# Patient Record
Sex: FEMALE | Race: BLACK OR AFRICAN AMERICAN | Employment: UNEMPLOYED | URBAN - METROPOLITAN AREA
[De-identification: names, ages, dates, MRNs, and addresses within clinical notes are randomized per-mention and may not be internally consistent; named-entity substitution may affect disease eponyms.]

---

## 2019-12-11 ENCOUNTER — OFFICE VISIT (OUTPATIENT)
Dept: FAMILY MEDICINE CLINIC | Age: 49
End: 2019-12-11

## 2019-12-11 VITALS
WEIGHT: 97 LBS | HEART RATE: 84 BPM | OXYGEN SATURATION: 98 % | RESPIRATION RATE: 12 BRPM | HEIGHT: 58 IN | BODY MASS INDEX: 20.36 KG/M2 | TEMPERATURE: 98.5 F

## 2019-12-11 DIAGNOSIS — K21.9 GASTROESOPHAGEAL REFLUX DISEASE WITHOUT ESOPHAGITIS: ICD-10-CM

## 2019-12-11 DIAGNOSIS — R56.9 SEIZURE (HCC): Primary | ICD-10-CM

## 2019-12-11 DIAGNOSIS — Q90.9 DOWN'S SYNDROME: ICD-10-CM

## 2019-12-11 PROBLEM — K59.00 CONSTIPATION: Status: ACTIVE | Noted: 2019-12-11

## 2019-12-11 PROBLEM — E61.1 IRON DEFICIENCY: Status: ACTIVE | Noted: 2019-12-11

## 2019-12-11 RX ORDER — LACOSAMIDE 50 MG/1
50 TABLET ORAL
COMMUNITY
Start: 2019-12-11 | End: 2020-03-17

## 2019-12-11 RX ORDER — OMEPRAZOLE 40 MG/1
40 CAPSULE, DELAYED RELEASE ORAL DAILY
COMMUNITY
End: 2020-03-17

## 2019-12-11 RX ORDER — ASPIRIN 81 MG
TABLET, DELAYED RELEASE (ENTERIC COATED) ORAL
COMMUNITY
End: 2020-03-17

## 2019-12-11 RX ORDER — LANOLIN ALCOHOL/MO/W.PET/CERES
CREAM (GRAM) TOPICAL
COMMUNITY
End: 2020-03-17

## 2019-12-11 RX ORDER — PREDNISONE 10 MG/1
TABLET ORAL
COMMUNITY
End: 2019-12-11 | Stop reason: ALTCHOICE

## 2019-12-11 RX ORDER — LACOSAMIDE 50 MG/1
50 TABLET ORAL 2 TIMES DAILY
COMMUNITY
End: 2019-12-11 | Stop reason: ALTCHOICE

## 2019-12-11 RX ORDER — LACOSAMIDE 100 MG/1
100 TABLET ORAL
COMMUNITY
Start: 2019-12-11 | End: 2020-03-17

## 2019-12-11 RX ORDER — LACOSAMIDE 100 MG/1
100 TABLET ORAL 2 TIMES DAILY
COMMUNITY
End: 2019-12-11 | Stop reason: ALTCHOICE

## 2019-12-11 RX ORDER — POLYETHYLENE GLYCOL 3350
POWDER (GRAM) MISCELLANEOUS
COMMUNITY
End: 2020-03-17

## 2019-12-11 RX ORDER — DIAZEPAM 10 MG/1
10 TABLET ORAL AS NEEDED
COMMUNITY
End: 2020-03-27

## 2019-12-11 RX ORDER — GUAIFENESIN 100 MG/5ML
81 LIQUID (ML) ORAL DAILY
Status: ON HOLD | COMMUNITY
End: 2021-01-01

## 2019-12-11 NOTE — PROGRESS NOTES
HISTORY OF PRESENT ILLNESS  Trae Crowley is a 51 y.o. female. HPI  Pt in office today to establish care - presents with group home and ?mom  -pt's care giver states that she has been having abnormal behaviors  -pt's care giver states she has had seizures - followed by neuro, on Vimpat, see med list  -pt's care givers states in recent mo pt has had a decline - not sure if med, seizure or CVA related, also followed by neuro  -they state that pt has been showing signs of possible dementia, pt was walking and now she is not  -pt has swelling in her hands   - pt went to the er for this and states that they say it was an allergic reaction to med - maybe Depakote, on 12d pred taper  Blisters have stabilized but not popped yet, on eze hands  The FamHx, SocHx, MedHx, Medication, and Allergy lists have been reviewed and updated in the chart. ROS  A comprehensive review of system was obtained and negative except findings in the HPI    Visit Vitals  Pulse 84   Temp 98.5 °F (36.9 °C) (Oral)   Resp 12   Ht 4' 10\" (1.473 m)   Wt 97 lb (44 kg)   LMP 09/05/2019   SpO2 98%   BMI 20.27 kg/m²     Physical Exam  Vitals signs and nursing note reviewed. Constitutional:       Appearance: She is well-developed. Comments:      Neck:      Vascular: No JVD. Cardiovascular:      Rate and Rhythm: Normal rate and regular rhythm. Heart sounds: No murmur. No friction rub. No gallop. Pulmonary:      Effort: Pulmonary effort is normal. No respiratory distress. Breath sounds: Normal breath sounds. No wheezing. Skin:     General: Skin is warm. Neurological:      Mental Status: She is alert and oriented to person, place, and time. ASSESSMENT and PLAN  Encounter Diagnoses   Name Primary?     Seizure ([address] Yes     Orders Placed This Encounter    aspirin 81 mg chewable tablet    docusate sodium (DOK) 100 mg tab    ferrous sulfate 325 mg (65 mg iron) tablet    omeprazole (PRILOSEC) 40 mg capsule    diazePAM (VALIUM) 10 mg tablet    Polyethylene Glycol 3350 powd    lacosamide (VIMPAT) 100 mg tab tablet    lacosamide (VIMPAT) 50 mg tab tablet     Med list updated  Will do refills in the future as long as they are not neuro related  Will set up cpx with labs next mo    I have discussed the diagnosis with the patient and the intended plan as seen in the above orders. The patient has received an after-visit summary and questions were answered concerning future plans. Patient conveyed understanding of the plan at the time of the visit.     Whit Zhang, MSN, ANP  12/11/2019

## 2019-12-11 NOTE — PROGRESS NOTES
Chief Complaint   Patient presents with   [address]     Pt in office today to establish care  -pt's care giver states that she has been having abnormal behaviors  -pt's care giver states she has had seizures  -pt's care givers states in recent mo pt has had a decline  -they state that pt has been showing signs of possible dementia, pt was walking and now she is not  -pt has swelling in her hands   - pt went to the er for this and states that they say it was an allergic reaction to med    1. Have you been to the ER, urgent care clinic since your last visit? Hospitalized since your last visit? Yes When: [facility]    2. Have you seen or consulted any other health care providers outside of the [address] [place] [address] since your last visit? Include any pap smears or colon screening.  No     Pt has no other concerns

## 2020-01-01 ENCOUNTER — DOCUMENTATION ONLY (OUTPATIENT)
Dept: FAMILY MEDICINE CLINIC | Age: 50
End: 2020-01-01

## 2020-01-01 ENCOUNTER — OFFICE VISIT (OUTPATIENT)
Dept: FAMILY MEDICINE CLINIC | Age: 50
End: 2020-01-01

## 2020-01-01 ENCOUNTER — HOSPITAL ENCOUNTER (OUTPATIENT)
Dept: GENERAL RADIOLOGY | Age: 50
Discharge: HOME | End: 2020-10-08
Attending: FAMILY MEDICINE
Payer: MEDICAID

## 2020-01-01 ENCOUNTER — TELEPHONE (OUTPATIENT)
Dept: FAMILY MEDICINE CLINIC | Age: 50
End: 2020-01-01

## 2020-01-01 ENCOUNTER — OFFICE VISIT (OUTPATIENT)
Dept: FAMILY MEDICINE CLINIC | Age: 50
End: 2020-01-01
Payer: MEDICAID

## 2020-01-01 ENCOUNTER — APPOINTMENT (OUTPATIENT)
Dept: GENERAL RADIOLOGY | Age: 50
End: 2020-01-01
Attending: STUDENT IN AN ORGANIZED HEALTH CARE EDUCATION/TRAINING PROGRAM
Payer: MEDICAID

## 2020-01-01 ENCOUNTER — ANESTHESIA EVENT (OUTPATIENT)
Dept: ENDOSCOPY | Age: 50
End: 2020-01-01
Payer: MEDICAID

## 2020-01-01 ENCOUNTER — ANESTHESIA (OUTPATIENT)
Dept: ENDOSCOPY | Age: 50
End: 2020-01-01
Payer: MEDICAID

## 2020-01-01 ENCOUNTER — VIRTUAL VISIT (OUTPATIENT)
Dept: FAMILY MEDICINE CLINIC | Age: 50
End: 2020-01-01

## 2020-01-01 ENCOUNTER — APPOINTMENT (OUTPATIENT)
Dept: CT IMAGING | Age: 50
End: 2020-01-01
Attending: STUDENT IN AN ORGANIZED HEALTH CARE EDUCATION/TRAINING PROGRAM
Payer: MEDICAID

## 2020-01-01 ENCOUNTER — HOSPITAL ENCOUNTER (OUTPATIENT)
Age: 50
Setting detail: OBSERVATION
Discharge: HOME | End: 2020-07-02
Attending: STUDENT IN AN ORGANIZED HEALTH CARE EDUCATION/TRAINING PROGRAM | Admitting: FAMILY MEDICINE
Payer: MEDICAID

## 2020-01-01 ENCOUNTER — HOSPITAL ENCOUNTER (OUTPATIENT)
Dept: ULTRASOUND IMAGING | Age: 50
Discharge: HOME | End: 2020-09-25
Attending: INTERNAL MEDICINE
Payer: MEDICAID

## 2020-01-01 ENCOUNTER — HOSPITAL ENCOUNTER (OUTPATIENT)
Dept: NUTRITION | Age: 50
Discharge: HOME | End: 2020-08-04

## 2020-01-01 ENCOUNTER — HOSPITAL ENCOUNTER (OUTPATIENT)
Age: 50
Setting detail: OUTPATIENT SURGERY
Discharge: HOME | End: 2020-07-08
Attending: INTERNAL MEDICINE | Admitting: INTERNAL MEDICINE
Payer: MEDICAID

## 2020-01-01 VITALS
TEMPERATURE: 98.6 F | HEIGHT: 58 IN | SYSTOLIC BLOOD PRESSURE: 129 MMHG | OXYGEN SATURATION: 99 % | WEIGHT: 110 LBS | DIASTOLIC BLOOD PRESSURE: 60 MMHG | HEART RATE: 98 BPM | BODY MASS INDEX: 23.09 KG/M2 | RESPIRATION RATE: 18 BRPM

## 2020-01-01 VITALS
HEIGHT: 57 IN | DIASTOLIC BLOOD PRESSURE: 64 MMHG | TEMPERATURE: 98.4 F | BODY MASS INDEX: 23.8 KG/M2 | OXYGEN SATURATION: 98 % | RESPIRATION RATE: 20 BRPM | SYSTOLIC BLOOD PRESSURE: 94 MMHG | HEART RATE: 98 BPM

## 2020-01-01 VITALS
OXYGEN SATURATION: 100 % | HEART RATE: 103 BPM | SYSTOLIC BLOOD PRESSURE: 116 MMHG | HEIGHT: 58 IN | RESPIRATION RATE: 17 BRPM | BODY MASS INDEX: 23.09 KG/M2 | TEMPERATURE: 98.2 F | WEIGHT: 110 LBS | DIASTOLIC BLOOD PRESSURE: 79 MMHG

## 2020-01-01 VITALS
DIASTOLIC BLOOD PRESSURE: 64 MMHG | RESPIRATION RATE: 16 BRPM | SYSTOLIC BLOOD PRESSURE: 106 MMHG | OXYGEN SATURATION: 94 % | HEIGHT: 58 IN | HEART RATE: 87 BPM | TEMPERATURE: 98.2 F | BODY MASS INDEX: 22.99 KG/M2

## 2020-01-01 VITALS
HEART RATE: 92 BPM | DIASTOLIC BLOOD PRESSURE: 81 MMHG | RESPIRATION RATE: 16 BRPM | SYSTOLIC BLOOD PRESSURE: 130 MMHG | OXYGEN SATURATION: 94 % | TEMPERATURE: 98.3 F

## 2020-01-01 VITALS — HEIGHT: 58 IN | BODY MASS INDEX: 23 KG/M2

## 2020-01-01 DIAGNOSIS — G47.00 INSOMNIA, UNSPECIFIED TYPE: ICD-10-CM

## 2020-01-01 DIAGNOSIS — F72 SEVERE INTELLECTUAL DISABILITY: ICD-10-CM

## 2020-01-01 DIAGNOSIS — A04.72 C. DIFFICILE COLITIS: Primary | ICD-10-CM

## 2020-01-01 DIAGNOSIS — K59.04 CHRONIC IDIOPATHIC CONSTIPATION: Primary | ICD-10-CM

## 2020-01-01 DIAGNOSIS — L98.9 SKIN LESION: ICD-10-CM

## 2020-01-01 DIAGNOSIS — R32 URINARY INCONTINENCE, UNSPECIFIED TYPE: Primary | ICD-10-CM

## 2020-01-01 DIAGNOSIS — B37.0 ORAL THRUSH: ICD-10-CM

## 2020-01-01 DIAGNOSIS — K59.04 CHRONIC IDIOPATHIC CONSTIPATION: ICD-10-CM

## 2020-01-01 DIAGNOSIS — Z99.3 WHEELCHAIR BOUND: ICD-10-CM

## 2020-01-01 DIAGNOSIS — Z13.6 ENCOUNTER FOR LIPID SCREENING FOR CARDIOVASCULAR DISEASE: ICD-10-CM

## 2020-01-01 DIAGNOSIS — R19.7 DIARRHEA OF PRESUMED INFECTIOUS ORIGIN: Primary | ICD-10-CM

## 2020-01-01 DIAGNOSIS — R26.2 INABILITY TO WALK: ICD-10-CM

## 2020-01-01 DIAGNOSIS — R79.89 ELEVATED LFTS: Primary | ICD-10-CM

## 2020-01-01 DIAGNOSIS — Z51.81 MEDICATION MONITORING ENCOUNTER: ICD-10-CM

## 2020-01-01 DIAGNOSIS — R74.8 ELEVATED LIVER ENZYMES: ICD-10-CM

## 2020-01-01 DIAGNOSIS — K62.5 RECTAL BLEEDING: ICD-10-CM

## 2020-01-01 DIAGNOSIS — R79.89 ELEVATED LFTS: ICD-10-CM

## 2020-01-01 DIAGNOSIS — Z13.220 ENCOUNTER FOR LIPID SCREENING FOR CARDIOVASCULAR DISEASE: ICD-10-CM

## 2020-01-01 DIAGNOSIS — A04.72 C. DIFFICILE DIARRHEA: Primary | ICD-10-CM

## 2020-01-01 DIAGNOSIS — Z78.9 ON TUBE FEEDING DIET: ICD-10-CM

## 2020-01-01 DIAGNOSIS — Z23 NEEDS FLU SHOT: ICD-10-CM

## 2020-01-01 DIAGNOSIS — Z99.3 WHEELCHAIR BOUND: Primary | ICD-10-CM

## 2020-01-01 LAB
ABO + RH BLD: NORMAL
ALBUMIN SERPL-MCNC: 2.2 G/DL (ref 3.5–5)
ALBUMIN SERPL-MCNC: 2.2 G/DL (ref 3.5–5)
ALBUMIN SERPL-MCNC: 2.3 G/DL (ref 3.5–5)
ALBUMIN SERPL-MCNC: 4 G/DL (ref 3.8–4.8)
ALBUMIN SERPL-MCNC: 4 G/DL (ref 3.8–4.8)
ALBUMIN SERPL-MCNC: 4.2 G/DL (ref 3.8–4.8)
ALBUMIN/GLOB SERPL: 0.5 {RATIO} (ref 1.1–2.2)
ALBUMIN/GLOB SERPL: 0.6 {RATIO} (ref 1.1–2.2)
ALBUMIN/GLOB SERPL: 0.6 {RATIO} (ref 1.1–2.2)
ALP BONE CFR SERPL: 14 % (ref 14–68)
ALP INTEST CFR SERPL: 0 % (ref 0–18)
ALP LIVER CFR SERPL: 86 % (ref 18–85)
ALP SERPL-CCNC: 193 U/L (ref 45–117)
ALP SERPL-CCNC: 203 U/L (ref 45–117)
ALP SERPL-CCNC: 214 U/L (ref 45–117)
ALP SERPL-CCNC: 253 IU/L (ref 39–117)
ALP SERPL-CCNC: 255 IU/L (ref 39–117)
ALP SERPL-CCNC: 274 IU/L (ref 39–117)
ALT SERPL-CCNC: 104 U/L (ref 12–78)
ALT SERPL-CCNC: 111 U/L (ref 12–78)
ALT SERPL-CCNC: 112 U/L (ref 12–78)
ALT SERPL-CCNC: 114 IU/L (ref 0–32)
ALT SERPL-CCNC: 116 IU/L (ref 0–32)
ALT SERPL-CCNC: 71 IU/L (ref 0–32)
ANION GAP SERPL CALC-SCNC: 3 MMOL/L (ref 5–15)
ANION GAP SERPL CALC-SCNC: 5 MMOL/L (ref 5–15)
ANION GAP SERPL CALC-SCNC: 7 MMOL/L (ref 5–15)
AST SERPL-CCNC: 38 IU/L (ref 0–40)
AST SERPL-CCNC: 60 IU/L (ref 0–40)
AST SERPL-CCNC: 64 U/L (ref 15–37)
AST SERPL-CCNC: 66 U/L (ref 15–37)
AST SERPL-CCNC: 67 IU/L (ref 0–40)
AST SERPL-CCNC: 77 U/L (ref 15–37)
BACTERIA SPEC CULT: NORMAL
BASOPHILS # BLD: 0 K/UL (ref 0–0.1)
BASOPHILS NFR BLD: 0 % (ref 0–1)
BILIRUB DIRECT SERPL-MCNC: 0.06 MG/DL (ref 0–0.4)
BILIRUB DIRECT SERPL-MCNC: 0.08 MG/DL (ref 0–0.4)
BILIRUB DIRECT SERPL-MCNC: 0.08 MG/DL (ref 0–0.4)
BILIRUB SERPL-MCNC: 0.1 MG/DL (ref 0.2–1)
BILIRUB SERPL-MCNC: 0.1 MG/DL (ref 0.2–1)
BILIRUB SERPL-MCNC: 0.2 MG/DL (ref 0.2–1)
BILIRUB SERPL-MCNC: <0.2 MG/DL (ref 0–1.2)
BLOOD GROUP ANTIBODIES SERPL: NORMAL
BUN SERPL-MCNC: 12 MG/DL (ref 6–20)
BUN SERPL-MCNC: 12 MG/DL (ref 6–20)
BUN SERPL-MCNC: 16 MG/DL (ref 6–20)
BUN/CREAT SERPL: 23 (ref 12–20)
BUN/CREAT SERPL: 26 (ref 12–20)
BUN/CREAT SERPL: 26 (ref 12–20)
C DIFF TOX A+B STL QL IA: POSITIVE
CALCIUM SERPL-MCNC: 8 MG/DL (ref 8.5–10.1)
CALCIUM SERPL-MCNC: 8.1 MG/DL (ref 8.5–10.1)
CALCIUM SERPL-MCNC: 8.4 MG/DL (ref 8.5–10.1)
CAMPYLOBACTER SPECIES, DNA: NEGATIVE
CHLORIDE SERPL-SCNC: 105 MMOL/L (ref 97–108)
CHLORIDE SERPL-SCNC: 108 MMOL/L (ref 97–108)
CHLORIDE SERPL-SCNC: 109 MMOL/L (ref 97–108)
CHOLEST SERPL-MCNC: 277 MG/DL (ref 100–199)
CO2 SERPL-SCNC: 26 MMOL/L (ref 21–32)
CO2 SERPL-SCNC: 26 MMOL/L (ref 21–32)
CO2 SERPL-SCNC: 27 MMOL/L (ref 21–32)
COMMENT, HOLDF: NORMAL
CREAT SERPL-MCNC: 0.46 MG/DL (ref 0.55–1.02)
CREAT SERPL-MCNC: 0.53 MG/DL (ref 0.55–1.02)
CREAT SERPL-MCNC: 0.61 MG/DL (ref 0.55–1.02)
CRP SERPL-MCNC: 2.98 MG/DL (ref 0–0.6)
D DIMER PPP FEU-MCNC: 3.84 MG/L FEU (ref 0–0.65)
DIFFERENTIAL METHOD BLD: ABNORMAL
ENTEROTOXIGEN E COLI, DNA: NEGATIVE
EOSINOPHIL # BLD: 0 K/UL (ref 0–0.4)
EOSINOPHIL NFR BLD: 0 % (ref 0–7)
EOSINOPHIL NFR BLD: 0 % (ref 0–7)
EOSINOPHIL NFR BLD: 1 % (ref 0–7)
ERYTHROCYTE [DISTWIDTH] IN BLOOD BY AUTOMATED COUNT: 14.3 % (ref 11.5–14.5)
ERYTHROCYTE [DISTWIDTH] IN BLOOD BY AUTOMATED COUNT: 14.3 % (ref 11.5–14.5)
ERYTHROCYTE [DISTWIDTH] IN BLOOD BY AUTOMATED COUNT: 14.4 % (ref 11.5–14.5)
FERRITIN SERPL-MCNC: 162 NG/ML (ref 26–388)
GLOBULIN SER CALC-MCNC: 3.8 G/DL (ref 2–4)
GLOBULIN SER CALC-MCNC: 3.9 G/DL (ref 2–4)
GLOBULIN SER CALC-MCNC: 4.2 G/DL (ref 2–4)
GLUCOSE SERPL-MCNC: 102 MG/DL (ref 65–100)
GLUCOSE SERPL-MCNC: 105 MG/DL (ref 65–100)
GLUCOSE SERPL-MCNC: 113 MG/DL (ref 65–100)
HCT VFR BLD AUTO: 38.9 % (ref 35–47)
HCT VFR BLD AUTO: 39.6 % (ref 35–47)
HCT VFR BLD AUTO: 39.8 % (ref 35–47)
HDLC SERPL-MCNC: 67 MG/DL
HEMOCCULT STL QL: POSITIVE
HGB BLD-MCNC: 12.9 G/DL (ref 11.5–16)
HGB BLD-MCNC: 13.3 G/DL (ref 11.5–16)
HGB BLD-MCNC: 13.3 G/DL (ref 11.5–16)
IL6 SERPL-MCNC: 6.5 PG/ML (ref 0–15.5)
IMM GRANULOCYTES # BLD AUTO: 0.1 K/UL (ref 0–0.04)
IMM GRANULOCYTES NFR BLD AUTO: 1 % (ref 0–0.5)
INTERPRETATION, 910389: NORMAL
LACTATE SERPL-SCNC: 1.6 MMOL/L (ref 0.4–2)
LDH SERPL L TO P-CCNC: 165 U/L (ref 81–246)
LDLC SERPL CALC-MCNC: 180 MG/DL (ref 0–99)
LEVETIRACETAM SERPL-MCNC: 79.4 UG/ML (ref 10–40)
LEVETIRACETAM SERPL-MCNC: 9.5 UG/ML (ref 10–40)
LIPASE SERPL-CCNC: 50 U/L (ref 73–393)
LYMPHOCYTES # BLD: 2.1 K/UL (ref 0.8–3.5)
LYMPHOCYTES # BLD: 2.4 K/UL (ref 0.8–3.5)
LYMPHOCYTES # BLD: 2.6 K/UL (ref 0.8–3.5)
LYMPHOCYTES NFR BLD: 23 % (ref 12–49)
LYMPHOCYTES NFR BLD: 28 % (ref 12–49)
LYMPHOCYTES NFR BLD: 32 % (ref 12–49)
MAGNESIUM SERPL-MCNC: 2.1 MG/DL (ref 1.6–2.4)
MCH RBC QN AUTO: 32.1 PG (ref 26–34)
MCH RBC QN AUTO: 32.4 PG (ref 26–34)
MCH RBC QN AUTO: 32.5 PG (ref 26–34)
MCHC RBC AUTO-ENTMCNC: 33.2 G/DL (ref 30–36.5)
MCHC RBC AUTO-ENTMCNC: 33.4 G/DL (ref 30–36.5)
MCHC RBC AUTO-ENTMCNC: 33.6 G/DL (ref 30–36.5)
MCV RBC AUTO: 96.4 FL (ref 80–99)
MCV RBC AUTO: 96.8 FL (ref 80–99)
MCV RBC AUTO: 97.3 FL (ref 80–99)
MONOCYTES # BLD: 0.4 K/UL (ref 0–1)
MONOCYTES # BLD: 0.5 K/UL (ref 0–1)
MONOCYTES # BLD: 0.5 K/UL (ref 0–1)
MONOCYTES NFR BLD: 5 % (ref 5–13)
MONOCYTES NFR BLD: 6 % (ref 5–13)
MONOCYTES NFR BLD: 6 % (ref 5–13)
NEUTS SEG # BLD: 4.5 K/UL (ref 1.8–8)
NEUTS SEG # BLD: 6 K/UL (ref 1.8–8)
NEUTS SEG # BLD: 6.6 K/UL (ref 1.8–8)
NEUTS SEG NFR BLD: 60 % (ref 32–75)
NEUTS SEG NFR BLD: 65 % (ref 32–75)
NEUTS SEG NFR BLD: 71 % (ref 32–75)
NRBC # BLD: 0 K/UL (ref 0–0.01)
NRBC BLD-RTO: 0 PER 100 WBC
O+P SPEC MICRO: NORMAL
O+P STL CONC: NORMAL
P SHIGELLOIDES DNA STL QL NAA+PROBE: NEGATIVE
PHENYTOIN SERPL-MCNC: 10.2 UG/ML (ref 10–20)
PHENYTOIN SERPL-MCNC: 33.3 UG/ML (ref 10–20)
PLATELET # BLD AUTO: 287 K/UL (ref 150–400)
PLATELET # BLD AUTO: 308 K/UL (ref 150–400)
PLATELET # BLD AUTO: 320 K/UL (ref 150–400)
PMV BLD AUTO: 10 FL (ref 8.9–12.9)
PMV BLD AUTO: 10 FL (ref 8.9–12.9)
PMV BLD AUTO: 9.9 FL (ref 8.9–12.9)
POTASSIUM SERPL-SCNC: 3.8 MMOL/L (ref 3.5–5.1)
POTASSIUM SERPL-SCNC: 4 MMOL/L (ref 3.5–5.1)
POTASSIUM SERPL-SCNC: 4 MMOL/L (ref 3.5–5.1)
PROT SERPL-MCNC: 6 G/DL (ref 6.4–8.2)
PROT SERPL-MCNC: 6.1 G/DL (ref 6.4–8.2)
PROT SERPL-MCNC: 6.5 G/DL (ref 6.4–8.2)
PROT SERPL-MCNC: 7.5 G/DL (ref 6–8.5)
PROT SERPL-MCNC: 7.5 G/DL (ref 6–8.5)
PROT SERPL-MCNC: 7.7 G/DL (ref 6–8.5)
RBC # BLD AUTO: 4.02 M/UL (ref 3.8–5.2)
RBC # BLD AUTO: 4.09 M/UL (ref 3.8–5.2)
RBC # BLD AUTO: 4.11 M/UL (ref 3.8–5.2)
SALMONELLA SPECIES, DNA: NEGATIVE
SAMPLES BEING HELD,HOLD: NORMAL
SARS-COV-2, COV2: NOT DETECTED
SERVICE CMNT-IMP: NORMAL
SHIGA TOXIN PRODUCING, DNA: NEGATIVE
SHIGELLA SP+EIEC IPAH STL QL NAA+PROBE: NEGATIVE
SODIUM SERPL-SCNC: 138 MMOL/L (ref 136–145)
SODIUM SERPL-SCNC: 139 MMOL/L (ref 136–145)
SODIUM SERPL-SCNC: 139 MMOL/L (ref 136–145)
SOURCE, COVRS: NORMAL
SPECIMEN EXP DATE BLD: NORMAL
SPECIMEN SOURCE, FCOV2M: NORMAL
SPECIMEN SOURCE: NORMAL
SPECIMEN STATUS REPORT, ROLRST: NORMAL
TRIGL SERPL-MCNC: 165 MG/DL (ref 0–149)
VIBRIO SPECIES, DNA: NEGATIVE
VLDLC SERPL CALC-MCNC: 30 MG/DL (ref 5–40)
WBC # BLD AUTO: 7.5 K/UL (ref 3.6–11)
WBC # BLD AUTO: 9.3 K/UL (ref 3.6–11)
WBC # BLD AUTO: 9.3 K/UL (ref 3.6–11)
WBC #/AREA STL HPF: NORMAL /HPF (ref 0–4)
Y. ENTEROCOLITICA, DNA: NEGATIVE

## 2020-01-01 PROCEDURE — 74011250636 HC RX REV CODE- 250/636: Performed by: NURSE ANESTHETIST, CERTIFIED REGISTERED

## 2020-01-01 PROCEDURE — 80053 COMPREHEN METABOLIC PANEL: CPT

## 2020-01-01 PROCEDURE — 99218 HC RM OBSERVATION: CPT

## 2020-01-01 PROCEDURE — 36415 COLL VENOUS BLD VENIPUNCTURE: CPT

## 2020-01-01 PROCEDURE — 74011250637 HC RX REV CODE- 250/637: Performed by: FAMILY MEDICINE

## 2020-01-01 PROCEDURE — 94761 N-INVAS EAR/PLS OXIMETRY MLT: CPT

## 2020-01-01 PROCEDURE — 74011250636 HC RX REV CODE- 250/636: Performed by: FAMILY MEDICINE

## 2020-01-01 PROCEDURE — 76705 ECHO EXAM OF ABDOMEN: CPT

## 2020-01-01 PROCEDURE — 85379 FIBRIN DEGRADATION QUANT: CPT

## 2020-01-01 PROCEDURE — 31720 CLEARANCE OF AIRWAYS: CPT

## 2020-01-01 PROCEDURE — 74011250636 HC RX REV CODE- 250/636: Performed by: STUDENT IN AN ORGANIZED HEALTH CARE EDUCATION/TRAINING PROGRAM

## 2020-01-01 PROCEDURE — 65660000000 HC RM CCU STEPDOWN

## 2020-01-01 PROCEDURE — 87040 BLOOD CULTURE FOR BACTERIA: CPT

## 2020-01-01 PROCEDURE — 77030006998

## 2020-01-01 PROCEDURE — 74011000250 HC RX REV CODE- 250: Performed by: NURSE ANESTHETIST, CERTIFIED REGISTERED

## 2020-01-01 PROCEDURE — 99214 OFFICE O/P EST MOD 30 MIN: CPT | Performed by: FAMILY MEDICINE

## 2020-01-01 PROCEDURE — 85025 COMPLETE CBC W/AUTO DIFF WBC: CPT

## 2020-01-01 PROCEDURE — 74011000258 HC RX REV CODE- 258: Performed by: NURSE ANESTHETIST, CERTIFIED REGISTERED

## 2020-01-01 PROCEDURE — 96372 THER/PROPH/DIAG INJ SC/IM: CPT

## 2020-01-01 PROCEDURE — 74011250637 HC RX REV CODE- 250/637: Performed by: STUDENT IN AN ORGANIZED HEALTH CARE EDUCATION/TRAINING PROGRAM

## 2020-01-01 PROCEDURE — 74011636320 HC RX REV CODE- 636/320: Performed by: RADIOLOGY

## 2020-01-01 PROCEDURE — 99285 EMERGENCY DEPT VISIT HI MDM: CPT

## 2020-01-01 PROCEDURE — 83605 ASSAY OF LACTIC ACID: CPT

## 2020-01-01 PROCEDURE — 94760 N-INVAS EAR/PLS OXIMETRY 1: CPT

## 2020-01-01 PROCEDURE — 65270000029 HC RM PRIVATE

## 2020-01-01 PROCEDURE — 77030018786 HC NDL GD F/USND BARD -B

## 2020-01-01 PROCEDURE — 89055 LEUKOCYTE ASSESSMENT FECAL: CPT

## 2020-01-01 PROCEDURE — 90471 IMMUNIZATION ADMIN: CPT

## 2020-01-01 PROCEDURE — C1751 CATH, INF, PER/CENT/MIDLINE: HCPCS

## 2020-01-01 PROCEDURE — 86140 C-REACTIVE PROTEIN: CPT

## 2020-01-01 PROCEDURE — 83735 ASSAY OF MAGNESIUM: CPT

## 2020-01-01 PROCEDURE — 83690 ASSAY OF LIPASE: CPT

## 2020-01-01 PROCEDURE — 74177 CT ABD & PELVIS W/CONTRAST: CPT

## 2020-01-01 PROCEDURE — 83615 LACTATE (LD) (LDH) ENZYME: CPT

## 2020-01-01 PROCEDURE — 76937 US GUIDE VASCULAR ACCESS: CPT

## 2020-01-01 PROCEDURE — 87506 IADNA-DNA/RNA PROBE TQ 6-11: CPT

## 2020-01-01 PROCEDURE — 74019 RADEX ABDOMEN 2 VIEWS: CPT

## 2020-01-01 PROCEDURE — 82728 ASSAY OF FERRITIN: CPT

## 2020-01-01 PROCEDURE — 87635 SARS-COV-2 COVID-19 AMP PRB: CPT

## 2020-01-01 PROCEDURE — 86900 BLOOD TYPING SEROLOGIC ABO: CPT

## 2020-01-01 PROCEDURE — 82272 OCCULT BLD FECES 1-3 TESTS: CPT

## 2020-01-01 PROCEDURE — 83520 IMMUNOASSAY QUANT NOS NONAB: CPT

## 2020-01-01 PROCEDURE — 90686 IIV4 VACC NO PRSV 0.5 ML IM: CPT

## 2020-01-01 PROCEDURE — 74011000250 HC RX REV CODE- 250: Performed by: INTERNAL MEDICINE

## 2020-01-01 PROCEDURE — 87177 OVA AND PARASITES SMEARS: CPT

## 2020-01-01 PROCEDURE — 74178 CT ABD&PLV WO CNTR FLWD CNTR: CPT

## 2020-01-01 PROCEDURE — 76040000019: Performed by: INTERNAL MEDICINE

## 2020-01-01 PROCEDURE — 76060000031 HC ANESTHESIA FIRST 0.5 HR: Performed by: INTERNAL MEDICINE

## 2020-01-01 PROCEDURE — 99213 OFFICE O/P EST LOW 20 MIN: CPT | Performed by: FAMILY MEDICINE

## 2020-01-01 RX ORDER — EPINEPHRINE 0.1 MG/ML
1 INJECTION INTRACARDIAC; INTRAVENOUS
Status: CANCELLED | OUTPATIENT
Start: 2020-01-01 | End: 2020-01-01

## 2020-01-01 RX ORDER — PHENYTOIN 125 MG/5ML
125 SUSPENSION ORAL EVERY 12 HOURS
Status: DISCONTINUED | OUTPATIENT
Start: 2020-01-01 | End: 2020-01-01 | Stop reason: HOSPADM

## 2020-01-01 RX ORDER — SODIUM CHLORIDE 9 MG/ML
50 INJECTION, SOLUTION INTRAVENOUS CONTINUOUS
Status: DISPENSED | OUTPATIENT
Start: 2020-01-01 | End: 2020-01-01

## 2020-01-01 RX ORDER — FENTANYL CITRATE 50 UG/ML
100 INJECTION, SOLUTION INTRAMUSCULAR; INTRAVENOUS
Status: CANCELLED | OUTPATIENT
Start: 2020-01-01 | End: 2020-01-01

## 2020-01-01 RX ORDER — DEXTROMETHORPHAN/PSEUDOEPHED 2.5-7.5/.8
1.2 DROPS ORAL
Status: CANCELLED | OUTPATIENT
Start: 2020-01-01

## 2020-01-01 RX ORDER — SODIUM CHLORIDE 9 MG/ML
1000 INJECTION, SOLUTION INTRAVENOUS ONCE
Status: COMPLETED | OUTPATIENT
Start: 2020-01-01 | End: 2020-01-01

## 2020-01-01 RX ORDER — VANCOMYCIN HYDROCHLORIDE 250 MG/5ML
250 POWDER, FOR SOLUTION ORAL EVERY 6 HOURS
Qty: 160 ML | Refills: 0 | Status: SHIPPED | OUTPATIENT
Start: 2020-01-01 | End: 2020-01-01

## 2020-01-01 RX ORDER — ATORVASTATIN CALCIUM 10 MG/1
10 TABLET, FILM COATED ORAL DAILY
Qty: 30 TAB | Refills: 3 | Status: SHIPPED | OUTPATIENT
Start: 2020-01-01 | End: 2021-01-01 | Stop reason: SDUPTHER

## 2020-01-01 RX ORDER — VANCOMYCIN HYDROCHLORIDE 250 MG/5ML
250 POWDER, FOR SOLUTION ORAL EVERY 6 HOURS
Status: DISCONTINUED | OUTPATIENT
Start: 2020-01-01 | End: 2020-01-01 | Stop reason: HOSPADM

## 2020-01-01 RX ORDER — SODIUM CHLORIDE 9 MG/ML
INJECTION, SOLUTION INTRAVENOUS
Status: DISCONTINUED | OUTPATIENT
Start: 2020-01-01 | End: 2020-01-01 | Stop reason: HOSPADM

## 2020-01-01 RX ORDER — POLYETHYLENE GLYCOL 3350 17 G/17G
17 POWDER, FOR SOLUTION ORAL
COMMUNITY
End: 2020-01-01 | Stop reason: CLARIF

## 2020-01-01 RX ORDER — SODIUM CHLORIDE 0.9 % (FLUSH) 0.9 %
5-40 SYRINGE (ML) INJECTION EVERY 8 HOURS
Status: DISCONTINUED | OUTPATIENT
Start: 2020-01-01 | End: 2020-01-01 | Stop reason: HOSPADM

## 2020-01-01 RX ORDER — NALOXONE HYDROCHLORIDE 0.4 MG/ML
0.4 INJECTION, SOLUTION INTRAMUSCULAR; INTRAVENOUS; SUBCUTANEOUS
Status: ACTIVE | OUTPATIENT
Start: 2020-01-01 | End: 2020-01-01

## 2020-01-01 RX ORDER — BACITRACIN ZINC 500 UNIT/G
OINTMENT (GRAM) TOPICAL 2 TIMES DAILY
Qty: 28 G | Refills: 2 | Status: SHIPPED | OUTPATIENT
Start: 2020-01-01 | End: 2020-01-01 | Stop reason: CLARIF

## 2020-01-01 RX ORDER — ACETAMINOPHEN 500 MG
500 TABLET ORAL
COMMUNITY
End: 2020-01-01 | Stop reason: CLARIF

## 2020-01-01 RX ORDER — IPRATROPIUM BROMIDE AND ALBUTEROL SULFATE 2.5; .5 MG/3ML; MG/3ML
3 SOLUTION RESPIRATORY (INHALATION)
Status: DISCONTINUED | OUTPATIENT
Start: 2020-01-01 | End: 2020-01-01 | Stop reason: HOSPADM

## 2020-01-01 RX ORDER — PROPOFOL 10 MG/ML
INJECTION, EMULSION INTRAVENOUS
Status: DISCONTINUED | OUTPATIENT
Start: 2020-01-01 | End: 2020-01-01 | Stop reason: HOSPADM

## 2020-01-01 RX ORDER — ATROPINE SULFATE 0.1 MG/ML
0.5 INJECTION INTRAVENOUS
Status: CANCELLED | OUTPATIENT
Start: 2020-01-01 | End: 2020-01-01

## 2020-01-01 RX ORDER — POLYETHYLENE GLYCOL 3350 17 G/17G
17 POWDER, FOR SOLUTION ORAL EVERY OTHER DAY
Qty: 507 G | Refills: 11 | Status: SHIPPED | OUTPATIENT
Start: 2020-01-01 | End: 2020-01-01 | Stop reason: SDUPTHER

## 2020-01-01 RX ORDER — VANCOMYCIN HYDROCHLORIDE 250 MG/5ML
125 POWDER, FOR SOLUTION ORAL EVERY 6 HOURS
Status: DISCONTINUED | OUTPATIENT
Start: 2020-01-01 | End: 2020-01-01

## 2020-01-01 RX ORDER — PROPOFOL 10 MG/ML
INJECTION, EMULSION INTRAVENOUS AS NEEDED
Status: DISCONTINUED | OUTPATIENT
Start: 2020-01-01 | End: 2020-01-01 | Stop reason: HOSPADM

## 2020-01-01 RX ORDER — NYSTATIN 100000 [USP'U]/ML
1 SUSPENSION ORAL 4 TIMES DAILY
Qty: 473 ML | Refills: 0 | Status: SHIPPED | OUTPATIENT
Start: 2020-01-01 | End: 2020-01-01

## 2020-01-01 RX ORDER — SODIUM CHLORIDE 9 MG/ML
50 INJECTION, SOLUTION INTRAVENOUS CONTINUOUS
Status: CANCELLED | OUTPATIENT
Start: 2020-01-01 | End: 2020-01-01

## 2020-01-01 RX ORDER — EPINEPHRINE 0.1 MG/ML
1 INJECTION INTRACARDIAC; INTRAVENOUS
Status: DISCONTINUED | OUTPATIENT
Start: 2020-01-01 | End: 2020-01-01 | Stop reason: HOSPADM

## 2020-01-01 RX ORDER — FLUMAZENIL 0.1 MG/ML
0.2 INJECTION INTRAVENOUS
Status: CANCELLED | OUTPATIENT
Start: 2020-01-01 | End: 2020-01-01

## 2020-01-01 RX ORDER — ATROPINE SULFATE 10 MG/ML
1 SOLUTION/ DROPS OPHTHALMIC 3 TIMES DAILY
Status: ON HOLD | COMMUNITY
End: 2021-01-01

## 2020-01-01 RX ORDER — MIDAZOLAM HYDROCHLORIDE 1 MG/ML
.25-5 INJECTION, SOLUTION INTRAMUSCULAR; INTRAVENOUS
Status: CANCELLED | OUTPATIENT
Start: 2020-01-01

## 2020-01-01 RX ORDER — FENTANYL CITRATE 50 UG/ML
100 INJECTION, SOLUTION INTRAMUSCULAR; INTRAVENOUS
Status: ACTIVE | OUTPATIENT
Start: 2020-01-01 | End: 2020-01-01

## 2020-01-01 RX ORDER — LIDOCAINE HYDROCHLORIDE 20 MG/ML
INJECTION, SOLUTION EPIDURAL; INFILTRATION; INTRACAUDAL; PERINEURAL AS NEEDED
Status: DISCONTINUED | OUTPATIENT
Start: 2020-01-01 | End: 2020-01-01 | Stop reason: HOSPADM

## 2020-01-01 RX ORDER — FLUMAZENIL 0.1 MG/ML
0.2 INJECTION INTRAVENOUS
Status: ACTIVE | OUTPATIENT
Start: 2020-01-01 | End: 2020-01-01

## 2020-01-01 RX ORDER — ZINC SULFATE 50(220)MG
1 CAPSULE ORAL DAILY
Status: DISCONTINUED | OUTPATIENT
Start: 2020-01-01 | End: 2020-01-01

## 2020-01-01 RX ORDER — SODIUM CHLORIDE 0.9 % (FLUSH) 0.9 %
10-40 SYRINGE (ML) INJECTION EVERY 8 HOURS
Status: DISCONTINUED | OUTPATIENT
Start: 2020-01-01 | End: 2020-01-01 | Stop reason: HOSPADM

## 2020-01-01 RX ORDER — ATORVASTATIN CALCIUM 20 MG/1
20 TABLET, FILM COATED ORAL DAILY
Status: DISCONTINUED | OUTPATIENT
Start: 2020-01-01 | End: 2020-01-01

## 2020-01-01 RX ORDER — MIDAZOLAM HYDROCHLORIDE 1 MG/ML
.25-5 INJECTION, SOLUTION INTRAMUSCULAR; INTRAVENOUS
Status: DISCONTINUED | OUTPATIENT
Start: 2020-01-01 | End: 2020-01-01 | Stop reason: HOSPADM

## 2020-01-01 RX ORDER — LORAZEPAM 2 MG/ML
4 INJECTION INTRAMUSCULAR
Status: DISCONTINUED | OUTPATIENT
Start: 2020-01-01 | End: 2020-01-01 | Stop reason: HOSPADM

## 2020-01-01 RX ORDER — DIAZEPAM 10 MG/2ML
10 GEL RECTAL AS NEEDED
COMMUNITY

## 2020-01-01 RX ORDER — SODIUM CHLORIDE 0.9 % (FLUSH) 0.9 %
5-40 SYRINGE (ML) INJECTION AS NEEDED
Status: DISCONTINUED | OUTPATIENT
Start: 2020-01-01 | End: 2020-01-01 | Stop reason: HOSPADM

## 2020-01-01 RX ORDER — LANOLIN ALCOHOL/MO/W.PET/CERES
3 CREAM (GRAM) TOPICAL
Qty: 30 TAB | Refills: 11 | Status: SHIPPED | OUTPATIENT
Start: 2020-01-01

## 2020-01-01 RX ORDER — POLYETHYLENE GLYCOL 3350 17 G/17G
17 POWDER, FOR SOLUTION ORAL DAILY
Qty: 507 G | Refills: 11 | Status: SHIPPED | OUTPATIENT
Start: 2020-01-01 | End: 2020-01-01

## 2020-01-01 RX ORDER — POLYETHYLENE GLYCOL 3350 17 G/17G
17 POWDER, FOR SOLUTION ORAL EVERY OTHER DAY
Qty: 507 G | Refills: 11 | Status: SHIPPED | OUTPATIENT
Start: 2020-01-01 | End: 2021-01-01 | Stop reason: SDUPTHER

## 2020-01-01 RX ORDER — SODIUM CHLORIDE 9 MG/ML
90 INJECTION, SOLUTION INTRAVENOUS CONTINUOUS
Status: DISCONTINUED | OUTPATIENT
Start: 2020-01-01 | End: 2020-01-01

## 2020-01-01 RX ORDER — NALOXONE HYDROCHLORIDE 0.4 MG/ML
0.4 INJECTION, SOLUTION INTRAMUSCULAR; INTRAVENOUS; SUBCUTANEOUS
Status: CANCELLED | OUTPATIENT
Start: 2020-01-01 | End: 2020-01-01

## 2020-01-01 RX ORDER — DEXTROMETHORPHAN/PSEUDOEPHED 2.5-7.5/.8
1.2 DROPS ORAL
Status: DISCONTINUED | OUTPATIENT
Start: 2020-01-01 | End: 2020-01-01 | Stop reason: HOSPADM

## 2020-01-01 RX ORDER — VANCOMYCIN HYDROCHLORIDE 250 MG/5ML
250 POWDER, FOR SOLUTION ORAL EVERY 6 HOURS
Qty: 100 ML | Refills: 0 | Status: SHIPPED | OUTPATIENT
Start: 2020-01-01 | End: 2020-01-01

## 2020-01-01 RX ORDER — DIAZEPAM 10 MG/2ML
10 GEL RECTAL AS NEEDED
Status: DISCONTINUED | OUTPATIENT
Start: 2020-01-01 | End: 2020-01-01

## 2020-01-01 RX ORDER — ATROPINE SULFATE 0.1 MG/ML
0.5 INJECTION INTRAVENOUS
Status: DISCONTINUED | OUTPATIENT
Start: 2020-01-01 | End: 2020-01-01 | Stop reason: HOSPADM

## 2020-01-01 RX ORDER — ENOXAPARIN SODIUM 100 MG/ML
40 INJECTION SUBCUTANEOUS EVERY 24 HOURS
Status: DISCONTINUED | OUTPATIENT
Start: 2020-01-01 | End: 2020-01-01 | Stop reason: HOSPADM

## 2020-01-01 RX ORDER — ASCORBIC ACID 500 MG
500 TABLET ORAL 2 TIMES DAILY
Status: DISCONTINUED | OUTPATIENT
Start: 2020-01-01 | End: 2020-01-01

## 2020-01-01 RX ADMIN — Medication 10 ML: at 15:15

## 2020-01-01 RX ADMIN — ENOXAPARIN SODIUM 40 MG: 40 INJECTION SUBCUTANEOUS at 21:13

## 2020-01-01 RX ADMIN — LEVETIRACETAM 500 MG: 100 SOLUTION ORAL at 01:12

## 2020-01-01 RX ADMIN — ENOXAPARIN SODIUM 40 MG: 40 INJECTION SUBCUTANEOUS at 01:11

## 2020-01-01 RX ADMIN — LIDOCAINE HYDROCHLORIDE 70 MG: 20 INJECTION, SOLUTION INTRAVENOUS at 16:25

## 2020-01-01 RX ADMIN — VANCOMYCIN HYDROCHLORIDE 125 MG: KIT at 05:33

## 2020-01-01 RX ADMIN — IOPAMIDOL 100 ML: 755 INJECTION, SOLUTION INTRAVENOUS at 16:44

## 2020-01-01 RX ADMIN — LEVETIRACETAM 500 MG: 100 SOLUTION ORAL at 09:09

## 2020-01-01 RX ADMIN — SODIUM CHLORIDE 90 ML/HR: 900 INJECTION, SOLUTION INTRAVENOUS at 05:34

## 2020-01-01 RX ADMIN — SODIUM CHLORIDE: 9 INJECTION, SOLUTION INTRAVENOUS at 16:11

## 2020-01-01 RX ADMIN — VANCOMYCIN HYDROCHLORIDE 125 MG: KIT at 19:12

## 2020-01-01 RX ADMIN — Medication 10 ML: at 05:42

## 2020-01-01 RX ADMIN — Medication 10 ML: at 05:41

## 2020-01-01 RX ADMIN — Medication 10 ML: at 21:13

## 2020-01-01 RX ADMIN — ATORVASTATIN CALCIUM 20 MG: 20 TABLET, FILM COATED ORAL at 09:09

## 2020-01-01 RX ADMIN — Medication 10 ML: at 15:17

## 2020-01-01 RX ADMIN — LEVETIRACETAM 500 MG: 100 SOLUTION ORAL at 09:21

## 2020-01-01 RX ADMIN — LEVETIRACETAM 500 MG: 100 SOLUTION ORAL at 21:13

## 2020-01-01 RX ADMIN — VANCOMYCIN HYDROCHLORIDE 250 MG: KIT at 12:54

## 2020-01-01 RX ADMIN — PHENYLEPHRINE HYDROCHLORIDE 100 MCG: 10 INJECTION INTRAVENOUS at 16:31

## 2020-01-01 RX ADMIN — PROPOFOL 100 MCG/KG/MIN: 10 INJECTION, EMULSION INTRAVENOUS at 16:25

## 2020-01-01 RX ADMIN — OXYCODONE HYDROCHLORIDE AND ACETAMINOPHEN 500 MG: 500 TABLET ORAL at 18:49

## 2020-01-01 RX ADMIN — PHENYTOIN 125 MG: 125 SUSPENSION ORAL at 01:12

## 2020-01-01 RX ADMIN — Medication 10 ML: at 01:13

## 2020-01-01 RX ADMIN — PHENYLEPHRINE HYDROCHLORIDE 100 MCG: 10 INJECTION INTRAVENOUS at 16:34

## 2020-01-01 RX ADMIN — VANCOMYCIN HYDROCHLORIDE 250 MG: KIT at 05:41

## 2020-01-01 RX ADMIN — PHENYLEPHRINE HYDROCHLORIDE 100 MCG: 10 INJECTION INTRAVENOUS at 16:37

## 2020-01-01 RX ADMIN — SODIUM CHLORIDE 1000 ML: 900 INJECTION, SOLUTION INTRAVENOUS at 19:10

## 2020-01-01 RX ADMIN — PHENYTOIN 125 MG: 125 SUSPENSION ORAL at 09:21

## 2020-01-01 RX ADMIN — PROPOFOL 60 MG: 10 INJECTION, EMULSION INTRAVENOUS at 16:25

## 2020-01-01 RX ADMIN — VANCOMYCIN HYDROCHLORIDE 125 MG: KIT at 15:09

## 2020-01-01 RX ADMIN — VANCOMYCIN HYDROCHLORIDE 250 MG: KIT at 23:55

## 2020-01-01 RX ADMIN — VANCOMYCIN HYDROCHLORIDE 125 MG: KIT at 01:12

## 2020-01-01 RX ADMIN — Medication 250 ML: at 16:33

## 2020-01-01 RX ADMIN — ZINC SULFATE 220 MG (50 MG) CAPSULE 1 CAPSULE: CAPSULE at 09:09

## 2020-01-01 RX ADMIN — OXYCODONE HYDROCHLORIDE AND ACETAMINOPHEN 500 MG: 500 TABLET ORAL at 09:09

## 2020-01-01 RX ADMIN — PHENYTOIN 125 MG: 125 SUSPENSION ORAL at 09:09

## 2020-01-01 RX ADMIN — Medication 10 ML: at 19:05

## 2020-01-01 RX ADMIN — Medication 10 ML: at 01:12

## 2020-01-01 RX ADMIN — PHENYTOIN 125 MG: 125 SUSPENSION ORAL at 21:13

## 2020-01-09 ENCOUNTER — OFFICE VISIT (OUTPATIENT)
Dept: FAMILY MEDICINE CLINIC | Age: 50
End: 2020-01-09

## 2020-01-09 VITALS
HEART RATE: 80 BPM | TEMPERATURE: 97.3 F | SYSTOLIC BLOOD PRESSURE: 114 MMHG | OXYGEN SATURATION: 99 % | DIASTOLIC BLOOD PRESSURE: 72 MMHG | RESPIRATION RATE: 20 BRPM | WEIGHT: 98.38 LBS | HEIGHT: 58 IN | BODY MASS INDEX: 20.65 KG/M2

## 2020-01-09 DIAGNOSIS — T14.8XXA SKIN ABRASION: Primary | ICD-10-CM

## 2020-01-09 RX ORDER — CLOBAZAM 10 MG/1
TABLET ORAL DAILY
COMMUNITY
End: 2020-03-17

## 2020-01-09 RX ORDER — MUPIROCIN 20 MG/G
OINTMENT TOPICAL DAILY
Qty: 30 G | Refills: 0 | Status: SHIPPED | OUTPATIENT
Start: 2020-01-09 | End: 2020-03-17

## 2020-01-09 NOTE — PROGRESS NOTES
1. Have you been to the ER, urgent care clinic since your last visit? Hospitalized since your last visit? No    2. Have you seen or consulted any other health care providers outside of the [address] [place] [address] since your last visit? Include any pap smears or colon screening.  No       Chief Complaint   Patient presents with    Skin Problem     blisters on left arm elbow area

## 2020-01-09 NOTE — PROGRESS NOTES
Chief Complaint   Patient presents with    Skin Problem     blisters on left arm elbow area     Alka Chan is a 51 y.o. female who presents for evaluation. Lives in [facility]. Caregiver states pt had negative rxn to depakote that caused blistering rash back in November. Took predisone in December and blistering improved somewhat. Was taking depakote for seizures. Neuro changed rx to [facility] along with vimpat, still in the process of up titration onfi dosage. Here today for eval of left elbow abrasion. Was previously blister but still not fully healed. Caregiver states they want to make sure there is no infection. Reviewed PmHx, RxHx, FmHx, SocHx, AllgHx and updated and dated in the chart. Patient Active Problem List    Diagnosis    Seizure ([address]    Gastroesophageal reflux disease without esophagitis    Down's syndrome    Iron deficiency    Constipation    MR (mental retardation), severe     Mongolism           Review of Systems - negative except as listed above in the HPI    Objective:     Vitals:    01/09/20 1048   BP: 114/72   Pulse: 80   Resp: 20   Temp: 97.3 °F (36.3 °C)   TempSrc: Oral   SpO2: 99%   Weight: 98 lb 6 oz (44.6 kg)   Height: 4' 10\" (1.473 m)     Physical Examination: General appearance - alert, well appearing, and in no distress  Mental status - alert, non-verbal  Chest - clear to auscultation, no wheezes, rales or rhonchi, symmetric air entry  Heart - normal rate, regular rhythm, normal S1, S2, no murmurs, rubs, clicks or gallops  Abdomen - soft, nontender, nondistended, no masses or organomegaly  Skin - normal coloration and turgor, healing abrasion/open blister to left elbow, no surrounding erythema, no purulence, no odor, non-tender to palpation    Assessment/ Plan:   Diagnoses and all orders for this visit:    1. Skin abrasion  -     mupirocin (BACTROBAN) 2 % ointment;  Apply  to affected area daily.  - Discussed left elbow appears to be healing well, does not look infected today  - Continue to wash daily during bath and can apply Bactroban afterwards to prevent infection  - Leave open to air        Follow-up and Dispositions    · Return if symptoms worsen or fail to improve. I have discussed the diagnosis with the patient and the intended plan as seen in the above orders. The patient understands and agrees with the plan. The patient has received an after-visit summary and questions were answered concerning future plans. Medication Side Effects and Warnings were discussed with patient  Patient Labs were reviewed and or requested:  Patient Past Records were reviewed and or requested    Caitlyn Garzon NP  [facility]    There are no Patient Instructions on file for this visit.

## 2020-01-14 ENCOUNTER — HOSPITAL ENCOUNTER (OUTPATIENT)
Dept: LAB | Age: 50
Discharge: HOME | End: 2020-01-14

## 2020-01-14 ENCOUNTER — OFFICE VISIT (OUTPATIENT)
Dept: FAMILY MEDICINE CLINIC | Age: 50
End: 2020-01-14

## 2020-01-14 VITALS
SYSTOLIC BLOOD PRESSURE: 113 MMHG | WEIGHT: 98 LBS | RESPIRATION RATE: 18 BRPM | BODY MASS INDEX: 20.57 KG/M2 | OXYGEN SATURATION: 98 % | HEIGHT: 58 IN | DIASTOLIC BLOOD PRESSURE: 79 MMHG | TEMPERATURE: 97.7 F | HEART RATE: 69 BPM

## 2020-01-14 DIAGNOSIS — R56.9 SEIZURE (HCC): ICD-10-CM

## 2020-01-14 DIAGNOSIS — Z00.00 WELL ADULT EXAM: ICD-10-CM

## 2020-01-14 DIAGNOSIS — Z00.00 WELL ADULT EXAM: Primary | ICD-10-CM

## 2020-01-14 DIAGNOSIS — Z11.1 SCREENING-PULMONARY TB: ICD-10-CM

## 2020-01-14 LAB
ALBUMIN SERPL-MCNC: 3.2 G/DL (ref 3.5–5)
ALBUMIN/GLOB SERPL: 0.9 {RATIO} (ref 1.1–2.2)
ALP SERPL-CCNC: 112 U/L (ref 45–117)
ALT SERPL-CCNC: 37 U/L (ref 12–78)
ANION GAP SERPL CALC-SCNC: 5 MMOL/L (ref 5–15)
AST SERPL-CCNC: 34 U/L (ref 15–37)
BASOPHILS # BLD: 0 K/UL (ref 0–0.1)
BASOPHILS NFR BLD: 0 % (ref 0–1)
BILIRUB SERPL-MCNC: 0.2 MG/DL (ref 0.2–1)
BUN SERPL-MCNC: 14 MG/DL (ref 6–20)
BUN/CREAT SERPL: 14 (ref 12–20)
CALCIUM SERPL-MCNC: 9.1 MG/DL (ref 8.5–10.1)
CHLORIDE SERPL-SCNC: 106 MMOL/L (ref 97–108)
CHOLEST SERPL-MCNC: 227 MG/DL
CO2 SERPL-SCNC: 30 MMOL/L (ref 21–32)
CREAT SERPL-MCNC: 1 MG/DL (ref 0.55–1.02)
DIFFERENTIAL METHOD BLD: ABNORMAL
EOSINOPHIL # BLD: 0 K/UL (ref 0–0.4)
EOSINOPHIL NFR BLD: 0 % (ref 0–7)
ERYTHROCYTE [DISTWIDTH] IN BLOOD BY AUTOMATED COUNT: 15 % (ref 11.5–14.5)
GLOBULIN SER CALC-MCNC: 3.5 G/DL (ref 2–4)
GLUCOSE SERPL-MCNC: 104 MG/DL (ref 65–100)
HCT VFR BLD AUTO: 42.2 % (ref 35–47)
HDLC SERPL-MCNC: 63 MG/DL
HDLC SERPL: 3.6 {RATIO} (ref 0–5)
HGB BLD-MCNC: 13.5 G/DL (ref 11.5–16)
IMM GRANULOCYTES # BLD AUTO: 0.1 K/UL (ref 0–0.04)
IMM GRANULOCYTES NFR BLD AUTO: 1 % (ref 0–0.5)
LDLC SERPL CALC-MCNC: 143 MG/DL (ref 0–100)
LIPID PROFILE,FLP: ABNORMAL
LYMPHOCYTES # BLD: 1.6 K/UL (ref 0.8–3.5)
LYMPHOCYTES NFR BLD: 20 % (ref 12–49)
MCH RBC QN AUTO: 32.6 PG (ref 26–34)
MCHC RBC AUTO-ENTMCNC: 32 G/DL (ref 30–36.5)
MCV RBC AUTO: 101.9 FL (ref 80–99)
MONOCYTES # BLD: 0.4 K/UL (ref 0–1)
MONOCYTES NFR BLD: 5 % (ref 5–13)
NEUTS SEG # BLD: 5.8 K/UL (ref 1.8–8)
NEUTS SEG NFR BLD: 74 % (ref 32–75)
NRBC # BLD: 0 K/UL (ref 0–0.01)
NRBC BLD-RTO: 0 PER 100 WBC
PLATELET # BLD AUTO: 240 K/UL (ref 150–400)
PMV BLD AUTO: 10.9 FL (ref 8.9–12.9)
POTASSIUM SERPL-SCNC: 4.1 MMOL/L (ref 3.5–5.1)
PROT SERPL-MCNC: 6.7 G/DL (ref 6.4–8.2)
RBC # BLD AUTO: 4.14 M/UL (ref 3.8–5.2)
SODIUM SERPL-SCNC: 141 MMOL/L (ref 136–145)
TRIGL SERPL-MCNC: 105 MG/DL (ref ?–150)
TSH SERPL DL<=0.05 MIU/L-ACNC: 3.85 UIU/ML (ref 0.36–3.74)
VLDLC SERPL CALC-MCNC: 21 MG/DL
WBC # BLD AUTO: 8 K/UL (ref 3.6–11)

## 2020-01-14 NOTE — PROGRESS NOTES
Chief Complaint   Patient presents with    Labs    Fatigue     Pt in office today for labs and fatigue  -pt caregiver has concerns about patient sleeping a lot   -pt started a new med January according to her caregiver  -pt's caregiver states that this has been going on for the last 3 days    1. Have you been to the ER, urgent care clinic since your last visit? Hospitalized since your last visit? No    2. Have you seen or consulted any other health care providers outside of the [address] [place] [address] since your last visit? Include any pap smears or colon screening.  No     Pt has no other concerns

## 2020-01-14 NOTE — PROGRESS NOTES
Subjective:   51 y.o. female for Well Woman Check. Her gyne and breast care is done elsewhere by her Ob-Gyne physician. Patient Active Problem List    Diagnosis Date Noted    Seizure [facility]) 12/11/2019    Gastroesophageal reflux disease without esophagitis 12/11/2019    Down's syndrome 12/11/2019    Iron deficiency 12/11/2019    Constipation 12/11/2019    MR (mental retardation), severe 02/02/2012     Current Outpatient Medications   Medication Sig Dispense Refill    cloBAZam (ONFI) 10 mg tab tablet Take  by mouth daily.  mupirocin (BACTROBAN) 2 % ointment Apply  to affected area daily. 30 g 0    aspirin 81 mg chewable tablet Take 81 mg by mouth daily.  docusate sodium (DOK) 100 mg tab Take  by mouth.  ferrous sulfate 325 mg (65 mg iron) tablet Take  by mouth Daily (before breakfast).  omeprazole (PRILOSEC) 40 mg capsule Take 40 mg by mouth daily.  diazePAM (VALIUM) 10 mg tablet Take 10 mg by mouth every six (6) hours as needed for Anxiety.  Polyethylene Glycol 3350 powd by Does Not Apply route.  lacosamide (VIMPAT) 100 mg tab tablet Take 1 Tab by mouth nightly. Max Daily Amount: 100 mg.      lacosamide (VIMPAT) 50 mg tab tablet Take 1 Tab by mouth every morning. Max Daily Amount: 50 mg. History reviewed. No pertinent family history. Social History     Tobacco Use    Smoking status: Never Smoker    Smokeless tobacco: Never Used   Substance Use Topics    Alcohol use: Never     Frequency: Never             ROS: Feeling generally well. No TIA's or unusual headaches, no dysphagia. No prolonged cough. No dyspnea or chest pain on exertion. No abdominal pain, change in bowel habits, black or bloody stools. No urinary tract symptoms. No new or unusual musculoskeletal symptoms. Specific concerns today: she is severely MR, here for group home cpx and tb screening for the year. Objective: The patient appears well, alert, oriented x 3, in no distress.   Visit Vitals  /79 (BP 1 Location: Left arm, BP Patient Position: Sitting)   Pulse 69   Temp 97.7 °F (36.5 °C) (Oral)   Resp 18   Ht 4' 10\" (1.473 m)   Wt 98 lb (44.5 kg)   SpO2 98%   BMI 20.48 kg/m²     ENT normal.  Neck supple. No adenopathy or thyromegaly. MATTHEW. Lungs are clear, good air entry, no wheezes, rhonchi or rales. S1 and S2 normal, no murmurs, regular rate and rhythm. Extremities show no edema, normal peripheral pulses. Breast and Pelvic exams are deferred. Assessment/Plan:   Well Woman  follow low fat diet, follow low salt diet, routine labs ordered, ppd placed, recheck 2 days  Encounter Diagnoses   Name Primary?  Well adult exam Yes    Seizure ([address]     Screening-pulmonary TB      Orders Placed This Encounter    LIPID PANEL    METABOLIC PANEL, COMPREHENSIVE    CBC WITH AUTOMATED DIFF    TSH 3RD GENERATION    AMB POC TUBERCULOSIS, INTRADERMAL (SKIN TEST)     I have discussed the diagnosis with the patient and the intended plan as seen in the above orders. The patient has received an after-visit summary and questions were answered concerning future plans. Patient conveyed understanding of the plan at the time of the visit.     David Arora, MSN, ANP  1/14/2020

## 2020-01-16 LAB
MM INDURATION POC: 0 MM (ref 0–5)
PPD POC: NEGATIVE NEGATIVE

## 2020-01-18 NOTE — PROGRESS NOTES
Please let the group home know that her labs overall are stable but her cholesterol is quite high. Find out what kind of diet they have her on.  Elias Dupont

## 2020-01-22 NOTE — PROGRESS NOTES
Spoke with pt caregiver she states that ye is in [facility]. In ICU but that they have her on a regular diet.  I verbalized understanding

## 2020-03-17 ENCOUNTER — OFFICE VISIT (OUTPATIENT)
Dept: FAMILY MEDICINE CLINIC | Age: 50
End: 2020-03-17

## 2020-03-17 ENCOUNTER — HOSPITAL ENCOUNTER (OUTPATIENT)
Dept: LAB | Age: 50
Discharge: HOME | End: 2020-03-17

## 2020-03-17 VITALS
RESPIRATION RATE: 18 BRPM | TEMPERATURE: 98.3 F | DIASTOLIC BLOOD PRESSURE: 75 MMHG | WEIGHT: 98 LBS | HEIGHT: 58 IN | OXYGEN SATURATION: 90 % | BODY MASS INDEX: 20.57 KG/M2 | SYSTOLIC BLOOD PRESSURE: 112 MMHG | HEART RATE: 80 BPM

## 2020-03-17 DIAGNOSIS — B37.0 ORAL THRUSH: ICD-10-CM

## 2020-03-17 DIAGNOSIS — Z51.81 MEDICATION MONITORING ENCOUNTER: ICD-10-CM

## 2020-03-17 DIAGNOSIS — R56.9 SEIZURE (HCC): ICD-10-CM

## 2020-03-17 DIAGNOSIS — R56.9 SEIZURE (HCC): Primary | ICD-10-CM

## 2020-03-17 DIAGNOSIS — K59.00 CONSTIPATION, UNSPECIFIED CONSTIPATION TYPE: ICD-10-CM

## 2020-03-17 LAB — PHENYTOIN SERPL-MCNC: 33.8 UG/ML (ref 10–20)

## 2020-03-17 RX ORDER — PHENYTOIN 125 MG/5ML
SUSPENSION ORAL
COMMUNITY
End: 2020-03-17

## 2020-03-17 RX ORDER — NYSTATIN 100000 [USP'U]/ML
1 SUSPENSION ORAL 4 TIMES DAILY
Qty: 300 ML | Refills: 0 | Status: SHIPPED | OUTPATIENT
Start: 2020-03-17 | End: 2020-04-01

## 2020-03-17 RX ORDER — IPRATROPIUM BROMIDE AND ALBUTEROL SULFATE 2.5; .5 MG/3ML; MG/3ML
3 SOLUTION RESPIRATORY (INHALATION)
COMMUNITY

## 2020-03-17 RX ORDER — LEVETIRACETAM 500 MG/1
500 TABLET ORAL 2 TIMES DAILY
COMMUNITY
End: 2020-03-27

## 2020-03-17 RX ORDER — PHENYTOIN 125 MG/5ML
125 SUSPENSION ORAL EVERY 8 HOURS
COMMUNITY
Start: 2020-03-17 | End: 2021-01-01

## 2020-03-17 RX ORDER — NYSTATIN 100000 [USP'U]/ML
SUSPENSION ORAL 4 TIMES DAILY
COMMUNITY
End: 2020-03-17

## 2020-03-17 RX ORDER — SCOLOPAMINE TRANSDERMAL SYSTEM 1 MG/1
1 PATCH, EXTENDED RELEASE TRANSDERMAL
COMMUNITY
End: 2020-03-27

## 2020-03-17 RX ORDER — POLYETHYLENE GLYCOL 3350 17 G/17G
17 POWDER, FOR SOLUTION ORAL DAILY
Qty: 30 EACH | Refills: 5 | Status: SHIPPED | OUTPATIENT
Start: 2020-03-17 | End: 2020-03-27

## 2020-03-17 NOTE — LETTER
3/17/2020 8:49 AM 
 
Ms. Joyce Griffith [address] [facility] [phone] [id number] To Whom It May Care, If no BM in 24 hours use miralax daily if no BM in 48 hrs increase miralax to bid, if no BM in 72 hours call Dr hull. Sincerely, Kartik Hart, DO

## 2020-03-17 NOTE — PROGRESS NOTES
Brian Stinson is a 51 y.o. female   Chief Complaint   Patient presents with   [facility] Follow Up    pt here for follow up from recent extended hospitalization for extended seizures. Had mult eeg's which were showing seizure activity. Was on vimpat and onfi and this was stopped. Pt has f/u with Dr Angelo Mullen with [address] [facility], has appt for 3/24. Had a dilantin and keppra level prior to d/c. Pt is also on a fluid restriction of 2L per day and this has been improved with the fluid restriction. Getting HH PT/OT. Pt also with increased oral secretions and is on scopolamine patch but insurance will not cover. Staff states not having any N/V. Pt also has an order for miralax daily but is having 4 loose stools daily. Needs a bowel protocol too. Pt also with oral thrushand will give nystatin. Scopolamine may be contributing will d/c and house will monitor if needed to resume a med for secretions will likely need to use glycopyrrolate given insurance issues. she is a 51y.o. year old female who presents for evalution. Reviewed PmHx, RxHx, FmHx, SocHx, AllgHx and updated and dated in the chart. Review of Systems - negative except as listed above in the HPI    Objective:     Vitals:    03/17/20 0812   BP: 112/75   Pulse: 80   Resp: 18   Temp: 98.3 °F (36.8 °C)   TempSrc: Oral   SpO2: 90%   Weight: 98 lb (44.5 kg)   Height: 4' 10\" (1.473 m)       Current Outpatient Medications   Medication Sig    albuterol-ipratropium (DUO-NEB) 2.5 mg-0.5 mg/3 ml nebu 3 mL by Nebulization route.  levETIRAcetam (Keppra) 500 mg tablet Take  by mouth two (2) times a day.  scopolamine (TRANSDERM-SCOP) 1 mg over 3 days pt3d 1 Patch by TransDERmal route every seventy-two (72) hours.  phenytoin (DILANTIN) 100 mg/4 mL susp oral suspension Take 8 mL by mouth two (2) times a day.  polyethylene glycol (MIRALAX) 17 gram packet 1 Packet by Per G Tube route daily.  As needed for constipation    nystatin (MYCOSTATIN) 100,000 unit/mL suspension Take 5 mL by mouth four (4) times daily for 14 days. swish and spit    aspirin 81 mg chewable tablet Take 81 mg by mouth daily.  diazePAM (VALIUM) 10 mg tablet Take 10 mg by mouth every six (6) hours as needed for Anxiety. No current facility-administered medications for this visit. Physical Examination: General appearance - chronically ill appearing  Mental status - non verbal sedated  Chest - upper airway sounds but lungs sound clear otherwise  Heart - normal rate, regular rhythm, normal S1, S2, no murmurs, rubs, clicks or gallops  Extremities - mild hand edema b/l, no LE edema    Mouth- thrush noted of tongue  Assessment/ Plan:   Diagnoses and all orders for this visit:    1. Seizure ([address]  -     LEVETIRACETAM (KEPPRA); Future  -     PHENYTOIN; Future    2. Medication monitoring encounter  -     LEVETIRACETAM (KEPPRA); Future  -     PHENYTOIN; Future    3. Constipation, unspecified constipation type  -     polyethylene glycol (MIRALAX) 17 gram packet; 1 Packet by Per G Tube route daily. As needed for constipation    4. Oral thrush  -     nystatin (MYCOSTATIN) 100,000 unit/mL suspension; Take 5 mL by mouth four (4) times daily for 14 days. swish and spit     Send levels to Dr Hector Moss  [phone] phone  Follow-up and Dispositions    · Return if symptoms worsen or fail to improve. I have discussed the diagnosis with the patient and the intended plan as seen in the above orders. The patient has received an after-visit summary and questions were answered concerning future plans. Pt conveyed understanding of plan. Medication Side Effects and Warnings were discussed with patient      Sandra Milagro Sawyer DO   Over 50% of the 45 minutes face to face with Frederick Perdomo consisted of counseling and/or discussing treatment plans in reference to her [facility] med issues.

## 2020-03-17 NOTE — PROGRESS NOTES
Chief Complaint   Patient presents with   [facility] Follow Up     Patient presents in office today for JOS[facility] f/u from [facility].  Admitted on 1/20/20 for altered mental status. Discharged on 3/9/20. No concerns. 1. Have you been to the ER, urgent care clinic since your last visit? Hospitalized since your last visit? Yes When: 1/20/20 Where: [facility] Reason for visit: altered mental status    2. Have you seen or consulted any other health care providers outside of the [address] [place] [address] since your last visit? Include any pap smears or colon screening.  No    Learning Assessment 3/17/2020   PRIMARY LEARNER Patient   PRIMARY LANGUAGE ENGLISH   LEARNER PREFERENCE PRIMARY PICTURES   ANSWERED BY caretaker   RELATIONSHIP OTHER

## 2020-03-17 NOTE — PATIENT INSTRUCTIONS
A Healthy Lifestyle: Care Instructions  Your Care Instructions    A healthy lifestyle can help you feel good, stay at a healthy weight, and have plenty of energy for both work and play. A healthy lifestyle is something you can share with your whole family. A healthy lifestyle also can lower your risk for serious health problems, such as high blood pressure, heart disease, and diabetes. You can follow a few steps listed below to improve your health and the health of your family. Follow-up care is a key part of your treatment and safety. Be sure to make and go to all appointments, and call your doctor if you are having problems. It's also a good idea to know your test results and keep a list of the medicines you take. How can you care for yourself at home? · Do not eat too much sugar, fat, or fast foods. You can still have dessert and treats now and then. The goal is moderation. · Start small to improve your eating habits. Pay attention to portion sizes, drink less juice and soda pop, and eat more fruits and vegetables. ? Eat a healthy amount of food. A 3-ounce serving of meat, for example, is about the size of a deck of cards. Fill the rest of your plate with vegetables and whole grains. ? Limit the amount of soda and sports drinks you have every day. Drink more water when you are thirsty. ? Eat at least 5 servings of fruits and vegetables every day. It may seem like a lot, but it is not hard to reach this goal. A serving or helping is 1 piece of fruit, 1 cup of vegetables, or 2 cups of leafy, raw vegetables. Have an apple or some carrot sticks as an afternoon snack instead of a candy bar. Try to have fruits and/or vegetables at every meal.  · Make exercise part of your daily routine. You may want to start with simple activities, such as walking, bicycling, or slow swimming. Try to be active 30 to 60 minutes every day. You do not need to do all 30 to 60 minutes all at once.  For example, you can exercise 3 times a day for 10 or 20 minutes. Moderate exercise is safe for most people, but it is always a good idea to talk to your doctor before starting an exercise program.  · Keep moving. Tala Anish the lawn, work in the garden, or Other Machine. Take the stairs instead of the elevator at work. · If you smoke, quit. People who smoke have an increased risk for heart attack, stroke, cancer, and other lung illnesses. Quitting is hard, but there are ways to boost your chance of quitting tobacco for good. ? Use nicotine gum, patches, or lozenges. ? Ask your doctor about stop-smoking programs and medicines. ? Keep trying. In addition to reducing your risk of diseases in the future, you will notice some benefits soon after you stop using tobacco. If you have shortness of breath or asthma symptoms, they will likely get better within a few weeks after you quit. · Limit how much alcohol you drink. Moderate amounts of alcohol (up to 2 drinks a day for men, 1 drink a day for women) are okay. But drinking too much can lead to liver problems, high blood pressure, and other health problems. Family health  If you have a family, there are many things you can do together to improve your health. · Eat meals together as a family as often as possible. · Eat healthy foods. This includes fruits, vegetables, lean meats and dairy, and whole grains. · Include your family in your fitness plan. Most people think of activities such as jogging or tennis as the way to fitness, but there are many ways you and your family can be more active. Anything that makes you breathe hard and gets your heart pumping is exercise. Here are some tips:  ? Walk to do errands or to take your child to school or the bus.  ? Go for a family bike ride after dinner instead of watching TV. Where can you learn more? Go to http://satnam-yesika.info/  Enter Z271 in the search box to learn more about \"A Healthy Lifestyle: Care Instructions. \"  Current as of: May 28, 2019Content Version: 12.4  © 5612-3247 [employer]wise, Incorporated. Care instructions adapted under license by [employer] (which disclaims liability or warranty for this information). If you have questions about a medical condition or this instruction, always ask your healthcare professional. [address] any warranty or liability for your use of this information.

## 2020-03-18 LAB — LEVETIRACETAM SERPL-MCNC: 30.6 UG/ML (ref 10–40)

## 2020-03-18 NOTE — PROGRESS NOTES
Dilantin level is elevated please fax to neuro their info in note. I do not know if they are keeping her at an elevated level due to difficulty controlling seizures.

## 2020-03-23 ENCOUNTER — TELEPHONE (OUTPATIENT)
Dept: FAMILY MEDICINE CLINIC | Age: 50
End: 2020-03-23

## 2020-03-23 ENCOUNTER — DOCUMENTATION ONLY (OUTPATIENT)
Dept: FAMILY MEDICINE CLINIC | Age: 50
End: 2020-03-23

## 2020-03-23 RX ORDER — PSEUDOEPHED/ACETAMINOPHEN/CPM 30-500-2MG
2 TABLET ORAL
Qty: 118 ML | Refills: 0 | Status: SHIPPED | OUTPATIENT
Start: 2020-03-23 | End: 2020-03-27 | Stop reason: SDUPTHER

## 2020-03-23 NOTE — TELEPHONE ENCOUNTER
Spoke with Pt`s Nurse idx3 The [facility] Nurse states it wasn`t melatonin being given but instead Miralax. The miralax has been discontinued for 10days now. The nurse thinks it may be the Pt`s new formula. The nurse states she would need a physicians order to hold formula or an order for a med to calm the patient stomach. Pt is on a fluid restriction and the nurse is worried about possible dehydration.

## 2020-03-23 NOTE — TELEPHONE ENCOUNTER
[address]  from [facility] DME needs a change of order regarding tube feedings. . She would like to discuss with nurse & wants nurse to call her back @[phone]

## 2020-03-23 NOTE — TELEPHONE ENCOUNTER
Find out if stomach virus is going through the house, does she take anything orally? Type of tube feed changed?  Maricruz Richards

## 2020-03-23 NOTE — TELEPHONE ENCOUNTER
Spoke with Pt`s nurse idx3 Pt takes Dilantin 6ml bid, Aspirin 81mg once a day, and Keppar 5ml bid. The nurse states the Pt is on a osma-like feeding 1.2, 237ml-8oz 5x day, there has not been a change in the type of tube feeding. Nurse states that no other Pt within the group home is experiencing loose stools. Pt was seen by Dr. Rebeca Butler last Monday and melatonin was ordered to be given as needed.

## 2020-03-23 NOTE — TELEPHONE ENCOUNTER
----- Message from [place] sent at 3/23/2020  8:10 AM EDT -----  Regarding: AMINA Dacosta/ Telephone  Contact: [phone]  Bren Coronado, Nurse with [address] states that patient has been having mutltiple loose stools for 5 days. There was no answer when calling the back line.

## 2020-03-23 NOTE — TELEPHONE ENCOUNTER
Imodium AD liquid sent to [facility] pharmacy for prn order diarrhea.  Russel Armenta

## 2020-03-24 ENCOUNTER — TELEPHONE (OUTPATIENT)
Dept: FAMILY MEDICINE CLINIC | Age: 50
End: 2020-03-24

## 2020-03-24 ENCOUNTER — DOCUMENTATION ONLY (OUTPATIENT)
Dept: FAMILY MEDICINE CLINIC | Age: 50
End: 2020-03-24

## 2020-03-24 NOTE — TELEPHONE ENCOUNTER
Leandra from [facility]. states that patient was put on imodium 3 times a day for diarrhea. [facility] is requesting to obtain a stool specimen order be faxed to them @[phone].  Any questions Constantino Price can be reached on her cell @[phone] Uriel Domínguez phone #[phone])

## 2020-03-24 NOTE — PROGRESS NOTES
IVY faxed to [address] [place] [address]. Fax number [phone] confirmation number [id number].

## 2020-03-24 NOTE — TELEPHONE ENCOUNTER
Called and spoke with Whitley Campos. She states that caretaker has reported that 30 minutes after feedings patient has a large loose BM. She was requesting to have patient switch to a fiber containing formula or send in something for the diarrhea. I advised that yesterday imodium was sent in for patient. Whitley Campos verbalized understanding and states she would like to hold off on the request for the formula switch at this time and will give the imodium about a week and see how that patient does.

## 2020-03-25 DIAGNOSIS — K52.9 CHRONIC DIARRHEA: Primary | ICD-10-CM

## 2020-03-25 NOTE — TELEPHONE ENCOUNTER
I need kasi information than this. Was she recently on antibiotics? Have they stopped giving her miralax? Have they discussed dilantin level with neuro as this can cause diarrhea as well.

## 2020-03-25 NOTE — TELEPHONE ENCOUNTER
Called and spoke with Leandra. She states that patient has had a strong foul smelling diarrhea. Patient has not recently been on abx. Miralax was stopped about a week ago and they did discuss her dilantin level with neuro. She is scheduled to have this rechecked on 3/31/20.

## 2020-03-25 NOTE — TELEPHONE ENCOUNTER
Order faxed to [facility]. Fax number [phone] confirmation number [id number]. Called and informed Margie Hewitt that this has been faxed. Leandra verbalized understanding.

## 2020-03-27 ENCOUNTER — TELEPHONE (OUTPATIENT)
Dept: FAMILY MEDICINE CLINIC | Age: 50
End: 2020-03-27

## 2020-03-27 ENCOUNTER — HOSPITAL ENCOUNTER (OUTPATIENT)
Age: 50
Setting detail: OBSERVATION
Discharge: HOME | End: 2020-04-01
Attending: EMERGENCY MEDICINE | Admitting: FAMILY MEDICINE
Payer: COMMERCIAL

## 2020-03-27 ENCOUNTER — APPOINTMENT (OUTPATIENT)
Dept: GENERAL RADIOLOGY | Age: 50
End: 2020-03-27
Attending: EMERGENCY MEDICINE
Payer: COMMERCIAL

## 2020-03-27 DIAGNOSIS — E86.0 DEHYDRATION: ICD-10-CM

## 2020-03-27 DIAGNOSIS — R19.7 DIARRHEA OF PRESUMED INFECTIOUS ORIGIN: Primary | ICD-10-CM

## 2020-03-27 LAB
ALBUMIN SERPL-MCNC: 2.4 G/DL (ref 3.5–5)
ALBUMIN/GLOB SERPL: 0.5 {RATIO} (ref 1.1–2.2)
ALP SERPL-CCNC: 182 U/L (ref 45–117)
ALT SERPL-CCNC: 99 U/L (ref 12–78)
ANION GAP SERPL CALC-SCNC: 6 MMOL/L (ref 5–15)
APPEARANCE UR: CLEAR
AST SERPL-CCNC: 51 U/L (ref 15–37)
BACTERIA URNS QL MICRO: NEGATIVE /HPF
BASOPHILS # BLD: 0 K/UL (ref 0–0.1)
BASOPHILS NFR BLD: 0 % (ref 0–1)
BILIRUB SERPL-MCNC: <0.1 MG/DL (ref 0.2–1)
BILIRUB UR QL: NEGATIVE
BUN SERPL-MCNC: 10 MG/DL (ref 6–20)
BUN/CREAT SERPL: 20 (ref 12–20)
CALCIUM SERPL-MCNC: 8.8 MG/DL (ref 8.5–10.1)
CHLORIDE SERPL-SCNC: 103 MMOL/L (ref 97–108)
CO2 SERPL-SCNC: 29 MMOL/L (ref 21–32)
COLOR UR: NORMAL
COMMENT, HOLDF: NORMAL
CREAT SERPL-MCNC: 0.51 MG/DL (ref 0.55–1.02)
DIFFERENTIAL METHOD BLD: ABNORMAL
EOSINOPHIL # BLD: 0 K/UL (ref 0–0.4)
EOSINOPHIL NFR BLD: 0 % (ref 0–7)
EPITH CASTS URNS QL MICRO: NORMAL /LPF
ERYTHROCYTE [DISTWIDTH] IN BLOOD BY AUTOMATED COUNT: 15.6 % (ref 11.5–14.5)
GLOBULIN SER CALC-MCNC: 4.5 G/DL (ref 2–4)
GLUCOSE SERPL-MCNC: 99 MG/DL (ref 65–100)
GLUCOSE UR STRIP.AUTO-MCNC: NEGATIVE MG/DL
HCT VFR BLD AUTO: 44.2 % (ref 35–47)
HEMOCCULT STL QL: POSITIVE
HGB BLD-MCNC: 14.5 G/DL (ref 11.5–16)
HGB UR QL STRIP: NEGATIVE
HYALINE CASTS URNS QL MICRO: NORMAL /LPF (ref 0–5)
IMM GRANULOCYTES # BLD AUTO: 0 K/UL (ref 0–0.04)
IMM GRANULOCYTES NFR BLD AUTO: 0 % (ref 0–0.5)
KETONES UR QL STRIP.AUTO: NEGATIVE MG/DL
LEUKOCYTE ESTERASE UR QL STRIP.AUTO: NEGATIVE
LIPASE SERPL-CCNC: 52 U/L (ref 73–393)
LYMPHOCYTES # BLD: 1.6 K/UL (ref 0.8–3.5)
LYMPHOCYTES NFR BLD: 18 % (ref 12–49)
MCH RBC QN AUTO: 32.5 PG (ref 26–34)
MCHC RBC AUTO-ENTMCNC: 32.8 G/DL (ref 30–36.5)
MCV RBC AUTO: 99.1 FL (ref 80–99)
MONOCYTES # BLD: 0.7 K/UL (ref 0–1)
MONOCYTES NFR BLD: 8 % (ref 5–13)
MYELOCYTES NFR BLD MANUAL: 1 %
NEUTS BAND NFR BLD MANUAL: 8 %
NEUTS SEG # BLD: 6.5 K/UL (ref 1.8–8)
NEUTS SEG NFR BLD: 65 % (ref 32–75)
NITRITE UR QL STRIP.AUTO: NEGATIVE
NRBC # BLD: 0 K/UL (ref 0–0.01)
NRBC BLD-RTO: 0 PER 100 WBC
PH UR STRIP: 6 [PH] (ref 5–8)
PLATELET # BLD AUTO: 302 K/UL (ref 150–400)
PMV BLD AUTO: 10.6 FL (ref 8.9–12.9)
POTASSIUM SERPL-SCNC: 4 MMOL/L (ref 3.5–5.1)
PROT SERPL-MCNC: 6.9 G/DL (ref 6.4–8.2)
PROT UR STRIP-MCNC: NEGATIVE MG/DL
RBC # BLD AUTO: 4.46 M/UL (ref 3.8–5.2)
RBC #/AREA URNS HPF: NORMAL /HPF (ref 0–5)
RBC MORPH BLD: ABNORMAL
SAMPLES BEING HELD,HOLD: NORMAL
SODIUM SERPL-SCNC: 138 MMOL/L (ref 136–145)
SP GR UR REFRACTOMETRY: 1.01 (ref 1–1.03)
UR CULT HOLD, URHOLD: NORMAL
UROBILINOGEN UR QL STRIP.AUTO: 0.2 EU/DL (ref 0.2–1)
WBC # BLD AUTO: 8.9 K/UL (ref 3.6–11)
WBC MORPH BLD: ABNORMAL
WBC URNS QL MICRO: NORMAL /HPF (ref 0–4)

## 2020-03-27 PROCEDURE — 77030019905 HC CATH URETH INTMIT MDII -A

## 2020-03-27 PROCEDURE — 80053 COMPREHEN METABOLIC PANEL: CPT

## 2020-03-27 PROCEDURE — 80185 ASSAY OF PHENYTOIN TOTAL: CPT

## 2020-03-27 PROCEDURE — 87506 IADNA-DNA/RNA PROBE TQ 6-11: CPT

## 2020-03-27 PROCEDURE — 87177 OVA AND PARASITES SMEARS: CPT

## 2020-03-27 PROCEDURE — 81001 URINALYSIS AUTO W/SCOPE: CPT

## 2020-03-27 PROCEDURE — 77030037759

## 2020-03-27 PROCEDURE — 0107U C DIFF TOX AG DETCJ IA STOOL: CPT

## 2020-03-27 PROCEDURE — 82272 OCCULT BLD FECES 1-3 TESTS: CPT

## 2020-03-27 PROCEDURE — 84100 ASSAY OF PHOSPHORUS: CPT

## 2020-03-27 PROCEDURE — 74011250636 HC RX REV CODE- 250/636: Performed by: EMERGENCY MEDICINE

## 2020-03-27 PROCEDURE — 96361 HYDRATE IV INFUSION ADD-ON: CPT

## 2020-03-27 PROCEDURE — 85025 COMPLETE CBC W/AUTO DIFF WBC: CPT

## 2020-03-27 PROCEDURE — 83690 ASSAY OF LIPASE: CPT

## 2020-03-27 PROCEDURE — 80177 DRUG SCRN QUAN LEVETIRACETAM: CPT

## 2020-03-27 PROCEDURE — 83735 ASSAY OF MAGNESIUM: CPT

## 2020-03-27 PROCEDURE — 36415 COLL VENOUS BLD VENIPUNCTURE: CPT

## 2020-03-27 PROCEDURE — 89055 LEUKOCYTE ASSESSMENT FECAL: CPT

## 2020-03-27 PROCEDURE — 96360 HYDRATION IV INFUSION INIT: CPT

## 2020-03-27 PROCEDURE — 71045 X-RAY EXAM CHEST 1 VIEW: CPT

## 2020-03-27 PROCEDURE — 99285 EMERGENCY DEPT VISIT HI MDM: CPT

## 2020-03-27 RX ORDER — DIAZEPAM 10 MG/1
10 TABLET ORAL AS NEEDED
COMMUNITY
End: 2020-01-01 | Stop reason: CLARIF

## 2020-03-27 RX ORDER — LEVETIRACETAM 100 MG/ML
750 SOLUTION ORAL EVERY 12 HOURS
COMMUNITY
End: 2021-01-01

## 2020-03-27 RX ORDER — PSEUDOEPHED/ACETAMINOPHEN/CPM 30-500-2MG
2 TABLET ORAL
Qty: 118 ML | Refills: 0 | Status: SHIPPED | OUTPATIENT
Start: 2020-03-27 | End: 2020-01-01

## 2020-03-27 RX ORDER — POLYETHYLENE GLYCOL 3350 17 G/17G
17 POWDER, FOR SOLUTION ORAL
COMMUNITY
End: 2020-04-01

## 2020-03-27 RX ADMIN — SODIUM CHLORIDE 1000 ML: 900 INJECTION, SOLUTION INTRAVENOUS at 22:11

## 2020-03-27 NOTE — TELEPHONE ENCOUNTER
Called and spoke with Lula Chang. She states that patient is still having diarrhea and has concerns of dehydration. Patient is on a fluid restriction of 2000 ML. She would like to know if she can either have the fluid restriction increased or get an RX for pedialyte to be sent to express care.

## 2020-03-27 NOTE — TELEPHONE ENCOUNTER
Royal Caro calling back about getting refill of immodium since they have given pt all of this that was sent to pharmacy on 3/24. I explained we received refill request this morning and it was approved. Pt is still having diarrhea and wants to speak with a nurse about this. She can be reached at [phone].

## 2020-03-27 NOTE — ED NOTES
Caregiver from group home called ahead to make sure it was ok for her to come here. Per policy, pt is non-verbal, and caretaker will be allowed at bedside. Screeners in front notified.

## 2020-03-27 NOTE — ED NOTES
Attempted IV via US x2 unsuccessfully. Pt incontinent of stool. Samples collected and sent to lab, pt cleaned and briefs replaced. Every time pt coughs she is incontinent of stool. Caretaker (Alise Tena) at bedside and reports that pt was in [facility] from Jan 9-March 9, and during the course of her stay, pt was coded, intubated, and switched to tracheostomy. All related to seizure activities. Many meds and abx given along with Miralax during hospitalization. Prior to hospitalization pt was verbal and ambulatory. Sees Dr. Rd Moreno for neurology. Keppra (caused behavioral issues)  
venpack (lethargy) depakote (hand swelling) 
onfi (lethargic and went downhill) Keppra (no seizures but causing tremors) Last Keppra level checked by [facility] on 3/16, decreased dosages due to elevated levels

## 2020-03-27 NOTE — TELEPHONE ENCOUNTER
Called and spoke with Tevin. Advised that patient should be evaluated by  for dispatch health. Gave her the phone number for dispatch health. Tevin verbalized understanding.

## 2020-03-27 NOTE — ED TRIAGE NOTES
Pt arrives via EMS from group home with cc of diarrhea x 6 days. Seen by dispatch health, who was unable to get IV line for rehydration. Pt has trach, is non-verbal, and does not move independently at baseline.

## 2020-03-28 PROBLEM — R19.7 DIARRHEA IN ADULT PATIENT: Status: ACTIVE | Noted: 2020-03-28

## 2020-03-28 PROBLEM — A04.72 C. DIFFICILE DIARRHEA: Status: ACTIVE | Noted: 2020-03-28

## 2020-03-28 LAB
ALBUMIN SERPL-MCNC: 2 G/DL (ref 3.5–5)
ALBUMIN/GLOB SERPL: 0.7 {RATIO} (ref 1.1–2.2)
ALP SERPL-CCNC: 147 U/L (ref 45–117)
ALT SERPL-CCNC: 76 U/L (ref 12–78)
ANION GAP SERPL CALC-SCNC: 4 MMOL/L (ref 5–15)
AST SERPL-CCNC: 39 U/L (ref 15–37)
B PERT DNA SPEC QL NAA+PROBE: NOT DETECTED
BASOPHILS # BLD: 0 K/UL (ref 0–0.1)
BASOPHILS NFR BLD: 0 % (ref 0–1)
BILIRUB SERPL-MCNC: <0.1 MG/DL (ref 0.2–1)
BORDETELLA PARAPERTUSSIS PCR, BORPAR: NOT DETECTED
BUN SERPL-MCNC: 5 MG/DL (ref 6–20)
BUN/CREAT SERPL: 14 (ref 12–20)
C DIFF GDH STL QL: POSITIVE
C DIFF TOX A+B STL QL IA: POSITIVE
C PNEUM DNA SPEC QL NAA+PROBE: NOT DETECTED
CALCIUM SERPL-MCNC: 8.1 MG/DL (ref 8.5–10.1)
CAMPYLOBACTER SPECIES, DNA: NEGATIVE
CHLORIDE SERPL-SCNC: 111 MMOL/L (ref 97–108)
CO2 SERPL-SCNC: 26 MMOL/L (ref 21–32)
CREAT SERPL-MCNC: 0.36 MG/DL (ref 0.55–1.02)
DIFFERENTIAL METHOD BLD: ABNORMAL
ENTEROTOXIGEN E COLI, DNA: NEGATIVE
EOSINOPHIL # BLD: 0 K/UL (ref 0–0.4)
EOSINOPHIL NFR BLD: 0 % (ref 0–7)
ERYTHROCYTE [DISTWIDTH] IN BLOOD BY AUTOMATED COUNT: 15.8 % (ref 11.5–14.5)
FLUAV H1 2009 PAND RNA SPEC QL NAA+PROBE: NOT DETECTED
FLUAV H1 RNA SPEC QL NAA+PROBE: NOT DETECTED
FLUAV H3 RNA SPEC QL NAA+PROBE: NOT DETECTED
FLUAV SUBTYP SPEC NAA+PROBE: NOT DETECTED
FLUBV RNA SPEC QL NAA+PROBE: NOT DETECTED
GLOBULIN SER CALC-MCNC: 3 G/DL (ref 2–4)
GLUCOSE SERPL-MCNC: 86 MG/DL (ref 65–100)
HADV DNA SPEC QL NAA+PROBE: NOT DETECTED
HCOV 229E RNA SPEC QL NAA+PROBE: NOT DETECTED
HCOV HKU1 RNA SPEC QL NAA+PROBE: NOT DETECTED
HCOV NL63 RNA SPEC QL NAA+PROBE: NOT DETECTED
HCOV OC43 RNA SPEC QL NAA+PROBE: NOT DETECTED
HCT VFR BLD AUTO: 37.1 % (ref 35–47)
HGB BLD-MCNC: 12.1 G/DL (ref 11.5–16)
HMPV RNA SPEC QL NAA+PROBE: NOT DETECTED
HPIV1 RNA SPEC QL NAA+PROBE: NOT DETECTED
HPIV2 RNA SPEC QL NAA+PROBE: NOT DETECTED
HPIV3 RNA SPEC QL NAA+PROBE: NOT DETECTED
HPIV4 RNA SPEC QL NAA+PROBE: NOT DETECTED
IMM GRANULOCYTES # BLD AUTO: 0.1 K/UL (ref 0–0.04)
IMM GRANULOCYTES NFR BLD AUTO: 2 % (ref 0–0.5)
INTERPRETATION: ABNORMAL
LYMPHOCYTES # BLD: 1.5 K/UL (ref 0.8–3.5)
LYMPHOCYTES NFR BLD: 24 % (ref 12–49)
M PNEUMO DNA SPEC QL NAA+PROBE: NOT DETECTED
MAGNESIUM SERPL-MCNC: 2.2 MG/DL (ref 1.6–2.4)
MCH RBC QN AUTO: 32.4 PG (ref 26–34)
MCHC RBC AUTO-ENTMCNC: 32.6 G/DL (ref 30–36.5)
MCV RBC AUTO: 99.2 FL (ref 80–99)
MONOCYTES # BLD: 0.7 K/UL (ref 0–1)
MONOCYTES NFR BLD: 11 % (ref 5–13)
NEUTS SEG # BLD: 3.8 K/UL (ref 1.8–8)
NEUTS SEG NFR BLD: 63 % (ref 32–75)
NRBC # BLD: 0 K/UL (ref 0–0.01)
NRBC BLD-RTO: 0 PER 100 WBC
P SHIGELLOIDES DNA STL QL NAA+PROBE: NEGATIVE
PHENYTOIN SERPL-MCNC: 17.4 UG/ML (ref 10–20)
PHOSPHATE SERPL-MCNC: 4.9 MG/DL (ref 2.6–4.7)
PLATELET # BLD AUTO: 253 K/UL (ref 150–400)
PMV BLD AUTO: 10 FL (ref 8.9–12.9)
POTASSIUM SERPL-SCNC: 4 MMOL/L (ref 3.5–5.1)
PROCALCITONIN SERPL-MCNC: 0.12 NG/ML
PROT SERPL-MCNC: 5 G/DL (ref 6.4–8.2)
RBC # BLD AUTO: 3.74 M/UL (ref 3.8–5.2)
RSV RNA SPEC QL NAA+PROBE: NOT DETECTED
RV+EV RNA SPEC QL NAA+PROBE: NOT DETECTED
SALMONELLA SPECIES, DNA: NEGATIVE
SHIGA TOXIN PRODUCING, DNA: NEGATIVE
SHIGELLA SP+EIEC IPAH STL QL NAA+PROBE: NEGATIVE
SODIUM SERPL-SCNC: 141 MMOL/L (ref 136–145)
VIBRIO SPECIES, DNA: NEGATIVE
WBC # BLD AUTO: 6.1 K/UL (ref 3.6–11)
WBC #/AREA STL HPF: NORMAL /HPF (ref 0–4)
Y. ENTEROCOLITICA, DNA: NEGATIVE

## 2020-03-28 PROCEDURE — 77030038269 HC DRN EXT URIN PURWCK BARD -A

## 2020-03-28 PROCEDURE — 87040 BLOOD CULTURE FOR BACTERIA: CPT

## 2020-03-28 PROCEDURE — 77030018798 HC PMP KT ENTRL FED COVD -A

## 2020-03-28 PROCEDURE — 99218 HC RM OBSERVATION: CPT

## 2020-03-28 PROCEDURE — 36415 COLL VENOUS BLD VENIPUNCTURE: CPT

## 2020-03-28 PROCEDURE — 87449 NOS EACH ORGANISM AG IA: CPT

## 2020-03-28 PROCEDURE — 77030041247 HC PROTECTOR HEEL HEELMEDIX MDII -B

## 2020-03-28 PROCEDURE — 96375 TX/PRO/DX INJ NEW DRUG ADDON: CPT

## 2020-03-28 PROCEDURE — 87899 AGENT NOS ASSAY W/OPTIC: CPT

## 2020-03-28 PROCEDURE — 65270000029 HC RM PRIVATE

## 2020-03-28 PROCEDURE — 97162 PT EVAL MOD COMPLEX 30 MIN: CPT

## 2020-03-28 PROCEDURE — 96376 TX/PRO/DX INJ SAME DRUG ADON: CPT

## 2020-03-28 PROCEDURE — 74011250637 HC RX REV CODE- 250/637: Performed by: STUDENT IN AN ORGANIZED HEALTH CARE EDUCATION/TRAINING PROGRAM

## 2020-03-28 PROCEDURE — 65660000000 HC RM CCU STEPDOWN

## 2020-03-28 PROCEDURE — 0100U RESPIRATORY PANEL,PCR,NASOPHARYNGEAL: CPT

## 2020-03-28 PROCEDURE — 97161 PT EVAL LOW COMPLEX 20 MIN: CPT

## 2020-03-28 PROCEDURE — 85025 COMPLETE CBC W/AUTO DIFF WBC: CPT

## 2020-03-28 PROCEDURE — 84145 PROCALCITONIN (PCT): CPT

## 2020-03-28 PROCEDURE — 97166 OT EVAL MOD COMPLEX 45 MIN: CPT

## 2020-03-28 PROCEDURE — 77030018846 HC SOL IRR STRL H20 ICUM -A

## 2020-03-28 PROCEDURE — 74011250636 HC RX REV CODE- 250/636: Performed by: STUDENT IN AN ORGANIZED HEALTH CARE EDUCATION/TRAINING PROGRAM

## 2020-03-28 PROCEDURE — 74011250637 HC RX REV CODE- 250/637: Performed by: FAMILY MEDICINE

## 2020-03-28 PROCEDURE — C9113 INJ PANTOPRAZOLE SODIUM, VIA: HCPCS | Performed by: STUDENT IN AN ORGANIZED HEALTH CARE EDUCATION/TRAINING PROGRAM

## 2020-03-28 PROCEDURE — 80053 COMPREHEN METABOLIC PANEL: CPT

## 2020-03-28 RX ORDER — PHENYTOIN 125 MG/5ML
150 SUSPENSION ORAL 2 TIMES DAILY
Status: DISCONTINUED | OUTPATIENT
Start: 2020-03-28 | End: 2020-04-01 | Stop reason: HOSPADM

## 2020-03-28 RX ORDER — CLOTRIMAZOLE 10 MG/1
10 LOZENGE ORAL; TOPICAL
Status: DISCONTINUED | OUTPATIENT
Start: 2020-03-28 | End: 2020-04-01 | Stop reason: HOSPADM

## 2020-03-28 RX ORDER — NYSTATIN 100000 [USP'U]/ML
500000 SUSPENSION ORAL 4 TIMES DAILY
Status: DISCONTINUED | OUTPATIENT
Start: 2020-03-28 | End: 2020-03-28

## 2020-03-28 RX ORDER — DIAZEPAM 5 MG/1
10 TABLET ORAL AS NEEDED
Status: DISCONTINUED | OUTPATIENT
Start: 2020-03-28 | End: 2020-04-01 | Stop reason: HOSPADM

## 2020-03-28 RX ORDER — SODIUM CHLORIDE 0.9 % (FLUSH) 0.9 %
5-40 SYRINGE (ML) INJECTION AS NEEDED
Status: DISCONTINUED | OUTPATIENT
Start: 2020-03-28 | End: 2020-04-01 | Stop reason: HOSPADM

## 2020-03-28 RX ORDER — LEVETIRACETAM 100 MG/ML
500 SOLUTION ORAL 2 TIMES DAILY
Status: DISCONTINUED | OUTPATIENT
Start: 2020-03-28 | End: 2020-04-01 | Stop reason: HOSPADM

## 2020-03-28 RX ORDER — PSEUDOEPHED/ACETAMINOPHEN/CPM 30-500-2MG
2 TABLET ORAL
Status: DISCONTINUED | OUTPATIENT
Start: 2020-03-28 | End: 2020-03-28

## 2020-03-28 RX ORDER — SODIUM CHLORIDE 9 MG/ML
60 INJECTION, SOLUTION INTRAVENOUS CONTINUOUS
Status: DISCONTINUED | OUTPATIENT
Start: 2020-03-28 | End: 2020-03-31

## 2020-03-28 RX ORDER — VANCOMYCIN HYDROCHLORIDE 250 MG/5ML
125 POWDER, FOR SOLUTION ORAL EVERY 6 HOURS
Status: DISCONTINUED | OUTPATIENT
Start: 2020-03-28 | End: 2020-04-01 | Stop reason: HOSPADM

## 2020-03-28 RX ORDER — SODIUM CHLORIDE 0.9 % (FLUSH) 0.9 %
5-40 SYRINGE (ML) INJECTION EVERY 8 HOURS
Status: DISCONTINUED | OUTPATIENT
Start: 2020-03-28 | End: 2020-04-01 | Stop reason: HOSPADM

## 2020-03-28 RX ADMIN — NYSTATIN 500000 UNITS: 100000 SUSPENSION ORAL at 08:50

## 2020-03-28 RX ADMIN — CLOTRIMAZOLE 10 MG: 10 LOZENGE ORAL; TOPICAL at 14:00

## 2020-03-28 RX ADMIN — Medication 10 ML: at 03:06

## 2020-03-28 RX ADMIN — Medication 10 ML: at 21:44

## 2020-03-28 RX ADMIN — CLOTRIMAZOLE 10 MG: 10 LOZENGE ORAL; TOPICAL at 18:52

## 2020-03-28 RX ADMIN — PANTOPRAZOLE SODIUM 40 MG: 40 INJECTION, POWDER, FOR SOLUTION INTRAVENOUS at 13:01

## 2020-03-28 RX ADMIN — CLOTRIMAZOLE 10 MG: 10 LOZENGE ORAL; TOPICAL at 21:44

## 2020-03-28 RX ADMIN — VANCOMYCIN HYDROCHLORIDE 125 MG: KIT at 21:43

## 2020-03-28 RX ADMIN — PANTOPRAZOLE SODIUM 40 MG: 40 INJECTION, POWDER, FOR SOLUTION INTRAVENOUS at 03:24

## 2020-03-28 RX ADMIN — LEVETIRACETAM 500 MG: 100 SOLUTION ORAL at 10:31

## 2020-03-28 RX ADMIN — VANCOMYCIN HYDROCHLORIDE 125 MG: KIT at 15:24

## 2020-03-28 RX ADMIN — VANCOMYCIN HYDROCHLORIDE 125 MG: KIT at 03:24

## 2020-03-28 RX ADMIN — LEVETIRACETAM 500 MG: 100 SOLUTION ORAL at 23:24

## 2020-03-28 RX ADMIN — PHENYTOIN 150 MG: 125 SUSPENSION ORAL at 18:51

## 2020-03-28 RX ADMIN — VANCOMYCIN HYDROCHLORIDE 125 MG: KIT at 10:31

## 2020-03-28 RX ADMIN — PHENYTOIN 150 MG: 125 SUSPENSION ORAL at 08:50

## 2020-03-28 RX ADMIN — Medication 10 ML: at 13:01

## 2020-03-28 RX ADMIN — SODIUM CHLORIDE 60 ML/HR: 900 INJECTION, SOLUTION INTRAVENOUS at 03:06

## 2020-03-28 RX ADMIN — Medication 10 ML: at 06:00

## 2020-03-28 RX ADMIN — PANTOPRAZOLE SODIUM 40 MG: 40 INJECTION, POWDER, FOR SOLUTION INTRAVENOUS at 23:10

## 2020-03-28 RX ADMIN — SODIUM CHLORIDE 60 ML/HR: 900 INJECTION, SOLUTION INTRAVENOUS at 19:02

## 2020-03-28 NOTE — PROGRESS NOTES
OCCUPATIONAL THERAPY EVALUATION/DISCHARGE Patient: Kush Daniel (79 y.o. female) Date: 3/28/2020 Primary Diagnosis: Diarrhea in adult patient [R19.7] Precautions: Fall; Pt is non-verbal    
 
ASSESSMENT Based on the objective data described below, the patient presents with near baseline total A for all ADLs following admission for diarrhea. Pt has baseline Down's Syndrome and is non-verbal, this session does not follow any commands and is unable to provide any history. She is further limited by cognition and recent complex hospital admission from January 2020 to March 2020 where she coded and now is with trach and PEG. Called  to obtain PLOF. Prior to that hospitalization, pt had been ambulatory and able to self-feed and required max A for ADLs. Since hospitalization (d/c'd back to her group home on 3/9/2020), pt has required total care for all ADLs. She had been having [place] therapy (one discipline d/c'd pt due to poor command following) and is placed in chair every day by staff. This session, pt is repositioned in bed for skin integrity and performs rolling in bed with total A for toileting. At this time, pt is likely near baseline. Group Home is able to provide this current level of assistance. Will sign off at this time. Please re-consult should patient's status change. Current Level of Function (ADLs/self-care): total care Functional Outcome Measure: The patient scored 0/100 on the Barthel outcome measure which is indicative of total impairment in ADLs and mobility. Other factors to consider for discharge: cognition; total care PLAN : 
Recommend with staff: continue to turn for skin integrity; PROM to UEs to prevent contractures Recommendation for discharge: (in order for the patient to meet his/her long term goals) No skilled occupational therapy/ follow up rehabilitation needs identified at this time. This discharge recommendation: Has not yet been discussed the attending provider and/or case management IF patient discharges home will need the following DME: patient owns DME required for discharge SUBJECTIVE:  
Patient is non-verbal 
 
OBJECTIVE DATA SUMMARY:  
HISTORY:  
Past Medical History:  
Diagnosis Date  Down syndrome  Seizures ([facility] [address] No past surgical history on file. Prior Level of Function/Environment/Context: see above. Expanded or extensive additional review of patient history:  
Home Situation Home Environment: Group home Living Alone: No 
Support Systems: Home care staff Current DME Used/Available at Home: (tracheostomy) EXAMINATION OF PERFORMANCE DEFICITS: 
Cognitive/Behavioral Status: 
Neurologic State: Alert;Eyes open spontaneously Orientation Level: Unable to verbalize Cognition: Unable to assess (comment); No command following Perseveration: No perseveration noted Vision/Perceptual:   
Tracking: Unable to test secondary due to decreased visual attention Range of Motion: 
AROM: Grossly decreased, non-functional 
PROM: Generally decreased, functional 
  
Strength: 
Strength: Grossly decreased, non-functional 
 
Coordination: 
Coordination: Grossly decreased, non-functional 
Fine Motor Skills-Upper: Left Impaired;Right Impaired Gross Motor Skills-Upper: Left Impaired;Right Impaired Sensation: 
Sensation: (unable to assess 2/2 cognition) Balance: 
Sitting: (not assessed) Functional Mobility and Transfers for ADLs: 
Bed Mobility: 
Rolling: Total assistance Transfers: 
 Not performed this session ADL Assessment: 
Feeding: (Pt has PEG tube for feeding) Oral Facial Hygiene/Grooming: Total assistance Bathing: Total assistance Upper Body Dressing: Total assistance Lower Body Dressing: Total assistance Toileting: Total assistance ADL Intervention and task modifications: Toileting Toileting Assistance: Total assistance(dependent)(performed at bed level) Bowel Hygiene: Total assistance (dependent) Cues: (physical assist for hygiene) Functional Measure: 
Barthel Index: 
 
Bathin Bladder: 0 Bowels: 0 Groomin Dressin Feedin Mobility: 0 Stairs: 0 Toilet Use: 0 Transfer (Bed to Chair and Back): 0 Total: 0/100 The Barthel ADL Index: Guidelines 1. The index should be used as a record of what a patient does, not as a record of what a patient could do. 2. The main aim is to establish degree of independence from any help, physical or verbal, however minor and for whatever reason. 3. The need for supervision renders the patient not independent. 4. A patient's performance should be established using the best available evidence. Asking the patient, friends/relatives and nurses are the usual sources, but direct observation and common sense are also important. However direct testing is not needed. 5. Usually the patient's performance over the preceding 24-48 hours is important, but occasionally longer periods will be relevant. 6. Middle categories imply that the patient supplies over 50 per cent of the effort. 7. Use of aids to be independent is allowed. Danilo Birmingham., Barthel, D.W. (3583). Functional evaluation: the Barthel Index. [facility] (14)2. Marilin Jacinto jose g ZORA YusufF, Lisa Naidu., Francesco Muñiz., Merlin Hippo, [address] [place] [address] (). Measuring the change indisability after inpatient rehabilitation; comparison of the responsiveness of the Barthel Index and Functional [place] Measure. Journal of Neurology, Neurosurgery, and Psychiatry, 66(4), 436-323. Pb Irene, N.J.A, VONDA Mosquera, & Demetrice Davenport, M.A. (2004.) Assessment of post-stroke quality of life in cost-effectiveness studies: The usefulness of the Barthel Index and the EuroQoL-5D. [facility], 13, 073-07 Occupational Therapy Evaluation Charge Determination History Examination Decision-Making MEDIUM Complexity : Expanded review of history including physical, cognitive and psychosocial  history  HIGH Complexity : 5 or more performance deficits relating to physical, cognitive , or psychosocial skils that result in activity limitations and / or participation restrictions HIGH Complexity : Patient presents with comorbidities that affect occupational performance. Signifigant modification of tasks or assistance (eg, physical or verbal) with assessment (s) is necessary to enable patient to complete evaluation Based on the above components, the patient evaluation is determined to be of the following complexity level: MEDIUM Pain Rating: Pt without grimace during activity; non-verbal 
 
Activity Tolerance: VSS during session Please refer to the flowsheet for vital signs taken during this treatment. After treatment patient left in no apparent distress:   
Supine in bed, Heels elevated for pressure relief, Patient positioned in Left sidelying for pressure relief, Call bell within reach, Side rails x 3 and RN notified COMMUNICATION/EDUCATION:  
The patients plan of care was discussed with: Physical therapist and Registered nurse. Thank you for this referral. 
Eloisa Bamberger, OT Time Calculation: 25 mins

## 2020-03-28 NOTE — H&P
Stacey Dyer [address]  Office [phone] Fax [phone] Admission H&P Name: Erich Galvin MRN: [id number]  Sex: Female YOB: 1970  Age: 53 y.o. PCP: Shantel Tinsley NP Source of Information: Patients Group home caretaker Ana Pulido, and  medical records Chief complaint: Diarrhea History of Present Illness Erich Galvin is a 53 y.o. female with known Downs Syndrome (non verbal at baseline), seizure disorder, GERD, h/o iron deficiency anemia, and recent Trach placement, presents to the ER from her [address] for 10 days of 5-6 loose mucous stools. She was started on Imodium by her PCP and caregiver states that this helped decrease the amount of stool produced but did not improve the frequency. Care taker states that for the past week Pt has appeared more tired than usual.  Denies any fever, chills, decreased ability to tolerate tube feeds, decreased UOP, SOB, cough, hemoptysis, hematemesis, melena, n/v or edema. She denies any sick contacts at the group home and states it does not appear that the Pt is in any pain. Of note: Pt had a recent 2 month admission at [facility] from January 9-March 9 for AHRF 2/2 aspiration PNA and neuro pulmonary derangement d/t Status epilepticus. During this stay she was coded and intubated and finally transitioned to a tracheostomy and swithced back to [facility] through her the PEG tube. Pt was treated with several IV antibiotics during this admission. Prior to hospitalization pt could communicate with one word phrases and was ambulatory. In the Er:  
vital signs were remarkable for tachycardia to 101. Otherwise BP stable and Pt is afebrile at 98.9 Labs were remarkable for Positive FOBT  
CXR Showed Multifocal scattered nodular opacities, Consider multifocal infectious process. Jass Pineda Pt treated with 2L NS bolus Patient Vitals for the past 12 hrs: 
 Pulse Resp BP SpO2  
03/27/20 2145   94/65 98 % 03/27/20 2100   91/68 99 % 03/27/20 2045   97/85 (!) 86 %  
03/27/20 2030   106/77 97 % 03/27/20 2015   102/79 98 % 03/27/20 2000    98 % 03/27/20 1945   100/71 97 % 03/27/20 1930    95 % 03/27/20 1915   103/65   
03/27/20 1815   109/79 100 % 03/27/20 1801   (!) 122/94 94 % 03/27/20 1756 (!) 101 18 (!) 122/94 97 % Past Medical History:  
Diagnosis Date  Down syndrome  Seizures ([facility] [address] Home Medications Prior to Admission medications Medication Sig Start Date End Date Taking? Authorizing Provider  
loperamide (Imodium A-D) 1 mg/7.5 mL solution 15 mL by Per G Tube route three (3) times daily as needed for Diarrhea. 3/27/20  Yes Sidney Sawyer, DO  
levETIRAcetam (KEPPRA) 100 mg/mL solution 500 mg by Per G Tube route two (2) times a day. Yes Provider, Historical  
polyethylene glycol (Miralax) 17 gram/dose powder Take 17 g by mouth daily as needed for Constipation. Yes Provider, Historical  
diazePAM (VALIUM) 10 mg tablet Take 10 mg by mouth as needed (seizure > 5 min). Uses gel formulation   Yes Provider, Historical  
albuterol-ipratropium (DUO-NEB) 2.5 mg-0.5 mg/3 ml nebu 3 mL by Nebulization route every four (4) hours as needed. Yes Provider, Historical  
phenytoin (DILANTIN) 100 mg/4 mL susp oral suspension 150 mg by PEG Tube route two (2) times a day. 3/17/20  Yes Sidney Sawyer DO  
nystatin (MYCOSTATIN) 100,000 unit/mL suspension Take 5 mL by mouth four (4) times daily for 14 days. swish and spit 3/17/20 3/31/20 Yes Sidney Sawyer DO  
aspirin 81 mg chewable tablet 81 mg by Per G Tube route daily. Yes Provider, Historical  
loperamide (Imodium A-D) 1 mg/7.5 mL solution 15 mL by Per G Tube route three (3) times daily as needed for Diarrhea. 3/23/20 3/27/20  Pat Mcgowan NP  
levETIRAcetam (Keppra) 500 mg tablet Take 500 mg by mouth two (2) times a day.   3/27/20  Provider, Historical  
scopolamine (TRANSDERM-SCOP) 1 mg over 3 days pt3d 1 Patch by TransDERmal route every seventy-two (72) hours. 3/27/20  Provider, Historical  
polyethylene glycol (MIRALAX) 17 gram packet 1 Packet by Per G Tube route daily. As needed for constipation Patient taking differently: 17 g by Per G Tube route daily as needed. As needed for constipation 3/17/20 3/27/20  Lobb, Karinaetta Day H, DO  
diazePAM (VALIUM) 10 mg tablet Take 10 mg by mouth as needed (seizure > 5 min). 3/27/20  Provider, Historical  
 
 
Allergies Allergies Allergen Reactions  Depakote [Divalproex] Swelling Past Medical History:  
Diagnosis Date  Down syndrome  Seizures ([facility] [address] No past surgical history on file. No family history on file. Social History Social History Socioeconomic History  Marital status: SINGLE Spouse name: Not on file  Number of children: Not on file  Years of education: Not on file  Highest education level: Not on file Occupational History  Not on file Social Needs  Financial resource strain: Not on file  Food insecurity Worry: Not on file Inability: Not on file  Transportation needs Medical: Not on file Non-medical: Not on file Tobacco Use  Smoking status: Never Smoker  Smokeless tobacco: Never Used Substance and Sexual Activity  Alcohol use: Never Frequency: Never  Drug use: Never  Sexual activity: Never Lifestyle  Physical activity Days per week: Not on file Minutes per session: Not on file  Stress: Not on file Relationships  Social connections Talks on phone: Not on file Gets together: Not on file Attends Orthodoxy service: Not on file Active member of club or organization: Not on file Attends meetings of clubs or organizations: Not on file Relationship status: Not on file  Intimate partner violence Fear of current or ex partner: Not on file Emotionally abused: Not on file   Physically abused: Not on file Forced sexual activity: Not on file Other Topics Concern  Not on file Social History Narrative ** Merged History Encounter ** Alcohol history: Not at all Smoking history: Non-smoker Illicit drug history: Not at all Living arrangement: patient lives in an adult home. Ambulates: non ambulatory-uses wheelchair Review of Systems:  
Review of Systems Unable to perform ROS: Patient nonverbal  
  
 
Physical Exam 
 
  O2 Device: Room air, Tracheostomy General: No acute distress. Alert. Head: Normocephalic. Atraumatic. Eyes:              Conjunctiva pink. Sclera white. PERRL. Nose:             Septum midline. Mucosa pink. No drainage. Throat: Mucosa pink. Moist mucous membranes. No tonsillar exudates or erythema. Palate movement equal bilaterally. Neck: Supple. Normal ROM. No stiffness. Respiratory: Tracheostomy is in place. Mild secretions, no evidence of infection. Referred upper airway sounds. No rhonchi, rales or wheezing. No respiratory distress. Cardiovascular: RRR. Normal S1,S2. No m/r/g. Pulses 2+ throughout. GI: + bowel sounds. Nontender. No rebound tenderness or guarding. Slightly distended but soft and non tender. PEG Tube in place. C/d/i Extremities: no LE edema. Distal pulses intact. Musculoskeletal: Chronic contractures and non weight bearing at baseline. Skin: Warm, dry. No rashes. Neuro: Nonverbal at baseline. Alert and awake. Laboratory Data Recent Results (from the past 8 hour(s)) OCCULT BLOOD, STOOL Collection Time: 03/27/20  7:09 PM  
Result Value Ref Range Occult blood, stool POSITIVE (A) NEG    
CBC WITH AUTOMATED DIFF Collection Time: 03/27/20  7:53 PM  
Result Value Ref Range WBC 8.9 3.6 - 11.0 K/uL  
 RBC 4.46 3.80 - 5.20 M/uL  
 HGB 14.5 11.5 - 16.0 g/dL HCT 44.2 35.0 - 47.0 % MCV 99.1 (H) 80.0 - 99.0 FL  
 MCH 32.5 26.0 - 34.0 PG  
 MCHC 32.8 30.0 - 36.5 g/dL  
 RDW 15.6 (H) 11.5 - 14.5 % PLATELET 851 099 - 183 K/uL MPV 10.6 8.9 - 12.9 FL  
 NRBC 0.0 0  WBC ABSOLUTE NRBC 0.00 0.00 - 0.01 K/uL NEUTROPHILS 65 32 - 75 % BAND NEUTROPHILS 8 % LYMPHOCYTES 18 12 - 49 % MONOCYTES 8 5 - 13 % EOSINOPHILS 0 0 - 7 % BASOPHILS 0 0 - 1 % MYELOCYTES 1 % IMMATURE GRANULOCYTES 0 0.0 - 0.5 % ABS. NEUTROPHILS 6.5 1.8 - 8.0 K/UL  
 ABS. LYMPHOCYTES 1.6 0.8 - 3.5 K/UL  
 ABS. MONOCYTES 0.7 0.0 - 1.0 K/UL  
 ABS. EOSINOPHILS 0.0 0.0 - 0.4 K/UL  
 ABS. BASOPHILS 0.0 0.0 - 0.1 K/UL  
 ABS. IMM. GRANS. 0.0 0.00 - 0.04 K/UL  
 DF MANUAL    
 RBC COMMENTS NORMOCYTIC, NORMOCHROMIC    
 WBC COMMENTS TOXIC GRANULATION    
METABOLIC PANEL, COMPREHENSIVE Collection Time: 03/27/20  7:53 PM  
Result Value Ref Range Sodium 138 136 - 145 mmol/L Potassium 4.0 3.5 - 5.1 mmol/L Chloride 103 97 - 108 mmol/L  
 CO2 29 21 - 32 mmol/L Anion gap 6 5 - 15 mmol/L Glucose 99 65 - 100 mg/dL BUN 10 6 - 20 MG/DL Creatinine 0.51 (L) 0.55 - 1.02 MG/DL  
 BUN/Creatinine ratio 20 12 - 20 GFR est AA >60 >60 ml/min/1.73m2 GFR est non-AA >60 >60 ml/min/1.73m2 Calcium 8.8 8.5 - 10.1 MG/DL Bilirubin, total <0.1 (L) 0.2 - 1.0 MG/DL  
 ALT (SGPT) 99 (H) 12 - 78 U/L  
 AST (SGOT) 51 (H) 15 - 37 U/L Alk. phosphatase 182 (H) 45 - 117 U/L Protein, total 6.9 6.4 - 8.2 g/dL Albumin 2.4 (L) 3.5 - 5.0 g/dL Globulin 4.5 (H) 2.0 - 4.0 g/dL A-G Ratio 0.5 (L) 1.1 - 2.2 LIPASE Collection Time: 03/27/20  7:53 PM  
Result Value Ref Range Lipase 52 (L) 73 - 393 U/L  
SAMPLES BEING HELD Collection Time: 03/27/20  7:53 PM  
Result Value Ref Range SAMPLES BEING HELD 1SST,1RED,1BLU   
 COMMENT Add-on orders for these samples will be processed based on acceptable specimen integrity and analyte stability, which may vary by analyte. URINALYSIS W/MICROSCOPIC Collection Time: 03/27/20  8:42 PM  
Result Value Ref Range Color YELLOW/STRAW  Appearance CLEAR CLEAR Specific gravity 1.007 1.003 - 1.030    
 pH (UA) 6.0 5.0 - 8.0 Protein NEGATIVE  NEG mg/dL Glucose NEGATIVE  NEG mg/dL Ketone NEGATIVE  NEG mg/dL Bilirubin NEGATIVE  NEG Blood NEGATIVE  NEG Urobilinogen 0.2 0.2 - 1.0 EU/dL Nitrites NEGATIVE  NEG Leukocyte Esterase NEGATIVE  NEG    
 WBC 0-4 0 - 4 /hpf  
 RBC 0-5 0 - 5 /hpf Epithelial cells FEW FEW /lpf Bacteria NEGATIVE  NEG /hpf Hyaline cast 0-2 0 - 5 /lpf URINE CULTURE HOLD SAMPLE Collection Time: 03/27/20  8:42 PM  
Result Value Ref Range Urine culture hold Urine on hold in Microbiology dept for 2 days. If unpreserved urine is submitted, it cannot be used for addtional testing after 24 hours, recollection will be required. Imaging CXR Results  (Last 48 hours) 03/27/20 2242  XR CHEST PORT Final result Impression:  IMPRESSION:  
Multifocal scattered nodular opacities. Consider multifocal infectious process. Shelby Money Narrative:  PORTABLE CHEST RADIOGRAPH/S: 3/27/2020 10:42 PM  
   
Clinical history: cough INDICATION:   cough COMPARISON: None FINDINGS:  
AP portable upright view of the chest demonstrates a stable  cardiopericardial  
silhouette. The lungs are adequately expanded. Tracheostomy in place. Scattered  
nodular and parenchymal opacities. . The osseous structures are unremarkable. Patient is on a cardiac monitor. Assessment and Plan Luis E Dasilva is a 53 y.o. female with known Downs syndrome (non verbal at baseline), seizure disorder,  GERD, h/o Iron deficiency, who is admitted for diarrhea. ACUTE PROBLEMS Diarrhea: 10 days of 5-6 loose stools per day. Afebrile. WBC wnl. Risk factors include: recent IV abx, living in group home. DDX includes, C. Diff or other infectious process, IBS, IBD, malignancy.    
- admit to tele  
-vitals per unit routine  
-strict I &O & hold PEG tube feeds, continue with MIVF at 60 mL/hr NS  
-consult nutrition when resume PEG tube feeds  
-f/u Cdiff, Ova/Parasite, WBC stool, Enteric Panel  
-PO Vanc  125mg q6h x10 days through PEG tube   
-continue Imodium TID prn  
-daily CBC/CMP  
-enteric percautions  
-consult GI- spoke with Dr. Leah Rondon and abril with Vanc and will see Pt in the AM. FOBT Positive on admission: h/o GIB in 2018. No active bleeding currently. Iron supplement and Omeprazole was discontinued after last hospital admission 3/9/2020. POA HgB 14.5 (1/2020 HgB 13.5) -hold ASA at this time.  
-hold anticoagulation and SCD's for now. -start Protonix 40mg IV BID 
-daily CBC to monitor HgB  
-GI following Focal Nodular opacity: Seen on POA CXR. Pt is asymptomatic, vitals stable and remains afebrile, WBC wnl. Treated for AHRF 2/2 aspiration pna during admission at [facility] 1/9 through 3/9/2020. This finding is likely residual from recent infection. No prior CXR to compare too.  
-f/u RVP, PNA labs, Procalcitonin  
-f/u blood cultures and sputum culture   
-hold on Abx at this time until further testing results - observe for respiratory failure or sepsis syndrome. -RT for trach care and suctioning.  
-Will reach out to radiology in AM to compare imaging from [address] [place] [address] Seizure: stable at this time. Dilantin level elevated on 3/17 and Keppra level wnl.   
-seizure precautions  
-continue home Dilantin and Keppra BID  
-continue Valium 10mg prn for seizure >5 minutes  
-get Keppra and Phenytoin level now, if abnormal will consult Neuro GERD: stable  
-Protonix 40mg IV BID 
 
H/o Iron Deficiency: stable. POA HgB 14.5  
-daily CBC Oral Thrush: Stable  
-continue Nystatin swish and spit 4 times per day FEN/GI -Hold tube feeds. cont IV Fluids NS at 60mL/hr. Nutrition consult once resume Tube Feeds Activity - Out of bed with assistance DVT prophylaxis - SCDs at this time due to +FOBT 
GI prophylaxis - Protonix 40 IV BID Fall prophylaxis - Not indicated at this time. Disposition - Admit to Telemetry. Plan to d/c to Group home. Consulting PT/OT/CM, Nutrition, RT, GI  
Code Status - Full, discussed with caregivers (Group Home Next of Kin Name and Contact Group home care giver Ana Law [phone]. Sister Nicci Barnes Patient Erich  will be discussed Dr. Alena Rivera (attending) . 10:45 PM, 03/27/20 Negrito Pearson DO Family Medicine Resident For Billing Chief Complaint Patient presents with  Diarrhea Hospital Problems  Date Reviewed: 3/17/2020 None [address] Medicine Residency Attending Addendum: I discussed the findings, assessment and plan with the resident and agree with the resident's findings and plan as documented in the resident's note. Gary Amador MD, FAAFP, CAQSM, RMSK

## 2020-03-28 NOTE — PROGRESS NOTES
Stacey Canaselsi Eng [address]  Office [phone] Fax [phone] [address] Medicine Residency Attending Addendum: I saw and evaluated the patient on the day of the encounter with Dr. Hal Marino, performing the walker elements of the service. I discussed the findings, assessment and plan with the resident and agree with the resident's findings and plan as documented in the resident's note. C. difficile return reporting positive. Continue p.o. Vanco.  Appreciate GI assistance and recommendations. Patient sinus rhythm in the 80s overnight on telemetry. Any within good range. Monitor closely for hypoxia, tachypnea, or tachycardia. No fevers. We will hold off on antibiotics. Radhika Guerra MD, FAAFP, CAQSM, RMSK Assessment and Plan Cate Sainz is a 53 y.o. female with known Downs syndrome (non verbal at baseline), seizure disorder,  GERD, h/o Iron deficiency, who is admitted for diarrhea. 24 Hour Events: No acute events overnight ACUTE PROBLEMS Diarrhea: 10 days of 5-6 loose stools per day. Afebrile. WBC wnl. Risk factors include: recent IV abx, living in group home. DDX includes, C. Diff or other infectious process, IBS, IBD, malignancy. -strict I &O & hold PEG tube feeds,  MIVF at 60 mL/hr NS  
-c/s nutrition when resume PEG tube feeds  
-f/u Cdiff, Ova/Parasite, WBC stool, Enteric Panel  
-PO Vanc 125mg q6h x10 days through PEG tube   
-continue Imodium TID prn as this was showing improvement at home  
-daily CBC/CMP  
-enteric percautions -GI following-will see Pt today *Pt is without URI symptoms and Afebrile since discharge on 3/9. With the diarrhea development 10 days ago would consider testing COVID-19 pending RVP panel.  
  
FOBT Positive on admission: h/o GIB in 2018. No evidence of active bleeding. Iron supplement and Omeprazole was discontinued after last hospital admission 3/9/2020. POA HgB 14.5 (1/2020 HgB 13.5) -hold ASA at this time.  
-hold anticoagulation and SCD's for now. -start Protonix 40mg IV BID (can consider d/c once GIB ruled out) -daily CBC to monitor HgB  
-GI following  
  
Focal Nodular opacity: Seen on POA CXR. Pt is asymptomatic, vitals stable and remains afebrile, WBC wnl. Treated for AHRF 2/2 aspiration pna during admission at [facility] 1/9 through 3/9/2020. This finding is likely residual from recent infection. No prior CXR to compare too. Procal low. (0.12) 
-f/u RVP, PNA labs 
-f/u blood cultures and sputum culture   
-hold on Abx at this time until further testing results -observe for respiratory failure or sepsis syndrome. -RT for trach care and suctioning.  
-Will reach out to radiology today to compare imaging from [address] [place] [address]  Seizure: stable at this time. Dilantin level elevated on 3/17 and Keppra level wnl.   
-seizure precautions  
-continue home Dilantin and Keppra BID  
-continue Valium 10mg prn for seizure >5 minutes  
-get Keppra and Phenytoin level now, if abnormal will consult Neuro  
  
GERD: stable  
-Protonix 40mg IV BID 
  
H/o Iron Deficiency: stable. POA HgB 14.5  
-daily CBC  
  
Oral Thrush: Stable  
-continue Nystatin swish and spit 4 times per day  
  
FEN/GI -Hold tube feeds. cont IV Fluids NS at 60mL/hr. Nutrition consult once resume Tube Feeds Activity - Out of bed with assistance (Pt non ambulatory) DVT prophylaxis - SCDs at this time due to +FOBT 
GI prophylaxis - Protonix 40 IV BID Fall prophylaxis - Not indicated at this time. Disposition - Admit to Telemetry. Plan to d/c to Group home. Consulting PT/OT/CM, Nutrition, RT, GI  
Code Status - Full, discussed with caregivers (Group Home Next of Kin Name and Contact Group home care giver Belkis Monica [phone].   Sister Cristiane Gordon  
  
Patient Blaine Kebede will be discussed Dr. Sofiya Dunn (attending) .  
  
I appreciate the opportunity to participate in the care of this patient, 
 Brien Bermeo DO Family Medicine Resident Subjective / Objective Subjective Pt non verbal.  No issues reported from nurse. Temp (24hrs), Av.6 °F (37 °C), Min:98.1 °F (36.7 °C), Max:98.9 °F (37.2 °C) Objective Respiratory:   O2 Device: Tracheostomy O2 Device: Room air, Tracheostomy General: No acute distress. Head: Normocephalic. Atraumatic. Eyes:              Conjunctiva pink. Sclera white. PERRL. Nose:             Septum midline. Mucosa pink. No drainage. Throat: Mucosa pink. Moist mucous membranes. No tonsillar exudates or erythema. Palate movement equal bilaterally. Neck: Supple. Normal ROM. No stiffness. Respiratory: Tracheostomy is in place. Mild secretions, no evidence of infection. Referred upper airway sounds. No rhonchi, rales or wheezing. No respiratory distress. Cardiovascular: RRR. Normal S1,S2. No m/r/g. Pulses 2+ throughout. GI: + bowel sounds. Nontender. No rebound tenderness or guarding. Slightly distended but soft and non tender. PEG Tube in place. C/d/i Extremities: no LE edema. Distal pulses intact. Musculoskeletal: Chronic contractures and non weight bearing at baseline. Skin: Warm, dry. No rashes. Neuro: Nonverbal at baseline. Sleeping I/O: 
Date 20 - 20 8325 20 - 20 5330 Shift [id number] 24 Hour Total 1900-0659 24 Hour Total  
INTAKE  
I.V.  1000 1000 Volume (sodium chloride 0.9 % bolus infusion 1,000 mL)  1000 1000 Shift Total(mL/kg)  1000(20.8) 1000(20.8) OUTPUT Shift Total(mL/kg) NET  1000 1000 Weight (kg)  48.2 48.2 48.2 48.2 48.2 Inpatient Medications Current Facility-Administered Medications Medication Dose Route Frequency  sodium chloride (NS) flush 5-40 mL  5-40 mL IntraVENous Q8H  
 sodium chloride (NS) flush 5-40 mL  5-40 mL IntraVENous PRN  
 0.9% sodium chloride infusion  60 mL/hr IntraVENous CONTINUOUS  
  vancomycin (FIRVANQ) 50 mg/mL oral solution 125 mg  125 mg Per G Tube Q6H  
 diazePAM (VALIUM) tablet 10 mg  10 mg Oral PRN  
 levETIRAcetam (KEPPRA) 100 mg/mL solution 500 mg  500 mg Per G Tube BID  loperamide (IMODIUM) 1 mg/7.5 mL oral solution 2 mg  2 mg Per G Tube TID PRN  
 nystatin (MYCOSTATIN) 100,000 unit/mL oral suspension 500,000 Units  500,000 Units Oral QID  phenytoin (DILANTIN) 100 mg/4 mL oral suspension 150 mg  150 mg PEG Tube BID  pantoprazole (PROTONIX) 40 mg in 0.9% sodium chloride 10 mL injection  40 mg IntraVENous Q12H Allergies Allergies Allergen Reactions  Depakote [Divalproex] Swelling CBC: 
Recent Labs  
  03/27/20 1953 WBC 8.9 HGB 14.5 HCT 44.2  Metabolic Panel: 
Recent Labs  
  03/27/20 1953   
K 4.0  
 CO2 29 BUN 10  
CREA 0.51* GLU 99  
CA 8.8 ALB 2.4* SGOT 51* ALT 99* For Billing Chief Complaint Patient presents with  Diarrhea Hospital Problems  Date Reviewed: 3/17/2020 Codes Class Noted POA Diarrhea in adult patient ICD-10-CM: R19.7 ICD-9-CM: 787.91  3/28/2020 Unknown

## 2020-03-28 NOTE — PROGRESS NOTES
[facility]: MED RECONCILIATION Comments/Recommendations:  
- confirmed pt no longer takes lacosamide, clobazam, valproic acid, omeprazole Medications removed: 
Scopolamine 1mg patch every 72 hrs Medications adjusted: 
Keppra- previously tablet formulation Phenytoin- previously 200mg BID Valium- previously every 6 hrs prn ASA- changed to PEG tube Information obtained from:  Vidal Fraser), [id number] Allergies: Depakote [divalproex] Prior to Admission Medications:  
 
Prior to Admission Medications Prescriptions Last Dose Informant Patient Reported? Taking? albuterol-ipratropium (DUO-NEB) 2.5 mg-0.5 mg/3 ml nebu   Yes Yes Sig: 3 mL by Nebulization route every four (4) hours as needed. aspirin 81 mg chewable tablet 3/27/2020  Yes Yes Si mg by Per G Tube route daily. diazePAM (VALIUM) 10 mg tablet   Yes Yes Sig: Take 10 mg by mouth as needed (seizure > 5 min). levETIRAcetam (KEPPRA) 100 mg/mL solution 3/27/2020  Yes Yes Si mg by Per G Tube route two (2) times a day. loperamide (Imodium A-D) 1 mg/7.5 mL solution 3/27/2020  No Yes Sig: 15 mL by Per G Tube route three (3) times daily as needed for Diarrhea. nystatin (MYCOSTATIN) 100,000 unit/mL suspension 3/27/2020  No Yes Sig: Take 5 mL by mouth four (4) times daily for 14 days. swish and spit  
phenytoin (DILANTIN) 100 mg/4 mL susp oral suspension 3/27/2020  Yes Yes Si mg by PEG Tube route two (2) times a day. polyethylene glycol (Miralax) 17 gram/dose powder   Yes Yes Sig: Take 17 g by mouth daily as needed for Constipation. Facility-Administered Medications: None Parish Monteiro

## 2020-03-28 NOTE — PROGRESS NOTES
TRANSFER - IN REPORT: 
 
Verbal report received from Raegan Holden RN(name) on Sandra Nguyen  being received from ED(unit) for routine progression of care Report consisted of patients Situation, Background, Assessment and  
Recommendations(SBAR). Information from the following report(s) SBAR, Kardex, ED Summary, Intake/Output, Recent Results, Med Rec Status and Cardiac Rhythm ST/NSR was reviewed with the receiving nurse. Opportunity for questions and clarification was provided. Assessment completed upon patients arrival to unit and care assumed. 206: Pt arrived vie stretcher accompanied by RN. Pt alert, non-verbal. Tracheostomy collar deflated; trach humidification attached to trach collar. /73 (BP 1 Location: Left arm, BP Patient Position: At rest;Supine; Head of bed elevated (Comment degrees))   Pulse 93   Temp 98.1 °F (36.7 °C)   Resp 20   Wt 48.2 kg (106 lb 3 oz)   LMP  (LMP Unknown)   SpO2 92%   BMI 22.19 kg/m² 0230: RT at pt bedside; Replaced Inline suction canulaMaribel at pt bedside. Additional Inner cannulas at pt bedside table. Humidified air at site of canula; collar is deflated. 0515: RT at pt bedside to suction; Pt Spo2 b/t 96-98%; breath sounds wet. Pt HR b/t 76-80. End of Shift Report:  
 
Bedside and Verbal shift change report given to Joselyn Valdez RN (oncoming nurse) by Thuy Pringle (offgoing nurse). Report included the following information SBAR, Kardex, ED Summary, Intake/Output, Recent Results, Med Rec Status and Cardiac Rhythm NSR.

## 2020-03-28 NOTE — ED NOTES
TRANSFER - OUT REPORT: 
 
Verbal report given to Ander Pang RN(name) on Lexii Found  being transferred to 3rd floor (unit) for routine progression of care Report consisted of patients Situation, Background, Assessment and  
Recommendations(SBAR). Information from the following report(s) SBAR, Intake/Output, MAR and Recent Results was reviewed with the receiving nurse. Lines:  
Peripheral IV 03/27/20 Right Antecubital (Active) Site Assessment Clean, dry, & intact 3/27/2020  7:56 PM  
Phlebitis Assessment 0 3/27/2020  7:56 PM  
Infiltration Assessment 0 3/27/2020  7:56 PM  
Dressing Status Clean, dry, & intact 3/27/2020  7:56 PM  
  
 
Opportunity for questions and clarification was provided. Patient transported with: 
 Monitor Registered Nurse

## 2020-03-28 NOTE — PROGRESS NOTES
3/28/2020 
2:06 PM 
EMR reviewed, pt has ID, Down's Syndrome, non-verbal at baseline,  per H & P pt had recent 2 mo stay at [facility]  January - March, during her admission she coded, was intubated, now has trach on RA  and PEG, pt is C. Diff + 
CM attempted  to contact  John Davila [phone] for d/c planning, return to group home when stable, lvm. Will follow Jackie Mosher

## 2020-03-28 NOTE — PROGRESS NOTES
Stacey Canaselsi Eng [address] Office [phone] Fax [phone] [address] Medicine Residency Attending Addendum: I saw and evaluated the patient on the day of the encounter with Dr. Trinh Coleman, performing the key elements of the service. I discussed the findings, assessment and plan with the resident and agree with the resident's findings and plan as documented in the resident's note. Vital signs stable. Still having loose stools. Blood cultures flagged and may be contaminant, will repeat blood cultures this morning. Continue Vanco p.o. Labs all within good range. We will start IV antibiotics for now. Noe Enriquez MD, FAAFP, CAQSM, RMSK Assessment and Plan Last Abrams is a 53 y.o. female with known Downs syndrome (non verbal at baseline), seizure disorder,  GERD, h/o Iron deficiency, who is admitted for diarrhea. 24 Hour Events: No acute events overnight ACUTE PROBLEMS Diarrhea 2/2 C DIFF: GI seen recommends continue PO (via PEG) vanco - if no response within five days fidaxomicin. Nutrition consulted for tube feeds. Enteric Panel Neg. WBC nl.  
- C diff positive  
-strict I &O & PEG tube feeds,   
-f/u  Ova/Parasite 
-PO Vanc 125mg q6h x10 days through PEG tube, ends on 4/7 
-daily CBC/CMP  
-enteric percautions  
 
 FOBT Positive on admission: h/o GIB in 2018. No evidence of active bleeding. Iron supplement and Omeprazole was discontinued after last hospital admission 3/9/2020. POA HgB 14.5 (1/2020 HgB 13.5). No new recs from GI, likely 2/2 to strain with diarrhea.  
-hold ASA -hold anticoagulation 
-SCDs 
-daily CBC to monitor HgB Focal Nodular opacity: Seen on POA CXR. Pt is asymptomatic, vitals stable and remains afebrile, WBC wnl. Treated for AHRF 2/2 aspiration pna during admission at [facility] 1/9 through 3/9/2020. This finding is likely residual from recent infection. No prior CXR to compare too. Procal low. (0. 12). RVP neg. - f/u  PNA labs - f/u Bcx 1 out of 2 flagged sens/spec, rBcx today - F/u Sputum culture - RT for trach care and suctioning.  
-Will reach out to radiology today to compare imaging from [address] Seizure: stable at this time. Dilantin level elevated on 3/17 and Keppra level wnl. Phenytoin level wnl.  
-seizure precautions  
-continue home Dilantin and Keppra BID  
-continue Valium 10mg prn for seizure >5 minutes  
-f/u Keppra level now, if abnormal will consult Neuro 
  
GERD: stable  
-Protonix 40mg IV BID 
  
H/o Iron Deficiency: stable. POA HgB 14.5  
-daily CBC  
  
Oral Thrush: Stable  
-continue Nystatin swish and spit 4 times per day  
  
FEN/GI -tube feeds. cont IV Fluids NS at 60mL/hr. Activity - Pt non ambulatory DVT prophylaxis - SCDs GI prophylaxis - Protonix 40 IV BID Fall prophylaxis - Not indicated at this time. Disposition - Admit to Telemetry. Plan to d/c to Group home. Consulting PT/OT/CM, Nutrition, RT, GI  
Code Status - Full, discussed with caregivers (Group Home Next of Kin Name and Contact Group home care giver Arcenio Clements [phone]. Sister Claus Johnston  
  
Patient Bladimir Guardado will be discussed Dr. Eula Simmonds (attending) .  
  
I appreciate the opportunity to participate in the care of this patient, Tamara Lopez MD 
Family Medicine Resident Subjective / Objective Subjective Pt non verbal.  No issues reported from nurse. 5 BM's o/n, last BM at 4AM. O2 Device: Room air, Tracheostomy General: No acute distress. Respiratory: Tracheostomy is in place. Mild secretions, no evidence of infection. Referred upper airway sounds. No rhonchi, rales or wheezing. No respiratory distress. Cardiovascular: RRR. GI: + bowel sounds. Nontender. PEG Tube in place. Extremities: no LE edema. Distal pulses intact. Musculoskeletal: Chronic contractures and non weight bearing at baseline. Skin: Warm, dry. No rashes. Neuro: Nonverbal at baseline. Sleeping I/O: 
Date 03/28/20 0700 - 03/29/20 0857 03/29/20 0700 - 03/30/20 9908 Shift 4015-9583 6895-8846 24 Hour Total 3292-8746 4255-1983 24 Hour Total  
INTAKE  
I.V.(mL/kg/hr) 485(0.8)  485 Volume (0.9% sodium chloride infusion) 485  485 NG/ 210 970 Water Flush Volume (mL) (PEG/Gastrostomy Tube) 100 50 150 Medication Volume (PEG/Gastrostomy Tube) 60 160 220 Intake (ml) (PEG/Gastrostomy Tube) 600  600 Shift Total(mL/kg) 1245(25.8) 210(3.8) 1455(26.6) OUTPUT Urine(mL/kg/hr) 200(0.3)  200 Urine Voided 200  200 Urine Occurrence(s) 2 x  2 x Stool Stool Occurrence(s) 3 x 3 x 6 x Shift Total(mL/kg) 200(4.2)  200(3.7) NET 9582 141 3137 Weight (kg) 48.2 54.7 54.7 54.7 54.7 54.7 Inpatient Medications Current Facility-Administered Medications Medication Dose Route Frequency  sodium chloride (NS) flush 5-40 mL  5-40 mL IntraVENous Q8H  
 sodium chloride (NS) flush 5-40 mL  5-40 mL IntraVENous PRN  
 0.9% sodium chloride infusion  60 mL/hr IntraVENous CONTINUOUS  
 vancomycin (FIRVANQ) 50 mg/mL oral solution 125 mg  125 mg Per G Tube Q6H  
 diazePAM (VALIUM) tablet 10 mg  10 mg Oral PRN  
 levETIRAcetam (KEPPRA) 100 mg/mL solution 500 mg  500 mg Per G Tube BID  phenytoin (DILANTIN) 100 mg/4 mL oral suspension 150 mg  150 mg PEG Tube BID  pantoprazole (PROTONIX) 40 mg in 0.9% sodium chloride 10 mL injection  40 mg IntraVENous Q12H  clotrimazole (MYCELEX) ayesha 10 mg  10 mg Oral 5XD Allergies Allergies Allergen Reactions  Depakote [Divalproex] Swelling CBC: 
Recent Labs  
  03/29/20 
0527 03/28/20 
0858 03/27/20 1953 WBC 5.6 6.1 8.9 HGB 12.5 12.1 14.5 HCT 38.4 37.1 44.2  253 302 Metabolic Panel: 
Recent Labs  
  03/29/20 
0527 03/28/20 
0858 03/27/20 1953  141 138  
K 4.0 4.0 4.0  
* 111* 103 CO2 24 26 29 BUN 7 5* 10  
CREA 0.38* 0.36* 0.51* GLU 99 86 99 CA 7.8* 8.1* 8.8 MG  --   --  2.2 PHOS  --   --  4.9* ALB 1.8* 2.0* 2.4* SGOT 46* 39* 51* ALT 76 76 99* For Billing Chief Complaint Patient presents with  Diarrhea Hospital Problems  Date Reviewed: 3/17/2020 Codes Class Noted POA Diarrhea in adult patient ICD-10-CM: R19.7 ICD-9-CM: 787.91  3/28/2020 Unknown * (Principal) C. difficile diarrhea ICD-10-CM: A04.72 
ICD-9-CM: 008.45  3/28/2020 Unknown

## 2020-03-28 NOTE — ED PROVIDER NOTES
Pt is a 52 yo female with hx of down syndrome, seizures, non verbal at baseline, trach who presents with diarrhea and dehydration. 2 month stay at OSH with discharge earlier this month. Caregiver reports 6 day hx of diarrhea and were unable to start line to give IVF prompting ED visit. No concern for change in mental status, no F/C, no sick contacts. Pt with multiple episodes of watery brown foul smelling diarrhea during ED stay. Past Medical History:  
Diagnosis Date  Down syndrome  Seizures ([facility] [address] No past surgical history on file. No family history on file. Social History Socioeconomic History  Marital status: SINGLE Spouse name: Not on file  Number of children: Not on file  Years of education: Not on file  Highest education level: Not on file Occupational History  Not on file Social Needs  Financial resource strain: Not on file  Food insecurity Worry: Not on file Inability: Not on file  Transportation needs Medical: Not on file Non-medical: Not on file Tobacco Use  Smoking status: Never Smoker  Smokeless tobacco: Never Used Substance and Sexual Activity  Alcohol use: Never Frequency: Never  Drug use: Never  Sexual activity: Never Lifestyle  Physical activity Days per week: Not on file Minutes per session: Not on file  Stress: Not on file Relationships  Social connections Talks on phone: Not on file Gets together: Not on file Attends Baptism service: Not on file Active member of club or organization: Not on file Attends meetings of clubs or organizations: Not on file Relationship status: Not on file  Intimate partner violence Fear of current or ex partner: Not on file Emotionally abused: Not on file Physically abused: Not on file Forced sexual activity: Not on file Other Topics Concern  Not on file Social History Narrative ** Merged History Encounter ** ALLERGIES: Depakote [divalproex] Review of Systems Unable to perform ROS: Patient nonverbal  
 
 
Vitals:  
 03/27/20 2030 03/27/20 2045 03/27/20 2100 03/27/20 2145 BP: 106/77 97/85 91/68 94/65 Pulse:      
Resp:      
SpO2: 97% (!) 86% 99% 98% Physical Exam 
Constitutional:   
   General: She is not in acute distress. Appearance: She is not ill-appearing, toxic-appearing or diaphoretic. HENT:  
   Mouth/Throat:  
   Mouth: Mucous membranes are dry. Eyes:  
   General: No scleral icterus. Conjunctiva/sclera: Conjunctivae normal.  
Neck: Musculoskeletal: Neck supple. Comments: Napoleon Ness in place Cardiovascular:  
   Rate and Rhythm: Normal rate. Pulses: Normal pulses. Pulmonary:  
   Effort: Pulmonary effort is normal.  
   Breath sounds: Normal breath sounds. Abdominal:  
   General: There is no distension. Palpations: There is no mass. Hernia: No hernia is present. Skin: 
   General: Skin is dry. Neurological:  
   Mental Status: Mental status is at baseline. MDM Number of Diagnoses or Management Options Dehydration:  
Diarrhea of presumed infectious origin:  
Diagnosis management comments: IVF for diarrhea, stool cultures sent, pending results. Will be admitted for further management. Procedures Hospitalist Perfect Serve for Admission 10:11 PM 
 
ED Room Number: RS76/62 Patient Name and age:  Monik Da Silva 53 y.o.  female Working Diagnosis: 1. Diarrhea of presumed infectious origin 2. Dehydration COVID-19 Suspicion:  no 
Readmission: no 
Isolation Requirements:  yes Recommended Level of Care:  telemetry Code Status:  Full Code Department:[facility] ED - [phone] Other:  Pt with recent dc from OSH after prolonged stay. Non verbal, trach in place. 6 days of diarrhea, dehydration.  Unable to IV hydrate at SNF and sent to ED. Copious amounts of diarrhea, C. Diff pending. IVF administered. +FOBT. Patient is being admitted to the hospital.  The results of their tests and reasons for their admission have been discussed with them and/or available family. They convey agreement and understanding for the need to be admitted and for their admission diagnosis.

## 2020-03-28 NOTE — PROGRESS NOTES
3/28/2020 
2:45 PM 
Reason for Admission:   Diarrhea, C. Difficile 
 pt is 54 yo AAF who presents to [facility] ED w/ diarrhea, at baseline pt has ID Down's syndrome, is non-verbal, had complicated admission at [facility] 1/9-3/9/2020  Where she coded, pt now has tracheostomy on RA and PEG tube PMHx:     Down's Syndrome, Seizures RUR Score:          10% Plan for utilizing home health:      Pt open to [facility] for SN,PT,OT will require resumption order at d/c 
 
PCP: First and Last name:  Manuel Bean DO Name of Practice: [facility] Are you a current patient: Yes/No:  Yes Approximate date of last visit: 3/17/2020 Current Advanced Directive/Advance Care Plan: ACP not on File Transition of Care Plan: 1. Hospital admission for medical management, GI consult, RT, PT/OT evals, nutrition 2. CM to follow 3. D/C when stable to group home 4. Resume HH through [facility] 5. Outpatient f/u PCP 6. [facility] health resources 7. Group home will transport pt at d/c, have pt's WC Care Management Interventions PCP Verified by CM: Yes(Sidney Sawyer DO) Last Visit to PCP: 03/17/20 Palliative Care Criteria Met (RRAT>21 & CHF Dx)?: No 
Mode of Transport at Discharge: Self(Group Home Staff) Transition of Care Consult (CM Consult): Discharge Planning Physical Therapy Consult: Yes Occupational Therapy Consult: Yes Speech Therapy Consult: No 
Current Support Network: Adult Group Home(pt resides in adult group home where she receives assist w/ all ADLs, pt uses WC for mobility, group home provides transport) Confirm Follow Up Transport: Other (see comment)(Group Home ) Discharge Location Discharge Placement: Group home CM s/w  David Leach to begin /c planning, charted demographics verified, pt lives in group home, there is entry ramp.  At baseline pt uses WC for mobility, group home staff assists pt w/ all ADLs and transport. DME: hospital bed,suction machine, nebulizer,WC, humidifier for trach cuff Rx; [facility] or [facility]  
 
 
Vonzella Rota

## 2020-03-28 NOTE — PROGRESS NOTES
Problem: Mobility Impaired (Adult and Pediatric) Goal: *Acute Goals and Plan of Care (Insert Text) Description: FUNCTIONAL STATUS PRIOR TO ADMISSION: Patient required maximum to total assistance for basic and instrumental ADLs. She had been working with [address] [facility] [address]  in the group home setting on sitting balance EOB, per manager. HOME SUPPORT PRIOR TO ADMISSION: The patient lived in a group home, has [place] [address] sydrome but recent major medical event in January where she was intubated and required tracheostomy. Prior to January, patient was able to ambulate with only supervision and perform some self-care. Physical Therapy Goals Initiated 3/28/2020 1. Patient will move from supine to sit and sit to supine , scoot up and down and roll side to side in bed with moderate assistance  within 7 day(s). 2.  Patient will transfer from bed to chair and chair to bed with moderate assistance  using the least restrictive device within 7 day(s). 3.  Patient will sit on the edge of bed for 5 minutes duration with only contact guard assistance within 7 days. Outcome: Progressing Towards Goal 
 PHYSICAL THERAPY EVALUATION Patient: Anne Roman (89 y.o. female) Date: 3/28/2020 Primary Diagnosis: Diarrhea in adult patient [R19.7] Precautions:     
 
 
ASSESSMENT Based on the objective data described below, the patient presents with significant mobility and cognitive limitations, most of which appear to be close to her baseline level of function. Patient has Down's syndrome and has lived in a group home for many years. Prior to January 2020, she was indep with mobility and gait and able to participate with ADLs. She was hospitalized in January, intubated and ended up with a trach. Per group home leader, she returned and was working with OT and PT but discharged by one due to lack of command following.   They had been working on sitting up on the EOB and the staff transfers her to a recliner every day. Today, patient was non-verbal and unable to respond to any commands. No active movement was noted per command but patient did exhibit some muscle contraction with stretching/yawning at one time. Total A for rolling. Will put her on caseload on a trial basis to assess ability to come to sitting EOB and command following as group home notes that she is able to follow some commands at baseline, even since the last hospitalization. Current Level of Function Impacting Discharge (mobility/balance): Total A x 1-2 for turning Functional Outcome Measure: The patient scored Total: 0/100 on the Barthel Index which is indicative of 100% impaired ability to care for basic self needs/dependency on others. Other factors to consider for discharge: lives in group home, minimal activity and mobility prior to this admission Patient will benefit from skilled therapy intervention to address the above noted impairments. PLAN : 
Recommendations and Planned Interventions: bed mobility training, transfer training, therapeutic exercises, neuromuscular re-education, patient and family training/education, and therapeutic activities Frequency/Duration: Patient will be followed by physical therapy:  2 times a week to address goals. Recommendation for discharge: (in order for the patient to meet his/her long term goals) Physical therapy at least 2 days/week in the home depending on response here and command following This discharge recommendation: A follow-up discussion with the attending provider and/or case management is planned IF patient discharges home will need the following DME: to be determined (TBD) SUBJECTIVE:  
Patient stated patient is non verbal. OBJECTIVE DATA SUMMARY:  
HISTORY:   
Past Medical History:  
Diagnosis Date Down syndrome Seizures ([facility] [address] No past surgical history on file. Personal factors and/or comorbidities impacting plan of care:  
 
Home Situation Home Environment: Group home Living Alone: No 
Support Systems: Home care staff Current DME Used/Available at Home: (tracheostomy) EXAMINATION/PRESENTATION/DECISION MAKING:  
Critical Behavior: 
Neurologic State: Alert Orientation Level: Unable to verbalize Cognition: Unable to assess (comment), No command following Hearing: 
  
 
Range Of Motion: 
AROM: Grossly decreased, non-functional 
  
  
  
PROM: Generally decreased, functional 
  
  
  
Strength:   
Strength: Grossly decreased, non-functional 
  
  
  
  
  
  
Tone & Sensation:  
  
  
  
  
  
Sensation: (unable to assess 2/2 cognition) Coordination: 
Coordination: Grossly decreased, non-functional 
Vision:  
  
Functional Mobility: 
Bed Mobility: 
Rolling: Total assistance Functional Measure: 
Barthel Index: 
 
Bathin Bladder: 0 Bowels: 0 Groomin Dressin Feedin Mobility: 0 Stairs: 0 Toilet Use: 0 Transfer (Bed to Chair and Back): 0 Total: 0/100 The Barthel ADL Index: Guidelines 1. The index should be used as a record of what a patient does, not as a record of what a patient could do. 2. The main aim is to establish degree of independence from any help, physical or verbal, however minor and for whatever reason. 3. The need for supervision renders the patient not independent. 4. A patient's performance should be established using the best available evidence. Asking the patient, friends/relatives and nurses are the usual sources, but direct observation and common sense are also important. However direct testing is not needed. 5. Usually the patient's performance over the preceding 24-48 hours is important, but occasionally longer periods will be relevant. 6. Middle categories imply that the patient supplies over 50 per cent of the effort. 7. Use of aids to be independent is allowed. Luis oH., Barthel, D.W. (6703). Functional evaluation: the Barthel Index. [facility] (14)2. JOSÉ LUIS Shah, Nate Mosher, Kwan Owens., [place], [address] (1999). Measuring the change indisability after inpatient rehabilitation; comparison of the responsiveness of the Barthel Index and Functional [place] Measure. Journal of Neurology, Neurosurgery, and Psychiatry, 66(4), 422-872. RUSSELL Mcmullen, VONDA Mosquera, & Sam Cotton M.A. (2004.) Assessment of post-stroke quality of life in cost-effectiveness studies: The usefulness of the Barthel Index and the EuroQoL-5D. [facility], 13, 751-32 Physical Therapy Evaluation Charge Determination History Examination Presentation Decision-Making HIGH Complexity :3+ comorbidities / personal factors will impact the outcome/ POC  MEDIUM Complexity : 3 Standardized tests and measures addressing body structure, function, activity limitation and / or participation in recreation  MEDIUM Complexity : Evolving with changing characteristics  Other outcome measures Barthel 0/100  HIGH Based on the above components, the patient evaluation is determined to be of the following complexity level: MEDIUM Pain Rating: 
None reported or noted Activity Tolerance:  
Poor Please refer to the flowsheet for vital signs taken during this treatment. After treatment patient left in no apparent distress:  
Supine in bed, Heels elevated for pressure relief, and Call bell within reach COMMUNICATION/EDUCATION:  
The patients plan of care was discussed with: Occupational therapist and Registered nurse. Fall prevention education was provided and the patient/caregiver indicated understanding. and Patient is unable to participate in goal setting and plan of care. Thank you for this referral. 
Zachary Pantoja, PT Time Calculation: 25 mins

## 2020-03-28 NOTE — PROGRESS NOTES
[facility]  
Senior Resident Admission Note CC: diarrhea HPI: 
Jaime Herrera is a 53 y.o. female known Downs Syndrome (non verbal at baseline), seizure disorder who presents to the ER from her [address] for 10 days of 5-6 loose watery foul smelly mucousy stools. H/o was obtained from chart review and caregiver. Pt had a recent 2 month admission at [facility] from January 9-March 9 for acute hypoxic respiratory failure secondary to aspiration pneumonia and acute metabolic encephalopathy 2/2 status epilepticus. Medical records were obtained from [address] [place] [address]. Since discharge home, patient f/u with PCP on 3/17: Keppra and dilantin level were ordered and a bowel regimen adjusted. Patient called PCP later for diarrhea. Miralax has been discontinued but stool has persisted. Immodium did not help. They have been seen by [facility] and sent to the ED for IV fluids. Caregiver states patient did not have any cough or increase SOB or fever or oxygen requirement since discharge. She has been tolerating and PEG tube feeding. See full HPI. Chart was reviewed. Visit Vitals /70 (BP 1 Location: Left arm, BP Patient Position: At rest) Pulse 98 Temp 98.8 °F (37.1 °C) Resp 18 LMP  (LMP Unknown) SpO2 97%  
on RA on tracheostomy General  no distress, alert non verbal  
HEENT  Tracheostomy with minimal secretion Respiratory  Clear Breath Sounds Bilaterally, No Increased Effort and Good Air Movement Bilaterally. Referred upper airway sounds Cardiovascular   RRR, S1S2, No murmur and Radial/Pedal Pulses 2+ Abdomen  soft, non tender and slightly distended. PEG tube in place. Skin  No Rash and Cap Refill less than 3 sec Musculoskeletal : LE contractures A/P: Admit to Telemetry 
 
-Diarrhea: likely 2/2 to C-diff colitis in the setting of recent antibiotics use for aspiration pneumonia - Stool studies: C.  Diff, O&P, WBC stool, Culture stool, culture blood  
- spoke with Dr. Bossman Anne, on call GI. Will start enteral Vanc per G tube. Hold Miralax, IVF, enteric precautions. Dr. Bossman Anne will see in morning. 
 
- mild elevation of LFTs <2 normal. Repeat CMP 
 
- FOBT +, likely 2/2 to CDiff colitis. Hb 14. 5. repeat CBC in am. And trend prn. Hold ASA. NPO, IV PPI BID. SCD, GI to see - Abnormal imaging: POA with multifocal scattered nodular opacities on CXR. Patient was recently treated for AHRF 2/2 PNA and is improving clinically. Caregiver states patient did not have any cough or increase SOB or fever. She has had decreased oxygen requirement since discharge, now on  RA. No fever or WBCs. Abnormal imaging likely residual from recent infection. 
- I attempted discussing radiology finding with oncall radiologist for possible comparison with previous imaging without success. - Monitor respiratory status. consider treating for HAP and repeat imaging if patient worsening 
- Hold off on ABx treatment in the setting of Cdiff treatment. PNA labs ( possibly legionaire disease?), procal, RVP,  Blood and sputum cultures - RT consulted for trach care. Seizure: Resume home meds. Drug levels. Seizure precautions. Cs Neuro consults. Patient seen, examined, and discussed with Dr. Devan Morris (PGY-1). For the remaining assessment and plan of other medical problems please refer to Dr. Elisa Sun H&P for more details. Pt discussed with on-call attending physician Angela Srivastava MD 
Family Medicine Resident

## 2020-03-28 NOTE — CONSULTS
Gastroenterology Consult Referring Physician: Kennedi Ellis 
 
Consult Date: 3/28/2020 Subjective: Chief Complaint: diarrhea History of Present Illness: Jaime Herrera is a 53 y.o. female who is seen in consultation for diarrhea. Patient is non verbal.  History obtained chart materials. Personal history of hospital stay with antibiotics; transferred here for long term care with diarrhea. Stool samples positive c diff. Past Medical History:  
Diagnosis Date  Down syndrome  Seizures ([address] No past surgical history on file. No family history on file. Social History Tobacco Use  Smoking status: Never Smoker  Smokeless tobacco: Never Used Substance Use Topics  Alcohol use: Never Frequency: Never Allergies Allergen Reactions  Depakote [Divalproex] Swelling Current Facility-Administered Medications Medication Dose Route Frequency  sodium chloride (NS) flush 5-40 mL  5-40 mL IntraVENous Q8H  
 sodium chloride (NS) flush 5-40 mL  5-40 mL IntraVENous PRN  
 0.9% sodium chloride infusion  60 mL/hr IntraVENous CONTINUOUS  
 vancomycin (FIRVANQ) 50 mg/mL oral solution 125 mg  125 mg Per G Tube Q6H  
 diazePAM (VALIUM) tablet 10 mg  10 mg Oral PRN  
 levETIRAcetam (KEPPRA) 100 mg/mL solution 500 mg  500 mg Per G Tube BID  loperamide (IMODIUM) 1 mg/7.5 mL oral solution 2 mg  2 mg Per G Tube TID PRN  
 nystatin (MYCOSTATIN) 100,000 unit/mL oral suspension 500,000 Units  500,000 Units Oral QID  phenytoin (DILANTIN) 100 mg/4 mL oral suspension 150 mg  150 mg PEG Tube BID  pantoprazole (PROTONIX) 40 mg in 0.9% sodium chloride 10 mL injection  40 mg IntraVENous Q12H Review of Systems: 
Not obtainable. Objective:  
 
Physical Exam: 
Visit Vitals /71 (BP 1 Location: Left arm, BP Patient Position: At rest) Pulse 90 Temp 97.4 °F (36.3 °C) Resp 16 Wt 48.2 kg (106 lb 3 oz) LMP  (LMP Unknown) SpO2 98% BMI 22.19 kg/m² Skin:  Extremities and face reveal no rashes. No lockwood erythema. HEENT: Sclerae anicteric. Extra-occular muscles are intact. No oral ulcers. No abnormal pigmentation of the lips. Cardiovascular: Regular rate and rhythm. No murmurs, gallops, or rubs. Respiratory:  Comfortable breathing with no accessory muscle use. Clear breath sounds with no wheezes, rales, or rhonchi. GI:  Abdomen nondistended, soft, and nontender. Normal active bowel sounds. No enlargement of the liver or spleen. No masses palpable. Musculoskeletal:  No pitting edema of the lower legs. Psychiatric:  No verbal responses. Lymphatic:  No cervical or supraclavicular adenopathy. Lab/Data Review: 
CMP:  
Lab Results Component Value Date/Time  03/28/2020 08:58 AM  
 K 4.0 03/28/2020 08:58 AM  
  (H) 03/28/2020 08:58 AM  
 CO2 26 03/28/2020 08:58 AM  
 AGAP 4 (L) 03/28/2020 08:58 AM  
 GLU 86 03/28/2020 08:58 AM  
 BUN 5 (L) 03/28/2020 08:58 AM  
 CREA 0.36 (L) 03/28/2020 08:58 AM  
 GFRAA >60 03/28/2020 08:58 AM  
 GFRNA >60 03/28/2020 08:58 AM  
 CA 8.1 (L) 03/28/2020 08:58 AM  
 MG 2.2 03/27/2020 07:53 PM  
 PHOS 4.9 (H) 03/27/2020 07:53 PM  
 ALB 2.0 (L) 03/28/2020 08:58 AM  
 TP 5.0 (L) 03/28/2020 08:58 AM  
 GLOB 3.0 03/28/2020 08:58 AM  
 AGRAT 0.7 (L) 03/28/2020 08:58 AM  
 SGOT 39 (H) 03/28/2020 08:58 AM  
 ALT 76 03/28/2020 08:58 AM  
 
CBC:  
Lab Results Component Value Date/Time WBC 6.1 03/28/2020 08:58 AM  
 HGB 12.1 03/28/2020 08:58 AM  
 HCT 37.1 03/28/2020 08:58 AM  
  03/28/2020 08:58 AM  
 
 
 
Assessment/Plan: Active Problems: 
  Diarrhea in adult patient (3/28/2020) 
 
 c diff enterocolitis without any high risk factors that would indicate short term poor prognosis Recommend:  
 
Vancomycin; if no response within five days fidaxomicin Resume tube feedings

## 2020-03-29 ENCOUNTER — APPOINTMENT (OUTPATIENT)
Dept: NON INVASIVE DIAGNOSTICS | Age: 50
End: 2020-03-29
Attending: STUDENT IN AN ORGANIZED HEALTH CARE EDUCATION/TRAINING PROGRAM
Payer: COMMERCIAL

## 2020-03-29 PROBLEM — R19.5 POSITIVE FECAL OCCULT BLOOD TEST: Status: ACTIVE | Noted: 2020-03-29

## 2020-03-29 PROBLEM — B37.0 ORAL THRUSH: Status: ACTIVE | Noted: 2020-03-29

## 2020-03-29 LAB
ALBUMIN SERPL-MCNC: 1.8 G/DL (ref 3.5–5)
ALBUMIN/GLOB SERPL: 0.5 {RATIO} (ref 1.1–2.2)
ALP SERPL-CCNC: 143 U/L (ref 45–117)
ALT SERPL-CCNC: 76 U/L (ref 12–78)
ANION GAP SERPL CALC-SCNC: 4 MMOL/L (ref 5–15)
AST SERPL-CCNC: 46 U/L (ref 15–37)
BASOPHILS # BLD: 0 K/UL (ref 0–0.1)
BASOPHILS NFR BLD: 0 % (ref 0–1)
BILIRUB SERPL-MCNC: <0.1 MG/DL (ref 0.2–1)
BUN SERPL-MCNC: 7 MG/DL (ref 6–20)
BUN/CREAT SERPL: 18 (ref 12–20)
CALCIUM SERPL-MCNC: 7.8 MG/DL (ref 8.5–10.1)
CHLORIDE SERPL-SCNC: 113 MMOL/L (ref 97–108)
CO2 SERPL-SCNC: 24 MMOL/L (ref 21–32)
CREAT SERPL-MCNC: 0.38 MG/DL (ref 0.55–1.02)
DIFFERENTIAL METHOD BLD: ABNORMAL
EOSINOPHIL # BLD: 0 K/UL (ref 0–0.4)
EOSINOPHIL NFR BLD: 0 % (ref 0–7)
ERYTHROCYTE [DISTWIDTH] IN BLOOD BY AUTOMATED COUNT: 15.3 % (ref 11.5–14.5)
FLUID CULTURE, SPNG2: NORMAL
GLOBULIN SER CALC-MCNC: 3.3 G/DL (ref 2–4)
GLUCOSE SERPL-MCNC: 99 MG/DL (ref 65–100)
HCT VFR BLD AUTO: 38.4 % (ref 35–47)
HGB BLD-MCNC: 12.5 G/DL (ref 11.5–16)
IMM GRANULOCYTES # BLD AUTO: 0 K/UL (ref 0–0.04)
IMM GRANULOCYTES NFR BLD AUTO: 0 % (ref 0–0.5)
L PNEUMO1 AG UR QL IA: NEGATIVE
LYMPHOCYTES # BLD: 1.7 K/UL (ref 0.8–3.5)
LYMPHOCYTES NFR BLD: 31 % (ref 12–49)
MCH RBC QN AUTO: 32.1 PG (ref 26–34)
MCHC RBC AUTO-ENTMCNC: 32.6 G/DL (ref 30–36.5)
MCV RBC AUTO: 98.7 FL (ref 80–99)
METAMYELOCYTES NFR BLD MANUAL: 2 %
MONOCYTES # BLD: 0.4 K/UL (ref 0–1)
MONOCYTES NFR BLD: 7 % (ref 5–13)
MYELOCYTES NFR BLD MANUAL: 4 %
NEUTS BAND NFR BLD MANUAL: 2 %
NEUTS SEG # BLD: 3.1 K/UL (ref 1.8–8)
NEUTS SEG NFR BLD: 54 % (ref 32–75)
NRBC # BLD: 0 K/UL (ref 0–0.01)
NRBC BLD-RTO: 0 PER 100 WBC
ORGANISM ID, SPNG3: NORMAL
PLATELET # BLD AUTO: 171 K/UL (ref 150–400)
PLEASE NOTE, SPNG4: NORMAL
PMV BLD AUTO: 11.1 FL (ref 8.9–12.9)
POTASSIUM SERPL-SCNC: 4 MMOL/L (ref 3.5–5.1)
PROT SERPL-MCNC: 5.1 G/DL (ref 6.4–8.2)
RBC # BLD AUTO: 3.89 M/UL (ref 3.8–5.2)
RBC MORPH BLD: ABNORMAL
S PNEUM AG SPEC QL LA: NEGATIVE
SODIUM SERPL-SCNC: 141 MMOL/L (ref 136–145)
SPECIMEN SOURCE: NORMAL
SPECIMEN SOURCE: NORMAL
SPECIMEN, SPNG1: NORMAL
WBC # BLD AUTO: 5.6 K/UL (ref 3.6–11)

## 2020-03-29 PROCEDURE — 94760 N-INVAS EAR/PLS OXIMETRY 1: CPT

## 2020-03-29 PROCEDURE — 74011250636 HC RX REV CODE- 250/636: Performed by: STUDENT IN AN ORGANIZED HEALTH CARE EDUCATION/TRAINING PROGRAM

## 2020-03-29 PROCEDURE — 65660000000 HC RM CCU STEPDOWN

## 2020-03-29 PROCEDURE — 74011250637 HC RX REV CODE- 250/637: Performed by: FAMILY MEDICINE

## 2020-03-29 PROCEDURE — 36415 COLL VENOUS BLD VENIPUNCTURE: CPT

## 2020-03-29 PROCEDURE — 87040 BLOOD CULTURE FOR BACTERIA: CPT

## 2020-03-29 PROCEDURE — 99218 HC RM OBSERVATION: CPT

## 2020-03-29 PROCEDURE — 80053 COMPREHEN METABOLIC PANEL: CPT

## 2020-03-29 PROCEDURE — 96376 TX/PRO/DX INJ SAME DRUG ADON: CPT

## 2020-03-29 PROCEDURE — 85025 COMPLETE CBC W/AUTO DIFF WBC: CPT

## 2020-03-29 PROCEDURE — 93306 TTE W/DOPPLER COMPLETE: CPT

## 2020-03-29 PROCEDURE — C9113 INJ PANTOPRAZOLE SODIUM, VIA: HCPCS | Performed by: STUDENT IN AN ORGANIZED HEALTH CARE EDUCATION/TRAINING PROGRAM

## 2020-03-29 PROCEDURE — 94761 N-INVAS EAR/PLS OXIMETRY MLT: CPT

## 2020-03-29 PROCEDURE — 74011250637 HC RX REV CODE- 250/637: Performed by: STUDENT IN AN ORGANIZED HEALTH CARE EDUCATION/TRAINING PROGRAM

## 2020-03-29 PROCEDURE — 77030038269 HC DRN EXT URIN PURWCK BARD -A

## 2020-03-29 PROCEDURE — 77030018798 HC PMP KT ENTRL FED COVD -A

## 2020-03-29 RX ADMIN — VANCOMYCIN HYDROCHLORIDE 125 MG: KIT at 15:34

## 2020-03-29 RX ADMIN — PHENYTOIN 150 MG: 125 SUSPENSION ORAL at 09:18

## 2020-03-29 RX ADMIN — SODIUM CHLORIDE 60 ML/HR: 900 INJECTION, SOLUTION INTRAVENOUS at 14:02

## 2020-03-29 RX ADMIN — Medication 10 ML: at 12:07

## 2020-03-29 RX ADMIN — PHENYTOIN 150 MG: 125 SUSPENSION ORAL at 18:23

## 2020-03-29 RX ADMIN — CLOTRIMAZOLE 10 MG: 10 LOZENGE ORAL; TOPICAL at 21:41

## 2020-03-29 RX ADMIN — VANCOMYCIN HYDROCHLORIDE 125 MG: KIT at 09:27

## 2020-03-29 RX ADMIN — CLOTRIMAZOLE 10 MG: 10 LOZENGE ORAL; TOPICAL at 14:02

## 2020-03-29 RX ADMIN — VANCOMYCIN HYDROCHLORIDE 125 MG: KIT at 21:41

## 2020-03-29 RX ADMIN — LEVETIRACETAM 500 MG: 100 SOLUTION ORAL at 09:19

## 2020-03-29 RX ADMIN — CLOTRIMAZOLE 10 MG: 10 LOZENGE ORAL; TOPICAL at 12:07

## 2020-03-29 RX ADMIN — VANCOMYCIN HYDROCHLORIDE 125 MG: KIT at 03:42

## 2020-03-29 RX ADMIN — PANTOPRAZOLE SODIUM 40 MG: 40 INJECTION, POWDER, FOR SOLUTION INTRAVENOUS at 12:07

## 2020-03-29 RX ADMIN — PANTOPRAZOLE SODIUM 40 MG: 40 INJECTION, POWDER, FOR SOLUTION INTRAVENOUS at 23:13

## 2020-03-29 RX ADMIN — LEVETIRACETAM 500 MG: 100 SOLUTION ORAL at 21:41

## 2020-03-29 RX ADMIN — Medication 10 ML: at 06:38

## 2020-03-29 RX ADMIN — CLOTRIMAZOLE 10 MG: 10 LOZENGE ORAL; TOPICAL at 18:23

## 2020-03-29 RX ADMIN — VANCOMYCIN HYDROCHLORIDE 1250 MG: 1.25 INJECTION, POWDER, LYOPHILIZED, FOR SOLUTION INTRAVENOUS at 14:02

## 2020-03-29 RX ADMIN — Medication 10 ML: at 21:41

## 2020-03-29 RX ADMIN — CLOTRIMAZOLE 10 MG: 10 LOZENGE ORAL; TOPICAL at 06:38

## 2020-03-29 NOTE — PROGRESS NOTES
[address] Office [phone] Fax [phone] [address] Medicine Residency Attending Addendum: I saw and evaluated the patient on the day of the encounter with Dr. Elza Fraser, performing the walker elements of the service. I discussed the findings, assessment and plan with the resident and agree with the resident's findings and plan as documented in the resident's note. Vital signs stable. Labs within good range. Pending speciation of blood cultures, continue IV abx. Continue p.o. Vanco for C. difficile. Altagracia Fitzpatirck MD, FAAFP, CAQSM, RMSK Assessment and Plan Gabbie Chavez is a 53 y.o. female with known Downs syndrome (non verbal at baseline), seizure disorder,  GERD, h/o Iron deficiency, who is admitted for diarrhea. 24 Hour Events: No acute events overnight. Hard to stick for morning labs. Diarrhea 2/2 C DIFF: GI seen recommends continue PO (via PEG) vanco - if no response within five days fidaxomicin. Nutrition consulted for tube feeds. Enteric Panel Neg. WBC nl.  
-strict I &O & PEG tube feeds,   
-f/u  Ova/Parasite 
-PO Vanc 125mg q6h x10 days through PEG tube, ends on 4/7 
-daily CBC/CMP  
-enteric percautions FOBT Positive on admission: h/o GIB in 2018. No evidence of active bleeding. Iron supplement and Omeprazole was discontinued after last hospital admission 3/9/2020. POA HgB 14.5 (1/2020 HgB 13.5). No new recs from GI, likely 2/2 to strain with diarrhea.  
-hold ASA -hold anticoagulation 
-SCDs and PPI BID 
- Follow up on GI recs  
-daily CBC to monitor HgB Focal Nodular opacity: Seen on POA CXR. Pt is asymptomatic, vitals stable and remains afebrile, WBC wnl. Treated for AHRF 2/2 aspiration pna during admission at [facility] 1/9 through 3/9/2020. This finding is likely residual from recent infection. No prior CXR to compare too. Procal low. (0.12). RVP neg. PNA labs neg. - Initial Bcx 1 out of 2 flagged: micrococcus which is a common contaminant 
- rBcx (3/29) NG18h 
- RT for trach care and suctioning - F/u ECHO results (done for eval of valve involvement) 
  Seizure: stable at this time. Dilantin level elevated on 3/17 and Keppra level wnl. Phenytoin level wnl.  
-seizure precautions  
-continue home Dilantin and Keppra BID  
-continue Valium 10mg prn for seizure >5 minutes  
-f/u Keppra level now, if abnormal will consult Neuro 
  
GERD: stable  
-Protonix 40mg IV BID  
  
H/o Iron Deficiency: stable. POA HgB 14.5  
-daily CBC  
  
Oral Thrush: Stable  
-continue Nystatin swish and spit 4 times per day  
  
FEN/GI -tube feeds. cont IV Fluids NS at 60mL/hr. Activity - Pt non ambulatory DVT prophylaxis - SCDs GI prophylaxis - Protonix 40 IV BID Fall prophylaxis - Not indicated at this time. Disposition - Plan to d/c to Group home. Consulting PT/OT/CM, Nutrition, RT, GI  
Code Status - Full, discussed with caregivers (Group Home Next of Kin Name and Contact Group home care giver Shona Morin [phone]. Sister Dinora Mauro 
I appreciate the opportunity to participate in the care of this patient, Ashley Pressley MD 
Family Medicine Resident Subjective / Objective Subjective Pt non verbal.  No complaints per nursing. O2 Device: Room air, Tracheostomy General: No acute distress. Respiratory: Tracheostomy is in place. Mild secretions, no evidence of infection. Referred upper airway sounds. No rhonchi, rales or wheezing. No respiratory distress. Cardiovascular: RRR. GI: + bowel sounds. Nontender. PEG Tube in place. Extremities: no LE edema. Distal pulses intact. Musculoskeletal: Chronic contractures and non weight bearing at baseline. Skin: Warm, dry. No rashes. Neuro: Nonverbal at baseline. Sleeping I/O: 
Date 03/29/20 0700 - 03/30/20 3437 03/30/20 0700 - 03/31/20 2498 Shift 2363-4086 9046-2842 24 Hour Total 5734-7192 4395-0615 24 Hour Total  
INTAKE  
P.O.  0 0     
  P. O.  0 0 NG/ 1030 1440 Water Flush Volume (mL) (PEG/Gastrostomy Tube) 50 200 250 Medication Volume (PEG/Gastrostomy Tube) 60  60 Intake (ml) (PEG/Gastrostomy Tube)  Shift Total(mL/kg) 410(7.5) 6813(40.2) 1440(26.5) OUTPUT Urine(mL/kg/hr)  200(0.3) 200(0.2) Urine Occurrence(s)  1 x 1 x Urine Output (mL) (External Female Catheter 03/30/20)  200 200 Stool Stool Occurrence(s)  2 x 2 x Shift Total(mL/kg)  200(3.7) 200(3.7)  335 4893 Weight (kg) 54.4 54.4 54.4 54.4 54.4 54.4 Inpatient Medications Current Facility-Administered Medications Medication Dose Route Frequency  vancomycin (VANCOCIN) 750 mg in 0.9% sodium chloride (MBP/ADV) 250 mL  750 mg IntraVENous Q12H  
 sodium chloride (NS) flush 5-40 mL  5-40 mL IntraVENous Q8H  
 sodium chloride (NS) flush 5-40 mL  5-40 mL IntraVENous PRN  
 0.9% sodium chloride infusion  60 mL/hr IntraVENous CONTINUOUS  
 vancomycin (FIRVANQ) 50 mg/mL oral solution 125 mg  125 mg Per G Tube Q6H  
 diazePAM (VALIUM) tablet 10 mg  10 mg Oral PRN  
 levETIRAcetam (KEPPRA) 100 mg/mL solution 500 mg  500 mg Per G Tube BID  phenytoin (DILANTIN) 100 mg/4 mL oral suspension 150 mg  150 mg PEG Tube BID  pantoprazole (PROTONIX) 40 mg in 0.9% sodium chloride 10 mL injection  40 mg IntraVENous Q12H  clotrimazole (MYCELEX) ayesha 10 mg  10 mg Oral 5XD Allergies Allergies Allergen Reactions  Depakote [Divalproex] Swelling CBC: 
Recent Labs  
  03/29/20 
0527 03/28/20 
0858 03/27/20 
1953 WBC 5.6 6.1 8.9 HGB 12.5 12.1 14.5 HCT 38.4 37.1 44.2  253 302 Metabolic Panel: 
Recent Labs  
  03/29/20 
0527 03/28/20 
0858 03/27/20 1953  141 138  
K 4.0 4.0 4.0  
* 111* 103 CO2 24 26 29 BUN 7 5* 10  
 CREA 0.38* 0.36* 0.51* GLU 99 86 99 CA 7.8* 8.1* 8.8 MG  --   --  2.2 PHOS  --   --  4.9* ALB 1.8* 2.0* 2.4* SGOT 46* 39* 51* ALT 76 76 99* For Billing Chief Complaint Patient presents with  Diarrhea Hospital Problems  Date Reviewed: 3/29/2020 Codes Class Noted POA Positive fecal occult blood test ICD-10-CM: R19.5 ICD-9-CM: 792.1  3/29/2020 Unknown Oral thrush ICD-10-CM: B37.0 ICD-9-CM: 112.0  3/29/2020 Unknown Diarrhea in adult patient ICD-10-CM: R19.7 ICD-9-CM: 787.91  3/28/2020 Unknown * (Principal) C. difficile diarrhea ICD-10-CM: A04.72 
ICD-9-CM: 008.45  3/28/2020 Unknown Seizure ([facility] [address] ICD-10-CM: R56.9 ICD-9-CM: 780.39  12/11/2019 Yes Gastroesophageal reflux disease without esophagitis ICD-10-CM: K21.9 ICD-9-CM: 530.81  12/11/2019 Yes Down's syndrome ICD-10-CM: Q90.9 ICD-9-CM: 758.0  12/11/2019 Yes Iron deficiency ICD-10-CM: E61.1 ICD-9-CM: 280.9  12/11/2019 Yes Constipation ICD-10-CM: K59.00 ICD-9-CM: 564.00  12/11/2019 Yes Severe intellectual disability ICD-10-CM: F72 
ICD-9-CM: 318.1  2/2/2012 Yes Overview Signed 2/2/2012  1:40 PM by Oskar Knowles MD  
  [facility]

## 2020-03-29 NOTE — PROGRESS NOTES
Bedside and Verbal shift change report given to Florence Joseph (oncoming nurse) by Vidhya Martinez (offgoing nurse). Report included the following information SBAR, Kardex, Procedure Summary, Intake/Output, MAR, Recent Results and Med Rec Status.

## 2020-03-29 NOTE — PROGRESS NOTES
Bedside and Verbal shift change report given to Stacey Mckeon (oncoming nurse) by Jose Goodwin (offgoing nurse). Report included the following information SBAR, Kardex, ED Summary, Intake/Output, MAR, Accordion and Recent Results.

## 2020-03-29 NOTE — PROGRESS NOTES
[address] Dosing Services: Antimicrobial Stewardship Progress Note Consult for antibiotic dosing of vancomycin by Dr. Christine Rodarte Pharmacist reviewed antibiotic appropriateness for 53year old , female  for indication of bloodstream infection Day of Therapy 1 Plan: 
Vancomycin therapy: 
Start Vancomycin therapy, with loading dose of 1250 mg IV x 1 now Follow with maintenance dose of 750 mg IV Q12H Dose calculated to approximate a therapeutic trough of 15-20 mcg/mL. Last trough level / Plan for level: prior to 4th dose (not yet ordered) Pharmacy to follow daily and will make changes to dose and/or frequency based on clinical status. Date Dose & Interval Measured (mcg/mL) Extrapolated (mcg/mL) ? ? ? ?  
? ? ? ?  
? ? ? ? Other Antimicrobial 
(not dosed by pharmacist) Vancomycin 125 mg PO Q6H (for C.diff) Cultures 3/29 Blood x 2 - (pending) 3/28 Blood x 2 - GPC clusters 1/2 bottles (pending) 3/28 Resp panel - not detected (FINAL) 3/27 Urine - (pending) 3/27 CDiff - positive (FINAL) Serum Creatinine Lab Results Component Value Date/Time Creatinine 0.38 (L) 03/29/2020 05:27 AM  
   
Creatinine Clearance Estimated Creatinine Clearance: 72.3 mL/min (A) (by C-G formula based on SCr of 0.38 mg/dL (L)). Procalcitonin Lab Results Component Value Date/Time Procalcitonin 0.12 03/28/2020 01:04 AM  
 
  
Temp 98.3 °F (36.8 °C) WBC Lab Results Component Value Date/Time WBC 5.6 03/29/2020 05:27 AM  
   
H/H Lab Results Component Value Date/Time HGB 12.5 03/29/2020 05:27 AM  
  
Platelets Lab Results Component Value Date/Time PLATELET 476 73/[birth year]/1916 05:27 AM  
 
  
 
 
Pharmacist: [facility] Elsi Olea, PHARMD Contact information: [phone]

## 2020-03-30 ENCOUNTER — DOCUMENTATION ONLY (OUTPATIENT)
Dept: FAMILY MEDICINE CLINIC | Age: 50
End: 2020-03-30

## 2020-03-30 LAB
ALBUMIN SERPL-MCNC: 2 G/DL (ref 3.5–5)
ALBUMIN/GLOB SERPL: 0.6 {RATIO} (ref 1.1–2.2)
ALP SERPL-CCNC: 151 U/L (ref 45–117)
ALT SERPL-CCNC: 81 U/L (ref 12–78)
ANION GAP SERPL CALC-SCNC: 4 MMOL/L (ref 5–15)
AST SERPL-CCNC: 50 U/L (ref 15–37)
AV VELOCITY RATIO: 0.76
BASOPHILS # BLD: 0 K/UL (ref 0–0.1)
BASOPHILS NFR BLD: 0 % (ref 0–1)
BILIRUB SERPL-MCNC: 0.1 MG/DL (ref 0.2–1)
BUN SERPL-MCNC: 6 MG/DL (ref 6–20)
BUN/CREAT SERPL: 13 (ref 12–20)
CALCIUM SERPL-MCNC: 8.4 MG/DL (ref 8.5–10.1)
CHLORIDE SERPL-SCNC: 113 MMOL/L (ref 97–108)
CO2 SERPL-SCNC: 26 MMOL/L (ref 21–32)
CREAT SERPL-MCNC: 0.45 MG/DL (ref 0.55–1.02)
DIFFERENTIAL METHOD BLD: ABNORMAL
ECHO AO ROOT DIAM: 2.88 CM
ECHO AV AREA PEAK VELOCITY: 1.9 CM2
ECHO AV PEAK GRADIENT: 4.2 MMHG
ECHO AV PEAK VELOCITY: 102.86 CM/S
ECHO EST RA PRESSURE: 8 MMHG
ECHO LA MAJOR AXIS: 2.04 CM
ECHO LA TO AORTIC ROOT RATIO: 0.71
ECHO LA VOL 2C: 18.26 ML (ref 22–52)
ECHO LA VOL 4C: 12.33 ML (ref 22–52)
ECHO LA VOL BP: 17.44 ML (ref 22–52)
ECHO LA VOL/BSA BIPLANE: 11.9 ML/M2 (ref 16–28)
ECHO LA VOLUME INDEX A2C: 12.46 ML/M2 (ref 16–28)
ECHO LA VOLUME INDEX A4C: 8.41 ML/M2 (ref 16–28)
ECHO LV INTERNAL DIMENSION DIASTOLIC: 3.71 CM (ref 3.9–5.3)
ECHO LV INTERNAL DIMENSION SYSTOLIC: 2.64 CM
ECHO LV IVSD: 0.77 CM (ref 0.6–0.9)
ECHO LV MASS 2D: 73.8 G (ref 67–162)
ECHO LV MASS INDEX 2D: 50.3 G/M2 (ref 43–95)
ECHO LV POSTERIOR WALL DIASTOLIC: 0.65 CM (ref 0.6–0.9)
ECHO LVOT DIAM: 1.79 CM
ECHO LVOT PEAK GRADIENT: 2.4 MMHG
ECHO LVOT PEAK VELOCITY: 78.15 CM/S
ECHO MV A VELOCITY: 93.92 CM/S
ECHO MV E DECELERATION TIME (DT): 204.4 MS
ECHO MV E VELOCITY: 108.79 CM/S
ECHO MV E/A RATIO: 1.16
ECHO PULMONARY ARTERY SYSTOLIC PRESSURE (PASP): 34 MMHG
ECHO PV MAX VELOCITY: 69.09 CM/S
ECHO PV PEAK GRADIENT: 1.9 MMHG
ECHO RIGHT VENTRICULAR SYSTOLIC PRESSURE (RVSP): 34 MMHG
ECHO RV INTERNAL DIMENSION: 3.12 CM
ECHO TV REGURGITANT MAX VELOCITY: 254.88 CM/S
ECHO TV REGURGITANT PEAK GRADIENT: 26 MMHG
EOSINOPHIL # BLD: 0.1 K/UL (ref 0–0.4)
EOSINOPHIL NFR BLD: 1 % (ref 0–7)
ERYTHROCYTE [DISTWIDTH] IN BLOOD BY AUTOMATED COUNT: 15.6 % (ref 11.5–14.5)
GLOBULIN SER CALC-MCNC: 3.1 G/DL (ref 2–4)
GLUCOSE SERPL-MCNC: 95 MG/DL (ref 65–100)
HCT VFR BLD AUTO: 38.2 % (ref 35–47)
HGB BLD-MCNC: 12.5 G/DL (ref 11.5–16)
IMM GRANULOCYTES # BLD AUTO: 0 K/UL (ref 0–0.04)
IMM GRANULOCYTES NFR BLD AUTO: 0 % (ref 0–0.5)
LVFS 2D: 28.85 %
LYMPHOCYTES # BLD: 1.4 K/UL (ref 0.8–3.5)
LYMPHOCYTES NFR BLD: 18 % (ref 12–49)
MCH RBC QN AUTO: 32.1 PG (ref 26–34)
MCHC RBC AUTO-ENTMCNC: 32.7 G/DL (ref 30–36.5)
MCV RBC AUTO: 97.9 FL (ref 80–99)
MONOCYTES # BLD: 0.5 K/UL (ref 0–1)
MONOCYTES NFR BLD: 6 % (ref 5–13)
MV DEC SLOPE: 5.32
MYELOCYTES NFR BLD MANUAL: 3 %
NEUTS BAND NFR BLD MANUAL: 7 %
NEUTS SEG # BLD: 5.6 K/UL (ref 1.8–8)
NEUTS SEG NFR BLD: 65 % (ref 32–75)
NRBC # BLD: 0 K/UL (ref 0–0.01)
NRBC BLD-RTO: 0 PER 100 WBC
PLATELET # BLD AUTO: 263 K/UL (ref 150–400)
PMV BLD AUTO: 10.3 FL (ref 8.9–12.9)
POTASSIUM SERPL-SCNC: 3.7 MMOL/L (ref 3.5–5.1)
PROT SERPL-MCNC: 5.1 G/DL (ref 6.4–8.2)
PULMONARY ARTERY END DIASTOLIC PRESSURE: 11.5 MMHG
PULMONARY ARTERY MEAN PRESURE: 19 MMHG
PV END DIASTOLIC VELOCITY: 0.9 MMHG
RBC # BLD AUTO: 3.9 M/UL (ref 3.8–5.2)
RBC MORPH BLD: ABNORMAL
SODIUM SERPL-SCNC: 143 MMOL/L (ref 136–145)
WBC # BLD AUTO: 7.8 K/UL (ref 3.6–11)

## 2020-03-30 PROCEDURE — 85025 COMPLETE CBC W/AUTO DIFF WBC: CPT

## 2020-03-30 PROCEDURE — 96365 THER/PROPH/DIAG IV INF INIT: CPT

## 2020-03-30 PROCEDURE — 74011250636 HC RX REV CODE- 250/636: Performed by: STUDENT IN AN ORGANIZED HEALTH CARE EDUCATION/TRAINING PROGRAM

## 2020-03-30 PROCEDURE — 74011250637 HC RX REV CODE- 250/637: Performed by: STUDENT IN AN ORGANIZED HEALTH CARE EDUCATION/TRAINING PROGRAM

## 2020-03-30 PROCEDURE — 36415 COLL VENOUS BLD VENIPUNCTURE: CPT

## 2020-03-30 PROCEDURE — 94761 N-INVAS EAR/PLS OXIMETRY MLT: CPT

## 2020-03-30 PROCEDURE — 77030006998

## 2020-03-30 PROCEDURE — 65660000000 HC RM CCU STEPDOWN

## 2020-03-30 PROCEDURE — 74011250637 HC RX REV CODE- 250/637: Performed by: FAMILY MEDICINE

## 2020-03-30 PROCEDURE — 99218 HC RM OBSERVATION: CPT

## 2020-03-30 PROCEDURE — 80053 COMPREHEN METABOLIC PANEL: CPT

## 2020-03-30 PROCEDURE — 97530 THERAPEUTIC ACTIVITIES: CPT

## 2020-03-30 PROCEDURE — 94760 N-INVAS EAR/PLS OXIMETRY 1: CPT

## 2020-03-30 PROCEDURE — 77030038269 HC DRN EXT URIN PURWCK BARD -A

## 2020-03-30 PROCEDURE — 77030018846 HC SOL IRR STRL H20 ICUM -A

## 2020-03-30 PROCEDURE — C9113 INJ PANTOPRAZOLE SODIUM, VIA: HCPCS | Performed by: STUDENT IN AN ORGANIZED HEALTH CARE EDUCATION/TRAINING PROGRAM

## 2020-03-30 PROCEDURE — 96376 TX/PRO/DX INJ SAME DRUG ADON: CPT

## 2020-03-30 RX ORDER — VANCOMYCIN HYDROCHLORIDE 250 MG/5ML
125 POWDER, FOR SOLUTION ORAL EVERY 6 HOURS
Qty: 70 ML | Refills: 0 | Status: SHIPPED | OUTPATIENT
Start: 2020-03-30 | End: 2020-03-31

## 2020-03-30 RX ORDER — CLOTRIMAZOLE 10 MG/1
10 LOZENGE ORAL; TOPICAL
Qty: 50 TAB | Refills: 0 | Status: SHIPPED | OUTPATIENT
Start: 2020-03-30 | End: 2020-03-31

## 2020-03-30 RX ADMIN — LEVETIRACETAM 500 MG: 100 SOLUTION ORAL at 09:00

## 2020-03-30 RX ADMIN — CLOTRIMAZOLE 10 MG: 10 LOZENGE ORAL; TOPICAL at 05:08

## 2020-03-30 RX ADMIN — VANCOMYCIN HYDROCHLORIDE 125 MG: KIT at 08:59

## 2020-03-30 RX ADMIN — LEVETIRACETAM 500 MG: 100 SOLUTION ORAL at 21:31

## 2020-03-30 RX ADMIN — CLOTRIMAZOLE 10 MG: 10 LOZENGE ORAL; TOPICAL at 21:31

## 2020-03-30 RX ADMIN — VANCOMYCIN HYDROCHLORIDE 125 MG: KIT at 21:31

## 2020-03-30 RX ADMIN — CLOTRIMAZOLE 10 MG: 10 LOZENGE ORAL; TOPICAL at 11:28

## 2020-03-30 RX ADMIN — VANCOMYCIN HYDROCHLORIDE 750 MG: 750 INJECTION, POWDER, LYOPHILIZED, FOR SOLUTION INTRAVENOUS at 02:06

## 2020-03-30 RX ADMIN — VANCOMYCIN HYDROCHLORIDE 125 MG: KIT at 02:07

## 2020-03-30 RX ADMIN — PHENYTOIN 150 MG: 125 SUSPENSION ORAL at 18:02

## 2020-03-30 RX ADMIN — SODIUM CHLORIDE 60 ML/HR: 900 INJECTION, SOLUTION INTRAVENOUS at 05:08

## 2020-03-30 RX ADMIN — CLOTRIMAZOLE 10 MG: 10 LOZENGE ORAL; TOPICAL at 14:44

## 2020-03-30 RX ADMIN — VANCOMYCIN HYDROCHLORIDE 125 MG: KIT at 14:43

## 2020-03-30 RX ADMIN — PANTOPRAZOLE SODIUM 40 MG: 40 INJECTION, POWDER, FOR SOLUTION INTRAVENOUS at 11:28

## 2020-03-30 RX ADMIN — Medication 10 ML: at 13:03

## 2020-03-30 RX ADMIN — PHENYTOIN 150 MG: 125 SUSPENSION ORAL at 08:59

## 2020-03-30 RX ADMIN — Medication 10 ML: at 21:31

## 2020-03-30 RX ADMIN — CLOTRIMAZOLE 10 MG: 10 LOZENGE ORAL; TOPICAL at 18:02

## 2020-03-30 RX ADMIN — Medication 10 ML: at 05:08

## 2020-03-30 NOTE — WOUND CARE
Wound care consult: 
Initial visit for skin assessment, on low air loss surface- limited mobility, assessed with staff nurse. Assessment All skin folds and bony prominences assessed, turned with staff assistance. Hands edematous, skin is dry and fragile. Sacral area- albert rectal area red with mild excoriation from frequent stools and incontinence. Heels intact Right heel- small resolving area of discoloration on base of heel with dry peeling skin - area is blanching- non tender, off loading boots in place. Treatment Repositioned in bed Heels floated- off loading boots in place Recommendations/Plan Low air loss surface- on P500 PEAK NEEDs surface Turn, reposition every 2 hours as tolerated, float heels, place in  boots. Incontinent care with comfort shields. Apply Zinc to all open areas, moisture barrier as needed. Will follow, reconsult as needed.  
 
 
[facility]

## 2020-03-30 NOTE — PROGRESS NOTES
Problem: Mobility Impaired (Adult and Pediatric) Goal: *Acute Goals and Plan of Care (Insert Text) Description: FUNCTIONAL STATUS PRIOR TO ADMISSION: Patient required maximum to total assistance for basic and instrumental ADLs. She had been working with [address] [address]  in the group home setting on sitting balance EOB, per manager. HOME SUPPORT PRIOR TO ADMISSION: The patient lived in a group home, has [facility] 12 sydrome but recent major medical event in January where she was intubated and required tracheostomy. Prior to January, patient was able to ambulate with only supervision and perform some self-care. Physical Therapy Goals Initiated 3/28/2020 1. Patient will move from supine to sit and sit to supine , scoot up and down and roll side to side in bed with moderate assistance  within 7 day(s). 2.  Patient will transfer from bed to chair and chair to bed with moderate assistance  using the least restrictive device within 7 day(s). 3.  Patient will sit on the edge of bed for 5 minutes duration with only contact guard assistance within 7 days. Outcome: Progressing Towards Goal 
 PHYSICAL THERAPY TREATMENT Patient: Phan Herbert (3 y.o. female) Date: 3/30/2020 Diagnosis: Diarrhea in adult patient [R19.7] C. difficile diarrhea Precautions:   
Chart, physical therapy assessment, plan of care and goals were reviewed. ASSESSMENT Patient continues with skilled PT services and is not progressing towards goals. Pt requires Total A for rolling L and R and scooting up in bed. Incontinent of urine and bowels. Total A for self care and repositioning in the bed. Patient is not following commands today, but does open eyes and able to track briefly before falling back to sleep. Will increased frequency to 5x/week on a trial basis to assess for any mobility gains. Will attempt Total A to EOB tomorrow to assess sitting balance/tolerance. If continues without progress will sign off acutely. Current Level of Function Impacting Discharge (mobility/balance): Total A Other factors to consider for discharge: ability to follow commands and make progress with therapy is questionable PLAN : 
Patient continues to benefit from skilled intervention to address the above impairments. Continue treatment per established plan of care. to address goals. Recommendation for discharge: (in order for the patient to meet his/her long term goals) Physical therapy at least 2 days/week in the home This discharge recommendation: 
Has been made in collaboration with the attending provider and/or case management IF patient discharges home will need the following DME: patient owns DME required for discharge SUBJECTIVE:  
Patient stated non-verbal. OBJECTIVE DATA SUMMARY:  
Critical Behavior: 
Neurologic State: Alert Orientation Level: Unable to verbalize Cognition: Unable to assess (comment) Functional Mobility Training: 
Bed Mobility: 
Rolling: Total assistance Activity Tolerance:  
Fair and desaturates with exertion and requires oxygen Please refer to the flowsheet for vital signs taken during this treatment. After treatment patient left in no apparent distress:  
Supine in bed, Heels elevated for pressure relief, Patient positioned in left sidelying for pressure relief, Call bell within reach, Bed / chair alarm activated, and Side rails x 3 
 
COMMUNICATION/COLLABORATION:  
The patients plan of care was discussed with: Registered nurse. Faith Mckeon, PT Time Calculation: 16 mins

## 2020-03-30 NOTE — PROGRESS NOTES
MD asked this RN to contact YULI and see if the patient was clear for DC. RN paged YULI JAY. Dr. Deneen Castle said she is clear as long as we have confirmation that the facility will take her back with C. Diff.  RN reported to Rhiannon Shelby MD.

## 2020-03-30 NOTE — PROGRESS NOTES
0330- Unable to obtain pt's AM labwork at this time. 2 RN's tried 2 times each unsuccessfully. On call family practice resident notified. Will continue to monitor and obtain labs as able.

## 2020-03-30 NOTE — PROGRESS NOTES
Bedside shift change report given to Yovani Fernandez RN (oncoming nurse) by Selena Willis RN (offgoing nurse). Report included the following information SBAR, Kardex, Procedure Summary, Intake/Output, MAR and Recent Results.

## 2020-03-30 NOTE — PROGRESS NOTES
Bedside shift change report given to Nghia Cha RN (oncoming nurse) by Araceli Casarez RN (offgoing nurse). Report included the following information SBAR and Kardex.

## 2020-03-30 NOTE — PROGRESS NOTES
3/30/2020 4:03 PM Received call from pt's , Froylan Lombard and [facility] resident. [facility] resident reported pt will discharge back to group home today. Froylan Lombard confirmed they can accept pt back but cannot accept pt back today. Froylan Lombard requested discharge on 3/31. CM called and spoke with [facility] confirmed discharge for today. CM called back to pt's group home director, Froylan Lombard who reported they cannot accept pt back today but can accept on 3/31 after 12PM. CM called back to [facility], spoke with Josef Payan and confirmed discharge transport for pt at [address] [place] [address] on 3/31.  
 
3/30/2020  2:00 PM CM called back to Froylan Lombard with pt's group home to confirm they received updates and can accept pt. Froylan Lombard reported if pt remains on enteric contact precautions they cannot accept pt back because they do not have the neccessary PPE. Froylan Lombard reported they have no way of accessing PPE at home for pt. CM called to [facility] resident regarding how long pt will need to be on contact precautions. [facility] resident to contact GI and follow up with CM after. If pt's group home cannot accept pt back, SNF placement will have to be pursued, pt will need a level 2 screening. 3/30/2020 1:39 PM CM called [facility] [employer], scheduled ambulance transport for pt on 3/31 to return to group home. Transport requested for 1PM on 3/31 and trip id # is [id number].  
 
3/30/2020 11:38 AM Received return phone call from pt's mother who was understanding planning for pt to discharge on 3/31. Pt's mother had no questions or concerns with pt's discharge. 3/30/2020 11:16 AM CM called and spoke with Sandra at [facility], notified of referral. Mitzi Subramanian confirmed they can accept pt back under their services at discharge. 3/30/2020  9:44 AM Case management consult for transport at discharge received. Confirmed with [facility] attending, pt will not discharge today, planning for discharge on 3/31.  CM called and spoke with group [place] manager, Adithya CRUZ([phone]), relayed plan for discharge on 3/31. Ms. Shin Rain requested CM contact her manager, ITALIASTEVE([phone]). CM called and spoke with [facility]. [facility] requested updates be faxed to her at [phone]. [facility] reported on day of discharge, they will need discharge and updated labs faxed to them to review prior to pt's discharge. [facility] was provided phone number to 5th floor to speak with pt's RN regarding any medical questions. [facility] reported pt does not have a POA nor a guardian and her mother is her emergency contact. Pt's [facility]([phone]). [facility] reported medical decisions are left up to pt's mother and group home. CM called and lvm with pt's mother, Ms. Radha Batista at provided number. CM faxed pt's updated labs and MD note to [facility] at [phone]. Home health order requested and received, referral sent to [facility] for resumption of care. CM will follow. GRICELDA Saldivar Transitions of Care Plan: 
Discharge back to group home on 3/31 at 1PM. Updates and discharge will need to be faxed to pt's group home at [phone]. Resumption of care through [facility] for home health. Ambulance transport arranged through [facility].

## 2020-03-30 NOTE — PROGRESS NOTES
[facility] [address] 
[phone] GI PROGRESS NOTE 
 
 
 
NAME: Lexii Found :  1970 MRN:  [id number] Subjective:  
Nursing reports her bowel movements have become more formed and less frequent. Objective:  
NAD 
 
 
VITALS:  
Last 24hrs VS reviewed since prior progress note. Most recent are: 
Visit Vitals /65 (BP 1 Location: Left arm, BP Patient Position: At rest) Pulse 85 Temp 98.1 °F (36.7 °C) Resp 18 Ht 4' 10\" (1.473 m) Wt 54.4 kg (120 lb) SpO2 96% BMI 25.08 kg/m² Intake/Output Summary (Last 24 hours) at 3/30/2020 1430 Last data filed at 3/30/2020 0800 Gross per 24 hour Intake 1130 ml Output 200 ml Net 930 ml PHYSICAL EXAM: 
General: Alert, in no acute distress HEENT: Anicteric sclerae. Lungs:            CTA Bilaterally. Heart:  Regular  rhythm, Abdomen: Soft, Non distended, Non tender.  (+)Bowel sounds, no HSM Extremities: No c/c/e Neurologic:  CN 2-12 gi, Alert. Non verbal.  No acute neurological distress Psych:   Not anxious nor agitated. Lab Data Reviewed:  
Recent Labs  
  20 
3641 20 
2574 WBC 7.8 5.6 HGB 12.5 12.5 HCT 38.2 38.4  171 Recent Labs  
  20 
0944 20  141   < > 138  
K 3.7 4.0   < > 4.0  
* 113*   < > 103 CO2 26 24   < > 29 BUN 6 7   < > 10 CREA 0.45* 0.38*   < > 0.51* GLU 95 99   < > 99 PHOS  --   --   --  4.9*  
CA 8.4* 7.8*   < > 8.8  
 < > = values in this interval not displayed. Recent Labs  
  20 
0944 20 SGOT 50* 46*   < > 51* * 143*   < > 182* TP 5.1* 5.1*   < > 6.9 ALB 2.0* 1.8*   < > 2.4*  
GLOB 3.1 3.3   < > 4.5* LPSE  --   --   --  52*  
 < > = values in this interval not displayed. ________________________________________________________________________ Patient Active Problem List  
Diagnosis Code  Seizure ([facility] [address] R56.9  Gastroesophageal reflux disease without esophagitis K21.9  Down's syndrome Q90.9  Iron deficiency E61.1  Constipation K59.00  Severe intellectual disability F72  
 Diarrhea in adult patient R19.7  
 C. difficile diarrhea A04.72  
 Positive fecal occult blood test R19.5  Oral thrush B37.0 Assessment and Plan: 
C Diff Diarrhea:  Improving  
 
- Continue Vancomycin 125 PO QID x 10 days - Continue tube feedings Will see again on request.  Please call with questions or concerns.    
 
  
Signed By: Ab Ty NP   
 3/30/2020  2:30 PM

## 2020-03-31 ENCOUNTER — APPOINTMENT (OUTPATIENT)
Dept: GENERAL RADIOLOGY | Age: 50
End: 2020-03-31
Attending: STUDENT IN AN ORGANIZED HEALTH CARE EDUCATION/TRAINING PROGRAM
Payer: COMMERCIAL

## 2020-03-31 ENCOUNTER — DOCUMENTATION ONLY (OUTPATIENT)
Dept: FAMILY MEDICINE CLINIC | Age: 50
End: 2020-03-31

## 2020-03-31 LAB
ALBUMIN SERPL-MCNC: 2 G/DL (ref 3.5–5)
ALBUMIN/GLOB SERPL: 0.6 {RATIO} (ref 1.1–2.2)
ALP SERPL-CCNC: 150 U/L (ref 45–117)
ALT SERPL-CCNC: 83 U/L (ref 12–78)
ANION GAP SERPL CALC-SCNC: 5 MMOL/L (ref 5–15)
AST SERPL-CCNC: 55 U/L (ref 15–37)
BASOPHILS # BLD: 0 K/UL (ref 0–0.1)
BASOPHILS NFR BLD: 0 % (ref 0–1)
BILIRUB SERPL-MCNC: <0.1 MG/DL (ref 0.2–1)
BUN SERPL-MCNC: 7 MG/DL (ref 6–20)
BUN/CREAT SERPL: 16 (ref 12–20)
CALCIUM SERPL-MCNC: 8.2 MG/DL (ref 8.5–10.1)
CHLORIDE SERPL-SCNC: 110 MMOL/L (ref 97–108)
CO2 SERPL-SCNC: 25 MMOL/L (ref 21–32)
CREAT SERPL-MCNC: 0.45 MG/DL (ref 0.55–1.02)
DIFFERENTIAL METHOD BLD: ABNORMAL
EOSINOPHIL # BLD: 0.1 K/UL (ref 0–0.4)
EOSINOPHIL NFR BLD: 1 % (ref 0–7)
ERYTHROCYTE [DISTWIDTH] IN BLOOD BY AUTOMATED COUNT: 14.8 % (ref 11.5–14.5)
GLOBULIN SER CALC-MCNC: 3.5 G/DL (ref 2–4)
GLUCOSE SERPL-MCNC: 94 MG/DL (ref 65–100)
HCT VFR BLD AUTO: 35 % (ref 35–47)
HGB BLD-MCNC: 11.6 G/DL (ref 11.5–16)
IMM GRANULOCYTES # BLD AUTO: 0 K/UL (ref 0–0.04)
IMM GRANULOCYTES NFR BLD AUTO: 0 % (ref 0–0.5)
LEVETIRACETAM SERPL-MCNC: 7.6 UG/ML (ref 10–40)
LYMPHOCYTES # BLD: 1.7 K/UL (ref 0.8–3.5)
LYMPHOCYTES NFR BLD: 20 % (ref 12–49)
MAGNESIUM SERPL-MCNC: 1.9 MG/DL (ref 1.6–2.4)
MCH RBC QN AUTO: 32.2 PG (ref 26–34)
MCHC RBC AUTO-ENTMCNC: 33.1 G/DL (ref 30–36.5)
MCV RBC AUTO: 97.2 FL (ref 80–99)
METAMYELOCYTES NFR BLD MANUAL: 1 %
MONOCYTES # BLD: 0.5 K/UL (ref 0–1)
MONOCYTES NFR BLD: 6 % (ref 5–13)
MYELOCYTES NFR BLD MANUAL: 1 %
NEUTS BAND NFR BLD MANUAL: 4 %
NEUTS SEG # BLD: 5.9 K/UL (ref 1.8–8)
NEUTS SEG NFR BLD: 67 % (ref 32–75)
NRBC # BLD: 0 K/UL (ref 0–0.01)
NRBC BLD-RTO: 0 PER 100 WBC
O+P SPEC MICRO: NORMAL
O+P STL CONC: NORMAL
PHOSPHATE SERPL-MCNC: 4.1 MG/DL (ref 2.6–4.7)
PLATELET # BLD AUTO: 260 K/UL (ref 150–400)
PMV BLD AUTO: 10.1 FL (ref 8.9–12.9)
POTASSIUM SERPL-SCNC: 3.4 MMOL/L (ref 3.5–5.1)
PROT SERPL-MCNC: 5.5 G/DL (ref 6.4–8.2)
RBC # BLD AUTO: 3.6 M/UL (ref 3.8–5.2)
RBC MORPH BLD: ABNORMAL
SODIUM SERPL-SCNC: 140 MMOL/L (ref 136–145)
SPECIMEN SOURCE: NORMAL
WBC # BLD AUTO: 8.3 K/UL (ref 3.6–11)

## 2020-03-31 PROCEDURE — 85025 COMPLETE CBC W/AUTO DIFF WBC: CPT

## 2020-03-31 PROCEDURE — 74011250637 HC RX REV CODE- 250/637: Performed by: FAMILY MEDICINE

## 2020-03-31 PROCEDURE — 83735 ASSAY OF MAGNESIUM: CPT

## 2020-03-31 PROCEDURE — 84100 ASSAY OF PHOSPHORUS: CPT

## 2020-03-31 PROCEDURE — 71045 X-RAY EXAM CHEST 1 VIEW: CPT

## 2020-03-31 PROCEDURE — 65660000000 HC RM CCU STEPDOWN

## 2020-03-31 PROCEDURE — 36415 COLL VENOUS BLD VENIPUNCTURE: CPT

## 2020-03-31 PROCEDURE — 74011250636 HC RX REV CODE- 250/636: Performed by: STUDENT IN AN ORGANIZED HEALTH CARE EDUCATION/TRAINING PROGRAM

## 2020-03-31 PROCEDURE — 80053 COMPREHEN METABOLIC PANEL: CPT

## 2020-03-31 PROCEDURE — 94760 N-INVAS EAR/PLS OXIMETRY 1: CPT

## 2020-03-31 PROCEDURE — 99218 HC RM OBSERVATION: CPT

## 2020-03-31 PROCEDURE — 96375 TX/PRO/DX INJ NEW DRUG ADDON: CPT

## 2020-03-31 PROCEDURE — 74011250637 HC RX REV CODE- 250/637: Performed by: STUDENT IN AN ORGANIZED HEALTH CARE EDUCATION/TRAINING PROGRAM

## 2020-03-31 PROCEDURE — 77030006998

## 2020-03-31 PROCEDURE — 77030038269 HC DRN EXT URIN PURWCK BARD -A

## 2020-03-31 RX ORDER — FUROSEMIDE 10 MG/ML
10 INJECTION INTRAMUSCULAR; INTRAVENOUS ONCE
Status: COMPLETED | OUTPATIENT
Start: 2020-03-31 | End: 2020-03-31

## 2020-03-31 RX ORDER — VANCOMYCIN HYDROCHLORIDE 250 MG/5ML
125 POWDER, FOR SOLUTION ORAL EVERY 6 HOURS
Qty: 65 ML | Refills: 0 | Status: SHIPPED
Start: 2020-03-31 | End: 2020-04-01

## 2020-03-31 RX ORDER — CLOTRIMAZOLE 10 MG/1
10 LOZENGE ORAL; TOPICAL
Qty: 150 TAB | Refills: 0 | Status: SHIPPED | OUTPATIENT
Start: 2020-03-31 | End: 2020-04-30

## 2020-03-31 RX ORDER — VANCOMYCIN HYDROCHLORIDE 250 MG/5ML
125 POWDER, FOR SOLUTION ORAL EVERY 6 HOURS
Qty: 65 ML | Refills: 0 | Status: SHIPPED | OUTPATIENT
Start: 2020-03-31 | End: 2020-03-31

## 2020-03-31 RX ADMIN — LEVETIRACETAM 500 MG: 100 SOLUTION ORAL at 08:35

## 2020-03-31 RX ADMIN — POTASSIUM BICARBONATE 40 MEQ: 782 TABLET, EFFERVESCENT ORAL at 06:51

## 2020-03-31 RX ADMIN — PHENYTOIN 150 MG: 125 SUSPENSION ORAL at 08:34

## 2020-03-31 RX ADMIN — CLOTRIMAZOLE 10 MG: 10 LOZENGE ORAL; TOPICAL at 12:01

## 2020-03-31 RX ADMIN — VANCOMYCIN HYDROCHLORIDE 125 MG: KIT at 22:03

## 2020-03-31 RX ADMIN — Medication 10 ML: at 16:29

## 2020-03-31 RX ADMIN — PHENYTOIN 150 MG: 125 SUSPENSION ORAL at 18:31

## 2020-03-31 RX ADMIN — Medication 10 ML: at 22:04

## 2020-03-31 RX ADMIN — FUROSEMIDE 10 MG: 10 INJECTION, SOLUTION INTRAMUSCULAR; INTRAVENOUS at 12:00

## 2020-03-31 RX ADMIN — POTASSIUM BICARBONATE 40 MEQ: 782 TABLET, EFFERVESCENT ORAL at 10:34

## 2020-03-31 RX ADMIN — SODIUM CHLORIDE 60 ML/HR: 900 INJECTION, SOLUTION INTRAVENOUS at 00:16

## 2020-03-31 RX ADMIN — VANCOMYCIN HYDROCHLORIDE 125 MG: KIT at 16:29

## 2020-03-31 RX ADMIN — CLOTRIMAZOLE 10 MG: 10 LOZENGE ORAL; TOPICAL at 05:22

## 2020-03-31 RX ADMIN — LEVETIRACETAM 500 MG: 100 SOLUTION ORAL at 22:03

## 2020-03-31 RX ADMIN — CLOTRIMAZOLE 10 MG: 10 LOZENGE ORAL; TOPICAL at 22:03

## 2020-03-31 RX ADMIN — Medication 10 ML: at 05:22

## 2020-03-31 RX ADMIN — CLOTRIMAZOLE 10 MG: 10 LOZENGE ORAL; TOPICAL at 18:31

## 2020-03-31 RX ADMIN — VANCOMYCIN HYDROCHLORIDE 125 MG: KIT at 08:35

## 2020-03-31 RX ADMIN — CLOTRIMAZOLE 10 MG: 10 LOZENGE ORAL; TOPICAL at 09:15

## 2020-03-31 RX ADMIN — VANCOMYCIN HYDROCHLORIDE 125 MG: KIT at 02:05

## 2020-03-31 NOTE — PROGRESS NOTES
[address] [facility] [address] Office [phone] Fax [phone] [address]  Medicine Residency Attending Addendum: I saw and evaluated the patient on the day of the encounter with Dr. Shannon Suarez, performing the walker elements of the service. I discussed the findings, assessment and plan with the resident and agree with the resident's findings and plan as documented in the resident's note. Patient less alert today. We will get a chest x-ray. Will consult ID for micrococcus on blood cultures. May be a contaminant. Appreciate their assistance and recommendations. Bowel movements are improving on p.o. Vanco.  Will need to touch base with the group home to see if they are able to give patient medication through PEG-tube. Juaquin Vela MD, FAAFP, CAQSM, RMSK Assessment and Plan Smiley Ch is a 53 y.o. female with known Downs syndrome (non verbal at baseline), seizure disorder,  GERD, h/o Iron deficiency, who is admitted for diarrhea. 24 Hour Events: No acute events overnight. Diarrhea 2/2 C DIFF: GI seen recommends continue PO (via PEG) vanco. Nutrition consulted for tube feeds. Enteric Panel Neg. WBC nl.  
-strict I &O & PEG tube feeds,   
-f/u  Ova/Parasite 
-PO Vanc 125mg q6h x10 days through PEG tube, ends on 4/7 
-daily CBC/CMP  
-enteric percautions FOBT Positive on admission: h/o GIB in 2018. No evidence of active bleeding. Iron supplement and Omeprazole was discontinued after last hospital admission 3/9/2020. POA HgB 14.5 (1/2020 HgB 13.5). No new recs from GI, likely 2/2 to strain with diarrhea.  
-can restart home meds and will need OP colonoscopy  
-daily CBC to monitor HgB Focal Nodular opacity: Seen on POA CXR. Pt is asymptomatic, vitals stable and remains afebrile, WBC wnl. Treated for AHRF 2/2 aspiration pna during admission at [facility] 1/9 through 3/9/2020.   This finding is likely residual from recent infection. No prior CXR to compare too. Procal low. (0.12). RVP neg. PNA labs neg. ECHO: EF: 60-65% - Initial Bcx: micrococcus which is a common contaminant 
- rBcx (3/29) NG1d 
- RT for trach care and suctioning Pleural effusion on CXR 3/31: likely 2/2 IVF 
- Off IVF 
- Lasix 10 IV once - CXR tomorrow  
  
Seizure: stable at this time. Dilantin level elevated on 3/17 and Keppra level wnl. Phenytoin level wnl. Keppra level 7.6 (low). -seizure precautions  
-continue home Dilantin and Keppra BID  
-continue Valium 10mg prn for seizure >5 minutes  
-f/u with neuro outpatient  
  
GERD: stable  
  
H/o Iron Deficiency: stable. POA HgB 14.5  
-daily CBC  
  
Oral Thrush: Stable  
-continue Nystatin swish and spit 4 times per day  
  
FEN/GI -tube feeds. cont IV Fluids NS at 60mL/hr. Activity - Pt non ambulatory DVT prophylaxis - SCDs GI prophylaxis - not indicated at this time Fall prophylaxis - Not indicated at this time. Disposition - Plan to d/c to Group home. Consulted PT/OT/CM, Nutrition, RT, GI  
Code Status - Full, discussed with caregivers (Group Home Next of Kin Name and Contact Group home care giver Belkis Anderson [phone]. Sister Hina Sexton 
I appreciate the opportunity to participate in the care of this patient, Juan Alberto Baltazar MD 
Family Medicine Resident Subjective / Objective Subjective Pt non verbal.  No complaints per nursing. O2 Device: Room air, Tracheostomy General: No acute distress. Respiratory: Tracheostomy is in place. Mild secretions, no evidence of infection. Referred upper airway sounds. No rhonchi, rales or wheezing. No respiratory distress. Cardiovascular: RRR. GI: + bowel sounds. Nontender. PEG Tube in place. Extremities: no LE edema. Distal pulses intact. Musculoskeletal: Chronic contractures and non weight bearing at baseline. Skin: Warm, dry. No rashes. Neuro: Nonverbal at baseline. Sleeping I/O: 
Date 03/30/20 0700 - 03/31/20 6983 03/31/20 0700 - 04/01/20 1167 Shift [id number] 1900-0659 24 Hour Total 0700-1859 1900-0659 24 Hour Total  
INTAKE  
P.O.  0 0 0  0  
  P. O.  0 0 0  0 NG/ 192 2743 300  300 Water Flush Volume (mL) (PEG/Gastrostomy Tube) 90 150 240 150  150 Medication Volume (PEG/Gastrostomy Tube) 100  100 150  150 Intake (ml) (PEG/Gastrostomy Tube)  Shift Total(mL/kg) 620(11.4) 980(18) 1600(29.4) 300(6)  300(6) OUTPUT Urine(mL/kg/hr)  1000(1.5) 1000(0.8) 200  200 Urine Occurrence(s) 1 x 1 x 2 x Urine Output (mL) (External Female Catheter 03/30/20)  1000 1000 200  200 Stool Stool Occurrence(s) 2 x 2 x 4 x 1 x  1 x Shift Total(mL/kg)  1000(18.4) 1000(18.4) 200(4)  200(4)  -20 600 100  100 Weight (kg) 54.4 54.4 54.4 50.1 50.1 50.1 Inpatient Medications Current Facility-Administered Medications Medication Dose Route Frequency  white petrolatum-mineral oiL (EUCERIN) cream   Topical PRN  
 sodium chloride (NS) flush 5-40 mL  5-40 mL IntraVENous Q8H  
 sodium chloride (NS) flush 5-40 mL  5-40 mL IntraVENous PRN  
 vancomycin (FIRVANQ) 50 mg/mL oral solution 125 mg  125 mg Per G Tube Q6H  
 diazePAM (VALIUM) tablet 10 mg  10 mg Oral PRN  
 levETIRAcetam (KEPPRA) 100 mg/mL solution 500 mg  500 mg Per G Tube BID  phenytoin (DILANTIN) 100 mg/4 mL oral suspension 150 mg  150 mg PEG Tube BID  clotrimazole (MYCELEX) ayesha 10 mg  10 mg Oral 5XD Allergies Allergies Allergen Reactions  Depakote [Divalproex] Swelling CBC: 
Recent Labs  
  03/31/20 
0445 03/30/20 
0944 03/29/20 
0429 WBC 8.3 7.8 5.6 HGB 11.6 12.5 12.5 HCT 35.0 38.2 38.4  263 171 Metabolic Panel: 
Recent Labs  
  03/31/20 
0445 03/30/20 
0944 03/29/20 
9829  143 141  
K 3.4* 3.7 4.0  
* 113* 113* CO2 25 26 24 BUN 7 6 7 CREA 0.45* 0.45* 0.38* GLU 94 95 99  
CA 8.2* 8.4* 7.8*  
MG 1.9  --   --   
PHOS 4.1  --   --   
ALB 2.0* 2.0* 1.8* SGOT 55* 50* 46* ALT 83* 81* 76 For Billing Chief Complaint Patient presents with  Diarrhea Hospital Problems  Date Reviewed: 3/29/2020 Codes Class Noted POA Positive fecal occult blood test ICD-10-CM: R19.5 ICD-9-CM: 792.1  3/29/2020 Unknown Oral thrush ICD-10-CM: B37.0 ICD-9-CM: 112.0  3/29/2020 Unknown Diarrhea in adult patient ICD-10-CM: R19.7 ICD-9-CM: 787.91  3/28/2020 Unknown * (Principal) C. difficile diarrhea ICD-10-CM: A04.72 
ICD-9-CM: 008.45  3/28/2020 Unknown Seizure ([facility] [address] ICD-10-CM: R56.9 ICD-9-CM: 780.39  12/11/2019 Yes Gastroesophageal reflux disease without esophagitis ICD-10-CM: K21.9 ICD-9-CM: 530.81  12/11/2019 Yes Down's syndrome ICD-10-CM: Q90.9 ICD-9-CM: 758.0  12/11/2019 Yes Iron deficiency ICD-10-CM: E61.1 ICD-9-CM: 280.9  12/11/2019 Yes Constipation ICD-10-CM: K59.00 ICD-9-CM: 564.00  12/11/2019 Yes Severe intellectual disability ICD-10-CM: F72 
ICD-9-CM: 318.1  2/2/2012 Yes Overview Signed 2/2/2012  1:40 PM by Tunde Olivera MD  
  [place]

## 2020-03-31 NOTE — DISCHARGE SUMMARY
Stacey Eng [address] Office [phone] Fax [phone] Discharge / Transfer / Off-Service Note Name: Mikie Casarez MRN: [id number]  Sex: Female YOB: 1970  Age: 53 y.o. PCP: Trish Ocampo NP Date of admission: 3/27/2020 Date of discharge/transfer: 4/1/2020 Attending physician at admission: Dr. Tia Leal Attending physician at discharge/transfer: Dr. Tia Leal Resident physician at discharge/transfer: Mimi Carpio DO, PGY1 Consultants during hospitalization GI Admission diagnoses Diarrhea in adult patient [R19.7] Recommended follow-up after discharge PCP  
GI Things to follow up on with PCP: 
Hospital stay and Transition of care Outpatient colonoscopy Medication Changes: MEDICATION CHANGES: 
STOP MIRALAX AND NYSTATIN  
START CLOTRIMAZOLE 10 MG ERIC 5 TIMES DAILY  
START VANCOMYCIN 2.5 ML VIA PEG-TUBE EVERY 6 HOURS (7CM-3DC-6EY-3AM) FOR 5.5 DAYS (TOTAL 22 DOSES. FIRST DOSE 4/1 AT 3 PM) CONTINUE ALL OTHER MEDICATIONS AS PRESCRIBED History of Present Illness Per admitting provider, Terence Lackey is a 53 y.o. 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 She was started on Imodium by her PCP and caregiver states that this helped decrease the amount of stool produced but did not improve the frequency. Care taker states that for the past week Pt has appeared more tired than usual.  Denies any fever, chills, decreased ability to tolerate tube feeds, decreased UOP, SOB, cough, hemoptysis, hematemesis, melena, n/v or edema.   She denies any sick contacts at the group home and states it does not appear that the Pt is in any pain.  
  
Of note: Pt had a recent 2 month admission at [facility] from January 9-March 9 for AHRF 2/2 aspiration PNA and neuro pulmonary derangement d/t Status epilepticus. During this stay she was coded and intubated and finally transitioned to a tracheostomy and swithced back to [facility] through her the PEG tube. Pt was treated with several IV antibiotics during this admission. Prior to hospitalization pt could communicate with one word phrases and was ambulatory.  
  
In the Er:  
vital signs were remarkable for tachycardia to 101. Otherwise BP stable and Pt is afebrile at 98.9 Labs were remarkable for Positive FOBT  
CXR Showed Multifocal scattered nodular opacities, Consider multifocal infectious process. . 
  
Pt treated with 2L NS bolus \" HOSPITAL COURSE Diarrhea 2/2 C DIFF: GI seen recommended PO Vancomycin. Enteric Panel Neg. WBC stool wnl. O&P negative. -PO Vanc 125mg q6h x10 days through PEG tube, ends on 4/6 
-strict hand hygiene discussed with group home staff. Recommendations included in AVS 
 
Pleural effusion: Resolved. Found on CXR on 3/31. Given lasix 10mg x 1 and discontinued maintenance fluids. CXR on day of discharge improved significantly. 
  
FOBT Positive on admission: h/o GIB in 2018. No evidence of active bleeding. Iron supplement and Omeprazole was discontinued after last hospital admission 3/9/2020. POA HgB 14.5 (1/2020 HgB 13.5). No new recs from GI, likely 2/2 to strain with diarrhea.  
-continue ASA 
-follow up with GI for outpatient colonoscopy 
  
Focal Nodular opacity: Seen on POA CXR. Pt is asymptomatic, vitals stable and remains afebrile, WBC wnl. Treated for AHRF 2/2 aspiration pna during admission at [facility] 1/9 through 3/9/2020.  This finding is likely residual from recent infection.  No prior CXR to compare too. Procal low. (0.12). RVP neg. PNA labs neg. CXR on day of discharge improved opacity. 
  
Seizure: stable at this time.  Dilantin level elevated on 3/17 and Keppra level wnl.  Phenytoin level wnl. Keppra level 7.6 -continue home Dilantin and Keppra BID  
-continue Valium 10mg prn for seizure >5 minutes  
  
GERD: stable. No home meds  
  
H/o Iron Deficiency: stable.  POA HgB 14.5  
-daily CBC  
  
Oral Thrush: Stable  
-discontinue Nystatin swish and spit 4 times per day 
-start clotrimazole 5 times daily Physical exam at discharge: 
General: No acute distress. Respiratory: Tracheostomy is in place. Mild secretions, no evidence of infection.  Referred upper airway sounds. No rhonchi, rales or wheezing.  No respiratory distress. Cardiovascular: RRR. GI: + bowel sounds. Nontender.  PEG Tube in place. Extremities: no LE edema. Distal pulses intact. Musculoskeletal: Chronic contractures and non weight bearing at baseline.   
Skin: Warm, dry. No rashes. Neuro: Nonverbal at baseline. Sleeping    
  
 
Condition at discharge: Stable. Labs Recent Results (from the past 24 hour(s)) CBC WITH AUTOMATED DIFF Collection Time: 04/01/20  2:06 AM  
Result Value Ref Range WBC 7.2 3.6 - 11.0 K/uL  
 RBC 3.71 (L) 3.80 - 5.20 M/uL  
 HGB 12.0 11.5 - 16.0 g/dL HCT 36.1 35.0 - 47.0 % MCV 97.3 80.0 - 99.0 FL  
 MCH 32.3 26.0 - 34.0 PG  
 MCHC 33.2 30.0 - 36.5 g/dL  
 RDW 15.4 (H) 11.5 - 14.5 % PLATELET 002 989 - 893 K/uL MPV 10.7 8.9 - 12.9 FL  
 NRBC 0.3 (H) 0  WBC ABSOLUTE NRBC 0.02 (H) 0.00 - 0.01 K/uL NEUTROPHILS 52 32 - 75 % BAND NEUTROPHILS 2 % LYMPHOCYTES 37 12 - 49 % MONOCYTES 7 5 - 13 % EOSINOPHILS 0 0 - 7 % BASOPHILS 0 0 - 1 % METAMYELOCYTES 1 % MYELOCYTES 1 % IMMATURE GRANULOCYTES 0 0.0 - 0.5 % ABS. NEUTROPHILS 3.9 1.8 - 8.0 K/UL  
 ABS. LYMPHOCYTES 2.7 0.8 - 3.5 K/UL  
 ABS. MONOCYTES 0.5 0.0 - 1.0 K/UL  
 ABS. EOSINOPHILS 0.0 0.0 - 0.4 K/UL  
 ABS. BASOPHILS 0.0 0.0 - 0.1 K/UL  
 ABS. IMM. GRANS. 0.0 0.00 - 0.04 K/UL  
 DF MANUAL    
 RBC COMMENTS ANISOCYTOSIS 1+ 
    
 RBC COMMENTS TEARDROP CELLS 
PRESENT 
    
METABOLIC PANEL, COMPREHENSIVE  
 Collection Time: 04/01/20  2:06 AM  
Result Value Ref Range Sodium 140 136 - 145 mmol/L Potassium 3.9 3.5 - 5.1 mmol/L Chloride 107 97 - 108 mmol/L  
 CO2 25 21 - 32 mmol/L Anion gap 8 5 - 15 mmol/L Glucose 137 (H) 65 - 100 mg/dL BUN 8 6 - 20 MG/DL Creatinine 0.45 (L) 0.55 - 1.02 MG/DL  
 BUN/Creatinine ratio 18 12 - 20 GFR est AA >60 >60 ml/min/1.73m2 GFR est non-AA >60 >60 ml/min/1.73m2 Calcium 8.3 (L) 8.5 - 10.1 MG/DL Bilirubin, total <0.1 (L) 0.2 - 1.0 MG/DL  
 ALT (SGPT) 81 (H) 12 - 78 U/L  
 AST (SGOT) 49 (H) 15 - 37 U/L Alk. phosphatase 156 (H) 45 - 117 U/L Protein, total 5.6 (L) 6.4 - 8.2 g/dL Albumin 1.9 (L) 3.5 - 5.0 g/dL Globulin 3.7 2.0 - 4.0 g/dL A-G Ratio 0.5 (L) 1.1 - 2.2 PROCALCITONIN Collection Time: 04/01/20  2:06 AM  
Result Value Ref Range Procalcitonin <0.05 ng/mL Cultures · 3/29 BCx NG3d; 3/28 BCx showed micrococcus which is a contaminant · PNA labs neg Imaging Results from [facility] Encounter encounter on 03/27/20 XR CHEST PORT Narrative PORTABLE CHEST RADIOGRAPH/S: 3/27/2020 10:42 PM 
 
Clinical history: cough INDICATION:   cough COMPARISON: None FINDINGS: 
AP portable upright view of the chest demonstrates a stable  cardiopericardial 
silhouette. The lungs are adequately expanded. Tracheostomy in place. Scattered 
nodular and parenchymal opacities. . The osseous structures are unremarkable. Patient is on a cardiac monitor. Impression IMPRESSION: 
Multifocal scattered nodular opacities. Consider multifocal infectious process. Shaaron Money No results found for this or any previous visit. No results found for this or any previous visit. No procedure found. Chronic diagnoses Problem List as of 3/31/2020 Date Reviewed: 3/29/2020 Codes Class Noted - Resolved Positive fecal occult blood test ICD-10-CM: R19.5 ICD-9-CM: 792.1  3/29/2020 - Present Oral thrush ICD-10-CM: B37.0 ICD-9-CM: 112.0  3/29/2020 - Present Diarrhea in adult patient ICD-10-CM: R19.7 ICD-9-CM: 787.91  3/28/2020 - Present * (Principal) C. difficile diarrhea ICD-10-CM: A04.72 
ICD-9-CM: 008.45  3/28/2020 - Present Seizure ([address] ICD-10-CM: R56.9 ICD-9-CM: 780.39  12/11/2019 - Present Gastroesophageal reflux disease without esophagitis ICD-10-CM: K21.9 ICD-9-CM: 530.81  12/11/2019 - Present Down's syndrome ICD-10-CM: Q90.9 ICD-9-CM: 758.0  12/11/2019 - Present Iron deficiency ICD-10-CM: E61.1 ICD-9-CM: 280.9  12/11/2019 - Present Constipation ICD-10-CM: K59.00 ICD-9-CM: 564.00  12/11/2019 - Present Severe intellectual disability ICD-10-CM: F72 
ICD-9-CM: 318.1  2/2/2012 - Present Overview Signed 2/2/2012  1:40 PM by Willian Sanchez MD  
  [facility] Discharge/Transfer Medications Current Discharge Medication List  
  
START taking these medications Details  
vancomycin (FIRVANQ) 50 mg/mL oral solution 2.5 mL by Per G Tube route every six (6) hours for 22 doses. Qty: 55 mL, Refills: 0  
  
clotrimazole (MYCELEX) 10 mg ayesha Take 1 Tab by mouth five (5) times daily for 30 days. Qty: 150 Tab, Refills: 0 CONTINUE these medications which have NOT CHANGED Details  
loperamide (Imodium A-D) 1 mg/7.5 mL solution 15 mL by Per G Tube route three (3) times daily as needed for Diarrhea. Qty: 118 mL, Refills: 0  
  
levETIRAcetam (KEPPRA) 100 mg/mL solution 500 mg by Per G Tube route two (2) times a day. diazePAM (VALIUM) 10 mg tablet Take 10 mg by mouth as needed (seizure > 5 min). Uses gel formulation  
  
albuterol-ipratropium (DUO-NEB) 2.5 mg-0.5 mg/3 ml nebu 3 mL by Nebulization route every four (4) hours as needed. phenytoin (DILANTIN) 100 mg/4 mL susp oral suspension 150 mg by PEG Tube route two (2) times a day. Associated Diagnoses: Seizure ([facility] [address] aspirin 81 mg chewable tablet 81 mg by Per G Tube route daily. STOP taking these medications  
  
 polyethylene glycol (Miralax) 17 gram/dose powder Comments:  
Reason for Stopping:   
   
 nystatin (MYCOSTATIN) 100,000 unit/mL suspension Comments:  
Reason for Stopping:   
   
  
 
 
  
Diet:  PEG-tube feeds. Activity:  As tolerated Disposition:  To group home, stable. Discharge instructions to patient/family Please seek medical attention for any new or worsening symptoms particularly fever, chest pain, shortness of breath, abdominal pain, nausea, vomiting Follow up plans/appointments Follow-up Information Follow up With Specialties Details Why Contact Dominic Bland NP Family Practice Call for transition of care virtual/telephone visit  [address] [facility] [id number] [place] [address] 
[phone] [facility], If you haven't recieved a phone call within 24, hours. Please contact the agency directly. Dixon Gomez [address] [facility] [id number] [phone] Raudel Chamberlain MD Gastroenterology Schedule an appointment as soon as possible for a visit for outpatient colonoscopy  [address] [place] [address] 
[phone] Ivan Ferrera DO Family Medicine Resident For Billing Chief Complaint Patient presents with  Diarrhea Hospital Problems  Date Reviewed: 3/29/2020 Codes Class Noted POA Positive fecal occult blood test ICD-10-CM: R19.5 ICD-9-CM: 792.1  3/29/2020 Unknown Oral thrush ICD-10-CM: B37.0 ICD-9-CM: 112.0  3/29/2020 Unknown Diarrhea in adult patient ICD-10-CM: R19.7 ICD-9-CM: 787.91  3/28/2020 Unknown * (Principal) C. difficile diarrhea ICD-10-CM: A04.72 
ICD-9-CM: 008.45  3/28/2020 Unknown Seizure ([address] ICD-10-CM: R56.9 ICD-9-CM: 780.39  12/11/2019 Yes Gastroesophageal reflux disease without esophagitis ICD-10-CM: K21.9 ICD-9-CM: 530.81  12/11/2019 Yes Down's syndrome ICD-10-CM: Q90.9 ICD-9-CM: 758.0  12/11/2019 Yes Iron deficiency ICD-10-CM: E61.1 ICD-9-CM: 280.9  12/11/2019 Yes Constipation ICD-10-CM: K59.00 ICD-9-CM: 564.00  12/11/2019 Yes Severe intellectual disability ICD-10-CM: F72 
ICD-9-CM: 318.1  2/2/2012 Yes Overview Signed 2/2/2012  1:40 PM by Cholo Grullon MD  
  [facility] [address] [facility] Medicine Residency Attending Addendum: I saw and evaluated the patient on the day of the encounter with Dr. George Antunez, performing the walker elements of the service. I discussed the findings, assessment and plan with the resident and agree with the resident's findings and plan as documented in the resident's note. Christian Kelly MD, FAAFP, CAQSM, RMSK

## 2020-03-31 NOTE — PROGRESS NOTES
Spiritual Care Assessment/Progress Note [address] 
 
 
NAME: David Zavala      MRN: [id number] AGE: 53 y.o. SEX: female Denominational Affiliation:   
Language: [place] 3/31/2020     Total Time (in minutes): 12 Spiritual Assessment begun in [facility]  MED SURG 2 through conversation with: 
  
    [x]Patient        [] Family    [] Friend(s) Reason for Consult: Initial/Spiritual assessment, patient floor Spiritual beliefs: (Please include comment if needed) 
   [] Identifies with a tj tradition:     
   [] Supported by a tj community:        
   [] Claims no spiritual orientation:       
   [] Seeking spiritual identity:            
   [] Adheres to an individual form of spirituality:       
   [x] Not able to assess:                   
 
    
Identified resources for coping:  
   [] Prayer                           
   [] Music                  [] Guided Imagery 
   [] Family/friends                 [] Pet visits [] Devotional reading                         [x] Unknown 
   [] Other:                                         
 
 
Interventions offered during this visit: (See comments for more details) Patient Interventions: Other (comment)(Unable to assess) Family/Friend(s): Affirmation of emotions/emotional suffering Plan of Care: 
 
 [] Support spiritual and/or cultural needs  
 [] Support AMD and/or advance care planning process    
 [] Support grieving process 
 [] Coordinate Rites and/or Rituals  
 [] Coordination with community clergy [] No spiritual needs identified at this time 
 [] Detailed Plan of Care below (See Comments)  [] Make referral to Music Therapy 
[] Make referral to Pet Therapy    
[] Make referral to Addiction services 
[] Make referral to [facility] 
[] Make referral to Spiritual Care Partner 
[] No future visits requested       
[x] Follow up visits as needed Comments:  visited Mrs. Joey Carmona, for an initial spiritual assessment on the Med. Surgical unit. Per her RN, Mrs. Mikel Jameson, is non-verbal and non-responsive. Mrs. Mikel Jameson is awake, lying bed, and appeared to be comfortable.  introduced herself and provided supportive presence and comforting words at bedside. Chaplains will follow up as able and/or needed Leda Barker.  Paging Service: [phone])

## 2020-03-31 NOTE — PROGRESS NOTES
Shift Change: 
 
Bedside and Verbal shift change report given to Natasha Mullen RN (oncoming nurse) by Jessie Lamar RN (offgoing nurse). Report included the following information SBAR, Kardex, MAR and Recent Results. 0900: Unable to complete admission documentation due to patient mental status. 1230: Tube feeding completed. Following 0600, 1200, 1800, 0000 schedule. 1700: Patient has had multiple watery stools and high urine output. 1820: Tube feeding completed. Shift Change:  
 
Bedside and Verbal shift change report given to KLAUDIA Pires (oncoming nurse) by Natasha Mullen RN (offgoing nurse). Report included the following information SBAR, Kardex, MAR and Recent Results.

## 2020-03-31 NOTE — PROGRESS NOTES
3/31/2020 3:38 PM Spke with Davidnadia at pt's group home, she requested pt's most recent labs for keppra and phenytoin be sent to pt's neurologist Dr. Nan Farmer. CM called to Dr. Kourtney Alvarez office, spoke with Zachary GARCIA who requested CM fax lab results to [phone]. CM faxed labs to provided fax number. 3/31/2020 12:17 PM Per [facility] resident, pt not to discharge today. CM called [facility], spoke with Alma Rosa Boss and reschedule transport for 1PM on 4/1. CM called pt's  and group home director and both are aware planning for discharge on 4/1. Pt's , Miguelangel Acosta reported pt was scheduled to have blood work done today to check her Julane Balding and dilantin levels and requested these be done here to  
CM will follow. 3/31/2020 9:56 AM Received return phone call from Jackson Wylie who confirmed pt's discharge today. 3/31/2020 8:31 AM Pt to discharge today. Pt's discharge summary and avs were faxed to pt's group home director, Jackson Wylie at [phone] and also sent to [facility] via [employer]. Notified [facility] via [employer] pt will discharge today. Called and left message for Jackson Wylie, pt's group home director. CM also called and spoke with pt's , Shawn Lewis and relayed plan for discharge. Miguelangel Acosta confirmed they will expect pt at home between 1:30-2:00PM. CM will follow. GRICELDA Macdonald Transitions of Care Plan: 
Return to group home on 4/1 at 1PM via ambulance. [facility] to resume services(PT/OT/RN).

## 2020-03-31 NOTE — PROGRESS NOTES
Bedside shift change report given to Lydia Adames RN (oncoming nurse) by Kim Jean RN (offgoing nurse). Report included the following information SBAR and Kardex.

## 2020-04-01 ENCOUNTER — TELEPHONE (OUTPATIENT)
Dept: FAMILY MEDICINE CLINIC | Age: 50
End: 2020-04-01

## 2020-04-01 ENCOUNTER — APPOINTMENT (OUTPATIENT)
Dept: GENERAL RADIOLOGY | Age: 50
End: 2020-04-01
Attending: STUDENT IN AN ORGANIZED HEALTH CARE EDUCATION/TRAINING PROGRAM
Payer: COMMERCIAL

## 2020-04-01 VITALS
HEART RATE: 99 BPM | OXYGEN SATURATION: 96 % | BODY MASS INDEX: 23.18 KG/M2 | WEIGHT: 110.45 LBS | TEMPERATURE: 97.7 F | HEIGHT: 58 IN | DIASTOLIC BLOOD PRESSURE: 100 MMHG | SYSTOLIC BLOOD PRESSURE: 153 MMHG | RESPIRATION RATE: 18 BRPM

## 2020-04-01 LAB
ALBUMIN SERPL-MCNC: 1.9 G/DL (ref 3.5–5)
ALBUMIN/GLOB SERPL: 0.5 {RATIO} (ref 1.1–2.2)
ALP SERPL-CCNC: 156 U/L (ref 45–117)
ALT SERPL-CCNC: 81 U/L (ref 12–78)
ANION GAP SERPL CALC-SCNC: 8 MMOL/L (ref 5–15)
AST SERPL-CCNC: 49 U/L (ref 15–37)
BASOPHILS # BLD: 0 K/UL (ref 0–0.1)
BASOPHILS NFR BLD: 0 % (ref 0–1)
BILIRUB SERPL-MCNC: <0.1 MG/DL (ref 0.2–1)
BUN SERPL-MCNC: 8 MG/DL (ref 6–20)
BUN/CREAT SERPL: 18 (ref 12–20)
CALCIUM SERPL-MCNC: 8.3 MG/DL (ref 8.5–10.1)
CHLORIDE SERPL-SCNC: 107 MMOL/L (ref 97–108)
CO2 SERPL-SCNC: 25 MMOL/L (ref 21–32)
CREAT SERPL-MCNC: 0.45 MG/DL (ref 0.55–1.02)
DIFFERENTIAL METHOD BLD: ABNORMAL
EOSINOPHIL # BLD: 0 K/UL (ref 0–0.4)
EOSINOPHIL NFR BLD: 0 % (ref 0–7)
ERYTHROCYTE [DISTWIDTH] IN BLOOD BY AUTOMATED COUNT: 15.4 % (ref 11.5–14.5)
GLOBULIN SER CALC-MCNC: 3.7 G/DL (ref 2–4)
GLUCOSE SERPL-MCNC: 137 MG/DL (ref 65–100)
HCT VFR BLD AUTO: 36.1 % (ref 35–47)
HGB BLD-MCNC: 12 G/DL (ref 11.5–16)
IMM GRANULOCYTES # BLD AUTO: 0 K/UL (ref 0–0.04)
IMM GRANULOCYTES NFR BLD AUTO: 0 % (ref 0–0.5)
LYMPHOCYTES # BLD: 2.7 K/UL (ref 0.8–3.5)
LYMPHOCYTES NFR BLD: 37 % (ref 12–49)
MCH RBC QN AUTO: 32.3 PG (ref 26–34)
MCHC RBC AUTO-ENTMCNC: 33.2 G/DL (ref 30–36.5)
MCV RBC AUTO: 97.3 FL (ref 80–99)
METAMYELOCYTES NFR BLD MANUAL: 1 %
MONOCYTES # BLD: 0.5 K/UL (ref 0–1)
MONOCYTES NFR BLD: 7 % (ref 5–13)
MYELOCYTES NFR BLD MANUAL: 1 %
NEUTS BAND NFR BLD MANUAL: 2 %
NEUTS SEG # BLD: 3.9 K/UL (ref 1.8–8)
NEUTS SEG NFR BLD: 52 % (ref 32–75)
NRBC # BLD: 0.02 K/UL (ref 0–0.01)
NRBC BLD-RTO: 0.3 PER 100 WBC
PLATELET # BLD AUTO: 215 K/UL (ref 150–400)
PMV BLD AUTO: 10.7 FL (ref 8.9–12.9)
POTASSIUM SERPL-SCNC: 3.9 MMOL/L (ref 3.5–5.1)
PROCALCITONIN SERPL-MCNC: <0.05 NG/ML
PROT SERPL-MCNC: 5.6 G/DL (ref 6.4–8.2)
RBC # BLD AUTO: 3.71 M/UL (ref 3.8–5.2)
RBC MORPH BLD: ABNORMAL
RBC MORPH BLD: ABNORMAL
SODIUM SERPL-SCNC: 140 MMOL/L (ref 136–145)
WBC # BLD AUTO: 7.2 K/UL (ref 3.6–11)

## 2020-04-01 PROCEDURE — 71045 X-RAY EXAM CHEST 1 VIEW: CPT

## 2020-04-01 PROCEDURE — 85025 COMPLETE CBC W/AUTO DIFF WBC: CPT

## 2020-04-01 PROCEDURE — 77030038269 HC DRN EXT URIN PURWCK BARD -A

## 2020-04-01 PROCEDURE — 84145 PROCALCITONIN (PCT): CPT

## 2020-04-01 PROCEDURE — 99218 HC RM OBSERVATION: CPT

## 2020-04-01 PROCEDURE — 80053 COMPREHEN METABOLIC PANEL: CPT

## 2020-04-01 PROCEDURE — 74011250637 HC RX REV CODE- 250/637: Performed by: STUDENT IN AN ORGANIZED HEALTH CARE EDUCATION/TRAINING PROGRAM

## 2020-04-01 PROCEDURE — 94760 N-INVAS EAR/PLS OXIMETRY 1: CPT

## 2020-04-01 PROCEDURE — 94761 N-INVAS EAR/PLS OXIMETRY MLT: CPT

## 2020-04-01 PROCEDURE — 74011250637 HC RX REV CODE- 250/637: Performed by: FAMILY MEDICINE

## 2020-04-01 PROCEDURE — 36415 COLL VENOUS BLD VENIPUNCTURE: CPT

## 2020-04-01 RX ORDER — VANCOMYCIN HYDROCHLORIDE 250 MG/5ML
125 POWDER, FOR SOLUTION ORAL EVERY 6 HOURS
Qty: 55 ML | Refills: 0 | Status: SHIPPED | OUTPATIENT
Start: 2020-04-01 | End: 2020-04-07

## 2020-04-01 RX ADMIN — VANCOMYCIN HYDROCHLORIDE 125 MG: KIT at 04:00

## 2020-04-01 RX ADMIN — VANCOMYCIN HYDROCHLORIDE 125 MG: KIT at 15:00

## 2020-04-01 RX ADMIN — CLOTRIMAZOLE 10 MG: 10 LOZENGE ORAL; TOPICAL at 11:28

## 2020-04-01 RX ADMIN — PHENYTOIN 150 MG: 125 SUSPENSION ORAL at 10:15

## 2020-04-01 RX ADMIN — CLOTRIMAZOLE 10 MG: 10 LOZENGE ORAL; TOPICAL at 06:48

## 2020-04-01 RX ADMIN — VANCOMYCIN HYDROCHLORIDE 125 MG: KIT at 10:15

## 2020-04-01 RX ADMIN — LEVETIRACETAM 500 MG: 100 SOLUTION ORAL at 10:15

## 2020-04-01 NOTE — PROGRESS NOTES
Transition of Care Plan:     
RUR Score 10% 1. Patient is being discharged today back to her group home. 2. Adithya Pelaez and Cristhian Bella group home staff have both been notified of discharge. AVS faxed to [phone]. 3. Notified [facility] health of d/c, sent AVS in [employer]. 4. [facility] will transport at 1:00.  
5. No other issues or concerns. Pt is ready to be discharged from cm standpoint. Care Management Interventions PCP Verified by CM: Yes(Sidney Sawyer DO) Last Visit to PCP: 03/17/20 Palliative Care Criteria Met (RRAT>21 & CHF Dx)?: No 
Mode of Transport at Discharge: Self(Group Home Staff) Transition of Care Consult (CM Consult): Discharge Planning Physical Therapy Consult: Yes Occupational Therapy Consult: Yes Speech Therapy Consult: No 
Current Support Network: Adult Group Home(pt resides in adult group home where she receives assist w/ all ADLs, pt uses WC for mobility, group home provides transport) Confirm Follow Up Transport: Other (see comment)(Group Home ) Discharge Location Discharge Placement: Group home ANNE Brown

## 2020-04-01 NOTE — PROGRESS NOTES
1910 
 
Bedside and Verbal shift change report given to [address] (oncoming nurse) [facility] Report included the following information SBAR, Kardex, MAR and Cardiac Rhythm NSR, Sinus Tach  
 
1928 Bedside and Verbal shift change report given to [address] (oncoming nurse) by [address] (offgoing nurse). Report included the following information SBAR, Kardex, MAR and Cardiac Rhythm NSR, Sinus Tach.

## 2020-04-01 NOTE — TELEPHONE ENCOUNTER
Called and spoke with caretaker. Sent a text message link to activate [employer] for patient in order to set up a virtual visit. Caretaker will call the office back to schedule virtual visit once she gets mychart set up.

## 2020-04-01 NOTE — PROGRESS NOTES
Bedside shift change report given to [place] and [address] [place] [address] (oncoming nurses) by Stephanie Hernandez (offgoing nurse). Report included the following information SBAR, Intake/Output and MAR.

## 2020-04-01 NOTE — PROGRESS NOTES
Attempted to schedule PCP but PCP Virtual visits are currently down but will call patient with follow up once available again

## 2020-04-01 NOTE — DISCHARGE INSTRUCTIONS
HOME DISCHARGE INSTRUCTIONS    Jeri Idalia / [id number] : 1970    Admission date: 3/27/2020 Discharge date: 2020     Please bring this form with you to show your care provider at your follow-up appointment. Primary care provider:  Kyleigh Adamson NP    Discharging provider:  Doreen Izquierdo DO  - Family Medicine Resident  Na Casiano MD - Attending, Family Medicine     You have been admitted to the hospital with the following diagnoses:    ACUTE DIAGNOSES:  · Diarrhea  . . . . . . . . . . . . . . . . . . . . . . . . . . . . . . . . . . . . . . . . . . . . . . . . . . . . . . . . . . . . . . . . . . . . . . . Shaaron Money FOLLOW-UP CARE RECOMMENDATIONS:    Appointments  Follow-up Information     Follow up With Specialties Details Why Contact Info    Kyleigh Adamson NP Family Practice Call for transition of care virtual/telephone visit  [address] [place] [address] [phone]      [address], If you haven't recieved a phone call within 24, hours. Please contact the agency directly. MARVA Younger 53 [facility]    Leydi Bernstein MD Gastroenterology Schedule an appointment as soon as possible for a visit for outpatient colonoscopy  [address] [facility]  [phone]           STRICT HAND HYGIENE OF STAFF WHO TAKES CARE OF PATIENT: [address] HANDS PRIOR AND AFTER CONTACT FOR 20 SECONDS WITH SOAP AND WATER. NO SHARING OF PERSONAL CARE ITEMS. Please follow up with your PCP regarding:  Transition of care   Outpatient colonoscopy    MEDICATION CHANGES:  STOP MIRALAX AND NYSTATIN   START CLOTRIMAZOLE 10 MG ERIC 5 TIMES DAILY   START VANCOMYCIN 2.5 ML VIA PEG-TUBE EVERY 6 HOURS (1OI-0CO-1HB-3AM) FOR 5.5 DAYS (TOTAL 22 DOSES. FIRST DOSE  AT 3 PM)    CONTINUE ALL OTHER MEDICATIONS AS PRESCRIBED     Follow-up tests needed: colonoscopy     Pending test results:    At the time of your discharge the following test results are still pending: final blood cultures (negative for over 48 hours prior to discharge). Please make sure you review these results with your outpatient follow-up provider(s). Specific symptoms to watch for: chest pain, shortness of breath, fever, chills, nausea, vomiting, diarrhea, change in mentation, falling, weakness, bleeding. DIET/what to eat:  PEG tube feeding     ACTIVITY:  Activity as tolerated and Bedrest    Wound care: Preventative Skin care to sacrum, albert rectal area and feet   Sacral area- USE BLUE WIPES TO CLEANSE- apply ORANGE TOP cream to excoriated skin. HEELS cleanse and moisturize with EUCERIN daily    Equipment needed:  none    What to do if new or unexpected symptoms occur? If you experience any of the above symptoms (or should other concerns or questions arise after discharge) please call your primary care physician. Return to the emergency room if you cannot get hold of your doctor. · It is very important that you keep your follow-up appointment(s). · Please bring discharge papers, medication list (and/or medication bottles) to your follow-up appointments for review by your outpatient provider(s). · Please check the list of medications and be sure it includes every medication (even non-prescription medications) that your provider wants you to take. · It is important that you take the medication exactly as they are prescribed. · Keep your medication in the bottles provided by the pharmacist and keep a list of the medication names, dosages, and times to be taken in your wallet. · Do not take other medications without consulting your doctor. · If you have any questions about your medications or other instructions, please talk to your nurse or care provider before you leave the hospital.     Information obtained by:     I understand that if any problems occur once I am at home I am to contact my physician.     These instructions were explained to me and I had the opportunity to ask questions. I understand and acknowledge receipt of the instructions indicated above.                                                                                                                                                Physician's or R.N.'s Signature                                                                  Date/Time                                                                                                                                              Patient or Representative Signature                                                          Date/Time

## 2020-04-01 NOTE — PROGRESS NOTES
Pt was unable to sign due to dysphagia and altered mental status. Meds and discharge papers are given to transport. IV lines are taken off upon discharge.

## 2020-04-01 NOTE — TELEPHONE ENCOUNTER
[facility] discharge unit calling and states that pt needs to make fuv from hospital admission for dx diarrhea, pt is being discharged today. Please call pt at her home number to schedule appt with Dr Danii Ramos.

## 2020-04-02 ENCOUNTER — DOCUMENTATION ONLY (OUTPATIENT)
Dept: FAMILY MEDICINE CLINIC | Age: 50
End: 2020-04-02

## 2020-04-02 NOTE — PROGRESS NOTES
[facility] home health certification & POC were put on Dr Tarri Lesches desk to process

## 2020-04-03 ENCOUNTER — DOCUMENTATION ONLY (OUTPATIENT)
Dept: FAMILY MEDICINE CLINIC | Age: 50
End: 2020-04-03

## 2020-04-03 LAB
BACTERIA SPEC CULT: ABNORMAL
BACTERIA SPEC CULT: ABNORMAL
BACTERIA SPEC CULT: NORMAL
SERVICE CMNT-IMP: ABNORMAL
SERVICE CMNT-IMP: NORMAL

## 2020-04-03 NOTE — PROGRESS NOTES
Plan of Care faxed to [facility]. Fax number [phone] confirmation number [id number].

## 2020-04-03 NOTE — PROGRESS NOTES
[facility] physicians order missing signature was put on Dr Chito Esquivel desk to process

## 2020-04-06 ENCOUNTER — DOCUMENTATION ONLY (OUTPATIENT)
Dept: FAMILY MEDICINE CLINIC | Age: 50
End: 2020-04-06

## 2020-04-07 ENCOUNTER — DOCUMENTATION ONLY (OUTPATIENT)
Dept: FAMILY MEDICINE CLINIC | Age: 50
End: 2020-04-07

## 2020-04-09 ENCOUNTER — VIRTUAL VISIT (OUTPATIENT)
Dept: FAMILY MEDICINE CLINIC | Age: 50
End: 2020-04-09

## 2020-04-09 DIAGNOSIS — R56.9 SEIZURE (HCC): ICD-10-CM

## 2020-04-09 DIAGNOSIS — R19.5 POSITIVE FECAL OCCULT BLOOD TEST: ICD-10-CM

## 2020-04-09 DIAGNOSIS — A04.72 C. DIFFICILE DIARRHEA: Primary | ICD-10-CM

## 2020-04-09 DIAGNOSIS — B37.0 ORAL THRUSH: ICD-10-CM

## 2020-04-09 NOTE — PROGRESS NOTES
Georgie Oliveros is a 53 y.o. female Chief Complaint Patient presents with  Diarrhea Pt for follow up for c. diff pos in hospital.  Pt in group home and the smell has gone away from stool and pt is now having 1 BM a day. Finished with PO vancomycin. Pt was also stool occult pos with hx of GI bleed but her cbc was stable during visit. No gross bleeding. Pt has a Gi doc already and will f/u with them Tere De La O is improving on ayesha, has 30 days total on day 9. Will need dilantin and keppra levels drawn will coordinate with [facility] who are already providing services 
 
she is a 53y.o. year old female who presents for evalution. Reviewed PmHx, RxHx, FmHx, SocHx, AllgHx and updated and dated in the chart. Review of Systems - negative except as listed above in the HPI Objective: There were no vitals filed for this visit. Current Outpatient Medications Medication Sig  clotrimazole (MYCELEX) 10 mg ayesha Take 1 Tab by mouth five (5) times daily for 30 days.  loperamide (Imodium A-D) 1 mg/7.5 mL solution 15 mL by Per G Tube route three (3) times daily as needed for Diarrhea.  levETIRAcetam (KEPPRA) 100 mg/mL solution 500 mg by Per G Tube route two (2) times a day.  diazePAM (VALIUM) 10 mg tablet Take 10 mg by mouth as needed (seizure > 5 min). Uses gel formulation  albuterol-ipratropium (DUO-NEB) 2.5 mg-0.5 mg/3 ml nebu 3 mL by Nebulization route every four (4) hours as needed.  phenytoin (DILANTIN) 100 mg/4 mL susp oral suspension 150 mg by PEG Tube route two (2) times a day.  aspirin 81 mg chewable tablet 81 mg by Per G Tube route daily. No current facility-administered medications for this visit. Physical Examination: General appearance - chronically ill appearing Mouth - thrush noted Assessment/ Plan:  
Diagnoses and all orders for this visit: 
 
1. C. difficile diarrhea 
resolved 2.  Positive fecal occult blood test 
 -     CBC WITH AUTOMATED DIFF; Future GI for colo 3. Oral thrush C/w troches 4. Seizure ([address] -     LEVETIRACETAM (KEPPRA); Future 
-     PHENYTOIN; Future -     METABOLIC PANEL, COMPREHENSIVE; Future -     CBC WITH AUTOMATED DIFF; Future Follow-up and Dispositions · Return in about 6 months (around 10/9/2020), or if symptoms worsen or fail to improve. I have discussed the diagnosis with the patient and the intended plan as seen in the above orders. The patient has received an after-visit summary and questions were answered concerning future plans. Pt conveyed understanding of plan. Medication Side Effects and Warnings were discussed with patient Juancho Young DO 
 Pursuant to the emergency declaration under the [address] and [place] [id number] waiver authority and the [employer] and [employer] Act, this Virtual Visit was conducted, with patient's consent, to reduce the patient's risk of exposure to COVID-19 and provide continuity of care for an established patient. Services were provided through a video synchronous discussion virtually to substitute for in-person appointment.

## 2020-04-10 ENCOUNTER — DOCUMENTATION ONLY (OUTPATIENT)
Dept: FAMILY MEDICINE CLINIC | Age: 50
End: 2020-04-10

## 2020-04-14 ENCOUNTER — DOCUMENTATION ONLY (OUTPATIENT)
Dept: FAMILY MEDICINE CLINIC | Age: 50
End: 2020-04-14

## 2020-04-15 ENCOUNTER — DOCUMENTATION ONLY (OUTPATIENT)
Dept: FAMILY MEDICINE CLINIC | Age: 50
End: 2020-04-15

## 2020-04-15 ENCOUNTER — TELEPHONE (OUTPATIENT)
Dept: FAMILY MEDICINE CLINIC | Age: 50
End: 2020-04-15

## 2020-04-15 NOTE — PROGRESS NOTES
Order faxed to [address] [place] [address]. Fax number [phone] confirmation number [id number].

## 2020-04-15 NOTE — TELEPHONE ENCOUNTER
----- Message from Tilman Severance sent at 4/15/2020  1:30 PM EDT -----  Regarding: /Telephone  Contact: [phone]  Philip Andino is requesting an order for a \"Reny Lift\" for PT to be faxed to [phone].

## 2020-04-16 ENCOUNTER — DOCUMENTATION ONLY (OUTPATIENT)
Dept: FAMILY MEDICINE CLINIC | Age: 50
End: 2020-04-16

## 2020-04-16 ENCOUNTER — TELEPHONE (OUTPATIENT)
Dept: FAMILY MEDICINE CLINIC | Age: 50
End: 2020-04-16

## 2020-04-16 NOTE — TELEPHONE ENCOUNTER
[facility] states that patient is having diarrhea w/muscus again. She finished her course of antibiotic for cdiff 04/07/20. Group Home has a half bottle of vanclimycin letfover if you would like them to start back up with that.  Sudha Memory can be reached @[phone]

## 2020-04-17 DIAGNOSIS — A04.72 C. DIFFICILE DIARRHEA: Primary | ICD-10-CM

## 2020-04-17 RX ORDER — METRONIDAZOLE 500 MG/1
500 TABLET ORAL 3 TIMES DAILY
Qty: 42 TAB | Refills: 0 | Status: SHIPPED | OUTPATIENT
Start: 2020-04-17 | End: 2020-04-24

## 2020-04-17 NOTE — TELEPHONE ENCOUNTER
I'm not sure how many doses are in half a bottle. Are they using tablets or liquid? I did send in flagyl to take for 14 days.   Find out how many doses(and what dose) they have of vanco,

## 2020-04-21 NOTE — TELEPHONE ENCOUNTER
Received call back from Lalito Arriaza. She states that the vancomycin is liquid 50 MG/ML takes 2.5 ML q6h for 22 doses. States that there is about half a bottle left. Advised to Flagyl sent to pharmacy and advised to start Flagyl in the meantime.

## 2020-04-22 NOTE — TELEPHONE ENCOUNTER
Start the vanco as well.   I need to know how many days worth of vanco they actually have so I kno if they need another Rx also what compound pharmacy they used for it if they do need more, would treat for 14 days

## 2020-04-23 NOTE — TELEPHONE ENCOUNTER
Called and spoke with Elissa Altamirano. Advised that Dr. Evelio Lubin stated that she should start to vancomycin that they have in the home but that we need to know exactly how many doses are left so we can send in more. She stated that when she saw the patient yesterday the  told her that she is no longer have the frequent loose stools and they decided to wait and discuss with provided during virtual visit that they have scheduled for tomorrow.

## 2020-04-24 ENCOUNTER — VIRTUAL VISIT (OUTPATIENT)
Dept: FAMILY MEDICINE CLINIC | Age: 50
End: 2020-04-24

## 2020-04-24 DIAGNOSIS — A04.72 C. DIFFICILE DIARRHEA: Primary | ICD-10-CM

## 2020-04-24 RX ORDER — ACETAMINOPHEN 500 MG
500 TABLET ORAL
Qty: 30 TAB | Refills: 5 | Status: SHIPPED | OUTPATIENT
Start: 2020-04-24 | End: 2020-01-01 | Stop reason: CLARIF

## 2020-04-24 NOTE — PROGRESS NOTES
Evangelista Mtz is a 53 y.o. female Chief Complaint Patient presents with  Diarrhea Pt had an appt with GI and has been tapered off the flagyl and is doing a tapering dose of vancomycin. Staff state pt is not having diarrhea just smelled funny. No abd pain noted. Pt normally goes every 36 hrs but Sunday  Had more frequent diarrhea. States stool is still slimey and mucousy. Will be on vanco for 30 days total. 
 
Requesting a prn for tylenol for fever or pain to have on file. she is a 53y.o. year old female who presents for evalution. Reviewed PmHx, RxHx, FmHx, SocHx, AllgHx and updated and dated in the chart. Review of Systems - negative except as listed above in the HPI Objective: There were no vitals filed for this visit. Current Outpatient Medications Medication Sig  
 acetaminophen (TYLENOL) 500 mg tablet Take 1 Tab by mouth every six (6) hours as needed for Pain or Fever (thru G tube).  clotrimazole (MYCELEX) 10 mg ayesha Take 1 Tab by mouth five (5) times daily for 30 days.  loperamide (Imodium A-D) 1 mg/7.5 mL solution 15 mL by Per G Tube route three (3) times daily as needed for Diarrhea.  levETIRAcetam (KEPPRA) 100 mg/mL solution 500 mg by Per G Tube route two (2) times a day.  diazePAM (VALIUM) 10 mg tablet Take 10 mg by mouth as needed (seizure > 5 min). Uses gel formulation  albuterol-ipratropium (DUO-NEB) 2.5 mg-0.5 mg/3 ml nebu 3 mL by Nebulization route every four (4) hours as needed.  phenytoin (DILANTIN) 100 mg/4 mL susp oral suspension 150 mg by PEG Tube route two (2) times a day.  aspirin 81 mg chewable tablet 81 mg by Per G Tube route daily. No current facility-administered medications for this visit. Physical Examination: General appearance - alert, well appearing, and in no distress Abdomen - had staff palpate abd and pt showed no signs of pain or discomfort Assessment/ Plan:  
Diagnoses and all orders for this visit: 
 1. C. difficile diarrhea 
-     acetaminophen (TYLENOL) 500 mg tablet; Take 1 Tab by mouth every six (6) hours as needed for Pain or Fever (thru G tube). c/w vanco prn for tylenol given to keep on file Follow-up and Dispositions · Return if symptoms worsen or fail to improve. I have discussed the diagnosis with the patient and the intended plan as seen in the above orders. The patient has received an after-visit summary and questions were answered concerning future plans. Pt conveyed understanding of plan. Medication Side Effects and Warnings were discussed with patient Hortense Coast, DO Vickey Sacks is a 53 y.o. female being evaluated by a Virtual Visit (video visit) encounter to address concerns as mentioned above. A caregiver was present when appropriate. Due to this being a TeleHealth encounter (During [facility]), evaluation of the following organ systems was limited: Vitals/Constitutional/EENT/Resp/CV/GI//MS/Neuro/Skin/Heme-Lymph-Imm. Pursuant to the emergency declaration under the [address] [place] [address], [address] [place] [address] authority and the [employer] Act, this Virtual Visit was conducted with patient's (and/or legal guardian's) consent, to reduce the risk of exposure to COVID-19 and provide necessary medical care. Services were provided through a video synchronous discussion virtually to substitute for in-person encounter. --Sharona Figueroa DO on 4/24/2020 at 1:40 PM 
 
An electronic signature was used to authenticate this note.

## 2020-04-24 NOTE — PATIENT INSTRUCTIONS

## 2020-05-01 ENCOUNTER — TELEPHONE (OUTPATIENT)
Dept: FAMILY MEDICINE CLINIC | Age: 50
End: 2020-05-01

## 2020-05-01 RX ORDER — CETIRIZINE HCL 10 MG
10 TABLET ORAL DAILY
Qty: 30 TAB | Refills: 1 | Status: ON HOLD | OUTPATIENT
Start: 2020-05-01 | End: 2021-01-01

## 2020-05-01 NOTE — TELEPHONE ENCOUNTER
----- Message from Jessica Chavez sent at 5/1/2020 12:22 PM EDT -----  Regarding: [place]/telephone  Pts caregiver Leandra York is requesting a Rx for the pts allergies. Ms Luis Pruitt number is [phone] and [facility] [phone].

## 2020-05-01 NOTE — TELEPHONE ENCOUNTER
Called and LVM for [facility] to call the office back. We do not have record of any allergy medication on file. Need to what she is taking if anything for allergies.

## 2020-05-01 NOTE — TELEPHONE ENCOUNTER
Received return call from [place]. She states that patient has been sneezing and some drainage coming from eyes. She states that patient has not been on anything previously to their knowledge for allergies. Requesting something to be sent in [facility] to [facility].

## 2020-05-01 NOTE — TELEPHONE ENCOUNTER
Called and spoke with Donta Karimi. Advised of Zte sent to pharmacy. Advised if no improvement by the end of next week to schedule a virtual visit. Dayami verbalized understanding.

## 2020-05-07 LAB
ALBUMIN SERPL-MCNC: 3.6 G/DL (ref 3.8–4.8)
ALBUMIN/GLOB SERPL: 1.1 {RATIO} (ref 1.2–2.2)
ALP SERPL-CCNC: 206 IU/L (ref 39–117)
ALT SERPL-CCNC: 92 IU/L (ref 0–32)
AST SERPL-CCNC: 56 IU/L (ref 0–40)
BASOPHILS # BLD AUTO: 0.1 X10E3/UL (ref 0–0.2)
BASOPHILS NFR BLD AUTO: 1 %
BILIRUB SERPL-MCNC: <0.2 MG/DL (ref 0–1.2)
BUN SERPL-MCNC: 18 MG/DL (ref 6–24)
BUN/CREAT SERPL: 35 (ref 9–23)
CALCIUM SERPL-MCNC: 9.6 MG/DL (ref 8.7–10.2)
CHLORIDE SERPL-SCNC: 96 MMOL/L (ref 96–106)
CO2 SERPL-SCNC: 24 MMOL/L (ref 20–29)
CREAT SERPL-MCNC: 0.52 MG/DL (ref 0.57–1)
EOSINOPHIL # BLD AUTO: 0.1 X10E3/UL (ref 0–0.4)
EOSINOPHIL NFR BLD AUTO: 1 %
ERYTHROCYTE [DISTWIDTH] IN BLOOD BY AUTOMATED COUNT: 16.1 % (ref 11.7–15.4)
GLOBULIN SER CALC-MCNC: 3.3 G/DL (ref 1.5–4.5)
GLUCOSE SERPL-MCNC: 80 MG/DL (ref 65–99)
HCT VFR BLD AUTO: 38.7 % (ref 34–46.6)
HGB BLD-MCNC: 13.1 G/DL (ref 11.1–15.9)
IMM GRANULOCYTES # BLD AUTO: 0 X10E3/UL (ref 0–0.1)
IMM GRANULOCYTES NFR BLD AUTO: 0 %
LEVETIRACETAM SERPL-MCNC: 27.2 UG/ML (ref 10–40)
LYMPHOCYTES # BLD AUTO: 2.3 X10E3/UL (ref 0.7–3.1)
LYMPHOCYTES NFR BLD AUTO: 25 %
MCH RBC QN AUTO: 32 PG (ref 26.6–33)
MCHC RBC AUTO-ENTMCNC: 33.9 G/DL (ref 31.5–35.7)
MCV RBC AUTO: 94 FL (ref 79–97)
MONOCYTES # BLD AUTO: 0.5 X10E3/UL (ref 0.1–0.9)
MONOCYTES NFR BLD AUTO: 5 %
NEUTROPHILS # BLD AUTO: 6.4 X10E3/UL (ref 1.4–7)
NEUTROPHILS NFR BLD AUTO: 68 %
PHENYTOIN SERPL-MCNC: 11.7 UG/ML (ref 10–20)
PLATELET # BLD AUTO: 319 X10E3/UL (ref 150–450)
POTASSIUM SERPL-SCNC: 4.6 MMOL/L (ref 3.5–5.2)
PROT SERPL-MCNC: 6.9 G/DL (ref 6–8.5)
RBC # BLD AUTO: 4.1 X10E6/UL (ref 3.77–5.28)
SODIUM SERPL-SCNC: 136 MMOL/L (ref 134–144)
WBC # BLD AUTO: 9.4 X10E3/UL (ref 3.4–10.8)

## 2020-05-07 NOTE — PROGRESS NOTES
LFT's are elevated, need to recheck in 2 weeks. If using tylenol stop. Is pt diarrhea improving?     Keppra and phenytoin levels are therapeutic

## 2020-05-08 NOTE — PROGRESS NOTES
Spoke with pt caregiver inforemd that pt would need to have another vv.  She verbalized understanding

## 2020-05-14 ENCOUNTER — TELEPHONE (OUTPATIENT)
Dept: FAMILY MEDICINE CLINIC | Age: 50
End: 2020-05-14

## 2020-05-14 NOTE — TELEPHONE ENCOUNTER
[facility]/ Alvin Damian-[phone]. Rm Sol would like an order for OT to be continued and also  for a wheelchair exam to be completed.  Fax order please: [phone]

## 2020-05-14 NOTE — TELEPHONE ENCOUNTER
Kia dowling from [facility] needs copy of the labs that were drawn at [place] [address] on 5/1. Nurse said once they were reviewed they could get copy faxed to [phone]. If you have any questions she can be reached at [phone].

## 2020-05-14 NOTE — TELEPHONE ENCOUNTER
Called and spoke with Alvin to give verbal. She states that she wants a written order and that she has already provided the fax number.

## 2020-05-14 NOTE — TELEPHONE ENCOUNTER
Labs faxed to [facility] with [facility] [place]. Fax number [phone] confirmation number [id number].

## 2020-05-26 ENCOUNTER — DOCUMENTATION ONLY (OUTPATIENT)
Dept: FAMILY MEDICINE CLINIC | Age: 50
End: 2020-05-26

## 2020-05-27 ENCOUNTER — TELEPHONE (OUTPATIENT)
Dept: FAMILY MEDICINE CLINIC | Age: 50
End: 2020-05-27

## 2020-05-27 NOTE — TELEPHONE ENCOUNTER
Kinza calling and re: letter they received about repeat lab work. She states does she come to the office or go directly to lab tiana again, they have one around the block form the group home and she would just need a slip to go to them. Please call her at [phone].

## 2020-05-27 NOTE — TELEPHONE ENCOUNTER
Called and spoke with Thanh Carver. Advised that virtual apt is needed for f/u and labs would be ordered at that time. Kinza verbalized understanding.  Virtual apt scheduled for 6/3/20

## 2020-05-28 ENCOUNTER — DOCUMENTATION ONLY (OUTPATIENT)
Dept: FAMILY MEDICINE CLINIC | Age: 50
End: 2020-05-28

## 2020-05-29 ENCOUNTER — DOCUMENTATION ONLY (OUTPATIENT)
Dept: FAMILY MEDICINE CLINIC | Age: 50
End: 2020-05-29

## 2020-06-03 ENCOUNTER — VIRTUAL VISIT (OUTPATIENT)
Dept: FAMILY MEDICINE CLINIC | Age: 50
End: 2020-06-03

## 2020-06-03 DIAGNOSIS — R56.9 SEIZURE (HCC): ICD-10-CM

## 2020-06-03 DIAGNOSIS — R79.89 ELEVATED LFTS: Primary | ICD-10-CM

## 2020-06-03 NOTE — PROGRESS NOTES
Viviana Hernandez is a 53 y.o. female Chief Complaint Patient presents with  Follow-up  
 pt for follow up for elevated lft's. Pt is on meds that could be causing this. Pt does not drink etoh and is not getting any. Alk phos was elevated as well. Pt does not seem in pain. Pt does have increased secretions and they have an appt with pulm tomorrow regarding this. [facility] request recheck of phenytoin and keppra levels for neuro. she is a 53y.o. year old female who presents for evalution. Reviewed PmHx, RxHx, FmHx, SocHx, AllgHx and updated and dated in the chart. Review of Systems - negative except as listed above in the HPI Objective: There were no vitals filed for this visit. Current Outpatient Medications Medication Sig  cetirizine (ZYRTEC) 10 mg tablet Take 1 Tab by mouth daily.  acetaminophen (TYLENOL) 500 mg tablet Take 1 Tab by mouth every six (6) hours as needed for Pain or Fever (thru G tube).  loperamide (Imodium A-D) 1 mg/7.5 mL solution 15 mL by Per G Tube route three (3) times daily as needed for Diarrhea.  levETIRAcetam (KEPPRA) 100 mg/mL solution 500 mg by Per G Tube route two (2) times a day.  diazePAM (VALIUM) 10 mg tablet Take 10 mg by mouth as needed (seizure > 5 min). Uses gel formulation  albuterol-ipratropium (DUO-NEB) 2.5 mg-0.5 mg/3 ml nebu 3 mL by Nebulization route every four (4) hours as needed.  phenytoin (DILANTIN) 100 mg/4 mL susp oral suspension 150 mg by PEG Tube route two (2) times a day.  aspirin 81 mg chewable tablet 81 mg by Per G Tube route daily. No current facility-administered medications for this visit. Physical Examination: General appearance - chronically ill appearing and at baseline Assessment/ Plan:  
Diagnoses and all orders for this visit: 1. Elevated LFTs 
-     HEPATIC FUNCTION PANEL; Future -     ALK PHOS ISOENZYMES; Future -     LEVETIRACETAM (KEPPRA); Future 
-     PHENYTOIN; Future 2. Seizure ([facility] [address] -     LEVETIRACETAM (KEPPRA); Future 
-     PHENYTOIN; Future Follow-up and Dispositions · Return if symptoms worsen or fail to improve. I have discussed the diagnosis with the patient and the intended plan as seen in the above orders. The patient has received an after-visit summary and questions were answered concerning future plans. Pt conveyed understanding of plan. Medication Side Effects and Warnings were discussed with patient DO Matheus Gallego is a 53 y.o. female being evaluated by a Virtual Visit (video visit) encounter to address concerns as mentioned above. A caregiver was present when appropriate. Due to this being a TeleHealth encounter (During YXT[facility]-28 public health emergency), evaluation of the following organ systems was limited: Vitals/Constitutional/EENT/Resp/CV/GI//MS/Neuro/Skin/Heme-Lymph-Imm. Pursuant to the emergency declaration under the [address] [place] [address], [address] [place] [address] authority and the [employer] and [employer] Act, this Virtual Visit was conducted with patient's (and/or legal guardian's) consent, to reduce the risk of exposure to COVID-19 and provide necessary medical care. Services were provided through a video synchronous discussion virtually to substitute for in-person encounter. --Sly Casillas DO on 6/3/2020 at 1:30 PM 
 
An electronic signature was used to authenticate this note.

## 2020-06-03 NOTE — PATIENT INSTRUCTIONS
A Healthy Lifestyle: Care Instructions Your Care Instructions A healthy lifestyle can help you feel good, stay at a healthy weight, and have plenty of energy for both work and play. A healthy lifestyle is something you can share with your whole family. A healthy lifestyle also can lower your risk for serious health problems, such as high blood pressure, heart disease, and diabetes. You can follow a few steps listed below to improve your health and the health of your family. Follow-up care is a key part of your treatment and safety. Be sure to make and go to all appointments, and call your doctor if you are having problems. It's also a good idea to know your test results and keep a list of the medicines you take. How can you care for yourself at home? · Do not eat too much sugar, fat, or fast foods. You can still have dessert and treats now and then. The goal is moderation. · Start small to improve your eating habits. Pay attention to portion sizes, drink less juice and soda pop, and eat more fruits and vegetables. ? Eat a healthy amount of food. A 3-ounce serving of meat, for example, is about the size of a deck of cards. Fill the rest of your plate with vegetables and whole grains. ? Limit the amount of soda and sports drinks you have every day. Drink more water when you are thirsty. ? Eat at least 5 servings of fruits and vegetables every day. It may seem like a lot, but it is not hard to reach this goal. A serving or helping is 1 piece of fruit, 1 cup of vegetables, or 2 cups of leafy, raw vegetables. Have an apple or some carrot sticks as an afternoon snack instead of a candy bar. Try to have fruits and/or vegetables at every meal. 
· Make exercise part of your daily routine. You may want to start with simple activities, such as walking, bicycling, or slow swimming. Try to be active 30 to 60 minutes every day.  You do not need to do all 30 to 60 minutes all at once. For example, you can exercise 3 times a day for 10 or 20 minutes. Moderate exercise is safe for most people, but it is always a good idea to talk to your doctor before starting an exercise program. 
· Keep moving. [place]a [place] the lawn, work in the garden, or [employer]. Take the stairs instead of the elevator at work. · If you smoke, quit. People who smoke have an increased risk for heart attack, stroke, cancer, and other lung illnesses. Quitting is hard, but there are ways to boost your chance of quitting tobacco for good. ? Use nicotine gum, patches, or lozenges. ? Ask your doctor about stop-smoking programs and medicines. ? Keep trying. In addition to reducing your risk of diseases in the future, you will notice some benefits soon after you stop using tobacco. If you have shortness of breath or asthma symptoms, they will likely get better within a few weeks after you quit. · Limit how much alcohol you drink. Moderate amounts of alcohol (up to 2 drinks a day for men, 1 drink a day for women) are okay. But drinking too much can lead to liver problems, high blood pressure, and other health problems. Family health If you have a family, there are many things you can do together to improve your health. · Eat meals together as a family as often as possible. · Eat healthy foods. This includes fruits, vegetables, lean meats and dairy, and whole grains. · Include your family in your fitness plan. Most people think of activities such as jogging or tennis as the way to fitness, but there are many ways you and your family can be more active. Anything that makes you breathe hard and gets your heart pumping is exercise. Here are some tips: 
? Walk to do errands or to take your child to school or the bus. 
? Go for a family bike ride after dinner instead of watching TV. Where can you learn more? Go to http://satnam-yesika.info/ Enter Z630 in the search box to learn more about \"A Healthy Lifestyle: Care Instructions. \" Current as of: January 31, 2020               Content Version: 12.5 © 2006-2020 Healthwise[employer]. Care instructions adapted under license by [employer] (which disclaims liability or warranty for this information). If you have questions about a medical condition or this instruction, always ask your healthcare professional. [address] any warranty or liability for your use of this information.

## 2020-06-04 NOTE — PROGRESS NOTES
Faxed 06/03/20 virtual office note & 05/01/20 lab results to Dr Jessica Carvajal per [facility] request. Fax #[phone] confirmation received.

## 2020-06-09 PROBLEM — Z99.3 WHEELCHAIR BOUND: Status: ACTIVE | Noted: 2020-01-01

## 2020-06-09 PROBLEM — R26.2 INABILITY TO WALK: Status: ACTIVE | Noted: 2020-01-01

## 2020-06-09 NOTE — PROGRESS NOTES
Anastasiya Vallejo is a 53 y.o. female Chief Complaint Patient presents with  Follow-up Pt currently is unable to walk and is wheelchair bound as of November 2019. Pt has been borrowing a wheelchair. Her hospitalization over the winter has also worsened her situation with ambulation. Pt did try PT but was unable to participate due to not responding to commands. Pt lives in a wheelchair accessible home and there is wheelchair  Accessible transportation and there are staff that can operate the wheelchair. Pt is seeing OT. Pt also favors R side when laying down and has a small sore on R ear. Discussed change in positions and will also give bacitracin prn.  
she is a 53y.o. year old female who presents for evalution. Reviewed PmHx, RxHx, FmHx, SocHx, AllgHx and updated and dated in the chart. Review of Systems - negative except as listed above in the HPI Objective: There were no vitals filed for this visit. Current Outpatient Medications Medication Sig  Wheel Chair jeanne Manual wheelchair with harness for mobility R26.2 Z99.3 F72  
 bacitracin zinc (BACITRACIN) ointment Apply  to affected area two (2) times a day.  cetirizine (ZYRTEC) 10 mg tablet Take 1 Tab by mouth daily.  acetaminophen (TYLENOL) 500 mg tablet Take 1 Tab by mouth every six (6) hours as needed for Pain or Fever (thru G tube).  loperamide (Imodium A-D) 1 mg/7.5 mL solution 15 mL by Per G Tube route three (3) times daily as needed for Diarrhea.  levETIRAcetam (KEPPRA) 100 mg/mL solution 500 mg by Per G Tube route two (2) times a day.  diazePAM (VALIUM) 10 mg tablet Take 10 mg by mouth as needed (seizure > 5 min). Uses gel formulation  albuterol-ipratropium (DUO-NEB) 2.5 mg-0.5 mg/3 ml nebu 3 mL by Nebulization route every four (4) hours as needed.  phenytoin (DILANTIN) 100 mg/4 mL susp oral suspension 150 mg by PEG Tube route two (2) times a day.  aspirin 81 mg chewable tablet 81 mg by Per G Tube route daily. No current facility-administered medications for this visit. Physical Examination: General appearance - @ baseline wih severe ID in wheelchair with harness to help hold up Skin - small scabbed lesion R ear Assessment/ Plan:  
Diagnoses and all orders for this visit: 
 
1. Wheelchair bound -     Wheel Chair jeanne; Manual wheelchair with harness for mobility R26.2 Z99.3 F72 
 
2. Inability to walk -     Wheel Chair jeanne; Manual wheelchair with harness for mobility R26.2 Z99.3 F72 3. Severe intellectual disability -     Wheel Chair jeanne; Manual wheelchair with harness for mobility R26.2 Z99.3 F72 
 
4. Skin lesion 
-     bacitracin zinc (BACITRACIN) ointment; Apply  to affected area two (2) times a day. Change position q2 when laying down. [facility] and Dispositions · Return if symptoms worsen or fail to improve. I have discussed the diagnosis with the patient and the intended plan as seen in the above orders. The patient has received an after-visit summary and questions were answered concerning future plans. Pt conveyed understanding of plan. Medication Side Effects and Warnings were discussed with patient Verner Bunkers, DO Clarice Pulliam is a 53 y.o. female being evaluated by a Virtual Visit (video visit) encounter to address concerns as mentioned above. A caregiver was present when appropriate. Due to this being a TeleHealth encounter (During [place] [address] public health emergency), evaluation of the following organ systems was limited: Vitals/Constitutional/EENT/Resp/CV/GI//MS/Neuro/Skin/Heme-Lymph-Imm.   Pursuant to the emergency declaration under the [facility], [address] [place] [address] authority and the [employer] and [employer] Act, this Virtual Visit was conducted with patient's (and/or legal guardian's) consent, to reduce the risk of exposure to COVID-19 and provide necessary medical care. Services were provided through a video synchronous discussion virtually to substitute for in-person encounter. --Patricio Melchor DO on 6/9/2020 at 4:10 PM 
 
An electronic signature was used to authenticate this note.

## 2020-06-09 NOTE — PATIENT INSTRUCTIONS
A Healthy Lifestyle: Care Instructions Your Care Instructions A healthy lifestyle can help you feel good, stay at a healthy weight, and have plenty of energy for both work and play. A healthy lifestyle is something you can share with your whole family. A healthy lifestyle also can lower your risk for serious health problems, such as high blood pressure, heart disease, and diabetes. You can follow a few steps listed below to improve your health and the health of your family. Follow-up care is a key part of your treatment and safety. Be sure to make and go to all appointments, and call your doctor if you are having problems. It's also a good idea to know your test results and keep a list of the medicines you take. How can you care for yourself at home? · Do not eat too much sugar, fat, or fast foods. You can still have dessert and treats now and then. The goal is moderation. · Start small to improve your eating habits. Pay attention to portion sizes, drink less juice and soda pop, and eat more fruits and vegetables. ? Eat a healthy amount of food. A 3-ounce serving of meat, for example, is about the size of a deck of cards. Fill the rest of your plate with vegetables and whole grains. ? Limit the amount of soda and sports drinks you have every day. Drink more water when you are thirsty. ? Eat at least 5 servings of fruits and vegetables every day. It may seem like a lot, but it is not hard to reach this goal. A serving or helping is 1 piece of fruit, 1 cup of vegetables, or 2 cups of leafy, raw vegetables. Have an apple or some carrot sticks as an afternoon snack instead of a candy bar. Try to have fruits and/or vegetables at every meal. 
· Make exercise part of your daily routine. You may want to start with simple activities, such as walking, bicycling, or slow swimming. Try to be active 30 to 60 minutes every day.  You do not need to do all 30 to 60 minutes all at once. For example, you can exercise 3 times a day for 10 or 20 minutes. Moderate exercise is safe for most people, but it is always a good idea to talk to your doctor before starting an exercise program. 
· Keep moving. Sean King the lawn, work in the garden, or [employer]. Take the stairs instead of the elevator at work. · If you smoke, quit. People who smoke have an increased risk for heart attack, stroke, cancer, and other lung illnesses. Quitting is hard, but there are ways to boost your chance of quitting tobacco for good. ? Use nicotine gum, patches, or lozenges. ? Ask your doctor about stop-smoking programs and medicines. ? Keep trying. In addition to reducing your risk of diseases in the future, you will notice some benefits soon after you stop using tobacco. If you have shortness of breath or asthma symptoms, they will likely get better within a few weeks after you quit. · Limit how much alcohol you drink. Moderate amounts of alcohol (up to 2 drinks a day for men, 1 drink a day for women) are okay. But drinking too much can lead to liver problems, high blood pressure, and other health problems. Family health If you have a family, there are many things you can do together to improve your health. · Eat meals together as a family as often as possible. · Eat healthy foods. This includes fruits, vegetables, lean meats and dairy, and whole grains. · Include your family in your fitness plan. Most people think of activities such as jogging or tennis as the way to fitness, but there are many ways you and your family can be more active. Anything that makes you breathe hard and gets your heart pumping is exercise. Here are some tips: 
? Walk to do errands or to take your child to school or the bus. 
? Go for a family bike ride after dinner instead of watching TV. Where can you learn more? Go to http://satnam-yesika.info/ Enter R792 in the search box to learn more about \"A Healthy Lifestyle: Care Instructions. \" Current as of: January 31, 2020               Content Version: 12.5 © 2006-2020 [employer]wise, [employer]. Care instructions adapted under license by [employer] (which disclaims liability or warranty for this information). If you have questions about a medical condition or this instruction, always ask your healthcare professional. [place] [address] any warranty or liability for your use of this information.

## 2020-06-15 NOTE — TELEPHONE ENCOUNTER
Stated took patient to [facility] (does not know name of facility) for chest xray.     Awaiting results

## 2020-06-16 NOTE — PROGRESS NOTES
Keppra and dilantin levels are both too high. LFT's have gone up as well likely med related. They need to hold both and contact prescribing physician for dose modification and we can fax these to them as well.       Would recommend follow up later this week for repeat drug levels and LFT recheck

## 2020-06-17 NOTE — PROGRESS NOTES
2nd attempt. Called and spoke with caretaker in reference to labs. Caretaker verbalized understanding. Labs faxed to Dr. Rebeca Ayala. Fax number [phone] confirmation number [id number].

## 2020-06-26 NOTE — PROGRESS NOTES
Manual order completed for c diff toxins A/B EIA ([facility] test @[id number]) since test was not available to order in [facility]. Order will be faxed to [address] [place] [address] as requested.

## 2020-06-30 PROBLEM — A04.72 CLOSTRIDIUM DIFFICILE DIARRHEA: Status: ACTIVE | Noted: 2020-01-01

## 2020-06-30 NOTE — ED NOTES
Spoke with Maris Jane of radiology regarding Dr. Jacquie Shine order to place PICC line. Per Maris Jane, she is reaching out to the PICC team, and will call back with information.

## 2020-06-30 NOTE — PROGRESS NOTES
Called to assess airway on pt with tracheostomy. Pt with 6.0 cuffless trach and humidity valve attached. Valve visibly soiled with foamy white secretions. Pt suctioned for moderate amount of thin white secretions. Attempted to clean out valve with sterile water but valve still soiled with secretions so valve left off pt's trach. Pt with 93% O2 sats on room air. Will add trach collar with humidity if needed. [facility], RN notified.

## 2020-06-30 NOTE — ED NOTES
Spoke Huma Ty, the manager of [address] [place] [address] states that the patient has recently tested positive for C-diff recently. States that the patient has had bloody stool for the past two days. States that she will be arriving to visit \"soon. \"

## 2020-06-30 NOTE — PROGRESS NOTES
MIDLINE PLACEMENT NOTE Midline catheters are NOT central lines. Approved midline products are peripheral infusion devices with the tip terminating in the arm at or below the axillary vein, distal to the shoulder. The tip DOES NOT ENTER THE CENTRAL VASCULATURE. A Midline is for reliable short term (less than 30 days) vascular access. PRE-PROCEDURE VERIFICATION Correct Procedure: yes Correct Site:  yes Temperature: Temp: 98.5 °F (36.9 °C), Temperature Source: Temp Source: Oral 
No results for input(s): BUN, CREA, PLT, INR, WBC, PLTEXT, INREXT in the last 72 hours. No lab exists for component: APTHR Allergies: Depakote [divalproex] Education materials for Midline Care given: yes. See Patient Education activity for further details. Midline Booklet placed at bedside: yes Midline Catheter Maintenance: For complete information reference Policy # [id number] A. Dressing Changes 1. Assess the dressing in the first 24 hours for accumulation of blood, fluid or moisture beneath the dressing. During dressing changes, assess the external length of the catheter to determine if migration of the catheter has occurred. Periodically confirm catheter placement, tip location, patency and security of dressing. Dressing changes should be done every 7 days, and PRN using sterile technique if integrity of dressing is compromised. Apply new dressing per hospital policy. Caution: Do not use scissors to remove dressing to minimize the risk of cutting the  Catheter. B. Flushing 1. Flush each lumen of the catheter with 10 mL of sterile saline every 12 hours or after each use. In addition, lock each lumen of the catheter with sterile saline. Note: Flush with 20 mL of sterile saline after blood therapy Caution: DO NOT USE A SYRINGE SMALLER THAN 10 mL TO FLUSH AND CONFIRM PATENCY.   Patency should be assessed with a 10 mL or larger syringe and sterile saline. Upon confirmation of patency, administration of medication should be  given in a syringe appropriately sized for the dose. Do not infuse against resistance. C. Blood Draws: 1. Stop infusion, draw off and waste 10 ml of blood. Draw sample with 10 mL syringe or          greater. DO NOT USE VACUTAINER . Transfer with appropriate device to lab tubes. Flush        with 20 ml NS. 
D. Occluded or Partially Occluded Catheter 1. Catheters that present resistance to flushing and aspiration may be partially or completely  occluded. Do not flush against resistance. PROCEDURE DETAIL: 
 
Verbal consent was obtained and all questions were answered related to risks and benefits. A Midline was inserted as a sterile procedure using ultrasound and Modified Seldinger Technique for vascular access. The following documentation is in addition to the Midline properties in the lines/airways Doc Flow Sheet: Lot #: [id number] Lidocaine 1% administered intradermally :yes Internal Catheter Total Length: 10 (cm)   External catheter length: 0 (cm) Vein Selection for Midline:left brachial 
Sterile CVC Insertion Bundle was used to place line yes Complication Related to Insertion:no Prior to use, line patency MUST be verified by flushing at least a 10 mL syringe. Line should flush easily without resistance, and withdrawal of brisk blood return to verify patency. Line is okay to use: yes        (Remove Midline by:    7/30/20          ) Jordan Rodríguez RN

## 2020-06-30 NOTE — ED PROVIDER NOTES
Patient is a 71-year-old female history of Down syndrome with a tracheostomy presenting to the emergency department today secondary to diarrhea. She is unable to provide any history. Full history, physical exam, and ROS unable to be obtained due to: Nonverbal 
 
I spoke to her caretaker at the group home. She states that she has had 8-15 episodes of diarrhea per day. She has had some blood in the stools today, describes mild streaking without gross blood. No fevers. Apparently she has a history of C. difficile which was just diagnosed a couple of days ago. Prescriptions were sent in for her but one was not covered by her insurance and the oral vancomycin cannot be given until Thursday, caretaker cannot tell me why. Past Medical History:  
Diagnosis Date  Down syndrome  History of vascular access device 06/30/2020  
 4 Fr midline, 10 cm L brachial, poor access  Seizures ([facility] [address] Social History Socioeconomic History  Marital status: SINGLE Spouse name: Not on file  Number of children: Not on file  Years of education: Not on file  Highest education level: Not on file Occupational History  Not on file Social Needs  Financial resource strain: Not on file  Food insecurity Worry: Not on file Inability: Not on file  Transportation needs Medical: Not on file Non-medical: Not on file Tobacco Use  Smoking status: Never Smoker  Smokeless tobacco: Never Used Substance and Sexual Activity  Alcohol use: Never Frequency: Never  Drug use: Never  Sexual activity: Never Lifestyle  Physical activity Days per week: Not on file Minutes per session: Not on file  Stress: Not on file Relationships  Social connections Talks on phone: Not on file Gets together: Not on file Attends [employer] service: Not on file Active member of club or organization: Not on file Attends meetings of clubs or organizations: Not on file Relationship status: Not on file  Intimate partner violence Fear of current or ex partner: Not on file Emotionally abused: Not on file Physically abused: Not on file Forced sexual activity: Not on file Other Topics Concern [address] Service Not Asked  Blood Transfusions Not Asked  Caffeine Concern Not Asked  Occupational Exposure Not Asked Caro Fluke Hazards Not Asked  Sleep Concern Not Asked  Stress Concern Not Asked  Weight Concern Not Asked  Special Diet Not Asked  Back Care Not Asked  Exercise Not Asked  Bike Helmet Not Asked  Seat Belt Not Asked  Self-Exams Not Asked Social History Narrative ** Merged History Encounter ** ALLERGIES: Depakote [divalproex] Review of Systems Unable to perform ROS: Patient nonverbal  
 
 
Vitals:  
 06/30/20 1250 06/30/20 1303 06/30/20 1335 06/30/20 1600 BP:  97/69  105/60 Pulse:  100 Resp:  20 Temp:  98.5 °F (36.9 °C) SpO2: 92% 95% 93% 94% Weight:  49.9 kg (110 lb) Height:  4' 10\" (1.473 m) Physical Exam 
Vitals signs and nursing note reviewed. Constitutional:   
   General: She is not in acute distress. Appearance: She is well-developed. She is not diaphoretic. Comments: Chronically ill-appearing HENT:  
   Head: Normocephalic. Mouth/Throat:  
   Pharynx: No oropharyngeal exudate. Eyes:  
   General:     
   Right eye: No discharge. Left eye: No discharge. Pupils: Pupils are equal, round, and reactive to light. Neck: Musculoskeletal: Normal range of motion and neck supple. Cardiovascular:  
   Rate and Rhythm: Normal rate and regular rhythm. Heart sounds: Normal heart sounds. No murmur. No friction rub. No gallop. Pulmonary:  
   Effort: Pulmonary effort is normal. No respiratory distress. Breath sounds: Normal breath sounds. No stridor. No wheezing or rales. Abdominal:  
   General: Bowel sounds are normal. There is distension. Palpations: Abdomen is soft. Tenderness: There is no abdominal tenderness. There is no guarding or rebound. Musculoskeletal: Normal range of motion. General: No deformity. Skin: 
   General: Skin is warm and dry. Capillary Refill: Capillary refill takes less than 2 seconds. Findings: No rash. Neurological:  
   Mental Status: She is alert. Comments: Awake Non-verbal  
Psychiatric:     
   Behavior: Behavior normal.  
 
  
 
 
Course: 
Very difficult IV access therefore midline was placed by [facility] team which delayed blood work Oral  vancomycin ordered MDM: 
36-year-old female here with diarrhea and now having streaks of blood in the stool. Recent diagnosis of C. difficile which is recurrent for her. Caretaker says that she is unable to get any medications for the next 2 days. Overall her vital signs are stable here. 4:51 PM patient signed out to Dr. Dawit Nur Pending labs, CT and admit Luis Mejia,

## 2020-06-30 NOTE — ED TRIAGE NOTES
Continued Stay Note  [facility]     Patient Name: Liliana Calderon  MRN: [id number]  Today's Date: 6/1/2020    Admit Date: 6/1/2020    Discharge Plan     Row Name 06/01/20 1544       Plan    Plan  The patient has a ankle monitor on. Social work will follow the patient for discharge planning needs if identified.    Row Name 06/01/20 1516       Plan    Plan  TBD    Patient/Family in Agreement with Plan  yes    Plan Comments  Met with patient in the room to initiate discharge planning. Patient lives alone in a second-story apartment in [place] [facility]. She is independent with ADLs and mobility. She wears 3L home oxygen as needed and has portable tanks at home. Patient's POC is evolving. ID consult and PT eval pending. CM will continue to follow.         Discharge Codes    No documentation.             JUAN Nicholas     Patient arrives from a group home via EMS. Patient has had blood stools for the past 2 days, per EMS. Patient has cerebral palsy, and tracheostomy with canula. Patient is nonverbal. Patient is alert, but does not verbalize nor follow commands.

## 2020-06-30 NOTE — ED NOTES
Linda Waite Serve for Admission 7:20 PM 
 
ED Room Number: ER10/10 Patient Name and age:  Mario Peguero 53 y.o.  female Working Diagnosis:  
1. C. difficile colitis 2. Rectal bleeding COVID-19 Suspicion:  no 
 
Code Status:  Full Code Readmission: no 
Isolation Requirements:  yes Recommended Level of Care:  med/surg Department:[facility] ED - [phone] Other:

## 2020-07-01 NOTE — ED NOTES
TRANSFER - OUT REPORT: 
 
Verbal report given to Milad Reid RN(name) on Siria Baezpe  being transferred to 5th Floor(unit) for routine progression of care Report consisted of patients Situation, Background, Assessment and  
Recommendations(SBAR). Information from the following report(s) SBAR, Kardex, ED Summary, MAR, Accordion and Recent Results was reviewed with the receiving nurse. Lines:    
 
Opportunity for questions and clarification was provided. Patient transported with: 
 [facility]

## 2020-07-01 NOTE — PROGRESS NOTES
7/1/2020 12:43 PM Received return phone call from pt's mother, Diego Cummings, completed ACP discussion. Pt's mother reported she missed phone call from GI MD but RN has relayed to GI MD to call pt's mother back. Pt's mother had no further questions or concerns. 7/1/2020 11:02 AM Case management consult for discharge planning received. Called and spoke with pt's , VIVIENNE([phone]) to complete initial assessment. Sia Hernandez reported pt's mother, Rob OTT([phone]) is pt's decision maker. Reason for Admission:  Emergency - Diarrhea 2/2 cdiff RUR: 9% Risk Level: [x]Low []Moderate []High Value-based purchasing: [] Yes [x] No 
Bundle patient: [] Yes [x] No 
 Specify:  
 
Advance Directive: 
  
Penny Sickle Mother   [phone] Assessment: 
 
Age: 53 Sex: [] Male [x]Female Residency: [x]Private residence []Apartment []Assisted Living []LTC []Other:  
Exterior Steps: 0 Interior Steps: 0 Lives With: Group home Prior functioning:  []Independent [x]Dependent []Partial dependence Pt requires assistance with: [x]Bathing [x]Dressing [x]Toileting Pt does not ambulate, pt is wheelchair bound. Pt is a 2 person transfer with reny lift at group home. Prior DME required:  
 [x]Wheelchair [x]Hospital Bed [x]Reny []Stair lift Rehab history:  [x]Home Health ([facility] in the past, group home requests ) []SNF (Provider/Date: ) []IPR (Provider/Date: ) []LTC (Provider/Date: ) Discharge Concerns: []Yes []No [x]Unknown Describe: Insurer:  [facility] Medicaid, [address]  PCP: Anastasiia Chua Name of Practice: [facility] Current patient: [x]Yes []No 
 Approximate date of last visit: 6/9/2020 Access to virtual PCP visits: [x]Yes []No 
 
Pharmacy: Has rx coverage, [facility] or [facility] [place] Transport: Medicaid Transport Transition of care plan: 
 
[x]Unable to determine at this time.  Awaiting clinical progress, and disposition recommendations. Planning for pt to return to group home when stable. , confirmed with Lina they will accept pt back when medically stable. Rajeev Ramos did express her concerns with pt's cdiff as they do have other residents in pt's home. CM called and lvm with pt's mother, Geovanni Castillo at [phone]. CM will follow. GRCIELDA Orellana Care Management Interventions PCP Verified by CM: Kellie Khan) Transition of Care Consult (CM Consult): Discharge Planning MyChart Signup: No 
Discharge Durable Medical Equipment: No 
Physical Therapy Consult: No 
Occupational Therapy Consult: No 
Speech Therapy Consult: No 
Current Support Network: Adult Group Home Confirm Follow Up Transport: Wheelchair Patrick Chauhan Discharge Location Discharge Placement: Group home

## 2020-07-01 NOTE — PROGRESS NOTES
[facility]: MED RECONCILIATION Comments/Recommendations:  
Prior to admission medication list updated per telephone conversation with patient's care giver/, Taty Nicho (tel: [phone]) Levetiracetam: dose recently reduced to 500 mg (5 mL) every 12 hours due to elevated level. Phenytoin: dose recently reduced to 125 mg (5 mL) every 12 hours due to elevated level. Acetaminophen: stopped by Dr. Inge De La O due to elevated LFTs. Fidaxomicin (Dificid) vs. Vancomycin PO for C diff: per Ms. Parish Rivas, patient's GI doctor prescribed Dificid for recurrent C diff infection; however, it was not covered by patient's insurance, so medication was never started. GI doctor is aware and prescribed Vancomycin as alternative, however, Ms. Parish Rivas said that the pharmacy will not have Vancomycin ready until Thursday, so this was not started yet. Allergies: Depakote [divalproex] Prior to Admission Medications Prescriptions Last Dose Informant Patient Reported? Taking? OTHER,NON-FORMULARY, 2020 at Unknown time Care Giver Yes Yes Si Can by Per G Tube route five (5) times daily. Osmolite 1 can 5 times per day (at 0800, 1100, 1400, 1700, and 2000)  
albuterol-ipratropium (DUO-NEB) 2.5 mg-0.5 mg/3 ml nebu  Care Giver Yes Yes Sig: 3 mL by Nebulization route every four (4) hours as needed. aspirin 81 mg chewable tablet 2020 at Unknown time [address] Yes Yes Sig: Take 81 mg by mouth daily. cetirizine (ZYRTEC) 10 mg tablet 2020 at Unknown time Care Giver No Yes Sig: Take 1 Tab by mouth daily. diazePAM (VALIUM) 5-7.5-10 mg kit  Care Giver Yes Yes Sig: Insert 10 mg into rectum as needed (for seizures that last longer than 5 minutes). levETIRAcetam (KEPPRA) 100 mg/mL solution 2020 at AM Care Giver Yes Yes Si mg by Per G Tube route every twelve (12) hours. loperamide (Imodium A-D) 1 mg/7.5 mL solution  Care Giver No Yes Sig: 15 mL by Per G Tube route three (3) times daily as needed for Diarrhea. phenytoin (DILANTIN) 100 mg/4 mL susp oral suspension 2020 at [address] [facility] Yes Yes Si mg by PEG Tube route every twelve (12) hours.   
  
 
Regina Robison, Pharmacist

## 2020-07-01 NOTE — PROGRESS NOTES
Gi 
 
As she is comfortable have no objection to outpatient fecal transplant assuming group home able administer, golytely and return her here for exam. 
 
Will check in again 7/6; please call if any urgent findings before then

## 2020-07-01 NOTE — PROGRESS NOTES
2130: TRANSFER - IN REPORT: 
 
Verbal report received from EI EI, RN(name) on Kavitha Gallego  being received from ED(unit) for routine progression of care Report consisted of patients Situation, Background, Assessment and  
Recommendations(SBAR). Information from the following report(s) SBAR, Kardex, MAR, and Accordion was reviewed with the receiving nurse. Opportunity for questions and clarification was provided. Assessment completed upon patients arrival to unit and care assumed. 2300: MD placed order to test pt for Covid. Notified supervisor that pt needs to be transferred to another floor however there are no beds. Asked supervisor for tube feed. She is unsure of which of our feeds is comparable to the pts home tube feeds. MD notified, will place consult for nutrition. 0300: Notified Family practice that pts D-Dimer is 3.84 
 
0700: Pt suctioned x 3 throughout shift 0730: Bedside and Verbal shift change report given to JOVITA RN (oncoming nurse) by [facility], RN (offgoing nurse). Report included the following information SBAR, Kardex, MAR, and Accordion. Problem: Risk for Spread of Infection Goal: Prevent transmission of infectious organism to others Description: Prevent the transmission of infectious organisms to other patients, staff members, and visitors. Outcome: Progressing Towards Goal 
  
Problem: Patient Education:  Go to Education Activity Goal: Patient/Family Education Outcome: Progressing Towards Goal

## 2020-07-01 NOTE — PROGRESS NOTES
[address] [facility]  
Senior Resident Admission Note CC: Diarrhea HPI: 
Laurie Hernandez is a 53 y.o. female with Down Syndrome (non-verbal at baseline), trach and PEG tube, seizure d/o, ANNE, who presents to the ER complaining of Diarrhea. Patient was alone in the room and is non-verbal at baseline. Spoke to her  Adelaida Miranda who provided collateral history. Patients has been having 6-8 episodes of loose bowel movements since last Wednesday- today is Day 6 of mucous BM. BM has 1-2 streaks of blood but otherwise no gross blood. She is followed by Dr. Kemal Swartz (GI) who treated her for C.diff with Vanc 4/24-6/4 and she was fine for 2-3 weeks. Ms. Yari Jurado reached out to PCP on Friday and they tested her sample for C.diff, which was positive. Ms. Yari Jurado reached out to Dr. Kemal Swartz and he recommended another course of PO Vanc but the group home was not able to get it until Thursday so she was sent the hospital. 
 
[facility] has 4 other residents. No one is sick. No exposures to anyone with Covid. Patient had no recent travel. A/P: 53 y.o. female with Down Syndrome (non-verbal at baseline), trach and PEG tube, seizure d/o, ANNE who is admitted for C. Diff colitis. C.Diff Colitis. Follows Dr. Kemal Swartz CT Abd/pelvis shows diffuse pancolititis-- seen in C. Diff colitis. No active GI bleed. GI consulted and see in the AM. Cotninue PO Vanc for 10 days. Stool studies. Obtain FOBT in the stool. Follow [facility]. Covid PUI. Testing because people came from group [place]. Covid labs and covid meds. Seizure Disorder. Stable. Continue home medications of Dilantin and Keppra. Valium prn. Trach care- RT consulted for joni Miranda at [phone] Patient seen, examined, and discussed with Dr. Joshua Subramanian (PGY-1). For the remaining assessment and plan of other medical problems please refer to Dr. Ronel Roy H&P for more details. Pt discussed with on-call attending physician Aris Carson MD 
Family Medicine Resident

## 2020-07-01 NOTE — PROGRESS NOTES
Nutrition Assessment: 
 
RECOMMENDATIONS/INTERVENTION(S):  
1. Initiate TF via PEG. Recommend: 
 
Jevity 1.5 240mL bolus 4x/day + 90mL HwO flush before and after each bolus. TF at goal will provide 1440 kcals, 61 g protein, 1450 mL H2O- meeting 100% estimated energy/protein needs. 2. Please obtain new measured weight as current weight is estimated. 3. Monitor for tolerance of TF, wt changes, labs. ASSESSMENT:  
7/1: RD consult received for management of tube feeding. Pt admitted with c.diff. PMH includes Down's syndrome (non-verbal at baseline), GERD, chronic PEG. BMI 23, WNL. Pt being tested for COVID-19. Attempted to call pt's room phone to speak with a caregiver, but no answer. Per chart, pt usually fed one can of Osmolite 5x/d. Since Osmolite is not on the hospital formulary, recommend Jevity 1.5 240mL 4x/d and monitor tolerance. Pt was on Jevity 1.5 during previous visit (3/20) per past RD note. No recent significant weight changes noted per EMR. Please obtain new measured weight as energy/protein needs are currently based on estimated weight in chart. Labs reviewed. Meds- Vit. C, vanco, zinc. 
 
Diet Order: NPO 
% Eaten:  No data found. Pertinent Medications: [x] Reviewed Labs: [x] Reviewed Anthropometrics: Height: 4' 10\" (147.3 cm) Weight: 49.9 kg (110 lb) IBW (%IBW):   ( ) UBW (%UBW):   (  %) BMI: Body mass index is 22.99 kg/m². This BMI is indicative of: 
 [] Underweight    [x] Normal    [] Overweight    []  Obesity    []  Extreme Obesity (BMI>40) Estimated Nutrition Needs (Based on): 6980 Kcals/day(1011 x `1.2 AF) , 50 g(0.8-1 g/kg) Protein Carbohydrate: At Least 130 g/day  Fluids: 1214 mL/day (1 ml/kcal) Last BM: 6/30 (loose)   [x]Active     []Hyperactive  []Hypoactive       [] Absent   BS Skin:    [x] Intact   [] Incision  [] Breakdown   [] DTI   [] Tears/Excoriation/Abrasion  []Edema [] Other: Wt Readings from Last 30 Encounters:  
06/30/20 49.9 kg (110 lb) 03/31/20 50.1 kg (110 lb 7.2 oz) 03/17/20 44.5 kg (98 lb)  
01/14/20 44.5 kg (98 lb) 01/09/20 44.6 kg (98 lb 6 oz) 12/11/19 44 kg (97 lb) 02/11/19 49.9 kg (110 lb) 08/18/17 49.9 kg (110 lb)  
07/19/17 49.9 kg (110 lb) 03/01/17 46.7 kg (103 lb) 06/08/15 45.4 kg (100 lb)  
11/19/14 47.6 kg (105 lb)  
07/12/13 47.2 kg (104 lb) 02/06/12 46.3 kg (102 lb) NUTRITION DIAGNOSES:  
Problem:  Inadequate oral intake Etiology: related to decreased ability to consume sufficient po intake Signs/Symptoms: as evidenced by NPO, chronic PEG NUTRITION INTERVENTIONS: 
  Enteral/Parenteral Nutrition: Initiate enteral nutrition GOAL:  
TF via PEG meeting 100% EENs next 3-5 days Cultural, Hinduism, or Ethnic Dietary Needs: None EDUCATION & DISCHARGE NEEDS:  
 [x] None Identified 
 [] Identified and Education Provided/Documented 
 [] Identified and Pt declined/was not appropriate 
  
 [] Interdisciplinary Care Plan Reviewed/Documented  
 [x] Discharge Needs:  TF via PEG 
 [] No Nutrition Related Discharge Needs NUTRITION RISK:  
Pt Is At Nutrition Risk  [x] No Nutrition Risk Identified  [] PT SEEN FOR:  
 [x]  MD Consult: []Calorie Count []Diabetic Diet Education []Diet Education []Electrolyte Management []General Nutrition Management and Supplements [x]Management of Tube Feeding []TPN Recommendations []  RN Referral:  []MST score >=2 
   []Enteral/Parenteral Nutrition PTA []Pregnant: Gestational DM or Multigestation  
              [] Pressure Ulcer 
 
[]  Low BMI      []  Length of Stay       [] Dysphagia Diet         [] Ventilator 
[]  Follow-up Previous Recommendations: 
 [] Implemented          [] Not Implemented          [x] Not Applicable Previous Goal: 
 [] Met              [] Progressing Towards Goal              [] Not Progressing Towards Goal   [x] Not Applicable Raul Dykes RDN Pager [phone] Phone [phone]

## 2020-07-01 NOTE — PROGRESS NOTES
Stacey Eng [address] Office [phone] Fax [phone] Assessment and Plan Corey Peace is a 53 y.o. female with PMH Down Syndrome (non-verbal at baseline), trach and PEG tube, seizure d/o, ANNE who is admitted for C. Diff colitis. 24 Hour Events: No acute events. 
  
Diarrhea 2/2 C DIFF: patient was admitted in March with similar presentation. GI seen recommended PO Vancomycin. Had C diff + 3 times after discharge. Follows with Dr Corinne Mejia outpatient. CT abdomen showed diffuse pan colitis, mild inflammation in appendix. FOBT positive - likely d/t strain with diarrhea. - admit to remote tele - Enteric Panel, WBC stool, O&P negative - blood cultures 
-PO Vanc 125mg q6h   
-strict hand hygiene and c diff precautions  
  
COVID PUI: patient came from group home. Screen only positive for diarrhea as above. - COVID test, labs and meds  
- COVID precautions - Will deescalate as appropriate  
  
Seizure: stable at this time.   
-continue home Dilantin 125 mg BID, Keppra 500 BID  via PEG-tube - Rectal Valium PRN  
  
GERD: stable. No home meds  
  
H/o Iron Deficiency: stable.  POA HgB 13.3 
-daily CBC  
  
Trach care: routine care - RT on board PEG-tube feedings: On Osmolite 1 can 5 times per day (at 0800, 1100, 1400, 1700, and 2000) - nutrition consulted  
- substitute per hospital formulary  
  
  
FEN/GI - tube feedings. IVF at 90 cc/hr until reestablished tube feeds Activity - bedrest 
DVT prophylaxis - lovenox GI prophylaxis -  No meds at this time Disposition - Plan to d/c to group home. CM consulted. Ashley Marina  [phone] (caregiver) Activity - bedrest 
DVT prophylaxis - lovenox GI prophylaxis -  No meds at this time Disposition - Plan to d/c to group home. ROSA consulted. Ashley Marina  [phone] (caregiver)  
  
CODE STATUS:  full I appreciate the opportunity to participate in the care of this patient, 
 Channing Salgado MD 
Family Medicine Resident Subjective / Objective Subjective Patient at baseline. No complaints per nursing Temp (24hrs), Av.5 °F (36.9 °C), Min:98.4 °F (36.9 °C), Max:98.5 °F (36.9 °C) Objective Physical Exam 
Constitutional:   
   General: She is not in acute distress. Appearance: She is ill-appearing (chronically). Neck:  
   Comments: Gala Blonder site is clean Cardiovascular:  
   Rate and Rhythm: Normal rate and regular rhythm. Pulses: Normal pulses. Heart sounds: No murmur. Pulmonary:  
   Effort: Pulmonary effort is normal.  
   Breath sounds: No wheezing. Comments: Slightly diminished breath sounds b/l anteriorly Abdominal:  
   General: Abdomen is flat. Bowel sounds are normal. There is no distension. Palpations: Abdomen is soft. Musculoskeletal:     
   General: No swelling. Neurological:  
   Mental Status: Mental status is at baseline. Respiratory:   O2 Device: Room air I/O: 
Date 20 - 20 3799 20 - 20 2380 Shift [id number] 6126-3713 24 Hour Total 3557-0987 3966-6182 24 Hour Total  
INTAKE  
NG/GT  60 60 Medication Volume (PEG/Gastrostomy Tube 20)  15 15 Intake (ml) (PEG/Gastrostomy Tube 20)  45 45 Shift Total(mL/kg)  60(1.2) 60(1.2) OUTPUT Urine(mL/kg/hr) Urine Occurrence(s)  1 x 1 x Stool Stool Occurrence(s)  1 x 1 x Shift Total(mL/kg) NET  60 60 Weight (kg) 49.9 49.9 49.9 49.9 49.9 49.9 Inpatient Medications Current Facility-Administered Medications Medication Dose Route Frequency  0.9% sodium chloride infusion  90 mL/hr IntraVENous CONTINUOUS  
 sodium chloride (NS) flush 10-40 mL  10-40 mL IntraVENous Q8H  
 vancomycin (FIRVANQ) 50 mg/mL oral solution 125 mg  125 mg Per G Tube Q6H  
 sodium chloride (NS) flush 5-40 mL  5-40 mL IntraVENous Q8H  
  sodium chloride (NS) flush 5-40 mL  5-40 mL IntraVENous PRN  
 enoxaparin (LOVENOX) injection 40 mg  40 mg SubCUTAneous Q24H  phenytoin (DILANTIN) 100 mg/4 mL oral suspension 125 mg  125 mg PEG Tube Q12H  levETIRAcetam (KEPPRA) oral solution 500 mg  500 mg Per G Tube Q12H  
 diazePAM (VALIUM) 5-7.5-10 mg rectal kit 10 mg  10 mg Rectal PRN  
 albuterol-ipratropium (DUO-NEB) 2.5 MG-0.5 MG/3 ML  3 mL Nebulization Q4H PRN  
 atorvastatin (LIPITOR) tablet 20 mg  20 mg Oral DAILY  ascorbic acid (vitamin C) (VITAMIN C) tablet 500 mg  500 mg Oral BID  zinc sulfate (ZINCATE) 220 (50) mg capsule 1 Cap  1 Cap Oral DAILY Allergies Allergies Allergen Reactions  Depakote [Divalproex] Swelling CBC: 
Recent Labs  
  07/01/20 
0124 06/30/20 
1640 WBC 9.3 9.3 HGB 12.9 13.3 HCT 38.9 39.6  320 Metabolic Panel: 
Recent Labs  
  07/01/20 
0124 06/30/20 
1640  139  
K 3.8 4.0  
 105 CO2 26 27 BUN 12 16 CREA 0.53* 0.61 * 102* CA 8.1* 8.4* MG  --  2.1 ALB 2.2* 2.3* * 104* For Billing Chief Complaint [facility] presents with  [facility]  Date Reviewed: 6/9/2020 Codes Class Noted POA Clostridium difficile diarrhea ICD-10-CM: A04.72 
ICD-9-CM: 008.45  6/30/2020 Unknown

## 2020-07-01 NOTE — ROUTINE PROCESS
0930 Tube feed, medication administration, complete linen change, incontinence care, and suctioning performed. Report given to [facility] on fourth floor. Pt transported with Loydvaishali Pradhan to 426. Pt is a PUI and is being transferred to the [facility] unit.

## 2020-07-01 NOTE — H&P
[address] [facility]  
Admission H&P Date of admission: 6/30/2020 Patient name: Siria Alejandro MRN: [id number] YOB: 1970 Age: 53 y.o. Primary care provider:  Doreen Kumar NP Source of Information: patient, medical records Chief complaint:  diarrhea History of Present Illness Siria Alejandro is a 53 y.o. female with pertinent past medical history of Downs Syndrome (non verbal at baseline), seizure disorder, GERD, h/o iron deficiency anemia, with Trach tube and PEG-tube  who presents from Group home to the ER complaining of 6-8 loose mucous stools with occasional steaks of blood, but no blood on wipes. Patient was started on Imodium and IV fluids. She tested positive for C diff yesterday. There is no oter complaints today. Care giver denies any fever, chills, intolerance of tube feeds, decreased UOP, SOB, cough, hematemesis, n/v or edema. She denies any sick contacts at the group home and states it does not appear that the Pt is in any pain. Off note: Patient was admitted to [facility] 01/09-03/09 for aspiration PNA and status epilepticus. During this time she was transitioned to trach. Last admission to [facility]  03/27-04/01 for C diff diarrhea, discharged on PO Vanc. Per care giver patient had 3 total of positive C diff tests after discharge until today. In the ER,  
vital signs were T 98.5  BP 97/69 RR 20 SpO2 95%. Labs were unremarkable. CT abdomen showed diffuse pan colitis, mild inflammation in appendix. Pt was treated with 1L IVF, Vanco  mg x1. COVID Questions:  
Experiencing any of the following symptoms: - fever, -chills, -cough, - SOB, + diarrhea,  -URI symptoms. Denies any Sick contacts with fever, cough, diarrhea, SOB, URI symptoms. Denies travel out of state or out of country. Home Medications Prior to Admission medications Medication Sig Start Date End Date Taking? Authorizing Provider diazePAM (VALIUM) 5-7.5-10 mg kit Insert 10 mg into rectum as needed (for seizures that last longer than 5 minutes). Yes Provider, Historical  
OTHER,NON-FORMULARY, 1 Can by Per G Tube route five (5) times daily. Osmolite 1 can 5 times per day (at 0800, 1100, 1400, 1700, and 2000)   Yes Provider, Historical  
cetirizine (ZYRTEC) 10 mg tablet Take 1 Tab by mouth daily. 5/1/20  Yes Sidney Sawyer,   
loperamide (Imodium A-D) 1 mg/7.5 mL solution 15 mL by Per G Tube route three (3) times daily as needed for Diarrhea. 3/27/20  Yes Sidney Sawyer, DO  
levETIRAcetam (KEPPRA) 100 mg/mL solution 500 mg by Per G Tube route every twelve (12) hours. Yes Provider, Historical  
albuterol-ipratropium (DUO-NEB) 2.5 mg-0.5 mg/3 ml nebu 3 mL by Nebulization route every four (4) hours as needed. Yes Provider, Historical  
phenytoin (DILANTIN) 100 mg/4 mL susp oral suspension 125 mg by PEG Tube route every twelve (12) hours. 3/17/20  Yes Sidney Sawyer DO  
aspirin 81 mg chewable tablet Take 81 mg by mouth daily. Yes Provider, Historical  
 
 
 
Allergies Allergies Allergen Reactions  Depakote [Divalproex] Swelling Past Medical History:  
Diagnosis Date  Down syndrome  History of vascular access device 06/30/2020  
 4 Fr midline, 10 cm L brachial, poor access  Seizures ([facility] [address] No past surgical history on file. No family history on file. Social History Patient resides Independently With family care Assisted living   
x  SNF Ambulates Independently With cane Assisted walker Alcohol history  
x  None Social  
  Chronic Smoking history 
x  None Former smoker Current smoker Social History Tobacco Use Smoking Status Never Smoker Smokeless Tobacco Never Used Drug history 
x  None Former drug user Current drug user Sexual history Sexually active Not sexually active Code status x  Full code DNR/DNI Partial   
Code status discussed with the patient/caregivers. Review of Systems: Non-verbal at baseline Physical Exam 
Visit Vitals /76 Pulse 100 Temp 98.5 °F (36.9 °C) Resp 20 Ht 4' 10\" (1.473 m) Wt 110 lb (49.9 kg) SpO2 98% BMI 22.99 kg/m² Physical Exam 
Constitutional:   
   General: She is not in acute distress. Appearance: She is ill-appearing (chronically). HENT:  
   Head: Normocephalic and atraumatic. Right Ear: External ear normal.  
   Left Ear: External ear normal.  
   Nose: Nose normal. No congestion. Mouth/Throat:  
   Mouth: Mucous membranes are moist.  
   Pharynx: Oropharynx is clear. Eyes:  
   General: No scleral icterus. Right eye: No discharge. Left eye: No discharge. Neck:  
   Comments: Vicky Nicholas site is clean Cardiovascular:  
   Rate and Rhythm: Normal rate and regular rhythm. Pulses: Normal pulses. Heart sounds: No murmur. Pulmonary:  
   Effort: Pulmonary effort is normal.  
   Breath sounds: No wheezing. Comments: Slightly diminished breath sounds b/l anteriorly Abdominal:  
   General: Abdomen is flat. Bowel sounds are normal. There is no distension. Palpations: Abdomen is soft. Musculoskeletal:     
   General: No swelling. Skin: 
   Capillary Refill: Capillary refill takes less than 2 seconds. Coloration: Skin is not jaundiced. Findings: No bruising or lesion. Neurological:  
   Mental Status: Mental status is at baseline. Comments: Non-verbal, demented Laboratory Data Recent Results (from the past 24 hour(s)) CBC WITH AUTOMATED DIFF Collection Time: 06/30/20  4:40 PM  
Result Value Ref Range WBC 9.3 3.6 - 11.0 K/uL  
 RBC 4.11 3.80 - 5.20 M/uL  
 HGB 13.3 11.5 - 16.0 g/dL HCT 39.6 35.0 - 47.0 % MCV 96.4 80.0 - 99.0 FL  
 MCH 32.4 26.0 - 34.0 PG  
 MCHC 33.6 30.0 - 36.5 g/dL  
 RDW 14.3 11.5 - 14.5 % PLATELET 804 519 - 945 K/uL MPV 10.0 8.9 - 12.9 FL  
 NRBC 0.0 0  WBC ABSOLUTE NRBC 0.00 0.00 - 0.01 K/uL NEUTROPHILS 65 32 - 75 % LYMPHOCYTES 28 12 - 49 % MONOCYTES 6 5 - 13 % EOSINOPHILS 0 0 - 7 % BASOPHILS 0 0 - 1 % IMMATURE GRANULOCYTES 1 (H) 0.0 - 0.5 % ABS. NEUTROPHILS 6.0 1.8 - 8.0 K/UL  
 ABS. LYMPHOCYTES 2.6 0.8 - 3.5 K/UL  
 ABS. MONOCYTES 0.5 0.0 - 1.0 K/UL  
 ABS. EOSINOPHILS 0.0 0.0 - 0.4 K/UL  
 ABS. BASOPHILS 0.0 0.0 - 0.1 K/UL  
 ABS. IMM. GRANS. 0.1 (H) 0.00 - 0.04 K/UL  
 DF AUTOMATED METABOLIC PANEL, COMPREHENSIVE Collection Time: 06/30/20  4:40 PM  
Result Value Ref Range Sodium 139 136 - 145 mmol/L Potassium 4.0 3.5 - 5.1 mmol/L Chloride 105 97 - 108 mmol/L  
 CO2 27 21 - 32 mmol/L Anion gap 7 5 - 15 mmol/L Glucose 102 (H) 65 - 100 mg/dL BUN 16 6 - 20 MG/DL Creatinine 0.61 0.55 - 1.02 MG/DL  
 BUN/Creatinine ratio 26 (H) 12 - 20 GFR est AA >60 >60 ml/min/1.73m2 GFR est non-AA >60 >60 ml/min/1.73m2 Calcium 8.4 (L) 8.5 - 10.1 MG/DL Bilirubin, total 0.1 (L) 0.2 - 1.0 MG/DL  
 ALT (SGPT) 104 (H) 12 - 78 U/L  
 AST (SGOT) 64 (H) 15 - 37 U/L Alk. phosphatase 214 (H) 45 - 117 U/L Protein, total 6.5 6.4 - 8.2 g/dL Albumin 2.3 (L) 3.5 - 5.0 g/dL Globulin 4.2 (H) 2.0 - 4.0 g/dL A-G Ratio 0.5 (L) 1.1 - 2.2 TYPE & SCREEN Collection Time: 06/30/20  4:40 PM  
Result Value Ref Range Crossmatch Expiration 07/03/2020 ABO/Rh(D) A POSITIVE Antibody screen NEG   
LIPASE Collection Time: 06/30/20  4:40 PM  
Result Value Ref Range Lipase 50 (L) 73 - 393 U/L MAGNESIUM Collection Time: 06/30/20  4:40 PM  
Result Value Ref Range Magnesium 2.1 1.6 - 2.4 mg/dL SAMPLES BEING HELD Collection Time: 06/30/20  4:40 PM  
Result Value Ref Range SAMPLES BEING HELD [email or web address]   
 COMMENT   Add-on orders for these samples will be processed based on acceptable specimen integrity and analyte stability, which may vary by analyte. Imaging CT abdomen showed diffuse pan colitis, mild inflammation in appendix. EKG:  none Assessment and Plan Kavitha Gallego is a 53 y.o. female with PMH Down Syndrome (non-verbal at baseline), trach and PEG tube, seizure d/o, ANNE who is admitted for C. Diff colitis. Diarrhea 2/2 C DIFF: patient was admitted in March with similar presentation. GI seen recommended PO Vancomycin. Had C diff + 3 times after discharge. Follows with Dr Estefani Zepeda outpatient. CT abdomen showed diffuse pan colitis, mild inflammation in appendix. - admit to remote tele - Enteric Panel, WBC stool, O&P negative - blood cultures 
-PO Vanc 125mg q6h x10 days   
-strict hand hygiene  
- FOBT 
- GI consulted, recs appreciated COVID PUI: patient came from group home. Screen only positive for diarrhea as above. - COVID test, labs and meds  
- COVID precautions - Will deescalate as appropriate  
  
Seizure disorder: stable at this time.   
-continue home Dilantin 125 mg BID, Keppra 500 BID  via PEG-tube - Rectal Valium PRN  
  
GERD: stable. No home meds  
  
H/o Iron Deficiency: stable.  POA HgB 13.3 
-daily CBC Trach care: routine care - RT on board PEG-tube feedings: On Osmolite 1 can 5 times per day (at 0800, 1100, 1400, 1700, and 2000) - nutrition consulted  
- substitute per hospital formulary FEN/GI - tube feedings. IVF at 90 cc/hr until reestablished tube feeds Activity - bedrest 
DVT prophylaxis - lovenox GI prophylaxis -  No meds at this time Disposition - Plan to d/c to group home. CM consulted. Jacques Green at [phone] (caregiver) CODE STATUS:  full Patient will be seen with Dr. Lee Ann Redd (attending physician) Murphy Rubi MD 
Family Medicine Resident Hospital Problems Hospital Problems  Date Reviewed: 6/9/2020 Codes Class Noted POA Clostridium difficile diarrhea ICD-10-CM: A04.72 
ICD-9-CM: 008.45  6/30/2020 Unknown

## 2020-07-01 NOTE — ACP (ADVANCE CARE PLANNING)
Advance Care Planning Advance Care Planning Activator (Inpatient) Conversation Note Date of ACP Conversation: 07/01/20 Conversation Conducted with:   Patient with Decision Making Capacity Healthcare Decision Maker: Next of Kin by law (only applies in absence of above) (name) Redia Leyden ACP Activator: Elisabeck Poonam Health Care Decision Maker: 
 
Current Designated Health Care Decision Maker:   Primary Decision Maker: Austin Boyd Corbett - [phone] Care Preferences Ventilation: \"If you were in your present state of health and suddenly became very ill and were unable to breathe on your own, what would your preference be about the use of a ventilator (breathing machine) if it were available to you? \" If patient would desire the use of a ventilator (breathing machine), answer \"yes\", if not \"no\":YES \"If your health worsens and it becomes clear that your chance of recovery is unlikely, what would your preference be about the use of a ventilator (breathing machine) if it were available to you? \" Would the patient desire the use of a ventilator (breathing machine)? YES Resuscitation \"CPR works best to restart the heart when there is a sudden event, like a heart attack, in someone who is otherwise healthy. Unfortunately, CPR does not typically restart the heart for people who have serious health conditions or who are very sick. \" \"In the event your heart stopped as a result of an underlying serious health condition, would you want attempts to be made to restart your heart (answer \"yes\" for attempt to resuscitate) or would you prefer a natural death (answer \"no\" for do not attempt to resuscitate)? \" YES 
 
 
NOTE: If the patient has a valid advance directive AND now provides care preference(s) that are inconsistent with that prior directive, advise the patient to consider either: creating a new advance directive that complies with state-specific requirements; or, if that is not possible, orally revoking that prior directive in accordance with state-specific requirements, which must be documented in the EHR. [] Yes  [x] No   Educated Patient / Hong Byes regarding differences between Advance Directives and portable DNR orders. Length of ACP Conversation in minutes:  5 Conversation Outcomes: 
[x] ACP discussion completed 
[] Existing advance directive reviewed with patient; no changes to patient's previously recorded wishes 
 
 [] New Advance Directive completed 
 [] Portable Do Not Resuscitate prepared for Provider review and signature 
[] POLST/POST/MOLST/MOST prepared for Provider review and signature

## 2020-07-01 NOTE — CONSULTS
Gastroenterology Consult Referring Physician: Godwin Lal Consult Date: 7/1/2020 Chasity Lazo Chief Complaint: Alcario Means History of Present Illness: Kenney Callahan is a 53 y.o. female who is seen in consultation for C diff. This is third hospital admit for the problem. Living a group home situation, likely there is significant contamination of her environment. Past Medical History:  
Diagnosis Date  Down syndrome  History of vascular access device 06/30/2020  
 4 Fr midline, 10 cm L brachial, poor access  Seizures ([address] No past surgical history on file. No family history on file. Social History Tobacco Use  Smoking status: Never Smoker  Smokeless tobacco: Never Used Substance Use Topics  Alcohol use: Never Frequency: Never Allergies Allergen Reactions  Depakote [Divalproex] Swelling Current Facility-Administered Medications Medication Dose Route Frequency  0.9% sodium chloride infusion  90 mL/hr IntraVENous CONTINUOUS  
 sodium chloride (NS) flush 10-40 mL  10-40 mL IntraVENous Q8H  
 vancomycin (FIRVANQ) 50 mg/mL oral solution 125 mg  125 mg Per G Tube Q6H  
 sodium chloride (NS) flush 5-40 mL  5-40 mL IntraVENous Q8H  
 sodium chloride (NS) flush 5-40 mL  5-40 mL IntraVENous PRN  
 enoxaparin (LOVENOX) injection 40 mg  40 mg SubCUTAneous Q24H  phenytoin (DILANTIN) 100 mg/4 mL oral suspension 125 mg  125 mg PEG Tube Q12H  levETIRAcetam (KEPPRA) oral solution 500 mg  500 mg Per G Tube Q12H  
 diazePAM (VALIUM) 5-7.5-10 mg rectal kit 10 mg  10 mg Rectal PRN  
 albuterol-ipratropium (DUO-NEB) 2.5 MG-0.5 MG/3 ML  3 mL Nebulization Q4H PRN  
 atorvastatin (LIPITOR) tablet 20 mg  20 mg Oral DAILY  ascorbic acid (vitamin C) (VITAMIN C) tablet 500 mg  500 mg Oral BID  zinc sulfate (ZINCATE) 220 (50) mg capsule 1 Cap  1 Cap Oral DAILY Review of Systems: 
Not obtainable from patient. Objective: Physical Exam: 
Visit Vitals /70 (BP 1 Location: Right arm, BP Patient Position: At rest) Pulse 100 Temp 98.4 °F (36.9 °C) Resp 18 Ht 4' 10\" (1.473 m) Wt 49.9 kg (110 lb) SpO2 96% BMI 22.99 kg/m² Skin:  Extremities and face reveal no rashes. No lockwood erythema. HEENT: Sclerae anicteric. Extra-occular muscles are intact. No oral ulcers. No abnormal pigmentation of the lips. Cardiovascular: Regular rate and rhythm. No murmurs, gallops, or rubs. Respiratory:  Comfortable breathing with no accessory muscle use. Clear breath sounds with no wheezes, rales, or rhonchi. GI:  Abdomen nondistended, soft, and nontender. Normal active bowel sounds. No enlargement of the liver or spleen. Musculoskeletal:  No pitting edema of the lower legs. Neurological:  Non verbal. 
Lymphatic:  No cervical or supraclavicular adenopathy. Lab/Data Review: 
CMP:  
Lab Results Component Value Date/Time  07/01/2020 01:24 AM  
 K 3.8 07/01/2020 01:24 AM  
  07/01/2020 01:24 AM  
 CO2 26 07/01/2020 01:24 AM  
 AGAP 5 07/01/2020 01:24 AM  
  (H) 07/01/2020 01:24 AM  
 BUN 12 07/01/2020 01:24 AM  
 CREA 0.53 (L) 07/01/2020 01:24 AM  
 GFRAA >60 07/01/2020 01:24 AM  
 GFRNA >60 07/01/2020 01:24 AM  
 CA 8.1 (L) 07/01/2020 01:24 AM  
 MG 2.1 06/30/2020 04:40 PM  
 ALB 2.2 (L) 07/01/2020 01:24 AM  
 TP 6.1 (L) 07/01/2020 01:24 AM  
 GLOB 3.9 07/01/2020 01:24 AM  
 AGRAT 0.6 (L) 07/01/2020 01:24 AM  
  (H) 07/01/2020 01:24 AM  
 
CBC:  
Lab Results Component Value Date/Time WBC 9.3 07/01/2020 01:24 AM  
 HGB 12.9 07/01/2020 01:24 AM  
 HCT 38.9 07/01/2020 01:24 AM  
  07/01/2020 01:24 AM  
 
Stool again positive c diff toxin Assessment/Plan: Active Problems: 
  Clostridium difficile diarrhea (6/30/2020) With third episode, likely additional antibiotics again fail. Fecal transplant becomes then treatment of choice. Discussed with caretaker, mother Sergio Castellanos [phone],  colonoscopy possible biopsy, polypectomy, cautery, injection, fecal transplant, alternatives, complications including but not limited to pain, cardiopulmonary event, bleeding, perforation requiring additional blood transfusion or operative repair; all questions answered. I understand that fecal transplant material has longer lead time to deliver than in past.  Garett Lopez would not be available until a week from now. Will plan for then.  
 In the meanwhile increase vancomycin dose to 250 mg

## 2020-07-01 NOTE — PROGRESS NOTES
Verbal shift change report given to Nishi Rosario RN (oncoming nurse) by Radha Lopez RN (offgoing nurse). Report included the following information SBAR, Kardex, Procedure Summary, Intake/Output and MAR.

## 2020-07-01 NOTE — PROGRESS NOTES
200 pt mother called and said she missed call from MD asking for colonoscopy consent 1130 called GI MD on consult. Spoke with April in office. She is forwarding message to MD to follow up.

## 2020-07-02 NOTE — PROGRESS NOTES
SPEECH THERAPY SCREENING: 
SERVICES ARE NOT INDICATED AT THIS TIME [facility] based on results obtained during the nursing admission assessment. The patients chart was reviewed and the patient is not appropriate for a skilled therapy evaluation at this time. Please consult speech therapy if any therapy needs arise. Thank you. Patient has a chronic PEG. Samantha Brennan, SLP

## 2020-07-02 NOTE — PROGRESS NOTES
Problem: Management of Known or Suspected C. difficile Goal: Minimize complications of C.difficile infection and prevent spread of infection Outcome: Resolved/Not Met 
Goal: Patient/Family Education Outcome: Resolved/Not Met Problem: Risk for Spread of Infection Goal: Prevent transmission of infectious organism to others Description: Prevent the transmission of infectious organisms to other patients, staff members, and visitors. Outcome: Resolved/Met Problem: Patient Education:  Go to Education Activity Goal: Patient/Family Education Outcome: Resolved/Met Problem: Falls - Risk of 
Goal: *Absence of Falls Description: Document Samantha Shae Fall Risk and appropriate interventions in the flowsheet. Outcome: Resolved/Met Note: Fall Risk Interventions: 
  
 
  
 
  
 
Elimination Interventions: Bed/chair exit alarm, Call light in reach, Stay With Me (per policy), Toileting schedule/hourly rounds Problem: Patient Education: Go to Patient Education Activity Goal: Patient/Family Education Outcome: Resolved/Met Problem: Pressure Injury - Risk of 
Goal: *Prevention of pressure injury Description: Document Rolf Scale and appropriate interventions in the flowsheet. Outcome: Resolved/Met Note: Pressure Injury Interventions: 
Sensory Interventions: Check visual cues for pain, Keep linens dry and wrinkle-free, Minimize linen layers, Turn and reposition approx. every two hours (pillows and wedges if needed), Pressure redistribution bed/mattress (bed type) Moisture Interventions: Absorbent underpads, Check for incontinence Q2 hours and as needed, Minimize layers Activity Interventions: Pressure redistribution bed/mattress(bed type), Assess need for specialty bed Mobility Interventions: Pressure redistribution bed/mattress (bed type), Turn and reposition approx. every two hours(pillow and wedges), Assess need for specialty bed Nutrition Interventions: (PEG tube feedings) Friction and Shear Interventions: Minimize layers, Apply protective barrier, creams and emollients Problem: Patient Education: Go to Patient Education Activity Goal: Patient/Family Education Outcome: Resolved/Met Problem: Patient Education: Go to Patient Education Activity Goal: Patient/Family Education Outcome: Resolved/Met

## 2020-07-02 NOTE — PROGRESS NOTES
7/2/2020 [facility] was sent pt's discharge clinicals and notified pt will discharge home today. CM faxed pt's avs and discharge summary to pt's group home at [phone].  
 
7/2/2020 11:25 AM Pt to discharge today. FP resident has spoken with pt's group home director, Debbie Castro who confirmed they can accept pt back today. CM called and confirmed with Debbie Castro who confirmed they are aware pt will come back to [facility] on 7/8 for colonoscopy with fecal transplant. Debbie Castro is aware of transport time for 3PM today for discharge. Debbie Castro confirmed she is aware pt will have bowel prep ordered, she requested for prep to be sent to their pharmacy, [facility]. Debbie Castro was open to home health again for pt through [address] [facility]. Referral sent to [facility] via [facility]. Called and notified [facility] of referral and discharge today. [facility]. CM called and scheduled ambulance transport through pt's [facility] for [address], reference number [id number]. GRICELDA Castle Transitions of Care Plan: 1. [address] 2. Ambulance set for 3PM through [facility](Medicaid auth received) 3. [facility] for home health RN 
4. Group home aware pt has colonoscopy with fecal transplant on 7/8. Care Management Interventions PCP Verified by CM: Aly Harper) Transition of Care Consult (CM Consult): Discharge Planning MyChart Signup: No 
Discharge Durable Medical Equipment: No 
Physical Therapy Consult: No 
Occupational Therapy Consult: No 
Speech Therapy Consult: No 
Current Support Network: Adult Group Home Confirm Follow Up Transport: Wheelchair [facility] Discharge Location Discharge Placement: Home with home health

## 2020-07-02 NOTE — PROGRESS NOTES
Stacey Eng [address] Office [phone] Fax [phone] Assessment and Plan Juancho Henderson is a 53 y.o. female with PMHx of Down Syndrome (non-verbal at baseline), trach and PEG tube, seizure d/o, ANNE who was admitted on 6/30/2020 for C. Difficile colitis. 24 Hour Events: Per nurse, patient had 2 loose bowel movements overnight. There have been 10 total bowel movements documented since admission. Pt had 2 episodes of mild tachycardia overnight (HR up to 102). Otherwise no acute events. 
  
Diarrhea 2/2 C. Difficile colitis: patient was most recently admitted in March 2020 with similar presentation. Seen by GI (Dr. Maira Rios) - recommended continued Vancomycin 250 mg PO and arrangement for outpatient colonoscopy with fecal transplant given recurrent C. difficile. CT abdomen showed diffuse pan colitis, mild inflammation in appendix. FOBT positive - likely d/t straining with bowel movements. Stool studies: C. Diff toxin positive, enteric bacteria panel negative, WBC negative. Preliminary blood culture negative. - Stool O&P pending 
- Continue PO Vanc 250mg q6h x 10 days (through 7/10/2020) - strict hand hygiene and c diff precautions - Outpatient colonoscopy with fecal transplant scheduled for 7/8/2020 
  
COVID PUI: patient came from group home. Screen only positive for diarrhea as above. COVID PCR negative. CRP and D-dimer elevated, likely secondary to C. Difficile colitis. Ferritin and LD wnl.  
- Plan to discontinue COVID droplet Precautions 
  
Seizure: History of seizure disorder. Stable at this time.   
-continue home Dilantin 125 mg BID, Keppra 500 BID via PEG-tube - Ativan 4 mg PRN 
  
GERD: stable. No home meds  
  
H/o Iron Deficiency: stable.  POA HgB 13.3 
-daily CBC  
  
Trach care: routine care - RT on board for suctioning PEG-tube feedings: On Osmolite 1 can 5 times per day (at 0800, 1100, 1400, 1700, and 2000) - nutrition consulted - substitute per hospital formulary  
  
 FEN/GI - tube feedings. Activity - bedrest 
DVT prophylaxis - lovenox GI prophylaxis -  No meds at this time Disposition - Plan to d/c to group home. CM consulted. Ignacio Kohlerw JOHNSON [phone] (caregiver)  
  
CODE STATUS:  full Patning Sheppard DO Family Medicine Resident Subjective / Objective Subjective Patient at baseline. She is nonverbal. No fever overnight. Temp (24hrs), Av.5 °F (36.9 °C), Min:98.1 °F (36.7 °C), Max:98.7 °F (37.1 °C) Objective: 
Vital signs: (most recent): Blood pressure 107/72, pulse 90, temperature 98.6 °F (37 °C), resp. rate 16, height 4' 10\" (1.473 m), weight 110 lb (49.9 kg), SpO2 97 %. Physical Exam  Per Dr. Zena Waggoner (PGY-2) due to droplet precautions. Constitutional:   
   General: She is not in acute distress. Appearance: She is ill-appearing (chronically). Neck:  
   Comments: Heydi Grills site is clean Cardiovascular:  
   Rate and Rhythm: Normal rate and regular rhythm. Pulses: Normal pulses. Heart sounds: No murmur. Pulmonary:  
   Effort: Pulmonary effort is normal.  
   Breath sounds: No wheezing. Comments: Poor inspiratory effort. Abdominal:  
   General: Abdomen is flat. Bowel sounds are normal. Minimal abdominal distention (improved). No tenderness with palpation. PEG tube in place. Palpations: Abdomen is soft. Musculoskeletal:     
   General: No swelling. Neurological:  
   Mental Status: Mental status is at baseline. Respiratory:   O2 Device: Room air I/O: 
Date 20 - 20 1187 20 - 20 7434 Shift  24 Hour Total 1900-0659 24 Hour Total  
INTAKE  
P.O. 0  0     
  P. O. 0  0     
I. V.(mL/kg/hr) 208.5(0.3) 1048.5(1.8) 1257(1) Volume (0.9% sodium chloride infusion) 208. 5 1048. 5 1257 NG/ 370 765 Water Flush Volume (mL) (PEG/Gastrostomy Tube 20) 176 659 050 Medication Volume (PEG/Gastrostomy Tube 06/30/20) 35 10 45 Shift Total(mL/kg) 603. 5(12.1) 3118.9(52.8) 1915(92.6) OUTPUT Stool Stool Occurrence(s) 6 x 4 x 10 x Shift Total(mL/kg) .5 1418.5 2022 Weight (kg) 49.9 49.9 49.9 49.9 49.9 49.9 Inpatient Medications Current Facility-Administered Medications Medication Dose Route Frequency  [START ON 7/7/2020] INV-FECAL BIO-MATTER [id number]  250 mL Rectal ONCE  
 [START ON 7/7/2020] peg 3350-electrolytes (COLYTE) 4000 mL  4,000 mL Per G Tube ONCE  
 LORazepam (ATIVAN) injection 4 mg  4 mg IntraVENous ONCE PRN  
 vancomycin (FIRVANQ) 50 mg/mL oral solution 250 mg  250 mg Per G Tube Q6H  
 sodium chloride (NS) flush 10-40 mL  10-40 mL IntraVENous Q8H  
 sodium chloride (NS) flush 5-40 mL  5-40 mL IntraVENous Q8H  
 sodium chloride (NS) flush 5-40 mL  5-40 mL IntraVENous PRN  
 enoxaparin (LOVENOX) injection 40 mg  40 mg SubCUTAneous Q24H  phenytoin (DILANTIN) 100 mg/4 mL oral suspension 125 mg  125 mg PEG Tube Q12H  levETIRAcetam (KEPPRA) oral solution 500 mg  500 mg Per G Tube Q12H  
 albuterol-ipratropium (DUO-NEB) 2.5 MG-0.5 MG/3 ML  3 mL Nebulization Q4H PRN Allergies Allergies Allergen Reactions  Depakote [Divalproex] Swelling CBC: 
Recent Labs  
  07/02/20 
0201 07/01/20 
0124 06/30/20 
1640 WBC 7.5 9.3 9.3 HGB 13.3 12.9 13.3 HCT 39.8 38.9 39.6  308 320 Metabolic Panel: 
Recent Labs  
  07/02/20 
0201 07/01/20 
0124 06/30/20 
1640  139 139  
K 4.0 3.8 4.0  
* 108 105 CO2 26 26 27 BUN 12 12 16 CREA 0.46* 0.53* 0.61 * 113* 102* CA 8.0* 8.1* 8.4* MG  --   --  2.1 ALB 2.2* 2.2* 2.3* * 111* 104* For Billing Chief Complaint Patient presents with  [facility] Problems  Date Reviewed: 6/9/2020 Codes Class Noted POA  Clostridium difficile diarrhea ICD-10-CM: A04.72 
 ICD-9-CM: 008.45  6/30/2020 Unknown

## 2020-07-02 NOTE — DISCHARGE SUMMARY
Stacey Eng [address]  Office [phone] Fax [phone] Discharge / Transfer / Off-Service Note Name: Ananya De La Cruz MRN: [id number]  Sex: Female YOB: 1970  Age: 53 y.o. PCP: Kirk Pennington NP Date of admission: 6/30/2020 Date of discharge/transfer: 7/2/2020 Date of admission: 6/30/2020 Date of discharge/transfer: 7/2/2020 Attending physician at admission: Haley Garcia MD 
Attending physician at discharge/transfer: Haley Garcia MD 
Resident physician at discharge/transfer: Shahnaz Huang DO Consultants during hospitalization IP CONSULT TO FAMILY PRACTICE 
IP CONSULT TO GASTROENTEROLOGY Admission diagnoses Clostridium difficile diarrhea [A04.72] Recommended follow-up after discharge 1. PCP 2. GI Follow up tests after discharge - Colonoscopy 7/8 for fecal transplant Medication Changes: START 
- Vancomycin 50mg/mL oral solution 5 mL per G tube every 6 hours for 8 days 
- GOLYTELY solution 4000 mL per G tube once on 7/7/2020 (colonoscopy prep) STOP 
- Loperamide 1 mg/7.5 mL solution  
 
 ---------------------------------------------------------------------------------------------------------------------------- The patient was well enough to be discharged from the hospital. However, because they were inpatient in a hospital, they are at greater risk of having been exposed to the coronavirus. PLEASE stay inside and self-quarantine for 14 days to prevent further spread of the coronavirus. 
------------------------------------------------------------------------------------------------------------------ History of Present Illness Per the admitting physicians H&P Ananya De La Cruz is a 53 y.o. female with pertinent past medical history of Downs Syndrome (non verbal at baseline), seizure disorder, GERD, h/o iron deficiency anemia, with Jeanne Maria tube and PEG-tube  who presents from Group home to the ER complaining of 6-8 loose mucous stools with occasional steaks of blood, but no blood on wipes. Patient was started on Imodium and IV fluids. She tested positive for C diff yesterday. There is no oter complaints today. Care giver denies any fever, chills, intolerance of tube feeds, decreased UOP, SOB, cough, hematemesis, n/v or edema. She denies any sick contacts at the group home and states it does not appear that the Pt is in any pain.    
  
Off note: Patient was admitted to [place] [address] 01/09-03/09 for aspiration PNA and status epilepticus. During this time she was transitioned to trach. Last admission to [facility]  03/27-04/01 for C diff diarrhea, discharged on PO Vanc. Per care giver patient had 3 total of positive C diff tests after discharge until today.  
  
In the ER,  
vital signs were T 98.5  BP 97/69 RR 20 SpO2 95%. Labs were unremarkable. CT abdomen showed diffuse pan colitis, mild inflammation in appendix. Pt was treated with 1L IVF, Vanco  mg x1. 
  
COVID Questions:  
Experiencing any of the following symptoms: - fever, -chills, -cough, - SOB, + diarrhea,  -URI symptoms.  
Denies any Sick contacts with fever, cough, diarrhea, SOB, URI symptoms.  
Denies travel out of state or out of country. [facility] course Corey Peace was admitted into the Family Medicine Service from 6/30/2020 to 7/2/2020 for Clostridium difficile diarrhea [A04.72]. During the course of this admission, the following conditions were addressed/managed: 
 
Diarrhea 2/2 C. Difficile colitis: patient was most recently admitted in March 2020 with similar presentation. Seen by GI (Dr. Corinne Mejia) who  recommended continued Vancomycin 250 mg PO and arrangement for outpatient colonoscopy with fecal transplant given recurrent C. difficile. CT abdomen showed diffuse pan colitis, mild inflammation in appendix.  FOBT positive - likely d/t straining with bowel movements. Stool studies: C. Diff toxin positive, enteric bacteria panel negative, WBC negative. Preliminary blood culture negative. - Stool O&P pending 
- Continue PO Vanc 250mg q6h x 10 days (through 7/10/2020)  
- Outpatient colonoscopy with fecal transplant scheduled for 7/8/2020 
  
COVID PUI: PCR Negative. Patient came from group home; screen only positive for diarrhea as above. CRP and D-dimer elevated, likely secondary to C. Difficile colitis. Ferritin and LD wnl. Elevated LFTs: AST, ALT, and alk phos have been elevated since March 2020. Likely secondary to hepatic steatosis. Followed by GI.  
- Follow up with GI. 
  
Seizure: History of seizure disorder. Stable at this time.   
-Continue home Dilantin 125 mg BID, Keppra 500 BID via PEG-tube, Diazepam 5-7.7-10mg kit PRN 
  
GERD: stable. No home meds  
  
H/o Iron Deficiency: stable.  POA HgB 13.3 
  
Trach care: routine care  
  
PEG-tube feedings: On Osmolite 1 can 5 times per day (at 0800, 1100, 1400, 1700, and 2000). Condition at discharge: Stable. Labs Recent Labs  
  07/02/20 0201 07/01/20 0124 06/30/20 
1640 WBC 7.5 9.3 9.3 HGB 13.3 12.9 13.3 HCT 39.8 38.9 39.6  308 320 Recent Labs  
  07/02/20 0201 07/01/20 
0124 06/30/20 
1640  139 139  
K 4.0 3.8 4.0  
* 108 105 CO2 26 26 27 BUN 12 12 16 CREA 0.46* 0.53* 0.61 * 113* 102* CA 8.0* 8.1* 8.4* MG  --   --  2.1 Recent Labs  
  07/02/20 
0201 07/01/20 
0124 06/30/20 
1640 * 111* 104* * 203* 214* TBILI 0.1* 0.2 0.1* TP 6.0* 6.1* 6.5 ALB 2.2* 2.2* 2.3*  
GLOB 3.8 3.9 4.2*  
LPSE  --   --  50* Recent Labs  
  06/30/20 
1640 FERR 162 Cultures · Blood culture 6/30 -  Preliminary: no growth x 1 day Procedures / Diagnostic Studies Imaging Results from [facility]Z Encounter encounter on 06/30/20 XR US GUIDED VASCULAR ACCESS  
 Narrative INDICATION:   NO VENOUS ACCESS EXAMINATION:  US GUIDE VAS ACCESS  
 
FINDINGS: Ultrasound was provided for Midline placement. Impression IMPRESSION: 
 
Ultrasound guidance for Midline  placement. GBP No results found for this or any previous visit. Results from N[facility]O CHICAS  DARRELL Encounter encounter on 06/30/20 CT ABD PELV W WO CONT Narrative EXAM: CT ABD PELV W WO CONT INDICATION: GI BLEED 
 
COMPARISON: None. CONTRAST: 100 mL of Isovue-370. TECHNIQUE:   
Multislice helical CT was performed from the diaphragm to the iliac crest prior 
to intravenous contrast administration and from the diaphragm to the symphysis 
pubis during uneventful rapid bolus intravenous contrast administration. Oral 
contrast was not administered. Contiguous 5 mm axial images were reconstructed 
and lung and soft tissue windows were generated. Coronal and sagittal 
reformations were generated. CT dose reduction was achieved through use of a 
standardized protocol tailored for this examination and automatic exposure 
control for dose modulation. Unenhanced, arterial, portal venous and delayed 
phase images were obtained FINDINGS:  
LOWER THORAX: There is tracheostomy tube positioned within the trachea. LIVER: No mass. BILIARY TREE: Gallbladder is within normal limits. CBD is not dilated. SPLEEN: within normal limits. PANCREAS: No mass or ductal dilatation. ADRENALS: Unremarkable. KIDNEYS: No mass, calculus, or hydronephrosis. STOMACH: PEG tube positioned within stomach SMALL BOWEL: No dilatation or wall thickening. COLON: Diffuse pancolonic wall thickening and hyperenhancement of the mucosa. Findings compatible with acute colitis. No active GI bleeding is demonstrated APPENDIX: Mildly inflamed but likely due to adjacent colitis PERITONEUM: Trace free fluid. No free air RETROPERITONEUM: No lymphadenopathy or aortic aneurysm. REPRODUCTIVE ORGANS: Not enlarged URINARY BLADDER: No mass or calculus. BONES: No destructive bone lesion. ABDOMINAL WALL: No mass or hernia. ADDITIONAL COMMENTS: N/A Impression IMPRESSION: 
 
1. Diffuse pan colitis. Distribution can be seen with C. difficile colitis. Active GI bleeding is not demonstrated Chronic diagnoses Problem List as of 7/2/2020 Date Reviewed: 6/9/2020 Codes Class Noted - Resolved Clostridium difficile diarrhea ICD-10-CM: A04.72 
ICD-9-CM: 008.45  6/30/2020 - Present Wheelchair bound ICD-10-CM: Z99.3 ICD-9-CM: V46.3  6/9/2020 - Present Inability to walk ICD-10-CM: R26.2 ICD-9-CM: 719.7  6/9/2020 - Present Positive fecal occult blood test ICD-10-CM: R19.5 ICD-9-CM: 792.1  3/29/2020 - Present Oral thrush ICD-10-CM: B37.0 ICD-9-CM: 112.0  3/29/2020 - Present Diarrhea in adult patient ICD-10-CM: R19.7 ICD-9-CM: 787.91  3/28/2020 - Present C. difficile diarrhea ICD-10-CM: A04.72 
ICD-9-CM: 008.45  3/28/2020 - Present Seizure ([address] ICD-10-CM: R56.9 ICD-9-CM: 780.39  12/11/2019 - Present Gastroesophageal reflux disease without esophagitis ICD-10-CM: K21.9 ICD-9-CM: 530.81  12/11/2019 - Present Down's syndrome ICD-10-CM: Q90.9 ICD-9-CM: 758.0  12/11/2019 - Present Iron deficiency ICD-10-CM: E61.1 ICD-9-CM: 280.9  12/11/2019 - Present Constipation ICD-10-CM: K59.00 ICD-9-CM: 564.00  12/11/2019 - Present Severe intellectual disability ICD-10-CM: F72 
ICD-9-CM: 318.1  2/2/2012 - Present Overview Signed 2/2/2012  1:40 PM by Hu Unger MD  
  [facility] Discharge/Transfer Medications Current Discharge Medication List  
  
START taking these medications Details PEG 3350-Electrolytes (COLYTE;GOLYTELY) solr 4,000 mL by Per G Tube route once for 1 dose.  
Qty: 1 mL, Refills: 0  
  
vancomycin (FIRVANQ) 50 mg/mL oral solution 5 mL by Per G Tube route every six (6) hours for 8 days. Indications: diarrhea from an infection with Clostridium difficile bacteria Qty: 160 mL, Refills: 0 CONTINUE these medications which have NOT CHANGED Details  
diazePAM (VALIUM) 5-7.5-10 mg kit Insert 10 mg into rectum as needed (for seizures that last longer than 5 minutes). OTHER,NON-FORMULARY, 1 Can by Per G Tube route five (5) times daily. Osmolite 1 can 5 times per day (at 0800, 1100, 1400, 1700, and 2000) cetirizine (ZYRTEC) 10 mg tablet Take 1 Tab by mouth daily. Qty: 30 Tab, Refills: 1  
  
levETIRAcetam (KEPPRA) 100 mg/mL solution 500 mg by Per G Tube route every twelve (12) hours. albuterol-ipratropium (DUO-NEB) 2.5 mg-0.5 mg/3 ml nebu 3 mL by Nebulization route every four (4) hours as needed. phenytoin (DILANTIN) 100 mg/4 mL susp oral suspension 125 mg by PEG Tube route every twelve (12) hours. Associated Diagnoses: Seizure ([address] aspirin 81 mg chewable tablet Take 81 mg by mouth daily. STOP taking these medications  
  
 loperamide (Imodium A-D) 1 mg/7.5 mL solution Comments:  
Reason for Stopping:   
   
  
 
  
Diet:  Resume tube feeds as before (one can of Osmolite 5x/d). Activity:  As tolerated Disposition: Home or Self Care Discharge instructions to patient/family Please seek medical attention for any new or worsening symptoms particularly fever, chest pain, shortness of breath, abdominal pain, nausea, vomiting Follow up plans/appointments Follow-up Information Follow up With Specialties Details Why Contact Info Vita Boone NP Family Practice On 7/9/2020 Hospital follow up. Virtual telehealth appointment at 2:45 pm. [address] [facility] [address] [address] [place] [address] 
[phone] [address], If you haven't recieved a phone call within 24, hours. Please contact the agency directly. [address] [facility] [address] 
[phone] Attila Roberts MD 
Family Medicine Resident For Billing Chief Complaint Patient presents with  [facility] Hospital Problems  Date Reviewed: 6/9/2020 Codes Class Noted POA Clostridium difficile diarrhea ICD-10-CM: A04.72 
ICD-9-CM: 008.45  6/30/2020 Unknown

## 2020-07-02 NOTE — PROGRESS NOTES
Completed pt tube feeding. Removed midline IV. RT suctioned pt. EMS here to transport pt. AVS updated and provided to EMS. Report given to EMS. Pt transferred to [facility] and left unit 1550.

## 2020-07-02 NOTE — DISCHARGE INSTRUCTIONS
HOME DISCHARGE INSTRUCTIONS    Corey Peace / [id number] : 1970    Admission date: 2020 Discharge date: 2020 12:02 PM     Please bring this form with you to show your care provider at your follow-up appointment. Primary care provider:  Vita Boone NP    Discharging provider:  Venkatesh Melvin DO  - Family Medicine Resident  Tracy Adkins MD - Family Medicine Attending      You have been admitted to the hospital with the following diagnoses:    ACUTE DIAGNOSES:  Clostridium difficile diarrhea [A04.72]    . . . . . . . . . . . . . . . . . . . . . . . . . . . . . . . . . . . . . . . . . . . . . . . . . . . . . . . . . . . . . . . . . . . . . . . .   You are well enough to be discharged from the hospital. However, because you were inpatient in a hospital, you are at greater risk of having been exposed to the coronavirus. PLEASE stay inside and self-quarantine for 14 days to prevent further spread of the coronavirus. . . . . . . . . . . . . . . . . . . . . . . . . . . . . . . . . . . . . . . . . . . . . . . . . . . . . . . . . . . . . . . . . . . . . . . . . MEDICATION CHANGES  START  - Vancomycin 50mg/mL oral solution 5 mL per G tube every 6 hours until evening of .   - GOLYTELY solution 4000 mL per G tube once on 2020 (colonoscopy prep)    STOP  - Loperamide 1 mg/7.5 mL solution     No other changes were made to your medications, please take all your other home medicines as previously prescribed. . . . . . . . . . . . . . . . . . . . . . . . . . . . . . . . . . . . . . . . . . . . . . . . . . . . . . . . . . . . . . . . . . . . . . . . Marimar Harris FOLLOW-UP CARE RECOMMENDATIONS:    Appointments  Follow-up Information     Follow up With Specialties Details Why Contact Info    Vita Boone NP Family Practice On 2020 Hospital follow up.  Virtual telehealth appointment at 2:45 pm. [address] [facility] [address]  [phone]      [address] [facility] [address], If you haven't recieved a phone call within 24, hours. Please contact the agency directly. [address]  [phone]             Follow-up tests needed:   - CMP to follow AST, ALT, alk phos    Pending test results: At the time of your discharge the following test results are still pending: Stool ova & parasites, blood culture. Please make sure you review these results with your outpatient follow-up provider(s). DIET/what to eat:  Resume tube feedings as before (one can of Osmolite 5 times/day)    ACTIVITY:  Activity as tolerated    Specific symptoms to watch for: chest pain, shortness of breath, fever, chills, nausea, vomiting, change in mentation, falling, weakness, bleeding. What to do if new or unexpected symptoms occur? If you experience any of the above symptoms (or should other concerns or questions arise after discharge) please call your primary care physician. Return to the emergency room if you cannot get hold of your doctor. · It is very important that you keep your follow-up appointment(s). · Please bring discharge papers, medication list (and/or medication bottles) to your follow-up appointments for review by your outpatient provider(s). · Please check the list of medications and be sure it includes every medication (even non-prescription medications) that your provider wants you to take. · It is important that you take the medication exactly as they are prescribed. · Keep your medication in the bottles provided by the pharmacist and keep a list of the medication names, dosages, and times to be taken in your wallet. · Do not take other medications without consulting your doctor.    · If you have any questions about your medications or other instructions, please talk to your nurse or care provider before you leave the hospital.     Information obtained by:     I understand that if any problems occur once I am at home I am to contact my physician. These instructions were explained to me and I had the opportunity to ask questions. I understand and acknowledge receipt of the instructions indicated above.                                                                                                                                                Physician's or R.N.'s Signature                                                                  Date/Time                                                                                                                                              Patient or Representative Signature                                                          Date/Time

## 2020-07-07 NOTE — PROGRESS NOTES
[address] [facility] Preprocedure Instructions 1. On the day of your surgery, please report to registration located on the 2nd floor of the  [facility]. yes 2. You must have a responsible adult to drive you to the hospital, stay at the hospital during your procedure and drive you home. If they leave your procedure will not be started (no exceptions). yes 3. Do not have anything to eat or drink (including water, gum, mints, coffee, and juice) after midnight. This does not apply to the medications you were instructed to take by your physician. yesIf you are currently taking Plavix, Coumadin, Aspirin, or other blood-thinning agents, contact your physician for special instructions. yes, 
 
4. If you are having a procedure that requires bowel prep: We recommend that you have only clear liquids the day before your procedure and begin your bowel prep by 5:00 pm.  You may continue to drink clear liquids until midnight. If for any reason you are not able to complete your prep please notify your physician immediately. yes 5. Have a list of all current medications, including vitamins, herbal supplements and any other over the counter medications. yes 6. If you wear glasses, contacts, dentures and/or hearing aids, they may be removed prior to procedure, please bring a case to store them in. yes 7. You should understand that if you do not follow these instructions your procedure may be cancelled. If your physical condition changes (I.e. fever, cold or flu) please contact your doctor as soon as possible. 8. It is important that you be on time. If for any reason you are unable to keep your appointment please call )- the day of or your physicians office prior to your scheduled procedure

## 2020-07-08 NOTE — PROGRESS NOTES

## 2020-07-08 NOTE — ADT AUTH CERT NOTES
PREVIOUSLY DENIED FOR INPATIENT DOWNGRADED TO OBSERVATION REF # [id number] PLEASE FAX OR CALL BACK AUTHORIZATION FOR OBSERVATION  PHONE # [phone]  FAX # [phone]

## 2020-07-08 NOTE — PROCEDURES
[address] Poonam Arroyo MD 
[phone] 2020 Colonoscopy Procedure Note Ivette Colmenares :  1970 Vinay Medical Record Number: [id number] Indications:   c diff colitis PCP:  Rickie Yusuf NP Anesthesia/Sedation: see nursing notes Endoscopist:  Dr. Poonam Arroyo Assistants: None Complications:  None Estimate Blood Loss:  None Permit: The indications, risks, benefits and alternatives were reviewed with the patient or their decision maker who was provided an opportunity to ask questions and all questions were answered. The specific risks of colonoscopy with conscious sedation were reviewed, including but not limited to anesthetic complication, bleeding, adverse drug reaction, missed lesion, infection, IV site reactions, and intestinal perforation which would lead to the need for surgical repair. Alternatives to colonoscopy including radiographic imaging, observation without testing, or laboratory testing were reviewed including the limitations of those alternatives. After considering the options and having all their questions answered, the patient or their decision maker provided both verbal and written consent to proceed. Procedure in Detail: After obtaining informed consent, positioning of the patient in the left lateral decubitus position, and conduction of a pre-procedure pause or \"time out\" the endoscope was introduced into the anus and advanced to the cecum, which was identified by the ileocecal valve and appendiceal orifice. The quality of the colonic preparation was good. A careful inspection was made as the colonoscope was withdrawn, findings and interventions are described below. Appendiceal orifice photographed Findings: - Diverticulosis Diffuse mild granular mucosal pattern c/w treated c diff 250 cc of fecal biome transplant matter infused at cecum Specimens:  
 none Implants: 250 cc fecal biome transplant matter Complications:  
None; patient tolerated the procedure well. Estimated blood loss: none Impression: 
c diff resistant to medication treatment; incidental colonic diverticulosis Recommendations:  
   - resume tube feedings tomorrow; advised care taker resume usual medications. Use additional antibiotics only if absolutely necessary Thank you for entrusting me with this patient's care. Please do not hesitate to contact me with any questions or if I can be of assistance with any of your other patients' GI needs. Signed By: Awilda Levy MD 
                      July 8, 2020

## 2020-07-08 NOTE — DISCHARGE INSTRUCTIONS
Janki Brittney  [id number]  1970    DISCHARGE INSTRUCTIONS  Discomfort:  Redness at IV site- apply warm compress to area; if redness or soreness persist- contact your physician. There may be a slight amount of blood passed from the rectum. Gaseous discomfort - walking, belching will help relieve any discomfort. You may not operate a vehicle for 12 hours. You may not engage in an occupation involving machinery or appliances for rest of today. You may not drink alcoholic beverages for at least 12 hours. Avoid making any critical decisions for at least 24 hours. DIET:   Resume tube feedings tomorrow. All usual medications unchanged     ACTIVITY:  You may resume your normal daily activities it is recommended that you spend the remainder of the day resting -  avoid any strenuous activity. CALL M.D. ANY SIGN OF:   Increasing pain, nausea, vomiting  Abdominal distension (swelling)  New increased bleeding (oral or rectal)  Fever (chills)  Pain in chest area  Bloody discharge from nose or mouth  Shortness of breath     Follow-up Instructions:  Call Dr. Kaiser Rather if you have any questions or problems. Recommendations:      - resume tube feedings tomorrow; advised care taker resume usual medications.   Use additional antibiotics only if absolutely necessary       DISCHARGE SUMMARY from Nurse    The following personal items collected during your admission are returned to you:   Dental Appliance:    Vision: Visual Aid: None  Hearing Aid:    Jewelry:    Clothing:    Other Valuables:    Valuables sent to safe:

## 2020-07-08 NOTE — ANESTHESIA PREPROCEDURE EVALUATION
Anesthetic History No history of anesthetic complications Review of Systems / Medical History Patient summary reviewed and pertinent labs reviewed Pulmonary Sleep apnea Comments: Recently admitted to [facility] with Aspiration PNA now has trach and PEG Neuro/Psych  
 
seizures Comments: Downs syndrome, debilitated Wheelchair bound Cardiovascular Within defined limits GI/Hepatic/Renal 
  
 
 
 
 
 
Comments: C diff positive Endo/Other Within defined limits Other Findings Comments: Racheal Conor in place Physical Exam 
 
Airway Mallampati: II 
 
 
 
 
 Cardiovascular Rhythm: regular Rate: normal 
 
 
 
 Dental 
 
Dentition: Edentulous Pulmonary Breath sounds clear to auscultation Abdominal 
GI exam deferred Other Findings Anesthetic Plan ASA: 4 Anesthesia type: MAC Induction: Intravenous After multiple IV sticks. Obtained informed consent from patient's mother for femoral central line placement. Risks discussed.

## 2020-07-08 NOTE — ANESTHESIA POSTPROCEDURE EVALUATION
Procedure(s): 
COLONOSCOPY with fecal transplant (consents in red file). MAC Anesthesia Post Evaluation Multimodal analgesia: multimodal analgesia not used between 6 hours prior to anesthesia start to PACU discharge Patient location during evaluation: PACU Patient participation: complete - patient participated Level of consciousness: awake Pain management: adequate Airway patency: patent Anesthetic complications: no 
Cardiovascular status: acceptable, blood pressure returned to baseline and hemodynamically stable Respiratory status: acceptable Hydration status: acceptable Post anesthesia nausea and vomiting:  controlled INITIAL Post-op Vital signs:  
Vitals Value Taken Time /81 7/8/2020  5:16 PM  
Temp 36.8 °C (98.2 °F) 7/8/2020  4:47 PM  
Pulse 121 7/8/2020  5:16 PM  
Resp 18 7/8/2020  5:16 PM  
SpO2 100 % 7/8/2020  5:15 PM  
Vitals shown include unvalidated device data.

## 2020-07-08 NOTE — H&P
Gastroenterology Outpatient History and Physical 
 
Patient: Gauri Rios Physician: Marley Hernández MD 
 
Vital Signs: See RN notes Allergies: Allergies Allergen Reactions  Depakote [Divalproex] Swelling Chief Complaint: diarrhea History of Present Illness: Third hospital admit for c diff recently; has failed antibiotic therapy. Justification for Procedure: persistent C Diff History: 
Past Medical History:  
Diagnosis Date  Down syndrome  History of vascular access device 06/30/2020  
 4 Fr midline, 10 cm L brachial, poor access  Seizures ([address] No past surgical history on file. Social History Socioeconomic History  Marital status: SINGLE Spouse name: Not on file  Number of children: Not on file  Years of education: Not on file  Highest education level: Not on file Tobacco Use  Smoking status: Never Smoker  Smokeless tobacco: Never Used Substance and Sexual Activity  Alcohol use: Never Frequency: Never  Drug use: Never  Sexual activity: Never Other Topics Concern Social History Narrative ** Merged History Encounter ** No family history on file. Medications:  
Prior to Admission medications Medication Sig Start Date End Date Taking? Authorizing Provider PEG 3350-Electrolytes (COLYTE;GOLYTELY) solr 4,000 mL by Per G Tube route once for 1 dose. 7/7/20 7/7/20  Margaux Dawson MD  
vancomycin ORLIN DEJESUS [facility]) 50 mg/mL oral solution 5 mL by Per G Tube route every six (6) hours for 5 days. Indications: diarrhea from an infection with Clostridium difficile bacteria 7/2/20 7/7/20  Margaux Dawson MD  
diazePAM (VALIUM) 5-7.5-10 mg kit Insert 10 mg into rectum as needed (for seizures that last longer than 5 minutes). Provider, Historical  
OTHER,NON-FORMULARY, 1 Can by Per G Tube route five (5) times daily.  Osmolite 1 can 5 times per day (at 0800, 1100, 1400, 1700, and 2000)    Provider, Historical  
 cetirizine (ZYRTEC) 10 mg tablet Take 1 Tab by mouth daily. 5/1/20   Sidney Sawyer H, DO  
levETIRAcetam (KEPPRA) 100 mg/mL solution 500 mg by Per G Tube route every twelve (12) hours. Provider, Historical  
albuterol-ipratropium (DUO-NEB) 2.5 mg-0.5 mg/3 ml nebu 3 mL by Nebulization route every four (4) hours as needed. Provider, Historical  
phenytoin (DILANTIN) 100 mg/4 mL susp oral suspension 125 mg by PEG Tube route every twelve (12) hours. 3/17/20   Venarmani Kirti H, DO  
aspirin 81 mg chewable tablet Take 81 mg by mouth daily. Provider, Historical  
 
 
Physical Exam:  
General: no distress HEENT: Head: Normal, normocephalic, atraumatic. Heart: regular rate and rhythm Lungs: chest clear, no wheezing, rales, normal symmetric air entry Abdominal: Bowel sounds are normal, liver is not enlarged, spleen is not enlarged Findings/Diagnosis: C Diff resistant to antibiotic therapy Plan of Care/Planned Procedure: I've discussed colonoscopy possible biopsy, polypectomy, cautery, injection, fecal transplant, alternatives, complications including but not limited to pain, cardiopulmonary event, bleeding, perforation requiring additional blood transfusion or operative repair; all questions answered.

## 2020-07-08 NOTE — PROGRESS NOTES
[id number] 
Telephone consent obtained for femoral central line placement due to 10 unsuccessful peripheral sticks by anesthesia. Consent obtained with Dr. Leann Cruz and Ruiz Stokes RN. pts mother has no questions at this time. [id number] [facility] [address] Right femoral central line placed by Dr. Leann Cruz, line sutured in place. [address] [facility] Pt stable in recovery, central line pulled by Lenore Ang, hemostatis achieved, Dr. Gonzales Ramirez at bedside for sign out. [phone] Pts caretaker at bedside, verbal report called to Renetta Jimenez RN at [facility], [address] [place] [address] aware of central line and S/S of bleeding at site. [address] Pt d/c with caretaker, instructions in hand.

## 2020-07-08 NOTE — ANESTHESIA PROCEDURE NOTES
Central Line Placement Start time: 7/8/2020 3:50 PM 
End time: 7/8/2020 4:15 PM 
Performed by: Lala Monae MD 
Authorized by: Lala Monae MD  
 
Indications: vascular access Preanesthetic Checklist: patient identified, risks and benefits discussed, anesthesia consent, site marked, patient being monitored and timeout performed Timeout Time: 15:50 Pre-procedure: All elements of maximal sterile barrier technique followed? Yes   
2% Chlorhexidine for cutaneous antisepsis, Hand hygiene performed prior to catheter insertion and Ultrasound guidance Sterile Ultrasound Technique followed?: Yes Procedure:  
Prep:  Chlorhexidine Location: femoral 
Orientation:  Right Patient position:  Flat Catheter type:  Single lumen Catheter size:  4 Fr Catheter length:  10 cm Number of attempts:  1 Successful placement: Yes Assessment:  
Post-procedure:  Catheter secured and sterile dressing applied Assessment:  Blood return through all ports, free fluid flow and guidewire removal verified Insertion:  Uncomplicated Patient tolerance:  Patient tolerated the procedure well with no immediate complications

## 2020-07-09 NOTE — PROGRESS NOTES
Gauri Rios is a 53 y.o. female who was seen by synchronous (real-time) audio-video technology on 7/9/2020 for No chief complaint on file. I spent at least 15 minutes on this visit with this established patient. [address] Subjective:  
Patient had colonoscopy with fecal transplant due to recurrent c diff Affect and mood much improved Smiling during the visit Did have soft BM today Advised to avoid abx as much as possible Prior to Admission medications Medication Sig Start Date End Date Taking? Authorizing Provider  
diazePAM (VALIUM) 5-7.5-10 mg kit Insert 10 mg into rectum as needed (for seizures that last longer than 5 minutes). Provider, Historical  
OTHER,NON-FORMULARY, 1 Can by Per G Tube route five (5) times daily. Osmolite 1 can 5 times per day (at 0800, 1100, 1400, 1700, and 2000)    Provider, Historical  
cetirizine (ZYRTEC) 10 mg tablet Take 1 Tab by mouth daily. 5/1/20   Sidney Sawyer, DO  
levETIRAcetam (KEPPRA) 100 mg/mL solution 500 mg by Per G Tube route every twelve (12) hours. Provider, Historical  
albuterol-ipratropium (DUO-NEB) 2.5 mg-0.5 mg/3 ml nebu 3 mL by Nebulization route every four (4) hours as needed. Provider, Historical  
phenytoin (DILANTIN) 100 mg/4 mL susp oral suspension 125 mg by PEG Tube route every twelve (12) hours. 3/17/20   Keyshawn SNYDER, DO  
aspirin 81 mg chewable tablet Take 81 mg by mouth daily. Provider, Historical  
 
Patient Active Problem List  
 Diagnosis Date Noted  Clostridium difficile diarrhea 06/30/2020  Wheelchair bound 06/09/2020  Inability to walk 06/09/2020  Positive fecal occult blood test 03/29/2020  
 Oral thrush 03/29/2020  Diarrhea in adult patient 03/28/2020  
 C. difficile diarrhea 03/28/2020  Seizure ([facility] [address] 12/11/2019  Gastroesophageal reflux disease without esophagitis 12/11/2019  Down's syndrome 12/11/2019  Iron deficiency 12/11/2019  Constipation 12/11/2019  Severe intellectual disability 02/02/2012 ROS A comprehensive review of system was obtained and negative except findings in the HPI Objective: No flowsheet data found. General: alert, cooperative, no distress Mental  status: normal mood, behavior, speech, dress, motor activity, and thought processes, able to follow commands HENT: NCAT Neck: no visualized mass Resp: no respiratory distress Neuro: no gross deficits Skin: no discoloration or lesions of concern on visible areas Psychiatric: normal affect, consistent with stated mood, no evidence of hallucinations Additional exam findings: none Diagnoses and all orders for this visit: 
 
1. C. difficile diarrhea Continue follow up plans for GI 7/13 Reviewed restrictions and follow up in the this office prn We discussed the expected course, resolution and complications of the diagnosis(es) in detail. Medication risks, benefits, costs, interactions, and alternatives were discussed as indicated. I advised her to contact the office if her condition worsens, changes or fails to improve as anticipated. She expressed understanding with the diagnosis(es) and plan. Clint Anglin, who was evaluated through a patient-initiated, synchronous (real-time) audio-video encounter, and/or her healthcare decision maker, is aware that it is a billable service, with coverage as determined by her insurance carrier. She provided verbal consent to proceed: Yes, and patient identification was verified. It was conducted pursuant to the emergency declaration under the [facility], [address] [place] [address] authority and the [employer] Act. A caregiver was present when appropriate. Ability to conduct physical exam was limited. I was in the office. The patient was at home.  
 
 
Nicholas Khan NP

## 2020-07-30 NOTE — PROGRESS NOTES
Script for Adult Pull ups faxed to [address] [place] [address]. Fax number [phone] confirmation number [phone].

## 2020-07-30 NOTE — PROGRESS NOTES
Progress Note 
 
she is a 53y.o. year old female who presents for evalution. Subjective:  
 
Pt ehre with staff and was needing increased briefs due to c diff. She had a need for increased briefs but no longer needs increased briefs. Sarabjit lgive Rx for 180 pr 31 days. Reviewed PmHx, RxHx, FmHx, SocHx, AllgHx and updated and dated in the chart. Review of Systems - negative except as listed above in the HPI Objective:  
 
Vitals:  
 07/30/20 1606 BP: 106/64 Pulse: 87 Resp: 16 Temp: 98.2 °F (36.8 °C) TempSrc: Oral  
SpO2: 94% Height: 4' 10\" (1.473 m) Current Outpatient Medications Medication Sig  
 atropine 1 % ophthalmic solution 1 Drop three (3) times daily.  nystatin (MYCOSTATIN) 100,000 unit/mL suspension Take 5 mL by mouth four (4) times daily for 14 days. swish and spit  diazePAM (VALIUM) 5-7.5-10 mg kit Insert 10 mg into rectum as needed (for seizures that last longer than 5 minutes).  OTHER,NON-FORMULARY, 1 Can by Per G Tube route five (5) times daily. Osmolite 1 can 5 times per day (at 0800, 1100, 1400, 1700, and 2000)  cetirizine (ZYRTEC) 10 mg tablet Take 1 Tab by mouth daily.  levETIRAcetam (KEPPRA) 100 mg/mL solution 500 mg by Per G Tube route every twelve (12) hours.  albuterol-ipratropium (DUO-NEB) 2.5 mg-0.5 mg/3 ml nebu 3 mL by Nebulization route every four (4) hours as needed.  phenytoin (DILANTIN) 100 mg/4 mL susp oral suspension 125 mg by PEG Tube route every twelve (12) hours.  aspirin 81 mg chewable tablet Take 81 mg by mouth daily. No current facility-administered medications for this visit. Physical Examination: General appearance - chronically ill appearing but doing better than previous Chest - clear to auscultation, no wheezes, rales or rhonchi, symmetric air entry Heart - normal rate, regular rhythm, normal S1, S2, no murmurs, rubs, clicks or gallops Mouth- thrush of tongue Assessment/ Plan: Diagnoses and all orders for this visit: 
 
1. Urinary incontinence, unspecified type Medium briefs 180/31 days Rx faxed 2. Oral thrush 
-     nystatin (MYCOSTATIN) 100,000 unit/mL suspension; Take 5 mL by mouth four (4) times daily for 14 days. swish and spit Follow-up and Dispositions · Return if symptoms worsen or fail to improve. I have discussed the diagnosis with the patient and the intended plan as seen in the above orders. The patient has received an after-visit summary and questions were answered concerning future plans. Pt conveyed understanding of plan. Medication Side Effects and Warnings were discussed with patient Sawyer Da Silva DO

## 2020-07-30 NOTE — PATIENT INSTRUCTIONS
A Healthy Lifestyle: Care Instructions Your Care Instructions A healthy lifestyle can help you feel good, stay at a healthy weight, and have plenty of energy for both work and play. A healthy lifestyle is something you can share with your whole family. A healthy lifestyle also can lower your risk for serious health problems, such as high blood pressure, heart disease, and diabetes. You can follow a few steps listed below to improve your health and the health of your family. Follow-up care is a key part of your treatment and safety. Be sure to make and go to all appointments, and call your doctor if you are having problems. It's also a good idea to know your test results and keep a list of the medicines you take. How can you care for yourself at home? · Do not eat too much sugar, fat, or fast foods. You can still have dessert and treats now and then. The goal is moderation. · Start small to improve your eating habits. Pay attention to portion sizes, drink less juice and soda pop, and eat more fruits and vegetables. ? Eat a healthy amount of food. A 3-ounce serving of meat, for example, is about the size of a deck of cards. Fill the rest of your plate with vegetables and whole grains. ? Limit the amount of soda and sports drinks you have every day. Drink more water when you are thirsty. ? Eat at least 5 servings of fruits and vegetables every day. It may seem like a lot, but it is not hard to reach this goal. A serving or helping is 1 piece of fruit, 1 cup of vegetables, or 2 cups of leafy, raw vegetables. Have an apple or some carrot sticks as an afternoon snack instead of a candy bar. Try to have fruits and/or vegetables at every meal. 
· Make exercise part of your daily routine. You may want to start with simple activities, such as walking, bicycling, or slow swimming. Try to be active 30 to 60 minutes every day.  You do not need to do all 30 to 60 minutes all at once. For example, you can exercise 3 times a day for 10 or 20 minutes. Moderate exercise is safe for most people, but it is always a good idea to talk to your doctor before starting an exercise program. 
· Keep moving. Amy Fredy the lawn, work in the garden, or [employer]. Take the stairs instead of the elevator at work. · If you smoke, quit. People who smoke have an increased risk for heart attack, stroke, cancer, and other lung illnesses. Quitting is hard, but there are ways to boost your chance of quitting tobacco for good. ? Use nicotine gum, patches, or lozenges. ? Ask your doctor about stop-smoking programs and medicines. ? Keep trying. In addition to reducing your risk of diseases in the future, you will notice some benefits soon after you stop using tobacco. If you have shortness of breath or asthma symptoms, they will likely get better within a few weeks after you quit. · Limit how much alcohol you drink. Moderate amounts of alcohol (up to 2 drinks a day for men, 1 drink a day for women) are okay. But drinking too much can lead to liver problems, high blood pressure, and other health problems. Family health If you have a family, there are many things you can do together to improve your health. · Eat meals together as a family as often as possible. · Eat healthy foods. This includes fruits, vegetables, lean meats and dairy, and whole grains. · Include your family in your fitness plan. Most people think of activities such as jogging or tennis as the way to fitness, but there are many ways you and your family can be more active. Anything that makes you breathe hard and gets your heart pumping is exercise. Here are some tips: 
? Walk to do errands or to take your child to school or the bus. 
? Go for a family bike ride after dinner instead of watching TV. Where can you learn more? Go to http://satnam-yesika.info/ Enter K613 in the search box to learn more about \"A Healthy Lifestyle: Care Instructions. \" Current as of: January 31, 2020               Content Version: 12.5 © 2006-2020 [employer]. Care instructions adapted under license by "[employer]" (which disclaims liability or warranty for this information). If you have questions about a medical condition or this instruction, always ask your healthcare professional. [place] [address] any warranty or liability for your use of this information.

## 2020-07-30 NOTE — PROGRESS NOTES
Chief Complaint Patient presents with  Form Completion Patient presents in office today for [facility] for adult pull ups. No concerns. 1. Have you been to the ER, urgent care clinic since your last visit? Hospitalized since your last visit? No 
 
2. Have you seen or consulted any other health care providers outside of the [address] [place] [address] since your last visit? Include any pap smears or colon screening. No 
 
Learning Assessment 3/17/2020 PRIMARY LEARNER Patient PRIMARY LANGUAGE ENGLISH  
LEARNER PREFERENCE PRIMARY PICTURES  
ANSWERED BY caretaker RELATIONSHIP OTHER

## 2020-07-31 NOTE — TELEPHONE ENCOUNTER
Received call from Donnell with group home. She states that patient was written a script yesterday for nystatin swish and spit however she is NPO. They would like to get an order for cotton swabs to administer to be faxed to [facility].

## 2020-07-31 NOTE — TELEPHONE ENCOUNTER
It is not applied with a cotton swab, as discussed at the appointment you would use a syringe and spits it onto the tongue where the thrush is.   The pharmacy should be able to supply medication syringe that they can clean and reuse

## 2020-08-03 NOTE — TELEPHONE ENCOUNTER
Attempt to reach pt unsuccessful. LVM for patient to [facility] in regards to message.

## 2020-08-03 NOTE — TELEPHONE ENCOUNTER
[facility] Staff- patient went to get labs done and they stuck her over five times and couldn't get labs. Can you send an order for her to get an ultrasound.  García's phone: [phone]

## 2020-08-04 NOTE — TELEPHONE ENCOUNTER
I did not order her labs. ..   If they are having issues with a lab draw contact the ordering clinician

## 2020-08-04 NOTE — TELEPHONE ENCOUNTER
Patient's caretaker/Betty returning call.  [phone] 

## 2020-08-04 NOTE — PROGRESS NOTES
1211   Assessment - Initial Nutrition Evaluation   DATE: 2020    The caregiver for patient Evan Laughlin was informed of the inherent limitations of a virtual visit, and has consented to a virtual therapy visit on 2020. The caregiver was informed that at any time during the virtual visit, they can decide to stop the virtual visit. The caregiver verified that they are physically located in the [facility] for this virtual visit. REFERRING PHYSICIAN: Rodolfo Molina MD  NAME: Jigar Mckeon : 1970 AGE: 53 y.o. GENDER: female  REASON FOR VISIT: Tube feeding formula, recent Hx of C. Diff, s/p fecal transplant 2020    ASSESSMENT:  Past Medical Hx:  Pt has a past medical history of Down syndrome, History of vascular access device (2020), Seizures ([facility] [address] Sleep apnea, and Stroke ([facility] [address] (). Pt is unable to speak much or give history. Spoke with caretaker, Connor Martinez. Caregiver reports prior to pts first seizure (2019), pt was mobile and active, frequently dancing and physically independent. Since 2019, pt has had multiple seizures and was in the hospital [facility] from 2020 to 2020. Pt was on multiple antibiotics for long periods of time including that admission and subsequent admissions at [facility]. During the [facility] admission, pt was intubated 2 times and then had a tracheostomy and PEG tube placed. 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 Pt had been having frequent loose stools prior to that with the C. Diff infection. Since the fecal transplant, stools have been normal. Averaging ~1 BM per day, formed, normal BM. Pt had appointment with PCP  and flushes were adjusted from 50 mL before and after tube feeds to 75 mL before and after. An increase in total fluid of 250 mL. Pt is currently receiving Osmolite 1.2 (237 mL) 5 times per day with flushes.      Osmolite 1.2 has 285 kcal, 195 mL free water. With flushes (75 mL before and after) this provides 1425 kcal, 1725 mL free water and 66 g Pro. Caregiver reports it is very difficult to find pts veins for blood draws to measure medications in blood (Keppra). Pt had been placed on a 2000 mL fluid restriction after d/c from [facility]. Pt had been well under that, and continues to be under 2000 mL total. Pt urinates regularly and does not seem to have any other signs of dehydration. Pt currently has thrush per MD and was recently rx Nystatin. Caregiver reports mild improvement with this. She has had this multiple times since Jan 2020 with it getting better and then returning. Pt had MBS with speech therapist recently and caregiver reports pt is able to take pudding and apple sauce safely with precautions. If approved by SLP, recommend consuming plain yogurt in small amounts to help with good \"probiotics\" in the mouth. If pt able to tolerate apple cider vinegar (with \"mother\") mixed into applesauce or other safe to consume food, this may help with thrush as well. Only if approved by SLP to safely consume. Discussed product called Banatrol by [employer] as a way to help with loose stools if they return. Banatrol helps with diarrhea (and C. Diff diarrhea) and can be placed in PEG. Pt would likely benefit from Banatrol x1 weekly to help keep stools formed and add prebiotic's to GI system to help overall health, since pt is on a No-fiber formula. Caregiver reports weight stable. Since MD recently adjusted flushes (4 days prior to visit), will not adjust flushes again at this time to allow time to see if this recent change makes a difference with either urination, vein finding or hydration/medication status. LABS:   No results found for: HBA1C, HGBE8, NAQ7BJLL, PJH4AHER, NCL7PDJJ    MEDICATIONS/SUPPLEMENTS:   [unfilled]  Prior to Admission medications    Medication Sig Start Date End Date Taking?  Authorizing Provider atropine 1 % ophthalmic solution 1 Drop three (3) times daily. Provider, Historical   nystatin (MYCOSTATIN) 100,000 unit/mL suspension Take 5 mL by mouth four (4) times daily for 14 days. swish and spit 7/30/20 8/13/20  Sidney Sawyer DO   diazePAM (VALIUM) 5-7.5-10 mg kit Insert 10 mg into rectum as needed (for seizures that last longer than 5 minutes). Provider, Historical   OTHER,NON-FORMULARY, 1 Can by Per G Tube route five (5) times daily. Osmolite 1 can 5 times per day (at 0800, 1100, 1400, 1700, and 2000)    Provider, Historical   cetirizine (ZYRTEC) 10 mg tablet Take 1 Tab by mouth daily. 5/1/20   Sidney Sawyer, DO   levETIRAcetam (KEPPRA) 100 mg/mL solution 500 mg by Per G Tube route every twelve (12) hours. Provider, Historical   albuterol-ipratropium (DUO-NEB) 2.5 mg-0.5 mg/3 ml nebu 3 mL by Nebulization route every four (4) hours as needed. Provider, Historical   phenytoin (DILANTIN) 100 mg/4 mL susp oral suspension 125 mg by PEG Tube route every twelve (12) hours. 3/17/20   Quinton SNYDER DO   aspirin 81 mg chewable tablet Take 81 mg by mouth daily. Provider, Historical       FOOD ALLERGIES/INTOLERANCES: none    ANTHROPOMETRICS:    Ht Readings from Last 1 Encounters:   08/04/20 4' 9.99\" (1.473 m)      Wt Readings from Last 1 Encounters:   07/08/20 49.9 kg (110 lb)         BMI: Body mass index is 23 kg/m². Category: normal    Reported Wt Hx:    Estimated Nutritional Needs:     Estimated Nutrition Needs:   Energy: 1318  kcal  Protein: 60 g  Fluid: 1400 mL    Wt used: Current (110 lbs)    Reported Diet Hx:     24 Hour Diet Recall  Breakfast  Tube feedings- Osmolite 1.2. Bolus 5 times per day. Flush 75 mL before and after. Lunch     Dinner     Snacks     Beverages       Exercise/Physical Actvity: assisted range of motion    Environmental/Social: Lives in group home. Needs assistance with all daily activities.          NUTRITION DIAGNOSIS: Swallowing difficulty related to swallowing difficulty as evidenced by swallowing study results(PEG for EN)      NUTRITION INTERVENTION: No changes at this time to tube feeding or flushes. Monitor MBS results and recommendations for PO in the future. Add Banatrol once per week to maintain gut health  Add yogurt PO if approved by speech pathologists as safe to consume. PATIENT GOALS: Maintain weight. Continue therapies at group home. Revisit in 1 year if wt stable. Revisit sooner if pt looses or gains >10% of body weight. Specific tips and techniques to facilitate compliance with above recommendations were provided and discussed. Pt was strongly encourage to begin making necessary changes now, and re-visit the dietitian prn. If further details are desired please feel free to contact me at [phone]. This phone number was also provided to the patient for any further questions or concerns. Tom Becerra is a 53 female with her caregiver being evaluated by a Virtual Visit (video visit) encounter to address concerns as mentioned above. A caregiver was present when appropriate. Due to this being a TeleHealth encounter (During [facility]- public health emergency), evaluation of the following areas was limited: Nutrition Focused Physical Exam. Pursuant to the emergency declaration under the [address] waiver authority and the [employer] Act, this Virtual Visit was conducted with patient's (and/or legal guardian's) consent, to reduce the risk of exposure to COVID-19 and provide necessary medical care. Services were provided through a video synchronous discussion virtually to substitute for in-person encounter.           Marilou Llamas, [address] [facility]

## 2020-08-04 NOTE — PROGRESS NOTES
[facility] home health certification & plan of care form was placed on Dr Terrence Woodall desk to process.

## 2020-08-05 NOTE — PROGRESS NOTES
Plan of Care faxed to [facility]. Fax number [phone] confirmation number [phone].

## 2020-08-07 NOTE — TELEPHONE ENCOUNTER
[facility] is needing Demographics of patient. Aleksandra's phone: [phone] ext [phone].  Fax: [phone]

## 2020-08-07 NOTE — TELEPHONE ENCOUNTER
Demographics faxed to [facility]. Fax number [phone] confirmation number [phone].

## 2020-08-11 NOTE — PROGRESS NOTES
Faxed change of order form to [facility] @ [phone]. Confirmation number [id number]. Original form placed in scan folder for central scanning.

## 2020-08-18 NOTE — PROGRESS NOTES
[facility] Delivered CMN for durable medical was [facility]

## 2020-08-19 NOTE — PROGRESS NOTES
Faxed order form to home care delivered @ [phone]. Confirmation number [id number]. Original form placed in scan folder for central scanning.

## 2020-09-10 NOTE — LETTER
9/10/2020 11:21 AM 
 
Ms. Jennifer Jerald [address] [place] [phone] To Whom It May Care, Discontinue bacitracin. Sincerely, [address] [facility] [address], DO

## 2020-09-10 NOTE — PROGRESS NOTES
Progress Note 
 
she is a 53y.o. year old female who presents for evalution. Subjective: Pt with staff member and had a fecal transport due to c diff then the diarrhea let up and has now been having constipation and hard stools. Pt has a prn for constipation miralax. Will make a regular order daily and will update bowel protocol, see letters. Er is healed need d/c for bacitracin. Will fax labs from July but renal function was normal 0.5. LFt's were elevted on these in July and will recheck. Reviewed PmHx, RxHx, FmHx, SocHx, AllgHx and updated and dated in the chart. Review of Systems - negative except as listed above in the HPI Objective: There were no vitals filed for this visit. Current Outpatient Medications Medication Sig  polyethylene glycol (MIRALAX) 17 gram/dose powder Take 17 g by mouth daily.  atropine 1 % ophthalmic solution 1 Drop three (3) times daily.  diazePAM (VALIUM) 5-7.5-10 mg kit Insert 10 mg into rectum as needed (for seizures that last longer than 5 minutes).  OTHER,NON-FORMULARY, 1 Can by Per G Tube route five (5) times daily. Osmolite 1 can 5 times per day (at 0800, 1100, 1400, 1700, and 2000)  cetirizine (ZYRTEC) 10 mg tablet Take 1 Tab by mouth daily.  levETIRAcetam (KEPPRA) 100 mg/mL solution 500 mg by Per G Tube route every twelve (12) hours.  albuterol-ipratropium (DUO-NEB) 2.5 mg-0.5 mg/3 ml nebu 3 mL by Nebulization route every four (4) hours as needed.  phenytoin (DILANTIN) 100 mg/4 mL susp oral suspension 125 mg by PEG Tube route every twelve (12) hours.  aspirin 81 mg chewable tablet Take 81 mg by mouth daily. No current facility-administered medications for this visit. Physical Examination: General appearance - chronically ill appearing and non verbal   
 
Assessment/ Plan:  
Diagnoses and all orders for this visit: 
 
1. Chronic idiopathic constipation -     polyethylene glycol (MIRALAX) 17 gram/dose powder; Take 17 g by mouth daily. 2. Elevated LFTs 
-     HEPATIC FUNCTION PANEL; Future Follow-up and Dispositions · Return in about 4 weeks (around 10/8/2020), or if symptoms worsen or fail to improve, for constipation. I have discussed the diagnosis with the patient and the intended plan as seen in the above orders. The patient has received an after-visit summary and questions were answered concerning future plans. Pt conveyed understanding of plan. Medication Side Effects and Warnings were discussed with patient DO Janki Brambila is a 53 y.o. female being evaluated by a Virtual Visit (video visit) encounter to address concerns as mentioned above. A caregiver was present when appropriate. Due to this being a TeleHealth encounter (During [facility] public health emergency), evaluation of the following organ systems was limited: Vitals/Constitutional/EENT/Resp/CV/GI//MS/Neuro/Skin/Heme-Lymph-Imm. Pursuant to the emergency declaration under the [address] [place] [address] authority and the [employer] Act, this Virtual Visit was conducted with patient's (and/or legal guardian's) consent, to reduce the risk of exposure to COVID-19 and provide necessary medical care. Services were provided through a video synchronous discussion virtually to substitute for in-person encounter. --Nancy Case DO on 9/10/2020 at 11:16 AM 
 
An electronic signature was used to authenticate this note.

## 2020-09-10 NOTE — LETTER
9/10/2020 11:19 AM 
 
Ms. Rai Ventura [address] [place] [phone] To Whom It May Concern, If no BM in 24 hours use miralax bid if no BM in 48 hrs give 8 oz prune juice, if no BM in 72 hours call  office. Sincerely, Diane Ask, DO

## 2020-09-16 NOTE — PROGRESS NOTES
Pt's caregiver Lina id x 3, notified as per Dr. Daryle Africa and verbalized understanding. States pt already has a GI specialist and that she will call for an appt tomorrow. Advised to call back if we need to send results. Carmen Palomares states that if Dr. Daryle Africa wants pt to continue to stay off of tylenol, could he send a d[facility]c order to [facility]? Please advise.

## 2020-09-16 NOTE — PROGRESS NOTES
Alkaline phosphatase level continues to climb I would like for her to make an appointment with GI. Please give number for [facility].

## 2020-09-17 NOTE — PROGRESS NOTES
Result note mailed to patient. Labs faxed to [facility]. Fax number [phone] confirmation number [phone].

## 2020-09-18 NOTE — PROGRESS NOTES
Received medical record release from [facility] for office note and labs. Copies printed and faxed to [phone] with confirmation received.

## 2020-10-08 NOTE — PROGRESS NOTES
Chief Complaint Patient presents with  Follow-up Patient presents in office today for 4 week f/u. Would like to get an order to cut back the miralax to every other day. No other concerns. Pt / caregiver given opportunity to review vaccine information sheet prior to vaccine administration. Opportunity given for questions and concerns. No questions or concerns at this time. 1. Have you been to the ER, urgent care clinic since your last visit? Hospitalized since your last visit? No 
 
2. Have you seen or consulted any other health care providers outside of the [address] [place] [address] since your last visit? Include any pap smears or colon screening. No 
 
Learning Assessment 3/17/2020 PRIMARY LEARNER Patient PRIMARY LANGUAGE ENGLISH  
LEARNER PREFERENCE PRIMARY PICTURES  
ANSWERED BY caretaker RELATIONSHIP OTHER

## 2020-10-08 NOTE — PROGRESS NOTES
Progress Note 
 
she is a 53y.o. year old female who presents for evalution. Subjective:  
 
Pt here with staff and states she had a loose Bm today and would like to back off of the miralax to every other day. States it seems like not a lot of stool and are concerned she ay have diarrhea going around regular stool. Pt does have chronic fecal incontinence. Pt is also having difficulty sleeping at night. Not currently taking anything for this. Had prev taken melatonin and this did help. Prior to hospitalization for extended seizure patient was able to walk and talk was very active. Patient is no longer verbal now has chronic trach on tube feeds. No longer verbal minimally interactive. Staff have been working with her and she has become more able to move her arms etc. will order PT OT and speech therapy to evaluate and treat patient to hopefully regain some level of function. Reviewed PmHx, RxHx, FmHx, SocHx, AllgHx and updated and dated in the chart. Review of Systems - negative except as listed above in the HPI Objective:  
 
Vitals:  
 10/08/20 1037 BP: 130/81 Pulse: 92 Resp: 16 Temp: 98.3 °F (36.8 °C) TempSrc: Oral  
SpO2: 94% Current Outpatient Medications Medication Sig  polyethylene glycol (MIRALAX) 17 gram/dose powder Take 17 g by mouth every other day.  melatonin 3 mg tablet 1 Tab by Per G Tube route nightly as needed for Insomnia. Must give by 10pm  
 atropine 1 % ophthalmic solution 1 Drop three (3) times daily.  diazePAM (VALIUM) 5-7.5-10 mg kit Insert 10 mg into rectum as needed (for seizures that last longer than 5 minutes).  OTHER,NON-FORMULARY, 1 Can by Per G Tube route five (5) times daily. Osmolite 1 can 5 times per day (at 0800, 1100, 1400, 1700, and 2000)  cetirizine (ZYRTEC) 10 mg tablet Take 1 Tab by mouth daily.  levETIRAcetam (KEPPRA) 100 mg/mL solution 500 mg by Per G Tube route every twelve (12) hours.  albuterol-ipratropium (DUO-NEB) 2.5 mg-0.5 mg/3 ml nebu 3 mL by Nebulization route every four (4) hours as needed.  phenytoin (DILANTIN) 100 mg/4 mL susp oral suspension 125 mg by PEG Tube route every twelve (12) hours.  aspirin 81 mg chewable tablet Take 81 mg by mouth daily. No current facility-administered medications for this visit. Physical Examination: General appearance -sleepy during visit, nonverbal, chronically ill-appearing Chest - clear to auscultation, no wheezes, rales or rhonchi, symmetric air entry Heart - normal rate, regular rhythm, normal S1, S2, no murmurs, rubs, clicks or gallops Abdomen - soft, nontender, nondistended, no masses or organomegaly Assessment/ Plan:  
Diagnoses and all orders for this visit: 
 
1. Chronic idiopathic constipation -     XR ABD FLAT/ ERECT; Future -     polyethylene glycol (MIRALAX) 17 gram/dose powder; Take 17 g by mouth every other day. 2. Insomnia, unspecified type 
-     melatonin 3 mg tablet; 1 Tab by Per G Tube route nightly as needed for Insomnia. Must give by 10pm 
 
3. Wheelchair bound 
-     [address] 4. Severe intellectual disability 
-     REFERRAL TO HOME HEALTH 
 
5. On tube feeding diet 
-     REFERRAL TO HOME HEALTH 6. Inability to walk 
-     [address] 7. Needs flu shot 
-     INFLUENZA VIRUS VAC QUAD,SPLIT,PRESV FREE SYRINGE IM Follow-up and Dispositions · Return in about 4 weeks (around 11/5/2020), or if symptoms worsen or fail to improve. I have discussed the diagnosis with the patient and the intended plan as seen in the above orders. The patient has received an after-visit summary and questions were answered concerning future plans. Pt conveyed understanding of plan. Medication Side Effects and Warnings were discussed with patient [address] [facility] [address], DO

## 2020-10-08 NOTE — PROGRESS NOTES
There is some feces in the rectum which could be constipation but there is none in the colon. Start with the MiraLAX every other day.

## 2020-10-08 NOTE — PATIENT INSTRUCTIONS
A Healthy Lifestyle: Care Instructions Your Care Instructions A healthy lifestyle can help you feel good, stay at a healthy weight, and have plenty of energy for both work and play. A healthy lifestyle is something you can share with your whole family. A healthy lifestyle also can lower your risk for serious health problems, such as high blood pressure, heart disease, and diabetes. You can follow a few steps listed below to improve your health and the health of your family. Follow-up care is a key part of your treatment and safety. Be sure to make and go to all appointments, and call your doctor if you are having problems. It's also a good idea to know your test results and keep a list of the medicines you take. How can you care for yourself at home? · Do not eat too much sugar, fat, or fast foods. You can still have dessert and treats now and then. The goal is moderation. · Start small to improve your eating habits. Pay attention to portion sizes, drink less juice and soda pop, and eat more fruits and vegetables. ? Eat a healthy amount of food. A 3-ounce serving of meat, for example, is about the size of a deck of cards. Fill the rest of your plate with vegetables and whole grains. ? Limit the amount of soda and sports drinks you have every day. Drink more water when you are thirsty. ? Eat at least 5 servings of fruits and vegetables every day. It may seem like a lot, but it is not hard to reach this goal. A serving or helping is 1 piece of fruit, 1 cup of vegetables, or 2 cups of leafy, raw vegetables. Have an apple or some carrot sticks as an afternoon snack instead of a candy bar. Try to have fruits and/or vegetables at every meal. 
· Make exercise part of your daily routine. You may want to start with simple activities, such as walking, bicycling, or slow swimming. Try to be active 30 to 60 minutes every day.  You do not need to do all 30 to 60 minutes all at once. For example, you can exercise 3 times a day for 10 or 20 minutes. Moderate exercise is safe for most people, but it is always a good idea to talk to your doctor before starting an exercise program. 
· Keep moving. Kaleb Christian the lawn, work in the garden, or [employer]. Take the stairs instead of the elevator at work. · If you smoke, quit. People who smoke have an increased risk for heart attack, stroke, cancer, and other lung illnesses. Quitting is hard, but there are ways to boost your chance of quitting tobacco for good. ? Use nicotine gum, patches, or lozenges. ? Ask your doctor about stop-smoking programs and medicines. ? Keep trying. In addition to reducing your risk of diseases in the future, you will notice some benefits soon after you stop using tobacco. If you have shortness of breath or asthma symptoms, they will likely get better within a few weeks after you quit. · Limit how much alcohol you drink. Moderate amounts of alcohol (up to 2 drinks a day for men, 1 drink a day for women) are okay. But drinking too much can lead to liver problems, high blood pressure, and other health problems. Family health If you have a family, there are many things you can do together to improve your health. · Eat meals together as a family as often as possible. · Eat healthy foods. This includes fruits, vegetables, lean meats and dairy, and whole grains. · Include your family in your fitness plan. Most people think of activities such as jogging or tennis as the way to fitness, but there are many ways you and your family can be more active. Anything that makes you breathe hard and gets your heart pumping is exercise. Here are some tips: 
? Walk to do errands or to take your child to school or the bus. 
? Go for a family bike ride after dinner instead of watching TV. Where can you learn more? Go to http://www.ralali/ Enter Q933 in the search box to learn more about \"A Healthy Lifestyle: Care Instructions. \" Current as of: January 31, 2020               Content Version: 12.6 © 2880-9429 [employer]. Care instructions adapted under license by [employer] (which disclaims liability or warranty for this information). If you have questions about a medical condition or this instruction, always ask your healthcare professional. [place] [address] any warranty or liability for your use of this information.

## 2020-11-05 NOTE — PROGRESS NOTES
Progress Note 
 
she is a 53y.o. year old female who presents for evalution. Subjective:  
 
Patient here for follow-up on chronic idiopathic constipation. We had decreased the MiraLAX to every other day due to concerns about diarrhea and we had also done an x-ray of the abdomen which just showed some feces in the rectum. Staff thinks may have missed a few days and would like Rx to be written to given in the evening so there is no issues. Alk phos was elevated and Gi was concerned about cholesterol level and she is fasting today. Also due for keppra and dilantin levels. These meds are rx by neuro Reviewed PmHx, RxHx, FmHx, SocHx, AllgHx and updated and dated in the chart. Review of Systems - negative except as listed above in the HPI Objective:  
 
Vitals:  
 11/05/20 0840 BP: 94/64 Pulse: 98 Resp: 20 Temp: 98.4 °F (36.9 °C) SpO2: 98% Height: 4' 9\" (1.448 m) Current Outpatient Medications Medication Sig  polyethylene glycol (MIRALAX) 17 gram/dose powder Take 17 g by mouth every other day. In the evening  melatonin 3 mg tablet 1 Tab by Per G Tube route nightly as needed for Insomnia. Must give by 10pm  
 atropine 1 % ophthalmic solution 1 Drop three (3) times daily.  diazePAM (VALIUM) 5-7.5-10 mg kit Insert 10 mg into rectum as needed (for seizures that last longer than 5 minutes).  OTHER,NON-FORMULARY, 1 Can by Per G Tube route five (5) times daily. Osmolite 1 can 5 times per day (at 0800, 1100, 1400, 1700, and 2000)  cetirizine (ZYRTEC) 10 mg tablet Take 1 Tab by mouth daily.  levETIRAcetam (KEPPRA) 100 mg/mL solution 500 mg by Per G Tube route every twelve (12) hours.  albuterol-ipratropium (DUO-NEB) 2.5 mg-0.5 mg/3 ml nebu 3 mL by Nebulization route every four (4) hours as needed.  phenytoin (DILANTIN) 100 mg/4 mL susp oral suspension 125 mg by PEG Tube route every twelve (12) hours.  aspirin 81 mg chewable tablet Take 81 mg by mouth daily. No current facility-administered medications for this visit. Physical Examination: Abdomen - soft, nontender, nondistended, no masses or organomegaly Assessment/ Plan:  
Diagnoses and all orders for this visit: 1. Elevated LFTs 
-     LIPID PANEL; Future 
-     HEPATIC FUNCTION PANEL; Future -     LEVETIRACETAM (KEPPRA); Future 
-     PHENYTOIN; Future 2. Chronic idiopathic constipation -     polyethylene glycol (MIRALAX) 17 gram/dose powder; Take 17 g by mouth every other day. In the evening 3. Encounter for lipid screening for cardiovascular disease -     LIPID PANEL; Future 
-     HEPATIC FUNCTION PANEL; Future 4. Medication monitoring encounter -     LEVETIRACETAM (KEPPRA); Future 
-     PHENYTOIN; Future Follow-up and Dispositions · Return in about 3 months (around 2/5/2021), or if symptoms worsen or fail to improve. I have discussed the diagnosis with the patient and the intended plan as seen in the above orders. The patient has received an after-visit summary and questions were answered concerning future plans. Pt conveyed understanding of plan. Medication Side Effects and Warnings were discussed with patient [id number] [facility] [address], DO

## 2020-11-05 NOTE — PROGRESS NOTES
Patient here with group home staff for follow up. She is non-verbal, wheelchair bound, with trach and g-tube. Staff states no concerns at this time. 1. Have you been to the ER, urgent care clinic since your last visit? Hospitalized since your last visit? No 
 
2. Have you seen or consulted any other health care providers outside of the [address] [place] [address] since your last visit? Include any pap smears or colon screening.  No

## 2020-11-05 NOTE — PATIENT INSTRUCTIONS
A Healthy Lifestyle: Care Instructions Your Care Instructions A healthy lifestyle can help you feel good, stay at a healthy weight, and have plenty of energy for both work and play. A healthy lifestyle is something you can share with your whole family. A healthy lifestyle also can lower your risk for serious health problems, such as high blood pressure, heart disease, and diabetes. You can follow a few steps listed below to improve your health and the health of your family. Follow-up care is a key part of your treatment and safety. Be sure to make and go to all appointments, and call your doctor if you are having problems. It's also a good idea to know your test results and keep a list of the medicines you take. How can you care for yourself at home? · Do not eat too much sugar, fat, or fast foods. You can still have dessert and treats now and then. The goal is moderation. · Start small to improve your eating habits. Pay attention to portion sizes, drink less juice and soda pop, and eat more fruits and vegetables. ? Eat a healthy amount of food. A 3-ounce serving of meat, for example, is about the size of a deck of cards. Fill the rest of your plate with vegetables and whole grains. ? Limit the amount of soda and sports drinks you have every day. Drink more water when you are thirsty. ? Eat at least 5 servings of fruits and vegetables every day. It may seem like a lot, but it is not hard to reach this goal. A serving or helping is 1 piece of fruit, 1 cup of vegetables, or 2 cups of leafy, raw vegetables. Have an apple or some carrot sticks as an afternoon snack instead of a candy bar. Try to have fruits and/or vegetables at every meal. 
· Make exercise part of your daily routine. You may want to start with simple activities, such as walking, bicycling, or slow swimming. Try to be active 30 to 60 minutes every day.  You do not need to do all 30 to 60 minutes all at once. For example, you can exercise 3 times a day for 10 or 20 minutes. Moderate exercise is safe for most people, but it is always a good idea to talk to your doctor before starting an exercise program. 
· Keep moving. Les Milroy the lawn, work in the garden, or [employer]. Take the stairs instead of the elevator at work. · If you smoke, quit. People who smoke have an increased risk for heart attack, stroke, cancer, and other lung illnesses. Quitting is hard, but there are ways to boost your chance of quitting tobacco for good. ? Use nicotine gum, patches, or lozenges. ? Ask your doctor about stop-smoking programs and medicines. ? Keep trying. In addition to reducing your risk of diseases in the future, you will notice some benefits soon after you stop using tobacco. If you have shortness of breath or asthma symptoms, they will likely get better within a few weeks after you quit. · Limit how much alcohol you drink. Moderate amounts of alcohol (up to 2 drinks a day for men, 1 drink a day for women) are okay. But drinking too much can lead to liver problems, high blood pressure, and other health problems. Family health If you have a family, there are many things you can do together to improve your health. · Eat meals together as a family as often as possible. · Eat healthy foods. This includes fruits, vegetables, lean meats and dairy, and whole grains. · Include your family in your fitness plan. Most people think of activities such as jogging or tennis as the way to fitness, but there are many ways you and your family can be more active. Anything that makes you breathe hard and gets your heart pumping is exercise. Here are some tips: 
? Walk to do errands or to take your child to school or the bus. 
? Go for a family bike ride after dinner instead of watching TV. Where can you learn more? Go to http://www.Carsabi.Bringrs/ Enter F352 in the search box to learn more about \"A Healthy Lifestyle: Care Instructions. \" Current as of: January 31, 2020               Content Version: 12.6 © 2343-1501 [employer]. Care instructions adapted under license by [employer] (which disclaims liability or warranty for this information). If you have questions about a medical condition or this instruction, always ask your healthcare professional. [place] [address] any warranty or liability for your use of this information.

## 2020-11-10 NOTE — PROGRESS NOTES
Caregiver id x [phone], notified as per Dr. Ce العراقي and verbalized understanding. Agrees to starting Lipitor, please send to pharm.

## 2020-11-10 NOTE — PROGRESS NOTES
Cholesterol is quite high, recommend starting Lipitor 10 mg nightly. If agreeable will send in then recheck in 8 to 12 weeks fasting.  
 
Phenytoin level is normal. 
 
Keppra level is a little bit low, please follow-up with specialist.

## 2021-01-01 ENCOUNTER — DOCUMENTATION ONLY (OUTPATIENT)
Dept: FAMILY MEDICINE CLINIC | Age: 51
End: 2021-01-01

## 2021-01-01 ENCOUNTER — APPOINTMENT (OUTPATIENT)
Dept: CT IMAGING | Age: 51
DRG: 053 | End: 2021-01-01
Attending: STUDENT IN AN ORGANIZED HEALTH CARE EDUCATION/TRAINING PROGRAM
Payer: MEDICAID

## 2021-01-01 ENCOUNTER — APPOINTMENT (OUTPATIENT)
Dept: GENERAL RADIOLOGY | Age: 51
DRG: 053 | End: 2021-01-01
Attending: STUDENT IN AN ORGANIZED HEALTH CARE EDUCATION/TRAINING PROGRAM
Payer: MEDICAID

## 2021-01-01 ENCOUNTER — APPOINTMENT (OUTPATIENT)
Dept: CT IMAGING | Age: 51
DRG: 720 | End: 2021-01-01
Attending: FAMILY MEDICINE
Payer: MEDICAID

## 2021-01-01 ENCOUNTER — TELEPHONE (OUTPATIENT)
Dept: FAMILY MEDICINE CLINIC | Age: 51
End: 2021-01-01

## 2021-01-01 ENCOUNTER — APPOINTMENT (OUTPATIENT)
Dept: GENERAL RADIOLOGY | Age: 51
DRG: 053 | End: 2021-01-01
Attending: INTERNAL MEDICINE
Payer: MEDICAID

## 2021-01-01 ENCOUNTER — APPOINTMENT (OUTPATIENT)
Dept: GENERAL RADIOLOGY | Age: 51
DRG: 720 | End: 2021-01-01
Attending: STUDENT IN AN ORGANIZED HEALTH CARE EDUCATION/TRAINING PROGRAM
Payer: MEDICAID

## 2021-01-01 ENCOUNTER — APPOINTMENT (OUTPATIENT)
Dept: GENERAL RADIOLOGY | Age: 51
DRG: 720 | End: 2021-01-01
Attending: EMERGENCY MEDICINE
Payer: MEDICAID

## 2021-01-01 ENCOUNTER — HOSPICE ADMISSION (OUTPATIENT)
Dept: HOSPICE | Facility: HOSPICE | Age: 51
End: 2021-01-01
Payer: MEDICAID

## 2021-01-01 ENCOUNTER — VIRTUAL VISIT (OUTPATIENT)
Dept: FAMILY MEDICINE CLINIC | Age: 51
End: 2021-01-01
Payer: MEDICAID

## 2021-01-01 ENCOUNTER — HOSPITAL ENCOUNTER (INPATIENT)
Age: 51
LOS: 15 days | Discharge: NURSING, REHAB OR LONG-TERM CARE | DRG: 053 | End: 2021-04-14
Attending: STUDENT IN AN ORGANIZED HEALTH CARE EDUCATION/TRAINING PROGRAM | Admitting: FAMILY MEDICINE
Payer: MEDICAID

## 2021-01-01 ENCOUNTER — HOSPITAL ENCOUNTER (INPATIENT)
Age: 51
LOS: 6 days | Discharge: HOME HEALTH | DRG: 720 | End: 2021-04-22
Attending: EMERGENCY MEDICINE | Admitting: FAMILY MEDICINE
Payer: MEDICAID

## 2021-01-01 ENCOUNTER — TELEPHONE (OUTPATIENT)
Dept: PALLATIVE CARE | Age: 51
End: 2021-01-01

## 2021-01-01 ENCOUNTER — APPOINTMENT (OUTPATIENT)
Dept: GENERAL RADIOLOGY | Age: 51
DRG: 720 | End: 2021-01-01
Attending: INTERNAL MEDICINE
Payer: MEDICAID

## 2021-01-01 ENCOUNTER — APPOINTMENT (OUTPATIENT)
Dept: GENERAL RADIOLOGY | Age: 51
DRG: 053 | End: 2021-01-01
Attending: FAMILY MEDICINE
Payer: MEDICAID

## 2021-01-01 ENCOUNTER — TRANSCRIBE ORDER (OUTPATIENT)
Dept: FAMILY MEDICINE CLINIC | Age: 51
End: 2021-01-01

## 2021-01-01 ENCOUNTER — HOSPITAL ENCOUNTER (INPATIENT)
Age: 51
LOS: 3 days | End: 2021-06-04
Attending: FAMILY MEDICINE | Admitting: FAMILY MEDICINE
Payer: MEDICAID

## 2021-01-01 ENCOUNTER — HOSPITAL ENCOUNTER (INPATIENT)
Age: 51
LOS: 8 days | Discharge: HOSPICE | DRG: 720 | End: 2021-06-01
Attending: STUDENT IN AN ORGANIZED HEALTH CARE EDUCATION/TRAINING PROGRAM | Admitting: FAMILY MEDICINE
Payer: MEDICAID

## 2021-01-01 ENCOUNTER — OFFICE VISIT (OUTPATIENT)
Dept: FAMILY MEDICINE CLINIC | Age: 51
End: 2021-01-01
Payer: MEDICAID

## 2021-01-01 ENCOUNTER — APPOINTMENT (OUTPATIENT)
Dept: VASCULAR SURGERY | Age: 51
DRG: 720 | End: 2021-01-01
Attending: STUDENT IN AN ORGANIZED HEALTH CARE EDUCATION/TRAINING PROGRAM
Payer: MEDICAID

## 2021-01-01 VITALS
BODY MASS INDEX: 28 KG/M2 | SYSTOLIC BLOOD PRESSURE: 108 MMHG | HEART RATE: 87 BPM | RESPIRATION RATE: 16 BRPM | WEIGHT: 129.8 LBS | OXYGEN SATURATION: 93 % | DIASTOLIC BLOOD PRESSURE: 77 MMHG | TEMPERATURE: 98.8 F | HEIGHT: 57 IN

## 2021-01-01 VITALS
WEIGHT: 143.2 LBS | TEMPERATURE: 98.4 F | BODY MASS INDEX: 30.89 KG/M2 | HEART RATE: 120 BPM | SYSTOLIC BLOOD PRESSURE: 127 MMHG | OXYGEN SATURATION: 95 % | DIASTOLIC BLOOD PRESSURE: 67 MMHG | RESPIRATION RATE: 24 BRPM | HEIGHT: 57 IN

## 2021-01-01 VITALS
TEMPERATURE: 98.2 F | RESPIRATION RATE: 20 BRPM | WEIGHT: 130.5 LBS | OXYGEN SATURATION: 99 % | BODY MASS INDEX: 28.15 KG/M2 | HEART RATE: 88 BPM | HEIGHT: 57 IN | SYSTOLIC BLOOD PRESSURE: 115 MMHG | DIASTOLIC BLOOD PRESSURE: 68 MMHG

## 2021-01-01 VITALS
DIASTOLIC BLOOD PRESSURE: 68 MMHG | OXYGEN SATURATION: 95 % | RESPIRATION RATE: 24 BRPM | HEART RATE: 49 BPM | TEMPERATURE: 97.8 F | SYSTOLIC BLOOD PRESSURE: 90 MMHG

## 2021-01-01 VITALS
RESPIRATION RATE: 34 BRPM | SYSTOLIC BLOOD PRESSURE: 116 MMHG | BODY MASS INDEX: 30.59 KG/M2 | HEIGHT: 57 IN | DIASTOLIC BLOOD PRESSURE: 80 MMHG | HEART RATE: 123 BPM | OXYGEN SATURATION: 97 % | TEMPERATURE: 98.9 F | WEIGHT: 141.8 LBS

## 2021-01-01 DIAGNOSIS — F72 SEVERE INTELLECTUAL DISABILITY: ICD-10-CM

## 2021-01-01 DIAGNOSIS — R00.0 TACHYCARDIA: ICD-10-CM

## 2021-01-01 DIAGNOSIS — Z93.0 TRACHEOSTOMY DEPENDENCE (HCC): ICD-10-CM

## 2021-01-01 DIAGNOSIS — R19.7 DIARRHEA, UNSPECIFIED TYPE: Primary | ICD-10-CM

## 2021-01-01 DIAGNOSIS — R45.1 AGITATION: ICD-10-CM

## 2021-01-01 DIAGNOSIS — J18.9 PNEUMONIA DUE TO INFECTIOUS ORGANISM, UNSPECIFIED LATERALITY, UNSPECIFIED PART OF LUNG: ICD-10-CM

## 2021-01-01 DIAGNOSIS — R26.2 INABILITY TO WALK: ICD-10-CM

## 2021-01-01 DIAGNOSIS — Z99.3 WHEELCHAIR BOUND: ICD-10-CM

## 2021-01-01 DIAGNOSIS — J96.01 ACUTE RESPIRATORY FAILURE WITH HYPOXIA (HCC): ICD-10-CM

## 2021-01-01 DIAGNOSIS — I16.0 HYPERTENSIVE URGENCY: ICD-10-CM

## 2021-01-01 DIAGNOSIS — J96.11 CHRONIC RESPIRATORY FAILURE WITH HYPOXIA (HCC): ICD-10-CM

## 2021-01-01 DIAGNOSIS — Z71.89 DNR (DO NOT RESUSCITATE) DISCUSSION: ICD-10-CM

## 2021-01-01 DIAGNOSIS — Z86.69 HX OF SEIZURE DISORDER: ICD-10-CM

## 2021-01-01 DIAGNOSIS — K52.9 COLITIS: Primary | ICD-10-CM

## 2021-01-01 DIAGNOSIS — Z78.9 ON TUBE FEEDING DIET: ICD-10-CM

## 2021-01-01 DIAGNOSIS — Z71.89 GOALS OF CARE, COUNSELING/DISCUSSION: ICD-10-CM

## 2021-01-01 DIAGNOSIS — D64.9 ANEMIA, UNSPECIFIED TYPE: ICD-10-CM

## 2021-01-01 DIAGNOSIS — N17.9 AKI (ACUTE KIDNEY INJURY) (HCC): ICD-10-CM

## 2021-01-01 DIAGNOSIS — I95.2 HYPOTENSION DUE TO DRUGS: ICD-10-CM

## 2021-01-01 DIAGNOSIS — B37.0 ORAL THRUSH: ICD-10-CM

## 2021-01-01 DIAGNOSIS — G93.49 CHRONIC STATIC ENCEPHALOPATHY: ICD-10-CM

## 2021-01-01 DIAGNOSIS — Z51.81 SUBTHERAPEUTIC PHENYTOIN LEVEL: ICD-10-CM

## 2021-01-01 DIAGNOSIS — R79.89 ELEVATED SERUM CREATININE: ICD-10-CM

## 2021-01-01 DIAGNOSIS — R65.20 SEPSIS WITH ACUTE ORGAN DYSFUNCTION WITHOUT SEPTIC SHOCK, DUE TO UNSPECIFIED ORGANISM, UNSPECIFIED TYPE (HCC): Primary | ICD-10-CM

## 2021-01-01 DIAGNOSIS — A04.72 C. DIFFICILE DIARRHEA: Primary | ICD-10-CM

## 2021-01-01 DIAGNOSIS — Z51.5 HOSPICE CARE PATIENT: ICD-10-CM

## 2021-01-01 DIAGNOSIS — R09.02 HYPOXIA: ICD-10-CM

## 2021-01-01 DIAGNOSIS — R65.20 SEVERE SEPSIS (HCC): ICD-10-CM

## 2021-01-01 DIAGNOSIS — R56.9 SEIZURE (HCC): ICD-10-CM

## 2021-01-01 DIAGNOSIS — S01.00XA OPEN WOUND OF SCALP, UNSPECIFIED OPEN WOUND TYPE, INITIAL ENCOUNTER: Primary | ICD-10-CM

## 2021-01-01 DIAGNOSIS — R60.0 EDEMA OF BOTH LOWER LEGS: ICD-10-CM

## 2021-01-01 DIAGNOSIS — E78.2 MIXED HYPERLIPIDEMIA: Primary | ICD-10-CM

## 2021-01-01 DIAGNOSIS — K21.9 GASTROESOPHAGEAL REFLUX DISEASE WITHOUT ESOPHAGITIS: ICD-10-CM

## 2021-01-01 DIAGNOSIS — Q90.9 DOWN'S SYNDROME: ICD-10-CM

## 2021-01-01 DIAGNOSIS — J39.8 INCREASED TRACHEAL SECRETIONS: ICD-10-CM

## 2021-01-01 DIAGNOSIS — R06.4 LABORED BREATHING: ICD-10-CM

## 2021-01-01 DIAGNOSIS — L89.91 PRESSURE INJURY, STAGE 1, UNSPECIFIED LOCATION: ICD-10-CM

## 2021-01-01 DIAGNOSIS — R41.82 ALTERED MENTAL STATUS, UNSPECIFIED ALTERED MENTAL STATUS TYPE: ICD-10-CM

## 2021-01-01 DIAGNOSIS — G40.901 STATUS EPILEPTICUS (HCC): Primary | ICD-10-CM

## 2021-01-01 DIAGNOSIS — K59.04 CHRONIC IDIOPATHIC CONSTIPATION: ICD-10-CM

## 2021-01-01 DIAGNOSIS — R52 PAIN: ICD-10-CM

## 2021-01-01 DIAGNOSIS — R06.02 SHORTNESS OF BREATH: ICD-10-CM

## 2021-01-01 DIAGNOSIS — K11.7 INCREASED OROPHARYNGEAL SECRETIONS: ICD-10-CM

## 2021-01-01 DIAGNOSIS — J18.9 HOSPITAL-ACQUIRED PNEUMONIA: ICD-10-CM

## 2021-01-01 DIAGNOSIS — A41.9 SEVERE SEPSIS (HCC): ICD-10-CM

## 2021-01-01 DIAGNOSIS — Z51.81 MEDICATION MONITORING ENCOUNTER: ICD-10-CM

## 2021-01-01 DIAGNOSIS — G40.909 SEIZURE DISORDER (HCC): Primary | ICD-10-CM

## 2021-01-01 DIAGNOSIS — J15.1 PNEUMONIA OF BOTH LUNGS DUE TO PSEUDOMONAS SPECIES, UNSPECIFIED PART OF LUNG (HCC): Primary | ICD-10-CM

## 2021-01-01 DIAGNOSIS — E61.1 IRON DEFICIENCY: ICD-10-CM
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
## 2021-01-01 DIAGNOSIS — R19.7 DIARRHEA OF PRESUMED INFECTIOUS ORIGIN: Primary | ICD-10-CM

## 2021-01-01 DIAGNOSIS — R79.89 ELEVATED LFTS: ICD-10-CM

## 2021-01-01 DIAGNOSIS — R06.82 TACHYPNEA: ICD-10-CM

## 2021-01-01 DIAGNOSIS — R79.89 ELEVATED D-DIMER: ICD-10-CM

## 2021-01-01 DIAGNOSIS — A41.9 SEPSIS WITH ACUTE ORGAN DYSFUNCTION WITHOUT SEPTIC SHOCK, DUE TO UNSPECIFIED ORGANISM, UNSPECIFIED TYPE (HCC): Primary | ICD-10-CM

## 2021-01-01 DIAGNOSIS — E87.6 HYPOKALEMIA: ICD-10-CM

## 2021-01-01 DIAGNOSIS — I95.9 HYPOTENSION, UNSPECIFIED HYPOTENSION TYPE: ICD-10-CM

## 2021-01-01 DIAGNOSIS — Z71.89 ADVANCED CARE PLANNING/COUNSELING DISCUSSION: ICD-10-CM

## 2021-01-01 DIAGNOSIS — N18.32 STAGE 3B CHRONIC KIDNEY DISEASE (HCC): ICD-10-CM

## 2021-01-01 DIAGNOSIS — D63.8 ANEMIA IN OTHER CHRONIC DISEASES CLASSIFIED ELSEWHERE: ICD-10-CM

## 2021-01-01 DIAGNOSIS — E78.2 MIXED HYPERLIPIDEMIA: ICD-10-CM

## 2021-01-01 DIAGNOSIS — R45.1 RESTLESSNESS AND AGITATION: ICD-10-CM

## 2021-01-01 DIAGNOSIS — E87.20 LACTIC ACIDOSIS: ICD-10-CM

## 2021-01-01 DIAGNOSIS — R78.89 ELEVATED DILANTIN LEVEL: ICD-10-CM

## 2021-01-01 DIAGNOSIS — N30.00 ACUTE CYSTITIS WITHOUT HEMATURIA: ICD-10-CM

## 2021-01-01 DIAGNOSIS — J95.851 VAP (VENTILATOR-ASSOCIATED PNEUMONIA) (HCC): ICD-10-CM

## 2021-01-01 DIAGNOSIS — R53.81 DEBILITY: ICD-10-CM

## 2021-01-01 LAB
ABO + RH BLD: NORMAL
ALBUMIN SERPL-MCNC: 1.7 G/DL (ref 3.5–5)
ALBUMIN SERPL-MCNC: 1.8 G/DL (ref 3.5–5)
ALBUMIN SERPL-MCNC: 1.8 G/DL (ref 3.5–5)
ALBUMIN SERPL-MCNC: 2 G/DL (ref 3.5–5)
ALBUMIN SERPL-MCNC: 2.1 G/DL (ref 3.5–5)
ALBUMIN SERPL-MCNC: 2.3 G/DL (ref 3.5–5)
ALBUMIN SERPL-MCNC: 2.4 G/DL (ref 3.5–5)
ALBUMIN SERPL-MCNC: 2.6 G/DL (ref 3.5–5)
ALBUMIN SERPL-MCNC: 2.8 G/DL (ref 3.5–5)
ALBUMIN SERPL-MCNC: 2.9 G/DL (ref 3.5–5)
ALBUMIN SERPL-MCNC: 3 G/DL (ref 3.5–5)
ALBUMIN SERPL-MCNC: 3.2 G/DL (ref 3.5–5)
ALBUMIN SERPL-MCNC: 3.4 G/DL (ref 3.5–5)
ALBUMIN/GLOB SERPL: 0.4 {RATIO} (ref 1.1–2.2)
ALBUMIN/GLOB SERPL: 0.5 {RATIO} (ref 1.1–2.2)
ALBUMIN/GLOB SERPL: 0.6 {RATIO} (ref 1.1–2.2)
ALBUMIN/GLOB SERPL: 0.9 {RATIO} (ref 1.1–2.2)
ALP SERPL-CCNC: 109 U/L (ref 45–117)
ALP SERPL-CCNC: 118 U/L (ref 45–117)
ALP SERPL-CCNC: 120 U/L (ref 45–117)
ALP SERPL-CCNC: 121 U/L (ref 45–117)
ALP SERPL-CCNC: 121 U/L (ref 45–117)
ALP SERPL-CCNC: 123 U/L (ref 45–117)
ALP SERPL-CCNC: 124 U/L (ref 45–117)
ALP SERPL-CCNC: 126 U/L (ref 45–117)
ALP SERPL-CCNC: 129 U/L (ref 45–117)
ALP SERPL-CCNC: 134 U/L (ref 45–117)
ALP SERPL-CCNC: 144 U/L (ref 45–117)
ALP SERPL-CCNC: 146 U/L (ref 45–117)
ALP SERPL-CCNC: 148 U/L (ref 45–117)
ALP SERPL-CCNC: 172 U/L (ref 45–117)
ALP SERPL-CCNC: 177 U/L (ref 45–117)
ALP SERPL-CCNC: 181 U/L (ref 45–117)
ALP SERPL-CCNC: 184 U/L (ref 45–117)
ALP SERPL-CCNC: 188 U/L (ref 45–117)
ALP SERPL-CCNC: 207 U/L (ref 45–117)
ALP SERPL-CCNC: 214 U/L (ref 45–117)
ALP SERPL-CCNC: 263 U/L (ref 45–117)
ALP SERPL-CCNC: 95 U/L (ref 45–117)
ALT SERPL-CCNC: 126 U/L (ref 12–78)
ALT SERPL-CCNC: 182 U/L (ref 12–78)
ALT SERPL-CCNC: 203 U/L (ref 12–78)
ALT SERPL-CCNC: 203 U/L (ref 12–78)
ALT SERPL-CCNC: 212 U/L (ref 12–78)
ALT SERPL-CCNC: 22 U/L (ref 12–78)
ALT SERPL-CCNC: 23 U/L (ref 12–78)
ALT SERPL-CCNC: 24 U/L (ref 12–78)
ALT SERPL-CCNC: 25 U/L (ref 12–78)
ALT SERPL-CCNC: 27 U/L (ref 12–78)
ALT SERPL-CCNC: 28 U/L (ref 12–78)
ALT SERPL-CCNC: 32 U/L (ref 12–78)
ALT SERPL-CCNC: 35 U/L (ref 12–78)
ALT SERPL-CCNC: 36 U/L (ref 12–78)
ALT SERPL-CCNC: 36 U/L (ref 12–78)
ALT SERPL-CCNC: 37 U/L (ref 12–78)
ALT SERPL-CCNC: 42 U/L (ref 12–78)
ALT SERPL-CCNC: 45 U/L (ref 12–78)
ALT SERPL-CCNC: 47 U/L (ref 12–78)
ALT SERPL-CCNC: 47 U/L (ref 12–78)
ALT SERPL-CCNC: 48 U/L (ref 12–78)
ALT SERPL-CCNC: 48 U/L (ref 12–78)
ALT SERPL-CCNC: 50 U/L (ref 12–78)
ALT SERPL-CCNC: 78 U/L (ref 12–78)
ANION GAP SERPL CALC-SCNC: 10 MMOL/L (ref 5–15)
ANION GAP SERPL CALC-SCNC: 11 MMOL/L (ref 5–15)
ANION GAP SERPL CALC-SCNC: 15 MMOL/L (ref 5–15)
ANION GAP SERPL CALC-SCNC: 4 MMOL/L (ref 5–15)
ANION GAP SERPL CALC-SCNC: 5 MMOL/L (ref 5–15)
ANION GAP SERPL CALC-SCNC: 6 MMOL/L (ref 5–15)
ANION GAP SERPL CALC-SCNC: 7 MMOL/L (ref 5–15)
ANION GAP SERPL CALC-SCNC: 8 MMOL/L (ref 5–15)
ANION GAP SERPL CALC-SCNC: 9 MMOL/L (ref 5–15)
APPEARANCE UR: ABNORMAL
APPEARANCE UR: CLEAR
APPEARANCE UR: CLEAR
ARTERIAL PATENCY WRIST A: ABNORMAL
ARTERIAL PATENCY WRIST A: NO
ARTERIAL PATENCY WRIST A: YES
ARTERIAL PATENCY WRIST A: YES
AST SERPL-CCNC: 112 U/L (ref 15–37)
AST SERPL-CCNC: 136 U/L (ref 15–37)
AST SERPL-CCNC: 222 U/L (ref 15–37)
AST SERPL-CCNC: 25 U/L (ref 15–37)
AST SERPL-CCNC: 25 U/L (ref 15–37)
AST SERPL-CCNC: 284 U/L (ref 15–37)
AST SERPL-CCNC: 292 U/L (ref 15–37)
AST SERPL-CCNC: 30 U/L (ref 15–37)
AST SERPL-CCNC: 30 U/L (ref 15–37)
AST SERPL-CCNC: 32 U/L (ref 15–37)
AST SERPL-CCNC: 38 U/L (ref 15–37)
AST SERPL-CCNC: 39 U/L (ref 15–37)
AST SERPL-CCNC: 40 U/L (ref 15–37)
AST SERPL-CCNC: 44 U/L (ref 15–37)
AST SERPL-CCNC: 45 U/L (ref 15–37)
AST SERPL-CCNC: 45 U/L (ref 15–37)
AST SERPL-CCNC: 48 U/L (ref 15–37)
AST SERPL-CCNC: 49 U/L (ref 15–37)
AST SERPL-CCNC: 52 U/L (ref 15–37)
AST SERPL-CCNC: 59 U/L (ref 15–37)
AST SERPL-CCNC: 60 U/L (ref 15–37)
AST SERPL-CCNC: 61 U/L (ref 15–37)
AST SERPL-CCNC: 64 U/L (ref 15–37)
AST SERPL-CCNC: 80 U/L (ref 15–37)
ATRIAL RATE: 119 BPM
ATRIAL RATE: 131 BPM
ATRIAL RATE: 135 BPM
ATRIAL RATE: 157 BPM
ATRIAL RATE: 90 BPM
B PERT DNA SPEC QL NAA+PROBE: NOT DETECTED
B PERT DNA SPEC QL NAA+PROBE: NOT DETECTED
BACTERIA SPEC CULT: ABNORMAL
BACTERIA SPEC CULT: NORMAL
BACTERIA URNS QL MICRO: ABNORMAL /HPF
BACTERIA URNS QL MICRO: NEGATIVE /HPF
BACTERIA URNS QL MICRO: NEGATIVE /HPF
BASE DEFICIT BLDA-SCNC: 0.1 MMOL/L
BASE DEFICIT BLDA-SCNC: 4.9 MMOL/L
BASE DEFICIT BLDV-SCNC: 3.9 MMOL/L
BASE EXCESS BLDA CALC-SCNC: 0.1 MMOL/L
BASE EXCESS BLDA CALC-SCNC: 0.7 MMOL/L
BASOPHILS # BLD: 0 K/UL (ref 0–0.1)
BASOPHILS # BLD: 0.1 K/UL (ref 0–0.1)
BASOPHILS # BLD: 0.1 K/UL (ref 0–0.1)
BASOPHILS # BLD: 0.2 K/UL (ref 0–0.1)
BASOPHILS NFR BLD: 0 % (ref 0–1)
BASOPHILS NFR BLD: 1 % (ref 0–1)
BASOPHILS NFR BLD: 2 % (ref 0–1)
BDY SITE: ABNORMAL
BILIRUB SERPL-MCNC: 0.1 MG/DL (ref 0.2–1)
BILIRUB SERPL-MCNC: 0.2 MG/DL (ref 0.2–1)
BILIRUB SERPL-MCNC: 0.3 MG/DL (ref 0.2–1)
BILIRUB SERPL-MCNC: 0.4 MG/DL (ref 0.2–1)
BILIRUB SERPL-MCNC: 0.5 MG/DL (ref 0.2–1)
BILIRUB SERPL-MCNC: <0.1 MG/DL (ref 0.2–1)
BILIRUB UR QL: NEGATIVE
BLD PROD TYP BPU: NORMAL
BLOOD GROUP ANTIBODIES SERPL: NORMAL
BNP SERPL-MCNC: 388 PG/ML
BORDETELLA PARAPERTUSSIS PCR, BORPAR: NOT DETECTED
BORDETELLA PARAPERTUSSIS PCR, BORPAR: NOT DETECTED
BPU ID: NORMAL
BREATHS.SPONTANEOUS ON VENT: 22
BUN SERPL-MCNC: 11 MG/DL (ref 6–20)
BUN SERPL-MCNC: 12 MG/DL (ref 6–20)
BUN SERPL-MCNC: 12 MG/DL (ref 6–20)
BUN SERPL-MCNC: 13 MG/DL (ref 6–20)
BUN SERPL-MCNC: 14 MG/DL (ref 6–20)
BUN SERPL-MCNC: 17 MG/DL (ref 6–20)
BUN SERPL-MCNC: 17 MG/DL (ref 6–20)
BUN SERPL-MCNC: 18 MG/DL (ref 6–20)
BUN SERPL-MCNC: 21 MG/DL (ref 6–20)
BUN SERPL-MCNC: 22 MG/DL (ref 6–20)
BUN SERPL-MCNC: 22 MG/DL (ref 6–20)
BUN SERPL-MCNC: 23 MG/DL (ref 6–20)
BUN SERPL-MCNC: 24 MG/DL (ref 6–20)
BUN SERPL-MCNC: 24 MG/DL (ref 6–20)
BUN SERPL-MCNC: 25 MG/DL (ref 6–20)
BUN SERPL-MCNC: 26 MG/DL (ref 6–20)
BUN SERPL-MCNC: 27 MG/DL (ref 6–20)
BUN SERPL-MCNC: 29 MG/DL (ref 6–20)
BUN SERPL-MCNC: 29 MG/DL (ref 6–20)
BUN SERPL-MCNC: 36 MG/DL (ref 6–20)
BUN SERPL-MCNC: 38 MG/DL (ref 6–20)
BUN SERPL-MCNC: 44 MG/DL (ref 6–20)
BUN SERPL-MCNC: 47 MG/DL (ref 6–20)
BUN SERPL-MCNC: 50 MG/DL (ref 6–20)
BUN SERPL-MCNC: 55 MG/DL (ref 6–20)
BUN SERPL-MCNC: 56 MG/DL (ref 6–20)
BUN/CREAT SERPL: 17 (ref 12–20)
BUN/CREAT SERPL: 18 (ref 12–20)
BUN/CREAT SERPL: 18 (ref 12–20)
BUN/CREAT SERPL: 19 (ref 12–20)
BUN/CREAT SERPL: 20 (ref 12–20)
BUN/CREAT SERPL: 20 (ref 12–20)
BUN/CREAT SERPL: 21 (ref 12–20)
BUN/CREAT SERPL: 23 (ref 12–20)
BUN/CREAT SERPL: 23 (ref 12–20)
BUN/CREAT SERPL: 24 (ref 12–20)
BUN/CREAT SERPL: 26 (ref 12–20)
BUN/CREAT SERPL: 27 (ref 12–20)
BUN/CREAT SERPL: 28 (ref 12–20)
BUN/CREAT SERPL: 30 (ref 12–20)
BUN/CREAT SERPL: 32 (ref 12–20)
BUN/CREAT SERPL: 36 (ref 12–20)
BUN/CREAT SERPL: 38 (ref 12–20)
BUN/CREAT SERPL: 39 (ref 12–20)
BUN/CREAT SERPL: 39 (ref 12–20)
BUN/CREAT SERPL: 40 (ref 12–20)
C DIFF GDH STL QL: NEGATIVE
C DIFF TOX A+B STL QL IA: NEGATIVE
C PNEUM DNA SPEC QL NAA+PROBE: NOT DETECTED
C PNEUM DNA SPEC QL NAA+PROBE: NOT DETECTED
CALCIUM SERPL-MCNC: 10.3 MG/DL (ref 8.5–10.1)
CALCIUM SERPL-MCNC: 10.4 MG/DL (ref 8.5–10.1)
CALCIUM SERPL-MCNC: 7.5 MG/DL (ref 8.5–10.1)
CALCIUM SERPL-MCNC: 7.7 MG/DL (ref 8.5–10.1)
CALCIUM SERPL-MCNC: 7.8 MG/DL (ref 8.5–10.1)
CALCIUM SERPL-MCNC: 7.9 MG/DL (ref 8.5–10.1)
CALCIUM SERPL-MCNC: 8 MG/DL (ref 8.5–10.1)
CALCIUM SERPL-MCNC: 8.3 MG/DL (ref 8.5–10.1)
CALCIUM SERPL-MCNC: 8.5 MG/DL (ref 8.5–10.1)
CALCIUM SERPL-MCNC: 8.6 MG/DL (ref 8.5–10.1)
CALCIUM SERPL-MCNC: 8.7 MG/DL (ref 8.5–10.1)
CALCIUM SERPL-MCNC: 8.7 MG/DL (ref 8.5–10.1)
CALCIUM SERPL-MCNC: 8.8 MG/DL (ref 8.5–10.1)
CALCIUM SERPL-MCNC: 8.9 MG/DL (ref 8.5–10.1)
CALCIUM SERPL-MCNC: 9 MG/DL (ref 8.5–10.1)
CALCIUM SERPL-MCNC: 9 MG/DL (ref 8.5–10.1)
CALCIUM SERPL-MCNC: 9.1 MG/DL (ref 8.5–10.1)
CALCIUM SERPL-MCNC: 9.2 MG/DL (ref 8.5–10.1)
CALCIUM SERPL-MCNC: 9.3 MG/DL (ref 8.5–10.1)
CALCIUM SERPL-MCNC: 9.3 MG/DL (ref 8.5–10.1)
CALCIUM SERPL-MCNC: 9.4 MG/DL (ref 8.5–10.1)
CALCIUM SERPL-MCNC: 9.5 MG/DL (ref 8.5–10.1)
CALCIUM SERPL-MCNC: 9.6 MG/DL (ref 8.5–10.1)
CALCIUM SERPL-MCNC: 9.6 MG/DL (ref 8.5–10.1)
CALCIUM SERPL-MCNC: 9.7 MG/DL (ref 8.5–10.1)
CALCIUM SERPL-MCNC: 9.8 MG/DL (ref 8.5–10.1)
CALCIUM SERPL-MCNC: 9.9 MG/DL (ref 8.5–10.1)
CALCULATED P AXIS, ECG09: 59 DEGREES
CALCULATED P AXIS, ECG09: 60 DEGREES
CALCULATED P AXIS, ECG09: 61 DEGREES
CALCULATED P AXIS, ECG09: 64 DEGREES
CALCULATED P AXIS, ECG09: 77 DEGREES
CALCULATED R AXIS, ECG10: 58 DEGREES
CALCULATED R AXIS, ECG10: 63 DEGREES
CALCULATED R AXIS, ECG10: 70 DEGREES
CALCULATED R AXIS, ECG10: 83 DEGREES
CALCULATED R AXIS, ECG10: 83 DEGREES
CALCULATED T AXIS, ECG11: 36 DEGREES
CALCULATED T AXIS, ECG11: 46 DEGREES
CALCULATED T AXIS, ECG11: 55 DEGREES
CALCULATED T AXIS, ECG11: 56 DEGREES
CALCULATED T AXIS, ECG11: 8 DEGREES
CAMPYLOBACTER SPECIES, DNA: NEGATIVE
CC UR VC: ABNORMAL
CHLORIDE SERPL-SCNC: 101 MMOL/L (ref 97–108)
CHLORIDE SERPL-SCNC: 102 MMOL/L (ref 97–108)
CHLORIDE SERPL-SCNC: 103 MMOL/L (ref 97–108)
CHLORIDE SERPL-SCNC: 105 MMOL/L (ref 97–108)
CHLORIDE SERPL-SCNC: 106 MMOL/L (ref 97–108)
CHLORIDE SERPL-SCNC: 107 MMOL/L (ref 97–108)
CHLORIDE SERPL-SCNC: 108 MMOL/L (ref 97–108)
CHLORIDE SERPL-SCNC: 108 MMOL/L (ref 97–108)
CHLORIDE SERPL-SCNC: 109 MMOL/L (ref 97–108)
CHLORIDE SERPL-SCNC: 110 MMOL/L (ref 97–108)
CHLORIDE SERPL-SCNC: 112 MMOL/L (ref 97–108)
CHLORIDE SERPL-SCNC: 113 MMOL/L (ref 97–108)
CHLORIDE SERPL-SCNC: 113 MMOL/L (ref 97–108)
CHLORIDE SERPL-SCNC: 115 MMOL/L (ref 97–108)
CHLORIDE SERPL-SCNC: 116 MMOL/L (ref 97–108)
CHLORIDE SERPL-SCNC: 121 MMOL/L (ref 97–108)
CHLORIDE SERPL-SCNC: 123 MMOL/L (ref 97–108)
CHLORIDE SERPL-SCNC: 95 MMOL/L (ref 97–108)
CHLORIDE SERPL-SCNC: 99 MMOL/L (ref 97–108)
CHOLEST SERPL-MCNC: 229 MG/DL
CO2 SERPL-SCNC: 18 MMOL/L (ref 21–32)
CO2 SERPL-SCNC: 18 MMOL/L (ref 21–32)
CO2 SERPL-SCNC: 19 MMOL/L (ref 21–32)
CO2 SERPL-SCNC: 20 MMOL/L (ref 21–32)
CO2 SERPL-SCNC: 21 MMOL/L (ref 21–32)
CO2 SERPL-SCNC: 21 MMOL/L (ref 21–32)
CO2 SERPL-SCNC: 22 MMOL/L (ref 21–32)
CO2 SERPL-SCNC: 23 MMOL/L (ref 21–32)
CO2 SERPL-SCNC: 24 MMOL/L (ref 21–32)
CO2 SERPL-SCNC: 25 MMOL/L (ref 21–32)
CO2 SERPL-SCNC: 25 MMOL/L (ref 21–32)
CO2 SERPL-SCNC: 26 MMOL/L (ref 21–32)
CO2 SERPL-SCNC: 26 MMOL/L (ref 21–32)
CO2 SERPL-SCNC: 27 MMOL/L (ref 21–32)
CO2 SERPL-SCNC: 28 MMOL/L (ref 21–32)
CO2 SERPL-SCNC: 28 MMOL/L (ref 21–32)
CO2 SERPL-SCNC: 29 MMOL/L (ref 21–32)
CO2 SERPL-SCNC: 29 MMOL/L (ref 21–32)
CO2 SERPL-SCNC: 30 MMOL/L (ref 21–32)
CO2 SERPL-SCNC: 32 MMOL/L (ref 21–32)
COLOR UR: ABNORMAL
COMMENT, HOLDF: NORMAL
COVID-19 RAPID TEST, COVR: NOT DETECTED
CREAT SERPL-MCNC: 0.61 MG/DL (ref 0.55–1.02)
CREAT SERPL-MCNC: 0.64 MG/DL (ref 0.55–1.02)
CREAT SERPL-MCNC: 0.65 MG/DL (ref 0.55–1.02)
CREAT SERPL-MCNC: 0.65 MG/DL (ref 0.55–1.02)
CREAT SERPL-MCNC: 0.67 MG/DL (ref 0.55–1.02)
CREAT SERPL-MCNC: 0.68 MG/DL (ref 0.55–1.02)
CREAT SERPL-MCNC: 0.7 MG/DL (ref 0.55–1.02)
CREAT SERPL-MCNC: 0.7 MG/DL (ref 0.55–1.02)
CREAT SERPL-MCNC: 0.74 MG/DL (ref 0.55–1.02)
CREAT SERPL-MCNC: 0.77 MG/DL (ref 0.55–1.02)
CREAT SERPL-MCNC: 0.78 MG/DL (ref 0.55–1.02)
CREAT SERPL-MCNC: 0.8 MG/DL (ref 0.55–1.02)
CREAT SERPL-MCNC: 0.86 MG/DL (ref 0.55–1.02)
CREAT SERPL-MCNC: 0.86 MG/DL (ref 0.55–1.02)
CREAT SERPL-MCNC: 0.9 MG/DL (ref 0.55–1.02)
CREAT SERPL-MCNC: 0.94 MG/DL (ref 0.55–1.02)
CREAT SERPL-MCNC: 0.96 MG/DL (ref 0.55–1.02)
CREAT SERPL-MCNC: 0.98 MG/DL (ref 0.55–1.02)
CREAT SERPL-MCNC: 1.1 MG/DL (ref 0.55–1.02)
CREAT SERPL-MCNC: 1.12 MG/DL (ref 0.55–1.02)
CREAT SERPL-MCNC: 1.14 MG/DL (ref 0.55–1.02)
CREAT SERPL-MCNC: 1.14 MG/DL (ref 0.55–1.02)
CREAT SERPL-MCNC: 1.17 MG/DL (ref 0.55–1.02)
CREAT SERPL-MCNC: 1.19 MG/DL (ref 0.55–1.02)
CREAT SERPL-MCNC: 1.24 MG/DL (ref 0.55–1.02)
CREAT SERPL-MCNC: 1.25 MG/DL (ref 0.55–1.02)
CREAT SERPL-MCNC: 1.31 MG/DL (ref 0.55–1.02)
CREAT SERPL-MCNC: 1.33 MG/DL (ref 0.55–1.02)
CREAT SERPL-MCNC: 1.37 MG/DL (ref 0.55–1.02)
CREAT SERPL-MCNC: 1.42 MG/DL (ref 0.55–1.02)
CREAT SERPL-MCNC: 1.43 MG/DL (ref 0.55–1.02)
CREAT SERPL-MCNC: 1.54 MG/DL (ref 0.55–1.02)
CREAT SERPL-MCNC: 1.56 MG/DL (ref 0.55–1.02)
CROSSMATCH RESULT,%XM: NORMAL
D DIMER PPP FEU-MCNC: 2.04 MG/L FEU (ref 0–0.65)
D DIMER PPP FEU-MCNC: 9.2 MG/L FEU (ref 0–0.65)
DATE LAST DOSE: ABNORMAL
DIAGNOSIS, 93000: NORMAL
DIFFERENTIAL METHOD BLD: ABNORMAL
ENTEROTOXIGEN E COLI, DNA: NEGATIVE
EOSINOPHIL # BLD: 0 K/UL (ref 0–0.4)
EOSINOPHIL # BLD: 0.1 K/UL (ref 0–0.4)
EOSINOPHIL NFR BLD: 0 % (ref 0–7)
EOSINOPHIL NFR BLD: 1 % (ref 0–7)
EPITH CASTS URNS QL MICRO: ABNORMAL /LPF
ERYTHROCYTE [DISTWIDTH] IN BLOOD BY AUTOMATED COUNT: 14.4 % (ref 11.5–14.5)
ERYTHROCYTE [DISTWIDTH] IN BLOOD BY AUTOMATED COUNT: 14.5 % (ref 11.5–14.5)
ERYTHROCYTE [DISTWIDTH] IN BLOOD BY AUTOMATED COUNT: 14.6 % (ref 11.5–14.5)
ERYTHROCYTE [DISTWIDTH] IN BLOOD BY AUTOMATED COUNT: 14.6 % (ref 11.5–14.5)
ERYTHROCYTE [DISTWIDTH] IN BLOOD BY AUTOMATED COUNT: 14.7 % (ref 11.5–14.5)
ERYTHROCYTE [DISTWIDTH] IN BLOOD BY AUTOMATED COUNT: 14.8 % (ref 11.5–14.5)
ERYTHROCYTE [DISTWIDTH] IN BLOOD BY AUTOMATED COUNT: 14.9 % (ref 11.5–14.5)
ERYTHROCYTE [DISTWIDTH] IN BLOOD BY AUTOMATED COUNT: 14.9 % (ref 11.5–14.5)
ERYTHROCYTE [DISTWIDTH] IN BLOOD BY AUTOMATED COUNT: 15 % (ref 11.5–14.5)
ERYTHROCYTE [DISTWIDTH] IN BLOOD BY AUTOMATED COUNT: 15.1 % (ref 11.5–14.5)
ERYTHROCYTE [DISTWIDTH] IN BLOOD BY AUTOMATED COUNT: 15.8 % (ref 11.5–14.5)
ERYTHROCYTE [DISTWIDTH] IN BLOOD BY AUTOMATED COUNT: 15.9 % (ref 11.5–14.5)
ERYTHROCYTE [DISTWIDTH] IN BLOOD BY AUTOMATED COUNT: 15.9 % (ref 11.5–14.5)
ERYTHROCYTE [DISTWIDTH] IN BLOOD BY AUTOMATED COUNT: 16 % (ref 11.5–14.5)
ERYTHROCYTE [DISTWIDTH] IN BLOOD BY AUTOMATED COUNT: 16.1 % (ref 11.5–14.5)
ERYTHROCYTE [DISTWIDTH] IN BLOOD BY AUTOMATED COUNT: 16.4 % (ref 11.5–14.5)
ERYTHROCYTE [DISTWIDTH] IN BLOOD BY AUTOMATED COUNT: 16.5 % (ref 11.5–14.5)
FERRITIN SERPL-MCNC: 211 NG/ML (ref 26–388)
FIO2 ON VENT: 40 %
FLUAV H1 2009 PAND RNA SPEC QL NAA+PROBE: NOT DETECTED
FLUAV H1 2009 PAND RNA SPEC QL NAA+PROBE: NOT DETECTED
FLUAV H1 RNA SPEC QL NAA+PROBE: NOT DETECTED
FLUAV H1 RNA SPEC QL NAA+PROBE: NOT DETECTED
FLUAV H3 RNA SPEC QL NAA+PROBE: NOT DETECTED
FLUAV H3 RNA SPEC QL NAA+PROBE: NOT DETECTED
FLUAV SUBTYP SPEC NAA+PROBE: NOT DETECTED
FLUAV SUBTYP SPEC NAA+PROBE: NOT DETECTED
FLUBV RNA SPEC QL NAA+PROBE: NOT DETECTED
FLUBV RNA SPEC QL NAA+PROBE: NOT DETECTED
FOLATE SERPL-MCNC: 8.5 NG/ML (ref 5–21)
GAS FLOW.O2 O2 DELIVERY SYS: 10 L/MIN
GAS FLOW.O2 SETTING OXYMISER: 10 L/MIN
GAS FLOW.O2 SETTING OXYMISER: 14 L/MIN
GLOBULIN SER CALC-MCNC: 3.8 G/DL (ref 2–4)
GLOBULIN SER CALC-MCNC: 4 G/DL (ref 2–4)
GLOBULIN SER CALC-MCNC: 4.1 G/DL (ref 2–4)
GLOBULIN SER CALC-MCNC: 4.4 G/DL (ref 2–4)
GLOBULIN SER CALC-MCNC: 4.5 G/DL (ref 2–4)
GLOBULIN SER CALC-MCNC: 4.6 G/DL (ref 2–4)
GLOBULIN SER CALC-MCNC: 4.7 G/DL (ref 2–4)
GLOBULIN SER CALC-MCNC: 4.8 G/DL (ref 2–4)
GLOBULIN SER CALC-MCNC: 4.9 G/DL (ref 2–4)
GLOBULIN SER CALC-MCNC: 4.9 G/DL (ref 2–4)
GLOBULIN SER CALC-MCNC: 5 G/DL (ref 2–4)
GLOBULIN SER CALC-MCNC: 5.2 G/DL (ref 2–4)
GLOBULIN SER CALC-MCNC: 5.2 G/DL (ref 2–4)
GLOBULIN SER CALC-MCNC: 5.3 G/DL (ref 2–4)
GLOBULIN SER CALC-MCNC: 5.4 G/DL (ref 2–4)
GLOBULIN SER CALC-MCNC: 5.4 G/DL (ref 2–4)
GLOBULIN SER CALC-MCNC: 5.5 G/DL (ref 2–4)
GLOBULIN SER CALC-MCNC: 5.5 G/DL (ref 2–4)
GLOBULIN SER CALC-MCNC: 5.6 G/DL (ref 2–4)
GLOBULIN SER CALC-MCNC: 6.8 G/DL (ref 2–4)
GLUCOSE BLD STRIP.AUTO-MCNC: 105 MG/DL (ref 65–100)
GLUCOSE BLD STRIP.AUTO-MCNC: 108 MG/DL (ref 65–100)
GLUCOSE BLD STRIP.AUTO-MCNC: 109 MG/DL (ref 65–100)
GLUCOSE BLD STRIP.AUTO-MCNC: 112 MG/DL (ref 65–100)
GLUCOSE BLD STRIP.AUTO-MCNC: 116 MG/DL (ref 65–100)
GLUCOSE BLD STRIP.AUTO-MCNC: 122 MG/DL (ref 65–100)
GLUCOSE BLD STRIP.AUTO-MCNC: 124 MG/DL (ref 65–100)
GLUCOSE BLD STRIP.AUTO-MCNC: 130 MG/DL (ref 65–100)
GLUCOSE BLD STRIP.AUTO-MCNC: 130 MG/DL (ref 65–100)
GLUCOSE BLD STRIP.AUTO-MCNC: 132 MG/DL (ref 65–100)
GLUCOSE BLD STRIP.AUTO-MCNC: 134 MG/DL (ref 65–100)
GLUCOSE BLD STRIP.AUTO-MCNC: 139 MG/DL (ref 65–100)
GLUCOSE BLD STRIP.AUTO-MCNC: 140 MG/DL (ref 65–100)
GLUCOSE BLD STRIP.AUTO-MCNC: 141 MG/DL (ref 65–100)
GLUCOSE BLD STRIP.AUTO-MCNC: 141 MG/DL (ref 65–100)
GLUCOSE BLD STRIP.AUTO-MCNC: 145 MG/DL (ref 65–100)
GLUCOSE BLD STRIP.AUTO-MCNC: 149 MG/DL (ref 65–100)
GLUCOSE BLD STRIP.AUTO-MCNC: 152 MG/DL (ref 65–100)
GLUCOSE BLD STRIP.AUTO-MCNC: 155 MG/DL (ref 65–100)
GLUCOSE BLD STRIP.AUTO-MCNC: 155 MG/DL (ref 65–100)
GLUCOSE BLD STRIP.AUTO-MCNC: 161 MG/DL (ref 65–100)
GLUCOSE BLD STRIP.AUTO-MCNC: 77 MG/DL (ref 65–100)
GLUCOSE BLD STRIP.AUTO-MCNC: 88 MG/DL (ref 65–100)
GLUCOSE BLD STRIP.AUTO-MCNC: 90 MG/DL (ref 65–100)
GLUCOSE SERPL-MCNC: 100 MG/DL (ref 65–100)
GLUCOSE SERPL-MCNC: 101 MG/DL (ref 65–100)
GLUCOSE SERPL-MCNC: 103 MG/DL (ref 65–100)
GLUCOSE SERPL-MCNC: 104 MG/DL (ref 65–100)
GLUCOSE SERPL-MCNC: 104 MG/DL (ref 65–100)
GLUCOSE SERPL-MCNC: 107 MG/DL (ref 65–100)
GLUCOSE SERPL-MCNC: 107 MG/DL (ref 65–100)
GLUCOSE SERPL-MCNC: 108 MG/DL (ref 65–100)
GLUCOSE SERPL-MCNC: 113 MG/DL (ref 65–100)
GLUCOSE SERPL-MCNC: 114 MG/DL (ref 65–100)
GLUCOSE SERPL-MCNC: 114 MG/DL (ref 65–100)
GLUCOSE SERPL-MCNC: 115 MG/DL (ref 65–100)
GLUCOSE SERPL-MCNC: 116 MG/DL (ref 65–100)
GLUCOSE SERPL-MCNC: 118 MG/DL (ref 65–100)
GLUCOSE SERPL-MCNC: 119 MG/DL (ref 65–100)
GLUCOSE SERPL-MCNC: 125 MG/DL (ref 65–100)
GLUCOSE SERPL-MCNC: 125 MG/DL (ref 65–100)
GLUCOSE SERPL-MCNC: 129 MG/DL (ref 65–100)
GLUCOSE SERPL-MCNC: 130 MG/DL (ref 65–100)
GLUCOSE SERPL-MCNC: 131 MG/DL (ref 65–100)
GLUCOSE SERPL-MCNC: 133 MG/DL (ref 65–100)
GLUCOSE SERPL-MCNC: 133 MG/DL (ref 65–100)
GLUCOSE SERPL-MCNC: 135 MG/DL (ref 65–100)
GLUCOSE SERPL-MCNC: 139 MG/DL (ref 65–100)
GLUCOSE SERPL-MCNC: 145 MG/DL (ref 65–100)
GLUCOSE SERPL-MCNC: 154 MG/DL (ref 65–100)
GLUCOSE SERPL-MCNC: 156 MG/DL (ref 65–100)
GLUCOSE SERPL-MCNC: 165 MG/DL (ref 65–100)
GLUCOSE SERPL-MCNC: 71 MG/DL (ref 65–100)
GLUCOSE SERPL-MCNC: 86 MG/DL (ref 65–100)
GLUCOSE SERPL-MCNC: 90 MG/DL (ref 65–100)
GLUCOSE SERPL-MCNC: 90 MG/DL (ref 65–100)
GLUCOSE SERPL-MCNC: 92 MG/DL (ref 65–100)
GLUCOSE SERPL-MCNC: 97 MG/DL (ref 65–100)
GLUCOSE SERPL-MCNC: 97 MG/DL (ref 65–100)
GLUCOSE UR STRIP.AUTO-MCNC: NEGATIVE MG/DL
GRAM STN SPEC: ABNORMAL
HADV DNA SPEC QL NAA+PROBE: NOT DETECTED
HADV DNA SPEC QL NAA+PROBE: NOT DETECTED
HAPTOGLOB SERPL-MCNC: 295 MG/DL (ref 30–200)
HBV SURFACE AG SER QL: <0.1 INDEX
HBV SURFACE AG SER QL: NEGATIVE
HCO3 BLDA-SCNC: 17 MMOL/L (ref 22–26)
HCO3 BLDA-SCNC: 23 MMOL/L (ref 22–26)
HCO3 BLDA-SCNC: 23 MMOL/L (ref 22–26)
HCO3 BLDA-SCNC: 24 MMOL/L (ref 22–26)
HCO3 BLDV-SCNC: 20 MMOL/L (ref 23–28)
HCOV 229E RNA SPEC QL NAA+PROBE: NOT DETECTED
HCOV 229E RNA SPEC QL NAA+PROBE: NOT DETECTED
HCOV HKU1 RNA SPEC QL NAA+PROBE: NOT DETECTED
HCOV HKU1 RNA SPEC QL NAA+PROBE: NOT DETECTED
HCOV NL63 RNA SPEC QL NAA+PROBE: NOT DETECTED
HCOV NL63 RNA SPEC QL NAA+PROBE: NOT DETECTED
HCOV OC43 RNA SPEC QL NAA+PROBE: NOT DETECTED
HCOV OC43 RNA SPEC QL NAA+PROBE: NOT DETECTED
HCT VFR BLD AUTO: 20.6 % (ref 35–47)
HCT VFR BLD AUTO: 24.3 % (ref 35–47)
HCT VFR BLD AUTO: 25 % (ref 35–47)
HCT VFR BLD AUTO: 25.2 % (ref 35–47)
HCT VFR BLD AUTO: 25.5 % (ref 35–47)
HCT VFR BLD AUTO: 25.9 % (ref 35–47)
HCT VFR BLD AUTO: 26.3 % (ref 35–47)
HCT VFR BLD AUTO: 26.9 % (ref 35–47)
HCT VFR BLD AUTO: 27.2 % (ref 35–47)
HCT VFR BLD AUTO: 27.4 % (ref 35–47)
HCT VFR BLD AUTO: 28 % (ref 35–47)
HCT VFR BLD AUTO: 28.5 % (ref 35–47)
HCT VFR BLD AUTO: 28.7 % (ref 35–47)
HCT VFR BLD AUTO: 28.7 % (ref 35–47)
HCT VFR BLD AUTO: 29 % (ref 35–47)
HCT VFR BLD AUTO: 29.9 % (ref 35–47)
HCT VFR BLD AUTO: 30 % (ref 35–47)
HCT VFR BLD AUTO: 30.3 % (ref 35–47)
HCT VFR BLD AUTO: 30.4 % (ref 35–47)
HCT VFR BLD AUTO: 30.4 % (ref 35–47)
HCT VFR BLD AUTO: 30.9 % (ref 35–47)
HCT VFR BLD AUTO: 31.2 % (ref 35–47)
HCT VFR BLD AUTO: 31.7 % (ref 35–47)
HCT VFR BLD AUTO: 31.8 % (ref 35–47)
HCT VFR BLD AUTO: 32 % (ref 35–47)
HCT VFR BLD AUTO: 32.5 % (ref 35–47)
HCT VFR BLD AUTO: 33.1 % (ref 35–47)
HCT VFR BLD AUTO: 33.7 % (ref 35–47)
HCT VFR BLD AUTO: 33.8 % (ref 35–47)
HCT VFR BLD AUTO: 33.9 % (ref 35–47)
HCT VFR BLD AUTO: 34.5 % (ref 35–47)
HCT VFR BLD AUTO: 35.5 % (ref 35–47)
HCV AB SERPL QL IA: NONREACTIVE
HCV COMMENT,HCGAC: NORMAL
HDLC SERPL-MCNC: 64 MG/DL
HDLC SERPL: 3.6 {RATIO} (ref 0–5)
HEMOCCULT STL QL: NEGATIVE
HGB BLD-MCNC: 10 G/DL (ref 11.5–16)
HGB BLD-MCNC: 10 G/DL (ref 11.5–16)
HGB BLD-MCNC: 10.1 G/DL (ref 11.5–16)
HGB BLD-MCNC: 10.2 G/DL (ref 11.5–16)
HGB BLD-MCNC: 10.4 G/DL (ref 11.5–16)
HGB BLD-MCNC: 10.4 G/DL (ref 11.5–16)
HGB BLD-MCNC: 10.7 G/DL (ref 11.5–16)
HGB BLD-MCNC: 10.9 G/DL (ref 11.5–16)
HGB BLD-MCNC: 11.4 G/DL (ref 11.5–16)
HGB BLD-MCNC: 6.3 G/DL (ref 11.5–16)
HGB BLD-MCNC: 7.9 G/DL (ref 11.5–16)
HGB BLD-MCNC: 7.9 G/DL (ref 11.5–16)
HGB BLD-MCNC: 8 G/DL (ref 11.5–16)
HGB BLD-MCNC: 8 G/DL (ref 11.5–16)
HGB BLD-MCNC: 8.2 G/DL (ref 11.5–16)
HGB BLD-MCNC: 8.4 G/DL (ref 11.5–16)
HGB BLD-MCNC: 8.5 G/DL (ref 11.5–16)
HGB BLD-MCNC: 8.6 G/DL (ref 11.5–16)
HGB BLD-MCNC: 8.6 G/DL (ref 11.5–16)
HGB BLD-MCNC: 8.7 G/DL (ref 11.5–16)
HGB BLD-MCNC: 8.8 G/DL (ref 11.5–16)
HGB BLD-MCNC: 9.1 G/DL (ref 11.5–16)
HGB BLD-MCNC: 9.2 G/DL (ref 11.5–16)
HGB BLD-MCNC: 9.4 G/DL (ref 11.5–16)
HGB BLD-MCNC: 9.5 G/DL (ref 11.5–16)
HGB BLD-MCNC: 9.7 G/DL (ref 11.5–16)
HGB BLD-MCNC: 9.7 G/DL (ref 11.5–16)
HGB BLD-MCNC: 9.9 G/DL (ref 11.5–16)
HGB UR QL STRIP: ABNORMAL
HGB UR QL STRIP: NEGATIVE
HGB UR QL STRIP: NEGATIVE
HISTORY CHECKED?,CKHIST: NORMAL
HIV 1+2 AB+HIV1 P24 AG SERPL QL IA: NONREACTIVE
HIV1 P24 AG SERPL QL IA: NONREACTIVE
HIV1+2 AB SERPL QL IA: NONREACTIVE
HIV12 RESULT COMMENT, HHIVC: NORMAL
HMPV RNA SPEC QL NAA+PROBE: NOT DETECTED
HMPV RNA SPEC QL NAA+PROBE: NOT DETECTED
HPIV1 RNA SPEC QL NAA+PROBE: NOT DETECTED
HPIV1 RNA SPEC QL NAA+PROBE: NOT DETECTED
HPIV2 RNA SPEC QL NAA+PROBE: NOT DETECTED
HPIV2 RNA SPEC QL NAA+PROBE: NOT DETECTED
HPIV3 RNA SPEC QL NAA+PROBE: NOT DETECTED
HPIV3 RNA SPEC QL NAA+PROBE: NOT DETECTED
HPIV4 RNA SPEC QL NAA+PROBE: NOT DETECTED
HPIV4 RNA SPEC QL NAA+PROBE: NOT DETECTED
HYALINE CASTS URNS QL MICRO: ABNORMAL /LPF (ref 0–5)
HYALINE CASTS URNS QL MICRO: ABNORMAL /LPF (ref 0–5)
IMM GRANULOCYTES # BLD AUTO: 0 K/UL
IMM GRANULOCYTES # BLD AUTO: 0 K/UL (ref 0–0.04)
IMM GRANULOCYTES # BLD AUTO: 0.1 K/UL (ref 0–0.04)
IMM GRANULOCYTES NFR BLD AUTO: 0 %
IMM GRANULOCYTES NFR BLD AUTO: 0 % (ref 0–0.5)
IMM GRANULOCYTES NFR BLD AUTO: 1 % (ref 0–0.5)
INR PPP: 1 (ref 0.9–1.1)
INTERPRETATION: NORMAL
IRON SATN MFR SERPL: 49 % (ref 20–50)
IRON SERPL-MCNC: 101 UG/DL (ref 35–150)
KETONES UR QL STRIP.AUTO: NEGATIVE MG/DL
LACTATE BLD-SCNC: 3.48 MMOL/L (ref 0.4–2)
LACTATE BLD-SCNC: 4.19 MMOL/L (ref 0.4–2)
LACTATE SERPL-SCNC: 1.2 MMOL/L (ref 0.4–2)
LACTATE SERPL-SCNC: 1.3 MMOL/L (ref 0.4–2)
LACTATE SERPL-SCNC: 1.4 MMOL/L (ref 0.4–2)
LACTATE SERPL-SCNC: 1.6 MMOL/L (ref 0.4–2)
LACTATE SERPL-SCNC: 1.9 MMOL/L (ref 0.4–2)
LACTATE SERPL-SCNC: 3 MMOL/L (ref 0.4–2)
LDH SERPL L TO P-CCNC: 297 U/L (ref 81–246)
LDLC SERPL CALC-MCNC: 131.6 MG/DL (ref 0–100)
LEUKOCYTE ESTERASE UR QL STRIP.AUTO: ABNORMAL
LEUKOCYTE ESTERASE UR QL STRIP.AUTO: NEGATIVE
LEUKOCYTE ESTERASE UR QL STRIP.AUTO: NEGATIVE
LEVETIRACETAM SERPL-MCNC: 14.9 UG/ML (ref 10–40)
LEVETIRACETAM SERPL-MCNC: 37.5 UG/ML (ref 10–40)
LEVETIRACETAM SERPL-MCNC: 8.2 UG/ML (ref 10–40)
LIPID PROFILE,FLP: ABNORMAL
LYMPHOCYTES # BLD: 0.9 K/UL (ref 0.8–3.5)
LYMPHOCYTES # BLD: 0.9 K/UL (ref 0.8–3.5)
LYMPHOCYTES # BLD: 1 K/UL (ref 0.8–3.5)
LYMPHOCYTES # BLD: 1.2 K/UL (ref 0.8–3.5)
LYMPHOCYTES # BLD: 1.4 K/UL (ref 0.8–3.5)
LYMPHOCYTES # BLD: 1.5 K/UL (ref 0.8–3.5)
LYMPHOCYTES # BLD: 1.6 K/UL (ref 0.8–3.5)
LYMPHOCYTES # BLD: 1.7 K/UL (ref 0.8–3.5)
LYMPHOCYTES # BLD: 1.7 K/UL (ref 0.8–3.5)
LYMPHOCYTES # BLD: 1.8 K/UL (ref 0.8–3.5)
LYMPHOCYTES # BLD: 1.9 K/UL (ref 0.8–3.5)
LYMPHOCYTES # BLD: 2 K/UL (ref 0.8–3.5)
LYMPHOCYTES # BLD: 2.1 K/UL (ref 0.8–3.5)
LYMPHOCYTES # BLD: 2.1 K/UL (ref 0.8–3.5)
LYMPHOCYTES # BLD: 2.2 K/UL (ref 0.8–3.5)
LYMPHOCYTES # BLD: 2.5 K/UL (ref 0.8–3.5)
LYMPHOCYTES # BLD: 2.7 K/UL (ref 0.8–3.5)
LYMPHOCYTES # BLD: 2.8 K/UL (ref 0.8–3.5)
LYMPHOCYTES # BLD: 3 K/UL (ref 0.8–3.5)
LYMPHOCYTES # BLD: 4.4 K/UL (ref 0.8–3.5)
LYMPHOCYTES NFR BLD: 10 % (ref 12–49)
LYMPHOCYTES NFR BLD: 11 % (ref 12–49)
LYMPHOCYTES NFR BLD: 12 % (ref 12–49)
LYMPHOCYTES NFR BLD: 12 % (ref 12–49)
LYMPHOCYTES NFR BLD: 13 % (ref 12–49)
LYMPHOCYTES NFR BLD: 14 % (ref 12–49)
LYMPHOCYTES NFR BLD: 15 % (ref 12–49)
LYMPHOCYTES NFR BLD: 18 % (ref 12–49)
LYMPHOCYTES NFR BLD: 19 % (ref 12–49)
LYMPHOCYTES NFR BLD: 20 % (ref 12–49)
LYMPHOCYTES NFR BLD: 21 % (ref 12–49)
LYMPHOCYTES NFR BLD: 21 % (ref 12–49)
LYMPHOCYTES NFR BLD: 23 % (ref 12–49)
LYMPHOCYTES NFR BLD: 26 % (ref 12–49)
LYMPHOCYTES NFR BLD: 26 % (ref 12–49)
LYMPHOCYTES NFR BLD: 27 % (ref 12–49)
LYMPHOCYTES NFR BLD: 28 % (ref 12–49)
LYMPHOCYTES NFR BLD: 29 % (ref 12–49)
LYMPHOCYTES NFR BLD: 30 % (ref 12–49)
LYMPHOCYTES NFR BLD: 31 % (ref 12–49)
LYMPHOCYTES NFR BLD: 33 % (ref 12–49)
LYMPHOCYTES NFR BLD: 8 % (ref 12–49)
LYMPHOCYTES NFR BLD: 9 % (ref 12–49)
LYMPHOCYTES NFR BLD: 9 % (ref 12–49)
M PNEUMO DNA SPEC QL NAA+PROBE: NOT DETECTED
M PNEUMO DNA SPEC QL NAA+PROBE: NOT DETECTED
M PNEUMO IGG SER IA-ACNC: 378 U/ML (ref 0–99)
M PNEUMO IGM SER IA-ACNC: <770 U/ML (ref 0–769)
MAGNESIUM SERPL-MCNC: 1.7 MG/DL (ref 1.6–2.4)
MAGNESIUM SERPL-MCNC: 1.9 MG/DL (ref 1.6–2.4)
MAGNESIUM SERPL-MCNC: 2.1 MG/DL (ref 1.6–2.4)
MAGNESIUM SERPL-MCNC: 2.2 MG/DL (ref 1.6–2.4)
MAGNESIUM SERPL-MCNC: 2.3 MG/DL (ref 1.6–2.4)
MAGNESIUM SERPL-MCNC: 2.4 MG/DL (ref 1.6–2.4)
MAGNESIUM SERPL-MCNC: 2.4 MG/DL (ref 1.6–2.4)
MAGNESIUM SERPL-MCNC: 2.5 MG/DL (ref 1.6–2.4)
MAGNESIUM SERPL-MCNC: 2.8 MG/DL (ref 1.6–2.4)
MAGNESIUM SERPL-MCNC: NORMAL MG/DL (ref 1.6–2.4)
MCH RBC QN AUTO: 29.8 PG (ref 26–34)
MCH RBC QN AUTO: 30.4 PG (ref 26–34)
MCH RBC QN AUTO: 30.6 PG (ref 26–34)
MCH RBC QN AUTO: 30.7 PG (ref 26–34)
MCH RBC QN AUTO: 30.8 PG (ref 26–34)
MCH RBC QN AUTO: 30.9 PG (ref 26–34)
MCH RBC QN AUTO: 31.1 PG (ref 26–34)
MCH RBC QN AUTO: 31.1 PG (ref 26–34)
MCH RBC QN AUTO: 31.3 PG (ref 26–34)
MCH RBC QN AUTO: 31.4 PG (ref 26–34)
MCH RBC QN AUTO: 31.5 PG (ref 26–34)
MCH RBC QN AUTO: 31.6 PG (ref 26–34)
MCH RBC QN AUTO: 31.7 PG (ref 26–34)
MCH RBC QN AUTO: 31.7 PG (ref 26–34)
MCH RBC QN AUTO: 31.8 PG (ref 26–34)
MCH RBC QN AUTO: 31.9 PG (ref 26–34)
MCH RBC QN AUTO: 32.2 PG (ref 26–34)
MCHC RBC AUTO-ENTMCNC: 30.2 G/DL (ref 30–36.5)
MCHC RBC AUTO-ENTMCNC: 30.3 G/DL (ref 30–36.5)
MCHC RBC AUTO-ENTMCNC: 30.6 G/DL (ref 30–36.5)
MCHC RBC AUTO-ENTMCNC: 30.8 G/DL (ref 30–36.5)
MCHC RBC AUTO-ENTMCNC: 30.9 G/DL (ref 30–36.5)
MCHC RBC AUTO-ENTMCNC: 30.9 G/DL (ref 30–36.5)
MCHC RBC AUTO-ENTMCNC: 31 G/DL (ref 30–36.5)
MCHC RBC AUTO-ENTMCNC: 31 G/DL (ref 30–36.5)
MCHC RBC AUTO-ENTMCNC: 31.1 G/DL (ref 30–36.5)
MCHC RBC AUTO-ENTMCNC: 31.1 G/DL (ref 30–36.5)
MCHC RBC AUTO-ENTMCNC: 31.3 G/DL (ref 30–36.5)
MCHC RBC AUTO-ENTMCNC: 31.4 G/DL (ref 30–36.5)
MCHC RBC AUTO-ENTMCNC: 31.6 G/DL (ref 30–36.5)
MCHC RBC AUTO-ENTMCNC: 31.7 G/DL (ref 30–36.5)
MCHC RBC AUTO-ENTMCNC: 31.9 G/DL (ref 30–36.5)
MCHC RBC AUTO-ENTMCNC: 31.9 G/DL (ref 30–36.5)
MCHC RBC AUTO-ENTMCNC: 32 G/DL (ref 30–36.5)
MCHC RBC AUTO-ENTMCNC: 32.1 G/DL (ref 30–36.5)
MCHC RBC AUTO-ENTMCNC: 32.2 G/DL (ref 30–36.5)
MCHC RBC AUTO-ENTMCNC: 32.7 G/DL (ref 30–36.5)
MCHC RBC AUTO-ENTMCNC: 32.9 G/DL (ref 30–36.5)
MCHC RBC AUTO-ENTMCNC: 33.8 G/DL (ref 30–36.5)
MCV RBC AUTO: 100 FL (ref 80–99)
MCV RBC AUTO: 100.3 FL (ref 80–99)
MCV RBC AUTO: 100.5 FL (ref 80–99)
MCV RBC AUTO: 100.6 FL (ref 80–99)
MCV RBC AUTO: 100.7 FL (ref 80–99)
MCV RBC AUTO: 100.7 FL (ref 80–99)
MCV RBC AUTO: 100.9 FL (ref 80–99)
MCV RBC AUTO: 101.5 FL (ref 80–99)
MCV RBC AUTO: 101.6 FL (ref 80–99)
MCV RBC AUTO: 102.2 FL (ref 80–99)
MCV RBC AUTO: 102.2 FL (ref 80–99)
MCV RBC AUTO: 102.3 FL (ref 80–99)
MCV RBC AUTO: 102.7 FL (ref 80–99)
MCV RBC AUTO: 93.7 FL (ref 80–99)
MCV RBC AUTO: 94.5 FL (ref 80–99)
MCV RBC AUTO: 94.9 FL (ref 80–99)
MCV RBC AUTO: 96.1 FL (ref 80–99)
MCV RBC AUTO: 97.4 FL (ref 80–99)
MCV RBC AUTO: 97.7 FL (ref 80–99)
MCV RBC AUTO: 98.1 FL (ref 80–99)
MCV RBC AUTO: 98.4 FL (ref 80–99)
MCV RBC AUTO: 98.4 FL (ref 80–99)
MCV RBC AUTO: 98.8 FL (ref 80–99)
MCV RBC AUTO: 98.8 FL (ref 80–99)
MCV RBC AUTO: 98.9 FL (ref 80–99)
MCV RBC AUTO: 99 FL (ref 80–99)
MCV RBC AUTO: 99.1 FL (ref 80–99)
MCV RBC AUTO: 99.3 FL (ref 80–99)
MCV RBC AUTO: 99.4 FL (ref 80–99)
MONOCYTES # BLD: 0.3 K/UL (ref 0–1)
MONOCYTES # BLD: 0.3 K/UL (ref 0–1)
MONOCYTES # BLD: 0.4 K/UL (ref 0–1)
MONOCYTES # BLD: 0.5 K/UL (ref 0–1)
MONOCYTES # BLD: 0.6 K/UL (ref 0–1)
MONOCYTES # BLD: 0.7 K/UL (ref 0–1)
MONOCYTES # BLD: 0.8 K/UL (ref 0–1)
MONOCYTES # BLD: 0.8 K/UL (ref 0–1)
MONOCYTES # BLD: 0.9 K/UL (ref 0–1)
MONOCYTES # BLD: 1 K/UL (ref 0–1)
MONOCYTES # BLD: 1 K/UL (ref 0–1)
MONOCYTES NFR BLD: 3 % (ref 5–13)
MONOCYTES NFR BLD: 4 % (ref 5–13)
MONOCYTES NFR BLD: 5 % (ref 5–13)
MONOCYTES NFR BLD: 6 % (ref 5–13)
MONOCYTES NFR BLD: 7 % (ref 5–13)
MONOCYTES NFR BLD: 8 % (ref 5–13)
MONOCYTES NFR BLD: 9 % (ref 5–13)
MONOCYTES NFR BLD: 9 % (ref 5–13)
NEUTS BAND NFR BLD MANUAL: 1 % (ref 0–6)
NEUTS BAND NFR BLD MANUAL: 3 % (ref 0–6)
NEUTS BAND NFR BLD MANUAL: 3 % (ref 0–6)
NEUTS SEG # BLD: 10.3 K/UL (ref 1.8–8)
NEUTS SEG # BLD: 10.9 K/UL (ref 1.8–8)
NEUTS SEG # BLD: 11.5 K/UL (ref 1.8–8)
NEUTS SEG # BLD: 12.4 K/UL (ref 1.8–8)
NEUTS SEG # BLD: 19.7 K/UL (ref 1.8–8)
NEUTS SEG # BLD: 2.8 K/UL (ref 1.8–8)
NEUTS SEG # BLD: 3.5 K/UL (ref 1.8–8)
NEUTS SEG # BLD: 3.7 K/UL (ref 1.8–8)
NEUTS SEG # BLD: 4.1 K/UL (ref 1.8–8)
NEUTS SEG # BLD: 4.2 K/UL (ref 1.8–8)
NEUTS SEG # BLD: 4.7 K/UL (ref 1.8–8)
NEUTS SEG # BLD: 4.9 K/UL (ref 1.8–8)
NEUTS SEG # BLD: 5 K/UL (ref 1.8–8)
NEUTS SEG # BLD: 5.1 K/UL (ref 1.8–8)
NEUTS SEG # BLD: 5.2 K/UL (ref 1.8–8)
NEUTS SEG # BLD: 5.5 K/UL (ref 1.8–8)
NEUTS SEG # BLD: 5.9 K/UL (ref 1.8–8)
NEUTS SEG # BLD: 6 K/UL (ref 1.8–8)
NEUTS SEG # BLD: 6 K/UL (ref 1.8–8)
NEUTS SEG # BLD: 6.6 K/UL (ref 1.8–8)
NEUTS SEG # BLD: 6.9 K/UL (ref 1.8–8)
NEUTS SEG # BLD: 6.9 K/UL (ref 1.8–8)
NEUTS SEG # BLD: 7.3 K/UL (ref 1.8–8)
NEUTS SEG # BLD: 7.3 K/UL (ref 1.8–8)
NEUTS SEG # BLD: 7.9 K/UL (ref 1.8–8)
NEUTS SEG # BLD: 8.1 K/UL (ref 1.8–8)
NEUTS SEG # BLD: 8.9 K/UL (ref 1.8–8)
NEUTS SEG # BLD: 9 K/UL (ref 1.8–8)
NEUTS SEG # BLD: 9.1 K/UL (ref 1.8–8)
NEUTS SEG # BLD: 9.9 K/UL (ref 1.8–8)
NEUTS SEG NFR BLD: 56 % (ref 32–75)
NEUTS SEG NFR BLD: 61 % (ref 32–75)
NEUTS SEG NFR BLD: 62 % (ref 32–75)
NEUTS SEG NFR BLD: 63 % (ref 32–75)
NEUTS SEG NFR BLD: 63 % (ref 32–75)
NEUTS SEG NFR BLD: 64 % (ref 32–75)
NEUTS SEG NFR BLD: 65 % (ref 32–75)
NEUTS SEG NFR BLD: 67 % (ref 32–75)
NEUTS SEG NFR BLD: 69 % (ref 32–75)
NEUTS SEG NFR BLD: 71 % (ref 32–75)
NEUTS SEG NFR BLD: 72 % (ref 32–75)
NEUTS SEG NFR BLD: 72 % (ref 32–75)
NEUTS SEG NFR BLD: 73 % (ref 32–75)
NEUTS SEG NFR BLD: 74 % (ref 32–75)
NEUTS SEG NFR BLD: 77 % (ref 32–75)
NEUTS SEG NFR BLD: 80 % (ref 32–75)
NEUTS SEG NFR BLD: 80 % (ref 32–75)
NEUTS SEG NFR BLD: 83 % (ref 32–75)
NEUTS SEG NFR BLD: 83 % (ref 32–75)
NEUTS SEG NFR BLD: 84 % (ref 32–75)
NEUTS SEG NFR BLD: 85 % (ref 32–75)
NEUTS SEG NFR BLD: 85 % (ref 32–75)
NEUTS SEG NFR BLD: 86 % (ref 32–75)
NEUTS SEG NFR BLD: 87 % (ref 32–75)
NITRITE UR QL STRIP.AUTO: NEGATIVE
NRBC # BLD: 0 K/UL (ref 0–0.01)
NRBC BLD-RTO: 0 PER 100 WBC
O+P STL CONC: NORMAL
O+P STL MICRO: NORMAL
OSMOLALITY SERPL: 313 MOSM/KG H2O
P SHIGELLOIDES DNA STL QL NAA+PROBE: NEGATIVE
P-R INTERVAL, ECG05: 114 MS
P-R INTERVAL, ECG05: 120 MS
P-R INTERVAL, ECG05: 132 MS
P-R INTERVAL, ECG05: 134 MS
P-R INTERVAL, ECG05: 140 MS
PCO2 BLDA: 24 MMHG (ref 35–45)
PCO2 BLDA: 32 MMHG (ref 35–45)
PCO2 BLDA: 33 MMHG (ref 35–45)
PCO2 BLDA: 36 MMHG (ref 35–45)
PCO2 BLDV: 32.3 MMHG (ref 41–51)
PEEP RESPIRATORY: 6 CM[H2O]
PEEP RESPIRATORY: 8 CM[H2O]
PERIPHERAL SMEAR,PSM: NORMAL
PH BLDA: 7.45 [PH] (ref 7.35–7.45)
PH BLDA: 7.46 [PH] (ref 7.35–7.45)
PH BLDA: 7.46 [PH] (ref 7.35–7.45)
PH BLDA: 7.47 [PH] (ref 7.35–7.45)
PH BLDV: 7.4 [PH] (ref 7.32–7.42)
PH UR STRIP: 6 [PH] (ref 5–8)
PH UR STRIP: 6 [PH] (ref 5–8)
PH UR STRIP: 7.5 [PH] (ref 5–8)
PHENYTOIN FREE SERPL-MCNC: 1.8 UG/ML (ref 1–2)
PHENYTOIN FREE SERPL-MCNC: 2.1 UG/ML (ref 1–2)
PHENYTOIN FREE SERPL-MCNC: <0.4 UG/ML (ref 1–2)
PHENYTOIN FREE SERPL-MCNC: <0.5 UG/ML (ref 1–2)
PHENYTOIN SERPL-MCNC: 11.6 UG/ML (ref 10–20)
PHENYTOIN SERPL-MCNC: 12.9 UG/ML (ref 10–20)
PHENYTOIN SERPL-MCNC: 22 UG/ML (ref 10–20)
PHENYTOIN SERPL-MCNC: 27.2 UG/ML (ref 10–20)
PHENYTOIN SERPL-MCNC: 3.9 UG/ML (ref 10–20)
PHENYTOIN SERPL-MCNC: 34 UG/ML (ref 10–20)
PHENYTOIN SERPL-MCNC: 7.6 UG/ML (ref 10–20)
PHENYTOIN SERPL-MCNC: 7.9 UG/ML (ref 10–20)
PHENYTOIN SERPL-MCNC: 8.1 UG/ML (ref 10–20)
PHENYTOIN SERPL-MCNC: 9 UG/ML (ref 10–20)
PHENYTOIN SERPL-MCNC: 9.1 UG/ML (ref 10–20)
PHENYTOIN SERPL-MCNC: 9.2 UG/ML (ref 10–20)
PHENYTOIN SERPL-MCNC: 9.3 UG/ML (ref 10–20)
PHOSPHATE SERPL-MCNC: 3.2 MG/DL (ref 2.6–4.7)
PHOSPHATE SERPL-MCNC: 3.7 MG/DL (ref 2.6–4.7)
PHOSPHATE SERPL-MCNC: 3.9 MG/DL (ref 2.6–4.7)
PHOSPHATE SERPL-MCNC: 3.9 MG/DL (ref 2.6–4.7)
PHOSPHATE SERPL-MCNC: 4 MG/DL (ref 2.6–4.7)
PHOSPHATE SERPL-MCNC: 4 MG/DL (ref 2.6–4.7)
PHOSPHATE SERPL-MCNC: 4.1 MG/DL (ref 2.6–4.7)
PHOSPHATE SERPL-MCNC: 4.2 MG/DL (ref 2.6–4.7)
PHOSPHATE SERPL-MCNC: 4.2 MG/DL (ref 2.6–4.7)
PHOSPHATE SERPL-MCNC: 4.4 MG/DL (ref 2.6–4.7)
PHOSPHATE SERPL-MCNC: 4.5 MG/DL (ref 2.6–4.7)
PHOSPHATE SERPL-MCNC: 4.9 MG/DL (ref 2.6–4.7)
PHOSPHATE SERPL-MCNC: 5 MG/DL (ref 2.6–4.7)
PHOSPHATE SERPL-MCNC: 5.6 MG/DL (ref 2.6–4.7)
PHOSPHATE SERPL-MCNC: 5.6 MG/DL (ref 2.6–4.7)
PLATELET # BLD AUTO: 259 K/UL (ref 150–400)
PLATELET # BLD AUTO: 266 K/UL (ref 150–400)
PLATELET # BLD AUTO: 270 K/UL (ref 150–400)
PLATELET # BLD AUTO: 277 K/UL (ref 150–400)
PLATELET # BLD AUTO: 278 K/UL (ref 150–400)
PLATELET # BLD AUTO: 279 K/UL (ref 150–400)
PLATELET # BLD AUTO: 286 K/UL (ref 150–400)
PLATELET # BLD AUTO: 289 K/UL (ref 150–400)
PLATELET # BLD AUTO: 293 K/UL (ref 150–400)
PLATELET # BLD AUTO: 295 K/UL (ref 150–400)
PLATELET # BLD AUTO: 299 K/UL (ref 150–400)
PLATELET # BLD AUTO: 305 K/UL (ref 150–400)
PLATELET # BLD AUTO: 306 K/UL (ref 150–400)
PLATELET # BLD AUTO: 307 K/UL (ref 150–400)
PLATELET # BLD AUTO: 310 K/UL (ref 150–400)
PLATELET # BLD AUTO: 320 K/UL (ref 150–400)
PLATELET # BLD AUTO: 321 K/UL (ref 150–400)
PLATELET # BLD AUTO: 322 K/UL (ref 150–400)
PLATELET # BLD AUTO: 325 K/UL (ref 150–400)
PLATELET # BLD AUTO: 327 K/UL (ref 150–400)
PLATELET # BLD AUTO: 329 K/UL (ref 150–400)
PLATELET # BLD AUTO: 335 K/UL (ref 150–400)
PLATELET # BLD AUTO: 336 K/UL (ref 150–400)
PLATELET # BLD AUTO: 346 K/UL (ref 150–400)
PLATELET # BLD AUTO: 353 K/UL (ref 150–400)
PLATELET # BLD AUTO: 365 K/UL (ref 150–400)
PLATELET # BLD AUTO: 366 K/UL (ref 150–400)
PLATELET # BLD AUTO: 389 K/UL (ref 150–400)
PLATELET # BLD AUTO: 393 K/UL (ref 150–400)
PLATELET # BLD AUTO: 415 K/UL (ref 150–400)
PLATELET # BLD AUTO: 584 K/UL (ref 150–400)
PMV BLD AUTO: 10 FL (ref 8.9–12.9)
PMV BLD AUTO: 10 FL (ref 8.9–12.9)
PMV BLD AUTO: 10.1 FL (ref 8.9–12.9)
PMV BLD AUTO: 10.2 FL (ref 8.9–12.9)
PMV BLD AUTO: 10.3 FL (ref 8.9–12.9)
PMV BLD AUTO: 10.4 FL (ref 8.9–12.9)
PMV BLD AUTO: 10.5 FL (ref 8.9–12.9)
PMV BLD AUTO: 10.5 FL (ref 8.9–12.9)
PMV BLD AUTO: 10.6 FL (ref 8.9–12.9)
PMV BLD AUTO: 10.7 FL (ref 8.9–12.9)
PMV BLD AUTO: 10.8 FL (ref 8.9–12.9)
PMV BLD AUTO: 10.8 FL (ref 8.9–12.9)
PMV BLD AUTO: 10.9 FL (ref 8.9–12.9)
PMV BLD AUTO: 11 FL (ref 8.9–12.9)
PMV BLD AUTO: 11.1 FL (ref 8.9–12.9)
PMV BLD AUTO: 11.1 FL (ref 8.9–12.9)
PMV BLD AUTO: 11.2 FL (ref 8.9–12.9)
PMV BLD AUTO: 11.4 FL (ref 8.9–12.9)
PMV BLD AUTO: 9.9 FL (ref 8.9–12.9)
PMV BLD AUTO: 9.9 FL (ref 8.9–12.9)
PO2 BLDA: 114 MMHG (ref 80–100)
PO2 BLDA: 120 MMHG (ref 80–100)
PO2 BLDA: 75 MMHG (ref 80–100)
PO2 BLDA: 90 MMHG (ref 80–100)
PO2 BLDV: 128 MMHG (ref 25–40)
POTASSIUM SERPL-SCNC: 2.8 MMOL/L (ref 3.5–5.1)
POTASSIUM SERPL-SCNC: 3 MMOL/L (ref 3.5–5.1)
POTASSIUM SERPL-SCNC: 3.1 MMOL/L (ref 3.5–5.1)
POTASSIUM SERPL-SCNC: 3.2 MMOL/L (ref 3.5–5.1)
POTASSIUM SERPL-SCNC: 3.3 MMOL/L (ref 3.5–5.1)
POTASSIUM SERPL-SCNC: 3.4 MMOL/L (ref 3.5–5.1)
POTASSIUM SERPL-SCNC: 3.5 MMOL/L (ref 3.5–5.1)
POTASSIUM SERPL-SCNC: 3.6 MMOL/L (ref 3.5–5.1)
POTASSIUM SERPL-SCNC: 3.7 MMOL/L (ref 3.5–5.1)
POTASSIUM SERPL-SCNC: 3.8 MMOL/L (ref 3.5–5.1)
POTASSIUM SERPL-SCNC: 3.8 MMOL/L (ref 3.5–5.1)
POTASSIUM SERPL-SCNC: 3.9 MMOL/L (ref 3.5–5.1)
POTASSIUM SERPL-SCNC: 3.9 MMOL/L (ref 3.5–5.1)
POTASSIUM SERPL-SCNC: 4 MMOL/L (ref 3.5–5.1)
POTASSIUM SERPL-SCNC: 4.2 MMOL/L (ref 3.5–5.1)
POTASSIUM SERPL-SCNC: 4.2 MMOL/L (ref 3.5–5.1)
POTASSIUM SERPL-SCNC: 4.3 MMOL/L (ref 3.5–5.1)
POTASSIUM SERPL-SCNC: 4.5 MMOL/L (ref 3.5–5.1)
POTASSIUM SERPL-SCNC: 4.6 MMOL/L (ref 3.5–5.1)
POTASSIUM SERPL-SCNC: 4.9 MMOL/L (ref 3.5–5.1)
POTASSIUM SERPL-SCNC: 4.9 MMOL/L (ref 3.5–5.1)
POTASSIUM SERPL-SCNC: ABNORMAL MMOL/L (ref 3.5–5.1)
PROCALCITONIN SERPL-MCNC: 0.16 NG/ML
PROCALCITONIN SERPL-MCNC: 0.23 NG/ML
PROCALCITONIN SERPL-MCNC: 2.82 NG/ML
PROT SERPL-MCNC: 5.8 G/DL (ref 6.4–8.2)
PROT SERPL-MCNC: 5.9 G/DL (ref 6.4–8.2)
PROT SERPL-MCNC: 6.1 G/DL (ref 6.4–8.2)
PROT SERPL-MCNC: 6.3 G/DL (ref 6.4–8.2)
PROT SERPL-MCNC: 6.6 G/DL (ref 6.4–8.2)
PROT SERPL-MCNC: 6.8 G/DL (ref 6.4–8.2)
PROT SERPL-MCNC: 7 G/DL (ref 6.4–8.2)
PROT SERPL-MCNC: 7 G/DL (ref 6.4–8.2)
PROT SERPL-MCNC: 7.2 G/DL (ref 6.4–8.2)
PROT SERPL-MCNC: 7.4 G/DL (ref 6.4–8.2)
PROT SERPL-MCNC: 7.6 G/DL (ref 6.4–8.2)
PROT SERPL-MCNC: 7.8 G/DL (ref 6.4–8.2)
PROT SERPL-MCNC: 7.8 G/DL (ref 6.4–8.2)
PROT SERPL-MCNC: 7.9 G/DL (ref 6.4–8.2)
PROT SERPL-MCNC: 8.2 G/DL (ref 6.4–8.2)
PROT SERPL-MCNC: 8.4 G/DL (ref 6.4–8.2)
PROT SERPL-MCNC: 8.5 G/DL (ref 6.4–8.2)
PROT SERPL-MCNC: 8.5 G/DL (ref 6.4–8.2)
PROT SERPL-MCNC: 8.7 G/DL (ref 6.4–8.2)
PROT SERPL-MCNC: 9.8 G/DL (ref 6.4–8.2)
PROT UR STRIP-MCNC: 100 MG/DL
PROT UR STRIP-MCNC: 100 MG/DL
PROT UR STRIP-MCNC: 300 MG/DL
PROTHROMBIN TIME: 10.4 SEC (ref 9–11.1)
Q-T INTERVAL, ECG07: 300 MS
Q-T INTERVAL, ECG07: 300 MS
Q-T INTERVAL, ECG07: 310 MS
Q-T INTERVAL, ECG07: 322 MS
Q-T INTERVAL, ECG07: 394 MS
QRS DURATION, ECG06: 62 MS
QRS DURATION, ECG06: 66 MS
QRS DURATION, ECG06: 66 MS
QRS DURATION, ECG06: 68 MS
QRS DURATION, ECG06: 70 MS
QTC CALCULATION (BEZET), ECG08: 436 MS
QTC CALCULATION (BEZET), ECG08: 443 MS
QTC CALCULATION (BEZET), ECG08: 450 MS
QTC CALCULATION (BEZET), ECG08: 481 MS
QTC CALCULATION (BEZET), ECG08: 520 MS
RBC # BLD AUTO: 2.05 M/UL (ref 3.8–5.2)
RBC # BLD AUTO: 2.55 M/UL (ref 3.8–5.2)
RBC # BLD AUTO: 2.56 M/UL (ref 3.8–5.2)
RBC # BLD AUTO: 2.56 M/UL (ref 3.8–5.2)
RBC # BLD AUTO: 2.6 M/UL (ref 3.8–5.2)
RBC # BLD AUTO: 2.63 M/UL (ref 3.8–5.2)
RBC # BLD AUTO: 2.66 M/UL (ref 3.8–5.2)
RBC # BLD AUTO: 2.7 M/UL (ref 3.8–5.2)
RBC # BLD AUTO: 2.72 M/UL (ref 3.8–5.2)
RBC # BLD AUTO: 2.79 M/UL (ref 3.8–5.2)
RBC # BLD AUTO: 2.8 M/UL (ref 3.8–5.2)
RBC # BLD AUTO: 2.83 M/UL (ref 3.8–5.2)
RBC # BLD AUTO: 2.88 M/UL (ref 3.8–5.2)
RBC # BLD AUTO: 2.89 M/UL (ref 3.8–5.2)
RBC # BLD AUTO: 2.95 M/UL (ref 3.8–5.2)
RBC # BLD AUTO: 3.02 M/UL (ref 3.8–5.2)
RBC # BLD AUTO: 3.05 M/UL (ref 3.8–5.2)
RBC # BLD AUTO: 3.09 M/UL (ref 3.8–5.2)
RBC # BLD AUTO: 3.11 M/UL (ref 3.8–5.2)
RBC # BLD AUTO: 3.12 M/UL (ref 3.8–5.2)
RBC # BLD AUTO: 3.15 M/UL (ref 3.8–5.2)
RBC # BLD AUTO: 3.19 M/UL (ref 3.8–5.2)
RBC # BLD AUTO: 3.21 M/UL (ref 3.8–5.2)
RBC # BLD AUTO: 3.24 M/UL (ref 3.8–5.2)
RBC # BLD AUTO: 3.25 M/UL (ref 3.8–5.2)
RBC # BLD AUTO: 3.3 M/UL (ref 3.8–5.2)
RBC # BLD AUTO: 3.34 M/UL (ref 3.8–5.2)
RBC # BLD AUTO: 3.37 M/UL (ref 3.8–5.2)
RBC # BLD AUTO: 3.4 M/UL (ref 3.8–5.2)
RBC # BLD AUTO: 3.48 M/UL (ref 3.8–5.2)
RBC # BLD AUTO: 3.54 M/UL (ref 3.8–5.2)
RBC #/AREA URNS HPF: ABNORMAL /HPF (ref 0–5)
RBC MORPH BLD: ABNORMAL
REPORTED DOSE,DOSE: ABNORMAL UNITS
REPORTED DOSE/TIME,TMG: ABNORMAL
RETICS # AUTO: 0.06 M/UL (ref 0.02–0.08)
RETICS/RBC NFR AUTO: 2.4 % (ref 0.7–2.1)
RSV RNA SPEC QL NAA+PROBE: NOT DETECTED
RSV RNA SPEC QL NAA+PROBE: NOT DETECTED
RV+EV RNA SPEC QL NAA+PROBE: NOT DETECTED
RV+EV RNA SPEC QL NAA+PROBE: NOT DETECTED
SALMONELLA SPECIES, DNA: NEGATIVE
SAMPLES BEING HELD,HOLD: NORMAL
SAO2 % BLD: 96 % (ref 92–97)
SAO2 % BLD: 97 % (ref 92–97)
SAO2 % BLD: 98 % (ref 92–97)
SAO2 % BLD: 99 % (ref 92–97)
SAO2 % BLDV: 99 % (ref 65–88)
SAO2% DEVICE SAO2% SENSOR NAME: ABNORMAL
SARS-COV-2 PCR, COVPCR: NOT DETECTED
SARS-COV-2 PCR, COVPCR: NOT DETECTED
SARS-COV-2, COV2: NORMAL
SARS-COV-2, XPLCVT: NOT DETECTED
SERVICE CMNT-IMP: ABNORMAL
SERVICE CMNT-IMP: NORMAL
SHIGA TOXIN PRODUCING, DNA: NEGATIVE
SHIGELLA SP+EIEC IPAH STL QL NAA+PROBE: NEGATIVE
SODIUM SERPL-SCNC: 130 MMOL/L (ref 136–145)
SODIUM SERPL-SCNC: 133 MMOL/L (ref 136–145)
SODIUM SERPL-SCNC: 136 MMOL/L (ref 136–145)
SODIUM SERPL-SCNC: 137 MMOL/L (ref 136–145)
SODIUM SERPL-SCNC: 139 MMOL/L (ref 136–145)
SODIUM SERPL-SCNC: 139 MMOL/L (ref 136–145)
SODIUM SERPL-SCNC: 140 MMOL/L (ref 136–145)
SODIUM SERPL-SCNC: 142 MMOL/L (ref 136–145)
SODIUM SERPL-SCNC: 143 MMOL/L (ref 136–145)
SODIUM SERPL-SCNC: 144 MMOL/L (ref 136–145)
SODIUM SERPL-SCNC: 145 MMOL/L (ref 136–145)
SODIUM SERPL-SCNC: 145 MMOL/L (ref 136–145)
SODIUM SERPL-SCNC: 147 MMOL/L (ref 136–145)
SODIUM SERPL-SCNC: 149 MMOL/L (ref 136–145)
SODIUM SERPL-SCNC: 151 MMOL/L (ref 136–145)
SODIUM SERPL-SCNC: 152 MMOL/L (ref 136–145)
SOURCE, COVRS: NORMAL
SP GR UR REFRACTOMETRY: 1.01 (ref 1–1.03)
SP GR UR REFRACTOMETRY: 1.02 (ref 1–1.03)
SP GR UR REFRACTOMETRY: 1.02 (ref 1–1.03)
SPECIMEN EXP DATE BLD: NORMAL
SPECIMEN SITE: ABNORMAL
STATUS OF UNIT,%ST: NORMAL
TIBC SERPL-MCNC: 205 UG/DL (ref 250–450)
TRIGL SERPL-MCNC: 167 MG/DL (ref ?–150)
TROPONIN I SERPL-MCNC: <0.05 NG/ML
UA: UC IF INDICATED,UAUC: ABNORMAL
UA: UC IF INDICATED,UAUC: ABNORMAL
UNIT DIVISION, %UDIV: 0
UR CULT HOLD, URHOLD: NORMAL
UROBILINOGEN UR QL STRIP.AUTO: 0.2 EU/DL (ref 0.2–1)
VANCOMYCIN TROUGH SERPL-MCNC: 17.3 UG/ML (ref 5–10)
VENTILATION MODE VENT: ABNORMAL
VENTRICULAR RATE, ECG03: 119 BPM
VENTRICULAR RATE, ECG03: 131 BPM
VENTRICULAR RATE, ECG03: 135 BPM
VENTRICULAR RATE, ECG03: 157 BPM
VENTRICULAR RATE, ECG03: 90 BPM
VIBRIO SPECIES, DNA: NEGATIVE
VIT B12 SERPL-MCNC: 1406 PG/ML (ref 193–986)
VLDLC SERPL CALC-MCNC: 33.4 MG/DL
VT SETTING VENT: 400 ML
VT SETTING VENT: 400 ML
WBC # BLD AUTO: 10.1 K/UL (ref 3.6–11)
WBC # BLD AUTO: 10.3 K/UL (ref 3.6–11)
WBC # BLD AUTO: 10.5 K/UL (ref 3.6–11)
WBC # BLD AUTO: 12 K/UL (ref 3.6–11)
WBC # BLD AUTO: 12.5 K/UL (ref 3.6–11)
WBC # BLD AUTO: 12.7 K/UL (ref 3.6–11)
WBC # BLD AUTO: 12.8 K/UL (ref 3.6–11)
WBC # BLD AUTO: 14.5 K/UL (ref 3.6–11)
WBC # BLD AUTO: 14.6 K/UL (ref 3.6–11)
WBC # BLD AUTO: 15 K/UL (ref 3.6–11)
WBC # BLD AUTO: 22.4 K/UL (ref 3.6–11)
WBC # BLD AUTO: 4.9 K/UL (ref 3.6–11)
WBC # BLD AUTO: 5.6 K/UL (ref 3.6–11)
WBC # BLD AUTO: 5.7 K/UL (ref 3.6–11)
WBC # BLD AUTO: 6 K/UL (ref 3.6–11)
WBC # BLD AUTO: 6.3 K/UL (ref 3.6–11)
WBC # BLD AUTO: 6.6 K/UL (ref 3.6–11)
WBC # BLD AUTO: 7.2 K/UL (ref 3.6–11)
WBC # BLD AUTO: 7.4 K/UL (ref 3.6–11)
WBC # BLD AUTO: 7.7 K/UL (ref 3.6–11)
WBC # BLD AUTO: 7.8 K/UL (ref 3.6–11)
WBC # BLD AUTO: 8 K/UL (ref 3.6–11)
WBC # BLD AUTO: 8.4 K/UL (ref 3.6–11)
WBC # BLD AUTO: 9 K/UL (ref 3.6–11)
WBC # BLD AUTO: 9.2 K/UL (ref 3.6–11)
WBC # BLD AUTO: 9.3 K/UL (ref 3.6–11)
WBC # BLD AUTO: 9.5 K/UL (ref 3.6–11)
WBC # BLD AUTO: 9.6 K/UL (ref 3.6–11)
WBC # BLD AUTO: 9.6 K/UL (ref 3.6–11)
WBC #/AREA STL HPF: NORMAL /HPF (ref 0–4)
WBC #/AREA STL HPF: NORMAL /HPF (ref 0–4)
WBC URNS QL MICRO: >100 /HPF (ref 0–4)
WBC URNS QL MICRO: ABNORMAL /HPF (ref 0–4)
WBC URNS QL MICRO: ABNORMAL /HPF (ref 0–4)
Y. ENTEROCOLITICA, DNA: NEGATIVE

## 2021-01-01 PROCEDURE — 80048 BASIC METABOLIC PNL TOTAL CA: CPT

## 2021-01-01 PROCEDURE — 36415 COLL VENOUS BLD VENIPUNCTURE: CPT

## 2021-01-01 PROCEDURE — 85379 FIBRIN DEGRADATION QUANT: CPT

## 2021-01-01 PROCEDURE — 65610000006 HC RM INTENSIVE CARE

## 2021-01-01 PROCEDURE — 85025 COMPLETE CBC W/AUTO DIFF WBC: CPT

## 2021-01-01 PROCEDURE — 94640 AIRWAY INHALATION TREATMENT: CPT

## 2021-01-01 PROCEDURE — 74011250637 HC RX REV CODE- 250/637: Performed by: NURSE PRACTITIONER

## 2021-01-01 PROCEDURE — 74011000258 HC RX REV CODE- 258: Performed by: PSYCHIATRY & NEUROLOGY

## 2021-01-01 PROCEDURE — 74011250637 HC RX REV CODE- 250/637: Performed by: PSYCHIATRY & NEUROLOGY

## 2021-01-01 PROCEDURE — 74011000258 HC RX REV CODE- 258: Performed by: STUDENT IN AN ORGANIZED HEALTH CARE EDUCATION/TRAINING PROGRAM

## 2021-01-01 PROCEDURE — 74011000258 HC RX REV CODE- 258: Performed by: INTERNAL MEDICINE

## 2021-01-01 PROCEDURE — 99223 1ST HOSP IP/OBS HIGH 75: CPT | Performed by: PSYCHIATRY & NEUROLOGY

## 2021-01-01 PROCEDURE — 94664 DEMO&/EVAL PT USE INHALER: CPT

## 2021-01-01 PROCEDURE — 77030038269 HC DRN EXT URIN PURWCK BARD -A

## 2021-01-01 PROCEDURE — 87040 BLOOD CULTURE FOR BACTERIA: CPT

## 2021-01-01 PROCEDURE — 74011250636 HC RX REV CODE- 250/636: Performed by: STUDENT IN AN ORGANIZED HEALTH CARE EDUCATION/TRAINING PROGRAM

## 2021-01-01 PROCEDURE — P9047 ALBUMIN (HUMAN), 25%, 50ML: HCPCS | Performed by: INTERNAL MEDICINE

## 2021-01-01 PROCEDURE — 80185 ASSAY OF PHENYTOIN TOTAL: CPT

## 2021-01-01 PROCEDURE — 77030006998

## 2021-01-01 PROCEDURE — 31720 CLEARANCE OF AIRWAYS: CPT

## 2021-01-01 PROCEDURE — 84100 ASSAY OF PHOSPHORUS: CPT

## 2021-01-01 PROCEDURE — 87177 OVA AND PARASITES SMEARS: CPT

## 2021-01-01 PROCEDURE — 36600 WITHDRAWAL OF ARTERIAL BLOOD: CPT

## 2021-01-01 PROCEDURE — 77030018831 HC SOL IRR H20 BAXT -A

## 2021-01-01 PROCEDURE — 74011250637 HC RX REV CODE- 250/637: Performed by: STUDENT IN AN ORGANIZED HEALTH CARE EDUCATION/TRAINING PROGRAM

## 2021-01-01 PROCEDURE — 77030018798 HC PMP KT ENTRL FED COVD -A

## 2021-01-01 PROCEDURE — 85018 HEMOGLOBIN: CPT

## 2021-01-01 PROCEDURE — 71045 X-RAY EXAM CHEST 1 VIEW: CPT

## 2021-01-01 PROCEDURE — 99232 SBSQ HOSP IP/OBS MODERATE 35: CPT | Performed by: FAMILY MEDICINE

## 2021-01-01 PROCEDURE — 94003 VENT MGMT INPAT SUBQ DAY: CPT

## 2021-01-01 PROCEDURE — 74011250636 HC RX REV CODE- 250/636: Performed by: INTERNAL MEDICINE

## 2021-01-01 PROCEDURE — 82803 BLOOD GASES ANY COMBINATION: CPT

## 2021-01-01 PROCEDURE — 80053 COMPREHEN METABOLIC PANEL: CPT

## 2021-01-01 PROCEDURE — 71275 CT ANGIOGRAPHY CHEST: CPT

## 2021-01-01 PROCEDURE — 93005 ELECTROCARDIOGRAM TRACING: CPT

## 2021-01-01 PROCEDURE — 2709999900 HC NON-CHARGEABLE SUPPLY

## 2021-01-01 PROCEDURE — 96374 THER/PROPH/DIAG INJ IV PUSH: CPT

## 2021-01-01 PROCEDURE — 74011000250 HC RX REV CODE- 250: Performed by: FAMILY MEDICINE

## 2021-01-01 PROCEDURE — 96372 THER/PROPH/DIAG INJ SC/IM: CPT

## 2021-01-01 PROCEDURE — 87324 CLOSTRIDIUM AG IA: CPT

## 2021-01-01 PROCEDURE — 74011000250 HC RX REV CODE- 250: Performed by: INTERNAL MEDICINE

## 2021-01-01 PROCEDURE — 82962 GLUCOSE BLOOD TEST: CPT

## 2021-01-01 PROCEDURE — 83605 ASSAY OF LACTIC ACID: CPT

## 2021-01-01 PROCEDURE — 74011250637 HC RX REV CODE- 250/637: Performed by: FAMILY MEDICINE

## 2021-01-01 PROCEDURE — 77010033678 HC OXYGEN DAILY

## 2021-01-01 PROCEDURE — 74011250637 HC RX REV CODE- 250/637: Performed by: INTERNAL MEDICINE

## 2021-01-01 PROCEDURE — 83735 ASSAY OF MAGNESIUM: CPT

## 2021-01-01 PROCEDURE — 77010033711 HC HIGH FLOW OXYGEN

## 2021-01-01 PROCEDURE — 80177 DRUG SCRN QUAN LEVETIRACETAM: CPT

## 2021-01-01 PROCEDURE — 77030013140 HC MSK NEB VYRM -A

## 2021-01-01 PROCEDURE — 84484 ASSAY OF TROPONIN QUANT: CPT

## 2021-01-01 PROCEDURE — C9113 INJ PANTOPRAZOLE SODIUM, VIA: HCPCS | Performed by: STUDENT IN AN ORGANIZED HEALTH CARE EDUCATION/TRAINING PROGRAM

## 2021-01-01 PROCEDURE — 65660000000 HC RM CCU STEPDOWN

## 2021-01-01 PROCEDURE — C9254 INJECTION, LACOSAMIDE: HCPCS | Performed by: PSYCHIATRY & NEUROLOGY

## 2021-01-01 PROCEDURE — 74011250636 HC RX REV CODE- 250/636: Performed by: PSYCHIATRY & NEUROLOGY

## 2021-01-01 PROCEDURE — 74011000250 HC RX REV CODE- 250: Performed by: STUDENT IN AN ORGANIZED HEALTH CARE EDUCATION/TRAINING PROGRAM

## 2021-01-01 PROCEDURE — 87070 CULTURE OTHR SPECIMN AEROBIC: CPT

## 2021-01-01 PROCEDURE — 77010026065 HC OXYGEN MINIMUM MEDICAL AIR

## 2021-01-01 PROCEDURE — 96375 TX/PRO/DX INJ NEW DRUG ADDON: CPT

## 2021-01-01 PROCEDURE — 74011000258 HC RX REV CODE- 258: Performed by: FAMILY MEDICINE

## 2021-01-01 PROCEDURE — 77030006564

## 2021-01-01 PROCEDURE — 77030008806 HC TU TRACH UNCUF COVD -B

## 2021-01-01 PROCEDURE — 74011250636 HC RX REV CODE- 250/636: Performed by: EMERGENCY MEDICINE

## 2021-01-01 PROCEDURE — 82272 OCCULT BLD FECES 1-3 TESTS: CPT

## 2021-01-01 PROCEDURE — 99223 1ST HOSP IP/OBS HIGH 75: CPT | Performed by: NURSE PRACTITIONER

## 2021-01-01 PROCEDURE — 80186 ASSAY OF PHENYTOIN FREE: CPT

## 2021-01-01 PROCEDURE — 87077 CULTURE AEROBIC IDENTIFY: CPT

## 2021-01-01 PROCEDURE — G0299 HHS/HOSPICE OF RN EA 15 MIN: HCPCS

## 2021-01-01 PROCEDURE — 97161 PT EVAL LOW COMPLEX 20 MIN: CPT

## 2021-01-01 PROCEDURE — 74011250636 HC RX REV CODE- 250/636: Performed by: FAMILY MEDICINE

## 2021-01-01 PROCEDURE — 94760 N-INVAS EAR/PLS OXIMETRY 1: CPT

## 2021-01-01 PROCEDURE — 87186 SC STD MICRODIL/AGAR DIL: CPT

## 2021-01-01 PROCEDURE — 30233N1 TRANSFUSION OF NONAUTOLOGOUS RED BLOOD CELLS INTO PERIPHERAL VEIN, PERCUTANEOUS APPROACH: ICD-10-PCS | Performed by: INTERNAL MEDICINE

## 2021-01-01 PROCEDURE — 99214 OFFICE O/P EST MOD 30 MIN: CPT | Performed by: FAMILY MEDICINE

## 2021-01-01 PROCEDURE — 83540 ASSAY OF IRON: CPT

## 2021-01-01 PROCEDURE — 81001 URINALYSIS AUTO W/SCOPE: CPT

## 2021-01-01 PROCEDURE — 99238 HOSP IP/OBS DSCHRG MGMT 30/<: CPT | Performed by: FAMILY MEDICINE

## 2021-01-01 PROCEDURE — 99285 EMERGENCY DEPT VISIT HI MDM: CPT

## 2021-01-01 PROCEDURE — 77030018861

## 2021-01-01 PROCEDURE — 99231 SBSQ HOSP IP/OBS SF/LOW 25: CPT | Performed by: NURSE PRACTITIONER

## 2021-01-01 PROCEDURE — 85045 AUTOMATED RETICULOCYTE COUNT: CPT

## 2021-01-01 PROCEDURE — 0651 HSPC ROUTINE HOME CARE

## 2021-01-01 PROCEDURE — 97165 OT EVAL LOW COMPLEX 30 MIN: CPT

## 2021-01-01 PROCEDURE — 89055 LEUKOCYTE ASSESSMENT FECAL: CPT

## 2021-01-01 PROCEDURE — 99233 SBSQ HOSP IP/OBS HIGH 50: CPT | Performed by: FAMILY MEDICINE

## 2021-01-01 PROCEDURE — 36573 INSJ PICC RS&I 5 YR+: CPT | Performed by: STUDENT IN AN ORGANIZED HEALTH CARE EDUCATION/TRAINING PROGRAM

## 2021-01-01 PROCEDURE — 82607 VITAMIN B-12: CPT

## 2021-01-01 PROCEDURE — 74011000636 HC RX REV CODE- 636: Performed by: RADIOLOGY

## 2021-01-01 PROCEDURE — 77030021668 HC NEB PREFIL KT VYRM -A

## 2021-01-01 PROCEDURE — U0005 INFEC AGEN DETEC AMPLI PROBE: HCPCS

## 2021-01-01 PROCEDURE — 83930 ASSAY OF BLOOD OSMOLALITY: CPT

## 2021-01-01 PROCEDURE — 74177 CT ABD & PELVIS W/CONTRAST: CPT

## 2021-01-01 PROCEDURE — 94668 MNPJ CHEST WALL SBSQ: CPT

## 2021-01-01 PROCEDURE — 99233 SBSQ HOSP IP/OBS HIGH 50: CPT | Performed by: NURSE PRACTITIONER

## 2021-01-01 PROCEDURE — 5A1955Z RESPIRATORY VENTILATION, GREATER THAN 96 CONSECUTIVE HOURS: ICD-10-PCS | Performed by: STUDENT IN AN ORGANIZED HEALTH CARE EDUCATION/TRAINING PROGRAM

## 2021-01-01 PROCEDURE — 0202U NFCT DS 22 TRGT SARS-COV-2: CPT

## 2021-01-01 PROCEDURE — 99223 1ST HOSP IP/OBS HIGH 75: CPT | Performed by: FAMILY MEDICINE

## 2021-01-01 PROCEDURE — 83010 ASSAY OF HAPTOGLOBIN QUANT: CPT

## 2021-01-01 PROCEDURE — 99232 SBSQ HOSP IP/OBS MODERATE 35: CPT | Performed by: PSYCHIATRY & NEUROLOGY

## 2021-01-01 PROCEDURE — 87506 IADNA-DNA/RNA PROBE TQ 6-11: CPT

## 2021-01-01 PROCEDURE — 83615 LACTATE (LD) (LDH) ENZYME: CPT

## 2021-01-01 PROCEDURE — 87635 SARS-COV-2 COVID-19 AMP PRB: CPT

## 2021-01-01 PROCEDURE — 77030020847 HC STATLOK BARD -A

## 2021-01-01 PROCEDURE — 99222 1ST HOSP IP/OBS MODERATE 55: CPT | Performed by: FAMILY MEDICINE

## 2021-01-01 PROCEDURE — 80202 ASSAY OF VANCOMYCIN: CPT

## 2021-01-01 PROCEDURE — 93970 EXTREMITY STUDY: CPT

## 2021-01-01 PROCEDURE — 02HV33Z INSERTION OF INFUSION DEVICE INTO SUPERIOR VENA CAVA, PERCUTANEOUS APPROACH: ICD-10-PCS | Performed by: STUDENT IN AN ORGANIZED HEALTH CARE EDUCATION/TRAINING PROGRAM

## 2021-01-01 PROCEDURE — 77030018786 HC NDL GD F/USND BARD -B

## 2021-01-01 PROCEDURE — 74011000258 HC RX REV CODE- 258: Performed by: EMERGENCY MEDICINE

## 2021-01-01 PROCEDURE — 96376 TX/PRO/DX INJ SAME DRUG ADON: CPT

## 2021-01-01 PROCEDURE — 51798 US URINE CAPACITY MEASURE: CPT

## 2021-01-01 PROCEDURE — 86901 BLOOD TYPING SEROLOGIC RH(D): CPT

## 2021-01-01 PROCEDURE — 77030040393 HC DRSG OPTIFOAM GENT MDII -B

## 2021-01-01 PROCEDURE — 99233 SBSQ HOSP IP/OBS HIGH 50: CPT | Performed by: PSYCHIATRY & NEUROLOGY

## 2021-01-01 PROCEDURE — 86923 COMPATIBILITY TEST ELECTRIC: CPT

## 2021-01-01 PROCEDURE — 77030019905 HC CATH URETH INTMIT MDII -A

## 2021-01-01 PROCEDURE — 85027 COMPLETE CBC AUTOMATED: CPT

## 2021-01-01 PROCEDURE — 95719 EEG PHYS/QHP EA INCR W/O VID: CPT | Performed by: PSYCHIATRY & NEUROLOGY

## 2021-01-01 PROCEDURE — 76937 US GUIDE VASCULAR ACCESS: CPT

## 2021-01-01 PROCEDURE — 0656 HSPC GENERAL INPATIENT

## 2021-01-01 PROCEDURE — 87086 URINE CULTURE/COLONY COUNT: CPT

## 2021-01-01 PROCEDURE — 84145 PROCALCITONIN (PCT): CPT

## 2021-01-01 PROCEDURE — 95714 VEEG EA 12-26 HR UNMNTR: CPT | Performed by: PSYCHIATRY & NEUROLOGY

## 2021-01-01 PROCEDURE — 65270000029 HC RM PRIVATE

## 2021-01-01 PROCEDURE — 77030008793 HC TU TRACH CUF COVD -B

## 2021-01-01 PROCEDURE — 95816 EEG AWAKE AND DROWSY: CPT | Performed by: NURSE PRACTITIONER

## 2021-01-01 PROCEDURE — 94002 VENT MGMT INPAT INIT DAY: CPT

## 2021-01-01 PROCEDURE — 77030037877 HC DRSG MEPILEX >48IN BORD MOLN -A

## 2021-01-01 PROCEDURE — 87205 SMEAR GRAM STAIN: CPT

## 2021-01-01 PROCEDURE — 99213 OFFICE O/P EST LOW 20 MIN: CPT | Performed by: FAMILY MEDICINE

## 2021-01-01 PROCEDURE — 85610 PROTHROMBIN TIME: CPT

## 2021-01-01 PROCEDURE — 77030029065 HC DRSG HEMO QCLOT ZMED -B

## 2021-01-01 PROCEDURE — P9016 RBC LEUKOCYTES REDUCED: HCPCS

## 2021-01-01 PROCEDURE — 83880 ASSAY OF NATRIURETIC PEPTIDE: CPT

## 2021-01-01 PROCEDURE — 94762 N-INVAS EAR/PLS OXIMTRY CONT: CPT

## 2021-01-01 PROCEDURE — 77030041530

## 2021-01-01 PROCEDURE — 87389 HIV-1 AG W/HIV-1&-2 AB AG IA: CPT

## 2021-01-01 PROCEDURE — 36430 TRANSFUSION BLD/BLD COMPNT: CPT

## 2021-01-01 PROCEDURE — 94667 MNPJ CHEST WALL 1ST: CPT

## 2021-01-01 PROCEDURE — 86738 MYCOPLASMA ANTIBODY: CPT

## 2021-01-01 PROCEDURE — 70450 CT HEAD/BRAIN W/O DYE: CPT

## 2021-01-01 PROCEDURE — C1751 CATH, INF, PER/CENT/MIDLINE: HCPCS

## 2021-01-01 PROCEDURE — 82746 ASSAY OF FOLIC ACID SERUM: CPT

## 2021-01-01 PROCEDURE — 3336500001 HSPC ELECTION

## 2021-01-01 PROCEDURE — 77030040922 HC BLNKT HYPOTHRM STRY -A

## 2021-01-01 PROCEDURE — 82728 ASSAY OF FERRITIN: CPT

## 2021-01-01 RX ORDER — SODIUM CHLORIDE 9 MG/ML
100 INJECTION, SOLUTION INTRAVENOUS CONTINUOUS
Status: DISCONTINUED | OUTPATIENT
Start: 2021-01-01 | End: 2021-01-01

## 2021-01-01 RX ORDER — OXYCODONE HYDROCHLORIDE 5 MG/1
5 TABLET ORAL
Qty: 28 TAB | Refills: 0 | Status: SHIPPED | OUTPATIENT
Start: 2021-01-01 | End: 2021-01-01

## 2021-01-01 RX ORDER — VANCOMYCIN HYDROCHLORIDE 250 MG/1
CAPSULE ORAL
Qty: 40 CAP | Refills: 0 | Status: SHIPPED | OUTPATIENT
Start: 2021-01-01 | End: 2021-01-01

## 2021-01-01 RX ORDER — HYDROMORPHONE HYDROCHLORIDE 1 MG/ML
0.5 INJECTION, SOLUTION INTRAMUSCULAR; INTRAVENOUS; SUBCUTANEOUS EVERY 4 HOURS
Status: DISCONTINUED | OUTPATIENT
Start: 2021-01-01 | End: 2021-01-01

## 2021-01-01 RX ORDER — SODIUM CHLORIDE 0.9 % (FLUSH) 0.9 %
5-40 SYRINGE (ML) INJECTION EVERY 8 HOURS
Status: DISCONTINUED | OUTPATIENT
Start: 2021-01-01 | End: 2021-01-01 | Stop reason: HOSPADM

## 2021-01-01 RX ORDER — LORAZEPAM 2 MG/ML
1 INJECTION INTRAMUSCULAR
Status: DISCONTINUED | OUTPATIENT
Start: 2021-01-01 | End: 2021-01-01

## 2021-01-01 RX ORDER — IPRATROPIUM BROMIDE AND ALBUTEROL SULFATE 2.5; .5 MG/3ML; MG/3ML
3 SOLUTION RESPIRATORY (INHALATION)
Status: DISCONTINUED | OUTPATIENT
Start: 2021-01-01 | End: 2021-01-01

## 2021-01-01 RX ORDER — ACETAMINOPHEN 325 MG/1
650 TABLET ORAL
Status: DISCONTINUED | OUTPATIENT
Start: 2021-01-01 | End: 2021-01-01 | Stop reason: HOSPADM

## 2021-01-01 RX ORDER — PHENYTOIN 50 MG/1
100 TABLET, CHEWABLE ORAL 3 TIMES DAILY
Qty: 180 TAB | Refills: 0 | Status: SHIPPED | OUTPATIENT
Start: 2021-01-01 | End: 2021-01-01

## 2021-01-01 RX ORDER — FUROSEMIDE 10 MG/ML
10 INJECTION INTRAMUSCULAR; INTRAVENOUS ONCE
Status: COMPLETED | OUTPATIENT
Start: 2021-01-01 | End: 2021-01-01

## 2021-01-01 RX ORDER — POTASSIUM CHLORIDE 750 MG/1
40 TABLET, FILM COATED, EXTENDED RELEASE ORAL
Status: COMPLETED | OUTPATIENT
Start: 2021-01-01 | End: 2021-01-01

## 2021-01-01 RX ORDER — SCOLOPAMINE TRANSDERMAL SYSTEM 1 MG/1
1 PATCH, EXTENDED RELEASE TRANSDERMAL
Status: DISCONTINUED | OUTPATIENT
Start: 2021-01-01 | End: 2021-01-01

## 2021-01-01 RX ORDER — ACETAMINOPHEN 650 MG/1
650 SUPPOSITORY RECTAL
Status: DISCONTINUED | OUTPATIENT
Start: 2021-01-01 | End: 2021-01-01 | Stop reason: HOSPADM

## 2021-01-01 RX ORDER — CALAMINE, MENTHOL, WHITE PETROLATUM, ZINC OXIDE .5; .2; 69; 19.5 G/100G; G/100G; G/100G; G/100G
PASTE TOPICAL
Qty: 113 G | Refills: 15 | Status: SHIPPED | OUTPATIENT
Start: 2021-01-01

## 2021-01-01 RX ORDER — MORPHINE SULFATE 2 MG/ML
1 INJECTION, SOLUTION INTRAMUSCULAR; INTRAVENOUS
Status: DISCONTINUED | OUTPATIENT
Start: 2021-01-01 | End: 2021-01-01 | Stop reason: HOSPADM

## 2021-01-01 RX ORDER — PHENYTOIN 125 MG/5ML
200 SUSPENSION ORAL 3 TIMES DAILY
Status: DISCONTINUED | OUTPATIENT
Start: 2021-01-01 | End: 2021-01-01

## 2021-01-01 RX ORDER — LEVALBUTEROL INHALATION SOLUTION 1.25 MG/3ML
1.25 SOLUTION RESPIRATORY (INHALATION)
Status: DISCONTINUED | OUTPATIENT
Start: 2021-01-01 | End: 2021-01-01 | Stop reason: HOSPADM

## 2021-01-01 RX ORDER — ATORVASTATIN CALCIUM 10 MG/1
10 TABLET, FILM COATED ORAL
Qty: 30 TAB | Refills: 5 | Status: SHIPPED | OUTPATIENT
Start: 2021-01-01 | End: 2021-01-01 | Stop reason: SDUPTHER

## 2021-01-01 RX ORDER — LORAZEPAM 0.5 MG/1
0.5 TABLET ORAL
Qty: 30 TAB | Refills: 0 | Status: SHIPPED | OUTPATIENT
Start: 2021-01-01

## 2021-01-01 RX ORDER — MICONAZOLE NITRATE 2 %
POWDER (GRAM) TOPICAL 2 TIMES DAILY
Status: DISCONTINUED | OUTPATIENT
Start: 2021-01-01 | End: 2021-01-01 | Stop reason: HOSPADM

## 2021-01-01 RX ORDER — MAGNESIUM SULFATE 1 G/100ML
1 INJECTION INTRAVENOUS ONCE
Status: DISPENSED | OUTPATIENT
Start: 2021-01-01 | End: 2021-01-01

## 2021-01-01 RX ORDER — LEVOFLOXACIN 5 MG/ML
750 INJECTION, SOLUTION INTRAVENOUS
Status: COMPLETED | OUTPATIENT
Start: 2021-01-01 | End: 2021-01-01

## 2021-01-01 RX ORDER — GLYCOPYRROLATE 0.2 MG/ML
0.2 INJECTION INTRAMUSCULAR; INTRAVENOUS EVERY 4 HOURS
Status: DISCONTINUED | OUTPATIENT
Start: 2021-01-01 | End: 2021-06-05 | Stop reason: HOSPADM

## 2021-01-01 RX ORDER — GLYCOPYRROLATE 1 MG/1
1 TABLET ORAL
Status: DISCONTINUED | OUTPATIENT
Start: 2021-01-01 | End: 2021-01-01 | Stop reason: HOSPADM

## 2021-01-01 RX ORDER — HYDROMORPHONE HYDROCHLORIDE 2 MG/ML
2 INJECTION, SOLUTION INTRAMUSCULAR; INTRAVENOUS; SUBCUTANEOUS
Status: DISCONTINUED | OUTPATIENT
Start: 2021-01-01 | End: 2021-06-05 | Stop reason: HOSPADM

## 2021-01-01 RX ORDER — MORPHINE SULFATE 2 MG/ML
INJECTION, SOLUTION INTRAMUSCULAR; INTRAVENOUS
Status: DISPENSED
Start: 2021-01-01 | End: 2021-01-01

## 2021-01-01 RX ORDER — HYDROMORPHONE HYDROCHLORIDE 1 MG/ML
0.5 INJECTION, SOLUTION INTRAMUSCULAR; INTRAVENOUS; SUBCUTANEOUS
Status: DISCONTINUED | OUTPATIENT
Start: 2021-01-01 | End: 2021-01-01

## 2021-01-01 RX ORDER — OXYCODONE HYDROCHLORIDE 5 MG/1
5 TABLET ORAL EVERY 6 HOURS
Qty: 120 TAB | Refills: 0 | Status: SHIPPED | OUTPATIENT
Start: 2021-01-01 | End: 2021-01-01 | Stop reason: SDUPTHER

## 2021-01-01 RX ORDER — CETIRIZINE HCL 10 MG
10 TABLET ORAL DAILY
COMMUNITY

## 2021-01-01 RX ORDER — LORAZEPAM 2 MG/ML
1 INJECTION INTRAMUSCULAR ONCE
Status: COMPLETED | OUTPATIENT
Start: 2021-01-01 | End: 2021-01-01

## 2021-01-01 RX ORDER — LORAZEPAM 2 MG/ML
4 INJECTION INTRAMUSCULAR
Status: DISCONTINUED | OUTPATIENT
Start: 2021-01-01 | End: 2021-06-05 | Stop reason: HOSPADM

## 2021-01-01 RX ORDER — SODIUM CHLORIDE 9 MG/ML
250 INJECTION, SOLUTION INTRAVENOUS AS NEEDED
Status: DISCONTINUED | OUTPATIENT
Start: 2021-01-01 | End: 2021-01-01 | Stop reason: HOSPADM

## 2021-01-01 RX ORDER — MIDODRINE HYDROCHLORIDE 5 MG/1
10 TABLET ORAL EVERY 8 HOURS
Status: DISCONTINUED | OUTPATIENT
Start: 2021-01-01 | End: 2021-01-01

## 2021-01-01 RX ORDER — OXYCODONE HYDROCHLORIDE 5 MG/1
5 TABLET ORAL EVERY 8 HOURS
Status: DISCONTINUED | OUTPATIENT
Start: 2021-01-01 | End: 2021-01-01

## 2021-01-01 RX ORDER — POTASSIUM CHLORIDE 7.45 MG/ML
10 INJECTION INTRAVENOUS
Status: COMPLETED | OUTPATIENT
Start: 2021-01-01 | End: 2021-01-01

## 2021-01-01 RX ORDER — LORAZEPAM 2 MG/ML
4 INJECTION INTRAMUSCULAR EVERY 4 HOURS
Status: DISCONTINUED | OUTPATIENT
Start: 2021-01-01 | End: 2021-06-05 | Stop reason: HOSPADM

## 2021-01-01 RX ORDER — LORAZEPAM 2 MG/ML
1 INJECTION INTRAMUSCULAR
Status: DISCONTINUED | OUTPATIENT
Start: 2021-01-01 | End: 2021-01-01 | Stop reason: HOSPADM

## 2021-01-01 RX ORDER — LORAZEPAM 0.5 MG/1
0.5 TABLET ORAL
Status: DISCONTINUED | OUTPATIENT
Start: 2021-01-01 | End: 2021-01-01

## 2021-01-01 RX ORDER — LORAZEPAM 2 MG/ML
0.5 INJECTION INTRAMUSCULAR ONCE
Status: COMPLETED | OUTPATIENT
Start: 2021-01-01 | End: 2021-01-01

## 2021-01-01 RX ORDER — MIDODRINE HYDROCHLORIDE 5 MG/1
5 TABLET ORAL
Status: COMPLETED | OUTPATIENT
Start: 2021-01-01 | End: 2021-01-01

## 2021-01-01 RX ORDER — LORAZEPAM 2 MG/ML
INJECTION INTRAMUSCULAR
Status: DISPENSED
Start: 2021-01-01 | End: 2021-01-01

## 2021-01-01 RX ORDER — POTASSIUM CHLORIDE 20MEQ/15ML
20 LIQUID (ML) ORAL DAILY
Qty: 480 ML | Refills: 11 | Status: SHIPPED | OUTPATIENT
Start: 2021-01-01

## 2021-01-01 RX ORDER — MIDODRINE HYDROCHLORIDE 5 MG/1
15 TABLET ORAL EVERY 8 HOURS
Qty: 270 TABLET | Refills: 0 | Status: SHIPPED | OUTPATIENT
Start: 2021-01-01 | End: 2021-06-30

## 2021-01-01 RX ORDER — POLYETHYLENE GLYCOL 3350 17 G/17G
POWDER, FOR SOLUTION ORAL
Qty: 1 G | Refills: 0 | Status: ON HOLD | OUTPATIENT
Start: 2021-01-01 | End: 2021-01-01

## 2021-01-01 RX ORDER — LEVOFLOXACIN 5 MG/ML
750 INJECTION, SOLUTION INTRAVENOUS EVERY 24 HOURS
Status: DISCONTINUED | OUTPATIENT
Start: 2021-01-01 | End: 2021-01-01

## 2021-01-01 RX ORDER — IPRATROPIUM BROMIDE AND ALBUTEROL SULFATE 2.5; .5 MG/3ML; MG/3ML
3 SOLUTION RESPIRATORY (INHALATION)
Status: DISCONTINUED | OUTPATIENT
Start: 2021-01-01 | End: 2021-01-01 | Stop reason: HOSPADM

## 2021-01-01 RX ORDER — DIAZEPAM 10 MG/2ML
10 GEL RECTAL AS NEEDED
Status: DISCONTINUED | OUTPATIENT
Start: 2021-01-01 | End: 2021-01-01

## 2021-01-01 RX ORDER — MORPHINE SULFATE 100 MG/5ML
10 SOLUTION ORAL
Status: DISCONTINUED | OUTPATIENT
Start: 2021-01-01 | End: 2021-01-01

## 2021-01-01 RX ORDER — FENTANYL CITRATE 50 UG/ML
25 INJECTION, SOLUTION INTRAMUSCULAR; INTRAVENOUS
Status: DISCONTINUED | OUTPATIENT
Start: 2021-01-01 | End: 2021-01-01

## 2021-01-01 RX ORDER — GLYCOPYRROLATE 1 MG/1
1 TABLET ORAL
Qty: 30 TAB | Refills: 0 | Status: SHIPPED | OUTPATIENT
Start: 2021-01-01 | End: 2021-01-01

## 2021-01-01 RX ORDER — ATORVASTATIN CALCIUM 10 MG/1
TABLET, FILM COATED ORAL
Qty: 1 TAB | Refills: 0 | Status: ON HOLD | OUTPATIENT
Start: 2021-01-01 | End: 2021-01-01

## 2021-01-01 RX ORDER — HYDROMORPHONE HYDROCHLORIDE 2 MG/ML
2 INJECTION, SOLUTION INTRAMUSCULAR; INTRAVENOUS; SUBCUTANEOUS EVERY 4 HOURS
Status: DISCONTINUED | OUTPATIENT
Start: 2021-01-01 | End: 2021-06-05 | Stop reason: HOSPADM

## 2021-01-01 RX ORDER — HEPARIN SODIUM 5000 [USP'U]/ML
5000 INJECTION, SOLUTION INTRAVENOUS; SUBCUTANEOUS EVERY 8 HOURS
Status: DISCONTINUED | OUTPATIENT
Start: 2021-01-01 | End: 2021-01-01 | Stop reason: HOSPADM

## 2021-01-01 RX ORDER — OXYCODONE HYDROCHLORIDE 5 MG/1
5 TABLET ORAL
Qty: 120 TAB | Refills: 0 | Status: ON HOLD | OUTPATIENT
Start: 2021-01-01 | End: 2021-01-01

## 2021-01-01 RX ORDER — PHENYTOIN 50 MG/1
100 TABLET, CHEWABLE ORAL 3 TIMES DAILY
Status: DISCONTINUED | OUTPATIENT
Start: 2021-01-01 | End: 2021-01-01 | Stop reason: HOSPADM

## 2021-01-01 RX ORDER — TORSEMIDE 20 MG/1
40 TABLET ORAL DAILY
Status: DISCONTINUED | OUTPATIENT
Start: 2021-01-01 | End: 2021-01-01 | Stop reason: HOSPADM

## 2021-01-01 RX ORDER — DIAZEPAM 10 MG/2ML
5 INJECTION INTRAMUSCULAR
Status: ACTIVE | OUTPATIENT
Start: 2021-01-01 | End: 2021-01-01

## 2021-01-01 RX ORDER — LORAZEPAM 0.5 MG/1
0.5 TABLET ORAL
Qty: 20 TAB | Refills: 0 | Status: SHIPPED | OUTPATIENT
Start: 2021-01-01 | End: 2021-01-01

## 2021-01-01 RX ORDER — LORAZEPAM 2 MG/ML
1 CONCENTRATE ORAL
Status: DISCONTINUED | OUTPATIENT
Start: 2021-01-01 | End: 2021-01-01

## 2021-01-01 RX ORDER — IPRATROPIUM BROMIDE AND ALBUTEROL SULFATE 2.5; .5 MG/3ML; MG/3ML
3 SOLUTION RESPIRATORY (INHALATION)
COMMUNITY

## 2021-01-01 RX ORDER — SAME BUTANEDISULFONATE/BETAINE 400-600 MG
250 POWDER IN PACKET (EA) ORAL 2 TIMES DAILY
Qty: 28 CAP | Refills: 0 | Status: SHIPPED | OUTPATIENT
Start: 2021-01-01 | End: 2021-01-01

## 2021-01-01 RX ORDER — SODIUM CHLORIDE 0.9 % (FLUSH) 0.9 %
5-40 SYRINGE (ML) INJECTION AS NEEDED
Status: DISCONTINUED | OUTPATIENT
Start: 2021-01-01 | End: 2021-01-01 | Stop reason: HOSPADM

## 2021-01-01 RX ORDER — FACIAL-BODY WIPES
10 EACH TOPICAL DAILY PRN
Status: DISCONTINUED | OUTPATIENT
Start: 2021-01-01 | End: 2021-06-05 | Stop reason: HOSPADM

## 2021-01-01 RX ORDER — PHENYTOIN 50 MG/1
TABLET, CHEWABLE ORAL
Qty: 270 TABLET | Refills: 0 | Status: SHIPPED | OUTPATIENT
Start: 2021-01-01 | End: 2021-01-01

## 2021-01-01 RX ORDER — FUROSEMIDE 10 MG/ML
10 INJECTION INTRAMUSCULAR; INTRAVENOUS 2 TIMES DAILY
Status: DISCONTINUED | OUTPATIENT
Start: 2021-01-01 | End: 2021-01-01

## 2021-01-01 RX ORDER — OXYCODONE HYDROCHLORIDE 5 MG/1
5 TABLET ORAL EVERY 6 HOURS
Qty: 28 TAB | Refills: 0 | Status: SHIPPED | OUTPATIENT
Start: 2021-01-01 | End: 2021-01-01

## 2021-01-01 RX ORDER — LEVALBUTEROL INHALATION SOLUTION 1.25 MG/3ML
1.25 SOLUTION RESPIRATORY (INHALATION)
Status: DISCONTINUED | OUTPATIENT
Start: 2021-01-01 | End: 2021-01-01

## 2021-01-01 RX ORDER — MORPHINE SULFATE 15 MG/1
7.5 TABLET ORAL
Status: DISCONTINUED | OUTPATIENT
Start: 2021-01-01 | End: 2021-01-01

## 2021-01-01 RX ORDER — MIDAZOLAM IN 0.9 % SOD.CHLORID 1 MG/ML
0-2 PLASTIC BAG, INJECTION (ML) INTRAVENOUS CONTINUOUS
Status: DISCONTINUED | OUTPATIENT
Start: 2021-01-01 | End: 2021-01-01

## 2021-01-01 RX ORDER — HYDROMORPHONE HYDROCHLORIDE 1 MG/ML
1 INJECTION, SOLUTION INTRAMUSCULAR; INTRAVENOUS; SUBCUTANEOUS
Status: DISCONTINUED | OUTPATIENT
Start: 2021-01-01 | End: 2021-01-01

## 2021-01-01 RX ORDER — FUROSEMIDE 10 MG/ML
20 INJECTION INTRAMUSCULAR; INTRAVENOUS ONCE
Status: COMPLETED | OUTPATIENT
Start: 2021-01-01 | End: 2021-01-01

## 2021-01-01 RX ORDER — PHENYTOIN 50 MG/1
150 TABLET, CHEWABLE ORAL 3 TIMES DAILY
Status: DISCONTINUED | OUTPATIENT
Start: 2021-01-01 | End: 2021-01-01

## 2021-01-01 RX ORDER — LORAZEPAM 2 MG/ML
2 INJECTION INTRAMUSCULAR
Status: DISCONTINUED | OUTPATIENT
Start: 2021-01-01 | End: 2021-01-01

## 2021-01-01 RX ORDER — PHENYTOIN 125 MG/5ML
100 SUSPENSION ORAL EVERY 8 HOURS
Status: DISCONTINUED | OUTPATIENT
Start: 2021-01-01 | End: 2021-01-01 | Stop reason: HOSPADM

## 2021-01-01 RX ORDER — LABETALOL HCL 20 MG/4 ML
20 SYRINGE (ML) INTRAVENOUS
Status: ACTIVE | OUTPATIENT
Start: 2021-01-01 | End: 2021-01-01

## 2021-01-01 RX ORDER — POLYETHYLENE GLYCOL 3350 17 G/17G
17 POWDER, FOR SOLUTION ORAL DAILY PRN
Status: DISCONTINUED | OUTPATIENT
Start: 2021-01-01 | End: 2021-01-01 | Stop reason: HOSPADM

## 2021-01-01 RX ORDER — TORSEMIDE 20 MG/1
40 TABLET ORAL DAILY
COMMUNITY
End: 2021-01-01

## 2021-01-01 RX ORDER — POTASSIUM CHLORIDE 750 MG/1
20 TABLET, FILM COATED, EXTENDED RELEASE ORAL ONCE
Status: DISCONTINUED | OUTPATIENT
Start: 2021-01-01 | End: 2021-01-01

## 2021-01-01 RX ORDER — POTASSIUM CHLORIDE 750 MG/1
40 TABLET, FILM COATED, EXTENDED RELEASE ORAL
Status: DISCONTINUED | OUTPATIENT
Start: 2021-01-01 | End: 2021-01-01

## 2021-01-01 RX ORDER — AMOXICILLIN AND CLAVULANATE POTASSIUM 875; 125 MG/1; MG/1
1 TABLET, FILM COATED ORAL EVERY 12 HOURS
Qty: 10 TAB | Refills: 0 | Status: SHIPPED | OUTPATIENT
Start: 2021-01-01 | End: 2021-01-01

## 2021-01-01 RX ORDER — DOXYCYCLINE 100 MG/1
100 TABLET ORAL 2 TIMES DAILY
Qty: 8 TAB | Refills: 0 | Status: SHIPPED | OUTPATIENT
Start: 2021-01-01 | End: 2021-01-01

## 2021-01-01 RX ORDER — MIDODRINE HYDROCHLORIDE 5 MG/1
15 TABLET ORAL EVERY 8 HOURS
Status: DISCONTINUED | OUTPATIENT
Start: 2021-01-01 | End: 2021-01-01 | Stop reason: HOSPADM

## 2021-01-01 RX ORDER — PHENYTOIN 125 MG/5ML
100 SUSPENSION ORAL EVERY 8 HOURS
Status: DISCONTINUED | OUTPATIENT
Start: 2021-01-01 | End: 2021-01-01

## 2021-01-01 RX ORDER — PHENYTOIN 50 MG/1
200 TABLET, CHEWABLE ORAL 3 TIMES DAILY
Qty: 360 TABLET | Refills: 0 | Status: SHIPPED | OUTPATIENT
Start: 2021-01-01 | End: 2021-06-30

## 2021-01-01 RX ORDER — CALCIUM CARB/MAGNESIUM CARB 311-232MG
5 TABLET ORAL
Status: DISCONTINUED | OUTPATIENT
Start: 2021-01-01 | End: 2021-01-01 | Stop reason: HOSPADM

## 2021-01-01 RX ORDER — TOBRAMYCIN INHALATION 300 MG/4ML
300 SOLUTION RESPIRATORY (INHALATION) 2 TIMES DAILY
COMMUNITY
Start: 2021-01-01

## 2021-01-01 RX ORDER — PHENYTOIN 50 MG/1
TABLET, CHEWABLE ORAL
Qty: 186 TAB | Refills: 0 | OUTPATIENT
Start: 2021-01-01

## 2021-01-01 RX ORDER — SODIUM CHLORIDE, SODIUM LACTATE, POTASSIUM CHLORIDE, CALCIUM CHLORIDE 600; 310; 30; 20 MG/100ML; MG/100ML; MG/100ML; MG/100ML
75 INJECTION, SOLUTION INTRAVENOUS CONTINUOUS
Status: DISCONTINUED | OUTPATIENT
Start: 2021-01-01 | End: 2021-01-01

## 2021-01-01 RX ORDER — POTASSIUM CHLORIDE 7.45 MG/ML
10 INJECTION INTRAVENOUS ONCE
Status: COMPLETED | OUTPATIENT
Start: 2021-01-01 | End: 2021-01-01

## 2021-01-01 RX ORDER — MAGNESIUM SULFATE 1 G/100ML
1 INJECTION INTRAVENOUS ONCE
Status: COMPLETED | OUTPATIENT
Start: 2021-01-01 | End: 2021-01-01

## 2021-01-01 RX ORDER — ACETYLCYSTEINE 100 MG/ML
4 SOLUTION ORAL; RESPIRATORY (INHALATION) AS NEEDED
Status: DISCONTINUED | OUTPATIENT
Start: 2021-01-01 | End: 2021-01-01 | Stop reason: HOSPADM

## 2021-01-01 RX ORDER — FUROSEMIDE 10 MG/ML
20 INJECTION INTRAMUSCULAR; INTRAVENOUS 3 TIMES DAILY
Status: DISCONTINUED | OUTPATIENT
Start: 2021-01-01 | End: 2021-01-01 | Stop reason: HOSPADM

## 2021-01-01 RX ORDER — ENOXAPARIN SODIUM 100 MG/ML
40 INJECTION SUBCUTANEOUS DAILY
Status: DISCONTINUED | OUTPATIENT
Start: 2021-01-01 | End: 2021-01-01

## 2021-01-01 RX ORDER — IPRATROPIUM BROMIDE AND ALBUTEROL SULFATE 2.5; .5 MG/3ML; MG/3ML
3 SOLUTION RESPIRATORY (INHALATION)
Status: DISCONTINUED | OUTPATIENT
Start: 2021-01-01 | End: 2021-06-05 | Stop reason: HOSPADM

## 2021-01-01 RX ORDER — ALBUMIN HUMAN 250 G/1000ML
12.5 SOLUTION INTRAVENOUS EVERY 8 HOURS
Status: COMPLETED | OUTPATIENT
Start: 2021-01-01 | End: 2021-01-01

## 2021-01-01 RX ORDER — MORPHINE SULFATE 2 MG/ML
1 INJECTION, SOLUTION INTRAMUSCULAR; INTRAVENOUS
Status: DISCONTINUED | OUTPATIENT
Start: 2021-01-01 | End: 2021-01-01

## 2021-01-01 RX ORDER — SODIUM CHLORIDE 0.9 % (FLUSH) 0.9 %
5-10 SYRINGE (ML) INJECTION AS NEEDED
Status: DISCONTINUED | OUTPATIENT
Start: 2021-01-01 | End: 2021-01-01 | Stop reason: HOSPADM

## 2021-01-01 RX ORDER — PHENYTOIN 50 MG/1
100 TABLET, CHEWABLE ORAL 3 TIMES DAILY
Qty: 180 TAB | Refills: 0 | Status: SHIPPED | OUTPATIENT
Start: 2021-01-01 | End: 2021-01-01 | Stop reason: SDUPTHER

## 2021-01-01 RX ORDER — GLYCOPYRROLATE 0.2 MG/ML
0.2 INJECTION INTRAMUSCULAR; INTRAVENOUS
Status: DISCONTINUED | OUTPATIENT
Start: 2021-01-01 | End: 2021-06-05 | Stop reason: HOSPADM

## 2021-01-01 RX ORDER — GLYCOPYRROLATE 1 MG/1
1 TABLET ORAL
Qty: 30 TAB | Refills: 0 | Status: SHIPPED | OUTPATIENT
Start: 2021-01-01

## 2021-01-01 RX ORDER — HYDROMORPHONE HYDROCHLORIDE 1 MG/ML
1 INJECTION, SOLUTION INTRAMUSCULAR; INTRAVENOUS; SUBCUTANEOUS EVERY 4 HOURS
Status: DISCONTINUED | OUTPATIENT
Start: 2021-01-01 | End: 2021-01-01

## 2021-01-01 RX ORDER — PHENYTOIN 125 MG/5ML
100 SUSPENSION ORAL EVERY 8 HOURS
Qty: 360 ML | Refills: 0 | Status: SHIPPED | OUTPATIENT
Start: 2021-01-01 | End: 2021-01-01

## 2021-01-01 RX ORDER — ATROPINE SULFATE 10 MG/ML
1 SOLUTION/ DROPS OPHTHALMIC 3 TIMES DAILY
Status: DISCONTINUED | OUTPATIENT
Start: 2021-01-01 | End: 2021-01-01

## 2021-01-01 RX ORDER — FOSPHENYTOIN SODIUM 50 MG/ML
200 INJECTION, SOLUTION INTRAMUSCULAR; INTRAVENOUS
Status: COMPLETED | OUTPATIENT
Start: 2021-01-01 | End: 2021-01-01

## 2021-01-01 RX ORDER — MIDODRINE HYDROCHLORIDE 5 MG/1
15 TABLET ORAL EVERY 8 HOURS
Qty: 270 TAB | Refills: 0 | Status: ON HOLD | OUTPATIENT
Start: 2021-01-01 | End: 2021-01-01

## 2021-01-01 RX ORDER — MORPHINE SULFATE 2 MG/ML
1 INJECTION, SOLUTION INTRAMUSCULAR; INTRAVENOUS
Status: DISPENSED | OUTPATIENT
Start: 2021-01-01 | End: 2021-01-01

## 2021-01-01 RX ORDER — LEVOFLOXACIN 5 MG/ML
750 INJECTION, SOLUTION INTRAVENOUS EVERY 24 HOURS
Status: DISCONTINUED | OUTPATIENT
Start: 2021-01-01 | End: 2021-01-01 | Stop reason: HOSPADM

## 2021-01-01 RX ORDER — LORAZEPAM 2 MG/ML
2 INJECTION INTRAMUSCULAR
Status: COMPLETED | OUTPATIENT
Start: 2021-01-01 | End: 2021-01-01

## 2021-01-01 RX ORDER — OXYCODONE HCL 20 MG/ML
5 CONCENTRATE, ORAL ORAL EVERY 8 HOURS
Status: DISCONTINUED | OUTPATIENT
Start: 2021-01-01 | End: 2021-01-01

## 2021-01-01 RX ORDER — NOREPINEPHRINE BITARTRATE/D5W 8 MG/250ML
.5-16 PLASTIC BAG, INJECTION (ML) INTRAVENOUS
Status: DISCONTINUED | OUTPATIENT
Start: 2021-01-01 | End: 2021-01-01

## 2021-01-01 RX ORDER — SODIUM CHLORIDE 0.9 % (FLUSH) 0.9 %
5-40 SYRINGE (ML) INJECTION EVERY 8 HOURS
Status: DISCONTINUED | OUTPATIENT
Start: 2021-01-01 | End: 2021-01-01

## 2021-01-01 RX ORDER — PANTOPRAZOLE SODIUM 40 MG/1
40 TABLET, DELAYED RELEASE ORAL 2 TIMES DAILY
Status: DISCONTINUED | OUTPATIENT
Start: 2021-01-01 | End: 2021-01-01 | Stop reason: HOSPADM

## 2021-01-01 RX ORDER — MIDODRINE HYDROCHLORIDE 5 MG/1
15 TABLET ORAL EVERY 8 HOURS
Qty: 270 TAB | Refills: 0 | Status: SHIPPED | OUTPATIENT
Start: 2021-01-01 | End: 2021-01-01

## 2021-01-01 RX ORDER — LORAZEPAM 0.5 MG/1
0.5 TABLET ORAL
Status: DISCONTINUED | OUTPATIENT
Start: 2021-01-01 | End: 2021-01-01 | Stop reason: HOSPADM

## 2021-01-01 RX ORDER — OXYCODONE HYDROCHLORIDE 5 MG/1
5 TABLET ORAL EVERY 6 HOURS
Status: DISCONTINUED | OUTPATIENT
Start: 2021-01-01 | End: 2021-01-01 | Stop reason: HOSPADM

## 2021-01-01 RX ORDER — MUPIROCIN 20 MG/G
OINTMENT TOPICAL 2 TIMES DAILY
Status: DISCONTINUED | OUTPATIENT
Start: 2021-01-01 | End: 2021-01-01 | Stop reason: HOSPADM

## 2021-01-01 RX ORDER — POTASSIUM CHLORIDE 20MEQ/15ML
20 LIQUID (ML) ORAL DAILY
Status: DISCONTINUED | OUTPATIENT
Start: 2021-01-01 | End: 2021-01-01 | Stop reason: CLARIF

## 2021-01-01 RX ORDER — DIAZEPAM 10 MG/2ML
5 INJECTION INTRAMUSCULAR
Status: DISCONTINUED | OUTPATIENT
Start: 2021-01-01 | End: 2021-01-01 | Stop reason: HOSPADM

## 2021-01-01 RX ORDER — GUAIFENESIN 100 MG/5ML
200 SOLUTION ORAL
Status: DISCONTINUED | OUTPATIENT
Start: 2021-01-01 | End: 2021-01-01 | Stop reason: HOSPADM

## 2021-01-01 RX ORDER — GLYCOPYRROLATE 1 MG/1
1 TABLET ORAL 3 TIMES DAILY
Status: DISCONTINUED | OUTPATIENT
Start: 2021-01-01 | End: 2021-01-01 | Stop reason: HOSPADM

## 2021-01-01 RX ORDER — SODIUM CHLORIDE 9 MG/ML
500 INJECTION, SOLUTION INTRAVENOUS ONCE
Status: COMPLETED | OUTPATIENT
Start: 2021-01-01 | End: 2021-01-01

## 2021-01-01 RX ORDER — MIDAZOLAM HYDROCHLORIDE 1 MG/ML
2 INJECTION, SOLUTION INTRAMUSCULAR; INTRAVENOUS
Status: COMPLETED | OUTPATIENT
Start: 2021-01-01 | End: 2021-01-01

## 2021-01-01 RX ORDER — OMEPRAZOLE 40 MG/1
40 CAPSULE, DELAYED RELEASE ORAL 2 TIMES DAILY
COMMUNITY
End: 2021-01-01

## 2021-01-01 RX ORDER — LORAZEPAM 2 MG/ML
2 INJECTION INTRAMUSCULAR EVERY 4 HOURS
Status: DISCONTINUED | OUTPATIENT
Start: 2021-01-01 | End: 2021-01-01

## 2021-01-01 RX ORDER — LEVOFLOXACIN 500 MG/1
500 TABLET, FILM COATED ORAL EVERY 24 HOURS
Status: DISCONTINUED | OUTPATIENT
Start: 2021-01-01 | End: 2021-01-01

## 2021-01-01 RX ORDER — FUROSEMIDE 10 MG/ML
20 INJECTION INTRAMUSCULAR; INTRAVENOUS 2 TIMES DAILY
Status: DISCONTINUED | OUTPATIENT
Start: 2021-01-01 | End: 2021-01-01

## 2021-01-01 RX ORDER — DEXMEDETOMIDINE HYDROCHLORIDE 4 UG/ML
.1-1.5 INJECTION, SOLUTION INTRAVENOUS
Status: DISCONTINUED | OUTPATIENT
Start: 2021-01-01 | End: 2021-01-01

## 2021-01-01 RX ORDER — ENOXAPARIN SODIUM 100 MG/ML
40 INJECTION SUBCUTANEOUS EVERY 24 HOURS
Status: DISCONTINUED | OUTPATIENT
Start: 2021-01-01 | End: 2021-01-01 | Stop reason: HOSPADM

## 2021-01-01 RX ORDER — MORPHINE SULFATE 2 MG/ML
1 INJECTION, SOLUTION INTRAMUSCULAR; INTRAVENOUS ONCE
Status: COMPLETED | OUTPATIENT
Start: 2021-01-01 | End: 2021-01-01

## 2021-01-01 RX ORDER — NALOXONE HYDROCHLORIDE 4 MG/.1ML
SPRAY NASAL
Qty: 2 EACH | Refills: 0 | Status: SHIPPED | OUTPATIENT
Start: 2021-01-01

## 2021-01-01 RX ORDER — LORAZEPAM 0.5 MG/1
0.75 TABLET ORAL
Status: DISCONTINUED | OUTPATIENT
Start: 2021-01-01 | End: 2021-01-01

## 2021-01-01 RX ORDER — MIDODRINE HYDROCHLORIDE 5 MG/1
5 TABLET ORAL
Status: DISCONTINUED | OUTPATIENT
Start: 2021-01-01 | End: 2021-01-01

## 2021-01-01 RX ORDER — VANCOMYCIN HYDROCHLORIDE 250 MG/5ML
500 POWDER, FOR SOLUTION ORAL EVERY 6 HOURS
Qty: 520 ML | Refills: 0 | Status: SHIPPED | OUTPATIENT
Start: 2021-01-01 | End: 2021-06-14

## 2021-01-01 RX ORDER — POTASSIUM BICARBONATE 782 MG/1
TABLET, EFFERVESCENT ORAL
Qty: 30 TAB | Refills: 0 | Status: SHIPPED | OUTPATIENT
Start: 2021-01-01 | End: 2021-01-01

## 2021-01-01 RX ORDER — MORPHINE SULFATE ORAL SOLUTION 10 MG/5ML
2 SOLUTION ORAL
Status: DISCONTINUED | OUTPATIENT
Start: 2021-01-01 | End: 2021-01-01 | Stop reason: HOSPADM

## 2021-01-01 RX ORDER — MUPIROCIN 20 MG/G
OINTMENT TOPICAL 2 TIMES DAILY
Qty: 30 G | Refills: 1 | Status: SHIPPED | OUTPATIENT
Start: 2021-01-01

## 2021-01-01 RX ORDER — GUAIFENESIN 100 MG/5ML
200 SOLUTION ORAL 4 TIMES DAILY
Status: DISCONTINUED | OUTPATIENT
Start: 2021-01-01 | End: 2021-01-01

## 2021-01-01 RX ORDER — LORAZEPAM 0.5 MG/1
0.5 TABLET ORAL
Qty: 28 TAB | Refills: 0 | Status: SHIPPED | OUTPATIENT
Start: 2021-01-01 | End: 2021-01-01 | Stop reason: SDUPTHER

## 2021-01-01 RX ORDER — OXYCODONE HYDROCHLORIDE 5 MG/1
5 TABLET ORAL EVERY 4 HOURS
Status: DISCONTINUED | OUTPATIENT
Start: 2021-01-01 | End: 2021-01-01

## 2021-01-01 RX ORDER — NALOXONE HYDROCHLORIDE 0.4 MG/ML
0.4 INJECTION, SOLUTION INTRAMUSCULAR; INTRAVENOUS; SUBCUTANEOUS
Status: DISCONTINUED | OUTPATIENT
Start: 2021-01-01 | End: 2021-01-01 | Stop reason: HOSPADM

## 2021-01-01 RX ORDER — LORAZEPAM 1 MG/1
1 TABLET ORAL EVERY 4 HOURS
Status: DISCONTINUED | OUTPATIENT
Start: 2021-01-01 | End: 2021-01-01 | Stop reason: HOSPADM

## 2021-01-01 RX ORDER — PROPOFOL 10 MG/ML
0-50 VIAL (ML) INTRAVENOUS
Status: DISCONTINUED | OUTPATIENT
Start: 2021-01-01 | End: 2021-01-01

## 2021-01-01 RX ORDER — MIDODRINE HYDROCHLORIDE 5 MG/1
15 TABLET ORAL EVERY 8 HOURS
Qty: 270 TAB | Refills: 0 | Status: SHIPPED | OUTPATIENT
Start: 2021-01-01 | End: 2021-01-01 | Stop reason: SDUPTHER

## 2021-01-01 RX ORDER — LORAZEPAM 2 MG/ML
0.5 INJECTION INTRAMUSCULAR
Status: DISCONTINUED | OUTPATIENT
Start: 2021-01-01 | End: 2021-01-01 | Stop reason: HOSPADM

## 2021-01-01 RX ORDER — LORAZEPAM 2 MG/ML
1 CONCENTRATE ORAL EVERY 4 HOURS
Status: DISCONTINUED | OUTPATIENT
Start: 2021-01-01 | End: 2021-01-01

## 2021-01-01 RX ORDER — MICONAZOLE NITRATE 2 %
POWDER (GRAM) TOPICAL 2 TIMES DAILY
Qty: 1 BOTTLE | Refills: 0 | Status: SHIPPED | OUTPATIENT
Start: 2021-01-01 | End: 2021-01-01

## 2021-01-01 RX ORDER — PHENYTOIN SODIUM 100 MG/1
100 CAPSULE, EXTENDED RELEASE ORAL 3 TIMES DAILY
Status: DISCONTINUED | OUTPATIENT
Start: 2021-01-01 | End: 2021-01-01

## 2021-01-01 RX ORDER — ACETYLCYSTEINE 100 MG/ML
4 SOLUTION ORAL; RESPIRATORY (INHALATION)
Status: DISCONTINUED | OUTPATIENT
Start: 2021-01-01 | End: 2021-01-01 | Stop reason: HOSPADM

## 2021-01-01 RX ORDER — NALOXONE HYDROCHLORIDE 1 MG/ML
1 INJECTION INTRAMUSCULAR; INTRAVENOUS; SUBCUTANEOUS AS NEEDED
Status: DISCONTINUED | OUTPATIENT
Start: 2021-01-01 | End: 2021-01-01

## 2021-01-01 RX ORDER — OXYCODONE HYDROCHLORIDE 5 MG/1
5 TABLET ORAL
Qty: 120 TABLET | Refills: 0 | Status: SHIPPED | OUTPATIENT
Start: 2021-01-01 | End: 2021-06-30

## 2021-01-01 RX ORDER — GUAIFENESIN 100 MG/5ML
200 SOLUTION ORAL 4 TIMES DAILY
COMMUNITY

## 2021-01-01 RX ORDER — ACETAMINOPHEN 500 MG
500 TABLET ORAL
Qty: 30 TAB | Refills: 11 | Status: ON HOLD | OUTPATIENT
Start: 2021-01-01 | End: 2021-01-01

## 2021-01-01 RX ORDER — SODIUM CHLORIDE, SODIUM LACTATE, POTASSIUM CHLORIDE, CALCIUM CHLORIDE 600; 310; 30; 20 MG/100ML; MG/100ML; MG/100ML; MG/100ML
100 INJECTION, SOLUTION INTRAVENOUS CONTINUOUS
Status: DISCONTINUED | OUTPATIENT
Start: 2021-01-01 | End: 2021-01-01

## 2021-01-01 RX ORDER — OXYCODONE HYDROCHLORIDE 5 MG/1
5 TABLET ORAL EVERY 8 HOURS
Status: DISCONTINUED | OUTPATIENT
Start: 2021-01-01 | End: 2021-01-01 | Stop reason: HOSPADM

## 2021-01-01 RX ORDER — NYSTATIN 100000 [USP'U]/ML
1 SUSPENSION ORAL 4 TIMES DAILY
Qty: 200 ML | Refills: 0 | Status: SHIPPED | OUTPATIENT
Start: 2021-01-01 | End: 2021-01-01

## 2021-01-01 RX ORDER — GUAIFENESIN 100 MG/5ML
200 SOLUTION ORAL 4 TIMES DAILY
Status: DISCONTINUED | OUTPATIENT
Start: 2021-01-01 | End: 2021-01-01 | Stop reason: HOSPADM

## 2021-01-01 RX ORDER — VANCOMYCIN HYDROCHLORIDE 250 MG/5ML
500 POWDER, FOR SOLUTION ORAL EVERY 6 HOURS
Status: DISCONTINUED | OUTPATIENT
Start: 2021-01-01 | End: 2021-01-01 | Stop reason: HOSPADM

## 2021-01-01 RX ORDER — PHENYTOIN 50 MG/1
200 TABLET, CHEWABLE ORAL 3 TIMES DAILY
Status: DISCONTINUED | OUTPATIENT
Start: 2021-01-01 | End: 2021-01-01 | Stop reason: HOSPADM

## 2021-01-01 RX ORDER — PHENYTOIN 50 MG/1
150 TABLET, CHEWABLE ORAL 3 TIMES DAILY
Qty: 270 TAB | Refills: 0 | Status: ON HOLD | OUTPATIENT
Start: 2021-01-01 | End: 2021-01-01

## 2021-01-01 RX ORDER — ENOXAPARIN SODIUM 100 MG/ML
40 INJECTION SUBCUTANEOUS DAILY
Status: DISCONTINUED | OUTPATIENT
Start: 2021-01-01 | End: 2021-01-01 | Stop reason: HOSPADM

## 2021-01-01 RX ADMIN — Medication 10 ML: at 21:46

## 2021-01-01 RX ADMIN — LORAZEPAM 2 MG: 2 INJECTION INTRAMUSCULAR; INTRAVENOUS at 02:05

## 2021-01-01 RX ADMIN — OXYCODONE 5 MG: 5 TABLET ORAL at 05:31

## 2021-01-01 RX ADMIN — MIDODRINE HYDROCHLORIDE 15 MG: 5 TABLET ORAL at 13:41

## 2021-01-01 RX ADMIN — MIDODRINE HYDROCHLORIDE 10 MG: 5 TABLET ORAL at 05:54

## 2021-01-01 RX ADMIN — HEPARIN SODIUM 5000 UNITS: 5000 INJECTION INTRAVENOUS; SUBCUTANEOUS at 13:03

## 2021-01-01 RX ADMIN — HYDROMORPHONE HYDROCHLORIDE 1 MG: 1 INJECTION, SOLUTION INTRAMUSCULAR; INTRAVENOUS; SUBCUTANEOUS at 18:29

## 2021-01-01 RX ADMIN — GLYCOPYRROLATE 1 MG: 1 TABLET ORAL at 08:26

## 2021-01-01 RX ADMIN — LORAZEPAM 0.5 MG: 0.5 TABLET ORAL at 21:08

## 2021-01-01 RX ADMIN — IPRATROPIUM BROMIDE AND ALBUTEROL SULFATE 3 ML: .5; 2.5 SOLUTION RESPIRATORY (INHALATION) at 08:37

## 2021-01-01 RX ADMIN — GUAIFENESIN 200 MG: 200 SOLUTION ORAL at 18:39

## 2021-01-01 RX ADMIN — MIDODRINE HYDROCHLORIDE 5 MG: 5 TABLET ORAL at 13:03

## 2021-01-01 RX ADMIN — HEPARIN SODIUM 5000 UNITS: 5000 INJECTION INTRAVENOUS; SUBCUTANEOUS at 13:59

## 2021-01-01 RX ADMIN — PHENYTOIN 100 MG: 125 SUSPENSION ORAL at 06:06

## 2021-01-01 RX ADMIN — Medication 10 ML: at 05:57

## 2021-01-01 RX ADMIN — VANCOMYCIN HYDROCHLORIDE 500 MG: KIT at 10:24

## 2021-01-01 RX ADMIN — PHENYTOIN 200 MG: 50 TABLET, CHEWABLE ORAL at 22:02

## 2021-01-01 RX ADMIN — DEXTROSE MONOHYDRATE 0.5 MCG/MIN: 50 INJECTION, SOLUTION INTRAVENOUS at 11:01

## 2021-01-01 RX ADMIN — FUROSEMIDE 20 MG: 10 INJECTION, SOLUTION INTRAMUSCULAR; INTRAVENOUS at 08:27

## 2021-01-01 RX ADMIN — IPRATROPIUM BROMIDE AND ALBUTEROL SULFATE 3 ML: .5; 2.5 SOLUTION RESPIRATORY (INHALATION) at 07:51

## 2021-01-01 RX ADMIN — MICONAZOLE NITRATE 2 % TOPICAL POWDER: at 20:19

## 2021-01-01 RX ADMIN — PANTOPRAZOLE SODIUM 40 MG: 40 TABLET, DELAYED RELEASE ORAL at 09:36

## 2021-01-01 RX ADMIN — ACETYLCYSTEINE 400 MG: 100 SOLUTION ORAL; RESPIRATORY (INHALATION) at 01:21

## 2021-01-01 RX ADMIN — LORAZEPAM 1 MG: 2 INJECTION INTRAMUSCULAR; INTRAVENOUS at 09:42

## 2021-01-01 RX ADMIN — MIDODRINE HYDROCHLORIDE 15 MG: 5 TABLET ORAL at 13:42

## 2021-01-01 RX ADMIN — ACETYLCYSTEINE 400 MG: 100 SOLUTION ORAL; RESPIRATORY (INHALATION) at 00:38

## 2021-01-01 RX ADMIN — LANSOPRAZOLE 30 MG: KIT at 22:34

## 2021-01-01 RX ADMIN — MIDODRINE HYDROCHLORIDE 15 MG: 5 TABLET ORAL at 13:00

## 2021-01-01 RX ADMIN — MIDODRINE HYDROCHLORIDE 15 MG: 5 TABLET ORAL at 14:00

## 2021-01-01 RX ADMIN — ACETAMINOPHEN 650 MG: 325 TABLET ORAL at 13:41

## 2021-01-01 RX ADMIN — POTASSIUM CHLORIDE 10 MEQ: 10 INJECTION, SOLUTION INTRAVENOUS at 09:31

## 2021-01-01 RX ADMIN — SODIUM CHLORIDE 100 MG: 9 INJECTION, SOLUTION INTRAVENOUS at 10:47

## 2021-01-01 RX ADMIN — Medication 10 ML: at 05:17

## 2021-01-01 RX ADMIN — LORAZEPAM 1 MG: 2 INJECTION INTRAMUSCULAR; INTRAVENOUS at 10:05

## 2021-01-01 RX ADMIN — GLYCOPYRROLATE 0.2 MG: 0.2 INJECTION INTRAMUSCULAR; INTRAVENOUS at 04:13

## 2021-01-01 RX ADMIN — ACETYLCYSTEINE 400 MG: 100 SOLUTION ORAL; RESPIRATORY (INHALATION) at 01:32

## 2021-01-01 RX ADMIN — LORAZEPAM 0.5 MG: 2 INJECTION INTRAMUSCULAR; INTRAVENOUS at 20:14

## 2021-01-01 RX ADMIN — FUROSEMIDE 20 MG: 10 INJECTION, SOLUTION INTRAMUSCULAR; INTRAVENOUS at 22:51

## 2021-01-01 RX ADMIN — LORAZEPAM 0.5 MG: 0.5 TABLET ORAL at 00:11

## 2021-01-01 RX ADMIN — VANCOMYCIN HYDROCHLORIDE 500 MG: KIT at 11:35

## 2021-01-01 RX ADMIN — GLYCOPYRROLATE 0.2 MG: 0.2 INJECTION INTRAMUSCULAR; INTRAVENOUS at 16:27

## 2021-01-01 RX ADMIN — LORAZEPAM 0.5 MG: 0.5 TABLET ORAL at 08:09

## 2021-01-01 RX ADMIN — ACETYLCYSTEINE 400 MG: 100 SOLUTION ORAL; RESPIRATORY (INHALATION) at 07:26

## 2021-01-01 RX ADMIN — LORAZEPAM 1 MG: 2 INJECTION INTRAMUSCULAR; INTRAVENOUS at 16:49

## 2021-01-01 RX ADMIN — PHENYTOIN 100 MG: 50 TABLET, CHEWABLE ORAL at 22:30

## 2021-01-01 RX ADMIN — MUPIROCIN: 20 OINTMENT TOPICAL at 08:20

## 2021-01-01 RX ADMIN — ACETYLCYSTEINE 400 MG: 100 SOLUTION ORAL; RESPIRATORY (INHALATION) at 00:26

## 2021-01-01 RX ADMIN — LORAZEPAM 0.5 MG: 0.5 TABLET ORAL at 13:59

## 2021-01-01 RX ADMIN — LORAZEPAM 0.5 MG: 0.5 TABLET ORAL at 21:46

## 2021-01-01 RX ADMIN — OXYCODONE 5 MG: 5 TABLET ORAL at 23:00

## 2021-01-01 RX ADMIN — POTASSIUM BICARBONATE 20 MEQ: 782 TABLET, EFFERVESCENT ORAL at 08:59

## 2021-01-01 RX ADMIN — Medication 10 ML: at 23:32

## 2021-01-01 RX ADMIN — GLYCOPYRROLATE 1 MG: 1 TABLET ORAL at 18:43

## 2021-01-01 RX ADMIN — MORPHINE SULFATE 1 MG: 2 INJECTION, SOLUTION INTRAMUSCULAR; INTRAVENOUS at 01:29

## 2021-01-01 RX ADMIN — PANTOPRAZOLE SODIUM 40 MG: 40 INJECTION, POWDER, FOR SOLUTION INTRAVENOUS at 08:53

## 2021-01-01 RX ADMIN — ENOXAPARIN SODIUM 40 MG: 40 INJECTION SUBCUTANEOUS at 09:07

## 2021-01-01 RX ADMIN — LANSOPRAZOLE 30 MG: KIT at 08:59

## 2021-01-01 RX ADMIN — LEVOFLOXACIN 750 MG: 5 INJECTION, SOLUTION INTRAVENOUS at 13:04

## 2021-01-01 RX ADMIN — VANCOMYCIN HYDROCHLORIDE 500 MG: KIT at 00:57

## 2021-01-01 RX ADMIN — LORAZEPAM 0.5 MG: 0.5 TABLET ORAL at 08:32

## 2021-01-01 RX ADMIN — Medication 10 ML: at 22:02

## 2021-01-01 RX ADMIN — Medication 10 ML: at 05:54

## 2021-01-01 RX ADMIN — DEXMEDETOMIDINE HYDROCHLORIDE 0.5 MCG/KG/HR: 400 INJECTION INTRAVENOUS at 14:28

## 2021-01-01 RX ADMIN — MIDODRINE HYDROCHLORIDE 15 MG: 5 TABLET ORAL at 13:04

## 2021-01-01 RX ADMIN — VANCOMYCIN HYDROCHLORIDE 500 MG: KIT at 18:25

## 2021-01-01 RX ADMIN — MIDODRINE HYDROCHLORIDE 15 MG: 5 TABLET ORAL at 15:18

## 2021-01-01 RX ADMIN — HYDROMORPHONE HYDROCHLORIDE 0.5 MG: 1 INJECTION, SOLUTION INTRAMUSCULAR; INTRAVENOUS; SUBCUTANEOUS at 08:06

## 2021-01-01 RX ADMIN — MIDODRINE HYDROCHLORIDE 15 MG: 5 TABLET ORAL at 22:30

## 2021-01-01 RX ADMIN — MIDODRINE HYDROCHLORIDE 10 MG: 5 TABLET ORAL at 20:42

## 2021-01-01 RX ADMIN — PANTOPRAZOLE SODIUM 40 MG: 40 TABLET, DELAYED RELEASE ORAL at 16:41

## 2021-01-01 RX ADMIN — POTASSIUM BICARBONATE 40 MEQ: 782 TABLET, EFFERVESCENT ORAL at 06:29

## 2021-01-01 RX ADMIN — Medication 10 ML: at 06:07

## 2021-01-01 RX ADMIN — MIDODRINE HYDROCHLORIDE 15 MG: 5 TABLET ORAL at 22:01

## 2021-01-01 RX ADMIN — ACETYLCYSTEINE 400 MG: 100 SOLUTION ORAL; RESPIRATORY (INHALATION) at 14:34

## 2021-01-01 RX ADMIN — IPRATROPIUM BROMIDE AND ALBUTEROL SULFATE 3 ML: .5; 2.5 SOLUTION RESPIRATORY (INHALATION) at 19:43

## 2021-01-01 RX ADMIN — MICONAZOLE NITRATE 2 % TOPICAL POWDER: at 10:03

## 2021-01-01 RX ADMIN — Medication 10 ML: at 21:20

## 2021-01-01 RX ADMIN — PHENYTOIN 100 MG: 50 TABLET, CHEWABLE ORAL at 21:25

## 2021-01-01 RX ADMIN — ATROPINE SULFATE 1 DROP: 10 SOLUTION OPHTHALMIC at 22:00

## 2021-01-01 RX ADMIN — PHENYTOIN 150 MG: 50 TABLET, CHEWABLE ORAL at 15:56

## 2021-01-01 RX ADMIN — OXYCODONE 5 MG: 5 TABLET ORAL at 06:06

## 2021-01-01 RX ADMIN — IPRATROPIUM BROMIDE AND ALBUTEROL SULFATE 3 ML: .5; 2.5 SOLUTION RESPIRATORY (INHALATION) at 15:09

## 2021-01-01 RX ADMIN — LEVOFLOXACIN 750 MG: 5 INJECTION, SOLUTION INTRAVENOUS at 15:55

## 2021-01-01 RX ADMIN — Medication 10 ML: at 22:33

## 2021-01-01 RX ADMIN — VANCOMYCIN HYDROCHLORIDE 500 MG: KIT at 18:30

## 2021-01-01 RX ADMIN — HYDROMORPHONE HYDROCHLORIDE 0.5 MG: 1 INJECTION, SOLUTION INTRAMUSCULAR; INTRAVENOUS; SUBCUTANEOUS at 16:34

## 2021-01-01 RX ADMIN — PHENYTOIN 150 MG: 50 TABLET, CHEWABLE ORAL at 08:39

## 2021-01-01 RX ADMIN — ACETYLCYSTEINE 400 MG: 100 SOLUTION ORAL; RESPIRATORY (INHALATION) at 19:17

## 2021-01-01 RX ADMIN — PANTOPRAZOLE SODIUM 40 MG: 40 TABLET, DELAYED RELEASE ORAL at 09:51

## 2021-01-01 RX ADMIN — MUPIROCIN: 20 OINTMENT TOPICAL at 17:24

## 2021-01-01 RX ADMIN — LORAZEPAM 0.5 MG: 2 INJECTION INTRAMUSCULAR; INTRAVENOUS at 16:32

## 2021-01-01 RX ADMIN — LORAZEPAM 1 MG: 2 SOLUTION, CONCENTRATE ORAL at 22:04

## 2021-01-01 RX ADMIN — VANCOMYCIN HYDROCHLORIDE 750 MG: 750 INJECTION, POWDER, LYOPHILIZED, FOR SOLUTION INTRAVENOUS at 11:56

## 2021-01-01 RX ADMIN — HEPARIN SODIUM 5000 UNITS: 5000 INJECTION INTRAVENOUS; SUBCUTANEOUS at 22:37

## 2021-01-01 RX ADMIN — Medication 10 ML: at 22:40

## 2021-01-01 RX ADMIN — PHENYTOIN 100 MG: 125 SUSPENSION ORAL at 21:25

## 2021-01-01 RX ADMIN — OXYCODONE 5 MG: 5 TABLET ORAL at 05:19

## 2021-01-01 RX ADMIN — SODIUM CHLORIDE 100 ML/HR: 9 INJECTION, SOLUTION INTRAVENOUS at 18:10

## 2021-01-01 RX ADMIN — LEVALBUTEROL HYDROCHLORIDE 1.25 MG: 1.25 SOLUTION RESPIRATORY (INHALATION) at 16:04

## 2021-01-01 RX ADMIN — LORAZEPAM 0.5 MG: 0.5 TABLET ORAL at 15:12

## 2021-01-01 RX ADMIN — VANCOMYCIN HYDROCHLORIDE 750 MG: 750 INJECTION, POWDER, LYOPHILIZED, FOR SOLUTION INTRAVENOUS at 11:37

## 2021-01-01 RX ADMIN — Medication 10 ML: at 22:09

## 2021-01-01 RX ADMIN — PIPERACILLIN AND TAZOBACTAM 3.38 G: 3; .375 INJECTION, POWDER, LYOPHILIZED, FOR SOLUTION INTRAVENOUS at 10:07

## 2021-01-01 RX ADMIN — MIDODRINE HYDROCHLORIDE 15 MG: 5 TABLET ORAL at 21:58

## 2021-01-01 RX ADMIN — LORAZEPAM 2 MG: 2 INJECTION INTRAMUSCULAR; INTRAVENOUS at 00:13

## 2021-01-01 RX ADMIN — POTASSIUM BICARBONATE 20 MEQ: 782 TABLET, EFFERVESCENT ORAL at 07:09

## 2021-01-01 RX ADMIN — PIPERACILLIN SODIUM AND TAZOBACTAM SODIUM 3.38 G: 3; .375 INJECTION, POWDER, LYOPHILIZED, FOR SOLUTION INTRAVENOUS at 06:06

## 2021-01-01 RX ADMIN — Medication 10 ML: at 11:55

## 2021-01-01 RX ADMIN — PIPERACILLIN AND TAZOBACTAM 3.38 G: 3; .375 INJECTION, POWDER, LYOPHILIZED, FOR SOLUTION INTRAVENOUS at 04:28

## 2021-01-01 RX ADMIN — MIDODRINE HYDROCHLORIDE 15 MG: 5 TABLET ORAL at 16:02

## 2021-01-01 RX ADMIN — LEVETIRACETAM 750 MG: 100 SOLUTION ORAL at 17:53

## 2021-01-01 RX ADMIN — IPRATROPIUM BROMIDE AND ALBUTEROL SULFATE 3 ML: .5; 2.5 SOLUTION RESPIRATORY (INHALATION) at 07:38

## 2021-01-01 RX ADMIN — MIDODRINE HYDROCHLORIDE 15 MG: 5 TABLET ORAL at 22:36

## 2021-01-01 RX ADMIN — ACETYLCYSTEINE 400 MG: 100 SOLUTION ORAL; RESPIRATORY (INHALATION) at 19:52

## 2021-01-01 RX ADMIN — LEVOFLOXACIN 750 MG: 5 INJECTION, SOLUTION INTRAVENOUS at 06:03

## 2021-01-01 RX ADMIN — SODIUM CHLORIDE 750 MG: 900 INJECTION, SOLUTION INTRAVENOUS at 07:23

## 2021-01-01 RX ADMIN — LORAZEPAM 0.5 MG: 0.5 TABLET ORAL at 00:57

## 2021-01-01 RX ADMIN — SODIUM CHLORIDE 750 MG: 900 INJECTION, SOLUTION INTRAVENOUS at 22:12

## 2021-01-01 RX ADMIN — ENOXAPARIN SODIUM 40 MG: 40 INJECTION SUBCUTANEOUS at 09:36

## 2021-01-01 RX ADMIN — LORAZEPAM 1 MG: 2 INJECTION INTRAMUSCULAR; INTRAVENOUS at 11:05

## 2021-01-01 RX ADMIN — VANCOMYCIN HYDROCHLORIDE 500 MG: KIT at 17:14

## 2021-01-01 RX ADMIN — SODIUM CHLORIDE 750 MG: 900 INJECTION, SOLUTION INTRAVENOUS at 21:48

## 2021-01-01 RX ADMIN — HYDROMORPHONE HYDROCHLORIDE 0.5 MG: 1 INJECTION, SOLUTION INTRAMUSCULAR; INTRAVENOUS; SUBCUTANEOUS at 14:13

## 2021-01-01 RX ADMIN — MICONAZOLE NITRATE 2 % TOPICAL POWDER: at 17:04

## 2021-01-01 RX ADMIN — MORPHINE SULFATE 1 MG: 2 INJECTION, SOLUTION INTRAMUSCULAR; INTRAVENOUS at 08:45

## 2021-01-01 RX ADMIN — LORAZEPAM 2 MG: 2 INJECTION INTRAMUSCULAR; INTRAVENOUS at 18:29

## 2021-01-01 RX ADMIN — ENOXAPARIN SODIUM 40 MG: 40 INJECTION SUBCUTANEOUS at 09:25

## 2021-01-01 RX ADMIN — LORAZEPAM 2 MG: 2 INJECTION INTRAMUSCULAR; INTRAVENOUS at 20:26

## 2021-01-01 RX ADMIN — LORAZEPAM 0.5 MG: 0.5 TABLET ORAL at 18:25

## 2021-01-01 RX ADMIN — IPRATROPIUM BROMIDE AND ALBUTEROL SULFATE 3 ML: .5; 2.5 SOLUTION RESPIRATORY (INHALATION) at 19:45

## 2021-01-01 RX ADMIN — Medication 10 ML: at 14:12

## 2021-01-01 RX ADMIN — PIPERACILLIN SODIUM AND TAZOBACTAM SODIUM 3.38 G: 3; .375 INJECTION, POWDER, LYOPHILIZED, FOR SOLUTION INTRAVENOUS at 15:25

## 2021-01-01 RX ADMIN — LORAZEPAM 0.5 MG: 0.5 TABLET ORAL at 09:13

## 2021-01-01 RX ADMIN — POTASSIUM BICARBONATE 40 MEQ: 782 TABLET, EFFERVESCENT ORAL at 03:12

## 2021-01-01 RX ADMIN — IPRATROPIUM BROMIDE AND ALBUTEROL SULFATE 3 ML: .5; 2.5 SOLUTION RESPIRATORY (INHALATION) at 04:01

## 2021-01-01 RX ADMIN — SODIUM CHLORIDE 100 MG: 9 INJECTION, SOLUTION INTRAVENOUS at 10:31

## 2021-01-01 RX ADMIN — GLYCOPYRROLATE 0.2 MG: 0.2 INJECTION INTRAMUSCULAR; INTRAVENOUS at 12:05

## 2021-01-01 RX ADMIN — PIPERACILLIN AND TAZOBACTAM 3.38 G: 3; .375 INJECTION, POWDER, LYOPHILIZED, FOR SOLUTION INTRAVENOUS at 13:49

## 2021-01-01 RX ADMIN — VANCOMYCIN HYDROCHLORIDE 500 MG: KIT at 06:28

## 2021-01-01 RX ADMIN — IPRATROPIUM BROMIDE AND ALBUTEROL SULFATE 3 ML: .5; 2.5 SOLUTION RESPIRATORY (INHALATION) at 07:50

## 2021-01-01 RX ADMIN — PHENYTOIN 150 MG: 50 TABLET, CHEWABLE ORAL at 10:00

## 2021-01-01 RX ADMIN — GUAIFENESIN 200 MG: 200 SOLUTION ORAL at 17:14

## 2021-01-01 RX ADMIN — MIDODRINE HYDROCHLORIDE 15 MG: 5 TABLET ORAL at 15:03

## 2021-01-01 RX ADMIN — MORPHINE SULFATE 1 MG: 2 INJECTION, SOLUTION INTRAMUSCULAR; INTRAVENOUS at 21:07

## 2021-01-01 RX ADMIN — Medication 10 ML: at 13:05

## 2021-01-01 RX ADMIN — MORPHINE SULFATE 1 MG: 2 INJECTION, SOLUTION INTRAMUSCULAR; INTRAVENOUS at 17:54

## 2021-01-01 RX ADMIN — LEVETIRACETAM 750 MG: 100 SOLUTION ORAL at 11:26

## 2021-01-01 RX ADMIN — GLYCOPYRROLATE 1 MG: 1 TABLET ORAL at 10:34

## 2021-01-01 RX ADMIN — SODIUM CHLORIDE 750 MG: 900 INJECTION, SOLUTION INTRAVENOUS at 20:20

## 2021-01-01 RX ADMIN — LORAZEPAM 0.5 MG: 0.5 TABLET ORAL at 11:00

## 2021-01-01 RX ADMIN — Medication 10 ML: at 05:35

## 2021-01-01 RX ADMIN — PHENYTOIN 100 MG: 125 SUSPENSION ORAL at 14:06

## 2021-01-01 RX ADMIN — MICONAZOLE NITRATE 2 % TOPICAL POWDER: at 09:36

## 2021-01-01 RX ADMIN — POTASSIUM CHLORIDE 10 MEQ: 10 INJECTION, SOLUTION INTRAVENOUS at 06:15

## 2021-01-01 RX ADMIN — POTASSIUM BICARBONATE 20 MEQ: 782 TABLET, EFFERVESCENT ORAL at 08:39

## 2021-01-01 RX ADMIN — PHENYTOIN 100 MG: 125 SUSPENSION ORAL at 14:55

## 2021-01-01 RX ADMIN — LEVOFLOXACIN 750 MG: 5 INJECTION, SOLUTION INTRAVENOUS at 05:28

## 2021-01-01 RX ADMIN — PANTOPRAZOLE SODIUM 40 MG: 40 INJECTION, POWDER, FOR SOLUTION INTRAVENOUS at 08:27

## 2021-01-01 RX ADMIN — LORAZEPAM 0.5 MG: 0.5 TABLET ORAL at 09:23

## 2021-01-01 RX ADMIN — LORAZEPAM 2 MG: 2 INJECTION INTRAMUSCULAR; INTRAVENOUS at 16:27

## 2021-01-01 RX ADMIN — Medication 10 ML: at 22:04

## 2021-01-01 RX ADMIN — PHENYTOIN 150 MG: 50 TABLET, CHEWABLE ORAL at 23:04

## 2021-01-01 RX ADMIN — PIPERACILLIN AND TAZOBACTAM 3.38 G: 3; .375 INJECTION, POWDER, LYOPHILIZED, FOR SOLUTION INTRAVENOUS at 18:25

## 2021-01-01 RX ADMIN — ENOXAPARIN SODIUM 40 MG: 40 INJECTION SUBCUTANEOUS at 08:53

## 2021-01-01 RX ADMIN — OXYCODONE 5 MG: 5 TABLET ORAL at 00:11

## 2021-01-01 RX ADMIN — LANSOPRAZOLE 30 MG: KIT at 20:31

## 2021-01-01 RX ADMIN — LORAZEPAM 0.5 MG: 0.5 TABLET ORAL at 03:55

## 2021-01-01 RX ADMIN — PIPERACILLIN SODIUM AND TAZOBACTAM SODIUM 3.38 G: 3; .375 INJECTION, POWDER, LYOPHILIZED, FOR SOLUTION INTRAVENOUS at 22:40

## 2021-01-01 RX ADMIN — LEVOFLOXACIN 750 MG: 5 INJECTION, SOLUTION INTRAVENOUS at 20:30

## 2021-01-01 RX ADMIN — PIPERACILLIN SODIUM AND TAZOBACTAM SODIUM 3.38 G: 3; .375 INJECTION, POWDER, LYOPHILIZED, FOR SOLUTION INTRAVENOUS at 22:44

## 2021-01-01 RX ADMIN — FUROSEMIDE 20 MG: 10 INJECTION, SOLUTION INTRAMUSCULAR; INTRAVENOUS at 09:43

## 2021-01-01 RX ADMIN — GLYCOPYRROLATE 0.2 MG: 0.2 INJECTION INTRAMUSCULAR; INTRAVENOUS at 20:04

## 2021-01-01 RX ADMIN — FENTANYL CITRATE 25 MCG: 50 INJECTION, SOLUTION INTRAMUSCULAR; INTRAVENOUS at 16:37

## 2021-01-01 RX ADMIN — GUAIFENESIN 200 MG: 200 SOLUTION ORAL at 12:25

## 2021-01-01 RX ADMIN — LORAZEPAM 0.5 MG: 2 INJECTION INTRAMUSCULAR; INTRAVENOUS at 03:10

## 2021-01-01 RX ADMIN — SODIUM CHLORIDE 750 MG: 900 INJECTION, SOLUTION INTRAVENOUS at 05:37

## 2021-01-01 RX ADMIN — OXYCODONE 5 MG: 5 TABLET ORAL at 01:13

## 2021-01-01 RX ADMIN — FUROSEMIDE 20 MG: 10 INJECTION, SOLUTION INTRAMUSCULAR; INTRAVENOUS at 21:46

## 2021-01-01 RX ADMIN — DEXMEDETOMIDINE HYDROCHLORIDE 0.5 MCG/KG/HR: 400 INJECTION INTRAVENOUS at 11:15

## 2021-01-01 RX ADMIN — LEVETIRACETAM 750 MG: 100 SOLUTION ORAL at 20:45

## 2021-01-01 RX ADMIN — MIDODRINE HYDROCHLORIDE 15 MG: 5 TABLET ORAL at 13:03

## 2021-01-01 RX ADMIN — LEVETIRACETAM 1000 MG: 100 SOLUTION ORAL at 09:00

## 2021-01-01 RX ADMIN — LORAZEPAM 0.5 MG: 0.5 TABLET ORAL at 09:32

## 2021-01-01 RX ADMIN — IPRATROPIUM BROMIDE AND ALBUTEROL SULFATE 3 ML: .5; 2.5 SOLUTION RESPIRATORY (INHALATION) at 19:56

## 2021-01-01 RX ADMIN — HEPARIN SODIUM 5000 UNITS: 5000 INJECTION INTRAVENOUS; SUBCUTANEOUS at 15:03

## 2021-01-01 RX ADMIN — IPRATROPIUM BROMIDE AND ALBUTEROL SULFATE 3 ML: .5; 2.5 SOLUTION RESPIRATORY (INHALATION) at 15:04

## 2021-01-01 RX ADMIN — SODIUM CHLORIDE 100 MG: 9 INJECTION, SOLUTION INTRAVENOUS at 13:19

## 2021-01-01 RX ADMIN — IPRATROPIUM BROMIDE AND ALBUTEROL SULFATE 3 ML: .5; 2.5 SOLUTION RESPIRATORY (INHALATION) at 15:27

## 2021-01-01 RX ADMIN — MORPHINE SULFATE 10 MG: 10 SOLUTION ORAL at 08:53

## 2021-01-01 RX ADMIN — MIDODRINE HYDROCHLORIDE 15 MG: 5 TABLET ORAL at 22:34

## 2021-01-01 RX ADMIN — LORAZEPAM 0.5 MG: 0.5 TABLET ORAL at 04:44

## 2021-01-01 RX ADMIN — MORPHINE SULFATE 1 MG: 2 INJECTION, SOLUTION INTRAMUSCULAR; INTRAVENOUS at 16:49

## 2021-01-01 RX ADMIN — ACETAMINOPHEN 650 MG: 325 TABLET ORAL at 16:40

## 2021-01-01 RX ADMIN — Medication 10 ML: at 21:19

## 2021-01-01 RX ADMIN — Medication 10 ML: at 21:56

## 2021-01-01 RX ADMIN — LANSOPRAZOLE 30 MG: KIT at 08:11

## 2021-01-01 RX ADMIN — PHENYTOIN 100 MG: 125 SUSPENSION ORAL at 05:57

## 2021-01-01 RX ADMIN — FUROSEMIDE 20 MG: 10 INJECTION, SOLUTION INTRAMUSCULAR; INTRAVENOUS at 10:10

## 2021-01-01 RX ADMIN — VANCOMYCIN HYDROCHLORIDE 1250 MG: 1.25 INJECTION, POWDER, LYOPHILIZED, FOR SOLUTION INTRAVENOUS at 06:34

## 2021-01-01 RX ADMIN — LORAZEPAM 0.5 MG: 2 INJECTION INTRAMUSCULAR; INTRAVENOUS at 11:55

## 2021-01-01 RX ADMIN — LORAZEPAM 0.5 MG: 0.5 TABLET ORAL at 02:16

## 2021-01-01 RX ADMIN — DEXTROSE MONOHYDRATE 0.5 MCG/MIN: 50 INJECTION, SOLUTION INTRAVENOUS at 18:17

## 2021-01-01 RX ADMIN — MIDODRINE HYDROCHLORIDE 15 MG: 5 TABLET ORAL at 22:03

## 2021-01-01 RX ADMIN — LANSOPRAZOLE 30 MG: KIT at 09:32

## 2021-01-01 RX ADMIN — ENOXAPARIN SODIUM 40 MG: 40 INJECTION SUBCUTANEOUS at 08:44

## 2021-01-01 RX ADMIN — Medication 10 ML: at 13:58

## 2021-01-01 RX ADMIN — PIPERACILLIN SODIUM AND TAZOBACTAM SODIUM 3.38 G: 3; .375 INJECTION, POWDER, LYOPHILIZED, FOR SOLUTION INTRAVENOUS at 14:55

## 2021-01-01 RX ADMIN — IPRATROPIUM BROMIDE AND ALBUTEROL SULFATE 3 ML: .5; 2.5 SOLUTION RESPIRATORY (INHALATION) at 07:07

## 2021-01-01 RX ADMIN — GLYCOPYRROLATE 1 MG: 1 TABLET ORAL at 22:19

## 2021-01-01 RX ADMIN — PIPERACILLIN SODIUM AND TAZOBACTAM SODIUM 3.38 G: 3; .375 INJECTION, POWDER, LYOPHILIZED, FOR SOLUTION INTRAVENOUS at 22:47

## 2021-01-01 RX ADMIN — HYDROMORPHONE HYDROCHLORIDE 0.5 MG: 1 INJECTION, SOLUTION INTRAMUSCULAR; INTRAVENOUS; SUBCUTANEOUS at 20:03

## 2021-01-01 RX ADMIN — LORAZEPAM 0.5 MG: 0.5 TABLET ORAL at 16:10

## 2021-01-01 RX ADMIN — LORAZEPAM 0.5 MG: 0.5 TABLET ORAL at 18:52

## 2021-01-01 RX ADMIN — ACETYLCYSTEINE 400 MG: 100 SOLUTION ORAL; RESPIRATORY (INHALATION) at 10:23

## 2021-01-01 RX ADMIN — ATROPINE SULFATE 1 DROP: 10 SOLUTION OPHTHALMIC at 17:03

## 2021-01-01 RX ADMIN — HYDROMORPHONE HYDROCHLORIDE 1 MG: 1 INJECTION, SOLUTION INTRAMUSCULAR; INTRAVENOUS; SUBCUTANEOUS at 08:38

## 2021-01-01 RX ADMIN — OXYCODONE HYDROCHLORIDE 5 MG: 5 TABLET ORAL at 03:12

## 2021-01-01 RX ADMIN — MIDODRINE HYDROCHLORIDE 15 MG: 5 TABLET ORAL at 05:19

## 2021-01-01 RX ADMIN — VANCOMYCIN HYDROCHLORIDE 500 MG: KIT at 06:21

## 2021-01-01 RX ADMIN — MIDODRINE HYDROCHLORIDE 15 MG: 5 TABLET ORAL at 21:00

## 2021-01-01 RX ADMIN — FUROSEMIDE 20 MG: 10 INJECTION, SOLUTION INTRAMUSCULAR; INTRAVENOUS at 17:42

## 2021-01-01 RX ADMIN — PIPERACILLIN SODIUM AND TAZOBACTAM SODIUM 3.38 G: 3; .375 INJECTION, POWDER, LYOPHILIZED, FOR SOLUTION INTRAVENOUS at 06:01

## 2021-01-01 RX ADMIN — HEPARIN SODIUM 5000 UNITS: 5000 INJECTION INTRAVENOUS; SUBCUTANEOUS at 06:12

## 2021-01-01 RX ADMIN — PHENYTOIN 150 MG: 50 TABLET, CHEWABLE ORAL at 09:05

## 2021-01-01 RX ADMIN — LEVETIRACETAM 1000 MG: 100 SOLUTION ORAL at 08:11

## 2021-01-01 RX ADMIN — OXYCODONE 5 MG: 5 TABLET ORAL at 14:00

## 2021-01-01 RX ADMIN — POTASSIUM CHLORIDE 10 MEQ: 10 INJECTION, SOLUTION INTRAVENOUS at 09:34

## 2021-01-01 RX ADMIN — IPRATROPIUM BROMIDE AND ALBUTEROL SULFATE 3 ML: .5; 2.5 SOLUTION RESPIRATORY (INHALATION) at 15:05

## 2021-01-01 RX ADMIN — ACETYLCYSTEINE 400 MG: 100 SOLUTION ORAL; RESPIRATORY (INHALATION) at 07:18

## 2021-01-01 RX ADMIN — LORAZEPAM 2 MG: 2 INJECTION INTRAMUSCULAR; INTRAVENOUS at 04:13

## 2021-01-01 RX ADMIN — POTASSIUM CHLORIDE 10 MEQ: 10 INJECTION, SOLUTION INTRAVENOUS at 08:10

## 2021-01-01 RX ADMIN — Medication 10 ML: at 14:02

## 2021-01-01 RX ADMIN — IPRATROPIUM BROMIDE AND ALBUTEROL SULFATE 3 ML: .5; 2.5 SOLUTION RESPIRATORY (INHALATION) at 00:38

## 2021-01-01 RX ADMIN — LORAZEPAM 0.5 MG: 0.5 TABLET ORAL at 12:50

## 2021-01-01 RX ADMIN — TORSEMIDE 40 MG: 20 TABLET ORAL at 10:15

## 2021-01-01 RX ADMIN — ACETYLCYSTEINE 400 MG: 100 SOLUTION ORAL; RESPIRATORY (INHALATION) at 07:50

## 2021-01-01 RX ADMIN — POTASSIUM BICARBONATE 20 MEQ: 782 TABLET, EFFERVESCENT ORAL at 09:23

## 2021-01-01 RX ADMIN — PHENYTOIN 100 MG: 125 SUSPENSION ORAL at 06:40

## 2021-01-01 RX ADMIN — ACETYLCYSTEINE 400 MG: 100 SOLUTION ORAL; RESPIRATORY (INHALATION) at 15:09

## 2021-01-01 RX ADMIN — MIDODRINE HYDROCHLORIDE 15 MG: 5 TABLET ORAL at 21:46

## 2021-01-01 RX ADMIN — PIPERACILLIN AND TAZOBACTAM 3.38 G: 3; .375 INJECTION, POWDER, LYOPHILIZED, FOR SOLUTION INTRAVENOUS at 16:41

## 2021-01-01 RX ADMIN — POTASSIUM BICARBONATE 40 MEQ: 782 TABLET, EFFERVESCENT ORAL at 08:47

## 2021-01-01 RX ADMIN — TORSEMIDE 40 MG: 20 TABLET ORAL at 21:24

## 2021-01-01 RX ADMIN — OXYCODONE 5 MG: 5 TABLET ORAL at 12:56

## 2021-01-01 RX ADMIN — LORAZEPAM 0.5 MG: 2 INJECTION INTRAMUSCULAR; INTRAVENOUS at 23:58

## 2021-01-01 RX ADMIN — Medication 10 ML: at 13:04

## 2021-01-01 RX ADMIN — LEVETIRACETAM 1000 MG: 100 SOLUTION ORAL at 20:08

## 2021-01-01 RX ADMIN — LORAZEPAM 0.5 MG: 0.5 TABLET ORAL at 20:54

## 2021-01-01 RX ADMIN — MIDODRINE HYDROCHLORIDE 15 MG: 5 TABLET ORAL at 05:55

## 2021-01-01 RX ADMIN — ENOXAPARIN SODIUM 40 MG: 40 INJECTION SUBCUTANEOUS at 08:07

## 2021-01-01 RX ADMIN — OXYCODONE 5 MG: 5 TABLET ORAL at 12:26

## 2021-01-01 RX ADMIN — IPRATROPIUM BROMIDE AND ALBUTEROL SULFATE 3 ML: .5; 2.5 SOLUTION RESPIRATORY (INHALATION) at 19:38

## 2021-01-01 RX ADMIN — LORAZEPAM 0.5 MG: 0.5 TABLET ORAL at 11:34

## 2021-01-01 RX ADMIN — GLYCOPYRROLATE 1 MG: 1 TABLET ORAL at 16:21

## 2021-01-01 RX ADMIN — IPRATROPIUM BROMIDE AND ALBUTEROL SULFATE 3 ML: .5; 2.5 SOLUTION RESPIRATORY (INHALATION) at 07:12

## 2021-01-01 RX ADMIN — MIDODRINE HYDROCHLORIDE 15 MG: 5 TABLET ORAL at 05:30

## 2021-01-01 RX ADMIN — LORAZEPAM 4 MG: 2 INJECTION INTRAMUSCULAR; INTRAVENOUS at 12:09

## 2021-01-01 RX ADMIN — VANCOMYCIN HYDROCHLORIDE 1000 MG: 1 INJECTION, POWDER, LYOPHILIZED, FOR SOLUTION INTRAVENOUS at 12:34

## 2021-01-01 RX ADMIN — MORPHINE SULFATE 1 MG: 2 INJECTION, SOLUTION INTRAMUSCULAR; INTRAVENOUS at 18:08

## 2021-01-01 RX ADMIN — PIPERACILLIN SODIUM AND TAZOBACTAM SODIUM 3.38 G: 3; .375 INJECTION, POWDER, LYOPHILIZED, FOR SOLUTION INTRAVENOUS at 02:28

## 2021-01-01 RX ADMIN — Medication 10 ML: at 13:31

## 2021-01-01 RX ADMIN — MIDODRINE HYDROCHLORIDE 15 MG: 5 TABLET ORAL at 06:24

## 2021-01-01 RX ADMIN — POTASSIUM BICARBONATE 40 MEQ: 782 TABLET, EFFERVESCENT ORAL at 08:36

## 2021-01-01 RX ADMIN — LORAZEPAM 0.5 MG: 2 INJECTION INTRAMUSCULAR; INTRAVENOUS at 08:35

## 2021-01-01 RX ADMIN — FENTANYL CITRATE 25 MCG: 50 INJECTION, SOLUTION INTRAMUSCULAR; INTRAVENOUS at 04:30

## 2021-01-01 RX ADMIN — OXYCODONE 5 MG: 5 TABLET ORAL at 13:09

## 2021-01-01 RX ADMIN — HYDROMORPHONE HYDROCHLORIDE 0.5 MG: 1 INJECTION, SOLUTION INTRAMUSCULAR; INTRAVENOUS; SUBCUTANEOUS at 22:32

## 2021-01-01 RX ADMIN — LORAZEPAM 0.5 MG: 0.5 TABLET ORAL at 02:22

## 2021-01-01 RX ADMIN — IPRATROPIUM BROMIDE AND ALBUTEROL SULFATE 3 ML: .5; 2.5 SOLUTION RESPIRATORY (INHALATION) at 11:41

## 2021-01-01 RX ADMIN — GLYCOPYRROLATE 1 MG: 1 TABLET ORAL at 15:06

## 2021-01-01 RX ADMIN — DEXTROSE MONOHYDRATE 1 MCG/MIN: 50 INJECTION, SOLUTION INTRAVENOUS at 06:47

## 2021-01-01 RX ADMIN — PHENYTOIN 200 MG: 50 TABLET, CHEWABLE ORAL at 23:00

## 2021-01-01 RX ADMIN — GUAIFENESIN 200 MG: 200 SOLUTION ORAL at 09:13

## 2021-01-01 RX ADMIN — PHENYTOIN 150 MG: 50 TABLET, CHEWABLE ORAL at 22:36

## 2021-01-01 RX ADMIN — GLYCOPYRROLATE 0.2 MG: 0.2 INJECTION INTRAMUSCULAR; INTRAVENOUS at 09:03

## 2021-01-01 RX ADMIN — IPRATROPIUM BROMIDE AND ALBUTEROL SULFATE 3 ML: .5; 2.5 SOLUTION RESPIRATORY (INHALATION) at 00:00

## 2021-01-01 RX ADMIN — Medication 10 ML: at 21:22

## 2021-01-01 RX ADMIN — LORAZEPAM 0.5 MG: 2 INJECTION INTRAMUSCULAR; INTRAVENOUS at 02:35

## 2021-01-01 RX ADMIN — Medication 10 ML: at 05:19

## 2021-01-01 RX ADMIN — GLYCOPYRROLATE 1 MG: 1 TABLET ORAL at 08:15

## 2021-01-01 RX ADMIN — FENTANYL CITRATE 25 MCG: 50 INJECTION, SOLUTION INTRAMUSCULAR; INTRAVENOUS at 04:24

## 2021-01-01 RX ADMIN — ACETAMINOPHEN 650 MG: 650 SUPPOSITORY RECTAL at 19:58

## 2021-01-01 RX ADMIN — PHENYTOIN 100 MG: 125 SUSPENSION ORAL at 21:58

## 2021-01-01 RX ADMIN — SODIUM CHLORIDE 1000 ML: 9 INJECTION, SOLUTION INTRAVENOUS at 21:44

## 2021-01-01 RX ADMIN — ENOXAPARIN SODIUM 40 MG: 40 INJECTION SUBCUTANEOUS at 09:52

## 2021-01-01 RX ADMIN — SODIUM CHLORIDE 750 MG: 900 INJECTION, SOLUTION INTRAVENOUS at 08:02

## 2021-01-01 RX ADMIN — LEVALBUTEROL HYDROCHLORIDE 1.25 MG: 1.25 SOLUTION RESPIRATORY (INHALATION) at 18:05

## 2021-01-01 RX ADMIN — Medication 10 ML: at 14:42

## 2021-01-01 RX ADMIN — MIDODRINE HYDROCHLORIDE 5 MG: 5 TABLET ORAL at 16:37

## 2021-01-01 RX ADMIN — Medication 10 ML: at 22:30

## 2021-01-01 RX ADMIN — LORAZEPAM 1 MG: 2 INJECTION INTRAMUSCULAR; INTRAVENOUS at 15:17

## 2021-01-01 RX ADMIN — OXYCODONE HYDROCHLORIDE 5 MG: 5 TABLET ORAL at 20:46

## 2021-01-01 RX ADMIN — LORAZEPAM 0.5 MG: 2 INJECTION INTRAMUSCULAR; INTRAVENOUS at 00:30

## 2021-01-01 RX ADMIN — LORAZEPAM 1 MG: 2 INJECTION INTRAMUSCULAR; INTRAVENOUS at 04:24

## 2021-01-01 RX ADMIN — Medication 10 ML: at 09:28

## 2021-01-01 RX ADMIN — VANCOMYCIN HYDROCHLORIDE 500 MG: KIT at 17:24

## 2021-01-01 RX ADMIN — MIDODRINE HYDROCHLORIDE 15 MG: 5 TABLET ORAL at 05:42

## 2021-01-01 RX ADMIN — HYDROMORPHONE HYDROCHLORIDE 0.5 MG: 1 INJECTION, SOLUTION INTRAMUSCULAR; INTRAVENOUS; SUBCUTANEOUS at 11:37

## 2021-01-01 RX ADMIN — PIPERACILLIN SODIUM AND TAZOBACTAM SODIUM 3.38 G: 3; .375 INJECTION, POWDER, LYOPHILIZED, FOR SOLUTION INTRAVENOUS at 09:32

## 2021-01-01 RX ADMIN — DEXTROSE MONOHYDRATE 4 MCG/MIN: 50 INJECTION, SOLUTION INTRAVENOUS at 01:50

## 2021-01-01 RX ADMIN — LEVETIRACETAM 750 MG: 100 SOLUTION ORAL at 08:36

## 2021-01-01 RX ADMIN — GLYCOPYRROLATE 0.2 MG: 0.2 INJECTION INTRAMUSCULAR; INTRAVENOUS at 11:37

## 2021-01-01 RX ADMIN — ACETAMINOPHEN 650 MG: 325 TABLET ORAL at 08:19

## 2021-01-01 RX ADMIN — VANCOMYCIN HYDROCHLORIDE 500 MG: KIT at 12:55

## 2021-01-01 RX ADMIN — PHENYTOIN 100 MG: 125 SUSPENSION ORAL at 06:24

## 2021-01-01 RX ADMIN — FUROSEMIDE 10 MG: 10 INJECTION, SOLUTION INTRAMUSCULAR; INTRAVENOUS at 14:04

## 2021-01-01 RX ADMIN — MIDODRINE HYDROCHLORIDE 15 MG: 5 TABLET ORAL at 06:27

## 2021-01-01 RX ADMIN — IPRATROPIUM BROMIDE AND ALBUTEROL SULFATE 3 ML: .5; 2.5 SOLUTION RESPIRATORY (INHALATION) at 19:25

## 2021-01-01 RX ADMIN — IPRATROPIUM BROMIDE AND ALBUTEROL SULFATE 3 ML: .5; 2.5 SOLUTION RESPIRATORY (INHALATION) at 03:52

## 2021-01-01 RX ADMIN — VANCOMYCIN HYDROCHLORIDE 500 MG: KIT at 06:13

## 2021-01-01 RX ADMIN — IPRATROPIUM BROMIDE AND ALBUTEROL SULFATE 3 ML: .5; 2.5 SOLUTION RESPIRATORY (INHALATION) at 07:14

## 2021-01-01 RX ADMIN — OXYCODONE 5 MG: 5 TABLET ORAL at 06:15

## 2021-01-01 RX ADMIN — ATROPINE SULFATE 1 DROP: 10 SOLUTION OPHTHALMIC at 23:46

## 2021-01-01 RX ADMIN — IPRATROPIUM BROMIDE AND ALBUTEROL SULFATE 3 ML: .5; 2.5 SOLUTION RESPIRATORY (INHALATION) at 20:03

## 2021-01-01 RX ADMIN — DEXMEDETOMIDINE HYDROCHLORIDE 0.4 MCG/KG/HR: 400 INJECTION INTRAVENOUS at 01:46

## 2021-01-01 RX ADMIN — OXYCODONE HYDROCHLORIDE 5 MG: 5 TABLET ORAL at 09:34

## 2021-01-01 RX ADMIN — Medication 10 ML: at 13:03

## 2021-01-01 RX ADMIN — ATROPINE SULFATE 1 DROP: 10 SOLUTION OPHTHALMIC at 08:34

## 2021-01-01 RX ADMIN — PHENYTOIN 100 MG: 50 TABLET, CHEWABLE ORAL at 15:12

## 2021-01-01 RX ADMIN — PHENYTOIN 100 MG: 125 SUSPENSION ORAL at 22:00

## 2021-01-01 RX ADMIN — Medication 10 ML: at 21:00

## 2021-01-01 RX ADMIN — Medication 10 ML: at 05:24

## 2021-01-01 RX ADMIN — PHENYTOIN 200 MG: 50 TABLET, CHEWABLE ORAL at 15:49

## 2021-01-01 RX ADMIN — LORAZEPAM 0.5 MG: 0.5 TABLET ORAL at 22:34

## 2021-01-01 RX ADMIN — LORAZEPAM 0.5 MG: 0.5 TABLET ORAL at 00:38

## 2021-01-01 RX ADMIN — LANSOPRAZOLE 30 MG: KIT at 20:48

## 2021-01-01 RX ADMIN — MORPHINE SULFATE 2 MG: 10 SOLUTION ORAL at 17:04

## 2021-01-01 RX ADMIN — VANCOMYCIN HYDROCHLORIDE 750 MG: 750 INJECTION, POWDER, LYOPHILIZED, FOR SOLUTION INTRAVENOUS at 23:58

## 2021-01-01 RX ADMIN — MIDAZOLAM 1.1 MG/HR: 5 INJECTION INTRAMUSCULAR; INTRAVENOUS at 01:21

## 2021-01-01 RX ADMIN — GLYCOPYRROLATE 1 MG: 1 TABLET ORAL at 15:18

## 2021-01-01 RX ADMIN — HYDROMORPHONE HYDROCHLORIDE 1 MG: 1 INJECTION, SOLUTION INTRAMUSCULAR; INTRAVENOUS; SUBCUTANEOUS at 08:30

## 2021-01-01 RX ADMIN — VANCOMYCIN HYDROCHLORIDE 500 MG: KIT at 17:03

## 2021-01-01 RX ADMIN — SODIUM CHLORIDE 750 MG: 900 INJECTION, SOLUTION INTRAVENOUS at 21:45

## 2021-01-01 RX ADMIN — PANTOPRAZOLE SODIUM 40 MG: 40 INJECTION, POWDER, FOR SOLUTION INTRAVENOUS at 10:09

## 2021-01-01 RX ADMIN — Medication 10 ML: at 05:28

## 2021-01-01 RX ADMIN — IPRATROPIUM BROMIDE AND ALBUTEROL SULFATE 3 ML: .5; 2.5 SOLUTION RESPIRATORY (INHALATION) at 00:03

## 2021-01-01 RX ADMIN — MIDODRINE HYDROCHLORIDE 15 MG: 5 TABLET ORAL at 13:53

## 2021-01-01 RX ADMIN — LORAZEPAM 0.5 MG: 0.5 TABLET ORAL at 23:04

## 2021-01-01 RX ADMIN — PANTOPRAZOLE SODIUM 40 MG: 40 INJECTION, POWDER, FOR SOLUTION INTRAVENOUS at 09:44

## 2021-01-01 RX ADMIN — MORPHINE SULFATE 1 MG: 2 INJECTION, SOLUTION INTRAMUSCULAR; INTRAVENOUS at 02:40

## 2021-01-01 RX ADMIN — FUROSEMIDE 20 MG: 10 INJECTION, SOLUTION INTRAMUSCULAR; INTRAVENOUS at 15:18

## 2021-01-01 RX ADMIN — MIDODRINE HYDROCHLORIDE 15 MG: 5 TABLET ORAL at 05:57

## 2021-01-01 RX ADMIN — MIDODRINE HYDROCHLORIDE 15 MG: 5 TABLET ORAL at 13:31

## 2021-01-01 RX ADMIN — MIDODRINE HYDROCHLORIDE 10 MG: 5 TABLET ORAL at 05:57

## 2021-01-01 RX ADMIN — ATROPINE SULFATE 1 DROP: 10 SOLUTION OPHTHALMIC at 08:06

## 2021-01-01 RX ADMIN — ALBUMIN (HUMAN) 12.5 G: 0.25 INJECTION, SOLUTION INTRAVENOUS at 13:03

## 2021-01-01 RX ADMIN — OXYCODONE 5 MG: 5 TABLET ORAL at 17:49

## 2021-01-01 RX ADMIN — SODIUM CHLORIDE 100 MG: 9 INJECTION, SOLUTION INTRAVENOUS at 21:19

## 2021-01-01 RX ADMIN — IPRATROPIUM BROMIDE AND ALBUTEROL SULFATE 3 ML: .5; 2.5 SOLUTION RESPIRATORY (INHALATION) at 14:48

## 2021-01-01 RX ADMIN — PANTOPRAZOLE SODIUM 40 MG: 40 TABLET, DELAYED RELEASE ORAL at 09:34

## 2021-01-01 RX ADMIN — MICONAZOLE NITRATE 2 % TOPICAL POWDER: at 20:46

## 2021-01-01 RX ADMIN — PANTOPRAZOLE SODIUM 40 MG: 40 INJECTION, POWDER, FOR SOLUTION INTRAVENOUS at 08:54

## 2021-01-01 RX ADMIN — PHENYTOIN 100 MG: 125 SUSPENSION ORAL at 16:03

## 2021-01-01 RX ADMIN — Medication 10 ML: at 05:41

## 2021-01-01 RX ADMIN — DEXMEDETOMIDINE HYDROCHLORIDE 0.6 MCG/KG/HR: 400 INJECTION INTRAVENOUS at 12:15

## 2021-01-01 RX ADMIN — OXYCODONE 5 MG: 5 TABLET ORAL at 23:02

## 2021-01-01 RX ADMIN — LEVETIRACETAM 1000 MG: 100 SOLUTION ORAL at 20:11

## 2021-01-01 RX ADMIN — ACETAMINOPHEN 650 MG: 325 TABLET ORAL at 17:22

## 2021-01-01 RX ADMIN — Medication 10 ML: at 13:54

## 2021-01-01 RX ADMIN — Medication 10 ML: at 16:21

## 2021-01-01 RX ADMIN — PHENYTOIN 100 MG: 50 TABLET, CHEWABLE ORAL at 10:51

## 2021-01-01 RX ADMIN — Medication 40 ML: at 07:07

## 2021-01-01 RX ADMIN — FENTANYL CITRATE 25 MCG: 50 INJECTION, SOLUTION INTRAMUSCULAR; INTRAVENOUS at 15:17

## 2021-01-01 RX ADMIN — MIDODRINE HYDROCHLORIDE 15 MG: 5 TABLET ORAL at 16:01

## 2021-01-01 RX ADMIN — Medication 10 ML: at 05:43

## 2021-01-01 RX ADMIN — MORPHINE SULFATE 1 MG: 2 INJECTION, SOLUTION INTRAMUSCULAR; INTRAVENOUS at 07:50

## 2021-01-01 RX ADMIN — MICONAZOLE NITRATE 2 % TOPICAL POWDER: at 15:05

## 2021-01-01 RX ADMIN — OXYCODONE 5 MG: 5 TABLET ORAL at 00:30

## 2021-01-01 RX ADMIN — Medication 10 ML: at 21:24

## 2021-01-01 RX ADMIN — LORAZEPAM 0.5 MG: 2 INJECTION INTRAMUSCULAR; INTRAVENOUS at 09:27

## 2021-01-01 RX ADMIN — OXYCODONE HYDROCHLORIDE 5 MG: 5 TABLET ORAL at 03:20

## 2021-01-01 RX ADMIN — GUAIFENESIN 200 MG: 200 SOLUTION ORAL at 17:45

## 2021-01-01 RX ADMIN — PIPERACILLIN AND TAZOBACTAM 3.38 G: 3; .375 INJECTION, POWDER, LYOPHILIZED, FOR SOLUTION INTRAVENOUS at 05:18

## 2021-01-01 RX ADMIN — IPRATROPIUM BROMIDE AND ALBUTEROL SULFATE 3 ML: .5; 2.5 SOLUTION RESPIRATORY (INHALATION) at 11:27

## 2021-01-01 RX ADMIN — HEPARIN SODIUM 5000 UNITS: 5000 INJECTION INTRAVENOUS; SUBCUTANEOUS at 06:20

## 2021-01-01 RX ADMIN — LEVALBUTEROL HYDROCHLORIDE 1.25 MG: 1.25 SOLUTION RESPIRATORY (INHALATION) at 07:37

## 2021-01-01 RX ADMIN — SODIUM CHLORIDE 750 MG: 900 INJECTION, SOLUTION INTRAVENOUS at 20:42

## 2021-01-01 RX ADMIN — PHENYTOIN 100 MG: 125 SUSPENSION ORAL at 22:18

## 2021-01-01 RX ADMIN — MIDODRINE HYDROCHLORIDE 15 MG: 5 TABLET ORAL at 14:24

## 2021-01-01 RX ADMIN — VANCOMYCIN HYDROCHLORIDE 500 MG: KIT at 11:56

## 2021-01-01 RX ADMIN — PHENYTOIN 100 MG: 125 SUSPENSION ORAL at 14:38

## 2021-01-01 RX ADMIN — LORAZEPAM 0.5 MG: 0.5 TABLET ORAL at 00:55

## 2021-01-01 RX ADMIN — LEVETIRACETAM 1000 MG: 100 SOLUTION ORAL at 08:52

## 2021-01-01 RX ADMIN — OXYCODONE HYDROCHLORIDE 5 MG: 5 TABLET ORAL at 15:56

## 2021-01-01 RX ADMIN — LORAZEPAM 1 MG: 2 INJECTION INTRAMUSCULAR; INTRAVENOUS at 09:01

## 2021-01-01 RX ADMIN — IPRATROPIUM BROMIDE AND ALBUTEROL SULFATE 3 ML: .5; 2.5 SOLUTION RESPIRATORY (INHALATION) at 15:38

## 2021-01-01 RX ADMIN — ACETYLCYSTEINE 400 MG: 100 SOLUTION ORAL; RESPIRATORY (INHALATION) at 21:34

## 2021-01-01 RX ADMIN — IPRATROPIUM BROMIDE AND ALBUTEROL SULFATE 3 ML: .5; 2.5 SOLUTION RESPIRATORY (INHALATION) at 19:52

## 2021-01-01 RX ADMIN — PANTOPRAZOLE SODIUM 40 MG: 40 INJECTION, POWDER, FOR SOLUTION INTRAVENOUS at 08:15

## 2021-01-01 RX ADMIN — GLYCOPYRROLATE 1 MG: 1 TABLET ORAL at 16:26

## 2021-01-01 RX ADMIN — ENOXAPARIN SODIUM 40 MG: 40 INJECTION SUBCUTANEOUS at 08:09

## 2021-01-01 RX ADMIN — OXYCODONE 5 MG: 5 TABLET ORAL at 23:24

## 2021-01-01 RX ADMIN — MIDODRINE HYDROCHLORIDE 15 MG: 5 TABLET ORAL at 06:07

## 2021-01-01 RX ADMIN — Medication 10 ML: at 15:55

## 2021-01-01 RX ADMIN — PIPERACILLIN AND TAZOBACTAM 3.38 G: 3; .375 INJECTION, POWDER, LYOPHILIZED, FOR SOLUTION INTRAVENOUS at 17:42

## 2021-01-01 RX ADMIN — HEPARIN SODIUM 5000 UNITS: 5000 INJECTION INTRAVENOUS; SUBCUTANEOUS at 21:23

## 2021-01-01 RX ADMIN — VANCOMYCIN HYDROCHLORIDE 750 MG: 750 INJECTION, POWDER, LYOPHILIZED, FOR SOLUTION INTRAVENOUS at 07:47

## 2021-01-01 RX ADMIN — PHENYTOIN 100 MG: 125 SUSPENSION ORAL at 21:21

## 2021-01-01 RX ADMIN — Medication 10 ML: at 23:05

## 2021-01-01 RX ADMIN — MICONAZOLE NITRATE 2 % TOPICAL POWDER: at 09:14

## 2021-01-01 RX ADMIN — SODIUM CHLORIDE 950 MG PE: 9 INJECTION, SOLUTION INTRAVENOUS at 13:04

## 2021-01-01 RX ADMIN — ENOXAPARIN SODIUM 40 MG: 40 INJECTION SUBCUTANEOUS at 09:44

## 2021-01-01 RX ADMIN — LANSOPRAZOLE 30 MG: KIT at 09:09

## 2021-01-01 RX ADMIN — LORAZEPAM 0.5 MG: 2 INJECTION INTRAMUSCULAR; INTRAVENOUS at 23:01

## 2021-01-01 RX ADMIN — PHENYTOIN 100 MG: 50 TABLET, CHEWABLE ORAL at 09:50

## 2021-01-01 RX ADMIN — LORAZEPAM 0.5 MG: 2 INJECTION INTRAMUSCULAR; INTRAVENOUS at 00:56

## 2021-01-01 RX ADMIN — LORAZEPAM 0.5 MG: 0.5 TABLET ORAL at 14:00

## 2021-01-01 RX ADMIN — Medication 10 ML: at 15:13

## 2021-01-01 RX ADMIN — HYDROMORPHONE HYDROCHLORIDE 0.5 MG: 1 INJECTION, SOLUTION INTRAMUSCULAR; INTRAVENOUS; SUBCUTANEOUS at 02:04

## 2021-01-01 RX ADMIN — LEVETIRACETAM 750 MG: 100 SOLUTION ORAL at 09:49

## 2021-01-01 RX ADMIN — Medication 10 ML: at 06:04

## 2021-01-01 RX ADMIN — LORAZEPAM 0.5 MG: 2 INJECTION INTRAMUSCULAR; INTRAVENOUS at 17:53

## 2021-01-01 RX ADMIN — HEPARIN SODIUM 5000 UNITS: 5000 INJECTION INTRAVENOUS; SUBCUTANEOUS at 21:08

## 2021-01-01 RX ADMIN — MIDODRINE HYDROCHLORIDE 10 MG: 5 TABLET ORAL at 14:03

## 2021-01-01 RX ADMIN — LEVALBUTEROL HYDROCHLORIDE 1.25 MG: 1.25 SOLUTION RESPIRATORY (INHALATION) at 15:04

## 2021-01-01 RX ADMIN — IPRATROPIUM BROMIDE AND ALBUTEROL SULFATE 3 ML: .5; 2.5 SOLUTION RESPIRATORY (INHALATION) at 19:22

## 2021-01-01 RX ADMIN — MUPIROCIN: 20 OINTMENT TOPICAL at 09:23

## 2021-01-01 RX ADMIN — PANTOPRAZOLE SODIUM 40 MG: 40 INJECTION, POWDER, FOR SOLUTION INTRAVENOUS at 08:07

## 2021-01-01 RX ADMIN — PIPERACILLIN SODIUM AND TAZOBACTAM SODIUM 3.38 G: 3; .375 INJECTION, POWDER, LYOPHILIZED, FOR SOLUTION INTRAVENOUS at 18:09

## 2021-01-01 RX ADMIN — PIPERACILLIN AND TAZOBACTAM 3.38 G: 3; .375 INJECTION, POWDER, LYOPHILIZED, FOR SOLUTION INTRAVENOUS at 21:00

## 2021-01-01 RX ADMIN — OXYCODONE 5 MG: 5 TABLET ORAL at 11:03

## 2021-01-01 RX ADMIN — LEVETIRACETAM 750 MG: 100 SOLUTION ORAL at 09:25

## 2021-01-01 RX ADMIN — GLYCOPYRROLATE 1 MG: 1 TABLET ORAL at 16:23

## 2021-01-01 RX ADMIN — MIDODRINE HYDROCHLORIDE 15 MG: 5 TABLET ORAL at 05:31

## 2021-01-01 RX ADMIN — POTASSIUM CHLORIDE 10 MEQ: 10 INJECTION, SOLUTION INTRAVENOUS at 13:03

## 2021-01-01 RX ADMIN — LEVOFLOXACIN 750 MG: 5 INJECTION, SOLUTION INTRAVENOUS at 22:37

## 2021-01-01 RX ADMIN — LORAZEPAM 0.5 MG: 0.5 TABLET ORAL at 18:09

## 2021-01-01 RX ADMIN — LEVETIRACETAM 750 MG: 100 SOLUTION ORAL at 20:44

## 2021-01-01 RX ADMIN — SODIUM CHLORIDE, POTASSIUM CHLORIDE, SODIUM LACTATE AND CALCIUM CHLORIDE 75 ML/HR: 600; 310; 30; 20 INJECTION, SOLUTION INTRAVENOUS at 08:48

## 2021-01-01 RX ADMIN — Medication 10 ML: at 06:21

## 2021-01-01 RX ADMIN — LORAZEPAM 2 MG: 2 INJECTION INTRAMUSCULAR; INTRAVENOUS at 11:37

## 2021-01-01 RX ADMIN — TORSEMIDE 40 MG: 20 TABLET ORAL at 09:35

## 2021-01-01 RX ADMIN — MICONAZOLE NITRATE 2 % TOPICAL POWDER: at 21:25

## 2021-01-01 RX ADMIN — LORAZEPAM 0.5 MG: 0.5 TABLET ORAL at 00:46

## 2021-01-01 RX ADMIN — Medication 10 ML: at 05:32

## 2021-01-01 RX ADMIN — LORAZEPAM 1 MG: 2 INJECTION INTRAMUSCULAR; INTRAVENOUS at 18:55

## 2021-01-01 RX ADMIN — OXYCODONE 5 MG: 5 TABLET ORAL at 05:50

## 2021-01-01 RX ADMIN — FUROSEMIDE 20 MG: 10 INJECTION, SOLUTION INTRAMUSCULAR; INTRAVENOUS at 22:17

## 2021-01-01 RX ADMIN — PIPERACILLIN AND TAZOBACTAM 3.38 G: 3; .375 INJECTION, POWDER, LYOPHILIZED, FOR SOLUTION INTRAVENOUS at 20:22

## 2021-01-01 RX ADMIN — Medication 10 ML: at 14:25

## 2021-01-01 RX ADMIN — GUAIFENESIN 200 MG: 200 SOLUTION ORAL at 09:34

## 2021-01-01 RX ADMIN — PANTOPRAZOLE SODIUM 40 MG: 40 TABLET, DELAYED RELEASE ORAL at 17:03

## 2021-01-01 RX ADMIN — PANTOPRAZOLE SODIUM 40 MG: 40 TABLET, DELAYED RELEASE ORAL at 17:22

## 2021-01-01 RX ADMIN — PHENYTOIN 200 MG: 50 TABLET, CHEWABLE ORAL at 16:11

## 2021-01-01 RX ADMIN — PIPERACILLIN AND TAZOBACTAM 3.38 G: 3; .375 INJECTION, POWDER, LYOPHILIZED, FOR SOLUTION INTRAVENOUS at 05:43

## 2021-01-01 RX ADMIN — OXYCODONE HYDROCHLORIDE 5 MG: 5 TABLET ORAL at 09:50

## 2021-01-01 RX ADMIN — Medication 1 EACH: at 09:08

## 2021-01-01 RX ADMIN — LORAZEPAM 2 MG: 2 INJECTION INTRAMUSCULAR; INTRAVENOUS at 12:04

## 2021-01-01 RX ADMIN — OXYCODONE 5 MG: 5 TABLET ORAL at 23:04

## 2021-01-01 RX ADMIN — IPRATROPIUM BROMIDE AND ALBUTEROL SULFATE 3 ML: .5; 2.5 SOLUTION RESPIRATORY (INHALATION) at 14:09

## 2021-01-01 RX ADMIN — MIDODRINE HYDROCHLORIDE 15 MG: 5 TABLET ORAL at 21:08

## 2021-01-01 RX ADMIN — MORPHINE SULFATE 1 MG: 2 INJECTION, SOLUTION INTRAMUSCULAR; INTRAVENOUS at 09:38

## 2021-01-01 RX ADMIN — SODIUM CHLORIDE, POTASSIUM CHLORIDE, SODIUM LACTATE AND CALCIUM CHLORIDE 100 ML/HR: 600; 310; 30; 20 INJECTION, SOLUTION INTRAVENOUS at 03:30

## 2021-01-01 RX ADMIN — FUROSEMIDE 20 MG: 10 INJECTION, SOLUTION INTRAMUSCULAR; INTRAVENOUS at 08:45

## 2021-01-01 RX ADMIN — ACETYLCYSTEINE 400 MG: 100 SOLUTION ORAL; RESPIRATORY (INHALATION) at 20:36

## 2021-01-01 RX ADMIN — PANTOPRAZOLE SODIUM 40 MG: 40 INJECTION, POWDER, FOR SOLUTION INTRAVENOUS at 08:48

## 2021-01-01 RX ADMIN — PIPERACILLIN AND TAZOBACTAM 3.38 G: 3; .375 INJECTION, POWDER, LYOPHILIZED, FOR SOLUTION INTRAVENOUS at 07:46

## 2021-01-01 RX ADMIN — HEPARIN SODIUM 5000 UNITS: 5000 INJECTION INTRAVENOUS; SUBCUTANEOUS at 13:00

## 2021-01-01 RX ADMIN — GLYCOPYRROLATE 1 MG: 1 TABLET ORAL at 10:15

## 2021-01-01 RX ADMIN — Medication 10 ML: at 03:13

## 2021-01-01 RX ADMIN — IPRATROPIUM BROMIDE AND ALBUTEROL SULFATE 3 ML: .5; 2.5 SOLUTION RESPIRATORY (INHALATION) at 03:30

## 2021-01-01 RX ADMIN — LEVETIRACETAM 750 MG: 100 SOLUTION ORAL at 22:30

## 2021-01-01 RX ADMIN — LEVETIRACETAM 750 MG: 100 SOLUTION ORAL at 09:33

## 2021-01-01 RX ADMIN — LORAZEPAM 0.5 MG: 0.5 TABLET ORAL at 20:44

## 2021-01-01 RX ADMIN — SODIUM CHLORIDE 750 MG: 900 INJECTION, SOLUTION INTRAVENOUS at 20:30

## 2021-01-01 RX ADMIN — LEVALBUTEROL HYDROCHLORIDE 1.25 MG: 1.25 SOLUTION RESPIRATORY (INHALATION) at 19:38

## 2021-01-01 RX ADMIN — ENOXAPARIN SODIUM 40 MG: 40 INJECTION SUBCUTANEOUS at 10:27

## 2021-01-01 RX ADMIN — SODIUM CHLORIDE 750 MG: 900 INJECTION, SOLUTION INTRAVENOUS at 08:31

## 2021-01-01 RX ADMIN — LORAZEPAM 0.5 MG: 0.5 TABLET ORAL at 01:58

## 2021-01-01 RX ADMIN — SODIUM CHLORIDE 750 MG: 900 INJECTION, SOLUTION INTRAVENOUS at 17:58

## 2021-01-01 RX ADMIN — OXYCODONE 5 MG: 5 TABLET ORAL at 18:00

## 2021-01-01 RX ADMIN — PIPERACILLIN SODIUM AND TAZOBACTAM SODIUM 3.38 G: 3; .375 INJECTION, POWDER, LYOPHILIZED, FOR SOLUTION INTRAVENOUS at 14:33

## 2021-01-01 RX ADMIN — IPRATROPIUM BROMIDE AND ALBUTEROL SULFATE 3 ML: .5; 2.5 SOLUTION RESPIRATORY (INHALATION) at 01:21

## 2021-01-01 RX ADMIN — Medication 10 ML: at 06:01

## 2021-01-01 RX ADMIN — MUPIROCIN: 20 OINTMENT TOPICAL at 16:12

## 2021-01-01 RX ADMIN — IPRATROPIUM BROMIDE AND ALBUTEROL SULFATE 3 ML: .5; 2.5 SOLUTION RESPIRATORY (INHALATION) at 00:25

## 2021-01-01 RX ADMIN — PHENYTOIN 100 MG: 125 SUSPENSION ORAL at 14:03

## 2021-01-01 RX ADMIN — IPRATROPIUM BROMIDE AND ALBUTEROL SULFATE 3 ML: .5; 2.5 SOLUTION RESPIRATORY (INHALATION) at 21:34

## 2021-01-01 RX ADMIN — DEXTROSE MONOHYDRATE 3 MCG/MIN: 50 INJECTION, SOLUTION INTRAVENOUS at 06:09

## 2021-01-01 RX ADMIN — LORAZEPAM 2 MG: 2 INJECTION INTRAMUSCULAR; INTRAVENOUS at 16:34

## 2021-01-01 RX ADMIN — LEVALBUTEROL HYDROCHLORIDE 1.25 MG: 1.25 SOLUTION RESPIRATORY (INHALATION) at 01:24

## 2021-01-01 RX ADMIN — LORAZEPAM 1 MG: 2 INJECTION INTRAMUSCULAR; INTRAVENOUS at 22:45

## 2021-01-01 RX ADMIN — LORAZEPAM 0.5 MG: 0.5 TABLET ORAL at 05:31

## 2021-01-01 RX ADMIN — PANTOPRAZOLE SODIUM 40 MG: 40 INJECTION, POWDER, FOR SOLUTION INTRAVENOUS at 08:10

## 2021-01-01 RX ADMIN — PIPERACILLIN SODIUM AND TAZOBACTAM SODIUM 3.38 G: 3; .375 INJECTION, POWDER, LYOPHILIZED, FOR SOLUTION INTRAVENOUS at 06:02

## 2021-01-01 RX ADMIN — LORAZEPAM 1 MG: 2 INJECTION INTRAMUSCULAR; INTRAVENOUS at 13:58

## 2021-01-01 RX ADMIN — ACETAMINOPHEN 650 MG: 325 TABLET ORAL at 16:37

## 2021-01-01 RX ADMIN — LORAZEPAM 0.5 MG: 0.5 TABLET ORAL at 05:50

## 2021-01-01 RX ADMIN — OXYCODONE HYDROCHLORIDE 5 MG: 5 TABLET ORAL at 15:51

## 2021-01-01 RX ADMIN — POTASSIUM BICARBONATE 40 MEQ: 782 TABLET, EFFERVESCENT ORAL at 22:30

## 2021-01-01 RX ADMIN — LORAZEPAM 0.5 MG: 0.5 TABLET ORAL at 18:04

## 2021-01-01 RX ADMIN — LEVETIRACETAM 750 MG: 100 SOLUTION ORAL at 21:21

## 2021-01-01 RX ADMIN — LORAZEPAM 2 MG: 2 INJECTION INTRAMUSCULAR; INTRAVENOUS at 14:14

## 2021-01-01 RX ADMIN — GLYCOPYRROLATE 0.2 MG: 0.2 INJECTION INTRAMUSCULAR; INTRAVENOUS at 00:13

## 2021-01-01 RX ADMIN — MORPHINE SULFATE 1 MG: 2 INJECTION, SOLUTION INTRAMUSCULAR; INTRAVENOUS at 09:44

## 2021-01-01 RX ADMIN — MIDODRINE HYDROCHLORIDE 15 MG: 5 TABLET ORAL at 22:18

## 2021-01-01 RX ADMIN — ACETYLCYSTEINE 400 MG: 100 SOLUTION ORAL; RESPIRATORY (INHALATION) at 07:08

## 2021-01-01 RX ADMIN — ACETYLCYSTEINE 400 MG: 100 SOLUTION ORAL; RESPIRATORY (INHALATION) at 08:37

## 2021-01-01 RX ADMIN — LEVALBUTEROL HYDROCHLORIDE 1.25 MG: 1.25 SOLUTION RESPIRATORY (INHALATION) at 16:13

## 2021-01-01 RX ADMIN — SODIUM CHLORIDE 750 MG: 900 INJECTION, SOLUTION INTRAVENOUS at 20:40

## 2021-01-01 RX ADMIN — IPRATROPIUM BROMIDE AND ALBUTEROL SULFATE 3 ML: .5; 2.5 SOLUTION RESPIRATORY (INHALATION) at 23:20

## 2021-01-01 RX ADMIN — OXYCODONE 5 MG: 5 TABLET ORAL at 13:59

## 2021-01-01 RX ADMIN — POTASSIUM CHLORIDE 40 MEQ: 750 TABLET, FILM COATED, EXTENDED RELEASE ORAL at 05:31

## 2021-01-01 RX ADMIN — SODIUM CHLORIDE 750 MG: 900 INJECTION, SOLUTION INTRAVENOUS at 09:12

## 2021-01-01 RX ADMIN — HEPARIN SODIUM 5000 UNITS: 5000 INJECTION INTRAVENOUS; SUBCUTANEOUS at 05:54

## 2021-01-01 RX ADMIN — MIDODRINE HYDROCHLORIDE 15 MG: 5 TABLET ORAL at 05:17

## 2021-01-01 RX ADMIN — OXYCODONE 5 MG: 5 TABLET ORAL at 17:34

## 2021-01-01 RX ADMIN — Medication 10 ML: at 06:15

## 2021-01-01 RX ADMIN — LORAZEPAM 0.5 MG: 0.5 TABLET ORAL at 23:14

## 2021-01-01 RX ADMIN — SODIUM CHLORIDE, POTASSIUM CHLORIDE, SODIUM LACTATE AND CALCIUM CHLORIDE 100 ML/HR: 600; 310; 30; 20 INJECTION, SOLUTION INTRAVENOUS at 06:26

## 2021-01-01 RX ADMIN — MIDODRINE HYDROCHLORIDE 5 MG: 5 TABLET ORAL at 08:10

## 2021-01-01 RX ADMIN — OXYCODONE HYDROCHLORIDE 5 MG: 5 TABLET ORAL at 21:46

## 2021-01-01 RX ADMIN — LEVETIRACETAM 1000 MG: 100 SOLUTION ORAL at 22:02

## 2021-01-01 RX ADMIN — HEPARIN SODIUM 5000 UNITS: 5000 INJECTION INTRAVENOUS; SUBCUTANEOUS at 22:35

## 2021-01-01 RX ADMIN — SODIUM CHLORIDE 1000 ML: 9 INJECTION, SOLUTION INTRAVENOUS at 01:28

## 2021-01-01 RX ADMIN — LEVETIRACETAM 750 MG: 100 SOLUTION ORAL at 20:31

## 2021-01-01 RX ADMIN — SODIUM CHLORIDE 100 ML/HR: 9 INJECTION, SOLUTION INTRAVENOUS at 15:28

## 2021-01-01 RX ADMIN — Medication 10 ML: at 10:32

## 2021-01-01 RX ADMIN — LORAZEPAM 1 MG: 2 SOLUTION, CONCENTRATE ORAL at 02:22

## 2021-01-01 RX ADMIN — LORAZEPAM 0.5 MG: 0.5 TABLET ORAL at 01:57

## 2021-01-01 RX ADMIN — OXYCODONE HYDROCHLORIDE 5 MG: 5 TABLET ORAL at 22:31

## 2021-01-01 RX ADMIN — VANCOMYCIN HYDROCHLORIDE 500 MG: KIT at 18:21

## 2021-01-01 RX ADMIN — PIPERACILLIN AND TAZOBACTAM 3.38 G: 3; .375 INJECTION, POWDER, LYOPHILIZED, FOR SOLUTION INTRAVENOUS at 18:09

## 2021-01-01 RX ADMIN — MUPIROCIN: 20 OINTMENT TOPICAL at 08:39

## 2021-01-01 RX ADMIN — PIPERACILLIN SODIUM AND TAZOBACTAM SODIUM 3.38 G: 3; .375 INJECTION, POWDER, LYOPHILIZED, FOR SOLUTION INTRAVENOUS at 07:50

## 2021-01-01 RX ADMIN — POTASSIUM BICARBONATE 20 MEQ: 782 TABLET, EFFERVESCENT ORAL at 05:40

## 2021-01-01 RX ADMIN — Medication 10 ML: at 21:25

## 2021-01-01 RX ADMIN — MUPIROCIN: 20 OINTMENT TOPICAL at 17:09

## 2021-01-01 RX ADMIN — LORAZEPAM 0.5 MG: 2 INJECTION INTRAMUSCULAR; INTRAVENOUS at 09:32

## 2021-01-01 RX ADMIN — MIDODRINE HYDROCHLORIDE 15 MG: 5 TABLET ORAL at 06:21

## 2021-01-01 RX ADMIN — PHENYTOIN 200 MG: 50 TABLET, CHEWABLE ORAL at 15:03

## 2021-01-01 RX ADMIN — LEVETIRACETAM 750 MG: 100 SOLUTION ORAL at 08:39

## 2021-01-01 RX ADMIN — ALBUMIN (HUMAN) 12.5 G: 0.25 INJECTION, SOLUTION INTRAVENOUS at 09:04

## 2021-01-01 RX ADMIN — FUROSEMIDE 20 MG: 10 INJECTION, SOLUTION INTRAMUSCULAR; INTRAVENOUS at 22:00

## 2021-01-01 RX ADMIN — MIDODRINE HYDROCHLORIDE 10 MG: 5 TABLET ORAL at 21:21

## 2021-01-01 RX ADMIN — LEVETIRACETAM 1000 MG: 100 SOLUTION ORAL at 20:54

## 2021-01-01 RX ADMIN — PANTOPRAZOLE SODIUM 40 MG: 40 INJECTION, POWDER, FOR SOLUTION INTRAVENOUS at 08:00

## 2021-01-01 RX ADMIN — LANSOPRAZOLE 30 MG: KIT at 20:09

## 2021-01-01 RX ADMIN — MUPIROCIN: 20 OINTMENT TOPICAL at 17:05

## 2021-01-01 RX ADMIN — Medication 10 ML: at 14:41

## 2021-01-01 RX ADMIN — IPRATROPIUM BROMIDE AND ALBUTEROL SULFATE 3 ML: .5; 2.5 SOLUTION RESPIRATORY (INHALATION) at 20:36

## 2021-01-01 RX ADMIN — PANTOPRAZOLE SODIUM 40 MG: 40 TABLET, DELAYED RELEASE ORAL at 09:12

## 2021-01-01 RX ADMIN — LORAZEPAM 0.5 MG: 0.5 TABLET ORAL at 06:39

## 2021-01-01 RX ADMIN — VANCOMYCIN HYDROCHLORIDE 500 MG: KIT at 06:24

## 2021-01-01 RX ADMIN — ACETYLCYSTEINE 400 MG: 100 SOLUTION ORAL; RESPIRATORY (INHALATION) at 14:48

## 2021-01-01 RX ADMIN — GUAIFENESIN 200 MG: 200 SOLUTION ORAL at 21:36

## 2021-01-01 RX ADMIN — LORAZEPAM 0.5 MG: 0.5 TABLET ORAL at 20:31

## 2021-01-01 RX ADMIN — MUPIROCIN: 20 OINTMENT TOPICAL at 18:26

## 2021-01-01 RX ADMIN — PIPERACILLIN SODIUM AND TAZOBACTAM SODIUM 3.38 G: 3; .375 INJECTION, POWDER, LYOPHILIZED, FOR SOLUTION INTRAVENOUS at 22:18

## 2021-01-01 RX ADMIN — MIDODRINE HYDROCHLORIDE 15 MG: 5 TABLET ORAL at 22:00

## 2021-01-01 RX ADMIN — OXYCODONE 5 MG: 5 TABLET ORAL at 05:57

## 2021-01-01 RX ADMIN — VANCOMYCIN HYDROCHLORIDE 500 MG: KIT at 12:46

## 2021-01-01 RX ADMIN — HYDROMORPHONE HYDROCHLORIDE 2 MG: 2 INJECTION, SOLUTION INTRAMUSCULAR; INTRAVENOUS; SUBCUTANEOUS at 12:12

## 2021-01-01 RX ADMIN — MUPIROCIN: 20 OINTMENT TOPICAL at 16:02

## 2021-01-01 RX ADMIN — LEVETIRACETAM 750 MG: 100 SOLUTION ORAL at 20:10

## 2021-01-01 RX ADMIN — IPRATROPIUM BROMIDE AND ALBUTEROL SULFATE 3 ML: .5; 2.5 SOLUTION RESPIRATORY (INHALATION) at 01:32

## 2021-01-01 RX ADMIN — DEXMEDETOMIDINE HYDROCHLORIDE 0.4 MCG/KG/HR: 400 INJECTION INTRAVENOUS at 09:24

## 2021-01-01 RX ADMIN — LORAZEPAM 0.5 MG: 0.5 TABLET ORAL at 15:49

## 2021-01-01 RX ADMIN — MIDODRINE HYDROCHLORIDE 5 MG: 5 TABLET ORAL at 11:55

## 2021-01-01 RX ADMIN — POTASSIUM BICARBONATE 20 MEQ: 782 TABLET, EFFERVESCENT ORAL at 08:11

## 2021-01-01 RX ADMIN — IPRATROPIUM BROMIDE AND ALBUTEROL SULFATE 3 ML: .5; 2.5 SOLUTION RESPIRATORY (INHALATION) at 14:39

## 2021-01-01 RX ADMIN — ACETYLCYSTEINE 400 MG: 100 SOLUTION ORAL; RESPIRATORY (INHALATION) at 07:42

## 2021-01-01 RX ADMIN — LEVETIRACETAM 1000 MG: 100 SOLUTION ORAL at 08:19

## 2021-01-01 RX ADMIN — OXYCODONE 5 MG: 5 TABLET ORAL at 00:42

## 2021-01-01 RX ADMIN — Medication 10 ML: at 09:21

## 2021-01-01 RX ADMIN — MIDODRINE HYDROCHLORIDE 15 MG: 5 TABLET ORAL at 05:05

## 2021-01-01 RX ADMIN — FUROSEMIDE 20 MG: 10 INJECTION, SOLUTION INTRAMUSCULAR; INTRAVENOUS at 08:32

## 2021-01-01 RX ADMIN — LORAZEPAM 0.5 MG: 0.5 TABLET ORAL at 06:11

## 2021-01-01 RX ADMIN — PIPERACILLIN AND TAZOBACTAM 3.38 G: 3; .375 INJECTION, POWDER, LYOPHILIZED, FOR SOLUTION INTRAVENOUS at 21:59

## 2021-01-01 RX ADMIN — LORAZEPAM 0.5 MG: 0.5 TABLET ORAL at 20:11

## 2021-01-01 RX ADMIN — MIDODRINE HYDROCHLORIDE 15 MG: 5 TABLET ORAL at 14:41

## 2021-01-01 RX ADMIN — LORAZEPAM 0.5 MG: 2 INJECTION INTRAMUSCULAR; INTRAVENOUS at 16:48

## 2021-01-01 RX ADMIN — LORAZEPAM 0.5 MG: 0.5 TABLET ORAL at 08:11

## 2021-01-01 RX ADMIN — GLYCOPYRROLATE 1 MG: 1 TABLET ORAL at 13:01

## 2021-01-01 RX ADMIN — VANCOMYCIN HYDROCHLORIDE 750 MG: 750 INJECTION, POWDER, LYOPHILIZED, FOR SOLUTION INTRAVENOUS at 23:37

## 2021-01-01 RX ADMIN — SODIUM CHLORIDE 500 ML: 9 INJECTION, SOLUTION INTRAVENOUS at 00:52

## 2021-01-01 RX ADMIN — OXYCODONE HYDROCHLORIDE 5 MG: 5 TABLET ORAL at 09:36

## 2021-01-01 RX ADMIN — PHENYTOIN 100 MG: 125 SUSPENSION ORAL at 21:55

## 2021-01-01 RX ADMIN — HYDROMORPHONE HYDROCHLORIDE 1 MG: 1 INJECTION, SOLUTION INTRAMUSCULAR; INTRAVENOUS; SUBCUTANEOUS at 04:13

## 2021-01-01 RX ADMIN — ACETYLCYSTEINE 400 MG: 100 SOLUTION ORAL; RESPIRATORY (INHALATION) at 19:56

## 2021-01-01 RX ADMIN — MORPHINE SULFATE 1 MG: 2 INJECTION, SOLUTION INTRAMUSCULAR; INTRAVENOUS at 16:18

## 2021-01-01 RX ADMIN — GUAIFENESIN 200 MG: 200 SOLUTION ORAL at 08:08

## 2021-01-01 RX ADMIN — OXYCODONE 5 MG: 5 TABLET ORAL at 11:55

## 2021-01-01 RX ADMIN — FUROSEMIDE 20 MG: 10 INJECTION, SOLUTION INTRAMUSCULAR; INTRAVENOUS at 16:01

## 2021-01-01 RX ADMIN — CEFEPIME HYDROCHLORIDE 2 G: 2 INJECTION, POWDER, FOR SOLUTION INTRAVENOUS at 10:28

## 2021-01-01 RX ADMIN — OXYCODONE HYDROCHLORIDE 5 MG: 5 TABLET ORAL at 15:12

## 2021-01-01 RX ADMIN — OXYCODONE 5 MG: 5 TABLET ORAL at 05:42

## 2021-01-01 RX ADMIN — MUPIROCIN: 20 OINTMENT TOPICAL at 15:49

## 2021-01-01 RX ADMIN — IPRATROPIUM BROMIDE AND ALBUTEROL SULFATE 3 ML: .5; 2.5 SOLUTION RESPIRATORY (INHALATION) at 04:14

## 2021-01-01 RX ADMIN — PIPERACILLIN SODIUM AND TAZOBACTAM SODIUM 3.38 G: 3; .375 INJECTION, POWDER, LYOPHILIZED, FOR SOLUTION INTRAVENOUS at 14:20

## 2021-01-01 RX ADMIN — PHENYTOIN 100 MG: 125 SUSPENSION ORAL at 22:11

## 2021-01-01 RX ADMIN — TORSEMIDE 40 MG: 20 TABLET ORAL at 09:51

## 2021-01-01 RX ADMIN — Medication 10 ML: at 05:59

## 2021-01-01 RX ADMIN — VANCOMYCIN HYDROCHLORIDE 500 MG: KIT at 17:05

## 2021-01-01 RX ADMIN — HEPARIN SODIUM 5000 UNITS: 5000 INJECTION INTRAVENOUS; SUBCUTANEOUS at 13:54

## 2021-01-01 RX ADMIN — OXYCODONE 5 MG: 5 TABLET ORAL at 05:34

## 2021-01-01 RX ADMIN — LEVETIRACETAM 750 MG: 100 SOLUTION ORAL at 10:08

## 2021-01-01 RX ADMIN — GLYCOPYRROLATE 1 MG: 1 TABLET ORAL at 09:44

## 2021-01-01 RX ADMIN — LANSOPRAZOLE 30 MG: KIT at 09:22

## 2021-01-01 RX ADMIN — LORAZEPAM 1 MG: 2 INJECTION INTRAMUSCULAR; INTRAVENOUS at 19:24

## 2021-01-01 RX ADMIN — IPRATROPIUM BROMIDE AND ALBUTEROL SULFATE 3 ML: .5; 2.5 SOLUTION RESPIRATORY (INHALATION) at 13:15

## 2021-01-01 RX ADMIN — OXYCODONE 5 MG: 5 TABLET ORAL at 23:47

## 2021-01-01 RX ADMIN — PHENYTOIN 100 MG: 125 SUSPENSION ORAL at 22:04

## 2021-01-01 RX ADMIN — Medication 10 ML: at 05:50

## 2021-01-01 RX ADMIN — PANTOPRAZOLE SODIUM 40 MG: 40 TABLET, DELAYED RELEASE ORAL at 17:14

## 2021-01-01 RX ADMIN — OXYCODONE 5 MG: 5 TABLET ORAL at 13:53

## 2021-01-01 RX ADMIN — GUAIFENESIN 200 MG: 200 SOLUTION ORAL at 22:31

## 2021-01-01 RX ADMIN — LEVALBUTEROL HYDROCHLORIDE 1.25 MG: 1.25 SOLUTION RESPIRATORY (INHALATION) at 12:21

## 2021-01-01 RX ADMIN — IPRATROPIUM BROMIDE AND ALBUTEROL SULFATE 3 ML: .5; 2.5 SOLUTION RESPIRATORY (INHALATION) at 01:01

## 2021-01-01 RX ADMIN — LORAZEPAM 0.5 MG: 0.5 TABLET ORAL at 10:00

## 2021-01-01 RX ADMIN — LEVALBUTEROL HYDROCHLORIDE 1.25 MG: 1.25 SOLUTION RESPIRATORY (INHALATION) at 07:21

## 2021-01-01 RX ADMIN — GLYCOPYRROLATE 1 MG: 1 TABLET ORAL at 06:09

## 2021-01-01 RX ADMIN — ACETYLCYSTEINE 400 MG: 100 SOLUTION ORAL; RESPIRATORY (INHALATION) at 13:37

## 2021-01-01 RX ADMIN — ACETAMINOPHEN 650 MG: 325 TABLET ORAL at 07:48

## 2021-01-01 RX ADMIN — LORAZEPAM 0.5 MG: 0.5 TABLET ORAL at 18:21

## 2021-01-01 RX ADMIN — VANCOMYCIN HYDROCHLORIDE 500 MG: KIT at 06:09

## 2021-01-01 RX ADMIN — MIDODRINE HYDROCHLORIDE 15 MG: 5 TABLET ORAL at 21:53

## 2021-01-01 RX ADMIN — LORAZEPAM 0.5 MG: 0.5 TABLET ORAL at 19:00

## 2021-01-01 RX ADMIN — GLYCOPYRROLATE 1 MG: 1 TABLET ORAL at 22:50

## 2021-01-01 RX ADMIN — Medication 10 ML: at 13:59

## 2021-01-01 RX ADMIN — MIDODRINE HYDROCHLORIDE 15 MG: 5 TABLET ORAL at 07:48

## 2021-01-01 RX ADMIN — FENTANYL CITRATE 25 MCG: 50 INJECTION, SOLUTION INTRAMUSCULAR; INTRAVENOUS at 19:34

## 2021-01-01 RX ADMIN — LORAZEPAM 2 MG: 2 INJECTION INTRAMUSCULAR; INTRAVENOUS at 08:50

## 2021-01-01 RX ADMIN — LORAZEPAM 0.5 MG: 2 INJECTION INTRAMUSCULAR; INTRAVENOUS at 16:18

## 2021-01-01 RX ADMIN — LEVETIRACETAM 1000 MG: 100 SOLUTION ORAL at 22:03

## 2021-01-01 RX ADMIN — LORAZEPAM 0.5 MG: 0.5 TABLET ORAL at 05:01

## 2021-01-01 RX ADMIN — PHENYTOIN 100 MG: 125 SUSPENSION ORAL at 05:54

## 2021-01-01 RX ADMIN — IPRATROPIUM BROMIDE AND ALBUTEROL SULFATE 3 ML: .5; 2.5 SOLUTION RESPIRATORY (INHALATION) at 14:33

## 2021-01-01 RX ADMIN — PANTOPRAZOLE SODIUM 40 MG: 40 INJECTION, POWDER, FOR SOLUTION INTRAVENOUS at 09:24

## 2021-01-01 RX ADMIN — IPRATROPIUM BROMIDE AND ALBUTEROL SULFATE 3 ML: .5; 2.5 SOLUTION RESPIRATORY (INHALATION) at 15:08

## 2021-01-01 RX ADMIN — IPRATROPIUM BROMIDE AND ALBUTEROL SULFATE 3 ML: .5; 2.5 SOLUTION RESPIRATORY (INHALATION) at 03:43

## 2021-01-01 RX ADMIN — GLYCOPYRROLATE 0.2 MG: 0.2 INJECTION INTRAMUSCULAR; INTRAVENOUS at 12:13

## 2021-01-01 RX ADMIN — HYDROMORPHONE HYDROCHLORIDE 2 MG: 2 INJECTION, SOLUTION INTRAMUSCULAR; INTRAVENOUS; SUBCUTANEOUS at 13:39

## 2021-01-01 RX ADMIN — MIDODRINE HYDROCHLORIDE 15 MG: 5 TABLET ORAL at 15:51

## 2021-01-01 RX ADMIN — DEXTROSE MONOHYDRATE 2 MCG/MIN: 50 INJECTION, SOLUTION INTRAVENOUS at 01:08

## 2021-01-01 RX ADMIN — LEVETIRACETAM 750 MG: 100 SOLUTION ORAL at 09:11

## 2021-01-01 RX ADMIN — IPRATROPIUM BROMIDE AND ALBUTEROL SULFATE 3 ML: .5; 2.5 SOLUTION RESPIRATORY (INHALATION) at 19:11

## 2021-01-01 RX ADMIN — SODIUM CHLORIDE 100 ML/HR: 9 INJECTION, SOLUTION INTRAVENOUS at 07:05

## 2021-01-01 RX ADMIN — DEXMEDETOMIDINE HYDROCHLORIDE 0.2 MCG/KG/HR: 400 INJECTION INTRAVENOUS at 04:22

## 2021-01-01 RX ADMIN — IPRATROPIUM BROMIDE AND ALBUTEROL SULFATE 3 ML: .5; 2.5 SOLUTION RESPIRATORY (INHALATION) at 13:37

## 2021-01-01 RX ADMIN — OXYCODONE HYDROCHLORIDE 5 MG: 5 TABLET ORAL at 14:44

## 2021-01-01 RX ADMIN — LORAZEPAM 2 MG: 2 INJECTION INTRAMUSCULAR; INTRAVENOUS at 08:05

## 2021-01-01 RX ADMIN — PHENYTOIN 150 MG: 50 TABLET, CHEWABLE ORAL at 16:02

## 2021-01-01 RX ADMIN — LEVALBUTEROL HYDROCHLORIDE 1.25 MG: 1.25 SOLUTION RESPIRATORY (INHALATION) at 08:02

## 2021-01-01 RX ADMIN — Medication 10 ML: at 08:47

## 2021-01-01 RX ADMIN — PIPERACILLIN SODIUM AND TAZOBACTAM SODIUM 3.38 G: 3; .375 INJECTION, POWDER, LYOPHILIZED, FOR SOLUTION INTRAVENOUS at 06:23

## 2021-01-01 RX ADMIN — Medication 10 ML: at 22:23

## 2021-01-01 RX ADMIN — MIDODRINE HYDROCHLORIDE 15 MG: 5 TABLET ORAL at 15:40

## 2021-01-01 RX ADMIN — PHENYTOIN 100 MG: 125 SUSPENSION ORAL at 07:51

## 2021-01-01 RX ADMIN — Medication 10 ML: at 15:52

## 2021-01-01 RX ADMIN — MORPHINE SULFATE 1 MG: 2 INJECTION, SOLUTION INTRAMUSCULAR; INTRAVENOUS at 21:10

## 2021-01-01 RX ADMIN — GLYCOPYRROLATE 1 MG: 1 TABLET ORAL at 08:52

## 2021-01-01 RX ADMIN — LORAZEPAM 0.5 MG: 2 INJECTION INTRAMUSCULAR; INTRAVENOUS at 09:52

## 2021-01-01 RX ADMIN — GLYCOPYRROLATE 1 MG: 1 TABLET ORAL at 18:26

## 2021-01-01 RX ADMIN — ACETYLCYSTEINE 400 MG: 100 SOLUTION ORAL; RESPIRATORY (INHALATION) at 13:27

## 2021-01-01 RX ADMIN — GUAIFENESIN 200 MG: 200 SOLUTION ORAL at 21:46

## 2021-01-01 RX ADMIN — VANCOMYCIN HYDROCHLORIDE 500 MG: KIT at 00:56

## 2021-01-01 RX ADMIN — PHENYTOIN 100 MG: 125 SUSPENSION ORAL at 22:49

## 2021-01-01 RX ADMIN — LEVOFLOXACIN 750 MG: 5 INJECTION, SOLUTION INTRAVENOUS at 05:05

## 2021-01-01 RX ADMIN — GLYCOPYRROLATE 0.2 MG: 0.2 INJECTION INTRAMUSCULAR; INTRAVENOUS at 23:47

## 2021-01-01 RX ADMIN — ALBUMIN (HUMAN) 12.5 G: 0.25 INJECTION, SOLUTION INTRAVENOUS at 23:04

## 2021-01-01 RX ADMIN — HEPARIN SODIUM 5000 UNITS: 5000 INJECTION INTRAVENOUS; SUBCUTANEOUS at 23:04

## 2021-01-01 RX ADMIN — OXYCODONE 5 MG: 5 TABLET ORAL at 12:47

## 2021-01-01 RX ADMIN — PHENYTOIN 100 MG: 50 TABLET, CHEWABLE ORAL at 16:56

## 2021-01-01 RX ADMIN — LEVETIRACETAM 750 MG: 100 SOLUTION ORAL at 18:10

## 2021-01-01 RX ADMIN — LEVALBUTEROL HYDROCHLORIDE 1.25 MG: 1.25 SOLUTION RESPIRATORY (INHALATION) at 04:13

## 2021-01-01 RX ADMIN — PHENYTOIN 100 MG: 125 SUSPENSION ORAL at 21:53

## 2021-01-01 RX ADMIN — PROPOFOL 20 MCG/KG/MIN: 10 INJECTION, EMULSION INTRAVENOUS at 22:44

## 2021-01-01 RX ADMIN — LORAZEPAM 1 MG: 2 INJECTION INTRAMUSCULAR; INTRAVENOUS at 20:30

## 2021-01-01 RX ADMIN — GLYCOPYRROLATE 1 MG: 1 TABLET ORAL at 08:11

## 2021-01-01 RX ADMIN — PIPERACILLIN AND TAZOBACTAM 3.38 G: 3; .375 INJECTION, POWDER, LYOPHILIZED, FOR SOLUTION INTRAVENOUS at 02:08

## 2021-01-01 RX ADMIN — ENOXAPARIN SODIUM 40 MG: 40 INJECTION SUBCUTANEOUS at 08:27

## 2021-01-01 RX ADMIN — PIPERACILLIN AND TAZOBACTAM 3.38 G: 3; .375 INJECTION, POWDER, LYOPHILIZED, FOR SOLUTION INTRAVENOUS at 21:23

## 2021-01-01 RX ADMIN — VANCOMYCIN HYDROCHLORIDE 500 MG: KIT at 12:10

## 2021-01-01 RX ADMIN — IPRATROPIUM BROMIDE AND ALBUTEROL SULFATE 3 ML: .5; 2.5 SOLUTION RESPIRATORY (INHALATION) at 21:05

## 2021-01-01 RX ADMIN — PIPERACILLIN SODIUM AND TAZOBACTAM SODIUM 3.38 G: 3; .375 INJECTION, POWDER, LYOPHILIZED, FOR SOLUTION INTRAVENOUS at 01:45

## 2021-01-01 RX ADMIN — HEPARIN SODIUM 5000 UNITS: 5000 INJECTION INTRAVENOUS; SUBCUTANEOUS at 06:27

## 2021-01-01 RX ADMIN — OXYCODONE 5 MG: 5 TABLET ORAL at 12:28

## 2021-01-01 RX ADMIN — PIPERACILLIN AND TAZOBACTAM 3.38 G: 3; .375 INJECTION, POWDER, LYOPHILIZED, FOR SOLUTION INTRAVENOUS at 01:57

## 2021-01-01 RX ADMIN — FOSPHENYTOIN SODIUM 200 MG PE: 50 INJECTION, SOLUTION INTRAMUSCULAR; INTRAVENOUS at 14:18

## 2021-01-01 RX ADMIN — OXYCODONE HYDROCHLORIDE 5 MG: 5 TABLET ORAL at 10:25

## 2021-01-01 RX ADMIN — IPRATROPIUM BROMIDE AND ALBUTEROL SULFATE 3 ML: .5; 2.5 SOLUTION RESPIRATORY (INHALATION) at 11:34

## 2021-01-01 RX ADMIN — MIDODRINE HYDROCHLORIDE 15 MG: 5 TABLET ORAL at 06:14

## 2021-01-01 RX ADMIN — OXYCODONE 5 MG: 5 TABLET ORAL at 00:41

## 2021-01-01 RX ADMIN — LORAZEPAM 1 MG: 2 INJECTION INTRAMUSCULAR; INTRAVENOUS at 02:00

## 2021-01-01 RX ADMIN — FUROSEMIDE 20 MG: 10 INJECTION, SOLUTION INTRAMUSCULAR; INTRAVENOUS at 21:51

## 2021-01-01 RX ADMIN — SODIUM CHLORIDE 100 ML/HR: 9 INJECTION, SOLUTION INTRAVENOUS at 05:07

## 2021-01-01 RX ADMIN — GLYCOPYRROLATE 1 MG: 1 TABLET ORAL at 21:25

## 2021-01-01 RX ADMIN — Medication 10 ML: at 00:10

## 2021-01-01 RX ADMIN — Medication 10 ML: at 22:01

## 2021-01-01 RX ADMIN — MAGNESIUM SULFATE HEPTAHYDRATE 1 G: 1 INJECTION, SOLUTION INTRAVENOUS at 05:41

## 2021-01-01 RX ADMIN — MIDODRINE HYDROCHLORIDE 15 MG: 5 TABLET ORAL at 21:25

## 2021-01-01 RX ADMIN — PIPERACILLIN SODIUM AND TAZOBACTAM SODIUM 3.38 G: 3; .375 INJECTION, POWDER, LYOPHILIZED, FOR SOLUTION INTRAVENOUS at 14:04

## 2021-01-01 RX ADMIN — VANCOMYCIN HYDROCHLORIDE 500 MG: KIT at 00:11

## 2021-01-01 RX ADMIN — PIPERACILLIN SODIUM AND TAZOBACTAM SODIUM 3.38 G: 3; .375 INJECTION, POWDER, LYOPHILIZED, FOR SOLUTION INTRAVENOUS at 14:41

## 2021-01-01 RX ADMIN — LORAZEPAM 1 MG: 2 SOLUTION, CONCENTRATE ORAL at 04:31

## 2021-01-01 RX ADMIN — OXYCODONE 5 MG: 5 TABLET ORAL at 05:55

## 2021-01-01 RX ADMIN — SODIUM CHLORIDE 250 ML: 9 INJECTION, SOLUTION INTRAVENOUS at 17:36

## 2021-01-01 RX ADMIN — GLYCOPYRROLATE 0.2 MG: 0.2 INJECTION INTRAMUSCULAR; INTRAVENOUS at 08:32

## 2021-01-01 RX ADMIN — ATROPINE SULFATE 1 DROP: 10 SOLUTION OPHTHALMIC at 16:28

## 2021-01-01 RX ADMIN — ALTEPLASE 1 MG: 2.2 INJECTION, POWDER, LYOPHILIZED, FOR SOLUTION INTRAVENOUS at 00:58

## 2021-01-01 RX ADMIN — LEVALBUTEROL HYDROCHLORIDE 1.25 MG: 1.25 SOLUTION RESPIRATORY (INHALATION) at 07:41

## 2021-01-01 RX ADMIN — VANCOMYCIN HYDROCHLORIDE 1000 MG: 1 INJECTION, POWDER, LYOPHILIZED, FOR SOLUTION INTRAVENOUS at 14:39

## 2021-01-01 RX ADMIN — MORPHINE SULFATE 1 MG: 2 INJECTION, SOLUTION INTRAMUSCULAR; INTRAVENOUS at 13:55

## 2021-01-01 RX ADMIN — LORAZEPAM 0.5 MG: 0.5 TABLET ORAL at 14:24

## 2021-01-01 RX ADMIN — POTASSIUM BICARBONATE 40 MEQ: 782 TABLET, EFFERVESCENT ORAL at 08:27

## 2021-01-01 RX ADMIN — VANCOMYCIN HYDROCHLORIDE 500 MG: KIT at 06:39

## 2021-01-01 RX ADMIN — ENOXAPARIN SODIUM 40 MG: 40 INJECTION SUBCUTANEOUS at 08:48

## 2021-01-01 RX ADMIN — SODIUM CHLORIDE 750 MG: 900 INJECTION, SOLUTION INTRAVENOUS at 21:55

## 2021-01-01 RX ADMIN — MICONAZOLE NITRATE 2 % TOPICAL POWDER: at 09:37

## 2021-01-01 RX ADMIN — MORPHINE SULFATE 1 MG: 2 INJECTION, SOLUTION INTRAMUSCULAR; INTRAVENOUS at 10:31

## 2021-01-01 RX ADMIN — LORAZEPAM 0.5 MG: 0.5 TABLET ORAL at 06:21

## 2021-01-01 RX ADMIN — PIPERACILLIN AND TAZOBACTAM 3.38 G: 3; .375 INJECTION, POWDER, LYOPHILIZED, FOR SOLUTION INTRAVENOUS at 09:26

## 2021-01-01 RX ADMIN — LORAZEPAM 0.5 MG: 0.5 TABLET ORAL at 16:40

## 2021-01-01 RX ADMIN — MIDODRINE HYDROCHLORIDE 15 MG: 5 TABLET ORAL at 05:43

## 2021-01-01 RX ADMIN — PIPERACILLIN SODIUM AND TAZOBACTAM SODIUM 3.38 G: 3; .375 INJECTION, POWDER, LYOPHILIZED, FOR SOLUTION INTRAVENOUS at 06:51

## 2021-01-01 RX ADMIN — MIDODRINE HYDROCHLORIDE 15 MG: 5 TABLET ORAL at 13:45

## 2021-01-01 RX ADMIN — PIPERACILLIN AND TAZOBACTAM 3.38 G: 3; .375 INJECTION, POWDER, LYOPHILIZED, FOR SOLUTION INTRAVENOUS at 17:13

## 2021-01-01 RX ADMIN — PHENYTOIN 100 MG: 50 TABLET, CHEWABLE ORAL at 09:51

## 2021-01-01 RX ADMIN — PIPERACILLIN AND TAZOBACTAM 3.38 G: 3; .375 INJECTION, POWDER, LYOPHILIZED, FOR SOLUTION INTRAVENOUS at 13:01

## 2021-01-01 RX ADMIN — MIDODRINE HYDROCHLORIDE 15 MG: 5 TABLET ORAL at 05:34

## 2021-01-01 RX ADMIN — ENOXAPARIN SODIUM 40 MG: 40 INJECTION SUBCUTANEOUS at 09:12

## 2021-01-01 RX ADMIN — PHENYTOIN 100 MG: 50 TABLET, CHEWABLE ORAL at 15:51

## 2021-01-01 RX ADMIN — LEVALBUTEROL HYDROCHLORIDE 1.25 MG: 1.25 SOLUTION RESPIRATORY (INHALATION) at 15:59

## 2021-01-01 RX ADMIN — OXYCODONE 5 MG: 5 TABLET ORAL at 15:03

## 2021-01-01 RX ADMIN — LEVETIRACETAM 1000 MG: 100 SOLUTION ORAL at 09:22

## 2021-01-01 RX ADMIN — PANTOPRAZOLE SODIUM 40 MG: 40 INJECTION, POWDER, FOR SOLUTION INTRAVENOUS at 08:29

## 2021-01-01 RX ADMIN — PIPERACILLIN AND TAZOBACTAM 3.38 G: 3; .375 INJECTION, POWDER, LYOPHILIZED, FOR SOLUTION INTRAVENOUS at 10:26

## 2021-01-01 RX ADMIN — LORAZEPAM 0.5 MG: 0.5 TABLET ORAL at 08:08

## 2021-01-01 RX ADMIN — PHENYTOIN 100 MG: 50 TABLET, CHEWABLE ORAL at 09:34

## 2021-01-01 RX ADMIN — MIDODRINE HYDROCHLORIDE 15 MG: 5 TABLET ORAL at 14:06

## 2021-01-01 RX ADMIN — Medication 10 ML: at 06:14

## 2021-01-01 RX ADMIN — MORPHINE SULFATE 1 MG: 2 INJECTION, SOLUTION INTRAMUSCULAR; INTRAVENOUS at 16:32

## 2021-01-01 RX ADMIN — PANTOPRAZOLE SODIUM 40 MG: 40 TABLET, DELAYED RELEASE ORAL at 08:28

## 2021-01-01 RX ADMIN — LORAZEPAM 1 MG: 2 INJECTION INTRAMUSCULAR; INTRAVENOUS at 11:23

## 2021-01-01 RX ADMIN — LEVETIRACETAM 750 MG: 100 SOLUTION ORAL at 22:31

## 2021-01-01 RX ADMIN — HEPARIN SODIUM 5000 UNITS: 5000 INJECTION INTRAVENOUS; SUBCUTANEOUS at 06:38

## 2021-01-01 RX ADMIN — GLYCOPYRROLATE 1 MG: 1 TABLET ORAL at 08:07

## 2021-01-01 RX ADMIN — GLYCOPYRROLATE 1 MG: 1 TABLET ORAL at 21:54

## 2021-01-01 RX ADMIN — GUAIFENESIN 200 MG: 200 SOLUTION ORAL at 09:49

## 2021-01-01 RX ADMIN — PHENYTOIN 200 MG: 50 TABLET, CHEWABLE ORAL at 08:11

## 2021-01-01 RX ADMIN — ATROPINE SULFATE 1 DROP: 10 SOLUTION OPHTHALMIC at 10:35

## 2021-01-01 RX ADMIN — LORAZEPAM 2 MG: 2 INJECTION INTRAMUSCULAR; INTRAVENOUS at 21:42

## 2021-01-01 RX ADMIN — FUROSEMIDE 20 MG: 10 INJECTION, SOLUTION INTRAMUSCULAR; INTRAVENOUS at 16:26

## 2021-01-01 RX ADMIN — PHENYTOIN 100 MG: 125 SUSPENSION ORAL at 13:46

## 2021-01-01 RX ADMIN — LORAZEPAM 1 MG: 2 INJECTION INTRAMUSCULAR; INTRAVENOUS at 02:11

## 2021-01-01 RX ADMIN — Medication 10 ML: at 06:00

## 2021-01-01 RX ADMIN — LEVALBUTEROL HYDROCHLORIDE 1.25 MG: 1.25 SOLUTION RESPIRATORY (INHALATION) at 14:40

## 2021-01-01 RX ADMIN — OXYCODONE 5 MG: 5 TABLET ORAL at 14:33

## 2021-01-01 RX ADMIN — ACETYLCYSTEINE 400 MG: 100 SOLUTION ORAL; RESPIRATORY (INHALATION) at 07:38

## 2021-01-01 RX ADMIN — OXYCODONE HYDROCHLORIDE 5 MG: 100 SOLUTION ORAL at 22:03

## 2021-01-01 RX ADMIN — PIPERACILLIN AND TAZOBACTAM 3.38 G: 3; .375 INJECTION, POWDER, LYOPHILIZED, FOR SOLUTION INTRAVENOUS at 12:27

## 2021-01-01 RX ADMIN — MUPIROCIN: 20 OINTMENT TOPICAL at 18:09

## 2021-01-01 RX ADMIN — LORAZEPAM 0.5 MG: 0.5 TABLET ORAL at 04:15

## 2021-01-01 RX ADMIN — PHENYTOIN 100 MG: 125 SUSPENSION ORAL at 05:19

## 2021-01-01 RX ADMIN — SODIUM CHLORIDE, POTASSIUM CHLORIDE, SODIUM LACTATE AND CALCIUM CHLORIDE 1000 ML: 600; 310; 30; 20 INJECTION, SOLUTION INTRAVENOUS at 12:31

## 2021-01-01 RX ADMIN — PHENYTOIN 100 MG: 50 TABLET, CHEWABLE ORAL at 16:38

## 2021-01-01 RX ADMIN — IPRATROPIUM BROMIDE AND ALBUTEROL SULFATE 3 ML: .5; 2.5 SOLUTION RESPIRATORY (INHALATION) at 01:33

## 2021-01-01 RX ADMIN — HEPARIN SODIUM 5000 UNITS: 5000 INJECTION INTRAVENOUS; SUBCUTANEOUS at 16:02

## 2021-01-01 RX ADMIN — OXYCODONE HYDROCHLORIDE 5 MG: 5 TABLET ORAL at 14:51

## 2021-01-01 RX ADMIN — GUAIFENESIN 200 MG: 200 SOLUTION ORAL at 17:03

## 2021-01-01 RX ADMIN — Medication 10 ML: at 22:03

## 2021-01-01 RX ADMIN — LORAZEPAM 0.5 MG: 0.5 TABLET ORAL at 16:00

## 2021-01-01 RX ADMIN — VANCOMYCIN HYDROCHLORIDE 750 MG: 750 INJECTION, POWDER, LYOPHILIZED, FOR SOLUTION INTRAVENOUS at 00:29

## 2021-01-01 RX ADMIN — LORAZEPAM 0.5 MG: 0.5 TABLET ORAL at 17:23

## 2021-01-01 RX ADMIN — VANCOMYCIN HYDROCHLORIDE 1250 MG: 1.25 INJECTION, POWDER, LYOPHILIZED, FOR SOLUTION INTRAVENOUS at 20:12

## 2021-01-01 RX ADMIN — ENOXAPARIN SODIUM 40 MG: 40 INJECTION SUBCUTANEOUS at 10:08

## 2021-01-01 RX ADMIN — LEVALBUTEROL HYDROCHLORIDE 1.25 MG: 1.25 SOLUTION RESPIRATORY (INHALATION) at 04:07

## 2021-01-01 RX ADMIN — PHENYTOIN 100 MG: 50 TABLET, CHEWABLE ORAL at 17:14

## 2021-01-01 RX ADMIN — GLYCOPYRROLATE 0.2 MG: 0.2 INJECTION INTRAMUSCULAR; INTRAVENOUS at 20:25

## 2021-01-01 RX ADMIN — GLYCOPYRROLATE 0.2 MG: 0.2 INJECTION INTRAMUSCULAR; INTRAVENOUS at 16:33

## 2021-01-01 RX ADMIN — VANCOMYCIN HYDROCHLORIDE 500 MG: KIT at 05:50

## 2021-01-01 RX ADMIN — VANCOMYCIN HYDROCHLORIDE 500 MG: KIT at 11:38

## 2021-01-01 RX ADMIN — PHENYTOIN 150 MG: 50 TABLET, CHEWABLE ORAL at 16:15

## 2021-01-01 RX ADMIN — LORAZEPAM 1 MG: 2 INJECTION INTRAMUSCULAR; INTRAVENOUS at 07:33

## 2021-01-01 RX ADMIN — OXYCODONE 5 MG: 5 TABLET ORAL at 17:53

## 2021-01-01 RX ADMIN — FENTANYL CITRATE 25 MCG: 50 INJECTION, SOLUTION INTRAMUSCULAR; INTRAVENOUS at 09:01

## 2021-01-01 RX ADMIN — POTASSIUM BICARBONATE 40 MEQ: 782 TABLET, EFFERVESCENT ORAL at 09:27

## 2021-01-01 RX ADMIN — FUROSEMIDE 20 MG: 10 INJECTION, SOLUTION INTRAMUSCULAR; INTRAVENOUS at 09:29

## 2021-01-01 RX ADMIN — Medication 10 ML: at 08:50

## 2021-01-01 RX ADMIN — LEVETIRACETAM 750 MG: 100 SOLUTION ORAL at 21:46

## 2021-01-01 RX ADMIN — ENOXAPARIN SODIUM 40 MG: 40 INJECTION SUBCUTANEOUS at 08:15

## 2021-01-01 RX ADMIN — GLYCOPYRROLATE 1 MG: 1 TABLET ORAL at 01:13

## 2021-01-01 RX ADMIN — LORAZEPAM 0.5 MG: 0.5 TABLET ORAL at 11:38

## 2021-01-01 RX ADMIN — PHENYTOIN 200 MG: 50 TABLET, CHEWABLE ORAL at 22:01

## 2021-01-01 RX ADMIN — ACETYLCYSTEINE 400 MG: 100 SOLUTION ORAL; RESPIRATORY (INHALATION) at 00:04

## 2021-01-01 RX ADMIN — FUROSEMIDE 20 MG: 10 INJECTION, SOLUTION INTRAMUSCULAR; INTRAVENOUS at 18:09

## 2021-01-01 RX ADMIN — HYDROMORPHONE HYDROCHLORIDE 1 MG: 1 INJECTION, SOLUTION INTRAMUSCULAR; INTRAVENOUS; SUBCUTANEOUS at 08:50

## 2021-01-01 RX ADMIN — LORAZEPAM 0.5 MG: 0.5 TABLET ORAL at 09:47

## 2021-01-01 RX ADMIN — MIDODRINE HYDROCHLORIDE 15 MG: 5 TABLET ORAL at 23:00

## 2021-01-01 RX ADMIN — OXYCODONE HYDROCHLORIDE 5 MG: 5 TABLET ORAL at 08:32

## 2021-01-01 RX ADMIN — ACETYLCYSTEINE 400 MG: 100 SOLUTION ORAL; RESPIRATORY (INHALATION) at 01:41

## 2021-01-01 RX ADMIN — PHENYTOIN 100 MG: 125 SUSPENSION ORAL at 05:34

## 2021-01-01 RX ADMIN — HEPARIN SODIUM 5000 UNITS: 5000 INJECTION INTRAVENOUS; SUBCUTANEOUS at 14:25

## 2021-01-01 RX ADMIN — GLYCOPYRROLATE 1 MG: 1 TABLET ORAL at 21:58

## 2021-01-01 RX ADMIN — LEVOFLOXACIN 750 MG: 5 INJECTION, SOLUTION INTRAVENOUS at 11:46

## 2021-01-01 RX ADMIN — SODIUM CHLORIDE 100 ML/HR: 9 INJECTION, SOLUTION INTRAVENOUS at 06:07

## 2021-01-01 RX ADMIN — SODIUM CHLORIDE, POTASSIUM CHLORIDE, SODIUM LACTATE AND CALCIUM CHLORIDE 100 ML/HR: 600; 310; 30; 20 INJECTION, SOLUTION INTRAVENOUS at 15:56

## 2021-01-01 RX ADMIN — HEPARIN SODIUM 5000 UNITS: 5000 INJECTION INTRAVENOUS; SUBCUTANEOUS at 23:02

## 2021-01-01 RX ADMIN — MIDODRINE HYDROCHLORIDE 15 MG: 5 TABLET ORAL at 16:26

## 2021-01-01 RX ADMIN — LEVALBUTEROL HYDROCHLORIDE 1.25 MG: 1.25 SOLUTION RESPIRATORY (INHALATION) at 00:26

## 2021-01-01 RX ADMIN — OXYCODONE HYDROCHLORIDE 5 MG: 5 TABLET ORAL at 06:03

## 2021-01-01 RX ADMIN — POTASSIUM CHLORIDE 10 MEQ: 10 INJECTION, SOLUTION INTRAVENOUS at 11:56

## 2021-01-01 RX ADMIN — LORAZEPAM 1 MG: 2 INJECTION INTRAMUSCULAR; INTRAVENOUS at 11:35

## 2021-01-01 RX ADMIN — LORAZEPAM 0.5 MG: 0.5 TABLET ORAL at 09:39

## 2021-01-01 RX ADMIN — HEPARIN SODIUM 5000 UNITS: 5000 INJECTION INTRAVENOUS; SUBCUTANEOUS at 05:50

## 2021-01-01 RX ADMIN — IPRATROPIUM BROMIDE AND ALBUTEROL SULFATE 3 ML: .5; 2.5 SOLUTION RESPIRATORY (INHALATION) at 07:24

## 2021-01-01 RX ADMIN — HYDROMORPHONE HYDROCHLORIDE 0.5 MG: 1 INJECTION, SOLUTION INTRAMUSCULAR; INTRAVENOUS; SUBCUTANEOUS at 00:12

## 2021-01-01 RX ADMIN — GLYCOPYRROLATE 1 MG: 1 TABLET ORAL at 22:00

## 2021-01-01 RX ADMIN — LEVOFLOXACIN 750 MG: 5 INJECTION, SOLUTION INTRAVENOUS at 16:10

## 2021-01-01 RX ADMIN — SODIUM CHLORIDE 750 MG: 900 INJECTION, SOLUTION INTRAVENOUS at 08:23

## 2021-01-01 RX ADMIN — LORAZEPAM 0.5 MG: 0.5 TABLET ORAL at 09:00

## 2021-01-01 RX ADMIN — ACETYLCYSTEINE 400 MG: 100 SOLUTION ORAL; RESPIRATORY (INHALATION) at 07:24

## 2021-01-01 RX ADMIN — VANCOMYCIN HYDROCHLORIDE 500 MG: KIT at 06:54

## 2021-01-01 RX ADMIN — ACETAMINOPHEN 650 MG: 325 TABLET ORAL at 20:10

## 2021-01-01 RX ADMIN — PANTOPRAZOLE SODIUM 40 MG: 40 INJECTION, POWDER, FOR SOLUTION INTRAVENOUS at 08:31

## 2021-01-01 RX ADMIN — LORAZEPAM 0.5 MG: 2 INJECTION INTRAMUSCULAR; INTRAVENOUS at 00:33

## 2021-01-01 RX ADMIN — MIDODRINE HYDROCHLORIDE 15 MG: 5 TABLET ORAL at 06:16

## 2021-01-01 RX ADMIN — OXYCODONE 5 MG: 5 TABLET ORAL at 22:36

## 2021-01-01 RX ADMIN — ACETYLCYSTEINE 400 MG: 100 SOLUTION ORAL; RESPIRATORY (INHALATION) at 01:01

## 2021-01-01 RX ADMIN — VANCOMYCIN HYDROCHLORIDE 500 MG: KIT at 23:19

## 2021-01-01 RX ADMIN — FUROSEMIDE 20 MG: 10 INJECTION, SOLUTION INTRAMUSCULAR; INTRAVENOUS at 17:34

## 2021-01-01 RX ADMIN — FUROSEMIDE 20 MG: 10 INJECTION, SOLUTION INTRAMUSCULAR; INTRAVENOUS at 17:15

## 2021-01-01 RX ADMIN — PIPERACILLIN SODIUM AND TAZOBACTAM SODIUM 3.38 G: 3; .375 INJECTION, POWDER, LYOPHILIZED, FOR SOLUTION INTRAVENOUS at 09:58

## 2021-01-01 RX ADMIN — LORAZEPAM 2 MG: 2 INJECTION INTRAMUSCULAR; INTRAVENOUS at 03:37

## 2021-01-01 RX ADMIN — LORAZEPAM 0.5 MG: 2 INJECTION INTRAMUSCULAR; INTRAVENOUS at 14:12

## 2021-01-01 RX ADMIN — MUPIROCIN: 20 OINTMENT TOPICAL at 09:07

## 2021-01-01 RX ADMIN — PHENYTOIN 100 MG: 125 SUSPENSION ORAL at 15:18

## 2021-01-01 RX ADMIN — LORAZEPAM 0.5 MG: 0.5 TABLET ORAL at 23:00

## 2021-01-01 RX ADMIN — LORAZEPAM 2 MG: 2 INJECTION INTRAMUSCULAR; INTRAVENOUS at 20:03

## 2021-01-01 RX ADMIN — LEVETIRACETAM 750 MG: 100 SOLUTION ORAL at 10:26

## 2021-01-01 RX ADMIN — LORAZEPAM 0.5 MG: 0.5 TABLET ORAL at 13:03

## 2021-01-01 RX ADMIN — LORAZEPAM 2 MG: 2 INJECTION INTRAMUSCULAR; INTRAVENOUS at 20:49

## 2021-01-01 RX ADMIN — PANTOPRAZOLE SODIUM 40 MG: 40 TABLET, DELAYED RELEASE ORAL at 08:08

## 2021-01-01 RX ADMIN — LEVALBUTEROL HYDROCHLORIDE 1.25 MG: 1.25 SOLUTION RESPIRATORY (INHALATION) at 12:02

## 2021-01-01 RX ADMIN — MORPHINE SULFATE 10 MG: 10 SOLUTION ORAL at 04:32

## 2021-01-01 RX ADMIN — IPRATROPIUM BROMIDE AND ALBUTEROL SULFATE 3 ML: .5; 2.5 SOLUTION RESPIRATORY (INHALATION) at 01:40

## 2021-01-01 RX ADMIN — LEVETIRACETAM 750 MG: 100 SOLUTION ORAL at 08:12

## 2021-01-01 RX ADMIN — SODIUM CHLORIDE, POTASSIUM CHLORIDE, SODIUM LACTATE AND CALCIUM CHLORIDE 100 ML/HR: 600; 310; 30; 20 INJECTION, SOLUTION INTRAVENOUS at 15:55

## 2021-01-01 RX ADMIN — OXYCODONE 5 MG: 5 TABLET ORAL at 17:37

## 2021-01-01 RX ADMIN — LORAZEPAM 0.5 MG: 0.5 TABLET ORAL at 22:01

## 2021-01-01 RX ADMIN — GLYCOPYRROLATE 1 MG: 1 TABLET ORAL at 18:52

## 2021-01-01 RX ADMIN — LEVALBUTEROL HYDROCHLORIDE 1.25 MG: 1.25 SOLUTION RESPIRATORY (INHALATION) at 00:04

## 2021-01-01 RX ADMIN — LEVETIRACETAM 750 MG: 100 SOLUTION ORAL at 09:06

## 2021-01-01 RX ADMIN — FUROSEMIDE 20 MG: 10 INJECTION, SOLUTION INTRAMUSCULAR; INTRAVENOUS at 08:07

## 2021-01-01 RX ADMIN — OXYCODONE 5 MG: 5 TABLET ORAL at 17:09

## 2021-01-01 RX ADMIN — TORSEMIDE 40 MG: 20 TABLET ORAL at 09:36

## 2021-01-01 RX ADMIN — PHENYTOIN 100 MG: 50 TABLET, CHEWABLE ORAL at 09:49

## 2021-01-01 RX ADMIN — LORAZEPAM 0.5 MG: 2 INJECTION INTRAMUSCULAR; INTRAVENOUS at 10:23

## 2021-01-01 RX ADMIN — IPRATROPIUM BROMIDE AND ALBUTEROL SULFATE 3 ML: .5; 2.5 SOLUTION RESPIRATORY (INHALATION) at 07:08

## 2021-01-01 RX ADMIN — TORSEMIDE 40 MG: 20 TABLET ORAL at 09:50

## 2021-01-01 RX ADMIN — HEPARIN SODIUM 5000 UNITS: 5000 INJECTION INTRAVENOUS; SUBCUTANEOUS at 23:03

## 2021-01-01 RX ADMIN — PANTOPRAZOLE SODIUM 40 MG: 40 INJECTION, POWDER, FOR SOLUTION INTRAVENOUS at 08:46

## 2021-01-01 RX ADMIN — Medication 10 ML: at 22:51

## 2021-01-01 RX ADMIN — GLYCOPYRROLATE 0.2 MG: 0.2 INJECTION INTRAMUSCULAR; INTRAVENOUS at 08:05

## 2021-01-01 RX ADMIN — GLYCOPYRROLATE 1 MG: 1 TABLET ORAL at 08:31

## 2021-01-01 RX ADMIN — IPRATROPIUM BROMIDE AND ALBUTEROL SULFATE 3 ML: .5; 2.5 SOLUTION RESPIRATORY (INHALATION) at 07:42

## 2021-01-01 RX ADMIN — Medication 10 ML: at 15:19

## 2021-01-01 RX ADMIN — OXYCODONE 5 MG: 5 TABLET ORAL at 07:45

## 2021-01-01 RX ADMIN — MORPHINE SULFATE 1 MG: 2 INJECTION, SOLUTION INTRAMUSCULAR; INTRAVENOUS at 03:45

## 2021-01-01 RX ADMIN — PHENYTOIN 100 MG: 125 SUSPENSION ORAL at 14:41

## 2021-01-01 RX ADMIN — OXYCODONE 5 MG: 5 TABLET ORAL at 11:54

## 2021-01-01 RX ADMIN — Medication 10 ML: at 06:24

## 2021-01-01 RX ADMIN — GLYCOPYRROLATE 1 MG: 1 TABLET ORAL at 22:04

## 2021-01-01 RX ADMIN — PIPERACILLIN AND TAZOBACTAM 3.38 G: 3; .375 INJECTION, POWDER, LYOPHILIZED, FOR SOLUTION INTRAVENOUS at 05:27

## 2021-01-01 RX ADMIN — FENTANYL CITRATE 25 MCG: 50 INJECTION, SOLUTION INTRAMUSCULAR; INTRAVENOUS at 22:56

## 2021-01-01 RX ADMIN — LORAZEPAM 2 MG: 2 INJECTION INTRAMUSCULAR; INTRAVENOUS at 22:33

## 2021-01-01 RX ADMIN — MIDODRINE HYDROCHLORIDE 15 MG: 5 TABLET ORAL at 13:48

## 2021-01-01 RX ADMIN — SODIUM CHLORIDE 750 MG: 900 INJECTION, SOLUTION INTRAVENOUS at 20:01

## 2021-01-01 RX ADMIN — LORAZEPAM 1 MG: 2 INJECTION INTRAMUSCULAR; INTRAVENOUS at 15:50

## 2021-01-01 RX ADMIN — PHENYTOIN 200 MG: 50 TABLET, CHEWABLE ORAL at 09:00

## 2021-01-01 RX ADMIN — POTASSIUM BICARBONATE 40 MEQ: 782 TABLET, EFFERVESCENT ORAL at 06:12

## 2021-01-01 RX ADMIN — LORAZEPAM 0.5 MG: 0.5 TABLET ORAL at 16:15

## 2021-01-01 RX ADMIN — FENTANYL CITRATE 25 MCG: 50 INJECTION, SOLUTION INTRAMUSCULAR; INTRAVENOUS at 07:33

## 2021-01-01 RX ADMIN — OXYCODONE HYDROCHLORIDE 5 MG: 100 SOLUTION ORAL at 06:26

## 2021-01-01 RX ADMIN — ACETYLCYSTEINE 400 MG: 100 SOLUTION ORAL; RESPIRATORY (INHALATION) at 15:04

## 2021-01-01 RX ADMIN — PIPERACILLIN AND TAZOBACTAM 3.38 G: 3; .375 INJECTION, POWDER, LYOPHILIZED, FOR SOLUTION INTRAVENOUS at 02:16

## 2021-01-01 RX ADMIN — MIDAZOLAM 2 MG: 1 INJECTION INTRAMUSCULAR; INTRAVENOUS at 22:09

## 2021-01-01 RX ADMIN — LORAZEPAM 0.5 MG: 2 INJECTION INTRAMUSCULAR; INTRAVENOUS at 18:08

## 2021-01-01 RX ADMIN — LORAZEPAM 0.5 MG: 0.5 TABLET ORAL at 02:32

## 2021-01-01 RX ADMIN — ACETYLCYSTEINE 400 MG: 100 SOLUTION ORAL; RESPIRATORY (INHALATION) at 19:22

## 2021-01-01 RX ADMIN — PHENYTOIN 150 MG: 50 TABLET, CHEWABLE ORAL at 23:55

## 2021-01-01 RX ADMIN — OXYCODONE HYDROCHLORIDE 5 MG: 5 TABLET ORAL at 03:33

## 2021-01-01 RX ADMIN — MICONAZOLE NITRATE 2 % TOPICAL POWDER: at 15:51

## 2021-01-01 RX ADMIN — HYDROMORPHONE HYDROCHLORIDE 0.5 MG: 1 INJECTION, SOLUTION INTRAMUSCULAR; INTRAVENOUS; SUBCUTANEOUS at 04:43

## 2021-01-01 RX ADMIN — LEVALBUTEROL HYDROCHLORIDE 1.25 MG: 1.25 SOLUTION RESPIRATORY (INHALATION) at 19:48

## 2021-01-01 RX ADMIN — IPRATROPIUM BROMIDE AND ALBUTEROL SULFATE 3 ML: .5; 2.5 SOLUTION RESPIRATORY (INHALATION) at 23:46

## 2021-01-01 RX ADMIN — FENTANYL CITRATE 25 MCG: 50 INJECTION, SOLUTION INTRAMUSCULAR; INTRAVENOUS at 02:11

## 2021-01-01 RX ADMIN — FUROSEMIDE 10 MG: 10 INJECTION, SOLUTION INTRAMUSCULAR; INTRAVENOUS at 18:26

## 2021-01-01 RX ADMIN — MUPIROCIN: 20 OINTMENT TOPICAL at 18:22

## 2021-01-01 RX ADMIN — MORPHINE SULFATE 1 MG: 2 INJECTION, SOLUTION INTRAMUSCULAR; INTRAVENOUS at 10:22

## 2021-01-01 RX ADMIN — VANCOMYCIN HYDROCHLORIDE 500 MG: KIT at 12:03

## 2021-01-01 RX ADMIN — PIPERACILLIN AND TAZOBACTAM 3.38 G: 3; .375 INJECTION, POWDER, LYOPHILIZED, FOR SOLUTION INTRAVENOUS at 19:57

## 2021-01-01 RX ADMIN — Medication 10 ML: at 21:17

## 2021-01-01 RX ADMIN — PIPERACILLIN SODIUM AND TAZOBACTAM SODIUM 3.38 G: 3; .375 INJECTION, POWDER, LYOPHILIZED, FOR SOLUTION INTRAVENOUS at 22:34

## 2021-01-01 RX ADMIN — LORAZEPAM 0.5 MG: 2 INJECTION INTRAMUSCULAR; INTRAVENOUS at 08:45

## 2021-01-01 RX ADMIN — OXYCODONE 5 MG: 5 TABLET ORAL at 06:24

## 2021-01-01 RX ADMIN — VANCOMYCIN HYDROCHLORIDE 500 MG: KIT at 23:46

## 2021-01-01 RX ADMIN — PHENYTOIN 200 MG: 50 TABLET, CHEWABLE ORAL at 15:46

## 2021-01-01 RX ADMIN — IOPAMIDOL 100 ML: 755 INJECTION, SOLUTION INTRAVENOUS at 12:48

## 2021-01-01 RX ADMIN — SODIUM CHLORIDE 250 ML: 9 INJECTION, SOLUTION INTRAVENOUS at 19:26

## 2021-01-01 RX ADMIN — ENOXAPARIN SODIUM 40 MG: 40 INJECTION SUBCUTANEOUS at 08:00

## 2021-01-01 RX ADMIN — PIPERACILLIN SODIUM AND TAZOBACTAM SODIUM 3.38 G: 3; .375 INJECTION, POWDER, LYOPHILIZED, FOR SOLUTION INTRAVENOUS at 06:15

## 2021-01-01 RX ADMIN — LEVALBUTEROL HYDROCHLORIDE 1.25 MG: 1.25 SOLUTION RESPIRATORY (INHALATION) at 19:29

## 2021-01-01 RX ADMIN — IPRATROPIUM BROMIDE AND ALBUTEROL SULFATE 3 ML: .5; 2.5 SOLUTION RESPIRATORY (INHALATION) at 00:43

## 2021-01-01 RX ADMIN — IPRATROPIUM BROMIDE AND ALBUTEROL SULFATE 3 ML: .5; 2.5 SOLUTION RESPIRATORY (INHALATION) at 07:23

## 2021-01-01 RX ADMIN — PIPERACILLIN AND TAZOBACTAM 3.38 G: 3; .375 INJECTION, POWDER, LYOPHILIZED, FOR SOLUTION INTRAVENOUS at 11:47

## 2021-01-01 RX ADMIN — HEPARIN SODIUM 5000 UNITS: 5000 INJECTION INTRAVENOUS; SUBCUTANEOUS at 14:55

## 2021-01-01 RX ADMIN — ACETYLCYSTEINE 400 MG: 100 SOLUTION ORAL; RESPIRATORY (INHALATION) at 14:39

## 2021-01-01 RX ADMIN — MORPHINE SULFATE 1 MG: 2 INJECTION, SOLUTION INTRAMUSCULAR; INTRAVENOUS at 11:28

## 2021-01-01 RX ADMIN — LORAZEPAM 0.5 MG: 2 INJECTION INTRAMUSCULAR; INTRAVENOUS at 17:25

## 2021-01-01 RX ADMIN — DEXMEDETOMIDINE HYDROCHLORIDE 0.2 MCG/KG/HR: 400 INJECTION INTRAVENOUS at 02:06

## 2021-01-01 RX ADMIN — GLYCOPYRROLATE 1 MG: 1 TABLET ORAL at 07:49

## 2021-01-01 RX ADMIN — LORAZEPAM 0.5 MG: 0.5 TABLET ORAL at 06:24

## 2021-01-01 RX ADMIN — PIPERACILLIN SODIUM AND TAZOBACTAM SODIUM 3.38 G: 3; .375 INJECTION, POWDER, LYOPHILIZED, FOR SOLUTION INTRAVENOUS at 15:06

## 2021-01-01 RX ADMIN — LEVETIRACETAM 750 MG: 100 SOLUTION ORAL at 09:51

## 2021-01-01 RX ADMIN — GUAIFENESIN 200 MG: 200 SOLUTION ORAL at 13:55

## 2021-01-01 RX ADMIN — SODIUM CHLORIDE 100 MG: 9 INJECTION, SOLUTION INTRAVENOUS at 20:38

## 2021-01-01 RX ADMIN — PHENYTOIN 100 MG: 50 TABLET, CHEWABLE ORAL at 21:24

## 2021-01-01 RX ADMIN — SODIUM CHLORIDE 750 MG: 900 INJECTION, SOLUTION INTRAVENOUS at 08:24

## 2021-01-01 RX ADMIN — SODIUM CHLORIDE 750 MG: 900 INJECTION, SOLUTION INTRAVENOUS at 08:53

## 2021-01-01 RX ADMIN — PIPERACILLIN SODIUM AND TAZOBACTAM SODIUM 3.38 G: 3; .375 INJECTION, POWDER, LYOPHILIZED, FOR SOLUTION INTRAVENOUS at 00:41

## 2021-01-01 RX ADMIN — LEVOFLOXACIN 750 MG: 5 INJECTION, SOLUTION INTRAVENOUS at 09:25

## 2021-01-01 RX ADMIN — GLYCOPYRROLATE 1 MG: 1 TABLET ORAL at 21:45

## 2021-01-01 RX ADMIN — MUPIROCIN: 20 OINTMENT TOPICAL at 09:09

## 2021-01-01 RX ADMIN — OXYCODONE 5 MG: 5 TABLET ORAL at 12:34

## 2021-01-01 RX ADMIN — HEPARIN SODIUM 5000 UNITS: 5000 INJECTION INTRAVENOUS; SUBCUTANEOUS at 22:01

## 2021-01-01 RX ADMIN — PHENYTOIN 100 MG: 50 TABLET, CHEWABLE ORAL at 17:20

## 2021-01-01 RX ADMIN — GLYCOPYRROLATE 0.2 MG: 0.2 INJECTION INTRAMUSCULAR; INTRAVENOUS at 04:44

## 2021-01-01 RX ADMIN — GUAIFENESIN 200 MG: 200 SOLUTION ORAL at 12:41

## 2021-01-01 RX ADMIN — LORAZEPAM 2 MG: 2 INJECTION INTRAMUSCULAR; INTRAVENOUS at 08:28

## 2021-01-01 RX ADMIN — LORAZEPAM 2 MG: 2 INJECTION INTRAMUSCULAR; INTRAVENOUS at 23:47

## 2021-01-01 RX ADMIN — IPRATROPIUM BROMIDE AND ALBUTEROL SULFATE 3 ML: .5; 2.5 SOLUTION RESPIRATORY (INHALATION) at 11:24

## 2021-01-01 RX ADMIN — IPRATROPIUM BROMIDE AND ALBUTEROL SULFATE 3 ML: .5; 2.5 SOLUTION RESPIRATORY (INHALATION) at 07:26

## 2021-01-01 RX ADMIN — GUAIFENESIN 200 MG: 200 SOLUTION ORAL at 13:48

## 2021-01-01 RX ADMIN — PHENYTOIN 100 MG: 50 TABLET, CHEWABLE ORAL at 09:10

## 2021-01-01 RX ADMIN — PHENYTOIN 200 MG: 50 TABLET, CHEWABLE ORAL at 22:34

## 2021-01-01 RX ADMIN — ACETYLCYSTEINE 400 MG: 100 SOLUTION ORAL; RESPIRATORY (INHALATION) at 00:01

## 2021-01-01 RX ADMIN — LEVALBUTEROL HYDROCHLORIDE 1.25 MG: 1.25 SOLUTION RESPIRATORY (INHALATION) at 12:33

## 2021-01-01 RX ADMIN — LORAZEPAM 0.5 MG: 0.5 TABLET ORAL at 09:06

## 2021-01-01 RX ADMIN — HEPARIN SODIUM 5000 UNITS: 5000 INJECTION INTRAVENOUS; SUBCUTANEOUS at 06:09

## 2021-01-01 RX ADMIN — LORAZEPAM 0.5 MG: 0.5 TABLET ORAL at 06:27

## 2021-01-01 RX ADMIN — PHENYTOIN 150 MG: 50 TABLET, CHEWABLE ORAL at 08:01

## 2021-01-01 RX ADMIN — PANTOPRAZOLE SODIUM 40 MG: 40 TABLET, DELAYED RELEASE ORAL at 18:39

## 2021-01-01 RX ADMIN — GLYCOPYRROLATE 1 MG: 1 TABLET ORAL at 17:53

## 2021-01-01 RX ADMIN — HYDROMORPHONE HYDROCHLORIDE 0.5 MG: 1 INJECTION, SOLUTION INTRAMUSCULAR; INTRAVENOUS; SUBCUTANEOUS at 12:05

## 2021-01-01 RX ADMIN — GUAIFENESIN 200 MG: 200 SOLUTION ORAL at 12:51

## 2021-01-01 RX ADMIN — ENOXAPARIN SODIUM 40 MG: 40 INJECTION SUBCUTANEOUS at 09:34

## 2021-01-01 RX ADMIN — HYDROMORPHONE HYDROCHLORIDE 1 MG: 1 INJECTION, SOLUTION INTRAMUSCULAR; INTRAVENOUS; SUBCUTANEOUS at 20:26

## 2021-01-01 RX ADMIN — GLYCOPYRROLATE 1 MG: 1 TABLET ORAL at 19:25

## 2021-01-01 RX ADMIN — SODIUM CHLORIDE 750 MG: 900 INJECTION, SOLUTION INTRAVENOUS at 17:46

## 2021-01-01 RX ADMIN — Medication 10 ML: at 06:13

## 2021-01-01 RX ADMIN — Medication 10 ML: at 05:55

## 2021-01-01 RX ADMIN — HYDROMORPHONE HYDROCHLORIDE 1 MG: 1 INJECTION, SOLUTION INTRAMUSCULAR; INTRAVENOUS; SUBCUTANEOUS at 23:47

## 2021-01-01 RX ADMIN — GUAIFENESIN 200 MG: 200 SOLUTION ORAL at 09:27

## 2021-01-01 RX ADMIN — MIDODRINE HYDROCHLORIDE 5 MG: 5 TABLET ORAL at 12:15

## 2021-01-01 RX ADMIN — PIPERACILLIN AND TAZOBACTAM 3.38 G: 3; .375 INJECTION, POWDER, LYOPHILIZED, FOR SOLUTION INTRAVENOUS at 18:20

## 2021-01-01 RX ADMIN — IPRATROPIUM BROMIDE AND ALBUTEROL SULFATE 3 ML: .5; 2.5 SOLUTION RESPIRATORY (INHALATION) at 13:27

## 2021-01-01 RX ADMIN — GUAIFENESIN 200 MG: 200 SOLUTION ORAL at 09:50

## 2021-01-01 RX ADMIN — GUAIFENESIN 200 MG: 200 SOLUTION ORAL at 20:19

## 2021-01-01 RX ADMIN — ACETYLCYSTEINE 400 MG: 100 SOLUTION ORAL; RESPIRATORY (INHALATION) at 14:00

## 2021-01-01 RX ADMIN — SODIUM CHLORIDE, POTASSIUM CHLORIDE, SODIUM LACTATE AND CALCIUM CHLORIDE 75 ML/HR: 600; 310; 30; 20 INJECTION, SOLUTION INTRAVENOUS at 16:03

## 2021-01-01 RX ADMIN — PIPERACILLIN AND TAZOBACTAM 3.38 G: 3; .375 INJECTION, POWDER, LYOPHILIZED, FOR SOLUTION INTRAVENOUS at 02:32

## 2021-01-01 RX ADMIN — ACETAMINOPHEN 650 MG: 325 TABLET ORAL at 18:43

## 2021-01-01 RX ADMIN — IPRATROPIUM BROMIDE AND ALBUTEROL SULFATE 3 ML: .5; 2.5 SOLUTION RESPIRATORY (INHALATION) at 07:33

## 2021-01-01 RX ADMIN — MIDODRINE HYDROCHLORIDE 15 MG: 5 TABLET ORAL at 05:50

## 2021-01-01 RX ADMIN — FENTANYL CITRATE 25 MCG: 50 INJECTION, SOLUTION INTRAMUSCULAR; INTRAVENOUS at 20:30

## 2021-01-01 RX ADMIN — MIDODRINE HYDROCHLORIDE 15 MG: 5 TABLET ORAL at 06:03

## 2021-01-01 RX ADMIN — SODIUM CHLORIDE 500 ML: 9 INJECTION, SOLUTION INTRAVENOUS at 07:41

## 2021-01-01 RX ADMIN — LORAZEPAM 0.5 MG: 2 INJECTION INTRAMUSCULAR; INTRAVENOUS at 20:22

## 2021-01-01 RX ADMIN — LEVETIRACETAM 750 MG: 100 SOLUTION ORAL at 20:18

## 2021-01-01 RX ADMIN — PIPERACILLIN AND TAZOBACTAM 3.38 G: 3; .375 INJECTION, POWDER, LYOPHILIZED, FOR SOLUTION INTRAVENOUS at 09:30

## 2021-01-01 RX ADMIN — ACETYLCYSTEINE 400 MG: 100 SOLUTION ORAL; RESPIRATORY (INHALATION) at 20:03

## 2021-01-01 RX ADMIN — SODIUM CHLORIDE 1000 ML: 9 INJECTION, SOLUTION INTRAVENOUS at 19:04

## 2021-01-01 RX ADMIN — MIDODRINE HYDROCHLORIDE 15 MG: 5 TABLET ORAL at 05:27

## 2021-01-01 RX ADMIN — Medication 10 ML: at 23:04

## 2021-01-01 RX ADMIN — LORAZEPAM 0.5 MG: 2 INJECTION INTRAMUSCULAR; INTRAVENOUS at 11:19

## 2021-01-01 RX ADMIN — OXYCODONE HYDROCHLORIDE 5 MG: 5 TABLET ORAL at 20:19

## 2021-01-01 RX ADMIN — HEPARIN SODIUM 5000 UNITS: 5000 INJECTION INTRAVENOUS; SUBCUTANEOUS at 05:30

## 2021-01-01 RX ADMIN — ENOXAPARIN SODIUM 40 MG: 40 INJECTION SUBCUTANEOUS at 08:29

## 2021-01-01 RX ADMIN — LORAZEPAM 1 MG: 1 TABLET ORAL at 15:03

## 2021-01-01 RX ADMIN — SODIUM CHLORIDE 750 MG: 900 INJECTION, SOLUTION INTRAVENOUS at 20:05

## 2021-01-01 RX ADMIN — MIDODRINE HYDROCHLORIDE 15 MG: 5 TABLET ORAL at 06:12

## 2021-01-01 RX ADMIN — Medication 10 ML: at 14:55

## 2021-01-01 RX ADMIN — MIDODRINE HYDROCHLORIDE 15 MG: 5 TABLET ORAL at 20:19

## 2021-01-01 RX ADMIN — LANSOPRAZOLE 30 MG: KIT at 23:27

## 2021-01-01 RX ADMIN — OXYCODONE 5 MG: 5 TABLET ORAL at 13:01

## 2021-01-01 RX ADMIN — FENTANYL CITRATE 25 MCG: 50 INJECTION, SOLUTION INTRAMUSCULAR; INTRAVENOUS at 15:50

## 2021-01-01 RX ADMIN — LEVETIRACETAM 750 MG: 100 SOLUTION ORAL at 08:01

## 2021-01-01 RX ADMIN — Medication 10 ML: at 16:14

## 2021-01-01 RX ADMIN — PHENYTOIN 100 MG: 50 TABLET, CHEWABLE ORAL at 21:46

## 2021-01-01 RX ADMIN — Medication 20 ML: at 21:23

## 2021-01-01 RX ADMIN — ACETAMINOPHEN 650 MG: 650 SOLUTION ORAL at 19:57

## 2021-01-01 RX ADMIN — PIPERACILLIN AND TAZOBACTAM 3.38 G: 3; .375 INJECTION, POWDER, LYOPHILIZED, FOR SOLUTION INTRAVENOUS at 02:40

## 2021-01-01 RX ADMIN — MIDODRINE HYDROCHLORIDE 15 MG: 5 TABLET ORAL at 21:45

## 2021-01-01 RX ADMIN — OXYCODONE 5 MG: 5 TABLET ORAL at 06:27

## 2021-01-01 RX ADMIN — ENOXAPARIN SODIUM 40 MG: 40 INJECTION SUBCUTANEOUS at 09:50

## 2021-01-01 RX ADMIN — OXYCODONE HYDROCHLORIDE 5 MG: 5 TABLET ORAL at 16:41

## 2021-01-01 RX ADMIN — SODIUM CHLORIDE 750 MG: 900 INJECTION, SOLUTION INTRAVENOUS at 05:41

## 2021-01-01 RX ADMIN — LORAZEPAM 0.5 MG: 0.5 TABLET ORAL at 15:56

## 2021-01-01 RX ADMIN — LORAZEPAM 0.5 MG: 0.5 TABLET ORAL at 04:19

## 2021-01-01 RX ADMIN — FENTANYL CITRATE 25 MCG: 50 INJECTION, SOLUTION INTRAMUSCULAR; INTRAVENOUS at 11:23

## 2021-01-01 RX ADMIN — IPRATROPIUM BROMIDE AND ALBUTEROL SULFATE 3 ML: .5; 2.5 SOLUTION RESPIRATORY (INHALATION) at 20:11

## 2021-01-01 RX ADMIN — LORAZEPAM 1 MG: 1 TABLET ORAL at 11:55

## 2021-01-01 RX ADMIN — IPRATROPIUM BROMIDE AND ALBUTEROL SULFATE 3 ML: .5; 2.5 SOLUTION RESPIRATORY (INHALATION) at 11:09

## 2021-01-01 RX ADMIN — LORAZEPAM 0.5 MG: 2 INJECTION INTRAMUSCULAR; INTRAVENOUS at 22:51

## 2021-01-01 RX ADMIN — IPRATROPIUM BROMIDE AND ALBUTEROL SULFATE 3 ML: .5; 2.5 SOLUTION RESPIRATORY (INHALATION) at 19:16

## 2021-01-01 RX ADMIN — PHENYTOIN 100 MG: 50 TABLET, CHEWABLE ORAL at 08:09

## 2021-01-01 RX ADMIN — PHENYTOIN 100 MG: 125 SUSPENSION ORAL at 13:03

## 2021-01-01 RX ADMIN — FENTANYL CITRATE 25 MCG: 50 INJECTION, SOLUTION INTRAMUSCULAR; INTRAVENOUS at 12:50

## 2021-01-01 RX ADMIN — PHENYTOIN 100 MG: 50 TABLET, CHEWABLE ORAL at 20:19

## 2021-01-01 RX ADMIN — PHENYTOIN 100 MG: 125 SUSPENSION ORAL at 06:14

## 2021-01-01 RX ADMIN — Medication 10 ML: at 22:18

## 2021-01-01 RX ADMIN — Medication 10 ML: at 22:22

## 2021-01-01 RX ADMIN — FUROSEMIDE 10 MG: 10 INJECTION, SOLUTION INTRAMUSCULAR; INTRAVENOUS at 09:00

## 2021-01-01 RX ADMIN — PANTOPRAZOLE SODIUM 40 MG: 40 TABLET, DELAYED RELEASE ORAL at 17:38

## 2021-01-01 RX ADMIN — SODIUM CHLORIDE 750 MG: 900 INJECTION, SOLUTION INTRAVENOUS at 09:01

## 2021-01-01 RX ADMIN — MUPIROCIN: 20 OINTMENT TOPICAL at 08:02

## 2021-01-01 RX ADMIN — MIDODRINE HYDROCHLORIDE 5 MG: 5 TABLET ORAL at 12:31

## 2021-01-01 RX ADMIN — LEVALBUTEROL HYDROCHLORIDE 1.25 MG: 1.25 SOLUTION RESPIRATORY (INHALATION) at 23:47

## 2021-01-01 RX ADMIN — ENOXAPARIN SODIUM 40 MG: 40 INJECTION SUBCUTANEOUS at 08:31

## 2021-01-01 RX ADMIN — SODIUM CHLORIDE 750 MG: 900 INJECTION, SOLUTION INTRAVENOUS at 18:17

## 2021-01-01 RX ADMIN — LORAZEPAM 0.5 MG: 0.5 TABLET ORAL at 10:07

## 2021-01-01 RX ADMIN — Medication 10 ML: at 14:03

## 2021-01-01 RX ADMIN — GUAIFENESIN 200 MG: 200 SOLUTION ORAL at 17:23

## 2021-01-01 RX ADMIN — OXYCODONE HYDROCHLORIDE 5 MG: 5 TABLET ORAL at 02:36

## 2021-01-01 RX ADMIN — LORAZEPAM 0.5 MG: 0.5 TABLET ORAL at 05:55

## 2021-01-01 RX ADMIN — OXYCODONE 5 MG: 5 TABLET ORAL at 06:38

## 2021-01-01 RX ADMIN — MICONAZOLE NITRATE 2 % TOPICAL POWDER: at 12:51

## 2021-01-01 RX ADMIN — GUAIFENESIN 200 MG: 200 SOLUTION ORAL at 09:28

## 2021-01-01 RX ADMIN — PHENYTOIN 100 MG: 125 SUSPENSION ORAL at 13:42

## 2021-01-01 RX ADMIN — LORAZEPAM 0.5 MG: 0.5 TABLET ORAL at 15:46

## 2021-01-01 RX ADMIN — FUROSEMIDE 20 MG: 10 INJECTION, SOLUTION INTRAMUSCULAR; INTRAVENOUS at 21:57

## 2021-01-01 RX ADMIN — LORAZEPAM 0.5 MG: 2 INJECTION INTRAMUSCULAR; INTRAVENOUS at 09:38

## 2021-01-01 RX ADMIN — Medication 19 ML: at 22:00

## 2021-01-01 RX ADMIN — MORPHINE SULFATE 1 MG: 2 INJECTION, SOLUTION INTRAMUSCULAR; INTRAVENOUS at 00:06

## 2021-01-01 RX ADMIN — POTASSIUM BICARBONATE 40 MEQ: 782 TABLET, EFFERVESCENT ORAL at 09:44

## 2021-01-01 RX ADMIN — ENOXAPARIN SODIUM 40 MG: 40 INJECTION SUBCUTANEOUS at 08:23

## 2021-01-01 RX ADMIN — PHENYTOIN 100 MG: 125 SUSPENSION ORAL at 22:22

## 2021-01-01 RX ADMIN — VANCOMYCIN HYDROCHLORIDE 500 MG: KIT at 00:37

## 2021-01-01 RX ADMIN — LEVALBUTEROL HYDROCHLORIDE 1.25 MG: 1.25 SOLUTION RESPIRATORY (INHALATION) at 11:38

## 2021-01-01 RX ADMIN — OXYCODONE 5 MG: 5 TABLET ORAL at 00:00

## 2021-01-01 RX ADMIN — LANSOPRAZOLE 30 MG: KIT at 20:56

## 2021-01-01 RX ADMIN — MIDODRINE HYDROCHLORIDE 15 MG: 5 TABLET ORAL at 06:39

## 2021-01-01 RX ADMIN — POTASSIUM BICARBONATE 40 MEQ: 782 TABLET, EFFERVESCENT ORAL at 10:08

## 2021-01-01 RX ADMIN — IPRATROPIUM BROMIDE AND ALBUTEROL SULFATE 3 ML: .5; 2.5 SOLUTION RESPIRATORY (INHALATION) at 01:38

## 2021-01-01 RX ADMIN — VANCOMYCIN HYDROCHLORIDE 500 MG: KIT at 01:31

## 2021-01-01 RX ADMIN — PHENYTOIN 200 MG: 50 TABLET, CHEWABLE ORAL at 09:02

## 2021-01-01 RX ADMIN — MORPHINE SULFATE 1 MG: 2 INJECTION, SOLUTION INTRAMUSCULAR; INTRAVENOUS at 12:25

## 2021-01-01 RX ADMIN — GLYCOPYRROLATE 1 MG: 1 TABLET ORAL at 17:15

## 2021-01-01 RX ADMIN — MIDODRINE HYDROCHLORIDE 15 MG: 5 TABLET ORAL at 13:52

## 2021-01-01 RX ADMIN — LORAZEPAM 0.5 MG: 0.5 TABLET ORAL at 08:19

## 2021-01-01 RX ADMIN — MIDODRINE HYDROCHLORIDE 15 MG: 5 TABLET ORAL at 14:51

## 2021-01-01 RX ADMIN — LEVETIRACETAM 750 MG: 100 SOLUTION ORAL at 17:33

## 2021-01-01 RX ADMIN — LEVOFLOXACIN 750 MG: 5 INJECTION, SOLUTION INTRAVENOUS at 17:24

## 2021-01-01 RX ADMIN — IPRATROPIUM BROMIDE AND ALBUTEROL SULFATE 3 ML: .5; 2.5 SOLUTION RESPIRATORY (INHALATION) at 19:27

## 2021-01-01 RX ADMIN — POTASSIUM CHLORIDE 10 MEQ: 10 INJECTION, SOLUTION INTRAVENOUS at 10:38

## 2021-01-01 RX ADMIN — PIPERACILLIN AND TAZOBACTAM 3.38 G: 3; .375 INJECTION, POWDER, LYOPHILIZED, FOR SOLUTION INTRAVENOUS at 04:20

## 2021-01-01 RX ADMIN — VANCOMYCIN HYDROCHLORIDE 1250 MG: 1.25 INJECTION, POWDER, LYOPHILIZED, FOR SOLUTION INTRAVENOUS at 12:10

## 2021-01-01 RX ADMIN — PHENYTOIN 100 MG: 125 SUSPENSION ORAL at 05:37

## 2021-01-01 RX ADMIN — LORAZEPAM 0.5 MG: 0.5 TABLET ORAL at 12:03

## 2021-01-01 RX ADMIN — OXYCODONE HYDROCHLORIDE 5 MG: 5 TABLET ORAL at 08:36

## 2021-01-01 RX ADMIN — GLYCOPYRROLATE 1 MG: 1 TABLET ORAL at 21:55

## 2021-01-01 RX ADMIN — LORAZEPAM 1 MG: 2 SOLUTION, CONCENTRATE ORAL at 08:52

## 2021-01-01 RX ADMIN — MIDODRINE HYDROCHLORIDE 15 MG: 5 TABLET ORAL at 23:04

## 2021-01-01 RX ADMIN — POTASSIUM BICARBONATE 20 MEQ: 782 TABLET, EFFERVESCENT ORAL at 08:19

## 2021-01-01 RX ADMIN — SODIUM CHLORIDE 800 ML: 9 INJECTION, SOLUTION INTRAVENOUS at 19:22

## 2021-01-01 RX ADMIN — OXYCODONE 5 MG: 5 TABLET ORAL at 18:09

## 2021-01-01 RX ADMIN — LEVALBUTEROL HYDROCHLORIDE 1.25 MG: 1.25 SOLUTION RESPIRATORY (INHALATION) at 03:27

## 2021-01-01 RX ADMIN — MORPHINE SULFATE 1 MG: 2 INJECTION, SOLUTION INTRAMUSCULAR; INTRAVENOUS at 20:43

## 2021-01-01 RX ADMIN — FUROSEMIDE 20 MG: 10 INJECTION, SOLUTION INTRAMUSCULAR; INTRAVENOUS at 16:21

## 2021-01-01 RX ADMIN — POTASSIUM CHLORIDE 10 MEQ: 10 INJECTION, SOLUTION INTRAVENOUS at 12:10

## 2021-01-01 RX ADMIN — LANSOPRAZOLE 30 MG: KIT at 08:23

## 2021-01-01 RX ADMIN — POTASSIUM BICARBONATE 20 MEQ: 782 TABLET, EFFERVESCENT ORAL at 09:05

## 2021-01-01 RX ADMIN — PHENYTOIN 100 MG: 125 SUSPENSION ORAL at 22:12

## 2021-01-01 RX ADMIN — PANTOPRAZOLE SODIUM 40 MG: 40 INJECTION, POWDER, FOR SOLUTION INTRAVENOUS at 08:23

## 2021-01-01 RX ADMIN — PIPERACILLIN SODIUM AND TAZOBACTAM SODIUM 3.38 G: 3; .375 INJECTION, POWDER, LYOPHILIZED, FOR SOLUTION INTRAVENOUS at 00:11

## 2021-01-01 RX ADMIN — PHENYTOIN 200 MG: 50 TABLET, CHEWABLE ORAL at 09:22

## 2021-01-01 RX ADMIN — LORAZEPAM 0.5 MG: 0.5 TABLET ORAL at 13:53

## 2021-01-01 RX ADMIN — PHENYTOIN 100 MG: 50 TABLET, CHEWABLE ORAL at 16:46

## 2021-01-01 RX ADMIN — ACETAMINOPHEN 650 MG: 325 TABLET ORAL at 09:58

## 2021-01-01 RX ADMIN — GUAIFENESIN 200 MG: 200 SOLUTION ORAL at 21:25

## 2021-01-01 RX ADMIN — VANCOMYCIN HYDROCHLORIDE 500 MG: KIT at 18:56

## 2021-01-01 RX ADMIN — OXYCODONE 5 MG: 5 TABLET ORAL at 14:11

## 2021-01-01 RX ADMIN — GUAIFENESIN 200 MG: 200 SOLUTION ORAL at 13:45

## 2021-01-01 RX ADMIN — PIPERACILLIN AND TAZOBACTAM 3.38 G: 3; .375 INJECTION, POWDER, LYOPHILIZED, FOR SOLUTION INTRAVENOUS at 17:23

## 2021-01-01 RX ADMIN — PIPERACILLIN AND TAZOBACTAM 3.38 G: 3; .375 INJECTION, POWDER, LYOPHILIZED, FOR SOLUTION INTRAVENOUS at 20:18

## 2021-01-01 RX ADMIN — FUROSEMIDE 20 MG: 10 INJECTION, SOLUTION INTRAMUSCULAR; INTRAVENOUS at 09:00

## 2021-01-01 RX ADMIN — OXYCODONE 5 MG: 5 TABLET ORAL at 22:01

## 2021-01-01 RX ADMIN — IOPAMIDOL 100 ML: 755 INJECTION, SOLUTION INTRAVENOUS at 00:32

## 2021-01-01 RX ADMIN — MIDODRINE HYDROCHLORIDE 5 MG: 5 TABLET ORAL at 08:53

## 2021-01-01 RX ADMIN — PANTOPRAZOLE SODIUM 40 MG: 40 TABLET, DELAYED RELEASE ORAL at 09:50

## 2021-01-01 RX ADMIN — MORPHINE SULFATE 1 MG: 2 INJECTION, SOLUTION INTRAMUSCULAR; INTRAVENOUS at 19:15

## 2021-01-01 RX ADMIN — LORAZEPAM 2 MG: 2 INJECTION INTRAMUSCULAR; INTRAVENOUS at 04:44

## 2021-01-01 RX ADMIN — LEVETIRACETAM 750 MG: 100 SOLUTION ORAL at 23:49

## 2021-01-01 RX ADMIN — ACETYLCYSTEINE 400 MG: 100 SOLUTION ORAL; RESPIRATORY (INHALATION) at 19:43

## 2021-01-01 RX ADMIN — LORAZEPAM 0.5 MG: 0.5 TABLET ORAL at 02:18

## 2021-01-01 RX ADMIN — MUPIROCIN: 20 OINTMENT TOPICAL at 08:11

## 2021-01-01 RX ADMIN — MIDODRINE HYDROCHLORIDE 15 MG: 5 TABLET ORAL at 06:19

## 2021-01-01 RX ADMIN — IPRATROPIUM BROMIDE AND ALBUTEROL SULFATE 3 ML: .5; 2.5 SOLUTION RESPIRATORY (INHALATION) at 11:14

## 2021-01-01 RX ADMIN — IPRATROPIUM BROMIDE AND ALBUTEROL SULFATE 3 ML: .5; 2.5 SOLUTION RESPIRATORY (INHALATION) at 03:21

## 2021-01-01 RX ADMIN — IPRATROPIUM BROMIDE AND ALBUTEROL SULFATE 3 ML: .5; 2.5 SOLUTION RESPIRATORY (INHALATION) at 03:23

## 2021-01-01 RX ADMIN — ALTEPLASE 1 MG: 2.2 INJECTION, POWDER, LYOPHILIZED, FOR SOLUTION INTRAVENOUS at 18:16

## 2021-01-01 RX ADMIN — LORAZEPAM 0.5 MG: 0.5 TABLET ORAL at 17:03

## 2021-01-01 RX ADMIN — PHENYTOIN 100 MG: 125 SUSPENSION ORAL at 05:41

## 2021-01-01 RX ADMIN — MIDODRINE HYDROCHLORIDE 15 MG: 5 TABLET ORAL at 21:24

## 2021-01-01 RX ADMIN — GUAIFENESIN 200 MG: 200 SOLUTION ORAL at 11:54

## 2021-01-01 RX ADMIN — IPRATROPIUM BROMIDE AND ALBUTEROL SULFATE 3 ML: .5; 2.5 SOLUTION RESPIRATORY (INHALATION) at 15:13

## 2021-01-01 RX ADMIN — OXYCODONE 5 MG: 5 TABLET ORAL at 18:12

## 2021-01-01 RX ADMIN — MIDODRINE HYDROCHLORIDE 15 MG: 5 TABLET ORAL at 14:38

## 2021-01-01 RX ADMIN — Medication 10 ML: at 22:34

## 2021-01-01 RX ADMIN — Medication 10 ML: at 16:03

## 2021-01-01 RX ADMIN — PIPERACILLIN AND TAZOBACTAM 3.38 G: 3; .375 INJECTION, POWDER, LYOPHILIZED, FOR SOLUTION INTRAVENOUS at 09:47

## 2021-01-01 RX ADMIN — IPRATROPIUM BROMIDE AND ALBUTEROL SULFATE 3 ML: .5; 2.5 SOLUTION RESPIRATORY (INHALATION) at 15:26

## 2021-01-01 RX ADMIN — SODIUM CHLORIDE 750 MG: 900 INJECTION, SOLUTION INTRAVENOUS at 17:34

## 2021-01-01 RX ADMIN — LORAZEPAM 1 MG: 2 INJECTION INTRAMUSCULAR; INTRAVENOUS at 11:00

## 2021-01-01 RX ADMIN — MIDODRINE HYDROCHLORIDE 15 MG: 5 TABLET ORAL at 14:44

## 2021-01-01 RX ADMIN — GUAIFENESIN 200 MG: 200 SOLUTION ORAL at 09:51

## 2021-01-01 RX ADMIN — LORAZEPAM 0.5 MG: 0.5 TABLET ORAL at 20:08

## 2021-01-01 RX ADMIN — IPRATROPIUM BROMIDE AND ALBUTEROL SULFATE 3 ML: .5; 2.5 SOLUTION RESPIRATORY (INHALATION) at 07:18

## 2021-01-01 RX ADMIN — MORPHINE SULFATE 1 MG: 2 INJECTION, SOLUTION INTRAMUSCULAR; INTRAVENOUS at 23:55

## 2021-01-01 RX ADMIN — MICONAZOLE NITRATE 2 % TOPICAL POWDER: at 18:00

## 2021-01-01 RX ADMIN — GUAIFENESIN 200 MG: 200 SOLUTION ORAL at 16:37

## 2021-01-01 RX ADMIN — MIDODRINE HYDROCHLORIDE 5 MG: 5 TABLET ORAL at 17:30

## 2021-01-01 RX ADMIN — IPRATROPIUM BROMIDE AND ALBUTEROL SULFATE 3 ML: .5; 2.5 SOLUTION RESPIRATORY (INHALATION) at 11:28

## 2021-01-01 RX ADMIN — LORAZEPAM 0.5 MG: 0.5 TABLET ORAL at 11:53

## 2021-01-01 RX ADMIN — Medication 10 ML: at 22:38

## 2021-01-01 RX ADMIN — POTASSIUM CHLORIDE 10 MEQ: 10 INJECTION, SOLUTION INTRAVENOUS at 10:55

## 2021-01-01 RX ADMIN — LEVETIRACETAM 750 MG: 100 SOLUTION ORAL at 09:52

## 2021-01-01 RX ADMIN — GLYCOPYRROLATE 1 MG: 1 TABLET ORAL at 08:36

## 2021-01-01 RX ADMIN — Medication 20 ML: at 14:41

## 2021-01-01 RX ADMIN — POTASSIUM BICARBONATE 20 MEQ: 782 TABLET, EFFERVESCENT ORAL at 08:01

## 2021-01-01 RX ADMIN — OXYCODONE 5 MG: 5 TABLET ORAL at 21:09

## 2021-01-01 RX ADMIN — MICONAZOLE NITRATE 2 % TOPICAL POWDER: at 07:49

## 2021-01-01 RX ADMIN — LORAZEPAM 1 MG: 2 INJECTION INTRAMUSCULAR; INTRAVENOUS at 21:02

## 2021-01-01 RX ADMIN — LORAZEPAM 0.5 MG: 0.5 TABLET ORAL at 10:01

## 2021-01-01 RX ADMIN — SODIUM CHLORIDE 750 MG: 900 INJECTION, SOLUTION INTRAVENOUS at 06:09

## 2021-01-01 RX ADMIN — HYDROMORPHONE HYDROCHLORIDE 0.5 MG: 1 INJECTION, SOLUTION INTRAMUSCULAR; INTRAVENOUS; SUBCUTANEOUS at 16:27

## 2021-01-01 RX ADMIN — MIDODRINE HYDROCHLORIDE 15 MG: 5 TABLET ORAL at 22:49

## 2021-01-01 RX ADMIN — LORAZEPAM 0.5 MG: 0.5 TABLET ORAL at 02:40

## 2021-01-01 RX ADMIN — MIDODRINE HYDROCHLORIDE 15 MG: 5 TABLET ORAL at 23:05

## 2021-02-03 NOTE — PATIENT INSTRUCTIONS
A Healthy Lifestyle: Care Instructions Your Care Instructions A healthy lifestyle can help you feel good, stay at a healthy weight, and have plenty of energy for both work and play. A healthy lifestyle is something you can share with your whole family. A healthy lifestyle also can lower your risk for serious health problems, such as high blood pressure, heart disease, and diabetes. You can follow a few steps listed below to improve your health and the health of your family. Follow-up care is a key part of your treatment and safety. Be sure to make and go to all appointments, and call your doctor if you are having problems. It's also a good idea to know your test results and keep a list of the medicines you take. How can you care for yourself at home? · Do not eat too much sugar, fat, or fast foods. You can still have dessert and treats now and then. The goal is moderation. · Start small to improve your eating habits. Pay attention to portion sizes, drink less juice and soda pop, and eat more fruits and vegetables. ? Eat a healthy amount of food. A 3-ounce serving of meat, for example, is about the size of a deck of cards. Fill the rest of your plate with vegetables and whole grains. ? Limit the amount of soda and sports drinks you have every day. Drink more water when you are thirsty. ? Eat at least 5 servings of fruits and vegetables every day. It may seem like a lot, but it is not hard to reach this goal. A serving or helping is 1 piece of fruit, 1 cup of vegetables, or 2 cups of leafy, raw vegetables. Have an apple or some carrot sticks as an afternoon snack instead of a candy bar.  Try to have fruits and/or vegetables at every meal. 
 · Make exercise part of your daily routine. You may want to start with simple activities, such as walking, bicycling, or slow swimming. Try to be active 30 to 60 minutes every day. You do not need to do all 30 to 60 minutes all at once. For example, you can exercise 3 times a day for 10 or 20 minutes. Moderate exercise is safe for most people, but it is always a good idea to talk to your doctor before starting an exercise program. 
· Keep moving. Savannah Longest the lawn, work in the garden, or [employer]. Take the stairs instead of the elevator at work. · If you smoke, quit. People who smoke have an increased risk for heart attack, stroke, cancer, and other lung illnesses. Quitting is hard, but there are ways to boost your chance of quitting tobacco for good. ? Use nicotine gum, patches, or lozenges. ? Ask your doctor about stop-smoking programs and medicines. ? Keep trying. In addition to reducing your risk of diseases in the future, you will notice some benefits soon after you stop using tobacco. If you have shortness of breath or asthma symptoms, they will likely get better within a few weeks after you quit. · Limit how much alcohol you drink. Moderate amounts of alcohol (up to 2 drinks a day for men, 1 drink a day for women) are okay. But drinking too much can lead to liver problems, high blood pressure, and other health problems. Family health If you have a family, there are many things you can do together to improve your health. · Eat meals together as a family as often as possible. · Eat healthy foods. This includes fruits, vegetables, lean meats and dairy, and whole grains. · Include your family in your fitness plan. Most people think of activities such as jogging or tennis as the way to fitness, but there are many ways you and your family can be more active. Anything that makes you breathe hard and gets your heart pumping is exercise. Here are some tips: ? Walk to do errands or to take your child to school or the bus. 
? Go for a family bike ride after dinner instead of watching TV. Where can you learn more? Go to http://www.gray.com/ Enter M385 in the search box to learn more about \"A Healthy Lifestyle: Care Instructions. \" Current as of: January 31, 2020               Content Version: 12.6 © 2006-2020 [employer]. Care instructions adapted under license by [employer] (which disclaims liability or warranty for this information). If you have questions about a medical condition or this instruction, always ask your healthcare professional. [address] any warranty or liability for your use of this information.

## 2021-02-03 NOTE — PROGRESS NOTES
Chief Complaint Patient presents with  Follow-up  Labs Patient presents in office today for 3 month f/u and labs. Caretaker states that she noticed yesterday that her hands were swollen. Caretaker would like a PRN for pain. No other concerns. 1. Have you been to the ER, urgent care clinic since your last visit? Hospitalized since your last visit? No 
 
2. Have you seen or consulted any other health care providers outside of the [address] [place] [address] since your last visit? Include any pap smears or colon screening. No 
 
Learning Assessment 3/17/2020 PRIMARY LEARNER Patient PRIMARY LANGUAGE ENGLISH  
LEARNER PREFERENCE PRIMARY PICTURES  
ANSWERED BY caretaker RELATIONSHIP OTHER

## 2021-02-03 NOTE — PROGRESS NOTES
Progress Note 
 
she is a 53y.o. year old female who presents for evalution. Subjective:  
 
Patient here for follow-up with elevated cholesterol. Lipitor 10 mg was started and patient seems to be tolerating with no side effects. Taking at 8am and will have home change to night time. Caretaker also notes some swelling in the hands which they noticed first yesterday. Staff states her hands have always been  Chubby. No pitting edema. No swelling in her legs. Staff has pictures and hands are not changed. Seems more adipose tissue. Pt opened her mouth for me and she has thrush on her tongue. No recent abx usage. Hx of elevated lft and will recheck today, have been avoiding tylenol but would like prn for pain and pt has hx of GI bleed so will avoid nsaid. Reviewed PmHx, RxHx, FmHx, SocHx, AllgHx and updated and dated in the chart. Review of Systems - negative except as listed above in the HPI Objective:  
 
Vitals:  
 02/03/21 0741 BP: 108/77 Pulse: 87 Resp: 16 Temp: 98.8 °F (37.1 °C) TempSrc: Temporal  
SpO2: 93% Weight: 129 lb 12.8 oz (58.9 kg) Height: 4' 9\" (1.448 m) Current Outpatient Medications Medication Sig  
 nystatin (MYCOSTATIN) 100,000 unit/mL suspension Take 5 mL by mouth four (4) times daily for 10 days. swish and spit  acetaminophen (TYLENOL) 500 mg tablet Take 1 Tab by mouth every six (6) hours as needed for Pain.  atorvastatin (LIPITOR) 10 mg tablet Take 1 Tab by mouth nightly.  polyethylene glycol (MIRALAX) 17 gram/dose powder Take 17 g by mouth every other day. In the evening  melatonin 3 mg tablet 1 Tab by Per G Tube route nightly as needed for Insomnia. Must give by 10pm  
 atropine 1 % ophthalmic solution 1 Drop three (3) times daily.  diazePAM (VALIUM) 5-7.5-10 mg kit Insert 10 mg into rectum as needed (for seizures that last longer than 5 minutes).  OTHER,NON-FORMULARY, 1 Can by Per G Tube route five (5) times daily. Osmolite 1 can 5 times per day (at 0800, 1100, 1400, 1700, and 2000)  cetirizine (ZYRTEC) 10 mg tablet Take 1 Tab by mouth daily.  levETIRAcetam (KEPPRA) 100 mg/mL solution 500 mg by Per G Tube route every twelve (12) hours.  albuterol-ipratropium (DUO-NEB) 2.5 mg-0.5 mg/3 ml nebu 3 mL by Nebulization route every four (4) hours as needed.  phenytoin (DILANTIN) 100 mg/4 mL susp oral suspension 125 mg by PEG Tube route every twelve (12) hours.  aspirin 81 mg chewable tablet Take 81 mg by mouth daily. No current facility-administered medications for this visit. Physical Examination: Abdomen - soft, nontender, nondistended, no masses or organomegaly Extremities - peripheral pulses normal, no pedal edema, no clubbing or cyanosis Mouth-oral thrush noted. Assessment/ Plan:  
Diagnoses and all orders for this visit: 
 
1. Mixed hyperlipidemia -     LIPID PANEL; Future -     METABOLIC PANEL, COMPREHENSIVE; Future 
-     atorvastatin (LIPITOR) 10 mg tablet; Take 1 Tab by mouth nightly. 2. Oral thrush 
-     nystatin (MYCOSTATIN) 100,000 unit/mL suspension; Take 5 mL by mouth four (4) times daily for 10 days. swish and spit 3. Elevated LFTs 
-     METABOLIC PANEL, COMPREHENSIVE; Future Other orders 
-     acetaminophen (TYLENOL) 500 mg tablet; Take 1 Tab by mouth every six (6) hours as needed for Pain. Follow-up and Dispositions · Return if symptoms worsen or fail to improve. I have discussed the diagnosis with the patient and the intended plan as seen in the above orders. The patient has received an after-visit summary and questions were answered concerning future plans. Pt conveyed understanding of plan. Medication Side Effects and Warnings were discussed with patient An electronic signature was used to authenticate this note Patricio Melchor DO

## 2021-02-04 NOTE — PROGRESS NOTES
Your liver function tests are elevated therefore you need to stop the atorvastatin and have her follow-up in 1 to 2 weeks for a recheck.   I already sent a DC order to [facility]

## 2021-02-26 NOTE — PROGRESS NOTES
MAR faxed to [address] [place] [address] Way. Fax number [phone] confirmation number [id number].

## 2021-03-09 NOTE — TELEPHONE ENCOUNTER
Called and spoke with Joe Mercado. She states that Saturday patient's BP dropped down to 64/40. She states that she was recently hospitalized and the hospital has put her on clonidine 0.2 MG QID, metoprolol 50 MG daily and amlodipine 10 MG daily. She states that she stopped giving her all BP medications since Saturday and today her BP was 105/78 and she is more alert. Caretaker states that she has never had hypertension before and does not know why the hospital put her on all of those BP meds. She states that she really should not be holding the meds without a physician order and is requesting and order to d/c all BP meds.

## 2021-03-09 NOTE — TELEPHONE ENCOUNTER
[address] [place] [address] would like a call as soon as possible as patient's blood pressure is 64/40. Marion Turner is not giving her any medication since Saturday. Patient has an appointment [phone] but Marion Turner wants one sooner.  Seng's phone: [phone]

## 2021-03-10 NOTE — TELEPHONE ENCOUNTER
Order faxed to [address] [place] [address] Way. Fax number [phone] confirmation number [id number].

## 2021-03-19 NOTE — PROGRESS NOTES
[facility] home health certification & POC was put on Dr Karo Edouard desk to process

## 2021-03-23 NOTE — LETTER
3/23/2021 2:58 PM 
 
Ms. Leon Chol [address] [place] [phone] To whom it may concern, Please reposition patient every 2 hours to prevent bedsores. Sincerely, Tanvi Flowers, DO

## 2021-03-23 NOTE — PROGRESS NOTES
Progress Note 
 
she is a 49y.o. year old female who presents for evalution. Subjective:  
 
Patient was hospitalized feb 9 and dx with pseudomonas PNA and hypoxia sats were low 80's. Was dc the 5th of march and was d/c with bp meds but her Bp was not high at home and we had d/c meds. Went to hosp on 6th for hypotension and clonidine was lowered to 0.1mg, they held meds and contacted me and I D/c'd the bp meds. She went back to hosp on 11th with GI bleed and was found to have colitis and is now on vanco po for 10 days, 250 qid. Will DC the MiraLAX for now keep a part of the bowel protocol. Apparently patient was started to get stage I ulcers while in the hospital they would like an order for repositioning. We will write for reposition every 2 hours. Reviewed PmHx, RxHx, FmHx, SocHx, AllgHx and updated and dated in the chart. Review of Systems - negative except as listed above in the HPI Objective: There were no vitals filed for this visit. Current Outpatient Medications Medication Sig  polyethylene glycol (MIRALAX) 17 gram/dose powder D/c med  atorvastatin (LIPITOR) 10 mg tablet Discontinue atorvastatin  acetaminophen (TYLENOL) 500 mg tablet Take 1 Tab by mouth every six (6) hours as needed for Pain.  melatonin 3 mg tablet 1 Tab by Per G Tube route nightly as needed for Insomnia. Must give by 10pm  
 atropine 1 % ophthalmic solution 1 Drop three (3) times daily.  diazePAM (VALIUM) 5-7.5-10 mg kit Insert 10 mg into rectum as needed (for seizures that last longer than 5 minutes).  OTHER,NON-FORMULARY, 1 Can by Per G Tube route five (5) times daily. Osmolite 1 can 5 times per day (at 0800, 1100, 1400, 1700, and 2000)  cetirizine (ZYRTEC) 10 mg tablet Take 1 Tab by mouth daily.  levETIRAcetam (KEPPRA) 100 mg/mL solution 500 mg by Per G Tube route every twelve (12) hours.   
 albuterol-ipratropium (DUO-NEB) 2.5 mg-0.5 mg/3 ml nebu 3 mL by Nebulization route every four (4) hours as needed.  phenytoin (DILANTIN) 100 mg/4 mL susp oral suspension 125 mg by PEG Tube route every twelve (12) hours.  aspirin 81 mg chewable tablet Take 81 mg by mouth daily. No current facility-administered medications for this visit. Physical Examination: General appearance - chronically ill appearing Assessment/ Plan:  
Diagnoses and all orders for this visit: 1. Colitis Complete the full course of vancomycin 250 mg 4 times daily 2. Chronic idiopathic constipation -     polyethylene glycol (MIRALAX) 17 gram/dose powder; D/c med Continue MiraLAX 3. Pressure injury, stage 1, unspecified location Position every 2 hours 4. Hypotension due to drugs Discontinue all blood pressure medications and blood pressure protocol given. Check 3 times daily call office if systolic over 924 or diastolic over 189. Follow-up and Dispositions · Return if symptoms worsen or fail to improve. I have discussed the diagnosis with the patient and the intended plan as seen in the above orders. The patient has received an after-visit summary and questions were answered concerning future plans. Pt conveyed understanding of plan. Medication Side Effects and Warnings were discussed with patient Edward Kaplan is being evaluated by a Virtual Visit (video visit) encounter to address concerns as mentioned above. A caregiver was present when appropriate. Due to this being a TeleHealth encounter (During SGMNO-74 public health emergency), evaluation of the following organ systems was limited: Vitals/Constitutional/EENT/Resp/CV/GI//MS/Neuro/Skin/Heme-Lymph-Imm.   Pursuant to the emergency declaration under the [address] [place] [address], [address] [place] [address] authority and the [employer] Act, this Virtual Visit was conducted with patient's (and/or legal guardian's) consent, to reduce the patient's risk of exposure to COVID-19 and provide necessary medical care. The patient (and/or legal guardian) has also been advised to contact this office for worsening conditions or problems, and seek emergency medical treatment and/or call 911 if deemed necessary. Patient identification was verified at the start of the visit: Yes Services were provided through a video synchronous discussion virtually to substitute for in-person clinic visit. Patient and provider were located at their individual homes.  
--Walt Foley,  on 3/23/2021 at 2:44 PM

## 2021-03-23 NOTE — LETTER
3/23/2021 2:53 PM 
 
Ms. Jensen Peaks [address] [place] [phone] To Whom It May Concern, If no BM in 24 hours use miralax bid if no BM in 48 hrs give 8 oz prune juice, if no BM in 72 hours call  office. Sincerely, [address], DO Sincerely, [address], DO

## 2021-03-23 NOTE — PATIENT INSTRUCTIONS
A Healthy Lifestyle: Care Instructions Your Care Instructions A healthy lifestyle can help you feel good, stay at a healthy weight, and have plenty of energy for both work and play. A healthy lifestyle is something you can share with your whole family. A healthy lifestyle also can lower your risk for serious health problems, such as high blood pressure, heart disease, and diabetes. You can follow a few steps listed below to improve your health and the health of your family. Follow-up care is a key part of your treatment and safety. Be sure to make and go to all appointments, and call your doctor if you are having problems. It's also a good idea to know your test results and keep a list of the medicines you take. How can you care for yourself at home? · Do not eat too much sugar, fat, or fast foods. You can still have dessert and treats now and then. The goal is moderation. · Start small to improve your eating habits. Pay attention to portion sizes, drink less juice and soda pop, and eat more fruits and vegetables. ? Eat a healthy amount of food. A 3-ounce serving of meat, for example, is about the size of a deck of cards. Fill the rest of your plate with vegetables and whole grains. ? Limit the amount of soda and sports drinks you have every day. Drink more water when you are thirsty. ? Eat at least 5 servings of fruits and vegetables every day. It may seem like a lot, but it is not hard to reach this goal. A serving or helping is 1 piece of fruit, 1 cup of vegetables, or 2 cups of leafy, raw vegetables. Have an apple or some carrot sticks as an afternoon snack instead of a candy bar. Try to have fruits and/or vegetables at every meal. 
· Make exercise part of your daily routine. You may want to start with simple activities, such as walking, bicycling, or slow swimming. Try to be active 30 to 60 minutes every day. You do not need to do all 30 to 60 minutes all at once.  For example, you can exercise 3 times a day for 10 or 20 minutes. Moderate exercise is safe for most people, but it is always a good idea to talk to your doctor before starting an exercise program. 
· Keep moving. Ranjit Pry the lawn, work in the garden, or [employer]. Take the stairs instead of the elevator at work. · If you smoke, quit. People who smoke have an increased risk for heart attack, stroke, cancer, and other lung illnesses. Quitting is hard, but there are ways to boost your chance of quitting tobacco for good. ? Use nicotine gum, patches, or lozenges. ? Ask your doctor about stop-smoking programs and medicines. ? Keep trying. In addition to reducing your risk of diseases in the future, you will notice some benefits soon after you stop using tobacco. If you have shortness of breath or asthma symptoms, they will likely get better within a few weeks after you quit. · Limit how much alcohol you drink. Moderate amounts of alcohol (up to 2 drinks a day for men, 1 drink a day for women) are okay. But drinking too much can lead to liver problems, high blood pressure, and other health problems. Family health If you have a family, there are many things you can do together to improve your health. · Eat meals together as a family as often as possible. · Eat healthy foods. This includes fruits, vegetables, lean meats and dairy, and whole grains. · Include your family in your fitness plan. Most people think of activities such as jogging or tennis as the way to fitness, but there are many ways you and your family can be more active. Anything that makes you breathe hard and gets your heart pumping is exercise. Here are some tips: 
? Walk to do errands or to take your child to school or the bus. 
? Go for a family bike ride after dinner instead of watching TV. Where can you learn more? Go to http://www.gray.com/ Enter F416 in the search box to learn more about \"A Healthy Lifestyle: Care Instructions. \" Current as of: January 31, 2020               Content Version: 12.6 © 7007-3942 [employer]. Care instructions adapted under license by [employer] (which disclaims liability or warranty for this information). If you have questions about a medical condition or this instruction, always ask your healthcare professional. [address] any warranty or liability for your use of this information.

## 2021-03-23 NOTE — LETTER
3/23/2021 2:56 PM 
 
Ms. Jese Chairez [address] [place] [phone] To Whom It May Concern, Check blood pressure 3 times daily. If blood pressures over 140 systolic or over 698 diastolic please contact physician's office. Sincerely, Manuel Messina, DO

## 2021-03-24 NOTE — PROGRESS NOTES
Plan of Care faxed to [facility]. Fax number [phone] confirmation number [phone].

## 2021-03-25 NOTE — TELEPHONE ENCOUNTER
[facility] called. Shiv Seo stated that pt is stooling more frequently and jelly like, GH would like a C. Diff order if possible. She also stated that pt bp was 92/60 with HR of 106. Please call her at [phone].

## 2021-03-26 NOTE — TELEPHONE ENCOUNTER
Called and spoke with Yissel. She states that patient is still on the vancomycin. Advised that is treating for C diff. Yissel verbalized understanding and states that she will discuss this with the .

## 2021-03-30 PROBLEM — G40.901 STATUS EPILEPTICUS (HCC): Status: ACTIVE | Noted: 2021-01-01

## 2021-03-31 PROBLEM — D75.839 THROMBOCYTOSIS: Status: ACTIVE | Noted: 2021-01-01

## 2021-03-31 PROBLEM — E87.1 HYPONATREMIA: Status: ACTIVE | Noted: 2021-01-01

## 2021-03-31 PROBLEM — E83.39 HYPERPHOSPHATEMIA: Status: ACTIVE | Noted: 2021-01-01

## 2021-03-31 PROBLEM — I95.9 HYPOTENSION: Status: ACTIVE | Noted: 2021-01-01

## 2021-03-31 PROBLEM — N17.9 AKI (ACUTE KIDNEY INJURY) (HCC): Status: ACTIVE | Noted: 2021-01-01

## 2021-03-31 PROBLEM — R60.9 EDEMA: Chronic | Status: ACTIVE | Noted: 2021-01-01

## 2021-03-31 NOTE — WOUND CARE
Wound care consult for ICU patient intubated:COVID 19 Reviewed with ICU nurse Jorge Tatum, no skin concerns at this time, skin assessed by nurse this AM. Heel boots, hyperglide , ICU Edwin air mattress bed, purewick in place. Peg tube intact without erythema or drainage per nurse. Will continue to follow

## 2021-03-31 NOTE — PROGRESS NOTES
Reason for Admission:   
 
                
RUR Score:  14% Plan for utilizing home health: To be determined PCP: First and Last name:  Zeus Simpson NP Name of Practice:  
 Are you a current patient: Yes/No: yes Approximate date of last visit:  
 Can you participate in a virtual visit with your PCP:  
                 
Current Advanced Directive/Advance Care Plan: Full Code Healthcare Decision Maker:  
 
        
  Primary Decision Maker: Radhaelana Jean Carlos Corbett - [phone] Transition of Care Plan: Pt was admitted to ICU with status epilepticus,hypotension,acute respiratory failure (trach dependent)and recurrent c.diffcile s/p fecal transplant. Pt has a history of Downs syndrome and seizures,Pt lives in a group home. NOK is her Yenny Bahu @ [phone]. Pt is intubated (8 Peep,40% Fio2) On levophed and versed gtts. I will follow pt for future discharge needs. Nely Francis Case management

## 2021-03-31 NOTE — H&P
[address] [facility] Stu Delgadillo  Office [phone] Fax [phone] Admission H&P Name: Halie Nieves MRN: [id number]  Sex: Female YOB: 1970  Age: 49 y.o. PCP: Pe[address] Street NP Source of Information: patient, medical records Chief complaint: Seizures History of Present Illness Halie Nieves is a 49 y.o. female with PMHx of Down Syndrome (non-verbal at baseline), S/p Trach and PEG tube, seizures, encephalomalacia, ANNE, h/o recurrent Cdiff colitis who presents to the ED via EMS from group home for breakthrough seizures. Per group home caregiver, pt has had multiple breakthrough seizures over the last 3 days with ~5 noted today. Seizures presented as stiffening of body and R sided gaze preference. Patient found to have supratheraputic Phenytoin level yesterday (28) by neurologist (Dr. Yadira Lackey) and instructed to hold Phenytoin starting tonight for 24 hours. EMS witnessed seizures x2. Per group home caregiver at baseline patient is nonverbal, moves only mouth head and arms. Group home caregiver denies all acute symptoms, including fever, cough, nasal congestion, rhinorrhea, SOB, diarrhea. S/p 2 doses of Covid Moderna vaccine. Patient was recently hospitalized at [facility] in February and March. Hospitalized for Pseudomonas pneumonia 2/6/2021 - 2/13/2021. Hospitalized for GI bleed and discharged on 3/19/2021. EGD and colonoscopy found gastritis and colitis. Patient was discharged on erythromycin, completed course today. Covid tests were negative during both hospitalizations at [facility]. 
 
 
COVID Questions:  
Experiencing any of the following symptoms: fever, chills, cough, SOB, diarrhea, URI symptoms. NO Any Sick contacts with fever, cough, diarrhea, SOB, URI symptoms. NO Traveled out of state or out of country. NO Lives at group home. Has been staying at home.  YES, except for recent hospitalizations at [facility] 
 
In the ED: 
Vitals: Temp 97.5   /111      RR 35   SatO2  95% on RA Labs: WBC 12.5, Hgb 10.1 (BL 13), Plt 584, Na 130, Cr 1.24 (BL 0.5), , AST 60, T protien 9.8, Globulin 6.8, Alb 3, Trop <0.05, Mg 2.8 (inc), Phenytoin 34, INR 1, PT 10.4 Imaging: CT head chronic encephalomalacia in L&R frontal lobe and R parietal lobe. CXR tracheostomy tube in position, unchanged diffuse interstitial prominence (likely edema) > rpt CXR no sig change. Treatment: Keppra 750mg x1, Ativan 5mg total (ED & EMS), Versed 2mg, Propofol > Versed gtt, NS 2L bolus EKG: NA 
 
Patient Vitals for the past 12 hrs: 
 Temp Pulse Resp BP SpO2  
03/30/21 2245  (!) 119 14 (!) 65/52 92 % 03/30/21 2238  (!) 117 14  100 % 03/30/21 2025 97.5 °F (36.4 °C) (!) 119 (!) 35 (!) 143/111 95 % Review of Systems Review of Systems Unable to perform ROS: Patient nonverbal  
 
 
Home Medications Prior to Admission medications Medication Sig Start Date End Date Taking? Authorizing Provider  
polyethylene glycol (MIRALAX) 17 gram/dose powder D/c med 3/23/21   Abdirahman Castro H, DO  
atorvastatin (LIPITOR) 10 mg tablet Discontinue atorvastatin 2/4/21   Sidney Sawyer, DO  
acetaminophen (TYLENOL) 500 mg tablet Take 1 Tab by mouth every six (6) hours as needed for Pain. 2/3/21   Sidney Sawyer, DO  
melatonin 3 mg tablet 1 Tab by Per G Tube route nightly as needed for Insomnia. Must give by 10pm 10/8/20   Sidney Sawyer DO  
atropine 1 % ophthalmic solution 1 Drop three (3) times daily. Provider, Historical  
diazePAM (VALIUM) 5-7.5-10 mg kit Insert 10 mg into rectum as needed (for seizures that last longer than 5 minutes). Provider, Historical  
OTHER,NON-FORMULARY, 1 Can by Per G Tube route five (5) times daily. Osmolite 1 can 5 times per day (at 0800, 1100, 1400, 1700, and 2000)    Provider, Historical  
cetirizine (ZYRTEC) 10 mg tablet Take 1 Tab by mouth daily.  5/1/20   Sidney Sawyer DO  
levETIRAcetam (KEPPRA) 100 mg/mL solution 500 mg by Per G Tube route every twelve (12) hours. Provider, Historical  
albuterol-ipratropium (DUO-NEB) 2.5 mg-0.5 mg/3 ml nebu 3 mL by Nebulization route every four (4) hours as needed. Provider, Historical  
phenytoin (DILANTIN) 100 mg/4 mL susp oral suspension 125 mg by PEG Tube route every twelve (12) hours. 3/17/20   Rupesh Daniel H, DO  
aspirin 81 mg chewable tablet Take 81 mg by mouth daily. Provider, Historical  
 
 
Allergies Allergies Allergen Reactions  Depakote [Divalproex] Swelling Past Medical History Past Medical History:  
Diagnosis Date  Down syndrome  History of vascular access device 06/30/2020  
 4 Fr midline, 10 cm L brachial, poor access  Seizures ([facility] [address]  Sleep apnea   
 humidifier and oxygen w/trach  Stroke [facility]) 2019 \"old stroke seen on CT\" Previous Hospitalization(s) Past Surgical History:  
Procedure Laterality Date  COLONOSCOPY N/A 7/8/2020 COLONOSCOPY with fecal transplant (consents in red file) performed by Moncho Rob MD at [address] HX GI    
 gtube 2/2020  HX OTHER SURGICAL    
 tracheostomy 2/2020 Family History Family History Family history unknown: Yes  
 
 
Social History Alcohol history: Not at all Smoking history: Non-smoker Illicit drug history: Not at all Living arrangement: patient lives in group home Ambulates: NA Vital Signs Visit Vitals BP (!) 65/52 Pulse (!) 119 Temp 97.5 °F (36.4 °C) Resp 14 Ht 4' 9\" (1.448 m) Wt 120 lb 5.9 oz (54.6 kg) SpO2 92% BMI 26.05 kg/m² Physical Exam 
   
General No acute distress. HENT Head Normocephalic and atraumatic. Eyes 3mm equal pupils, sluggish. Nose Nose normal, clear turbinates. Throat  Trach tube in place Cardio Tachycardia, regular rhythm. Exam reveals no gallop and no friction rub. No murmur heard. No chest wall tenderness. Pulmonary Effort normal and breath sounds normal. No respiratory distress.   
No wheezes, no rales. Abdominal Soft. Bowel sounds normal. No distension. No tenderness. PEG tube in place  Deferred. Extremities Musculoskeletal:    
 No LE edema. Cap refill <2sec. Full ROM in all extremities Neurological Sedated. Inc tone in all extremities. Dermatology Skin is warm and dry. No rash noted. No erythema or pallor. Psychiatric Unable to acces Laboratory Data Recent Results (from the past 8 hour(s)) GLUCOSE, POC Collection Time: 03/30/21  8:36 PM  
Result Value Ref Range Glucose (POC) 77 65 - 100 mg/dL Performed by [address] [place] [address] Collection Time: 03/30/21  9:19 PM  
Result Value Ref Range SAMPLES BEING HELD 1SST,1RED COMMENT Add-on orders for these samples will be processed based on acceptable specimen integrity and analyte stability, which may vary by analyte. CBC WITH AUTOMATED DIFF Collection Time: 03/30/21  9:19 PM  
Result Value Ref Range WBC 12.5 (H) 3.6 - 11.0 K/uL  
 RBC 3.19 (L) 3.80 - 5.20 M/uL  
 HGB 10.1 (L) 11.5 - 16.0 g/dL HCT 29.9 (L) 35.0 - 47.0 % MCV 93.7 80.0 - 99.0 FL  
 MCH 31.7 26.0 - 34.0 PG  
 MCHC 33.8 30.0 - 36.5 g/dL  
 RDW 14.5 11.5 - 14.5 % PLATELET 645 (H) 010 - 400 K/uL MPV 10.2 8.9 - 12.9 FL  
 NRBC 0.0 0  WBC ABSOLUTE NRBC 0.00 0.00 - 0.01 K/uL NEUTROPHILS 72 32 - 75 % LYMPHOCYTES 20 12 - 49 % MONOCYTES 8 5 - 13 % EOSINOPHILS 0 0 - 7 % BASOPHILS 0 0 - 1 % IMMATURE GRANULOCYTES 0 0.0 - 0.5 % ABS. NEUTROPHILS 8.9 (H) 1.8 - 8.0 K/UL  
 ABS. LYMPHOCYTES 2.5 0.8 - 3.5 K/UL  
 ABS. MONOCYTES 1.0 0.0 - 1.0 K/UL  
 ABS. EOSINOPHILS 0.0 0.0 - 0.4 K/UL  
 ABS. BASOPHILS 0.0 0.0 - 0.1 K/UL  
 ABS. IMM. GRANS. 0.0 0.00 - 0.04 K/UL  
 DF AUTOMATED METABOLIC PANEL, COMPREHENSIVE Collection Time: 03/30/21  9:19 PM  
Result Value Ref Range Sodium 130 (L) 136 - 145 mmol/L Potassium 4.9 3.5 - 5.1 mmol/L  Chloride 95 (L) 97 - 108 mmol/L  
 CO2 24 21 - 32 mmol/L Anion gap 11 5 - 15 mmol/L Glucose 71 65 - 100 mg/dL BUN 47 (H) 6 - 20 MG/DL Creatinine 1.24 (H) 0.55 - 1.02 MG/DL  
 BUN/Creatinine ratio 38 (H) 12 - 20 GFR est AA 55 (L) >60 ml/min/1.73m2 GFR est non-AA 46 (L) >60 ml/min/1.73m2 Calcium 9.7 8.5 - 10.1 MG/DL Bilirubin, total 0.2 0.2 - 1.0 MG/DL  
 ALT (SGPT) 36 12 - 78 U/L  
 AST (SGOT) 60 (H) 15 - 37 U/L Alk. phosphatase 214 (H) 45 - 117 U/L Protein, total 9.8 (H) 6.4 - 8.2 g/dL Albumin 3.0 (L) 3.5 - 5.0 g/dL Globulin 6.8 (H) 2.0 - 4.0 g/dL A-G Ratio 0.4 (L) 1.1 - 2.2    
TROPONIN I Collection Time: 03/30/21  9:19 PM  
Result Value Ref Range Troponin-I, Qt. <0.05 <0.05 ng/mL MAGNESIUM Collection Time: 03/30/21  9:19 PM  
Result Value Ref Range Magnesium 2.8 (H) 1.6 - 2.4 mg/dL PROTHROMBIN TIME + INR Collection Time: 03/30/21  9:19 PM  
Result Value Ref Range INR 1.0 0.9 - 1.1 Prothrombin time 10.4 9.0 - 11.1 sec PHENYTOIN Collection Time: 03/30/21  9:19 PM  
Result Value Ref Range Phenytoin 34.0 (HH) 10.0 - 20.0 ug/mL Imaging CXR Results  (Last 48 hours) 03/30/21 2238  XR CHEST PORT Final result Impression:  Tracheostomy tube in appropriate position. Unchanged diffuse interstitial  
prominence likely related to edema. Narrative:  EXAM: XR CHEST PORT  
   
HISTORY: trach exchange. COMPARISON: 3/30/2021 FINDINGS: Portable AP. A tracheostomy tube is in place. The heart is normal  
size. There is diffuse interstitial prominence again seen likely related to  
edema. No pleural effusion or pneumothorax.   
   
  
 03/30/21 2112  XR CHEST PORT Final result Impression:  No significant interval change. Narrative:  EXAM: XR CHEST PORT  
   
HISTORY: Chest pain. COMPARISON: 4/1/2020 FINDINGS: Portable AP. A tracheostomy tube is in place. The heart is normal  
size.  There is unchanged diffuse interstitial prominence likely related to edema. No focal consolidation, pleural effusion, or pneumothorax. CT Results  (Last 48 hours) 03/30/21 2132  CT HEAD WO CONT Final result Impression:  Significant chronic encephalomalacia in the right frontal lobe, right parietal  
lobe, and left frontal lobe. Narrative:  EXAM: CT HEAD WO CONT INDICATION: seizures COMPARISON: None. CONTRAST: None. TECHNIQUE: Unenhanced CT of the head was performed using 5 mm images. Brain and  
bone windows were generated. Coronal and sagittal reformats. CT dose reduction  
was achieved through use of a standardized protocol tailored for this  
examination and automatic exposure control for dose modulation. FINDINGS:  
There is an incidental cavum septum pellucidum. There is mild atrophy and  
compensatory dilatation of the ventricles. Areas of chronic encephalomalacia are  
seen in the right frontal lobe, right parietal lobe, and left frontal lobe. There are incidental bilateral basal ganglia calcifications. There is no  
intracranial hemorrhage, extra-axial collection, or mass effect. The basilar  
cisterns are open. No CT evidence of acute infarct. The bone windows demonstrate no abnormalities. The visualized portions of the  
paranasal sinuses and mastoid air cells are clear. Assessment and Plan Maria Luisa Sanders is a 49 y.o. female with a PMHx of Down Syndrome (non-verbal at baseline), S/p Trach and PEG tube, seizure, encephalomalacia, ANNE, h/o recurrent Cdiff colitis who is admitted for seizures. Status epilepticus: CT head no acute abnormality, chronic encephalomalacia in L&R frontal lobe and R parietal lobe. Supra therapeutic phenytoin level at 34. S/p Keppra 750mg x1, Ativan 5mg, Versed 2mg and started on Propofol > Versed gtt. Home Keppra 500mg BID and Phenytoin 125mg BID. F/w Dr. Sean Thayer (Neuro). - Admit to ICU 
- VS per unit routine - Continue Versed gtt - Mechanical ventilation though chornic trach tube, wean when more awake - Keppra 750mg BID  
- STAT EEG 
- Phosphorus, Keppra level - BCx paired - F/u UA, EKG 
- Daily Phenytoin levels while holding phenytoin 
- HOLD home Phenytoin 125mg BID 
- Ativan 2mg q5min prn for seizure, max 3 doses 
- Neurology consult - Intensivist consult 
- Daily CBC, CMP 
- Seizure and fall precautions Hypotension: POA hypertensive (/111), but BP dropped to 65/52 with Propofol. BP responding to fluid bolus. - Receiving 2L NS bolus - Continue to monitor and low threshold to start pressors 3/4 SIRS: Likely 2/2 seizure. POA WBC 12.5, , RR 35. Imaging (CXR) and symptoms prior to seizure not suggestive of infection.  
- F/u UA, BCx VICK: POA Cr 1.24 (BL 0.5) - Receiving 2L NS bolus - Monitor on daily labs 
- HOLD home Torsemide 40mg Hyponatremia: POA Na 130.  
- Monitor on daily labs Hyperphosphatemia: POA P 5.6.  
- Monitor on daily labs Thrombocytosis: POA plt 584. - Monitor on daily labs COVID PUI: Resides in group home. Recent neg covid tests at [facility].  
- F/u rapid COVID 
- Consider COVID PCR 
- Droplet plus  
  
H/o ANNE: POA Hgb 10.1 (BL 13). Not on home iron. Recently hospitalized in March 2021 for GI bleed at [facility].   
- Monitor on daily labs H/o GI bleed: in 3/2021 at [facility].  EGD & Colonoscopy revealed gastritis and colitis. - Protonix 40mg daily Elevated AP, AST in setting of hepatic steatosis: Chronic. POA , AST 60.  
- Monitor on daily labs  
  
S/p Trach:  
- Routine care  
  
S/p PEG-tube: Home tube feedings.  
- NPO d/t seizures Chronic edema: Home Torsemide 40mg daily. F/w Nephrology. 
- HOLD home Torsemide d/t VICK GERD: stable. No home meds H/o Cdiff colitis: S/p fecal transplant.  
- Enteric precuations FEN/GI - NPO Activity - Activity w/assitance DVT prophylaxis - Lovenox GI prophylaxis - Not indicated at this time Fall prophylaxis - Fall precautions ordered Disposition - Admit to ICU. Plan to d/c to Group Home. Code Status - Full. Discussed with Caregiver Next of Karen Grande Name and [id number], mother . Kristina Swan, group home . Patient seen and examined with Dr. Xavier Headings. Patient Nonda Lines will be discussed with Dr. Favian Burrell. Allison Berry MD 
Family Medicine Resident For Billing Chief Complaint Patient presents with  Seizure Hospital Problems  Date Reviewed: 2/3/2021 Codes Class Noted POA Status epilepticus ([facility] [address] ICD-10-CM: Alondra Abbie ICD-9-CM: 345.3  3/30/2021 Unknown

## 2021-03-31 NOTE — ED TRIAGE NOTES
Pt comes from group home with complaint of seizure x 5 times today. Per EMS pt seizures lasting about 1 minute each. 2 witnessed seizures by EMS last 15 seconds each.

## 2021-03-31 NOTE — ED PROVIDER NOTES
Chief Complaint Patient presents with  Seizure The patient is nonverbal at baseline limiting the history and review of systems. This is a 61-year-old female with a history of Down syndrome, status post tracheostomy and PEG tube placement, seizure disorder for which she uses Dilantin and Keppra, presenting from a group home for recurrent seizures increasing in frequency over the last 3 to 4 days. Her caregiver reports that she had 5 generalized seizures today, was stiff throughout her body and had a right-sided gaze preference. Prior to the last several days she had not had a seizure in some time. Her neurologist actually instructed staff at the facility to hold her Dilantin until tomorrow as she was supratherapeutic on it, she had her morning dose of Keppra but has not yet had her evening dose. Apparently she had 2 additional generalized seizures while being transported by EMS. 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 She was hospitalized within the past year at [facility] for status epilepticus apparently for several months which is what prompted the tracheostomy. Patient is severe in nature without alleviating or exacerbating factors. Past Medical History:  
Diagnosis Date  Down syndrome  History of vascular access device 06/30/2020  
 4 Fr midline, 10 cm L brachial, poor access  Seizures ([address]  Sleep apnea   
 humidifier and oxygen w/trach  Stroke [facility]) 2019 \"old stroke seen on CT\" Past Surgical History:  
Procedure Laterality Date  COLONOSCOPY N/A 7/8/2020 COLONOSCOPY with fecal transplant (consents in red file) performed by Slava Fortune MD at [address] [facility] HX GI    
 gtube 2/2020  HX OTHER SURGICAL tracheostomy 2/2020 Family History:  
Family history unknown: Yes  
 
 
Social History Socioeconomic History  Marital status: SINGLE Spouse name: Not on file  Number of children: Not on file  Years of education: Not on file  Highest education level: Not on file Occupational History  Not on file Social Needs  Financial resource strain: Not on file  Food insecurity Worry: Not on file Inability: Not on file  Transportation needs Medical: Not on file Non-medical: Not on file Tobacco Use  Smoking status: Never Smoker  Smokeless tobacco: Never Used Substance and Sexual Activity  Alcohol use: Never Frequency: Never  Drug use: Never  Sexual activity: Never Lifestyle  Physical activity Days per week: Not on file Minutes per session: Not on file  Stress: Not on file Relationships  Social connections Talks on phone: Not on file Gets together: Not on file Attends [employer]ism service: Not on file Active member of club or organization: Not on file Attends meetings of clubs or organizations: Not on file Relationship status: Not on file  Intimate partner violence Fear of current or ex partner: Not on file Emotionally abused: Not on file Physically abused: Not on file Forced sexual activity: Not on file Other Topics Concern [address] Service Not Asked  Blood Transfusions Not Asked  Caffeine Concern Not Asked  Occupational Exposure Not Asked Amandeep Caul Hazards Not Asked  Sleep Concern Not Asked  Stress Concern Not Asked  Weight Concern Not Asked  Special Diet Not Asked  Back Care Not Asked  Exercise Not Asked  Bike Helmet Not Asked  Seat Belt Not Asked  Self-Exams Not Asked Social History Narrative ** Merged History Encounter ** ALLERGIES: Depakote [divalproex] Review of Systems Unable to perform ROS: Patient nonverbal  
  
 
 Vitals:  
 03/30/21 2025 03/30/21 2238 BP: (!) 143/111 Pulse: (!) 119 (!) 117 Resp: (!) 35 14 Temp: 97.5 °F (36.4 °C) SpO2: 95% 100% Weight: 54.6 kg (120 lb 5.9 oz) Height: 4' 9\" (1.448 m) Physical Exam 
General:  Awake, eyes are open, nonverbal and not able to follow commands, no gaze deviation HEENT:  NC/AT, equal pupils, moist mucous membranes Neck:   +Tracheostomy in place Cardiac:  +Tachycardic, regular rhythm, no murmurs Respiratory:  Clear bilaterally, no wheezes, rales, rhonchi Abdomen:  Soft and nondistended, PEG tube in place, site is clean/dry/intact Extremities: Warm and well perfused, no peripheral edema Neuro:  +Increased motor tone in the upper and lower extremities Skin:   No rashes or pallor RESULTS Recent Results (from the past 12 hour(s)) GLUCOSE, POC Collection Time: 03/30/21  8:36 PM  
Result Value Ref Range Glucose (POC) 77 65 - 100 mg/dL Performed by [address] [place] [address] Collection Time: 03/30/21  9:19 PM  
Result Value Ref Range SAMPLES BEING HELD 1SST,1RED COMMENT Add-on orders for these samples will be processed based on acceptable specimen integrity and analyte stability, which may vary by analyte. CBC WITH AUTOMATED DIFF Collection Time: 03/30/21  9:19 PM  
Result Value Ref Range WBC 12.5 (H) 3.6 - 11.0 K/uL  
 RBC 3.19 (L) 3.80 - 5.20 M/uL  
 HGB 10.1 (L) 11.5 - 16.0 g/dL HCT 29.9 (L) 35.0 - 47.0 % MCV 93.7 80.0 - 99.0 FL  
 MCH 31.7 26.0 - 34.0 PG  
 MCHC 33.8 30.0 - 36.5 g/dL  
 RDW 14.5 11.5 - 14.5 % PLATELET 749 (H) 876 - 400 K/uL MPV 10.2 8.9 - 12.9 FL  
 NRBC 0.0 0  WBC ABSOLUTE NRBC 0.00 0.00 - 0.01 K/uL NEUTROPHILS 72 32 - 75 % LYMPHOCYTES 20 12 - 49 % MONOCYTES 8 5 - 13 % EOSINOPHILS 0 0 - 7 % BASOPHILS 0 0 - 1 % IMMATURE GRANULOCYTES 0 0.0 - 0.5 % ABS. NEUTROPHILS 8.9 (H) 1.8 - 8.0 K/UL  
 ABS. LYMPHOCYTES 2.5 0.8 - 3.5 K/UL  
 ABS.  MONOCYTES 1.0 0.0 - 1.0 K/UL  
 ABS. EOSINOPHILS 0.0 0.0 - 0.4 K/UL  
 ABS. BASOPHILS 0.0 0.0 - 0.1 K/UL  
 ABS. IMM. GRANS. 0.0 0.00 - 0.04 K/UL  
 DF AUTOMATED METABOLIC PANEL, COMPREHENSIVE Collection Time: 03/30/21  9:19 PM  
Result Value Ref Range Sodium 130 (L) 136 - 145 mmol/L Potassium 4.9 3.5 - 5.1 mmol/L Chloride 95 (L) 97 - 108 mmol/L  
 CO2 24 21 - 32 mmol/L Anion gap 11 5 - 15 mmol/L Glucose 71 65 - 100 mg/dL BUN 47 (H) 6 - 20 MG/DL Creatinine 1.24 (H) 0.55 - 1.02 MG/DL  
 BUN/Creatinine ratio 38 (H) 12 - 20 GFR est AA 55 (L) >60 ml/min/1.73m2 GFR est non-AA 46 (L) >60 ml/min/1.73m2 Calcium 9.7 8.5 - 10.1 MG/DL Bilirubin, total 0.2 0.2 - 1.0 MG/DL  
 ALT (SGPT) 36 12 - 78 U/L  
 AST (SGOT) 60 (H) 15 - 37 U/L Alk. phosphatase 214 (H) 45 - 117 U/L Protein, total 9.8 (H) 6.4 - 8.2 g/dL Albumin 3.0 (L) 3.5 - 5.0 g/dL Globulin 6.8 (H) 2.0 - 4.0 g/dL A-G Ratio 0.4 (L) 1.1 - 2.2    
TROPONIN I Collection Time: 03/30/21  9:19 PM  
Result Value Ref Range Troponin-I, Qt. <0.05 <0.05 ng/mL MAGNESIUM Collection Time: 03/30/21  9:19 PM  
Result Value Ref Range Magnesium 2.8 (H) 1.6 - 2.4 mg/dL PROTHROMBIN TIME + INR Collection Time: 03/30/21  9:19 PM  
Result Value Ref Range INR 1.0 0.9 - 1.1 Prothrombin time 10.4 9.0 - 11.1 sec PHENYTOIN Collection Time: 03/30/21  9:19 PM  
Result Value Ref Range Phenytoin 34.0 (HH) 10.0 - 20.0 ug/mL IMAGING Ct Head Wo Cont Result Date: 3/30/2021 Significant chronic encephalomalacia in the right frontal lobe, right parietal lobe, and left frontal lobe. Xr Chest [facility] Result Date: 3/30/2021 Tracheostomy tube in appropriate position. Unchanged diffuse interstitial prominence likely related to edema. Xr Chest [facility] Result Date: 3/30/2021 No significant interval change. Critical Care Performed by: Rosy Palacio MD 
Authorized by: Rosy Palacio MD  
 
Critical care provider statement:  
  Critical care time (minutes):  80 
  Critical care time was exclusive of:  Separately billable procedures and treating other patients Critical care was necessary to treat or prevent imminent or life-threatening deterioration of the following conditions:  Respiratory failure and CNS failure or compromise Critical care was time spent personally by me on the following activities:  Discussions with consultants, evaluation of patient's response to treatment, examination of patient, ordering and performing treatments and interventions, ordering and review of laboratory studies, ordering and review of radiographic studies, pulse oximetry and re-evaluation of patient's condition Central Line 
 
Date/Time: 3/31/2021 12:24 AM 
Performed by: Bing Horn MD 
Authorized by: Bing Horn MD  
 
Consent:  
  Consent obtained:  Emergent situation Pre-procedure details:  
  Hand hygiene: Hand hygiene performed prior to insertion Sterile barrier technique: All elements of maximal sterile technique followed Skin preparation:  ChloraPrep Skin preparation agent: Skin preparation agent completely dried prior to procedure Anesthesia (see MAR for exact dosages): Anesthesia method:  Local infiltration Local anesthetic:  Lidocaine 1% w/o epi Procedure details: Location:  R femoral 
  Site selection rationale:  Tracheostomy tube prevents safe placement of the catheter in the internal jugular vein Catheter size:  7 Fr Landmarks identified: yes Ultrasound guidance: yes Sterile ultrasound techniques: Sterile gel and sterile probe covers were used Number of attempts:  1 Successful placement: yes Post-procedure details: Post-procedure:  Dressing applied and line sutured Assessment:  Blood return through all ports Patient tolerance of procedure: Tolerated well, no immediate complications ED course: Labs, EKG and imaging reviewed.   Continued to have bodywide stiffening with heart rate in the 140's-150's, suspect continued focal seizure on arrival.  No gaze preference and she does have withdrawal to noxious stimuli. After administering IV Ativan 3 mg and Versed 2 mg, and loading with Keppra 750 mg IV per her home AED regimen, she continued to be tachycardic with increased motor tone throughout, suspicion was for status epilepticus and so her trach was exchanged for a cuffed trach and she was placed on the ventilator, a Versed infusion was initiated as well rather than propofol in the setting of worsening hypotension refractory to fluid resuscitation. I placed a femoral line as per the note above. Her phenytoin level does appear to be supratherapeutic. CT head shows widespread encephalomalacia but no hemorrhage or mass. No clear source of bacterial infection that would lower her seizure threshold. No clear metabolic trigger based on my review of her lab work. Will admit to the family medicine service for continued management, she needs a stat EEG and neurology evaluation in the AM.  Discussed with teleneurology Regina Carrasco) who agrees with management, also suggests that if she is having recurrent seizures to consider Depakote. Admitted to the ICU. Pankajist Mariann Serve for Admission 10:58 PM 
 
ED Room Number:   ER03/03 Patient Name and age:  Halie Blush 49 y.o.  female Working Diagnosis: 1. Status epilepticus ([address] COVID suspicion:   Other(coming from group home, needs screening) Code Status:    Full Code Readmission:    no 
Isolation Requirements:  no 
Recommended Level of Care: ICU Department:    [facility] ED - [phone] Other:     Hx Down syndrome status post trach/PEG, received Ativan for suspected seizure, stiffening of the upper and lower extremities but no gaze deviation, HR in the 140's. Now has cuffed trach after exchange and is vented on a propofol infusion. Dilantin level supratherapeutic.   Received Keppra 750 mg IV which she was due for. Needs stat EEG and neurology evaluation in the AM.  Discussed with teleneurology who agrees with management, if having recurrent seizures consider Depakote.

## 2021-03-31 NOTE — CONSULTS
Consult Date: 3/31/2021 Consults Asked to see pt by Dr. Twyla Galaviz for mgmt of status Subjective 48 y/o with hx Downs, seizures, recurrent c-diff and HLD admitted with  Seizures. Per caregiver, pt with breakthrough seizures over 3 days prior to admission with roughly 5 on day of admission. Day prior to admission pt found to have supratheraputic phenytoin level by neruo and was instructed to hold phenytoin dose. In ED pt given 750mg keppra and 5 of ativan and admitted to ICU. Past Medical History:  
Diagnosis Date  Down syndrome  History of vascular access device 06/30/2020  
 4 Fr midline, 10 cm L brachial, poor access  Seizures ([address]  Sleep apnea   
 humidifier and oxygen w/trach  Stroke [facility]) 2019 \"old stroke seen on CT\" Past Surgical History:  
Procedure Laterality Date  COLONOSCOPY N/A 7/8/2020 COLONOSCOPY with fecal transplant (consents in red file) performed by Ashok Jensen MD at [address] [facility] HX GI    
 gtube 2/2020  HX OTHER SURGICAL    
 tracheostomy 2/2020 Family History Family history unknown: Yes  
  
Social History Tobacco Use  Smoking status: Never Smoker  Smokeless tobacco: Never Used Substance Use Topics  Alcohol use: Never Frequency: Never Current Facility-Administered Medications Medication Dose Route Frequency Provider Last Rate Last Admin  sodium chloride (NS) flush 5-40 mL  5-40 mL IntraVENous Q8H Dahlia Haider MD      
 sodium chloride (NS) flush 5-40 mL  5-40 mL IntraVENous PRN Dahlia Haider MD      
 acetaminophen (TYLENOL) tablet 650 mg  650 mg Oral Q6H PRN Dahlia Haider MD      
 Or  
 acetaminophen (TYLENOL) suppository 650 mg  650 mg Rectal Q6H PRN Dahlia Haider MD      
 enoxaparin (LOVENOX) injection 40 mg  40 mg SubCUTAneous DAILY Dahlia Haider MD      
 LORazepam (ATIVAN) injection 2 mg  2 mg IntraVENous Q5MIN PRN Ford Dallas MD      
 sodium chloride (NS) flush 5-40 mL 5-40 mL IntraVENous Q8H Redell Saint, MD      
 sodium chloride (NS) flush 5-40 mL  5-40 mL IntraVENous PRN Redell Saint, MD      
 midazolam in normal saline (VERSED) 1 mg/mL infusion  1.1 mg/hr IntraVENous CONTINUOUS Liz Correia MD 1.1 mL/hr at 03/31/21 0121 1.1 mg/hr at 03/31/21 0121  
 NOREPINephrine (LEVOPHED) 8 mg in 5% dextrose 250mL (32 mcg/mL) infusion  0.5-16 mcg/min IntraVENous TITRATE Redell Saint, MD 7.5 mL/hr at 03/31/21 0150 4 mcg/min at 03/31/21 0150  propofol (DIPRIVAN) 10 mg/mL infusion  0-50 mcg/kg/min IntraVENous TITRATE Liz Correia MD 6.6 mL/hr at 03/30/21 2244 20 mcg/kg/min at 03/30/21 2244  sodium chloride 0.9 % bolus infusion 1,000 mL  1,000 mL IntraVENous Stephenie Currie MD      
  
 
Review of Systems Non-verbal and unable to obtain Objective Vital signs for last 24 hours: 
Visit Vitals BP (!) 102/91 Pulse (!) 121 Temp 97.5 °F (36.4 °C) Resp 14 Ht 4' 9\" (1.448 m) Wt 54.6 kg (120 lb 5.9 oz) SpO2 100% BMI 26.05 kg/m² PE: 
Gen: sedated on vent HEENT: trach in place Chest: symmetrical chest rise CV: r/r/r Abd: mildly distended, somewhat firm per RN with diminished BS Ext: contractures of b/l UE/LE Neuro: non-verbal at baseline. Sedated. Not following commands or tracking. Intake/Output this shift: 
Current Shift: No intake/output data recorded. Last 3 Shifts: No intake/output data recorded. Data Review:  
Recent Results (from the past 24 hour(s)) GLUCOSE, POC Collection Time: 03/30/21  8:36 PM  
Result Value Ref Range Glucose (POC) 77 65 - 100 mg/dL Performed by BARRETT DOMINICK   
EKG, 12 LEAD, INITIAL Collection Time: 03/30/21  8:50 PM  
Result Value Ref Range Ventricular Rate 122 BPM  
 Atrial Rate 122 BPM  
 P-R Interval 134 ms QRS Duration 70 ms Q-T Interval 322 ms QTC Calculation (Poolet) 458 ms Calculated P Axis 54 degrees Calculated R Axis 59 degrees Calculated T Axis 46 degrees Diagnosis Sinus tachycardia Possible Left atrial enlargement Borderline ECG No previous ECGs available SAMPLES BEING HELD Collection Time: 03/30/21  9:19 PM  
Result Value Ref Range SAMPLES BEING HELD 1SST,1RED COMMENT Add-on orders for these samples will be processed based on acceptable specimen integrity and analyte stability, which may vary by analyte. CBC WITH AUTOMATED DIFF Collection Time: 03/30/21  9:19 PM  
Result Value Ref Range WBC 12.5 (H) 3.6 - 11.0 K/uL  
 RBC 3.19 (L) 3.80 - 5.20 M/uL  
 HGB 10.1 (L) 11.5 - 16.0 g/dL HCT 29.9 (L) 35.0 - 47.0 % MCV 93.7 80.0 - 99.0 FL  
 MCH 31.7 26.0 - 34.0 PG  
 MCHC 33.8 30.0 - 36.5 g/dL  
 RDW 14.5 11.5 - 14.5 % PLATELET 358 (H) 187 - 400 K/uL MPV 10.2 8.9 - 12.9 FL  
 NRBC 0.0 0  WBC ABSOLUTE NRBC 0.00 0.00 - 0.01 K/uL NEUTROPHILS 72 32 - 75 % LYMPHOCYTES 20 12 - 49 % MONOCYTES 8 5 - 13 % EOSINOPHILS 0 0 - 7 % BASOPHILS 0 0 - 1 % IMMATURE GRANULOCYTES 0 0.0 - 0.5 % ABS. NEUTROPHILS 8.9 (H) 1.8 - 8.0 K/UL  
 ABS. LYMPHOCYTES 2.5 0.8 - 3.5 K/UL  
 ABS. MONOCYTES 1.0 0.0 - 1.0 K/UL  
 ABS. EOSINOPHILS 0.0 0.0 - 0.4 K/UL  
 ABS. BASOPHILS 0.0 0.0 - 0.1 K/UL  
 ABS. IMM. GRANS. 0.0 0.00 - 0.04 K/UL  
 DF AUTOMATED METABOLIC PANEL, COMPREHENSIVE Collection Time: 03/30/21  9:19 PM  
Result Value Ref Range Sodium 130 (L) 136 - 145 mmol/L Potassium 4.9 3.5 - 5.1 mmol/L Chloride 95 (L) 97 - 108 mmol/L  
 CO2 24 21 - 32 mmol/L Anion gap 11 5 - 15 mmol/L Glucose 71 65 - 100 mg/dL BUN 47 (H) 6 - 20 MG/DL Creatinine 1.24 (H) 0.55 - 1.02 MG/DL  
 BUN/Creatinine ratio 38 (H) 12 - 20 GFR est AA 55 (L) >60 ml/min/1.73m2 GFR est non-AA 46 (L) >60 ml/min/1.73m2 Calcium 9.7 8.5 - 10.1 MG/DL Bilirubin, total 0.2 0.2 - 1.0 MG/DL  
 ALT (SGPT) 36 12 - 78 U/L  
 AST (SGOT) 60 (H) 15 - 37 U/L Alk. phosphatase 214 (H) 45 - 117 U/L  Protein, total 9.8 (H) 6.4 - 8.2 g/dL  
 Albumin 3.0 (L) 3.5 - 5.0 g/dL Globulin 6.8 (H) 2.0 - 4.0 g/dL A-G Ratio 0.4 (L) 1.1 - 2.2    
TROPONIN I Collection Time: 03/30/21  9:19 PM  
Result Value Ref Range Troponin-I, Qt. <0.05 <0.05 ng/mL MAGNESIUM Collection Time: 03/30/21  9:19 PM  
Result Value Ref Range Magnesium 2.8 (H) 1.6 - 2.4 mg/dL PROTHROMBIN TIME + INR Collection Time: 03/30/21  9:19 PM  
Result Value Ref Range INR 1.0 0.9 - 1.1 Prothrombin time 10.4 9.0 - 11.1 sec PHENYTOIN Collection Time: 03/30/21  9:19 PM  
Result Value Ref Range Phenytoin 34.0 (HH) 10.0 - 20.0 ug/mL PHOSPHORUS Collection Time: 03/30/21  9:19 PM  
Result Value Ref Range Phosphorus 5.6 (H) 2.6 - 4.7 MG/DL  
BLOOD GAS, ARTERIAL Collection Time: 03/31/21 12:25 AM  
Result Value Ref Range pH 7.45 7.35 - 7.45    
 PCO2 36 35 - 45 mmHg PO2 114 (H) 80 - 100 mmHg O2 SAT 98 (H) 92 - 97 % BICARBONATE 24 22 - 26 mmol/L  
 BASE EXCESS 0.7 mmol/L  
 O2 METHOD VENT    
 FIO2 40 % MODE ASSIST CONTROL Tidal volume 400.0 SET RATE 14 PEEP/CPAP 8.0 Sample source ARTERIAL    
 SITE RIGHT RADIAL DOMENICA'S TEST NO    
CBC WITH AUTOMATED DIFF Collection Time: 03/31/21  1:05 AM  
Result Value Ref Range WBC 9.3 3.6 - 11.0 K/uL  
 RBC 2.56 (L) 3.80 - 5.20 M/uL HGB 8.0 (L) 11.5 - 16.0 g/dL HCT 24.3 (L) 35.0 - 47.0 % MCV 94.9 80.0 - 99.0 FL  
 MCH 31.3 26.0 - 34.0 PG  
 MCHC 32.9 30.0 - 36.5 g/dL  
 RDW 14.4 11.5 - 14.5 % PLATELET 381 (H) 764 - 400 K/uL MPV 9.9 8.9 - 12.9 FL  
 NRBC 0.0 0  WBC ABSOLUTE NRBC 0.00 0.00 - 0.01 K/uL NEUTROPHILS 80 (H) 32 - 75 % LYMPHOCYTES 15 12 - 49 % MONOCYTES 5 5 - 13 % EOSINOPHILS 0 0 - 7 % BASOPHILS 0 0 - 1 % IMMATURE GRANULOCYTES 0 0.0 - 0.5 % ABS. NEUTROPHILS 7.3 1.8 - 8.0 K/UL  
 ABS. LYMPHOCYTES 1.4 0.8 - 3.5 K/UL  
 ABS. MONOCYTES 0.5 0.0 - 1.0 K/UL  
 ABS. EOSINOPHILS 0.0 0.0 - 0.4 K/UL  
 ABS.  BASOPHILS 0.0 0.0 - 0.1 K/UL  
 ABS. IMM. GRANS. 0.0 0.00 - 0.04 K/UL  
 DF AUTOMATED    
SARS-COV-2 Collection Time: 03/31/21  1:05 AM  
Result Value Ref Range SARS-CoV-2 Please find results under separate order A/P: 
49 with pmh Downs, c-diff and seizures admitted with status. Status Epilepticus: 
-- appreciate neuro assistance -- propofol use limited by hypotension, now on versed 
-- unable to get STAT EEG at this time at night, will need to get first thing in am 
-- does not appear that pt having ongoing seizures. Seizures were witnessed and not subclinical.   
-- further AED mgmt per neuro 
-- infectious w/u ongoing to r/o as etiology for seizures. Hypotension: 
-- suspect sedation related -- pressors for MAP >65 
-- no indication of new infection or cardiac event. Acute Resp Failure/Trach Dependence: -- pt trach'd at baseline but not on vent -- wean vent to off when pt more alert and back near baseline, but continue for now while on sedation VICK: 
-- likely pre-renal/ATN 
-- avoid nephrotoxins -- no acute indication for renal replacement. Hx Recurrent C-diff: 
-- s/p fecal transplant 
-- avoid abx unless clear infection CCM time 35 min. Pt at risk of life threatening deterioration from seizures. Pt seen and evaluated via tele interaction. Audio and video were used for this encounter.

## 2021-03-31 NOTE — PROGRESS NOTES
[id number] Medicine Residency Resident Note in Brief 
---------------------------------------- 
 
S: Pt was examined at bedside with Dr. Willa Braswell and pt's nurse. Pt non verbal.  
 
O: 
Visit Vitals /63 Pulse 88 Temp 98.9 °F (37.2 °C) Resp 14 Ht 4' 9\" (1.448 m) Wt 120 lb 5.9 oz (54.6 kg) SpO2 100% BMI 26.05 kg/m² Physical Exam 
Abdominal:  
   General: Abdomen is flat. There is no distension. Palpations: Abdomen is soft. Tenderness: There is no abdominal tenderness. Comments: No rectal hemorrhoids noted, stool noted without any gross blood Skin: 
   General: Skin is warm. Neurological:  
   Mental Status: She is alert. A/P Pt here with status epilepticus and now with concern for GI bleed. Repeat Hgb today drop to 6.3 from 8.0 this morning. Recent admission at [facility] for GI bleed in early March with EGD/Colonoscopy with gastritis and colitis. Concern for GI Bleed:  
- GI Consult --> spoke with Dr. Mary Hernandez who will evaluate the patient at bedside soon 
- FOBT pending  
- NPO 
- 2 large bore IVs 
- Protonix 40 IV BID 
- Discontinued Lovenox - Strict I&Os - Type & Screen, blood transfusion ordered --> group home caretaker made aware who will inform pt's mother to call ICU at which time the nurses will obtain consent   
- 2 hr post transfusion H&H Hypotension: Ddx include likely 2/2 to sedatives (versed/profofol) vs IVVD vs GI bleed - Likely to improve not that sedatives have been weaned - Will continue to monitor vitals carefully, Lovephed is still running and Midodrine added as well by intensivist  
- LR 75 ml/hr Please see full daily progress note for complete plan.  
Talya Tyler,  
2:59 PM

## 2021-03-31 NOTE — PROGRESS NOTES
Transition of Care: Hermann Wolfe is a 49 y.o. female with PMHx of Down Syndrome (non-verbal at baseline), S/p Trach and PEG tube, seizures, encephalomalacia, ANNE, h/o recurrent Cdiff colitis admitted for status epilepticus on 3/30 evening. Patient admitted to the ICU, sedate and intubated. Found to have supratherapeutic phenytoin level, phenytoin held with plans to restart per neurology. Neurology has been consulted and we will follow up on their recs and EEG findings: Hold dilantin, continue keppra and vimpat. Patient hypotensive 2/2 sedatives, currently on levophed gtt. 3/4 SIRS on presentation, infection not suspected. Hemoglobin precipitously dropping: 10.1 on admission to 6.3 the following day.

## 2021-03-31 NOTE — PROGRESS NOTES
Problem: Risk for Spread of Infection Goal: Prevent transmission of infectious organism to others Description: Prevent the transmission of infectious organisms to other patients, staff members, and visitors. Outcome: Progressing Towards Goal 
  
Problem: Patient Education:  Go to Education Activity Goal: Patient/Family Education Outcome: Progressing Towards Goal 
  
Problem: Ventilator Management Goal: *Adequate oxygenation and ventilation Outcome: Progressing Towards Goal 
Goal: *Patient maintains clear airway/free of aspiration Outcome: Progressing Towards Goal 
Goal: *Absence of infection signs and symptoms Outcome: Progressing Towards Goal 
Goal: *Normal spontaneous ventilation Outcome: Progressing Towards Goal 
  
Problem: Patient Education: Go to Patient Education Activity Goal: Patient/Family Education Outcome: Progressing Towards Goal 
  
Problem: Falls - Risk of 
Goal: *Absence of Falls Description: Document Denisa Barnes Fall Risk and appropriate interventions in the flowsheet. Outcome: Progressing Towards Goal 
Note: Fall Risk Interventions: 
  
 
Mentation Interventions: Bed/chair exit alarm, Evaluate medications/consider consulting pharmacy, More frequent rounding, Room close to nurse's station, Toileting rounds Medication Interventions: Bed/chair exit alarm, Evaluate medications/consider consulting pharmacy Elimination Interventions: Bed/chair exit alarm, Call light in reach, Toileting schedule/hourly rounds Problem: Patient Education: Go to Patient Education Activity Goal: Patient/Family Education Outcome: Progressing Towards Goal 
  
Problem: Pressure Injury - Risk of 
Goal: *Prevention of pressure injury Description: Document Rolf Scale and appropriate interventions in the flowsheet.  
Outcome: Progressing Towards Goal 
Note: Pressure Injury Interventions: 
Sensory Interventions: Avoid rigorous massage over bony prominences, Assess need for specialty bed, Check visual cues for pain, Discuss PT/OT consult with provider, Keep linens dry and wrinkle-free, Minimize linen layers, Monitor skin under medical devices, Pad between skin to skin, Pressure redistribution bed/mattress (bed type), Turn and reposition approx. every two hours (pillows and wedges if needed), Use 30-degree side-lying position, Suspension boots Moisture Interventions: Absorbent underpads, Apply protective barrier, creams and emollients, Check for incontinence Q2 hours and as needed, Internal/External urinary devices, Limit adult briefs, Offer toileting Q_hr, Moisture barrier Activity Interventions: Pressure redistribution bed/mattress(bed type), PT/OT evaluation Mobility Interventions: Float heels, HOB 30 degrees or less, Pressure redistribution bed/mattress (bed type), PT/OT evaluation, Suspension boots, Turn and reposition approx. every two hours(pillow and wedges) Nutrition Interventions: Document food/fluid/supplement intake, Discuss nutritional consult with provider Friction and Shear Interventions: Apply protective barrier, creams and emollients, HOB 30 degrees or less, Lift sheet, Lift team/patient mobility team, Minimize layers, Transferring/repositioning devices Problem: Patient Education: Go to Patient Education Activity Goal: Patient/Family Education Outcome: Progressing Towards Goal

## 2021-03-31 NOTE — ED NOTES
TRANSFER - OUT REPORT: 
 
Verbal report given to Grace(name) on Wilfredo Basurto  being transferred to [facility] [place]) for routine progression of care Report consisted of patients Situation, Background, Assessment and  
Recommendations(SBAR). Information from the following report(s) SBAR, ED Summary, Procedure Summary, Intake/Output, MAR and Recent Results was reviewed with the receiving nurse. Lines:  
Peripheral IV 03/30/21 Left Wrist (Active) Site Assessment Clean, dry, & intact 03/30/21 2121 Phlebitis Assessment 0 03/30/21 2121 Infiltration Assessment 0 03/30/21 2121 Dressing Status Clean, dry, & intact 03/30/21 2121 Hub Color/Line Status Pink 03/30/21 2121 Opportunity for questions and clarification was provided. Patient transported with: 
 Monitor Registered Nurse

## 2021-03-31 NOTE — PROGRESS NOTES
Senior Resident Admission Note CC: Seizures HPI: 
Polina Silva is a 49 y.o. female w/ a PMHX of Downs Syndrome, s/p Trach & PEG, epilepsy, encephalomalacia, recurrent C. Diff and HLD who presents to the ER complaining of breakthrough seizures. Per caregiver, pt has had multiple breakthrough seizures over the last 3 days with ~5 noted today. Seizures presents as stiffening of body and R sided gaze preference. She was found to have supratheraputic Phenytoin level yesterday (28) by neurologist (Dr. Diana Donis) and so this was to be held tonight. Pt came via EMS who also witnessed seizures x2. In the ED was treated with 750mg Keppra and has received total 5mg ativan. Chart reviewed. Patient seen, examined, and discussed with Dr. Nathanael Tolbert (PGY-1). See H&P for more details. A/P: Admit to ICU. Code status: Full. 1. Status Epilepticus- S/p Keppra 750mg in ED. Hold Phenytoin d/t supratheraputic level, follow levels daily, restart per neuro recs. On Versed (propofol d/c). Keppra 750mg BID. Stat EEG. Neuro c/s. BCx, UA. Seizure precautions, Ativan prn for seizure. 2. Hypotension- Originally hypertensive, but BP dropped with Propofol and then Versed. Was responsive to fluid bolus but now hypotensive again. Start Levophed. 3. VICK- S/p 1L Ns bolus. Give additional 1L. Hold torsemide. Daily BMP I agree with remaining assessment and plan as documented in Dr. Nathanael Tolbert' Full H&P. Pt discussed with Dr. Olga Jarrett (on-call attending physician).  
 
 
 
Dinorah Del Real MD 
Family Medicine Resident, PGY-2

## 2021-03-31 NOTE — PROGRESS NOTES
[facility] [facility] PROGRAM 
PROGRESS NOTE  
 
4/1/2021 PCP: Lydia Olsen NP Assessment/Plan:  
 
Yadi Spencer is a 49 y.o. female with PMHx of Down Syndrome (non-verbal at baseline), S/p Trach and PEG tube, seizures, encephalomalacia, ANNE, h/o recurrent Cdiff colitis admitted for status epilepticus. 24 Hour Events: Versed and levo gtt weaned. Hgb of 6.3 during day yesterday, FOBT neg - pt transfused 1u pRBCs. 2mg ativan given 2000 for episode of R leg shaking Status epilepticus: CT head no acute abnormality, chronic encephalomalacia in L&R frontal lobe, R parietal lobe. Supra-therapeutic phenytoin level at 34. POA EKG w/ sinus tach. F/w neuro Dr. Ricardo Jordan versed gtt. BCx NGTD. -Mechanical ventilation though chronic trach tube, wean when less sedated - will touch base w/ intensivist team today 
-Ativan 2mg q5min prn for seizure, max 3 doses 
-Per neurology: appreciate recs -HOLD home Phenytoin 125mg BID until no longer supratherapeutic 
 -Keppra 750mg IV BID 
 -Daily phenytoin levels -EEG in process, read pending 
 -Will await further recs on EEG read, med management Intensivist following, appreciate recs 
-Daily CBC, CMP 
-Seizure and fall precautions   
 
Anemia in setting of hx of GI bleed: POA Hgb 10.1 (b/l 13) > 6.3. Hx of GI bleed, admission 3/2021 to [address] [place] [address], EGD & Colonoscopy at that time revealed gastritis and colitis. FOBT neg. No obvious source of bleeding, possibly 2/2 hemodilution. S/p 1u pRBC. 
-Hgb improved to 9.2 post-transfusion, 10.2 this morning 
-Per GI no indication of GI bleed, no intervention at this time - appreciate recs -Iron studies pending (obtained prior to pRBC) -Protonix 40mg IV daily 
  
Hypotension: Likely 2/2 sedatives. S/p 2L NS bolus. S/p levo gtt. -Levo gtt PRN 
-Midodrine 5mg TID started per pulm - appreciate recs 
  
3/4 SIRS: Likely 2/2 seizure. POA WBC 12.5, , RR 35. CXR, UA neg.  Leukocytosis resolved after IV fluids. Pt w/ continued tachycardia, likely 2/2 anemia vs IVVD vs intubation w/o sedation. BCx NGTD. Low suspicion for infectious process at this time. 
-Continue to f/u BCx 
-Daily CBC 
  
VICK - improving. POA Cr 1.24 (BL 0.5). S/p 2L NS bolus.  
-Daily CMP 
-HOLD home Torsemide 40mg  
  
Hyponatremia - resolved: Likely 2/2 IVVD. POA Na 130.  
-Daily CMP 
  
Hyperphosphatemia: Improving after fluids. POA P 5.6.  
-Daily phos 
  Thrombocytosis: RESOLVED. Likely 2/2 IVVD as resolved after fluids. POA plt 584.  
-Daily CBC 
  
H/o ANNE: POA Hgb 10.1 (BL 13) > 6.3, s/p 1u pRBC. Not on home iron. Recently hospitalized in March 2021 for GI bleed at [facility].   
-Monitor on daily labs -Iron studies pending 
  
Elevated AP, AST in setting of hepatic steatosis: Chronic. POA , AST 60.  
-Monitor on daily labs  
  
S/p Trach: Due to hx of seizures, aspiration PNA, and previous intubation. 
-Routine care  
-Wean intubation as tolerated 
  
S/p PEG-tube: Home tube feedings.  
-Start tube feeds  
  
Chronic edema: Home Torsemide 40mg daily. F/w Nephrology OP. 
-HOLD home Torsemide d/t VICK 
  
GERD: stable. -Protonix 40mg IV daily  
 
H/o Cdiff colitis: S/p fecal transplant.  
-Enteric precuations 
  
  
FEN/GI -NG tube feeds Activity - Activity w/assitance DVT prophylaxis - Held GI prophylaxis - Protonix Fall prophylaxis - Fall precautions ordered Code Status - Full. Discussed with Caregiver Next of Kin Name and [address], mother/ [phone] [facility], group home . Viki Nyhan, MD 
Family Medicine Resident Luisa Palma discussed with Dr. Jono Bazzi. Subjective: Pt was seen and examined at bedside. Afebrile, tachycardic up to 130s. Pt w/ reported episode of R leg shaking yesterday evening. Pt intubated. Nonverbal at baseline. Objective:  
Physical examination Patient Vitals for the past 24 hrs: 
 Temp Pulse Resp BP SpO2  
04/01/21 0714  (!) 115 15  98 % 04/01/21 0615  (!) 128 22 (!) 134/107 95 % 04/01/21 0600  (!) 111 14 (!) 82/47 99 % 04/01/21 0545  (!) 128 23 127/86 99 % 04/01/21 0530  (!) 129 26 109/89 99 % 04/01/21 0515  (!) 126 19 (!) 175/136 99 % 04/01/21 0500  (!) 126 21 (!) 163/93   
04/01/21 0445  (!) 126 24 129/82 97 % 04/01/21 0430  (!) 131 23  98 % 04/01/21 0415  (!) 133 20 (!) 119/91 99 % 04/01/21 0400 98.3 °F (36.8 °C) (!) 126 22 (!) 130/101 98 % 04/01/21 0354 98.3 °F (36.8 °C)      
04/01/21 0345    117/75   
04/01/21 0332  (!) 115 19  100 % 04/01/21 0330  84 15 121/73 99 % 04/01/21 0315  (!) 117 15 (!) 113/96 100 % 04/01/21 0300  76 14 102/64 100 % 04/01/21 0245  79 14 106/62 98 % 04/01/21 0230  79 14 110/65 98 % 04/01/21 0215  78 14 108/70 99 % 04/01/21 0200    106/71   
04/01/21 0145  82 14 107/65 100 % 04/01/21 0130  83 14 105/65 99 % 04/01/21 0115  85 14 107/70 98 % 04/01/21 0100  84 14 105/69 98 % 04/01/21 0045  83 14 103/63 100 % 04/01/21 0021  87 14  99 % 04/01/21 0015  87 14 106/68 100 % 04/01/21 0000 97.8 °F (36.6 °C) 88 14 105/66 97 % 03/31/21 2345  89 14 117/72 97 % 03/31/21 2330    (!) 72/37   
03/31/21 2327  90     
03/31/21 2300  90 14 111/68 96 % 03/31/21 2230  91 14 137/79 100 % 03/31/21 2215  87 14 125/79 100 % 03/31/21 2200  88 14 122/74 97 % 03/31/21 2145  92 14 (!) 74/52 96 % 03/31/21 2130  95 14 (!) 82/55 99 % 03/31/21 2115  (!) 103 14 (!) 70/38 98 % 03/31/21 2100  (!) 113 14 130/71 100 % 03/31/21 2015  (!) 127 22 (!) 148/84 99 % 03/31/21 2000 99.3 °F (37.4 °C) (!) 132 23 (!) 161/97 97 % 03/31/21 1945 99.3 °F (37.4 °C) (!) 133 30 (!) 170/103 96 % 03/31/21 1939  (!) 137 30  100 % 03/31/21 1930  (!) 126 24 (!) 156/117 95 % 03/31/21 1915  (!) 122 20 (!) 144/101 97 % 03/31/21 1900  (!) 122 23 (!) 165/87 99 % 03/31/21 1836  (!) 110 15 119/69 99 % 03/31/21 1821  (!) 129 26 (!) 121/96 99 % 21 1806 98.2 °F (36.8 °C) 100 14 96/64 100 % 21 1736 98 °F (36.7 °C) (!) 123 14 111/82 100 % 21 1721 98.2 °F (36.8 °C) (!) 113 14 107/72 100 % 21 1706 98.6 °F (37 °C) 80 14 (!) 105/59 100 % 21 1600 98.2 °F (36.8 °C) 83 14 109/65 100 % 21 1547  80 14  100 % 21 1545  80 14 98/60 100 % 21 1541  82 14 (!) 89/54 100 % 21 1530  83 14 (!) 86/51 100 % 21 1515  84 14 (!) 90/53 100 % 21 1504  88     
21 1500  90 14 (!) 84/55 100 % 21 1445  (!) 107 13 (!) 86/36 100 % 21 1430  87 14 110/66 98 % 21 1415  85 14 113/68 100 % 21 1400  85 14 105/63 100 % 21 1345  85 14 103/64 100 % 21 1330  84 14 (!) 93/46 99 % 21 1315  86 14 121/73 98 % 21 1300  85 14 115/66 100 % 21 1245  87 14 107/61 100 % 21 1230  88 14 108/63 100 % 21 1200 98.9 °F (37.2 °C) 89 14 111/69 100 % 21 1130  88 14 110/63 100 % 21 1114  88 14  100 % 21 1100  88 14 (!) 99/58 100 % 21 1045  90 14 (!) 100/58 100 % 21 1030  (!) 102 14 104/60 100 % 21 1015  (!) 123 14 111/60 100 % 21 1000  98 14 98/66 100 % 21 0945  (!) 118 15 117/71 98 % 21 0930  (!) 106 14 100/65 99 % 21 0915  (!) 128 22 (!) 204/142 100 % 21 0900  (!) 126 20 118/77 100 % 21 0800 98.9 °F (37.2 °C)    100 % Temp (24hrs), Av.5 °F (36.9 °C), Min:97.8 °F (36.6 °C), Max:99.3 °F (37.4 °C) O2 Device: Tracheostomy, Ventilator Date 21 - 21 - 21 0285 Shift 7618-3709 7864-5102 24 Hour Total 0962-4494 2356-4670 24 Hour Total  
INTAKE  
I.V.(mL/kg/hr) 817.2(1.2) 92.9(0.1) 910.1(0.7) Versed Volume 12.2  12.2 Levophed Volume 139.9 47.9 187.8 Volume (lactated Ringers infusion) 665 45 710 Blood  438.8 438.8 Volume (TRANSFUSE PACKED RBC'S)  438.8 438.8 NG/GT 65 150 215 Water Flush Volume (mL) (PEG/Gastrostomy Tube)  150 150 Medication Volume (PEG/Gastrostomy Tube) 10  10 Intake (ml) (PEG/Gastrostomy Tube) 55  55 Shift Total(mL/kg) 882. 2(16.2) 681. 7(12.5) 1563. 9(28.6) OUTPUT Urine(mL/kg/hr) 550(0.8)  550(0.4) Urine Occurrence(s)  3 x 3 x Urine Output (mL) (External Female Catheter 03/31/21) 550  550 Stool Stool Occurrence(s)  1 x 1 x Shift Total(mL/kg) 550(10.1)  550(10.1) .2 681.7 1013.9 Weight (kg) 54.6 54.6 54.6 54.6 54.6 54.6 General appearance - No acute distress. Eyes open, alert but not responsive to voice. Chest - Trach tube in place, intubated CTAB, no wheezes, rales or rhonchi, symmetric air entry Heart - tachycardic, regular rhythm, normal S1, S2, no murmurs, rubs, clicks or gallops, Abdomen - soft, nontender, nondistended, +BS,no rebound or guarding Neurological - Alert but unresponsive to voice. Extremities - no pedal edema, Cap refill <2sec Data Review: CBC: 
Recent Labs 04/01/21 
0402 03/31/21 
2319 03/31/21 
1324 03/31/21 
0105 WBC 7.8  --  6.0 9.3 HGB 10.2* 9.2* 6.3* 8.0*  
HCT 31.2* 28.7* 20.6* 24.3*  
  --  346 415* Metabolic Panel: 
Recent Labs 04/01/21 
9766 03/31/21 
5717 03/31/21 
0105 03/30/21 
2119  137 133* 130*  
K 3.7 4.6 3.9 4.9  
* 105 101 95* CO2 24 23 22 24 BUN 24* 38* 44* 47* CREA 0.94 0.96 1.14* 1.24* GLU 97 129* 125* 71  
CA 8.9 9.2 9.1 9.7 MG 2.3  --  2.3 2.8* PHOS 4.5  --  4.9* 5.6* ALB 2.4*  --  2.6* 3.0* TBILI 0.5  --  0.1* 0.2 ALT 28  --  27 36 INR  --   --   --  1.0 Micro: 
Lab Results Component Value Date/Time  Culture result: NO GROWTH AFTER 4 HOURS 03/31/2021 02:11 AM  
 Culture result: NO GROWTH 6 DAYS 07/01/2020 02:32 AM  
 Culture result: NO GROWTH 5 DAYS 03/29/2020 10:45 AM  
 
Imaging: 
Ct Head Wo Cont Result Date: 3/30/2021 EXAM: CT HEAD WO CONT INDICATION: seizures COMPARISON: None. CONTRAST: None. TECHNIQUE: Unenhanced CT of the head was performed using 5 mm images. Brain and bone windows were generated. Coronal and sagittal reformats. CT dose reduction was achieved through use of a standardized protocol tailored for this examination and automatic exposure control for dose modulation. FINDINGS: There is an incidental cavum septum pellucidum. There is mild atrophy and compensatory dilatation of the ventricles. Areas of chronic encephalomalacia are seen in the right frontal lobe, right parietal lobe, and left frontal lobe. There are incidental bilateral basal ganglia calcifications. There is no intracranial hemorrhage, extra-axial collection, or mass effect. The basilar cisterns are open. No CT evidence of acute infarct. The bone windows demonstrate no abnormalities. The visualized portions of the paranasal sinuses and mastoid air cells are clear. Significant chronic encephalomalacia in the right frontal lobe, right parietal lobe, and left frontal lobe. Xr Chest Alline Seller Result Date: 3/30/2021 EXAM: XR CHEST PORT HISTORY: trach exchange. COMPARISON: 3/30/2021 FINDINGS: Portable AP. A tracheostomy tube is in place. The heart is normal size. There is diffuse interstitial prominence again seen likely related to edema. No pleural effusion or pneumothorax. Tracheostomy tube in appropriate position. Unchanged diffuse interstitial prominence likely related to edema. Xr Chest Alline Seller Result Date: 3/30/2021 EXAM: XR CHEST PORT HISTORY: Chest pain. COMPARISON: 4/1/2020 FINDINGS: Portable AP. A tracheostomy tube is in place. The heart is normal size. There is unchanged diffuse interstitial prominence likely related to edema. No focal consolidation, pleural effusion, or pneumothorax. No significant interval change. Medications reviewed Current Facility-Administered Medications Medication Dose Route Frequency  sodium chloride (NS) flush 5-40 mL  5-40 mL IntraVENous Q8H  
 sodium chloride (NS) flush 5-40 mL  5-40 mL IntraVENous PRN  
 acetaminophen (TYLENOL) tablet 650 mg  650 mg Oral Q6H PRN Or  
 acetaminophen (TYLENOL) suppository 650 mg  650 mg Rectal Q6H PRN  
 LORazepam (ATIVAN) injection 2 mg  2 mg IntraVENous Q5MIN PRN  
 sodium chloride (NS) flush 5-40 mL  5-40 mL IntraVENous Q8H  
 sodium chloride (NS) flush 5-40 mL  5-40 mL IntraVENous PRN  
 midazolam in normal saline (VERSED) 1 mg/mL infusion  0-2 mg/hr IntraVENous CONTINUOUS  
 NOREPINephrine (LEVOPHED) 8 mg in 5% dextrose 250mL (32 mcg/mL) infusion  0.5-16 mcg/min IntraVENous TITRATE  levETIRAcetam (KEPPRA) 750 mg in 0.9% sodium chloride 100 mL IVPB  750 mg IntraVENous BID  lacosamide (VIMPAT) 100 mg in 0.9% sodium chloride 100 mL IVPB  100 mg IntraVENous Q12H  
 midodrine (PROAMATINE) tablet 5 mg  5 mg Oral TID WITH MEALS  
 0.9% sodium chloride infusion 250 mL  250 mL IntraVENous PRN  pantoprazole (PROTONIX) 40 mg in 0.9% sodium chloride 10 mL injection  40 mg IntraVENous Q24H  
 alteplase (CATHFLO) 1 mg in sterile water (preservative free) 1 mL injection  1 mg InterCATHeter PRN  propofol (DIPRIVAN) 10 mg/mL infusion  0-50 mcg/kg/min IntraVENous TITRATE Signed: 
 Junior Phill MD 
 Resident, Family Medicine Attending note: Attending note to follow. ..

## 2021-03-31 NOTE — PROGRESS NOTES
[facility]: MED RECONCILIATION Information obtained from: Med list from [facility]. This is scanned into media. Allergies: Depakote [divalproex] Prior to Admission Medications:  
 
Medication Documentation Review Audit Reviewed by Sarah Dumont, PHARMD (Pharmacist) on 03/31/21 at 8026 Medication Sig Documenting Provider Last Dose Status Taking? albuterol-ipratropium (DUO-NEB) 2.5 mg-0.5 mg/3 ml nebu 3 mL by Nebulization route every 4 hours daily while awake resp. Provider, Historical  Active Yes  
albuterol-ipratropium (DUO-NEB) 2.5 mg-0.5 mg/3 ml nebu 3 mL by Nebulization route as needed for Shortness of Breath. Provider, Historical  Active Yes  
cetirizine (ZYRTEC) 10 mg tablet 10 mg by Per G Tube route daily. Provider, Historical  Active Yes  
diazePAM (VALIUM) 5-7.5-10 mg kit Insert 10 mg into rectum as needed (for seizures that last longer than 5 minutes). Provider, Historical  Active Yes  
guaiFENesin (Siltussin SA) 100 mg/5 mL liquid Take 200 mg by mouth four (4) times daily. Provider, Historical  Active Yes  
levETIRAcetam (KEPPRA) 100 mg/mL solution 750 mg by Per G Tube route every twelve (12) hours. Provider, Historical  Active Yes  
melatonin 3 mg tablet 1 Tab by Per G Tube route nightly as needed for Insomnia. Must give by 10pm Femi Sawyer, DO  Active Yes  
omeprazole (PRILOSEC) 40 mg capsule Take 40 mg by mouth two (2) times a day. Provider, Historical  Active Yes OTHER,NON-FORMULARY, by Per G Tube route four (4) times daily. OSMOLITE 1.2 Marino Liquid Provider, Historical  Active Yes  
phenytoin (DILANTIN) 100 mg/4 mL susp oral suspension 125 mg by PEG Tube route every eight (8) hours. Kathryn Herb, DO  Active Yes  
torsemide (DEMADEX) 20 mg tablet Take 40 mg by mouth daily. Provider, Historical  Active Yes [address], [place]   Contact: [phone]

## 2021-03-31 NOTE — ED NOTES
Pt PIV infiltrated. RN at bedside for Ultrasound. 2250: Ultrasound IV unsuccessful at this time. Pt without IV access. Will notify provider.

## 2021-03-31 NOTE — PROGRESS NOTES
0030 - Verbal shift change report given to Deion Hudson (oncoming nurse) by Charla Keith (offgoing nurse). Report included the following information SBAR, ED Summary, Procedure Summary, MAR, Cardiac Rhythm NSR and Dual Neuro Assessment. 0100 - Primary Nurse Balta Lazo and Charla Keith, RN performed a dual skin assessment on this patient No impairment noted Rolf score is 10 
0120 - Dr Mauro Little with [facility] bedside to assess patient 
5 - Patient sustains MAP <65, Levo started. 0200 - Assessment complete, patient arrived receiving 1 liter bolus, another liter given after, patient nonverbal, eyes open spontaneously, patient withdraws from pain, S1 S2 heard, diminished lung sounds, active bowel sounds. Patient is NPO and has peg tube. Patient has a lot of secretions when suctioned with inline and in oral cavity. Patient's tongue white in color. Straight cath patient for urine cultures, 300 mls voided. Mouth and trach care complete, patient turned. 65 - Dr Mackenzie Winn assessing patient via tele doc 
0300 - Spoke to nursing supervisor about not being able to get consent for HIV test. Due to nature of needing the test, supervisor informed that it is deemed necessary and consent is not needed. 0400 - Mouth and trach care complete, patient turned. 0600 - Reassessment of q4 neuro/vascular complete, mouth and trach care complete, patient turned and repositioned. 0700 - Verbal shift change report given to [employer] by Brooke (oncoming nurse) by Deion Hudson (offgoing nurse). Report included the following information SBAR, ED Summary, Procedure Summary, Intake/Output, MAR, Recent Results, Cardiac Rhythm NSR and Dual Neuro Assessment.

## 2021-03-31 NOTE — PROGRESS NOTES
VAT 
 
Assessed pt at bedside for midline. Pt systolic in 10'I, spoke with Gretel Marroquin, RN concerning pt's need for central access for pressors and placing a midline only gives 1 port for peripheral access. At this time MD Jalen Kelly wants to hold off on PICC line per Gretel Marroquin. Midline is not appropriate for patient needs at this time, Gretel Marroquin requests to keep femoral line for central access and is hopeful that pt can come off pressors soon--if this happens then patient would be more appropriate for midline.  for questions, will follow along.

## 2021-03-31 NOTE — CONSULTS
Gastroenterology Consult Referring Physician: Dr Erica Marcano Consult Date: 3/31/2021 Subjective:seizures Chief Complaint: seizures History of Present Illness: Randal Hernandez is a 49 y.o. female who is seen in consultation for anemia. Known with myriad medical problems currently admit with Sz and noted to be anemic. No history overt blood loss; rectal exams done this admit without blood. Past Medical History:  
Diagnosis Date  Down syndrome  History of vascular access device 06/30/2020  
 4 Fr midline, 10 cm L brachial, poor access  Seizures ([address]  Sleep apnea   
 humidifier and oxygen w/trach  Stroke [facility]) 2019 \"old stroke seen on CT\" Past Surgical History:  
Procedure Laterality Date  COLONOSCOPY N/A 7/8/2020 COLONOSCOPY with fecal transplant (consents in red file) performed by Rohan Mueller MD at [facility] HX GI    
 gtube 2/2020  HX OTHER SURGICAL    
 tracheostomy 2/2020 Family History Family history unknown: Yes  
 
Social History Tobacco Use  Smoking status: Never Smoker  Smokeless tobacco: Never Used Substance Use Topics  Alcohol use: Never Frequency: Never Allergies Allergen Reactions  Depakote [Divalproex] Swelling Current Facility-Administered Medications Medication Dose Route Frequency  sodium chloride (NS) flush 5-40 mL  5-40 mL IntraVENous Q8H  
 sodium chloride (NS) flush 5-40 mL  5-40 mL IntraVENous PRN  
 acetaminophen (TYLENOL) tablet 650 mg  650 mg Oral Q6H PRN  Or  
 acetaminophen (TYLENOL) suppository 650 mg  650 mg Rectal Q6H PRN  
 LORazepam (ATIVAN) injection 2 mg  2 mg IntraVENous Q5MIN PRN  
 sodium chloride (NS) flush 5-40 mL  5-40 mL IntraVENous Q8H  
 sodium chloride (NS) flush 5-40 mL  5-40 mL IntraVENous PRN  
 midazolam in normal saline (VERSED) 1 mg/mL infusion  0-2 mg/hr IntraVENous CONTINUOUS  
 NOREPINephrine (LEVOPHED) 8 mg in 5% dextrose 250mL (32 mcg/mL) infusion  0.5-16 mcg/min IntraVENous TITRATE  levETIRAcetam (KEPPRA) 750 mg in 0.9% sodium chloride 100 mL IVPB  750 mg IntraVENous BID  lactated Ringers infusion  75 mL/hr IntraVENous CONTINUOUS  
 lacosamide (VIMPAT) 100 mg in 0.9% sodium chloride 100 mL IVPB  100 mg IntraVENous Q12H  
 midodrine (PROAMATINE) tablet 5 mg  5 mg Oral TID WITH MEALS  
 0.9% sodium chloride infusion 250 mL  250 mL IntraVENous PRN  pantoprazole (PROTONIX) 40 mg in 0.9% sodium chloride 10 mL injection  40 mg IntraVENous Q12H  propofol (DIPRIVAN) 10 mg/mL infusion  0-50 mcg/kg/min IntraVENous TITRATE Review of Systems: 
Non verbal; not obtainable. Objective:  
 
Physical Exam: 
Visit Vitals BP (!) 89/54 Pulse 80 Temp 98.9 °F (37.2 °C) Resp 14 Ht 4' 9\" (1.448 m) Wt 54.6 kg (120 lb 5.9 oz) SpO2 100% BMI 26.05 kg/m² Skin:  Extremities and face reveal no rashes. HEENT: Sclerae anicteric. Cardiovascular: Regular rate and rhythm. Respiratory:  Comfortable breathing with no accessory muscle use. Mechanical ventilation GI:  Abdomen nondistended, soft, and nontender. Normal active bowel sounds. No enlargement of the liver or spleen. No masses palpable. Musculoskeletal:  No pitting edema of the lower legs. Extremities have good range of motion. No costovertebral tenderness. Lymphatic:  No cervical or supraclavicular adenopathy. Lab/Data Review: 
CMP:  
Lab Results Component Value Date/Time   03/31/2021 08:52 AM  
 K 4.6 03/31/2021 08:52 AM  
  03/31/2021 08:52 AM  
 CO2 23 03/31/2021 08:52 AM  
 AGAP 9 03/31/2021 08:52 AM  
  (H) 03/31/2021 08:52 AM  
 BUN 38 (H) 03/31/2021 08:52 AM  
 CREA 0.96 03/31/2021 08:52 AM  
 GFRAA >60 03/31/2021 08:52 AM  
 GFRNA >60 03/31/2021 08:52 AM  
 CA 9.2 03/31/2021 08:52 AM  
 MG 2.3 03/31/2021 01:05 AM  
 PHOS 4.9 (H) 03/31/2021 01:05 AM  
 ALB 2.6 (L) 03/31/2021 01:05 AM  
 TP 7.8 03/31/2021 01:05 AM  
 GLOB 5.2 (H) 03/31/2021 01:05 AM  
 AGRAT 0.5 (L) 03/31/2021 01:05 AM  
 ALT 27 03/31/2021 01:05 AM  
 
CBC:  
Lab Results Component Value Date/Time WBC 6.0 03/31/2021 01:24 PM  
 HGB 6.3 (L) 03/31/2021 01:24 PM  
 HCT 20.6 (L) 03/31/2021 01:24 PM  
  03/31/2021 01:24 PM  
 
 
 
Assessment/Plan:  
 
Principal Problem: 
  Status epilepticus ([address] (3/30/2021) Active Problems: 
  Iron deficiency (12/11/2019) VICK (acute kidney injury) ([address] (3/31/2021) Edema (3/31/2021) Hypotension (3/31/2021) Hyperphosphatemia (3/31/2021) Hyponatremia (3/31/2021) Thrombocytosis ([address] (3/31/2021) Can't state acute blood loss to be contributing factor for anemia. With recurrent episodes c diff colitis in past, could well have some anemia from that problem alone although ferritin 162 in 6/20 indicated no iron deficiency Recommend: 
 
No additional specific GI evaluation in absence of ongoing demonstrable intestinal blood loss

## 2021-03-31 NOTE — CONSULTS
Neurology Consult - Inpatient Name:   Krysta Hercules 
MRN:    [id number] Date of Admission:  3/30/2021 Date of Consultation:  03/31/21 HISTORY OF PRESENT ILLNESS:  
 
This is a 49 y.o. female with  has a past medical history of Down syndrome, History of vascular access device (06/30/2020), Seizures ([facility] Sleep apnea, and Stroke ([facility] (2019). She also has no past medical history of Adverse effect of anesthesia, Diabetes ([facility] Difficult intubation, Malignant hyperthermia due to anesthesia, Nausea & vomiting, or Pseudocholinesterase deficiency. Nena [facility]mel Neurology is asked to see the patient for status epilepticus. Hx is taken from ER Provider note and Teleneurology Note (from this AM). 49 y.o. female with PMhx Down Syndrome, Tracheostomy, PEG, non-verbal at baseline, living in group home, presented to ER for status epilepticus. Per ER Provider/ Dr Syl Pichardo, pt takes Dilantin and Keppra for seizure prevention, and was hospitalized somewhere (not within [facility] per chart review) last year for \"2 month\" to get her seizures under control. Pt's dilanin had been on hold due to a recent Dilantin level returning at 29. She has started to have increased seizures on 3-27 and yesterday had 5 generalized seizures with right-sided gaze and 2 seizures in ER. In ER Dilantin level was 34, so Dilantin was not restarted. CT head: Significant chronic encephalomalacia in the right frontal lobe, right parietal lobe, and left frontal lobe. She was intubated in ER, started on Propofol. Loaded on Keppra 750 mg IV, given total of 5 mg Ativan (ER + EMS combined), and started back on Keppra 75 mg BID. Teleneurology recommended checking stat EEG and prolonged video-EEG to evaluate for status epilepticus.  
  
Labs: Covid 19 rapid test negative, CBC with WBCs 12.5/ Hgb 10.1/ Hct 29.9/ Platelets 018 (H), CMP with sodium 130, Cr 1.24, GFR 55, AST 60 (mild elevated), Alk Phos (214 (elevated), Phenytoin 34.0, UA negative, Levetiracetam pending, HIV Nonreactive, Sodium, Cr, WBCs, platelet count improving on most recent labs, but Anemia is worse 8/ 24. Dilantin level is also down to 27.2 Complete Review of Systems: UTO due to patient lethargy 
 
==================================== Allergies Allergen Reactions  Depakote [Divalproex] Swelling Current Outpatient Medications Medication Instructions  albuterol-ipratropium (DUO-NEB) 2.5 mg-0.5 mg/3 ml nebu 3 mL, Nebulization, EVERY 4 HOURS WHILE AWAKE RESP  
 albuterol-ipratropium (DUO-NEB) 2.5 mg-0.5 mg/3 ml nebu 3 mL, Nebulization, AS NEEDED  cetirizine (ZYRTEC) 10 mg, Per G Tube, DAILY  diazePAM (VALIUM) 5-7.5-10 mg kit 10 mg, Rectal, AS NEEDED  
 guaiFENesin (SILTUSSIN SA) 200 mg, Oral, 4 TIMES DAILY  levETIRAcetam (KEPPRA) 750 mg, Per G Tube, EVERY 12 HOURS  melatonin 3 mg, Per G Tube, BEDTIME PRN, Must give by 10pm  
 omeprazole (PRILOSEC) 40 mg, Oral, 2 TIMES DAILY  OTHER,NON-FORMULARY, Per G Tube, 4 TIMES DAILY, OSMOLITE 1.2 Marino Liquid  phenytoin (DILANTIN) 125 mg, PEG Tube, EVERY 8 HOURS  torsemide (DEMADEX) 40 mg, Oral, DAILY Current Facility-Administered Medications Medication Dose Route Frequency Provider Last Rate Last Admin  sodium chloride (NS) flush 5-40 mL  5-40 mL IntraVENous Q8H Anum Gilbert MD   10 mL at 03/31/21 8110  sodium chloride (NS) flush 5-40 mL  5-40 mL IntraVENous PRN Anum Gilbert MD      
 acetaminophen (TYLENOL) tablet 650 mg  650 mg Oral Q6H PRN Anum Gilbert MD      
 Or  
 acetaminophen (TYLENOL) suppository 650 mg  650 mg Rectal Q6H PRN Anum Gilbert MD      
 enoxaparin (LOVENOX) injection 40 mg  40 mg SubCUTAneous DAILY Anum Gilbert MD   40 mg at 03/31/21 6360  LORazepam (ATIVAN) injection 2 mg  2 mg IntraVENous Q5MIN PRN Bhavya Kevin MD      
 sodium chloride (NS) flush 5-40 mL  5-40 mL IntraVENous Q8H Bhavya Kevin MD   10 mL at 03/31/21 8145  
 sodium chloride (NS) flush 5-40 mL  5-40 mL IntraVENous PRN Bhavya Kevin MD      
 midazolam in normal saline (VERSED) 1 mg/mL infusion  0-2 mg/hr IntraVENous CONTINUOUS Devin Ayala MD 1 mL/hr at 03/31/21 1123 1 mg/hr at 03/31/21 1123  
 NOREPINephrine (LEVOPHED) 8 mg in 5% dextrose 250mL (32 mcg/mL) infusion  0.5-16 mcg/min IntraVENous TITRATE Bhavya Kevin MD 7.5 mL/hr at 03/31/21 1027 4 mcg/min at 03/31/21 1027  levETIRAcetam (KEPPRA) 750 mg in 0.9% sodium chloride 100 mL IVPB  750 mg IntraVENous BID Anum Gilbert  mL/hr at 03/31/21 0901 750 mg at 03/31/21 0901  pantoprazole (PROTONIX) 40 mg in 0.9% sodium chloride 10 mL injection  40 mg IntraVENous DAILY Anum Gilbert MD   40 mg at 03/31/21 1327  lactated Ringers infusion  75 mL/hr IntraVENous CONTINUOUS Ariella Weldon MD 75 mL/hr at 03/31/21 0848 75 mL/hr at 03/31/21 0848  lacosamide (VIMPAT) 100 mg in 0.9% sodium chloride 100 mL IVPB  100 mg IntraVENous Q12H Ford Nevarez MD      
 propofol (DIPRIVAN) 10 mg/mL infusion  0-50 mcg/kg/min IntraVENous TITRATE Han Fischer MD   Stopped at 03/31/21 0100 PSHx  has a past surgical history that includes hx gi; hx other surgical; and colonoscopy (N/A, 7/8/2020). SocHx  reports that she has never smoked. She has never used smokeless tobacco. She reports that she does not drink alcohol or use drugs. FHx Family history is unknown by patient. PHYSICAL EXAM 
 
Patient Vitals for the past 4 hrs: 
 Temp Pulse Resp BP SpO2  
03/31/21 1114  88 14  100 % 03/31/21 1045  90 14 (!) 100/58 100 % 03/31/21 1030  (!) 102 14 104/60 100 % 03/31/21 1015  (!) 123 14 111/60 100 % 03/31/21 1000  98 14 98/66 100 % 03/31/21 0945  (!) 118 15 117/71 98 % 03/31/21 0930  (!) 106 14 100/65 99 % 03/31/21 0915  (!) 128 22 (!) 204/142 100 % 03/31/21 0900  (!) 126 20 118/77 100 % 03/31/21 0800 98.9 °F (37.2 °C)    100 % General:  Head: normocephalic, atraumatic. Down's facial appearance. Conjunctiva clear. Neck: supple. Lungs: not examined. CV: not examined. Extremities: no edema. Skin: No rashes Neurologic Exam (significatly limited by pt being non-verbal at baseline and currently on versed drip) Resting in bed, tracheostomy with ETT tube attached Face appears symmetric Eyes closed but opens to voice Doesn't track examiner around room Pupils 3 mm/ sluggish/ bilateral 
Doesn't follow commands to move extremities Withdraws/ localizes/ grimaces to nailbed pressure in all extremities Cannot test remainder of neurologic exam due to pt lethargy, ongoing sedation Labs/ Radiology Imaging: 
 
Ct Head Wo Cont Result Date: 3/30/2021 Significant chronic encephalomalacia in the right frontal lobe, right parietal lobe, and left frontal lobe. Xr Chest [facility] Result Date: 3/30/2021 Tracheostomy tube in appropriate position. Unchanged diffuse interstitial prominence likely related to edema. Xr Chest [facility] Result Date: 3/30/2021 No significant interval change. Assessment/ Plan ICD-10-CM ICD-9-CM 1. Status epilepticus (HCC)  G40.901 345.3 2. Elevated Dilantin level  R78.89 790.6 Ordered stat prolonged video EEG to evaluate for any non-convulsive seizure activity Continue holding Dilantin due to elevated level Added Vimpat 100 mg IV q12 hrs as AED Continue Keppra 750 mg IV BID Keppra level pending Pt had significant drop in hemoglobin and hematocrit compared to yesterday's lab. Notified Family Practice service via [employer]. Will continue following with you Thank you for asking the Neurology Service to evaluate Bhaskar Norton. Signed By: Malika Wang MD   
 March 31, 2021

## 2021-03-31 NOTE — PROGRESS NOTES
Chart accessed to assist primary nurse, Darryn Pope RN, in role of preceptor. 46- Spoke with caretaker from group home, states that patient has received both doses of [address] vaccination and has been tested x4 for COVID and all have resulted negative. This RN informed her that her COVID test is currently pending and updated on current visitor policy. Will update if any results. 21 - Dr. Lexii Mary and Francisco Brown Neuro NP updated on patient condition, including right sided gaze but broke after a minute and then focused/tracked both eyes.

## 2021-03-31 NOTE — PROGRESS NOTES
Stacey [address] Office [phone] Fax [phone] Assessment and Plan Halie Nieves is a 49 y.o. female with PMHx of Down Syndrome (non-verbal at baseline), S/p Trach and PEG tube, seizures, encephalomalacia, ANNE, h/o recurrent Cdiff colitis admitted for status epilepticus. 24 Hour Events: Started on Levophed Status epilepticus: CT head no acute abnormality, chronic encephalomalacia in L&R frontal lobe and R parietal lobe. Supra therapeutic phenytoin level at 34. POA EKG Sinus tach at 122. Home Keppra 500mg BID and Phenytoin 125mg BID. F/w Dr. Lupis Christopher (Neuro). - Continue Versed gtt - Keppra 750mg IV BID 
- HOLD home Phenytoin 125mg BID  
- Mechanical ventilation though chronic trach tube, wean when less sedated - STAT EEG, unable to be obtained o/n will need to be done in AM 
- F/u paired BCx, Keppra level 
- Daily Phenytoin levels while holding phenytoin - Ativan 2mg q5min prn for seizure, max 3 doses 
- Neurology consult - Intensivist consulted, appreciate recs 
- Daily CBC, CMP 
- Seizure and fall precautions - Neurochecks q4H 
  
Hypotension: Likely 2/2 sedatives. S/p 2L NS bolus. - Levo gtt, goal MAP >65, wean as tolerated - Continue to monitor and low threshold to start pressors    
  
3/4 SIRS: Likely 2/2 seizure. POA WBC 12.5, , RR 35. CXR, UA neg. Leukocytosis resolved after IV fluids.  
- F/u BCx 
  
VICK: Improving. POA Cr 1.24 (BL 0.5). S/p 2L NS bolus. - Monitor on daily labs 
- HOLD home Torsemide 40mg Hyponatremia: Improving after fluids, likely 2/2 IVVD. POA Na 130.  
- Monitor on daily labs Hyperphosphatemia: Improving after fluids. POA P 5.6.  
- Monitor on daily labs Thrombocytosis: RESOLVED. Likely 2/2 IVVD as resolved after fluids. POA plt 584.  
  
COVID PUI: Resides in group home.  Recent neg covid tests at [facility].  
- F/u rapid COVID 
- Consider COVID PCR 
- Droplet plus  
  
H/o ANNE: POA Hgb 10.1 (BL 13). Not on home iron. Recently hospitalized in 2021 for GI bleed at [facility].   
- Monitor on daily labs H/o GI bleed: in 3/2021 at [facility].  EGD & Colonoscopy revealed gastritis and colitis. - Protonix 40mg daily Elevated AP, AST in setting of hepatic steatosis: Chronic. POA , AST 60.  
- Monitor on daily labs  
  
S/p Trach:  
- Routine care  
  
S/p PEG-tube: Home tube feedings.  
- NPO d/t seizures  
  
Chronic edema: Home Torsemide 40mg daily. F/w Nephrology. 
- HOLD home Torsemide d/t VICK 
  
GERD: stable. No home meds  
  
H/o Cdiff colitis: S/p fecal transplant.  
- Enteric precuations 
  
  
FEN/GI - NPO Activity - Activity w/assitance DVT prophylaxis - Lovenox GI prophylaxis - Not indicated at this time Fall prophylaxis - Fall precautions ordered Disposition - Plan to d/c to Group Home. Code Status - Full. Discussed with Caregiver Next of Karen [address], mother . Sybilwillie Moser, group home [address] 67 
 
 
Hailee Clay MD 
Family Medicine Resident Subjective / Objective Subjective: Pt was seen and examined at through room window. Resting comfortably and in no acute distress. Objective: 
Temp (24hrs), Av.8 °F (36.6 °C), Min:97.5 °F (36.4 °C), Max:98 °F (36.7 °C) Visit Vitals /64 Pulse 92 Temp 98 °F (36.7 °C) Resp 14 Ht 4' 9\" (1.448 m) Wt 120 lb 5.9 oz (54.6 kg) SpO2 99% BMI 26.05 kg/m² Physical Exam: 
General: No acute distress. Respiratory: Trach tube in place. Symmetrical chest rise b/l. Cardiovascular: Normal S1,S2. No m/r/g. GI: Nondistended. Skin: No rashes. Neuro: Sedated. Respiratory:   O2 Device: Tracheostomy, Ventilator Date 21 07 - 21 9306 21 07 - 21 3884 Shift [id number] 6388-3245 24 Hour Total 1702-8354 3869-7583 24 Hour Total  
INTAKE  
I.V.  223.7 223.7 Versed Volume  0.7 0.7   Diprivan Volume  21.6 21.6     
  Levophed Volume  1.4 1.4 Volume (levETIRAcetam (KEPPRA) 750 mg in 0.9% sodium chloride 100 mL IVPB)  200 200 Shift Total(mL/kg)  223. 7(4.1) 223. 7(4.1) OUTPUT Urine  300 300 Urine  300 300 Shift Total(mL/kg)  300(5.5) 300(5.5) NET  -76.3 -76.3 Weight (kg)  54.6 54.6 54.6 54.6 54.6 Inpatient Medications Current Facility-Administered Medications Medication Dose Route Frequency  sodium chloride (NS) flush 5-40 mL  5-40 mL IntraVENous Q8H  
 sodium chloride (NS) flush 5-40 mL  5-40 mL IntraVENous PRN  
 acetaminophen (TYLENOL) tablet 650 mg  650 mg Oral Q6H PRN Or  
 acetaminophen (TYLENOL) suppository 650 mg  650 mg Rectal Q6H PRN  
 enoxaparin (LOVENOX) injection 40 mg  40 mg SubCUTAneous DAILY  LORazepam (ATIVAN) injection 2 mg  2 mg IntraVENous Q5MIN PRN  
 sodium chloride (NS) flush 5-40 mL  5-40 mL IntraVENous Q8H  
 sodium chloride (NS) flush 5-40 mL  5-40 mL IntraVENous PRN  
 midazolam in normal saline (VERSED) 1 mg/mL infusion  1.1 mg/hr IntraVENous CONTINUOUS  
 NOREPINephrine (LEVOPHED) 8 mg in 5% dextrose 250mL (32 mcg/mL) infusion  0.5-16 mcg/min IntraVENous TITRATE  levETIRAcetam (KEPPRA) 750 mg in 0.9% sodium chloride 100 mL IVPB  750 mg IntraVENous BID  pantoprazole (PROTONIX) 40 mg in 0.9% sodium chloride 10 mL injection  40 mg IntraVENous DAILY  propofol (DIPRIVAN) 10 mg/mL infusion  0-50 mcg/kg/min IntraVENous TITRATE  sodium chloride 0.9 % bolus infusion 1,000 mL  1,000 mL IntraVENous ONCE Allergies Allergies Allergen Reactions  Depakote [Divalproex] Swelling CBC: 
Recent Labs  
  03/31/21 0105 03/30/21 2119 WBC 9.3 12.5* HGB 8.0* 10.1* HCT 24.3* 29.9*  
* 584* Metabolic Panel: 
Recent Labs  
  03/31/21 0105 03/30/21 2119 * 130*  
K 3.9 4.9  
 95* CO2 22 24 BUN 44* 47* CREA 1.14* 1.24* * 71  
CA 9.1 9.7 MG 2.3 2.8* PHOS 4.9* 5.6* ALB 2.6* 3.0* ALT 27 36 INR  --  1.0 Procedures: None Imaging/Diagnostic Studies: 
Results from [facility] Encounter encounter on 03/30/21 XR CHEST PORT Narrative EXAM: XR CHEST PORT 
 
HISTORY: trach exchange. COMPARISON: 3/30/2021 FINDINGS: Portable AP. A tracheostomy tube is in place. The heart is normal 
size. There is diffuse interstitial prominence again seen likely related to 
edema. No pleural effusion or pneumothorax. Impression Tracheostomy tube in appropriate position. Unchanged diffuse interstitial 
prominence likely related to edema. Results from [facility] Encounter encounter on 09/25/20 US ABD LTD Narrative EXAM:  ABDOMEN, LTD - US* INDICATION: Elevated liver enzymes. COMPARISON: None. TECHNIQUE:  
Limited real-time sonography of the right upper quadrant of the abdomen was 
performed with multiple static images of the liver, gallbladder, pancreatic head 
and right kidney obtained. FINDINGS: 
LIVER:  
The liver is heterogeneous and increased in echogenicity with no mass or other 
focal abnormality. The portal vein flow is hepatopedal. The liver measures 15.5 
cm in length. GALLBLADDER: 
The gallbladder is normal. There is no wall thickening or fluid around the 
gallbladder. COMMON BILE DUCT: 
There is no biliary duct dilatation and the common duct measures 2 mm in 
diameter. PANCREAS: 
The pancreatic head is normal. 
 
KIDNEYS: 
The right kidney demonstrates normal echogenicity with no mass, stone or 
hydronephrosis. The right kidney measures 9.6 cm in length. The body and tail of the pancreas, left kidney, spleen and retroperitoneum were 
not evaluated on this right upper quadrant examination. Impression IMPRESSION:  
 
Heterogeneous hyperechoic slightly enlarged liver. No discrete mass Results from [facility] Encounter encounter on 03/30/21 CT HEAD WO CONT Narrative EXAM: CT HEAD WO CONT INDICATION: seizures COMPARISON: None. CONTRAST: None. TECHNIQUE: Unenhanced CT of the head was performed using 5 mm images. Brain and 
bone windows were generated. Coronal and sagittal reformats. CT dose reduction 
was achieved through use of a standardized protocol tailored for this 
examination and automatic exposure control for dose modulation. FINDINGS: 
There is an incidental cavum septum pellucidum. There is mild atrophy and 
compensatory dilatation of the ventricles. Areas of chronic encephalomalacia are 
seen in the right frontal lobe, right parietal lobe, and left frontal lobe. There are incidental bilateral basal ganglia calcifications. There is no 
intracranial hemorrhage, extra-axial collection, or mass effect. The basilar 
cisterns are open. No CT evidence of acute infarct. The bone windows demonstrate no abnormalities. The visualized portions of the 
paranasal sinuses and mastoid air cells are clear. Impression Significant chronic encephalomalacia in the right frontal lobe, right parietal 
lobe, and left frontal lobe. For Billing Chief Complaint Patient presents with  Seizure Hospital Problems  Date Reviewed: 2/3/2021 Codes Class Noted POA VICK (acute kidney injury) ([facility] ICD-10-CM: N17.9 ICD-9-CM: 584.9  3/31/2021 Unknown Edema (Chronic) ICD-10-CM: R60.9 ICD-9-CM: 782.3  3/31/2021 Unknown Hypotension ICD-10-CM: I95.9 ICD-9-CM: 458.9  3/31/2021 Unknown Hyperphosphatemia ICD-10-CM: E83.39 
ICD-9-CM: 275.3  3/31/2021 Unknown Hyponatremia ICD-10-CM: E87.1 ICD-9-CM: 276.1  3/31/2021 Unknown Thrombocytosis ([facility] ICD-10-CM: D47.3 ICD-9-CM: 238.71  3/31/2021 Unknown * (Principal) Status epilepticus ([facility] ICD-10-CM: Nina Post ICD-9-CM: 345.3  3/30/2021 Unknown Iron deficiency ICD-10-CM: E61.1 ICD-9-CM: 280.9  12/11/2019 Yes

## 2021-03-31 NOTE — PROGRESS NOTES
0700- Verbal shift change report given to Darryn Pope RN (oncoming nurse) by Omar Santana RN (offgoing nurse). Report included the following information SBAR, Kardex, Intake/Output and Cardiac Rhythm sinus tach. Primary Nurse Angelia Veliz, RN and David Fox RN performed a dual skin assessment on this patient No impairment noted. All drips rate verified. Versed at 1.1, levo at 6. 
 
0800- Assessment completed, see doc flowsheet. Mouth care and trach care completed. Pt turned and repositioned. 0848- LR started at 76, and levo at 5. SST and lactic sent to lab. Purewick in place. 1000- Pt turned and repositioned, mouth care and trach care completed. 1027- Levo at 4.  
 
1200- Reassessment completed, see doc flowsheet. Mouth care and trach care completed, pt turned and repositioned. 1231- LR bolus started and midodrine given via PEG tube. Levo at 3.  
 
1318- Versed stopped. 1400-  Pt turned and repositioned, mouth care and trach care completed. 1500- PICC team at bedside for midline placement. Pt on levo at 1 with systolic BP in 55Y. Unablet to levo through midline and pt still requiring pressors. Discussed with PICC team with possibility of changing midline order to PICC once consent is obtained from family and if MD is agreeable. 0- Dr. Hipolito De Jesus agreeable with PICC line placement. Plans for PICC in morning, message relayed to PICC team.  
 
1600- Reassessment completed, see doc flowsheet. Mouth care and trach care completed, pt turned and repositioned. 36- Pt's sister Marissa Sow contacted via telephone regarding call back from mother Monika Garza. Monika Garza is to call back regarding pt's blood consent. 2870- Telephone consent for blood transfusion obtained from mother Monika Garza with [facility].  
 
1206- Blood transfusion started with Kellie Agudelo RN.  
 
1900- Bedside shift change report given to Omar Santana RN (oncoming nurse) by Darryn Pope RN (offgoing nurse).  Report included the following information SBAR, Kardex and Cardiac Rhythm NSR.

## 2021-03-31 NOTE — PROGRESS NOTES
Attempted to reach EEG on call for stat EEG at number [phone]) provided by Nursing supervisor. No answer. ____________________________________________________________________________ ADDENDUM: 
1:20 AM Spoke with Nursing supervisor who was also unable to reach on call EEG, left VM. Dr. Martínez Jane previously reached out without answer as well.

## 2021-03-31 NOTE — CONSULTS
Comprehensive Nutrition Assessment Type and Reason for Visit: Initial, Consult Nutrition Recommendations/Plan: 1. Start TF via PEG. Recommend: 
 
Initiate Vital HP @25mL/h, advance as tolerated by 10mL q8h to goal of 45mL/h + 75mL H2O flush q4h (or flush per MD). TF at goal provides 1080 kcals, 95 g protein, 1352 mL H2O- this meets 75% estimated energy needs and 100% protein needs. Will consider adding Banatrol to TF regimen if loose stools continue. Nutrition Assessment:     
3/31: 48 y/o female admitted with status epilepticus. PMH includes Down's syndrome, nonverbal, trach, PEG. +cdiff. Pt currently on vent and pressors (Levo @2). Pt normally resides at group home and her normal TF regimen is Osmolite 1.2 237mL bolus 5x/day + 75mL H2O flush before and after each bolus. Hospital does not carry Osmolite TF so will start pt on hospital's no-fiber formula, Vital HP. Monitor tolerance. Weight up from last year, down from two months ago. Will continue to monitor weight trends. Labs- phos 4.9, Hgb 6.3. Meds- [email or web address], Levo@2mcg/kg/min. Weight hx: Wt Readings from Last 10 Encounters:  
03/30/21 54.6 kg (120 lb 5.9 oz) 02/03/21 58.9 kg (129 lb 12.8 oz) 07/08/20 49.9 kg (110 lb)  
06/30/20 49.9 kg (110 lb) 03/31/20 50.1 kg (110 lb 7.2 oz) 03/17/20 44.5 kg (98 lb)  
01/14/20 44.5 kg (98 lb) 01/09/20 44.6 kg (98 lb 6 oz) 12/11/19 44 kg (97 lb) 02/11/19 49.9 kg (110 lb) Malnutrition Assessment: 
Malnutrition Status: insufficient data Estimated Daily Nutrient Needs: 
Energy (kcal): 1429(PAL 1191 x 1.2); Weight Used for Energy Requirements: Current Protein (g): 65(1.2-2.0 g/kg); Weight Used for Protein Requirements: Current Fluid (ml/day): 1429; Method Used for Fluid Requirements: 1 ml/kcal 
 
 
Nutrition Related Findings:  last BM 3/31 (loose); chronic PEG; trach Wounds:   
None Current Nutrition Therapies: DIET NPO Anthropometric Measures: 
· Height:  4' 9\" (144.8 cm) 
· Current Body Wt:  54.6 kg (120 lb 5.9 oz) · Admission Body Wt:      
· Usual Body Wt:       
· Ideal Body Wt:  85 lbs:  141.6 % · Adjusted Body Weight:   ; Weight Adjustment for: No adjustment · Adjusted BMI:      
· BMI Category: Overweight (BMI 25.0-29. 9) Nutrition Diagnosis:  
· Inadequate protein-energy intake related to inadequate enteral nutrition infusion as evidenced by (TFs currently not running) Nutrition Interventions:  
Food and/or Nutrient Delivery: Start tube feeding Nutrition Education and Counseling: No recommendations at this time Coordination of Nutrition Care: Continue to monitor while inpatient Goals: 
Start TFs to meet >60% of pt's energy/protein needs next 1-3 days Nutrition Monitoring and Evaluation:  
Behavioral-Environmental Outcomes: None identified Food/Nutrient Intake Outcomes: Enteral nutrition intake/tolerance Physical Signs/Symptoms Outcomes: GI status, Weight, Biochemical data, Nutrition focused physical findings, Hemodynamic status Discharge Planning:   
Enteral nutrition Electronically signed by Milbert Meckel, RDN on 3/31/2021 at 2:45 PM 
 
Contact: [phone]

## 2021-04-01 NOTE — CONSULTS
Nutrition Note 4/1: Duplicate consult received for TF order and management. TF order in and currently running. Will remove consult at this time. Electronically signed by Reyes Pun, RDN on 4/1/2021 at 8:17 AM 
 
Contact: [phone]

## 2021-04-01 NOTE — PROGRESS NOTES
Neurology - Inpatient Progress Note Name:   Nadia Youngblood 
MRN:    [id number] Admit Date:   3/30/2021 Follow up:   Status epilpeticus Interval History Reviewed prolonged EEG:  Abnormal video-EEG recording. Severe generalized slowing (burst-suppression pattern) consistent with a severe encephalopathic process. May be due to ongoing sedation. No seizure discharges or electrographic seizures seen. Clinical correlation is necessary. Resting in bed, eyes open, tracks examined, doesn't follow commands No observed spontaneous movements of extremities Resists passive arm movements On Keppra 750 mg IV q12 hrs Added Vimpat 100 mg IV q12 hrs Dilantin remains on hold due to supratherpeutic level Brief ROS: As noted above Allergies Allergen Reactions  Depakote [Divalproex] Swelling Current Facility-Administered Medications:  
  enoxaparin (LOVENOX) injection 40 mg, 40 mg, SubCUTAneous, Q24H, Nate Ware, , 40 mg at 04/01/21 1277   sodium chloride (NS) flush 5-40 mL, 5-40 mL, IntraVENous, Q8H, Luanne Fermin MD, 10 mL at 04/01/21 0541 
  sodium chloride (NS) flush 5-40 mL, 5-40 mL, IntraVENous, PRN, Alphonse Seip, MD 
  acetaminophen (TYLENOL) tablet 650 mg, 650 mg, Oral, Q6H PRN **OR** acetaminophen (TYLENOL) suppository 650 mg, 650 mg, Rectal, Q6H PRN, Alphonse Seip, MD 
  LORazepam (ATIVAN) injection 2 mg, 2 mg, IntraVENous, Q5MIN PRN, Mk Avendaño MD, 2 mg at 03/31/21 2049   sodium chloride (NS) flush 5-40 mL, 5-40 mL, IntraVENous, Q8H, Mk Avendaño MD, 10 mL at 04/01/21 0541 
  sodium chloride (NS) flush 5-40 mL, 5-40 mL, IntraVENous, PRN, Mk Avendaño MD 
  midazolam in normal saline (VERSED) 1 mg/mL infusion, 0-2 mg/hr, IntraVENous, CONTINUOUS, Laura Nunes MD, Stopped at 03/31/21 1318   NOREPINephrine (LEVOPHED) 8 mg in 5% dextrose 250mL (32 mcg/mL) infusion, 0.5-16 mcg/min, IntraVENous, TITRATE, Mk Avendaño MD, Stopped at 04/01/21 0200   levETIRAcetam (KEPPRA) 750 mg in 0.9% sodium chloride 100 mL IVPB, 750 mg, IntraVENous, BID, VarysLuanne MD, Last Rate: 400 mL/hr at 04/01/21 0853, 750 mg at 04/01/21 0512   lacosamide (VIMPAT) 100 mg in 0.9% sodium chloride 100 mL IVPB, 100 mg, IntraVENous, Q12H, Ralf Nevarez MD, 100 mg at 03/31/21 2038   midodrine (PROAMATINE) tablet 5 mg, 5 mg, Oral, TID WITH MEALS, Devin Ayala MD, 5 mg at 04/01/21 0853 
  0.9% sodium chloride infusion 250 mL, 250 mL, IntraVENous, PRN, Franci Benjamin DO 
  pantoprazole (PROTONIX) 40 mg in 0.9% sodium chloride 10 mL injection, 40 mg, IntraVENous, Q24H, Franci Benjamin DO, 40 mg at 04/01/21 0799   alteplase (CATHFLO) 1 mg in sterile water (preservative free) 1 mL injection, 1 mg, InterCATHeter, PRN, Blake Fitch MD, 1 mg at 04/01/21 4186   propofol (DIPRIVAN) 10 mg/mL infusion, 0-50 mcg/kg/min, IntraVENous, TITRATE, Zoe Scott MD, Stopped at 03/31/21 0100 PMHx: has a past medical history of Down syndrome, History of vascular access device (06/30/2020), Seizures ([facility] [address] Sleep apnea, and Stroke ([facility] [address] (2019). She also has no past medical history of Adverse effect of anesthesia, Diabetes ([facility] [address] Difficult intubation, Malignant hyperthermia due to anesthesia, Nausea & vomiting, or Pseudocholinesterase deficiency. PSHx: has a past surgical history that includes hx gi; hx other surgical; and colonoscopy (N/A, 7/8/2020). Impression / Plan ICD-10-CM ICD-9-CM 1. Status epilepticus (HCC)  G40.901 345.3 2. Elevated Dilantin level  R78.89 790.6 3. Gastroesophageal reflux disease without esophagitis  K21.9 530.81  
4. Hypotension due to drugs  I95.2 458.8 E947.9 5. Inability to walk  R26.2 719.7 6. Iron deficiency  E61.1 280.9 7. On tube feeding diet  Z78.9 V49.89  
8. Seizure ([address]  R56.9 780.39  
9.  Severe intellectual disability  F72 18.4  
 
 
49 y.o. female with PMHx Down Syndrome, Epilepsy Admitted d/t status epilepticus after Dilantin was held due for few days due to supratherapeutic level Last dilantin level (on 3-31-21 early AM) was 27 No seizures seen on continuous EEG; eeg was d/c'd 
Continue to hold Dilantin Continue with current AEDs Agree with weaning off sedation Will f/u tomorrow Signed By: Ashwin Ovalle MD   
 April 1, 2021

## 2021-04-01 NOTE — PROGRESS NOTES
0700- Verbal shift change report given to Consuelo Rodriguez RN (oncoming nurse) by KLAUDIA Covarrubias (offgoing nurse). Report included the following information SBAR, Intake/Output, Recent Results and Cardiac Rhythm NSR/sinus tach. Primary Nurse Zohreh Jonas RN and Bertha Elkins RN performed a dual skin assessment on this patient No impairment noted 0800- Assessment completed, see doc flowsheet. Pt turned and repositioned in bed, mouth care and trach care completed. Bloody secretions suctioned from trach. Tube feed advanced to 45 mL/hr, no gastric residual.  
 
0820- Dr. Jacob Piedra in room assessing pt via telehealth monitor, wants to try pt on SBT. 0840- Pt failed SBT due to apnea. 1000- Pt turned and repositioned, mouth care and trach care completed. Moderate, thick secretions suctioned from trach. 1100- Pt restless in bed, HR 130s and SBP 150s. Trach suctioned, Dr. Jacob Piedra notified, and PRN Ativan given. 1200- Reassessment completed, see doc flowsheet. Pt turned and repositioned in bed, mouth care and trach care completed. 1250- PRN fentanyl given for adult non-verbal pain score of 7. 
 
1400- Pt turned and repositioned in bed. Mouth care and trach care completed, trach suctioned small amount of tan sputum. 1600- Reassessment completed, see doc flowsheet. Pt turned and repositioned in bed, trach suctioned, and mouth care completed. 1620- PICC team at bedside to place PICC line. Pt's HR elevated throughout procedure. 1637- PRN fentanyl given and Dr. Jacob Piedra notified regarding pt's HR. 
 
1650- Femoral central line removed, no evidence of bleeding, pressure held for five minutes. 1800- Pt turned and repositioned, mouth care and trach care completed. 1900- Bedside shift change report given to Jesus Perry RN (oncoming nurse) by Consuelo Rodriguez RN (offgoing nurse). Report included the following information SBAR, Kardex, Intake/Output and Cardiac Rhythm sinus tach.

## 2021-04-01 NOTE — PROGRESS NOTES
VAT Note - To acknowledge order for PICC placement in order to remove femoral line placed 3/31/21. Chart reviewed for PICC placement evaluation. Areas reviewed include, but are not limited to, patient's current and past H&P, Lab and Procedure Results, all notes, media records and medications. Will contact pt's mother Tash Jurado for consent and plan to place this afternoon.

## 2021-04-01 NOTE — PROGRESS NOTES
PICC Placement Note PRE-PROCEDURE VERIFICATION Correct Procedure: yes Correct Site:  yes Temperature: Temp: 99.6 °F (37.6 °C), Temperature Source: Temp Source: Axillary Recent Labs 04/01/21 
0402 03/30/21 2119 03/30/21 2119 BUN 24*   < > 47* CREA 0.94   < > 1.24*    < > 584* INR  --   --  1.0 WBC 7.8   < > 12.5*  
 < > = values in this interval not displayed. Allergies: Depakote [divalproex] Education materials for PICC Care given: yes. See Patient Education activity for further details. PICC Booklet placed at bedside: yes Closed Ended PICC Catheters: 
Flush Lumens as Follows: 
Intermittent Medication:   Flush before and after each medication with 10 ml NS. Unused Ports:  Flush every 8 hours with 10 ml NS. 
TPN Ports:  Flush every 24 hours with 20 ml NS prior to hanging new bag. Blood Draws: Stop infusion, draw off and waste 10 ml of blood. Draw sample with 10cc syringe or greater. DO NOT USE VACUTAINER . Transfer with appropriate device to lab  tubes. Flush with 20 ml NS. Dressing Change:  Every 7 days, and PRN using sterile technique if integrity of dressing is compromised. Initial dressing change for central line 24-48 hours post insertion if gauze is used. Apply new dressing per policy. PROCEDURE DETAIL Consent was obtained and all questions were answered related to risks and benefits. A double lumen PICC line was inserted, as a sterile procedure using ultrasound and modified Seldinger technique for vascular access. The following documentation is in addition to the PICC properties in the lines/airways flowsheet : 
Lot #: [id number] Lidocaine 1% administered intradermally :yes Internal Catheter Total Length: 38 (cm) Vein Selection for PICC:right brachial 
Central Line Bundle followed yes Complication Related to Insertion:no The placement was verified by EKG, MAX P WAVE @ 38 (cm). PER EKG PICC TIP @ C/A junction. Line is okay to use: yes Ada Vee Deneen Rivas

## 2021-04-01 NOTE — PROGRESS NOTES
Problem: Risk for Spread of Infection Goal: Prevent transmission of infectious organism to others Description: Prevent the transmission of infectious organisms to other patients, staff members, and visitors. Outcome: Progressing Towards Goal 
  
Problem: Patient Education:  Go to Education Activity Goal: Patient/Family Education Outcome: Progressing Towards Goal 
  
Problem: Ventilator Management Goal: *Adequate oxygenation and ventilation Outcome: Progressing Towards Goal 
Goal: *Patient maintains clear airway/free of aspiration Outcome: Progressing Towards Goal 
Goal: *Absence of infection signs and symptoms Outcome: Progressing Towards Goal 
Goal: *Normal spontaneous ventilation Outcome: Progressing Towards Goal 
  
Problem: Patient Education: Go to Patient Education Activity Goal: Patient/Family Education Outcome: Progressing Towards Goal 
  
Problem: Falls - Risk of 
Goal: *Absence of Falls Description: Document Heber Fabio Fall Risk and appropriate interventions in the flowsheet. Outcome: Progressing Towards Goal 
Note: Fall Risk Interventions: 
  
 
Mentation Interventions: Bed/chair exit alarm, Adequate sleep, hydration, pain control, Door open when patient unattended, More frequent rounding, Room close to nurse's station, Toileting rounds Medication Interventions: Bed/chair exit alarm, Teach patient to arise slowly Elimination Interventions: Bed/chair exit alarm, Call light in reach, Toileting schedule/hourly rounds Problem: Patient Education: Go to Patient Education Activity Goal: Patient/Family Education Outcome: Progressing Towards Goal 
  
Problem: Pressure Injury - Risk of 
Goal: *Prevention of pressure injury Description: Document Rolf Scale and appropriate interventions in the flowsheet.  
Outcome: Progressing Towards Goal 
Note: Pressure Injury Interventions: 
Sensory Interventions: Assess changes in LOC, Avoid rigorous massage over bony prominences, Check visual cues for pain, Discuss PT/OT consult with provider, Float heels, Keep linens dry and wrinkle-free, Maintain/enhance activity level, Minimize linen layers, Monitor skin under medical devices, Pad between skin to skin, Pressure redistribution bed/mattress (bed type), Suspension boots, Turn and reposition approx. every two hours (pillows and wedges if needed), Use 30-degree side-lying position Moisture Interventions: Absorbent underpads, Apply protective barrier, creams and emollients, Check for incontinence Q2 hours and as needed, Internal/External urinary devices, Limit adult briefs, Maintain skin hydration (lotion/cream), Minimize layers, Moisture barrier, Offer toileting Q_hr Activity Interventions: Assess need for specialty bed, Pressure redistribution bed/mattress(bed type) Mobility Interventions: HOB 30 degrees or less, Pressure redistribution bed/mattress (bed type), Turn and reposition approx. every two hours(pillow and wedges), Suspension boots Nutrition Interventions: Document food/fluid/supplement intake, Discuss nutritional consult with provider Friction and Shear Interventions: Apply protective barrier, creams and emollients, HOB 30 degrees or less, Lift sheet, Lift team/patient mobility team, Minimize layers, Transferring/repositioning devices Problem: Patient Education: Go to Patient Education Activity Goal: Patient/Family Education Outcome: Progressing Towards Goal

## 2021-04-01 NOTE — PROCEDURES
Prolonged EEG Monitoring Report [facility] [address] Name:    Vikram Butler MRN:   [id number] Admission Date:  3/30/2021 Start date/ time:  3-31-21/ 1142 End date/ time:  4-1-21/ 0902 Interpreting physician:  Cherrie Nicole MD 
 
Indication: This prolonged EEG study was requested on this 49 y.o. female to evaluate for status epilepticus. Current Meds: has a current medication list which includes the following prescription(s): cetirizine, omeprazole, guaifenesin, torsemide, albuterol-ipratropium, melatonin, diazepam, OTHER,NON-FORMULARY,, levetiracetam, albuterol-ipratropium, and phenytoin, and the following Facility-Administered Medications: enoxaparin, sodium chloride, sodium chloride, acetaminophen **OR** acetaminophen, lorazepam, sodium chloride, sodium chloride, midazolam in normal saline, norepinephrine, levETIRAcetam (KEPPRA) 750 mg in 0.9% sodium chloride 100 mL IVPB, lacosamide (VIMPAT) 100 mg in 0.9% sodium chloride 100 mL IVPB, midodrine, 0.9% sodium chloride, pantoprazole (PROTONIX) 40 mg in 0.9% sodium chloride 10 mL injection, alteplase (CATHFLO) 1 mg in sterile water (preservative free) 1 mL injection, and propofol. Technical: This study was recorded in multiple montages using a digital EEG machine according to the 10-20 international placement system. The technical quality of the study was adequate. Single lead EKG was recorded. Interictal EEG: On sedation. Waxing-waning burst suppression pattern. On Sedation. At most posterior dominant rhythm reaches 2-3 Hz on both sides for short periods of time. Sleep and Drowsiness not recorded. Single lead EKG didn't show any abnormalities. Areas of focal slowing: none Epileptiform discharges: none Electrographic seizures: none Ictal recordings: none Clinical Correlation: Abnormal video-EEG recording.  Severe generalized slowing (burst-suppression pattern) consistent with a severe encephalopathic process. May be due to ongoing sedation. No seizure discharges or electrographic seizures seen. Clinical correlation is necessary. Video-EEG recording was discontinued.

## 2021-04-01 NOTE — PROGRESS NOTES
48 [facility] with [facility] Resident Progress Note in Brief S: Patient seen and examined at bedside. Patient resting comfortably with no acute distress. O: 
Visit Vitals /74 Pulse 88 Temp 99.3 °F (37.4 °C) Resp 14 Ht 4' 9\" (1.448 m) Wt 120 lb 5.9 oz (54.6 kg) SpO2 97% BMI 26.05 kg/m² Physical Examination:  
General appearance - No acute distress. Chest - Trach tube in place, CTAB, no wheezes, rales or rhonchi, symmetric air entry Heart - normal rate, regular rhythm, normal S1, S2, no murmurs, rubs, clicks or gallops, Abdomen - soft, nontender, nondistended, +BS,no rebound or guarding Neurological - Sedated. Extremities - no pedal edema, Cap refill <2sec A/P:  
Status epilepticus: CT head no acute abnormality, chronic encephalomalacia in L&R frontal lobe, R parietal lobe. Supra-therapeutic phenytoin level at 34. POA EKG w/ sinus tach. F/w neuro Dr. Lendia Oppenheim versed gtt. - Keppra 750mg IV BID 
- Vimpat 100mg BID 
-Mechanical ventilation though chronic trach tube, wean when less sedated 
-F/u paired [id number] 
-Ativan 2mg q5min prn for seizure, max 3 doses 
-Per neurology: appreciate recs -HOLD home Phenytoin 125mg BID until no longer supratherapeutic - Intensivist following, appreciate recs 
- Daily CBC, CMP, Phenytoin levels - Seizure and fall precautions  
- Neurochecks q4H Hypotension: Likely 2/2 sedatives. S/p 2L NS bolus. -Levo gtt, goal MAP >65, wean as tolerated  
-Midodrine 5mg TID started per pulm - appreciate recs Anemia: POA Hgb 10.1 > 6.3 (3/31). S/p 1U pRBC. FOBT negative. - F/u 2hr prost H&H 
- GI consulted, no GI bleeding > no intervention at this time Please see the primary team's daily progress note for the patient's full plan. Hailee Clay MD 
Family Medicine Resident

## 2021-04-01 NOTE — PROGRESS NOTES
Progress Note Date:2021       Room:[address] [place] [address] Patient Shamir Bustillo     YOB: 1970     Age:51 y.o. Subjective Subjective: 
: OFF verse gtt. On cEEg. ff Levophed,toelrating midodrine. Pn vent support: 14/400/40/6. Did not tolerate SPV this morning. Has periods of agitation. Received 1 U RBCs . Hb up to 9.2 Review of Systems:Unable to obtain given clinical condition. Objective Vitals Last 24 Hours: TEMPERATURE:  Temp  Av.5 °F (36.9 °C)  Min: 97.8 °F (36.6 °C)  Max: 99.3 °F (37.4 °C) RESPIRATIONS RANGE: Resp  Av  Min: 10  Max: 33 PULSE OXIMETRY RANGE: SpO2  Av.8 %  Min: 95 %  Max: 100 % PULSE RANGE: Pulse  Av  Min: 76  Max: 142 BLOOD PRESSURE RANGE: Systolic (43FWT), VVI:734 , Min:70 , BRP:053  
; Diastolic (16HJO), MRQ:55, Min:36, Max:136 
 
I/O (24Hr): Intake/Output Summary (Last 24 hours) at 2021 1050 Last data filed at 2021 1000 Gross per 24 hour Intake 1475.85 ml Output 600 ml Net 875.85 ml Objective Physical Examination: I examined the patient via telemedicine, with its associated limitations. I reviewed the electronic medical record, the x-rays, labs, progress notes, previous history and physicals and consultation notes that were available in the electronic medical record. I beamed in and examined the patient On exam: OE spontaneous,No commands Trach in place S1,S2 Decreased breath sounds bilateral at bases Abdomen soft, ND Labs/Imaging/Diagnostics Labs: CBC: 
Recent Labs 21 
0402 21 
2319 21 
1324 21 
0105 WBC 7.8  --  6.0 9.3  
RBC 3.30*  --  2.05* 2.56* HGB 10.2* 9.2* 6.3* 8.0*  
HCT 31.2* 28.7* 20.6* 24.3*  
MCV 94.5  --  100.5* 94.9  
RDW 14.7*  --  14.4 14.4   --  346 415* CHEMISTRIES: 
Recent Labs 21 
8954 21 
0105 21 
2119  137 133* 130*  
K 3.7 4.6 3.9 4.9 * 105 101 95* CO2 24 23 22 24 BUN 24* 38* 44* 47* CA 8.9 9.2 9.1 9.7 PHOS 4.5  --  4.9* 5.6*  
MG 2.3  --  2.3 2.8*  
PT/INR: 
Recent Labs  
  03/30/21 2119 INR 1.0 APTT:No results for input(s): APTT in the last 72 hours. LIVER PROFILE: 
Recent Labs 04/01/21 
0402 03/31/21 
0105 03/30/21 2119 AST 40* 30 60* ALT 28 27 36 Lab Results Component Value Date/Time ALT (SGPT) 28 04/01/2021 04:02 AM  
 AST (SGOT) 40 (H) 04/01/2021 04:02 AM  
 Alk. phosphatase 207 (H) 04/01/2021 04:02 AM  
 Bilirubin, direct 0.08 11/05/2020 11:23 AM  
 Bilirubin, total 0.5 04/01/2021 04:02 AM  
 
 
Imaging Last 24 Hours: 
No results found. Assessment//Plan Principal Problem: 
  Status epilepticus ([facility] [address] (3/30/2021) Active Problems: 
  Iron deficiency (12/11/2019) VICK (acute kidney injury) ([facility] [address] (3/31/2021) Edema (3/31/2021) Hypotension (3/31/2021) Hyperphosphatemia (3/31/2021) Hyponatremia (3/31/2021) Thrombocytosis ([facility] [address] (3/31/2021) Assessment & Plan A/P: 
49 with pmh Downs, c-diff and seizures admitted with status. Assessment: 
 
Status Epilepticus Acute Resp Failure/Trach Dependence: Hypotension Anemia VICK, improving Hx Recurrent C-diff: s/p fecal transplant Plan: 
Noted cEEG results. Keep off versed gtt as able. On Vimpat and Keppra. Neurology following. Add fentanyl and ativan PRN. Monitor BP off pressors. Continue midodrine Continue Vent support. PSV as tolerated. Hope to  Be back on Trach collar if next 1-2 days Sp RBCs transfusion. Monitor H/H Monitor UO and renal indices. Continue TF 
 DC Femoral Line. Place PICC/Midline Protonix/SQ Lovenox Poor prognosis 
 
 I performed all aspects of the physical examination via Telemedicine associated with two way audio and video communication and with the on-site assistance of  the bedside nurse.   I am located in [place], [place] and the patient is located in [place] at [facility]. The patient is critically ill in the ICU. I  personally  reviewed the pertinent medical records, laboratory/ pathology data and radiographic images. The decision making regarding this patient is as documented above, which was generated  following  discussion  with the multidisciplinary ICU team and creation of a treatment plan for  the patient. We discussed the patient's interval history and future coordination of care and  plans. The patient's medications  were reviewed and changes made as stipulated above. Due to  critical illness impairing one or more vital organs of this patient resulting in life threatening clinical situation  I have provided direct, frequent personal  assessment and manipulation in management plan and spent 40 minutes  of  critical care time excluding the time spent on procedures and teaching. Greater than 50% of this time  in patients care was  employed  in counseling and coordination of care and engaged in face to face discussion of case management issues, addressing questions, and outlining a plan of  therapy. Pt at risk of life threatening deterioration from seizure, acute on chronic resp failure Pt seen and evaluated via tele encounter. Audio and Video were used for this interaction.  
 
Electronically signed by Shahnaz Simon MD on 4/1/2021 at 10:50 AM

## 2021-04-01 NOTE — PROGRESS NOTES
Chart accessed to assist primary nurse, Syl Brannon RN, in role of preceptor. 1030- Call from [facility], [facility] need for CDiff sample sent down to lab today. Pt with very small amount of loose stool today and was unable to send sample. 1200- Call from [facility] from the patient's group home. Updated on patient condition. Had questions about restarting the patient's home meds including duo neb and guaifenesin four times day and would like to speak with attending. Dr. Adam Lainez with [facility] team called and updated.

## 2021-04-01 NOTE — PROGRESS NOTES
[facility] [address] with [facility] Resident Progress Note in Brief S: Patient seen and examined at bedside. Patient is awake with no acute distress. O: 
Visit Vitals BP (!) 85/46 Pulse 94 Temp 99.3 °F (37.4 °C) Resp 10 Ht 4' 9\" (1.448 m) Wt 122 lb 2.2 oz (55.4 kg) SpO2 99% BMI 26.43 kg/m² Physical Examination General appearance - No acute distress Chest - Trach tube in place. Clear to auscultation, no wheezes, rales or rhonchi, symmetric air entry Heart - normal rate, regular rhythm, normal S1, S2, no murmurs, rubs, clicks or gallops, Abdomen - soft, nontender, nondistended, no masses or organomegaly Neurological - alert, but not interactive Extremities - peripheral pulses normal, no pedal edema, no clubbing or cyanosis A/P:  
Status epilepticus: CT head no acute abnormality, chronic encephalomalacia in L&R frontal lobe, R parietal lobe. Supra-therapeutic phenytoin level at 34. POA EKG w/ sinus tach. F/w neuro Dr. Jose Guadalupe Mtz versed gtt.  
-No seizures seen on continuous EEG 
-Keppra 750mg IV BID 
-HOLD home Phenytoin 125mg BID until no longer supratherapeutic 
-Mechanical ventilation through chronic trach tube, wean when less sedated  
-Ativan 2mg q5min prn for seizure, max 3 doses 
-Intensivist following, appreciate recs 
-Seizure and fall precautions   
  
Anemia in setting of hx of GI bleed: POA Hgb 10.1 (b/l 13). FOBT neg. S/p 1u pRBC. 
-Hgb improved to 9.2 post-transfusion, 10.2 this morning 
-Protonix 40mg IV daily 
-Per GI no indication of GI bleed, no intervention at this time  
  
Hypotension: Likely 2/2 sedatives. S/p 2L NS bolus. S/p levo gtt. -Midodrine 5mg TID started per pulm - appreciate recs Please see daily progress note for detailed plan. Odalys Dorado MD 
Family Medicine Resident

## 2021-04-01 NOTE — PROGRESS NOTES
1900 - Bedside and Verbal shift change report given to Darin Boo (oncoming nurse) by Waqas Sands (offgoing nurse). Report included the following information SBAR, Procedure Summary, Intake/Output, MAR, Cardiac Rhythm NSR, Sinus Tach and Dual Neuro Assessment. Primary Nurse Anne Hopson and Waqas Sands RN performed a dual skin assessment on this patient No impairment noted Rolf score is 10 
2000 - Assessment complete, patient very restless, increased HR, increased BP, Levo stopped, S1 S2 heard, clean to diminished lung sounds, active bowel sounds. Patient had urine and stool occurrence. Stool loose. Patient also diaphoretic. Mouth and trach care complete, patient turned. 2049 - Ativan given for restlessness, increased HR and BP. 
2200 - Mouth and trach care complete, patient turned. 0000 - Reassessment complete, patient resting, mouth and tach care complete, patient turned. 0100 - Cath alek placed in blue and white port on Fem line due to no blood draw back. 0200 - Mouth and trach care complete, patient turned. 0400 - Reassessment complete, mouth and trach care complete, patient turned. 0600 - Mouth and Trach care complete, patient turned. 0700 - Bedside and Verbal shift change report given to [email or web address] (oncoming nurse) by Darin Boo (offgoing nurse). Report included the following information SBAR, Procedure Summary, Intake/Output, Recent Results, Cardiac Rhythm NSR, Sinus Tach and Dual Neuro Assessment.

## 2021-04-01 NOTE — PROGRESS NOTES
Transition of care note: 
 
RUR 15% Pt remains intubated (Peep 6,fio2 40%) Pt has a trach and peg. When hospitalized @ [facility] within the past year,pt had a lengthy hospitalization prompting the need for tracheostomy. I attempted to contact  pt.'s Murtaza Keepers @ [phone] to discuss disposition needs and left her a VM to please call me back. If no call back by tomorrow,I will contact the group home where pt resides. Valentin Riley 
[phone]

## 2021-04-02 NOTE — PROGRESS NOTES
[id number] [facility] PROGRAM 
PROGRESS NOTE  
 
4/2/2021 PCP: Darrell Benavidez NP Assessment/Plan:  
 
Mario Peguero is a 49 y.o. female with PMHx of Down Syndrome (non-verbal at baseline), S/p Trach and PEG tube, seizures, encephalomalacia, ANNE, h/o recurrent Cdiff colitis admitted for status epilepticus. 24 Hour Events: Levophed gtt restarted due to low MAPs. Fentanyl started for sedation on vent. Pt w/ fever of 100.4 at midnight. Sepsis 2/2 Ventilator-acquired PNA: Pt originally admitted w/ 3/4 SIRs that resolved after initial fluid resuscitation. No infectious etiology suspected as leukocytosis and tachycardia believed 2/2 to seizures. Elevated WBC today, temp up to 100.4, and pt has continued tachycardia. CXR shows minimal interstitial pulmonary edema versus interstitial pneumonia. BCx NGTD. -BCx, lactic acid, and UA pending 
-Will start vanc and cefepime for empiric VAP treatment 
-Daily CBC Seizures: Pt admitted in status epilepticus. CT head no acute abnormality, chronic encephalomalacia in L&R frontal lobe, R parietal lobe. Supra-therapeutic phenytoin level at 34. POA EKG w/ sinus tach. F/w neuro Dr. Jose Guadalupe Mtz versed gtt. BCx NGTD. EEG w/ no evidence of seizure activity. -Mechanical ventilation though chronic trach tube, wean as tolerated  
-Ativan 2mg q5min prn for seizure, max 3 doses 
-Per neurology: appreciate recs -HOLD home Phenytoin 125mg BID - OP neuro to reevaluate restarting 
 -Keppra 750mg IV BID, vimpat 100mg BID 
 -Daily phenytoin levels Intensivist following, appreciate recs 
-Daily CBC, CMP 
-Seizure and fall precautions   
 
Anemia of chronic disease in setting of hx of GI bleed: POA Hgb 10.1 (b/l 13) > 6.3. Hx of GI bleed, admission 3/2021 to [address] [place] [address], EGD & Colonoscopy at that time revealed gastritis and colitis. FOBT neg. No obvious source of bleeding, possibly 2/2 hemodilution. S/p 1u pRBC. Iron studies suggest ACD. Hgb stable.  
-Per GI no indication of GI bleed, no intervention at this time - appreciate recs 
-Protonix 40mg IV daily 
-Daily CBC 
  
Hypotension: Likely 2/2 sedatives. S/p 2L NS bolus. S/p levo gtt. -Levo gtt PRN 
-Midodrine 5mg TID started per pulm - appreciate recs VICK - improving. POA Cr 1.24 (BL 0.5). S/p 2L NS bolus.  
-Daily CMP 
-HOLD home Torsemide 40mg  
  
Hyponatremia - resolved: Likely 2/2 IVVD. POA Na 130.  
-Daily CMP 
  
Hyperphosphatemia: Improving after fluids. POA P 5.6.  
-Daily phos 
  Thrombocytosis: RESOLVED. Likely 2/2 IVVD as resolved after fluids. POA plt 584.  
-Daily CBC 
  
H/o ANNE: POA Hgb 10.1 (BL 13) > 6.3, s/p 1u pRBC. Not on home iron. Recently hospitalized in March 2021 for GI bleed at [facility].   
-Monitor on daily labs -Iron studies pending 
  
Elevated AP, AST in setting of hepatic steatosis: Chronic. POA , AST 60.  
-Monitor on daily labs  
  
S/p Trach: Due to hx of seizures, aspiration PNA, and previous intubation. 
-Routine care  
-Wean intubation as tolerated 
  
S/p PEG-tube: Home tube feedings.  
-Start tube feeds  
  
Chronic edema: Home Torsemide 40mg daily. F/w Nephrology OP. 
-HOLD home Torsemide d/t VICK 
  
GERD: stable. -Protonix 40mg IV daily  
 
H/o Cdiff colitis: S/p fecal transplant.  
-Enteric precuations 
  
  
FEN/GI -NG tube feeds Activity - Activity w/assitance DVT prophylaxis - Held GI prophylaxis - Lovenox Fall prophylaxis - Fall precautions ordered Code Status - Full. Discussed with Caregiver Next of Kin Name and [address], mother/ [phone]. Christine Marin, group home . Claudean Ruiz, MD 
Family Medicine Resident [place] discussed with Dr. Rubin Witt. Subjective: Pt was seen and examined at bedside. Afebrile, tachycardic. Pt intubated. Nonverbal at baseline. Objective:  
Physical examination Patient Vitals for the past 24 hrs: 
 Temp Pulse Resp BP SpO2  
04/02/21 1115  (!) 119 24 (!) 106/54 99 % 04/02/21 1100  93 10 121/67 100 % 04/02/21 1045  (!) 118 15 (!) 107/45 100 % 04/02/21 1000  91 10 (!) 87/50 99 % 04/02/21 0945  95 10 (!) 86/50 99 % 04/02/21 0930  (!) 128 24 108/70 100 % 04/02/21 0915  (!) 126 20 (!) 111/92 99 % 04/02/21 0900  97 10 (!) 98/59 100 % 04/02/21 0845  (!) 105 10 116/65 100 % 04/02/21 0830  (!) 106 10 (!) 105/39 100 % 04/02/21 0815  (!) 103 10 (!) 99/50 100 % 04/02/21 0800 98.6 °F (37 °C) (!) 114 11 120/73 100 % 04/02/21 0745  (!) 109 11 (!) 106/58 100 % 04/02/21 0730  (!) 125 24 94/75 100 % 04/02/21 0723  91 10  100 % 04/02/21 0715  93 10 109/65 100 % 04/02/21 0701  93     
04/02/21 0700  92 10 106/66 100 % 04/02/21 0417  (!) 128 27  98 % 04/02/21 0400 99.1 °F (37.3 °C) (!) 113 27 134/73 99 % 04/02/21 0230  98 20 (!) 79/42 98 % 04/02/21 0210 99.2 °F (37.3 °C) (!) 102 14 121/75 97 % 04/02/21 0200  (!) 103 20 (!) 74/39 97 % 04/02/21 0100  (!) 121 17 (!) 98/58 98 % 04/02/21 0003  94 10  99 % 04/02/21 0000 100.4 °F (38 °C) 96 10 (!) 78/41 99 % 04/01/21 2300  97 11 (!) 85/46 100 % 04/01/21 2200  (!) 117 20 (!) 94/55 98 % 04/01/21 2100  (!) 107 14 (!) 92/48 99 % 04/01/21 2015  97 10  98 % 04/01/21 2000 99.3 °F (37.4 °C) (!) 118 17 100/63 98 % 04/01/21 1900  (!) 145 (!) 43 (!) 122/92 99 % 04/01/21 1815  94 10  100 % 04/01/21 1800  95 10 (!) 81/51 100 % 04/01/21 1745  95 10  99 % 04/01/21 1730  99 10 (!) 81/48 99 % 04/01/21 1700  (!) 105 10  98 % 04/01/21 1600 99.7 °F (37.6 °C) (!) 119 20 (!) 115/59 100 % 04/01/21 1545    (!) 94/56   
04/01/21 1530  (!) 114 15 105/62 100 % 04/01/21 1519  (!) 109     
04/01/21 1517  98 10  100 % 04/01/21 1515  97 10 (!) 97/58 100 % 04/01/21 1500  95 11 (!) 98/57 100 % 04/01/21 1445  (!) 109 18 100/65 100 % 04/01/21 1430  97 10 (!) 97/57 100 % 04/01/21 1415  99 10 (!) 93/59 100 % 04/01/21 1400  100 10 100/60 99 % 21 1315  (!) 102 10 (!) 87/49 99 % 21 1245  (!) 120 11 (!) 155/135 99 % 21 1230  (!) 136 21 (!) 135/109 99 % 21 1215  (!) 124 27 116/83 97 % 21 1200 99.6 °F (37.6 °C) (!) 122 18 102/70 97 % Temp (24hrs), Av.4 °F (37.4 °C), Min:98.6 °F (37 °C), Max:100.4 °F (38 °C) O2 Device: Tracheostomy, Ventilator Date 21 - 21 0521 - 21 8782 Shift 7826-4469 0487-5919 24 Hour Total 9580-6898 3149-9040 24 Hour Total  
INTAKE  
I.V.(mL/kg/hr)  0.1(0) 0.1(0) 122.4  122.4 Levophed Volume  0.1 0.1 22.4  22.4 Volume (potassium chloride 10 mEq in 100 ml IVPB)    100  100 NG/ 180 495 120  120 Water Flush Volume (mL) (PEG/Gastrostomy Tube) 225  225 75  75 Medication Volume (PEG/Gastrostomy Tube) 50  50 Intake (ml) (PEG/Gastrostomy Tube) 40 180 220 45  45 Shift Total(mL/kg) 315(5.7) 180.1(3.3) 495.1(8.9) 242. 4(4.4)  242. 4(4.4) OUTPUT Urine(mL/kg/hr) 600(0.9) 250(0.4) 850(0.6) Urine Output (mL) (External Female Catheter 21) 600 250 850 Shift Total(mL/kg) 600(10.8) 250(4.5) 850(15.3) NET -285 -69.9 -354.9 242.4  242.4 Weight (kg) 55.4 55.4 55.4 55.4 55.4 55.4 General appearance - No acute distress. Sedated. Chest - Trach tube in place, intubated CTAB, no wheezes, rales or rhonchi, symmetric air entry Heart - tachycardic, regular rhythm, normal S1, S2, no murmurs, rubs, clicks or gallops, Abdomen - soft, nontender, nondistended, +BS,no rebound or guarding Neurological - Sedated and unresponsive to voice. Extremities - no pedal edema, Cap refill <2sec Data Review: CBC: 
Recent Labs 21 
0448 21 
0402 21 
2319 21 
1324 WBC 14.6* 7.8  --  6.0 HGB 8.6* 10.2* 9.2* 6.3* HCT 26.9* 31.2* 28.7* 20.6*  393  --  346 Metabolic Panel: 
Recent Labs 21 
5949 21 
0402 21 
5419 21 
0105 21 
2119  140 137 133* 130*  
K 3.1* 3.7 4.6 3.9 4.9  
* 109* 105 101 95* CO2 22 24 23 22 24 BUN 22* 24* 38* 44* 47* CREA 0.78 0.94 0.96 1.14* 1.24* * 97 129* 125* 71  
CA 9.0 8.9 9.2 9.1 9.7 MG 2.1 2.3  --  2.3 2.8* PHOS 4.1 4.5  --  4.9* 5.6* ALB 2.3* 2.4*  --  2.6* 3.0* TBILI 0.2 0.5  --  0.1* 0.2 ALT 25 28  --  27 36 INR  --   --   --   --  1.0 Micro: 
Lab Results Component Value Date/Time Culture result: NO GROWTH 2 DAYS 03/31/2021 02:11 AM  
 Culture result: NO GROWTH 6 DAYS 07/01/2020 02:32 AM  
 Culture result: NO GROWTH 5 DAYS 03/29/2020 10:45 AM  
 
Imaging: 
Ct Head Wo Cont Result Date: 3/30/2021 EXAM: CT HEAD WO CONT INDICATION: seizures COMPARISON: None. CONTRAST: None. TECHNIQUE: Unenhanced CT of the head was performed using 5 mm images. Brain and bone windows were generated. Coronal and sagittal reformats. CT dose reduction was achieved through use of a standardized protocol tailored for this examination and automatic exposure control for dose modulation. FINDINGS: There is an incidental cavum septum pellucidum. There is mild atrophy and compensatory dilatation of the ventricles. Areas of chronic encephalomalacia are seen in the right frontal lobe, right parietal lobe, and left frontal lobe. There are incidental bilateral basal ganglia calcifications. There is no intracranial hemorrhage, extra-axial collection, or mass effect. The basilar cisterns are open. No CT evidence of acute infarct. The bone windows demonstrate no abnormalities. The visualized portions of the paranasal sinuses and mastoid air cells are clear. Significant chronic encephalomalacia in the right frontal lobe, right parietal lobe, and left frontal lobe. Xr Chest [facility] Result Date: 3/30/2021 EXAM: XR CHEST PORT HISTORY: trach exchange. COMPARISON: 3/30/2021 FINDINGS: Portable AP. A tracheostomy tube is in place. The heart is normal size.  There is diffuse interstitial prominence again seen likely related to edema. No pleural effusion or pneumothorax. Tracheostomy tube in appropriate position. Unchanged diffuse interstitial prominence likely related to edema. Xr Chest [facility] Result Date: 3/30/2021 EXAM: XR CHEST PORT HISTORY: Chest pain. COMPARISON: 4/1/2020 FINDINGS: Portable AP. A tracheostomy tube is in place. The heart is normal size. There is unchanged diffuse interstitial prominence likely related to edema. No focal consolidation, pleural effusion, or pneumothorax. No significant interval change. Medications reviewed Current Facility-Administered Medications Medication Dose Route Frequency  potassium chloride 10 mEq in 100 ml IVPB  10 mEq IntraVENous Q1H  
 cefepime (MAXIPIME) 2 g in sterile water (preservative free) 10 mL IV syringe  2 g IntraVENous Q8H  
 vancomycin (VANCOCIN) 1,250 mg in 0.9% sodium chloride 250 mL (VIAL-MATE)  1,250 mg IntraVENous ONCE  
 enoxaparin (LOVENOX) injection 40 mg  40 mg SubCUTAneous Q24H  
 LORazepam (ATIVAN) injection 1 mg  1 mg IntraVENous Q2H PRN  
 fentaNYL citrate (PF) injection 25 mcg  25 mcg IntraVENous Q1H PRN  
 alteplase (CATHFLO) 1 mg in sterile water (preservative free) 1 mL injection  1 mg InterCATHeter PRN  
 guaiFENesin (ROBITUSSIN) 100 mg/5 mL oral liquid 200 mg  200 mg Oral Q6H PRN  
 albuterol-ipratropium (DUO-NEB) 2.5 MG-0.5 MG/3 ML  3 mL Nebulization Q4H RT  
 sodium chloride (NS) flush 5-40 mL  5-40 mL IntraVENous Q8H  
 sodium chloride (NS) flush 5-40 mL  5-40 mL IntraVENous PRN  
 acetaminophen (TYLENOL) tablet 650 mg  650 mg Oral Q6H PRN  Or  
 acetaminophen (TYLENOL) suppository 650 mg  650 mg Rectal Q6H PRN  
 LORazepam (ATIVAN) injection 2 mg  2 mg IntraVENous Q5MIN PRN  
 sodium chloride (NS) flush 5-40 mL  5-40 mL IntraVENous Q8H  
 sodium chloride (NS) flush 5-40 mL  5-40 mL IntraVENous PRN  
 midazolam in normal saline (VERSED) 1 mg/mL infusion  0-2 mg/hr IntraVENous CONTINUOUS  
 NOREPINephrine (LEVOPHED) 8 mg in 5% dextrose 250mL (32 mcg/mL) infusion  0.5-16 mcg/min IntraVENous TITRATE  levETIRAcetam (KEPPRA) 750 mg in 0.9% sodium chloride 100 mL IVPB  750 mg IntraVENous BID  lacosamide (VIMPAT) 100 mg in 0.9% sodium chloride 100 mL IVPB  100 mg IntraVENous Q12H  
 midodrine (PROAMATINE) tablet 5 mg  5 mg Oral TID WITH MEALS  
 0.9% sodium chloride infusion 250 mL  250 mL IntraVENous PRN  pantoprazole (PROTONIX) 40 mg in 0.9% sodium chloride 10 mL injection  40 mg IntraVENous Q24H  
 alteplase (CATHFLO) 1 mg in sterile water (preservative free) 1 mL injection  1 mg InterCATHeter PRN  propofol (DIPRIVAN) 10 mg/mL infusion  0-50 mcg/kg/min IntraVENous TITRATE Signed: 
 Cheri Stovall MD 
 Resident, Family Medicine Attending note: Attending note to follow. ..

## 2021-04-02 NOTE — PROGRESS NOTES
Addressed in a phone call Neurology - Inpatient Progress Note Name:   Andres Gordon 
MRN:    [id number] Admit Date:   3/30/2021 Follow up:   Status epilpeticus Interval History Prolonged EEG (3-31-21 to 4-1-21):  Abnormal video-EEG recording. Severe generalized slowing (burst-suppression pattern) consistent with a severe encephalopathic process. May be due to ongoing sedation. No seizure discharges or electrographic seizures seen. Clinical correlation is necessary. On Keppra 750 mg IV q12 hrs Added Vimpat 100 mg IV q12 hrs during this admission Dilantin has been held due to supratherpeutic level on admission Dilantin level this AM is 12.9 Reviewed interval notes: no seizure overnight (and no seizure since admission). Discussed with RN; no ongoing sedation; pt has order for prn fentanyl for agitation and RN says she's only given it once today. Resting in bed, sleeping, eyes closed, Doesn't open eyes to voice Does grimace, open eyes to nailbed pressure in extremities Resists passive arm movements Brief ROS: As noted above Allergies Allergen Reactions  Depakote [Divalproex] Swelling Current Facility-Administered Medications:  
  potassium chloride 10 mEq in 100 ml IVPB, 10 mEq, IntraVENous, Q1H, Froy West MD, Last Rate: 100 mL/hr at 04/02/21 1210, 10 mEq at 04/02/21 1210   cefepime (MAXIPIME) 2 g in sterile water (preservative free) 10 mL IV syringe, 2 g, IntraVENous, Q8H, Elba Saleem DO, 2 g at 04/02/21 1028 
  vancomycin (VANCOCIN) 1,250 mg in 0.9% sodium chloride 250 mL (VIAL-MATE), 1,250 mg, IntraVENous, ONCE, Mao Moulton DO, 1,250 mg at 04/02/21 1210 
  midodrine (PROAMATINE) tablet 10 mg, 10 mg, Oral, Q8H, Devin Ayala MD 
  phenytoin ER (DILANTIN ER) ER capsule 100 mg, 100 mg, Oral, TID, Luis Alberto Fontana MD 
  fosphenytoin (CEREBYX) injection 200 mg PE, 200 mg PE, IntraVENous, NOW, Ralf Nevarez MD 
  enoxaparin (LOVENOX) injection 40 mg, 40 mg, SubCUTAneous, Q24H, Taya Garza DO, 40 mg at 04/02/21 4160   LORazepam (ATIVAN) injection 1 mg, 1 mg, IntraVENous, Q2H PRN, Devin Ayala MD, 1 mg at 04/02/21 1123   fentaNYL citrate (PF) injection 25 mcg, 25 mcg, IntraVENous, Q1H PRN, Perico Guevara MD, 25 mcg at 04/02/21 1123   alteplase (CATHFLO) 1 mg in sterile water (preservative free) 1 mL injection, 1 mg, InterCATHeter, PRN, Devon Villafuerte MD 
  guaiFENesin (ROBITUSSIN) 100 mg/5 mL oral liquid 200 mg, 200 mg, Oral, Q6H PRN, Devin Ayala MD 
  albuterol-ipratropium (DUO-NEB) 2.5 MG-0.5 MG/3 ML, 3 mL, Nebulization, Q4H RT, Devin Ayala MD, 3 mL at 04/02/21 1127 
  sodium chloride (NS) flush 5-40 mL, 5-40 mL, IntraVENous, Q8H, Luanne Fermin MD, 10 mL at 04/02/21 1303   sodium chloride (NS) flush 5-40 mL, 5-40 mL, IntraVENous, PRN, Bernadette Welsh MD 
  acetaminophen (TYLENOL) tablet 650 mg, 650 mg, Oral, Q6H PRN **OR** acetaminophen (TYLENOL) suppository 650 mg, 650 mg, Rectal, Q6H PRN, Bernadette Welsh MD 
  LORazepam (ATIVAN) injection 2 mg, 2 mg, IntraVENous, Q5MIN PRN, Redell Saint, MD, 2 mg at 04/02/21 0337 
  sodium chloride (NS) flush 5-40 mL, 5-40 mL, IntraVENous, Q8H, Redell Saint, MD, 10 mL at 04/02/21 1303   sodium chloride (NS) flush 5-40 mL, 5-40 mL, IntraVENous, PRN, Redell Saint, MD 
  midazolam in normal saline (VERSED) 1 mg/mL infusion, 0-2 mg/hr, IntraVENous, CONTINUOUS, Perico Guevara MD, Stopped at 03/31/21 1318   NOREPINephrine (LEVOPHED) 8 mg in 5% dextrose 250mL (32 mcg/mL) infusion, 0.5-16 mcg/min, IntraVENous, TITRATE, Redell Saint, MD, Last Rate: 3.8 mL/hr at 04/02/21 1218, 2 mcg/min at 04/02/21 1218   levETIRAcetam (KEPPRA) 750 mg in 0.9% sodium chloride 100 mL IVPB, 750 mg, IntraVENous, BID, VarysLuanne MD, Last Rate: 400 mL/hr at 04/02/21 0912, 750 mg at 04/02/21 0912 
  0.9% sodium chloride infusion 250 mL, 250 mL, IntraVENous, PRN, Franci Benjamin DO 
  pantoprazole (PROTONIX) 40 mg in 0.9% sodium chloride 10 mL injection, 40 mg, IntraVENous, Q24H, Franci Benjamin, , 40 mg at 04/02/21 0810 
  alteplase (CATHFLO) 1 mg in sterile water (preservative free) 1 mL injection, 1 mg, InterCATHeter, PRN, Redell Saint, MD, 1 mg at 04/01/21 8140   propofol (DIPRIVAN) 10 mg/mL infusion, 0-50 mcg/kg/min, IntraVENous, TITRATE, Liz Coreria MD, Stopped at 03/31/21 0100 PMHx: has a past medical history of Down syndrome, History of vascular access device (06/30/2020), History of vascular access device (04/01/2021), Seizures ([address] Sleep apnea, and Stroke ([address] (2019). She also has no past medical history of Adverse effect of anesthesia, Diabetes ([address] Difficult intubation, Malignant hyperthermia due to anesthesia, Nausea & vomiting, or Pseudocholinesterase deficiency. PSHx: has a past surgical history that includes hx gi; hx other surgical; and colonoscopy (N/A, 7/8/2020). Impression / Plan ICD-10-CM ICD-9-CM 1. Status epilepticus (HCC)  G40.901 345.3 2. Elevated Dilantin level  R78.89 790.6 3. Gastroesophageal reflux disease without esophagitis  K21.9 530.81  
4. Hypotension due to drugs  I95.2 458.8 E947.9 5. Inability to walk  R26.2 719.7 6. Iron deficiency  E61.1 280.9 7. On tube feeding diet  Z78.9 V49.89  
8. Seizure ([address]  R56.9 780.39  
9. Severe intellectual disability  F72 318.1 10. VICK (acute kidney injury) ([facility] [address]  N17.9 584.9 11. Wheelchair bound  Z99.3 V46.3  
 
 
49 y.o. female with PMHx Down Syndrome, Epilepsy Admitted d/t status epilepticus after Dilantin was held due for few days due to supratherapeutic level. No seizures seen on continuous EEG. Dilantin level 4-2-21: 12.9 normal (rr 10-20) Continue Keppra 750 mg IV q12 hours Home dilantin dose was 125 mg per PEG every 8 hrs Due to supratherapeutic levels prior to admission, restarted it at lower dose 100 mg per PEG q8 hours Giving one time dose of fosphenytoin 200 mg/ PE x 1 now Dc'd Efren Phalen Discussed above med changes with patients RN 
 
Dr Ed Esquivel takes over Neurology service today. Will have him follow up. Signed By: Darren Spangler MD   
 April 2, 2021

## 2021-04-02 NOTE — PROGRESS NOTES
[address] Dosing Services: Antimicrobial Stewardship Progress Note Consult for antibiotic dosing of Vancomycin by Dr. King Lofton Pharmacist reviewed antibiotic appropriateness for 49year old , female  for indication of VAP Day of Therapy - 1 Plan: 
Vancomycin therapy: LD - 1250 mg 
MD - 750 mg IV Q12h 
Goal trough - 15 to 20 mcg/ml Date Dose & Interval Measured (mcg/mL) Extrapolated (mcg/mL) ? ? ? ?  
? ? ? ?  
? ? ? ? Other Antimicrobial 
(not dosed by pharmacist) Zosyn Monitor renal function closely Cultures Serum Creatinine Lab Results Component Value Date/Time Creatinine 0.78 04/02/2021 04:48 AM  
   
Creatinine Clearance Estimated Creatinine Clearance: 67.4 mL/min (based on SCr of 0.78 mg/dL). Procalcitonin Lab Results Component Value Date/Time Procalcitonin <0.05 04/01/2020 02:06 AM  
 
  
Temp  
99.1 °F (37.3 °C) WBC Lab Results Component Value Date/Time WBC 14.6 (H) 04/02/2021 04:48 AM  
   
H/H Lab Results Component Value Date/Time HGB 8.6 (L) 04/02/2021 04:48 AM  
  
Platelets Lab Results Component Value Date/Time PLATELET 833 72/29/6154 04:48 AM  
 
  
 
 
Pharmacist: Koko Grajeda PHARMD Contact information: [phone]

## 2021-04-02 NOTE — PROGRESS NOTES
30 [facility] [address] with [address] [facility] Resident Progress Note in Brief S: Patient seen and examined at bedside. Patient is asleep with no distress. O: 
Visit Vitals /67 Pulse 89 Temp 98.8 °F (37.1 °C) Resp 10 Ht 4' 9\" (1.448 m) Wt 122 lb 2.2 oz (55.4 kg) SpO2 100% BMI 26.43 kg/m² Physical Examination General appearance - No acute distress Chest - Trach tube in place. Clear to auscultation, no wheezes, rales or rhonchi, symmetric air entry Heart - normal rate, regular rhythm, normal S1, S2, no murmurs, rubs, clicks or gallops, Abdomen - soft, nontender, nondistended, no masses or organomegaly Neurological - Asleep Extremities - peripheral pulses normal, no pedal edema, no clubbing or cyanosis A/P:  
Sepsis 2/2 Ventilator-acquired PNA: Pt originally admitted w/ 3/4 SIRs that resolved after initial fluid resuscitation. No infectious etiology suspected as leukocytosis and tachycardia believed 2/2 to seizures. Elevated WBC, temp up to 100.4, and pt has continued tachycardia. CXR shows minimal interstitial pulmonary edema versus interstitial pneumonia. BCx NGTD. -Continue vanc and cefepime for empiric VAP treatment 
-Daily CBC Status epilepticus: CT head no acute abnormality, chronic encephalomalacia in L&R frontal lobe, R parietal lobe. Supra-therapeutic phenytoin level at 34. POA EKG w/ sinus tach. F/w neuro Dr. Hamlet Felix versed gtt.  
-No seizures seen on continuous EEG 
-Keppra 750mg IV BID 
-Re-started home Phenytoin 125mg BID 
-Mechanical ventilation through chronic trach tube, wean when less sedated  
-Ativan 2mg q5min prn for seizure, max 3 doses 
-Intensivist following, appreciate recs 
-Seizure and fall precautions   
  
Anemia in setting of hx of GI bleed: POA Hgb 10.1 (b/l 13). FOBT neg. S/p 1u pRBC.  
-Protonix 40mg IV daily 
-Per GI no indication of GI bleed, no intervention at this time  
  Hypotension: Likely 2/2 sedatives. S/p 2L NS bolus. S/p levo gtt. -Midodrine 5mg TID started per pulm - appreciate recs - Levophed 2 mcg/min Please see daily progress note for detailed plan. Braeden Hewitt MD 
Family Medicine Resident

## 2021-04-02 NOTE — PROGRESS NOTES
1900-Received shift report from [facility], [facility]; report including MAR, SBAR, Cardiac rhythm. 1930-Assessment completed, Pt. Hr 130s-140s, pt. seemed to be in discomfort. Administer 25mcg Fentanyl. Patient turned and readjusted in bed. 2000-Reassessed patient. Patient is calm and hr decreased less 120s. 2100-Mouth care and suction complted 2200-Patient resting comfortably with eyes closed. 0200-Patient blood pressure 74/39 MAP 47 Levo restarted. 0300-Patient bathed and turned. Ativan given. 0430-labs drawn and Fentanyl given. 0500-Notified on call Family Practice, Dr. Sawyer Chris of increased WBC and temp. 
0600-Chest X-ray and urine sample ordered.

## 2021-04-02 NOTE — PROGRESS NOTES
[address] [facility] PROGRAM 
PROGRESS NOTE  
 
4/3/2021 PCP: Josiah Lowe NP Assessment/Plan:  
 
Murtaza Chew is a 49 y.o. female with PMHx of Down Syndrome (non-verbal at baseline), S/p Trach and PEG tube, seizures, encephalomalacia, ANNE, h/o recurrent Cdiff colitis admitted for status epilepticus. 24 Hour Events: Randine Jews Sepsis 2/2 Ventilator-acquired PNA: Pt originally admitted w/ 3/4 SIRs that resolved after initial fluid resuscitation. No infectious etiology suspected as leukocytosis and tachycardia believed 2/2 to seizures. CXR 4/2: minimal interstitial pulmonary edema versus interstitial pneumonia. BCx 3/31 NGTD. LA wnl. 
-f/u BCx 4/2 
-Vanc and Zosyn (cefepime DC'd 4/2 as it lowers seizure threshold) -Duonebs q4h 
-Daily CBC 
-Daily SBT 
 
UTI Abnormal UA 4/2 
- f/u UCx - Abx as above Seizures: Pt admitted in status epilepticus. CT head no acute abnormality, chronic encephalomalacia in L&R frontal lobe, R parietal lobe. Supra-therapeutic phenytoin level at 34. POA EKG w/ sinus tach. F/w neuro Dr. Ki Resendez versed gtt. EEG w/ no evidence of seizure activity. -Mechanical ventilation though chronic trach tube, wean as tolerated  
-Ativan 2mg q5min prn for seizure, max 3 doses 
-Per neurology: appreciate recs 
 -Phenytoin 100mg q8 (4/2) (home dose 125mg q8) -Keppra 750mg IV BID 
            -STOP lacosamide  
-Daily CBC, CMP 
-Seizure and fall precautions   
 
Hypotension: Likely 2/2 sedatives. S/p levo gtt. -Levo gtt PRN 
-Increase to Midodrine 10mg TID (4/2) Anemia of chronic disease in setting of hx of GI bleed: POA Hgb 10.1 (b/l 13) > 6.3. Hx of GI bleed, admission 3/2021 to [address] [place] [address], EGD & Colonoscopy at that time revealed gastritis and colitis. FOBT neg. No obvious source of bleeding, possibly 2/2 hemodilution. S/p 1u pRBC. Iron studies suggest ACD. Hgb stable.  
-Per GI no indication of GI bleed, no intervention at this time - appreciate recs 
-Protonix 40mg IV daily 
-Daily CBC VICK - resolved. POA Cr 1.24 (BL 0.5). -Daily CMP 
-HOLD home Torsemide 40mg  
  
Hyponatremia - resolved: Likely 2/2 IVVD. POA Na 130.  
-Daily CMP 
  
Hyperphosphatemia- resolved:  POA P 5.6. Improved with IVF 
-Daily phos 
  Thrombocytosis- resolved: Likely 2/2 IVVD as resolved after fluids. POA plt 584.  
-Daily CBC 
  
H/o ANNE: Fe wnl this admission. POA Hgb 10.1 (BL 13) > 6.3, s/p 1u pRBC. Not on home iron. Recently hospitalized in March 2021 for GI bleed at [facility].   
-Monitor on daily labs 
  
Elevated ALP, AST in setting of hepatic steatosis: Chronic. POA , AST 60.  
-Monitor on daily labs  
  
S/p Trach: Due to hx of seizures, aspiration PNA, and previous intubation. 
-Routine care  
-Wean intubation as tolerated 
  
S/p PEG-tube: Home tube feedings.  
-Continue tube feeds  
  
Chronic edema: Home Torsemide 40mg daily. F/w Nephrology OP. 
-HOLD home Torsemide d/t VICK 
  
GERD: stable. -Protonix 40mg IV daily  
 
H/o Cdiff colitis: S/p fecal transplant.  
-Enteric precuations 
  
  
FEN/GI -NG tube feeds Activity - Activity w/assitance DVT prophylaxis - Held GI prophylaxis - Lovenox Fall prophylaxis - Fall precautions ordered Code Status - Full. Discussed with Caregiver Next of Kin Name and [address], mother/ [address]. Laila Patten, group home . Yosef Loaiza MD 
Family Medicine Resident Subjective: Pt was seen and examined at bedside. Afebrile, tachycardic, MAP 76. Patient is asleep but arousable. Objective:  
Physical examination Patient Vitals for the past 24 hrs: 
 Temp Pulse Resp BP SpO2  
04/03/21 0645  (!) 103 10 109/66 99 % 04/03/21 0630  (!) 127 20 (!) 141/93 99 % 04/03/21 0600  (!) 105 20 115/76 100 % 04/03/21 0545  (!) 104 15 (!) 101/58 99 % 04/03/21 0515  97 10 (!) 90/47 99 % 04/03/21 0456  (!) 106 10 (!) 87/48 99 % 04/03/21 0455  (!) 103 10 (!) 89/45 99 % 04/03/21 0445 Bree Bajwa 103 10 (!) 95/49 99 % 04/03/21 0430  (!) 112 10 113/64 99 % 04/03/21 0415  (!) 128 29 (!) 130/92 100 % 04/03/21 0402  (!) 125 28  100 % 04/03/21 0400 98.3 °F (36.8 °C) (!) 117 20 121/83 99 % 04/03/21 0345  78 10 115/68 100 % 04/03/21 0330  79 10 118/71 100 % 04/03/21 0315  81 10 121/72 100 % 04/03/21 0300  80 10 119/70 100 % 04/03/21 0245  79 10 115/69 100 % 04/03/21 0230  78 10 111/66 100 % 04/03/21 0215  83 10 104/62 100 % 04/03/21 0200  86 10 106/61 100 % 04/03/21 0145  (!) 118 24 (!) 118/92 99 % 04/03/21 0130    (!) 97/37 98 % 04/03/21 0115  89 10 (!) 105/58 99 % 04/03/21 0100  89 10 108/60 99 % 04/03/21 0045  89 10 112/61 100 % 04/03/21 0015  94 10 (!) 100/54 99 % 04/03/21 0000  90 10 (!) 96/49 99 % 04/02/21 2347  84 10  99 % 04/02/21 2345  85 10 (!) 95/53 100 % 04/02/21 2330  89 10 (!) 94/53 100 % 04/02/21 2315  91 10 (!) 94/55 99 % 04/02/21 2306  93     
04/02/21 2300 98.2 °F (36.8 °C) 99 10 115/65 99 % 04/02/21 2245  (!) 128 15 128/86 100 % 04/02/21 2230  (!) 122 15 (!) 151/121 100 % 04/02/21 2215  85 10 (!) 99/58 100 % 04/02/21 2145  84 10 100/61 100 % 04/02/21 2130  86 10 104/65 100 % 04/02/21 2115  87 10 98/63 100 % 04/02/21 2100  88 10 101/60 100 % 04/02/21 2045  91 10 101/63 100 % 04/02/21 2030  94 10 (!) 96/56 100 % 04/02/21 2015  95 10 (!) 98/59 100 % 04/02/21 2000  97 10 (!) 97/58 100 % 04/02/21 1945  96 10 (!) 95/55 100 % 04/02/21 1930  85 10 101/64 100 % 04/02/21 1927  86 11  100 % 04/02/21 1915  89 10 102/66 100 % 04/02/21 1900 98.3 °F (36.8 °C) 87 10 104/68 100 % 04/02/21 1845  92 11 114/70 100 % 04/02/21 1830  89 10 103/65 100 % 04/02/21 1815  89 10 101/67 100 % 04/02/21 1800  87 10 (!) 95/58 100 % 04/02/21 1745  97 12 111/73 100 % 04/02/21 1730  85 11 (!) 94/55 100 % 04/02/21 1715  92 12 (!) 89/53 99 % 04/02/21 1700  93 10 (!) 83/50 99 % 21 1645  98 11 (!) 77/45 99 % 21 1615  99 10 (!) 88/50 99 % 21 1603 98.8 °F (37.1 °C) (!) 101 11 100/67 99 % 21 1530  98 10 (!) 89/54 100 % 21 1515  97 10 (!) 89/57 100 % 21 1508  86 10  100 % 21 1501  86     
21 1500  87 10 (!) 85/52 100 % 21 1445  85 10 (!) 80/52 100 % 21 1430  96 11 120/69 100 % 21 1415  79 10 113/63 100 % 21 1400  80 10 97/63 100 % 21 1345  84 10 99/61 100 % 21 1330  91 10 (!) 94/59 100 % 21 1315  91 10 (!) 93/56 100 % 21 1300  (!) 110 15 (!) 88/65 99 % 21 1245  99 10 (!) 90/52 100 % 21 1230  (!) 102 10 (!) 93/54 99 % 21 1215  (!) 124 19 107/81 99 % 21 1200 99.1 °F (37.3 °C) (!) 120 16 (!) 89/49 98 % 21 1130  (!) 105 10 94/60 100 % 21 1127  (!) 116 10  99 % 21 1115  (!) 119 24 (!) 106/54 99 % 21 1100  93 10 121/67 100 % 21 1045  (!) 118 15 (!) 107/45 100 % 21 1000  91 10 (!) 87/50 99 % 21 0945  95 10 (!) 86/50 99 % 21 0930  (!) 128 24 108/70 100 % 21 0915  (!) 126 20 (!) 111/92 99 % 21 0900  97 10 (!) 98/59 100 % 21 0845  (!) 105 10 116/65 100 % 21 0830  (!) 106 10 (!) 105/39 100 % 21 0815  (!) 103 10 (!) 99/50 100 % 21 0800 98.6 °F (37 °C) (!) 114 11 120/73 100 % 21 0745  (!) 109 11 (!) 106/58 100 % 21 0730  (!) 125 24 94/75 100 % 21 0723  91 10  100 % 21 0715  93 10 109/65 100 % Temp (24hrs), Av.6 °F (37 °C), Min:98.2 °F (36.8 °C), Max:99.1 °F (37.3 °C) O2 Flow Rate (L/min): 50 l/min O2 Device: Ventilator Date 21 - 21 5192 21 - 21 9090 Shift [id number] 24 Hour Total 3951-4272 4444-0865 24 Hour Total  
INTAKE  
I.V.(mL/kg/hr) 796.2(1.2) 12.7(0) 808.8(0.6)   Levophed Volume 46.2 12.7 58.8 Volume (potassium chloride 10 mEq in 100 ml IVPB) 400  400 Volume (vancomycin (VANCOCIN) 1,250 mg in 0.9% sodium chloride 250 mL (VIAL-MATE)) 250  250 Volume (piperacillin-tazobactam (ZOSYN) 3.375 g in 0.9% sodium chloride (MBP/ADV) 100 mL MBP) 100  100 Volume (vancomycin (VANCOCIN) 750 mg in 0.9% sodium chloride 250 mL (VIAL-MATE)) 0  0 NG/ 195 615 Water Flush Volume (mL) (PEG/Gastrostomy Tube) 150 75 225 Intake (ml) (PEG/Gastrostomy Tube) 270 120 390 Shift Total(mL/kg) 9761.4(93) 207.7(3.7) 1423. 8(25.7) OUTPUT Urine(mL/kg/hr) 250(0.4) 300(0.5) 550(0.4) Urine Voided 250  250 Urine Occurrence(s) 1 x 1 x 2 x Urine Output (mL) (External Female Catheter 03/31/21)  300 300 Shift Total(mL/kg) 250(4.5) 300(5.4) 550(9.9) .2 -92.3 873.8 Weight (kg) 55.4 55.4 55.4 55.4 55.4 55.4 General appearance - No acute distress. Chest - Trach tube in place, intubated Rhonchi bilaterally, symmetric air entry Heart - tachycardic, regular rhythm, normal S1, S2, no murmurs, rubs, clicks or gallops, Abdomen - soft, nontender, nondistended, +BS,no rebound or guarding Neurological - Sleepy, arousable to voice Extremities - Edematous hands BL. No pedal edema, Unna boots in place. Data Review: CBC: 
Recent Labs 04/03/21 
8941 04/02/21 
0448 04/01/21 
0402 WBC 12.0* 14.6* 7.8 HGB 8.7* 8.6* 10.2* HCT 28.0* 26.9* 31.2*  
 336 393 Metabolic Panel: 
Recent Labs 04/03/21 
6519 04/02/21 
0448 04/01/21 
0402  145 140  
K 3.5 3.1* 3.7 * 113* 109* CO2 20* 22 24 BUN 17 22* 24* CREA 0.74 0.78 0.94 * 118* 97  
CA 9.0 9.0 8.9 MG 2.2 2.1 2.3 PHOS 4.2 4.1 4.5 ALB 2.1* 2.3* 2.4* TBILI 0.3 0.2 0.5 ALT 24 25 28 Micro: 
Lab Results Component Value Date/Time  Culture result: NO GROWTH 2 DAYS 03/31/2021 02:11 AM  
 Culture result: NO GROWTH 6 DAYS 07/01/2020 02:32 AM  
 Culture result: NO GROWTH 5 DAYS 03/29/2020 10:45 AM  
 
Imaging: 
Ct Head Wo Cont Result Date: 3/30/2021 EXAM: CT HEAD WO CONT INDICATION: seizures COMPARISON: None. CONTRAST: None. TECHNIQUE: Unenhanced CT of the head was performed using 5 mm images. Brain and bone windows were generated. Coronal and sagittal reformats. CT dose reduction was achieved through use of a standardized protocol tailored for this examination and automatic exposure control for dose modulation. FINDINGS: There is an incidental cavum septum pellucidum. There is mild atrophy and compensatory dilatation of the ventricles. Areas of chronic encephalomalacia are seen in the right frontal lobe, right parietal lobe, and left frontal lobe. There are incidental bilateral basal ganglia calcifications. There is no intracranial hemorrhage, extra-axial collection, or mass effect. The basilar cisterns are open. No CT evidence of acute infarct. The bone windows demonstrate no abnormalities. The visualized portions of the paranasal sinuses and mastoid air cells are clear. Significant chronic encephalomalacia in the right frontal lobe, right parietal lobe, and left frontal lobe. Xr Chest [facility] Result Date: 3/30/2021 EXAM: XR CHEST PORT HISTORY: trach exchange. COMPARISON: 3/30/2021 FINDINGS: Portable AP. A tracheostomy tube is in place. The heart is normal size. There is diffuse interstitial prominence again seen likely related to edema. No pleural effusion or pneumothorax. Tracheostomy tube in appropriate position. Unchanged diffuse interstitial prominence likely related to edema. Xr Chest [facility] Result Date: 3/30/2021 EXAM: XR CHEST PORT HISTORY: Chest pain. COMPARISON: 4/1/2020 FINDINGS: Portable AP. A tracheostomy tube is in place. The heart is normal size. There is unchanged diffuse interstitial prominence likely related to edema. No focal consolidation, pleural effusion, or pneumothorax. No significant interval change. Medications reviewed Current Facility-Administered Medications Medication Dose Route Frequency  midodrine (PROAMATINE) tablet 10 mg  10 mg Oral Q8H  piperacillin-tazobactam (ZOSYN) 3.375 g in 0.9% sodium chloride (MBP/ADV) 100 mL MBP  3.375 g IntraVENous Q8H  phenytoin (DILANTIN) 100 mg/4 mL oral suspension 100 mg  100 mg PEG Tube Q8H  
 vancomycin (VANCOCIN) 750 mg in 0.9% sodium chloride 250 mL (VIAL-MATE)  750 mg IntraVENous Q12H  
 enoxaparin (LOVENOX) injection 40 mg  40 mg SubCUTAneous Q24H  
 LORazepam (ATIVAN) injection 1 mg  1 mg IntraVENous Q2H PRN  
 fentaNYL citrate (PF) injection 25 mcg  25 mcg IntraVENous Q1H PRN  
 alteplase (CATHFLO) 1 mg in sterile water (preservative free) 1 mL injection  1 mg InterCATHeter PRN  
 guaiFENesin (ROBITUSSIN) 100 mg/5 mL oral liquid 200 mg  200 mg Oral Q6H PRN  
 albuterol-ipratropium (DUO-NEB) 2.5 MG-0.5 MG/3 ML  3 mL Nebulization Q4H RT  
 sodium chloride (NS) flush 5-40 mL  5-40 mL IntraVENous Q8H  
 sodium chloride (NS) flush 5-40 mL  5-40 mL IntraVENous PRN  
 acetaminophen (TYLENOL) tablet 650 mg  650 mg Oral Q6H PRN Or  
 acetaminophen (TYLENOL) suppository 650 mg  650 mg Rectal Q6H PRN  
 LORazepam (ATIVAN) injection 2 mg  2 mg IntraVENous Q5MIN PRN  
 sodium chloride (NS) flush 5-40 mL  5-40 mL IntraVENous Q8H  
 sodium chloride (NS) flush 5-40 mL  5-40 mL IntraVENous PRN  
 midazolam in normal saline (VERSED) 1 mg/mL infusion  0-2 mg/hr IntraVENous CONTINUOUS  
 NOREPINephrine (LEVOPHED) 8 mg in 5% dextrose 250mL (32 mcg/mL) infusion  0.5-16 mcg/min IntraVENous TITRATE  levETIRAcetam (KEPPRA) 750 mg in 0.9% sodium chloride 100 mL IVPB  750 mg IntraVENous BID  
 0.9% sodium chloride infusion 250 mL  250 mL IntraVENous PRN  pantoprazole (PROTONIX) 40 mg in 0.9% sodium chloride 10 mL injection  40 mg IntraVENous Q24H  
 alteplase (CATHFLO) 1 mg in sterile water (preservative free) 1 mL injection  1 mg InterCATHeter PRN  
 propofol (DIPRIVAN) 10 mg/mL infusion  0-50 mcg/kg/min IntraVENous TITRATE Signed: 
 Rajiv Portillo MD 
 Resident, Family Medicine Attending note: Attending note to follow. ..

## 2021-04-02 NOTE — PROGRESS NOTES
Progress Note Date:2021       Room:[address] [place] [address] Patient Miguel Yañez     YOB: 1970     Age:51 y.o. Subjective Subjective: 
 
: No acute events overnight. Vimpat stopped and was started on Phenytoin per neurology. Receiving Fentanyl and ativan PRN for agitationOn low dose of Levophed Lactate normal.ON Ac 10/400/40/. Failed SBT this am. Cr stable. Hb down to 8.6 WBCs up Started on vanco and cefepime per primary team.  
: OFF verse gtt. On cEEg. ff Levophed,toelrating midodrine. Pn vent support: . Did not tolerate SPV this morning. Has periods of agitation. Received 1 U RBCs . Hb up to 9.2 Review of Systems:Unable to obtain given clinical condition. Objective Vitals Last 24 Hours: TEMPERATURE:  Temp  Av.3 °F (37.4 °C)  Min: 98.6 °F (37 °C)  Max: 100.4 °F (38 °C) RESPIRATIONS RANGE: Resp  Av.1  Min: 10  Max: 43 PULSE OXIMETRY RANGE: SpO2  Av.3 %  Min: 97 %  Max: 100 % PULSE RANGE: Pulse  Av.1  Min: 80  Max: 145 BLOOD PRESSURE RANGE: Systolic (80RQH), HHO:06 , Min:74 , JEK:825  
; Diastolic (91YRJ), OMT:08, Min:39, Max:92 I/O (24Hr): Intake/Output Summary (Last 24 hours) at 2021 1449 Last data filed at 2021 1400 Gross per 24 hour Intake 1191.3 ml Output 650 ml Net 541.3 ml Objective: 
Vital signs: (most recent): Blood pressure 97/63, pulse 80, temperature 99.1 °F (37.3 °C), resp. rate 10, height 4' 9\" (1.448 m), weight 55.4 kg (122 lb 2.2 oz), SpO2 100 %. Physical Examination: I examined the patient via telemedicine, with its associated limitations. I reviewed the electronic medical record, the x-rays, labs, progress notes, previous history and physicals and consultation notes that were available in the electronic medical record. I beamed in and examined the patient On exam: 
Lying in bed. No EO to voice. Grimace to pain No commands Trach in place S1,S2 Decreased breath sounds bilateral at bases Abdomen soft, ND Labs/Imaging/Diagnostics Labs: CBC: 
Recent Labs 04/02/21 
0448 04/01/21 
0402 03/31/21 
2319 03/31/21 
1324 WBC 14.6* 7.8  --  6.0  
RBC 2.80* 3.30*  --  2.05* HGB 8.6* 10.2* 9.2* 6.3* HCT 26.9* 31.2* 28.7* 20.6* MCV 96.1 94.5  --  100.5* RDW 15.0* 14.7*  --  14.4  393  --  346 CHEMISTRIES: 
Recent Labs 04/02/21 
7871 04/01/21 
0402 03/31/21 
9925 03/31/21 
0105  140 137 133* K 3.1* 3.7 4.6 3.9 * 109* 105 101 CO2 22 24 23 22 BUN 22* 24* 38* 44*  
CA 9.0 8.9 9.2 9.1 PHOS 4.1 4.5  --  4.9*  
MG 2.1 2.3  --  2.3 PT/INR: 
Recent Labs  
  03/30/21 2119 INR 1.0 APTT:No results for input(s): APTT in the last 72 hours. LIVER PROFILE: 
Recent Labs 04/02/21 0448 04/01/21 
0402 03/31/21 
0105 AST 30 40* 30 ALT 25 28 27 Lab Results Component Value Date/Time ALT (SGPT) 25 04/02/2021 04:48 AM  
 AST (SGOT) 30 04/02/2021 04:48 AM  
 Alk. phosphatase 188 (H) 04/02/2021 04:48 AM  
 Bilirubin, direct 0.08 11/05/2020 11:23 AM  
 Bilirubin, total 0.2 04/02/2021 04:48 AM  
 
 
Imaging Last 24 Hours: 
Xr Chest Im [address] Result Date: 4/2/2021 EXAM: XR CHEST PORT INDICATION: Fever and leukocytosis. Status epilepticus. COMPARISON: Portable chest on 3/30/2021 and 4/1/2020. TECHNIQUE: Upright portable chest AP view FINDINGS: Tracheostomy tube is in good position. Left PICC line extends into the distal superior vena cava. Cardiac monitoring wires overlie the thorax. The cardiomediastinal and hilar contours are within normal limits. The pulmonary vasculature is within normal limits. Reticular interstitial opacities are decreased and minimal. No focal airspace opacity. Pleural spaces are clear. Bones are osteopenic. Decreased now minimal interstitial pulmonary edema versus interstitial pneumonia. No pneumothorax. Left PICC line is new and in good position.   
 
Xr Us Guided Vascular Access Result Date: 4/2/2021 INDICATION:   NO VENOUS ACCESS  EXAMINATION:  US GUIDE VAS ACCESS FINDINGS: Ultrasound was provided for PICC line placement. Ultrasound guidance for PICC line  placement. GBP Assessment//Plan Principal Problem: 
  Status epilepticus ([address] (3/30/2021) Active Problems: 
  Iron deficiency (12/11/2019) VICK (acute kidney injury) ([address] (3/31/2021) Edema (3/31/2021) Hypotension (3/31/2021) Hyperphosphatemia (3/31/2021) Hyponatremia (3/31/2021) Thrombocytosis ([address] (3/31/2021) Assessment & Plan A/P: 
49 with pmh Downs, c-diff and seizures admitted with status. Assessment: 
 
Status Epilepticus Acute Resp Failure/Trach Dependence: Hypotension Anemia VICK, improving Hx Recurrent C-diff: s/p fecal transplant Plan: PRN Fentanyl and ativan for agitation. Continue Keppra. Vimpat stopped and started on Phenytoin per neurology. Follow Neuro exam  
Wean Levophed as tolerate to keep MAP>65. Check VBG. Increase midodrine and schedule Q8 hrs. Continue Vent support. Decrease TV. PSV as tolerated. Continue to monitor H/H Monitor UO and renal indices. Continue VAnco. DC cefepime,as it lowers Seizure threshold. Start zosyn. Follow CX. TF Protonix/SQ Lovenox Poor prognosis 
 
 I performed all aspects of the physical examination via Telemedicine associated with two way audio and video communication and with the on-site assistance of  the bedside nurse. I am located in [place], [place] and the patient is located in [place] at [place] [address]. The patient is critically ill in the ICU. I  personally  reviewed the pertinent medical records, laboratory/ pathology data and radiographic images. The decision making regarding this patient is as documented above, which was generated  following  discussion  with the multidisciplinary ICU team and creation of a treatment plan for  the patient.  We discussed the patient's interval history and future coordination of care and  plans. The patient's medications  were reviewed and changes made as stipulated above. Due to  critical illness impairing one or more vital organs of this patient resulting in life threatening clinical situation  I have provided direct, frequent personal  assessment and manipulation in management plan and spent 42 minutes  of  critical care time excluding the time spent on procedures and teaching. Greater than 50% of this time  in patients care was  employed  in counseling and coordination of care and engaged in face to face discussion of case management issues, addressing questions, and outlining a plan of  therapy. Pt at risk of life threatening deterioration from seizure, acute on chronic resp failure Pt seen and evaluated via tele encounter. Audio and Video were used for this interaction.  
 
Electronically signed by Paris Elaine MD on 4/2/2021 at 10:50 AM

## 2021-04-02 NOTE — PROGRESS NOTES
0700: Bedside and Verbal shift change report given to Mi James (oncoming nurse) by [facility] (offgoing nurse). Report included the following information SBAR, Kardex, ED Summary, Intake/Output, MAR, Recent Results and Cardiac Rhythm NSR/ST. Primary Nurse Charlie Garcia and [facility], RN performed a dual skin assessment on this patient No impairment noted Patient seen resting in bed. Patient being worked up for sepsis today r/t low bp requiring levo and higher WBC today with a low grade fever overnight. RN obtained straight cath order and straight catheted patient according to sterile technique with Sydna Gitelman RN. Sent lab down. Jose Luis lactic lacid-normal. Old femoral site intact. PICC intact and draws back. Afebrile. Patient failed SBT today due to apnea. Rounded with Intensivist 2x today. Q6h glucose checks ordered. Patient with low residuals tolerating well. Q2h suctioning and mouth care via Trach. Trach care and cannula change at 8 am. Turning q2h. Titrating Levo off as able. Communicated with Family Practice in regards to delay in blood cultures, patient difficult stick. This RN tried x 2 with no success. Patient calm, no command following. No tracking with eyes. Patient contracted in arms, no move, Patient withdraws legs to pain. Palpable pulses. Neuro discussed with RN change in anti-seizure medications. Patient given PRN fent and Ativan for agitation (evidenced by prolonged tachycardia in the 130's at times). Blood cultures obtained after several attempts by RN's and by the use of US guide. Patient bladder scanned at 6 pm since no void since first straight cath, bladder with 210 ml in bladder. Per policy, rescan at 8 pm and keep rechecking until bladder scan shows 400 ML. Afebrile today. 1900: Bedside and Verbal shift change report given to [facility] [address] [place] (oncoming nurse) by Mi James (offgoing nurse).  Report included the following information SBAR, Kardex, ED Summary, Intake/Output, MAR, Recent Results and Cardiac Rhythm NSR/.

## 2021-04-02 NOTE — PROGRESS NOTES
Comprehensive Nutrition Assessment Type and Reason for Visit: Marcos Ward Nutrition Recommendations/Plan: 1. Continue TF via PEG[de-identified] 
 
Vital HP @ 45mL/h + 75mL H2O flush q4h (or flush per MD). TF provides 1080 kcals, 95 g protein, 1352 mL H2O- this meets 75% estimated energy needs and 100% protein needs. 2. K+ low, replete as needed. Nutrition Assessment:     
4/2: Follow up. Pt remains on vent and pressors (Levo @2). TFs running at goal. Per RN, pt tolerating. Minimal to no GRVs. Small amt of loose stools yesterday per RN note. Continue TF at goal. 
Labs- K 3.1, Hgb 8.6. Meds- cefepime, fentanyl, [email or web address], Protonix. 3/31: 48 y/o female admitted with status epilepticus. PMH includes Down's syndrome, nonverbal, trach, PEG. +cdiff. Pt currently on vent and pressors (Levo @2). Pt normally resides at group home and her normal TF regimen is Osmolite 1.2 237mL bolus 5x/day + 75mL H2O flush before and after each bolus. Hospital does not carry Osmolite TF so will start pt on hospital's no-fiber formula, Vital HP. Monitor tolerance. Weight up from last year, down from two months ago. Will continue to monitor weight trends. Labs- phos 4.9, Hgb 6.3. Meds- [email or web address], Levo@2mcg/kg/min. Weight hx: Wt Readings from Last 10 Encounters:  
04/01/21 55.4 kg (122 lb 2.2 oz) 02/03/21 58.9 kg (129 lb 12.8 oz) 07/08/20 49.9 kg (110 lb)  
06/30/20 49.9 kg (110 lb) 03/31/20 50.1 kg (110 lb 7.2 oz) 03/17/20 44.5 kg (98 lb)  
01/14/20 44.5 kg (98 lb) 01/09/20 44.6 kg (98 lb 6 oz) 12/11/19 44 kg (97 lb) 02/11/19 49.9 kg (110 lb) Malnutrition Assessment: 
Malnutrition Status: insufficient data Estimated Daily Nutrient Needs: 
Energy (kcal): 1429(PAL 1191 x 1.2); Weight Used for Energy Requirements: Current Protein (g): 65(1.2-2.0 g/kg); Weight Used for Protein Requirements: Current Fluid (ml/day): 1429; Method Used for Fluid Requirements: 1 ml/kcal 
 
 
Nutrition Related Findings:  last BM 4/1; 1+ LE edema Wounds:   
None Current Nutrition Therapies: DIET TUBE FEEDING Anthropometric Measures: 
· Height:  4' 9\" (144.8 cm) · Current Body Wt:  55.4 kg (122 lb 2.2 oz) · Admission Body Wt:      
· Usual Body Wt:       
· Ideal Body Wt:  85 lbs:  141.6 % · Adjusted Body Weight:   ; Weight Adjustment for: No adjustment · Adjusted BMI:      
· BMI Category: Overweight (BMI 25.0-29. 9) Nutrition Diagnosis:  
· Inadequate protein-energy intake related to inadequate enteral nutrition infusion as evidenced by (TFs currently not running) 4/2: Nutrition dx resolved. No nutrition dx at this time. Nutrition Interventions:  
Food and/or Nutrient Delivery: Continue tube feeding Nutrition Education and Counseling: No recommendations at this time Coordination of Nutrition Care: Continue to monitor while inpatient Goals: 
Continue TFs to meet >60% of pt's energy/protein needs next 3-5 days Nutrition Monitoring and Evaluation:  
Behavioral-Environmental Outcomes: None identified Food/Nutrient Intake Outcomes: Enteral nutrition intake/tolerance Physical Signs/Symptoms Outcomes: GI status, Weight, Biochemical data, Nutrition focused physical findings, Hemodynamic status Discharge Planning:   
Enteral nutrition Electronically signed by Karthik Washington RDN on 4/2/2021 Contact: [phone]

## 2021-04-02 NOTE — PROGRESS NOTES
Physical Therapy Orders received. Chart reviewed. Spoke with team.  Patient is baseline with contractures, down syndrome lives at a group home. She is currently on the vent via her trach (co-morbid). She has no command following, unable to perform eye tracking, failed SBT today. Patient not appropriate for PT at this time. We will complete the order. Thank you.  
Anthony Villegas PT,DPT,NCS

## 2021-04-03 PROBLEM — R78.89 ELEVATED DILANTIN LEVEL: Status: ACTIVE | Noted: 2021-01-01

## 2021-04-03 PROBLEM — N30.00 ACUTE CYSTITIS WITHOUT HEMATURIA: Status: ACTIVE | Noted: 2021-01-01

## 2021-04-03 NOTE — PROGRESS NOTES
[facility] Progress Note Patient ID: 
Parish Alicea 
[id number] 
27 y.o. 
1970 Subjective:  
History: 
Parish Alicea is a 49 y.o. female who  has a past medical history of Down syndrome, History of vascular access device (06/30/2020), History of vascular access device (04/01/2021), Seizures ([facility] Sleep apnea, and Stroke ([facility] (2019). She also has no past medical history of Adverse effect of anesthesia, Diabetes ([address] Difficult intubation, Malignant hyperthermia due to anesthesia, Nausea & vomiting, or Pseudocholinesterase deficiency. who is admitted for seizures. Patient was seen by Dr Canelo Pringle. Patient has seizures on Dilantin and Keppra seeing neuro Dr Dean Jernigan. Also had UTI Vimpat was added and was stopped after no more seizures. Currently stable with no clinical seizures overnight. Having issues weaning off vent. Current seizure meds: 
Keppra 750 mg BID Dilantin 100 mg Q 8 hrs via PEG 
 
 
ROS: 
Per HPI- 
Otherwise the remainder of the review of system was negative Social Hx: 
Social History Socioeconomic History  Marital status: SINGLE Spouse name: Not on file  Number of children: Not on file  Years of education: Not on file  Highest education level: Not on file Tobacco Use  Smoking status: Never Smoker  Smokeless tobacco: Never Used Substance and Sexual Activity  Alcohol use: Never Frequency: Never  Drug use: Never  Sexual activity: Never Other Topics Concern Social History Narrative ** Merged History Encounter ** Meds: 
No current facility-administered medications on file prior to encounter. Current Outpatient Medications on File Prior to Encounter Medication Sig Dispense Refill  cetirizine (ZYRTEC) 10 mg tablet 10 mg by Per G Tube route daily.  omeprazole (PRILOSEC) 40 mg capsule Take 40 mg by mouth two (2) times a day.     
 guaiFENesin (Siltussin SA) 100 mg/5 mL liquid Take 200 mg by mouth four (4) times daily.  torsemide (DEMADEX) 20 mg tablet Take 40 mg by mouth daily.  albuterol-ipratropium (DUO-NEB) 2.5 mg-0.5 mg/3 ml nebu 3 mL by Nebulization route as needed for Shortness of Breath.  melatonin 3 mg tablet 1 Tab by Per G Tube route nightly as needed for Insomnia. Must give by 10pm 30 Tab 11  
 diazePAM (VALIUM) 5-7.5-10 mg kit Insert 10 mg into rectum as needed (for seizures that last longer than 5 minutes).  OTHER,NON-FORMULARY, by Per G Tube route four (4) times daily. OSMOLITE 1.2 [place] Liquid  levETIRAcetam (KEPPRA) 100 mg/mL solution 750 mg by Per G Tube route every twelve (12) hours.  albuterol-ipratropium (DUO-NEB) 2.5 mg-0.5 mg/3 ml nebu 3 mL by Nebulization route every 4 hours daily while awake resp.  phenytoin (DILANTIN) 100 mg/4 mL susp oral suspension 125 mg by PEG Tube route every eight (8) hours. Imaging: CT Results (recent): 
Results from [facility] Encounter encounter on 03/30/21 CT HEAD WO CONT Narrative EXAM: CT HEAD WO CONT INDICATION: seizures COMPARISON: None. CONTRAST: None. TECHNIQUE: Unenhanced CT of the head was performed using 5 mm images. Brain and 
bone windows were generated. Coronal and sagittal reformats. CT dose reduction 
was achieved through use of a standardized protocol tailored for this 
examination and automatic exposure control for dose modulation. FINDINGS: 
There is an incidental cavum septum pellucidum. There is mild atrophy and 
compensatory dilatation of the ventricles. Areas of chronic encephalomalacia are 
seen in the right frontal lobe, right parietal lobe, and left frontal lobe. There are incidental bilateral basal ganglia calcifications. There is no 
intracranial hemorrhage, extra-axial collection, or mass effect. The basilar 
cisterns are open. No CT evidence of acute infarct. The bone windows demonstrate no abnormalities.  The visualized portions of the 
paranasal sinuses and mastoid air cells are clear. Impression Significant chronic encephalomalacia in the right frontal lobe, right parietal 
lobe, and left frontal lobe. MRI Results (recent): No results found for this or any previous visit. IR Results (recent): No results found for this or any previous visit. VAS/US Results (recent): No results found for this or any previous visit. Reviewed records in [employer] and media tab today Lab Review No results displayed because visit has over 200 results. Office Visit on 02/03/2021 Component Date Value Ref Range Status  Sodium 02/03/2021 140  136 - 145 mmol/L Final  
 Potassium 02/03/2021 4.9  3.5 - 5.1 mmol/L Final  
 Chloride 02/03/2021 102  97 - 108 mmol/L Final  
 CO2 02/03/2021 27  21 - 32 mmol/L Final  
 Anion gap 02/03/2021 11  5 - 15 mmol/L Final  
 Glucose 02/03/2021 86  65 - 100 mg/dL Final  
 BUN 02/03/2021 18  6 - 20 MG/DL Final  
 Creatinine 02/03/2021 0.67  0.55 - 1.02 MG/DL Final  
 BUN/Creatinine ratio 02/03/2021 27* 12 - 20   Final  
 GFR est AA 02/03/2021 >60  >60 ml/min/1.73m2 Final  
 GFR est non-AA 02/03/2021 >60  >60 ml/min/1.73m2 Final  
 Comment: Estimated GFR is calculated using the IDMS-traceable Modification of Diet in 
Renal Disease (MDRD) Study equation, reported for both  Americans [facility]) and non- Americans (GFRNA), and normalized to 1.73m2 body 
surface area. The physician must decide which value applies to the patient.  Calcium 02/03/2021 9.2  8.5 - 10.1 MG/DL Final  
 Bilirubin, total 02/03/2021 0.2  0.2 - 1.0 MG/DL Final  
 ALT (SGPT) 02/03/2021 203* 12 - 78 U/L Final  
 AST (SGOT) 02/03/2021 112* 15 - 37 U/L Final  
 Alk.  phosphatase 02/03/2021 263* 45 - 117 U/L Final  
 Protein, total 02/03/2021 7.4  6.4 - 8.2 g/dL Final  
 Albumin 02/03/2021 3.4* 3.5 - 5.0 g/dL Final  
 Globulin 02/03/2021 4.0  2.0 - 4.0 g/dL Final  
 A-G Ratio 02/03/2021 0.9* 1.1 - 2.2   Final  LIPID PROFILE 02/03/2021        Final  
 Cholesterol, total 02/03/2021 229* <200 MG/DL Final  
 Triglyceride 02/03/2021 167* <150 MG/DL Final  
 Comment: Based on NCEP-ATP III:  Triglycerides <150 mg/dL  is considered normal, 150-199 
mg/dL  borderline high,  200-499 mg/dL high and  greater than or equal to 500 
mg/dL very high.  HDL Cholesterol 02/03/2021 64  MG/DL Final  
 Comment: Based on NCEP ATP III, HDL Cholesterol <40 mg/dL is considered low and >60 
mg/dL is elevated.  LDL, calculated 02/03/2021 131.6* 0 - 100 MG/DL Final  
 Comment: Based on the NCEP-ATP: LDL-C concentrations are considered  optimal <100 mg/dL, 
near optimal/above Normal 100-129 mg/dL Borderline High: 130-159, High: 160-189 
mg/dL Very High: Greater than or equal to 190 mg/dL  VLDL, calculated 02/03/2021 33.4  MG/DL Final  
 CHOL/HDL Ratio 02/03/2021 3.6  0.0 - 5.0   Final  
 
 
 
Objective:  
 
 
Exam: 
Visit Vitals /69 Pulse 93 Temp 98.3 °F (36.8 °C) Resp 12 Ht 4' 9\" (1.448 m) Wt 55.4 kg (122 lb 2.2 oz) SpO2 100% BMI 26.43 kg/m² Gen: Awake, alert, 
(+) trach on vent 
(+) contractures Assessment: 1. Status epilepticus ([address] 2. Elevated Dilantin level 3. Gastroesophageal reflux disease without esophagitis 4. Hypotension due to drugs 5. Inability to walk 6. Iron deficiency 7. On tube feeding diet 8. Seizure ([address] 9. Severe intellectual disability 10. VICK (acute kidney injury) ([address] 11. Wheelchair bound Status epliepticus 
 
chronic encephalomalacia in L&R frontal lobe, R parietal lobe Prolonged EEG (3-31-21 to 4-1-21):  Abnormal video-EEG recording. Severe generalized slowing (burst-suppression pattern) consistent with a severe encephalopathic process. May be due to ongoing sedation.  No seizure discharges or electrographic seizures seen. Lenon Cool correlation is necessary. Plan: 1. I had a long discussion with patient and family.  Discussed diagnosis, prognosis, pathophysiology and available treatment. Reviewed test results. All questions were answered. 2. Continue Keppra 750 g Q 12 hrs. Keppra level 4 days ago 14.9 3. Phenytoin level 11.6 today. Continue Dilantin 100 mg (was on 125 mg) Q 8 hrs. Check Dilantin level tomorrow 4. Seizure precautions 5. Wean off vent as tolerated Please call for questions 35 mins of time spent, 50% of which was spent on counseling and coordination of care. Akbar Guzman MD 
Diplomate, American Board of Psychiatry and Neurology Diplomate, Neuromuscular Medicine Diplomate, [facility] Board of Electrodiagnostic Medicine

## 2021-04-03 NOTE — PROGRESS NOTES
[address] [facility] [address] with [address] [facility] Resident Progress Note in Brief S: Patient seen and examined at bedside. Intubated. O: 
Visit Vitals /66 Pulse 87 Temp 98.5 °F (36.9 °C) Resp 11 Ht 4' 9\" (1.448 m) Wt 122 lb 2.2 oz (55.4 kg) SpO2 99% BMI 26.43 kg/m² Physical Examination General appearance - No acute distress. Alert. Tracking with eyes. Appears comfortable Chest - Trach tube in place. Clear to auscultation, no wheezes, rales or rhonchi, symmetric air entry Heart - normal rate, regular rhythm, normal S1, S2, no murmurs, rubs, clicks or gallops, Abdomen - soft, nontender, nondistended, no masses or organomegaly Extremities - peripheral pulses normal, no pedal edema, no clubbing or cyanosis A/P:  
Sepsis 2/2 Ventilator-associated PNA: Pt originally admitted w/ 3/4 SIRs that resolved after initial fluid resuscitation. No infectious etiology suspected as leukocytosis and tachycardia believed 2/2 to seizures.  
-Daily CBC 
-Vanc and Zosyn (cefepime DC'd 4/2 as it lowers seizure threshold)-Vanc and Zosyn (cefepime DC'd 4/2 as it lowers seizure threshold) UTI: UCx prelim pseudomonas. - Continue zosyn Status epilepticus: CT head no acute abnormality, chronic encephalomalacia in L&R frontal lobe, R parietal lobe. Supra-therapeutic phenytoin level at 34. POA EKG w/ sinus tach. F/w neuro Dr. Cristino Nguyen versed gtt. -Mechanical ventilation though chronic trach tube, wean as tolerated  
-Ativan 2mg q5min prn for seizure, max 3 doses 
-Per neurology: appreciate recs 
            -Phenytoin 100mg q8 (4/2) (home dose 125mg q8) -Keppra 750mg IV BID 
            -STOP lacosamide  
-Daily CBC, CMP 
-Seizure and fall precautions   
  
Anemia in setting of hx of GI bleed: POA Hgb 10.1 (b/l 13). FOBT neg. S/p 1u pRBC.  
-Protonix 40mg IV daily 
-Per GI no indication of GI bleed, no intervention at this time  
  Hypotension: Likely 2/2 sedatives. -Levo gtt PRN 
-Increase to Midodrine 10mg TID (4/2) Please see daily progress note for detailed plan. Stephanie Couch MD 
Family Medicine Resident

## 2021-04-03 NOTE — PROGRESS NOTES
Problem: Risk for Spread of Infection Goal: Prevent transmission of infectious organism to others Description: Prevent the transmission of infectious organisms to other patients, staff members, and visitors. Outcome: Progressing Towards Goal 
  
Problem: Ventilator Management Goal: *Adequate oxygenation and ventilation Outcome: Progressing Towards Goal 
  
Problem: Falls - Risk of 
Goal: *Absence of Falls Description: Document Lynn Soto Fall Risk and appropriate interventions in the flowsheet. Outcome: Progressing Towards Goal 
Note: Fall Risk Interventions: 
  
 
Mentation Interventions: Adequate sleep, hydration, pain control, Bed/chair exit alarm, Door open when patient unattended, Increase mobility, More frequent rounding, Reorient patient, Room close to nurse's station, Toileting rounds, Update white board Medication Interventions: Assess postural VS orthostatic hypotension, Bed/chair exit alarm, Evaluate medications/consider consulting pharmacy, Patient to call before getting OOB, Teach patient to arise slowly Elimination Interventions: Bed/chair exit alarm, Call light in reach, Toilet paper/wipes in reach, Toileting schedule/hourly rounds Problem: Pressure Injury - Risk of 
Goal: *Prevention of pressure injury Description: Document Rolf Scale and appropriate interventions in the flowsheet. Outcome: Progressing Towards Goal 
Note: Pressure Injury Interventions: 
Sensory Interventions: Assess changes in LOC, Assess need for specialty bed, Avoid rigorous massage over bony prominences, Check visual cues for pain, Float heels, Minimize linen layers, Maintain/enhance activity level, Keep linens dry and wrinkle-free, Monitor skin under medical devices, Pad between skin to skin, Pressure redistribution bed/mattress (bed type), Turn and reposition approx. every two hours (pillows and wedges if needed) Moisture Interventions: Absorbent underpads, Assess need for specialty bed, Apply protective barrier, creams and emollients, Check for incontinence Q2 hours and as needed, Internal/External urinary devices, Limit adult briefs, Maintain skin hydration (lotion/cream), Minimize layers Activity Interventions: Assess need for specialty bed, Increase time out of bed, Pressure redistribution bed/mattress(bed type) Mobility Interventions: Assess need for specialty bed, Float heels, HOB 30 degrees or less, Pressure redistribution bed/mattress (bed type), Turn and reposition approx. every two hours(pillow and wedges) Nutrition Interventions: Document food/fluid/supplement intake, Discuss nutritional consult with provider Friction and Shear Interventions: Apply protective barrier, creams and emollients, Foam dressings/transparent film/skin sealants, HOB 30 degrees or less, Lift sheet, Lift team/patient mobility team, Minimize layers, Transferring/repositioning devices

## 2021-04-03 NOTE — PROGRESS NOTES
1900 Bedside and Verbal shift change report given to Babs Fabian RN. (oncoming nurse) by Lisa Tran RN. (offgoing nurse). Report included the following information SBAR, Kardex, ED Summary, Procedure Summary, Intake/Output, MAR and Recent Results. Primary Nurse Jailene Heller RN and Lisa Tran RN performed a dual skin assessment on this patient No impairment noted. See Rolf scale on assessment. Patient has a trach and is on the vent. Suction set up at bedside.

## 2021-04-03 NOTE — PROGRESS NOTES
Problem: Risk for Spread of Infection Goal: Prevent transmission of infectious organism to others Description: Prevent the transmission of infectious organisms to other patients, staff members, and visitors. Outcome: Progressing Towards Goal 
  
Problem: Patient Education:  Go to Education Activity Goal: Patient/Family Education Outcome: Progressing Towards Goal 
  
Problem: Ventilator Management Goal: *Adequate oxygenation and ventilation Outcome: Progressing Towards Goal 
  
Problem: Patient Education: Go to Patient Education Activity Goal: Patient/Family Education Outcome: Progressing Towards Goal 
  
Problem: Falls - Risk of 
Goal: *Absence of Falls Description: Document Trisha Palmer Fall Risk and appropriate interventions in the flowsheet. Outcome: Progressing Towards Goal 
Note: Fall Risk Interventions: 
  
 
Mentation Interventions: Adequate sleep, hydration, pain control, Bed/chair exit alarm, Door open when patient unattended, More frequent rounding, Update white board Medication Interventions: Bed/chair exit alarm, Evaluate medications/consider consulting pharmacy Elimination Interventions: Bed/chair exit alarm, Call light in reach, Toileting schedule/hourly rounds

## 2021-04-03 NOTE — PROGRESS NOTES
0700 - Bedside and Verbal shift change report given to [facility] and Jose Antonio Joyce (oncoming nurse) by René Moss (offgoing nurse). Report included the following information SBAR, Kardex, ED Summary, Intake/Output, MAR, Recent Results, Med Rec Status and Cardiac Rhythm NSR. Patient seen resting in bed quietly this AM. She becomes tachycardic upon stimulation. Trach care completed, dressing and inner cannula changed. Patient suctioned. Levophed titrated down due to elevated BPs. Oral and deep suctioning performed q2h. Ativan and Fentanyl given prn for agitation during suctioning. Pt seen resting quietly and no agitation at this time. Mother updated this shift. Purewick changed along with tubing and cannister. 900mL urine voided this shift. 2 large BMs this shift. Pt turned q2h. Feeding tube supplies changed at 4pm. Pt failed SBT today due to apnea. Neurology rounded on patient, no changes to plan of care. Bedside and Verbal shift change report given to René Moss (oncoming nurse) by [facility] and Jose Antonio Joyce (offgoing nurse). Report included the following information SBAR, Kardex, ED Summary, Intake/Output, MAR, Recent Results, Med Rec Status and Cardiac Rhythm NSR.

## 2021-04-03 NOTE — PROGRESS NOTES
1900 Bedside and Verbal shift change report given to Jhoan Longo RN. (oncoming nurse) by Jeannette Lerma RN. (offgoing nurse). Report included the following information SBAR, Kardex, ED Summary, Procedure Summary, Intake/Output, MAR and Recent Results. Primary Nurse Cherri Watters RN and Jeannette Lerma RN performed a dual skin assessment on this patient No impairment noted. See Rolf scale on assessment. Patient has a trach and is on the vent. Suction set up at bedside. 0600 Pt is incontinent and wearing an external catheter.

## 2021-04-03 NOTE — PROGRESS NOTES
Progress Note TARO:       VIJJ:[id number] Patient Echo Lombardo     YOB: 1970     Age:51 y.o. Subjective CC: unable to obtain 24 HOUR: no new events. Remains on vent. Objective Vitals Last 24 Hours: TEMPERATURE:  Temp  Av.6 °F (37 °C)  Min: 98.2 °F (36.8 °C)  Max: 99.6 °F (37.6 °C) RESPIRATIONS RANGE: Resp  Av.3  Min: 10  Max: 39 PULSE OXIMETRY RANGE: SpO2  Av.7 %  Min: 98 %  Max: 100 % PULSE RANGE: Pulse  Av  Min: 78  Max: 141 BLOOD PRESSURE RANGE: Systolic (62SVX), CKL:633 , Min:77 , HNL:658  
; Diastolic (21QJU), XVB:07, Min:37, Max:121 I/O (24Hr): Intake/Output Summary (Last 24 hours) at 4/3/2021 1316 Last data filed at 4/3/2021 1200 Gross per 24 hour Intake 1842.94 ml Output 300 ml Net 1542.94 ml Objective Gen: trach, sedated HEENT: trach in place Chest: symmetrical chest rise CV: r/r/r Abd: non-distended Ext: no LE edema. 1+ b/l UE Neuro: not following commands at time of my eval.   
Labs/Imaging/Diagnostics Labs: CBC: 
Recent Labs 21 
7937 21 
0448 21 
0402 WBC 12.0* 14.6* 7.8  
RBC 2.83* 2.80* 3.30* HGB 8.7* 8.6* 10.2* HCT 28.0* 26.9* 31.2*  
MCV 98.9 96.1 94.5  
RDW 15.0* 15.0* 14.7*  
 336 393 CHEMISTRIES: 
Recent Labs 21 
3574 21 
0448 21 
0402  145 140  
K 3.5 3.1* 3.7 * 113* 109* CO2 20* 22 24 BUN 17 22* 24* CA 9.0 9.0 8.9 PHOS 4.2 4.1 4.5 MG 2.2 2.1 2.3 PT/INR:No results for input(s): INR, INREXT in the last 72 hours. No lab exists for component: PROTIME APTT:No results for input(s): APTT in the last 72 hours. LIVER PROFILE: 
Recent Labs 21 
6856 21 
0448 21 
0402 AST 32 30 40* ALT 24 25 28 Lab Results Component Value Date/Time ALT (SGPT) 24 2021 03:46 AM  
 AST (SGOT) 32 2021 03:46 AM  
 Alk.  phosphatase 181 (H) 2021 03:46 AM  
 Bilirubin, direct 0.08 11/05/2020 11:23 AM  
 Bilirubin, total 0.3 04/03/2021 03:46 AM  
 
 
Imaging Last 24 Hours: 
No results found. Assessment//Plan A/P: 
49 with pmh Downs, c-diff and seizures admitted with status. 
  
Status Epilepticus: 
-- appreciate neuro assistance 
-- currently on keppra and dilantin per neuro Pneumonia: 
-- remains on vanc/zosyn 
-- added MRSA nasal swab 4/3 to help with possible de-escalation of vanc -- will need to keep pseudomonal coverage for UTI regardless of pneumonia. No sputum to guide therapy so would probably just do 8 day course depending on clinical course. UTI: 
-- culture with probable pseudomonas. -- sensitivities pending. 
  
Hypotension: 
-- suspect sedation related -- pressors for MAP >65 
-- midodrine increased to 10 TID 4/2. On minimal NE. If still requiring 4/4 would increase midodrine to 15 TID. 
  
Acute Resp Failure/Trach Dependence: -- pt trach'd at baseline but not on vent -- wean vent to off as able. -- lasix 10mg x 1 to gauge response. Ideally net neg 500 daily as tolerated by hemodynamics and kidney function. Won't be too aggressive given that she's still on pressors.  
  
VICK: 
-- likely pre-renal/ATN and UTI 
-- avoid nephrotoxins -- no acute indication for renal replacement. -- improved 
  
Hx Recurrent C-diff: 
-- s/p fecal transplant 
-- avoid abx unless clear infection 
  
Hx GIB: 
-- had GIB 3/21 
-- EGD and colonoscopy at that time -- no thought to have GI bleeding currently -- continue PPI. CCM time 40 min. Pt at risk of life threatening deterioration from acute resp failure, seizures, UTI Pt seen and evaluated via tele encounter. Audio and video used for this encounter.    
 
Electronically signed by Albania Huang DO on 4/3/2021 at 1:16 PM

## 2021-04-03 NOTE — PROGRESS NOTES
[id number] PROGRAM 
PROGRESS NOTE  
 
4/4/2021 PCP: Jatinder White, NP Assessment/Plan:  
 
Ryan Fraser is a 49 y.o. female with PMHx of Down Syndrome (non-verbal at baseline), S/p Trach and PEG tube, seizures, encephalomalacia, ANNE, h/o recurrent Cdiff colitis admitted for status epilepticus. 24 Hour Events: none Sepsis 2/2 VAP and UTI: Pt originally admitted w/ 3/4 SIRs that resolved after initial fluid resuscitation. No infectious etiology suspected as leukocytosis and tachycardia believed 2/2 to seizures. CXR 4/2: minimal interstitial pulmonary edema versus interstitial pneumonia. BCx 3/31 and 4/2 NGTD. LA wnl. UCx w/ probable pseudomonas. -f/u BCx 4/2 
-Vanc and Zosyn (started 4/2) for 8 day course rec by pulm 
-f/u MRSA swab for possible abx de-escalation  
-Daily CBC Seizures: Pt admitted in status epilepticus. CT head no acute abnormality, chronic encephalomalacia in L&R frontal lobe, R parietal lobe. Supra-therapeutic phenytoin level at 34. POA EKG w/ sinus tach. F/w neuro Dr. Shayne Schrader versed gtt. EEG w/ no evidence of seizure activity. -Mechanical ventilation though chronic trach tube, wean as tolerated  
-Ativan 2mg q5min prn for seizure, max 3 doses 
-Per neurology: appreciate recs 
 -Phenytoin 100mg q8 (4/2) (home dose 125mg q8) -Keppra 750mg IV BID 
            -STOP lacosamide  
-Daily CBC, CMP 
-Seizure and fall precautions   
 
Acute respiratory failure/trach dependence s/p lasix 10mg x1 4/3. Trach placed due to hx of seizures, aspiration PNA, and previous intubation. -wean vent as tolerated 
-goal fluid status: net neg 500 daily as tolerated by BP and kidney function -appreciate intensivist recs 
-Duonebs q4h 
-Daily SBT Hypotension: Likely 2/2 sedatives. S/p levo gtt.  
-Levo gtt PRN 
-Midodrine inc to 10mg TID (4/2), per intensivist consider increase again to 15mg TID today if pt continues to require pressor support 
-Strict Is/Os Anemia of chronic disease in setting of hx of GI bleed: POA Hgb 10.1 (b/l 13) > 6.3. Hx of GI bleed, admission 3/2021 to [address] [place] [address], EGD & Colonoscopy at that time revealed gastritis and colitis. FOBT neg. No obvious source of bleeding, possibly 2/2 hemodilution. S/p 1u pRBC. Iron studies suggest ACD. -Per GI no active bleed/ no intervention at this time 
-Protonix 40mg IV daily 
-Daily CBC VICK - resolved. POA Cr 1.24 (BL 0.5). -Daily CMP 
-HOLD home Torsemide 40mg  
  
Hyponatremia - resolved: Likely 2/2 IVVD. POA Na 130.  
-Daily CMP 
  
Hyperphosphatemia- resolved:  POA P 5.6. Improved with IVF 
-Daily phos 
  Thrombocytosis- resolved: Likely 2/2 IVVD as resolved after fluids. POA plt 584.  
-Daily CBC 
  
H/o ANNE: Fe wnl this admission. POA Hgb 10.1 (BL 13) > 6.3, s/p 1u pRBC. Not on home iron. Recently hospitalized in March 2021 for GI bleed at [facility].   
-Monitor on daily labs 
  
Elevated ALP, AST in setting of hepatic steatosis: Chronic. POA , AST 60.  
-Monitor on daily labs  
  
S/p PEG-tube:  
-Continue tube feeds  
  
Chronic edema: Home Torsemide 40mg daily. F/w Nephrology OP. S/p lasix 10mg IV x1 
-HOLD home Torsemide d/t AK 
  
GERD: stable. -Protonix 40mg IV daily  
 
H/o Cdiff colitis: S/p fecal transplant.  
-Enteric precuations 
  
  
FEN/GI -NG tube feeds Activity - Activity w/assitance DVT prophylaxis - Held GI prophylaxis - Lovenox Fall prophylaxis - Fall precautions ordered Code Status - Full. Discussed with Caregiver Next of Kin Name and [address], mother/ [phone]. [facility] Angulo, group home . Eulogio Retana MD 
Family Medicine Resident Subjective: No reported events overnight. Pt awake w/ eyes open, occasional R leg twitching but no eye deviation. Does not respond to voice. Objective:  
Physical examination Patient Vitals for the past 24 hrs: 
 Temp Pulse Resp BP SpO2  
04/04/21 0645  (!) 130 22 (!) 134/93 100 % 04/04/21 0630  (!) 117 15 122/64 99 % 04/04/21 0615  (!) 131 19 127/83 99 % 04/04/21 0600  (!) 133 27 (!) 152/87 100 % 04/04/21 0545  (!) 131 23 (!) 142/93 99 % 04/04/21 0530  (!) 128 19 131/87 99 % 04/04/21 0515  (!) 135 (!) 36 103/75 100 % 04/04/21 0500  84 10 (!) 89/53 99 % 04/04/21 0445  81 10 107/62 100 % 04/04/21 0430  87 11 107/66 99 % 04/04/21 0415  85 10 106/61 100 % 04/04/21 0400 98.5 °F (36.9 °C) 86 10 (!) 100/56 100 % 04/04/21 0345  86 10 (!) 98/53 99 % 04/04/21 0330  84 10 106/64 100 % 04/04/21 0323  82 10  100 % 04/04/21 0315  81 10 (!) 98/57 100 % 04/04/21 0300  84 10 (!) 95/57 100 % 04/04/21 0245  85 10 (!) 97/58 100 % 04/04/21 0230  93 11 107/64 99 % 04/04/21 0215  (!) 135 (!) 32 (!) 152/103 100 % 04/04/21 0200  (!) 133 12 136/89 99 % 04/04/21 0145  (!) 124 16 114/82 99 % 04/04/21 0130    (!) 132/98   
04/04/21 0115  (!) 121 15 131/69 98 % 04/04/21 0100  86 10 113/64 99 % 04/04/21 0045  87 10 113/63 99 % 04/04/21 0015  84 12 (!) 106/59 99 % 04/04/21 0000  79 10 117/63 100 % 04/03/21 2357  81 10  100 % 04/03/21 2345  82 10 119/65 100 % 04/03/21 2334  82     
04/03/21 2330  82 10 119/66 100 % 04/03/21 2315 98.8 °F (37.1 °C) 82 10 124/72 100 % 04/03/21 2300    120/66   
04/03/21 2245  88 10 123/68 100 % 04/03/21 2230  88 10 128/72 100 % 04/03/21 2215  (!) 130 19 (!) 141/96 99 % 04/03/21 2200  94 10 114/65 99 % 04/03/21 2145  98 10 (!) 90/51 99 % 04/03/21 2130  (!) 101 10 (!) 88/42 98 % 04/03/21 2115  (!) 133 20 102/80 99 % 04/03/21 2100  (!) 105 11 (!) 83/40 96 % 04/03/21 2045  (!) 110 10 (!) 84/48 97 % 04/03/21 2030  (!) 144 (!) 34 (!) 149/99 98 % 04/03/21 2015  (!) 145 (!) 31 (!) 138/92 98 % 04/03/21 2000  (!) 128 23 (!) 120/91 98 % 04/03/21 1946  (!) 129 24  99 % 04/03/21 1945  (!) 129 27 134/80 99 % 04/03/21 1930  (!) 117 17 113/69 98 % 04/03/21 1915  (!) 113 17 (!) 127/97 100 % 04/03/21 1900 100 °F (37.8 °C) (!) 131 (!) 32 116/79 100 % 04/03/21 1845  85 10 105/65 100 % 04/03/21 1830  (!) 129 (!) 32 114/84 99 % 04/03/21 1815  86 10 (!) 96/59 100 % 04/03/21 1800  88 10 (!) 93/57 100 % 04/03/21 1745  88 10 (!) 98/55 100 % 04/03/21 1730  88 10 (!) 98/58 100 % 04/03/21 1715  93 12 115/68 100 % 04/03/21 1700  (!) 101 14 (!) 99/58 100 % 04/03/21 1645  95 10 98/60 99 % 04/03/21 1630  96 10 102/62 100 % 04/03/21 1615  (!) 120 21 101/82 100 % 04/03/21 1600 99 °F (37.2 °C) 99 10 (!) 97/58 98 % 04/03/21 1545  98 10 99/65 99 % 04/03/21 1538  90 10  99 % 04/03/21 1530  96 10 110/72 99 % 04/03/21 1515  (!) 140 30 (!) 153/105 100 % 04/03/21 1500  (!) 101 14 118/77 98 % 04/03/21 1445  (!) 122 (!) 32 134/89 99 % 04/03/21 1431  (!) 115     
04/03/21 1430  89 10 118/77 100 % 04/03/21 1415  (!) 124 25 104/64 100 % 04/03/21 1400  80 10 115/75 100 % 04/03/21 1345  80 10 115/73 100 % 04/03/21 1330  84 10 119/73 100 % 04/03/21 1315  83 10 115/77 100 % 04/03/21 1300  78 13 131/75 100 % 04/03/21 1245  89 10 (!) 89/50 100 % 04/03/21 1230  87 10 116/72 100 % 04/03/21 1215  93 12 108/69 100 % 04/03/21 1200 98.3 °F (36.8 °C) 88 10 (!) 82/44 100 % 04/03/21 1145  81 10  100 % 04/03/21 1141  82 10  100 % 04/03/21 1130  83 10 (!) 85/55 100 % 04/03/21 1115  83 10  100 % 04/03/21 1100  85 10 (!) 86/53 100 % 04/03/21 1045  87 10  100 % 04/03/21 1030  90 10 (!) 90/52 100 % 04/03/21 1015  93 10  100 % 04/03/21 1000  (!) 119 16  100 % 04/03/21 0945  (!) 101 10 (!) 95/58 100 % 04/03/21 0930  (!) 103 10 99/62 99 % 04/03/21 0915  (!) 103 10 (!) 93/58 99 % 04/03/21 0900  (!) 133 28 116/82 99 % 04/03/21 0845  (!) 137 (!) 36 (!) 125/99 99 % 04/03/21 0830  (!) 141 (!) 39 124/63 100 % 04/03/21 0815  (!) 140 (!) 34 (!) 125/100 100 % 04/03/21 0800 99.6 °F (37.6 °C) (!) 101 17 124/68 99 % 21 0745  84 10  99 % 21 0736  84     
21 0734  88 10  100 % 21 0730  88 10 116/72 100 % 21 0715  90 10 113/69 100 % Temp (24hrs), Av °F (37.2 °C), Min:98.3 °F (36.8 °C), Max:100 °F (37.8 °C) O2 Flow Rate (L/min): 50 l/min O2 Device: Ventilator, Tracheostomy Date 21 - 21 9147 21 - 21 3455 Shift 1870-8633 6615-3007 24 Hour Total 4115-4127 0061-1112 24 Hour Total  
INTAKE  
I.V.(mL/kg/hr) 960.9(1.4)  960.9(0.7) Versed Volume 0  0 Levophed Volume 60.9  60.9 Volume (piperacillin-tazobactam (ZOSYN) 3.375 g in 0.9% sodium chloride (MBP/ADV) 100 mL MBP) 400  400 Volume (vancomycin (VANCOCIN) 750 mg in 0.9% sodium chloride 250 mL (VIAL-MATE)) 500  500 NG/  720 Water Flush Volume (mL) (PEG/Gastrostomy Tube) 225  225 Intake (ml) (PEG/Gastrostomy Tube) 495  495 Shift Total(mL/kg) 1680. 9(30.3)  1680. 9(30.3) OUTPUT Urine(mL/kg/hr) 1300(2)  1300(1) Urine Voided 400  400 Urine Output (mL) (External Female Catheter 21) 900  900 Stool Stool Occurrence(s) 2 x  2 x Shift Total(mL/kg) 1300(23.5)  1300(23.5) .9  380.9 Weight (kg) 55.4 55.4 55.4 55.4 55.4 55.4 General appearance - No acute distress. Eyes open, awake, does not respond to voice. No eye deviation. Chest - Trach tube in place, intubated Rhonchi and coarse breath sounds bilaterally, symmetric air entry Heart - tachycardic, regular rhythm, normal S1, S2, no murmurs, rubs, clicks or gallops, Abdomen - soft, nontender, mildly distended, +BS,no rebound or guarding Neurological - Sleepy, arousable to voice Extremities - Trace pitting edema to BUE. No pedal edema, Unna boots in place. Occasional R leg twitching Data Review: CBC: 
Recent Labs 21 
7817 21 
1204 21 
0448 WBC 8.4 12.0* 14.6* HGB 7.9* 8. 7* 8.6* HCT 25.5* 28.0* 26.9*  
 320 336 Metabolic Panel: 
Recent Labs 04/04/21 
1361 04/03/21 
5724 04/02/21 
0448  142 145  
K 3.0* 3.5 3.1*  
* 112* 113* CO2 23 20* 22 BUN 14 17 22* CREA 0.61 0.74 0.78 * 116* 118* CA 8.7 9.0 9.0 MG 1.9 2.2 2.1 PHOS 3.7 4.2 4.1 ALB 2.1* 2.1* 2.3* TBILI 0.2 0.3 0.2 ALT 23 24 25 Micro: 
Lab Results Component Value Date/Time Culture result: PENDING 04/03/2021 03:46 AM  
 Culture result: NO GROWTH 2 DAYS 04/02/2021 04:00 PM  
 Culture result: PROBABLE PSEUDOMONAS SPECIES (A) 04/02/2021 10:40 AM  
 
Imaging: 
Ct Head Wo Cont Result Date: 3/30/2021 EXAM: CT HEAD WO CONT INDICATION: seizures COMPARISON: None. CONTRAST: None. TECHNIQUE: Unenhanced CT of the head was performed using 5 mm images. Brain and bone windows were generated. Coronal and sagittal reformats. CT dose reduction was achieved through use of a standardized protocol tailored for this examination and automatic exposure control for dose modulation. FINDINGS: There is an incidental cavum septum pellucidum. There is mild atrophy and compensatory dilatation of the ventricles. Areas of chronic encephalomalacia are seen in the right frontal lobe, right parietal lobe, and left frontal lobe. There are incidental bilateral basal ganglia calcifications. There is no intracranial hemorrhage, extra-axial collection, or mass effect. The basilar cisterns are open. No CT evidence of acute infarct. The bone windows demonstrate no abnormalities. The visualized portions of the paranasal sinuses and mastoid air cells are clear. Significant chronic encephalomalacia in the right frontal lobe, right parietal lobe, and left frontal lobe. Xr Chest [facility] Result Date: 3/30/2021 EXAM: XR CHEST PORT HISTORY: trach exchange. COMPARISON: 3/30/2021 FINDINGS: Portable AP. A tracheostomy tube is in place. The heart is normal size.  There is diffuse interstitial prominence again seen likely related to edema. No pleural effusion or pneumothorax. Tracheostomy tube in appropriate position. Unchanged diffuse interstitial prominence likely related to edema. Xr Chest [facility] Result Date: 3/30/2021 EXAM: XR CHEST PORT HISTORY: Chest pain. COMPARISON: 4/1/2020 FINDINGS: Portable AP. A tracheostomy tube is in place. The heart is normal size. There is unchanged diffuse interstitial prominence likely related to edema. No focal consolidation, pleural effusion, or pneumothorax. No significant interval change. Medications reviewed Current Facility-Administered Medications Medication Dose Route Frequency  midodrine (PROAMATINE) tablet 10 mg  10 mg Oral Q8H  piperacillin-tazobactam (ZOSYN) 3.375 g in 0.9% sodium chloride (MBP/ADV) 100 mL MBP  3.375 g IntraVENous Q8H  phenytoin (DILANTIN) 100 mg/4 mL oral suspension 100 mg  100 mg PEG Tube Q8H  
 vancomycin (VANCOCIN) 750 mg in 0.9% sodium chloride 250 mL (VIAL-MATE)  750 mg IntraVENous Q12H  
 enoxaparin (LOVENOX) injection 40 mg  40 mg SubCUTAneous Q24H  
 LORazepam (ATIVAN) injection 1 mg  1 mg IntraVENous Q2H PRN  
 fentaNYL citrate (PF) injection 25 mcg  25 mcg IntraVENous Q1H PRN  
 alteplase (CATHFLO) 1 mg in sterile water (preservative free) 1 mL injection  1 mg InterCATHeter PRN  
 guaiFENesin (ROBITUSSIN) 100 mg/5 mL oral liquid 200 mg  200 mg Oral Q6H PRN  
 albuterol-ipratropium (DUO-NEB) 2.5 MG-0.5 MG/3 ML  3 mL Nebulization Q4H RT  
 sodium chloride (NS) flush 5-40 mL  5-40 mL IntraVENous Q8H  
 sodium chloride (NS) flush 5-40 mL  5-40 mL IntraVENous PRN  
 acetaminophen (TYLENOL) tablet 650 mg  650 mg Oral Q6H PRN  Or  
 acetaminophen (TYLENOL) suppository 650 mg  650 mg Rectal Q6H PRN  
 LORazepam (ATIVAN) injection 2 mg  2 mg IntraVENous Q5MIN PRN  
 sodium chloride (NS) flush 5-40 mL  5-40 mL IntraVENous Q8H  
 sodium chloride (NS) flush 5-40 mL  5-40 mL IntraVENous PRN  
 midazolam in normal saline (VERSED) 1 mg/mL infusion  0-2 mg/hr IntraVENous CONTINUOUS  
 NOREPINephrine (LEVOPHED) 8 mg in 5% dextrose 250mL (32 mcg/mL) infusion  0.5-16 mcg/min IntraVENous TITRATE  levETIRAcetam (KEPPRA) 750 mg in 0.9% sodium chloride 100 mL IVPB  750 mg IntraVENous BID  
 0.9% sodium chloride infusion 250 mL  250 mL IntraVENous PRN  pantoprazole (PROTONIX) 40 mg in 0.9% sodium chloride 10 mL injection  40 mg IntraVENous Q24H  
 alteplase (CATHFLO) 1 mg in sterile water (preservative free) 1 mL injection  1 mg InterCATHeter PRN  propofol (DIPRIVAN) 10 mg/mL infusion  0-50 mcg/kg/min IntraVENous TITRATE Signed: 
 Marisol Angeles MD 
 Resident, Family Medicine Attending note: Attending note to follow. ..

## 2021-04-04 PROBLEM — J96.01 ACUTE RESPIRATORY FAILURE WITH HYPOXIA (HCC): Status: ACTIVE | Noted: 2021-01-01

## 2021-04-04 NOTE — PROGRESS NOTES
Chart accessed to assist primary RN with patient care. Medications administered per order. See MAR.  
 
1006- Levophed gtt restarted at 4 mcg for MAP of 6. See MAR.

## 2021-04-04 NOTE — PROGRESS NOTES
0700 - Bedside and Verbal shift change report given to [facility] and Jose Antonio Joyce (oncoming nurse) by Rondall Gaucher (offgoing nurse). Report included the following information SBAR, Kardex, ED Summary, Procedure Summary, Intake/Output, MAR, Recent Results, Med Rec Status and Cardiac Rhythm NSR. 
0800 - Pt seen resting quietly in bed. Oral care and suctioning done q2h along with turning pt and floating heels q2h this shift. Trach care completed, inner cannula changed. No residuals seen from PEG tube this shift. DTIs noted bilaterally to heels. 1200 - Pt seen agitated. PRN Ativan given with minimal change. HR 140s-150s and RR 30s. Rounded with Dr. Livia Garcia on patient and morphine prn added for agitation. Morphine given, pt seen more comfortable at this time. Caregiver from [address] [place] [address] arrived to visit pt. Gave update on patient status and answered all questions. Throughout the shift pt has been nasally suctioned as well as orally due to copious secretions. Pt also lavaged multiple times due to thick secretions. Levophed titrated this shift for goal of MAP >65. Purewick changed, pt cleaned after BM. Pt failed SBT due to low TV.  
1300 - Bedside and Verbal shift change report given to [address] (oncoming nurse) by [facility] and Jose Antonio Joyce (offgoing nurse). Report included the following information SBAR, Kardex, ED Summary, Procedure Summary, Intake/Output, MAR, Recent Results, Med Rec Status and Cardiac Rhythm NSR.

## 2021-04-04 NOTE — PROGRESS NOTES
Spiritual Care Assessment/Progress Note Samuel Hillman 
 
 
NAME: Alee Lauren      MRN: [id number] AGE: 49 y.o. SEX: female Muslim Affiliation: No preference Language: [place] 4/4/2021     Total Time (in minutes): 5 Spiritual Assessment begun in [facility] through conversation with: 
  
    [x]Patient        [] Family    [] Friend(s) Reason for Consult: Palliative Care, Initial/Spiritual Assessment Spiritual beliefs: (Please include comment if needed) 
   [] Identifies with a tj tradition:     
   [] Supported by a tj community:        
   [] Claims no spiritual orientation:       
   [] Seeking spiritual identity:            
   [] Adheres to an individual form of spirituality:       
   [x] Not able to assess:                   
 
    
Identified resources for coping:  
   [] Prayer                           
   [] Music                  [] Guided Imagery 
   [] Family/friends                 [] Pet visits [] Devotional reading                         [x] Unknown 
   [] Other:                                          
 
 
Interventions offered during this visit: (See comments for more details) Plan of Care: 
 
 [] Support spiritual and/or cultural needs  
 [] Support AMD and/or advance care planning process    
 [] Support grieving process 
 [] Coordinate Rites and/or Rituals  
 [] Coordination with community clergy [] No spiritual needs identified at this time 
 [] Detailed Plan of Care below (See Comments)  [] Make referral to Music Therapy 
[] Make referral to Pet Therapy    
[] Make referral to Addiction services 
[] Make referral to [facility] 
[] Make referral to Spiritual Care Partner 
[] No future visits requested       
[x] Follow up upon further referrals Comments:  visit for initial spiritual assessment. Patient resting in bed, on ventilator.   Patient resting, will open eyes, but non-responsive at baseline. Appears comfortable. Provided words of comfort and support. No family or visitors present. Will continue to follow up as needed and upon request as able. Rev. Santos Jimenez MDiv, [facility], [facility]  paging service: [phone])

## 2021-04-04 NOTE — PROGRESS NOTES
Critical Care Medicine Progress Note [id number]       [id number] Patient [facility]     YOB: 1970     Age:51 y.o. Subjective Subjective: 
: No acute events overnight. Receiving PRN fentanyl and Atvan for agitation. Remain with large amount of clear secretions. Toelrating Tf. Hb 7.9 Afebrile, Wbcs normal ,afebrile. Failed SBT this am.  
: No acute events overnight. Vimpat stopped and was started on Phenytoin per neurology. Receiving Fentanyl and ativan PRN for agitationOn low dose of Levophed Lactate normal.ON Ac 10/400//6. Failed SBT this am. Cr stable. Hb down to 8.6 WBCs up Started on vanco and cefepime per primary team.  
: OFF verse gtt. On cEEg. ff Levophed,toelrating midodrine. Pn vent support: //. Did not tolerate SPV this morning. Has periods of agitation. Received 1 U RBCs . Hb up to 9.2 Review of Systems:Unable to obtain given clinical condition. Objective Vitals Last 24 Hours: TEMPERATURE:  Temp  Av.4 °F (37.4 °C)  Min: 98.5 °F (36.9 °C)  Max: 100.2 °F (37.9 °C) RESPIRATIONS RANGE: Resp  Av  Min: 10  Max: 44 PULSE OXIMETRY RANGE: SpO2  Av.3 %  Min: 96 %  Max: 100 % PULSE RANGE: Pulse  Av.6  Min: 66  Max: 156 BLOOD PRESSURE RANGE: Systolic (86RUY), AEZ:529 , Min:81 , FFY:473  
; Diastolic (86UTA), IKC:97, Min:40, Max:110 I/O (24Hr): Intake/Output Summary (Last 24 hours) at 2021 1416 Last data filed at 2021 1303 Gross per 24 hour Intake 1277.99 ml Output 1450 ml Net -172.01 ml Objective: 
Vital signs: (most recent): Blood pressure (!) 145/86, pulse 76, temperature 99.8 °F (37.7 °C), resp. rate 20, height 4' 9\" (1.448 m), weight 55.4 kg (122 lb 2.2 oz), SpO2 100 %. Physical Examination: I examined the patient via telemedicine, with its associated limitations.   I reviewed the electronic medical record, the x-rays, labs, progress notes, previous history and physicals and consultation notes that were available in the electronic medical record. I beamed in and examined the patient On exam 
:Lying in bed. EO to voice No commands Trach in place S1,S2 Decreased breath sounds bilateral at bases Abdomen soft, ND Labs/Imaging/Diagnostics Labs: CBC: 
Recent Labs 04/04/21 
1678 04/03/21 
5952 04/02/21 
0448 WBC 8.4 12.0* 14.6*  
RBC 2.60* 2.83* 2.80* HGB 7.9* 8.7* 8.6* HCT 25.5* 28.0* 26.9*  
MCV 98.1 98.9 96.1 RDW 15.0* 15.0* 15.0*  
 320 336 CHEMISTRIES: 
Recent Labs 04/04/21 
3802 04/03/21 
5068 04/02/21 
0448  142 145  
K 3.0* 3.5 3.1*  
* 112* 113* CO2 23 20* 22 BUN 14 17 22* CA 8.7 9.0 9.0 PHOS 3.7 4.2 4.1 MG 1.9 2.2 2.1 PT/INR: 
No results for input(s): INR, INREXT, INREXT in the last 72 hours. No lab exists for component: PROTIME APTT:No results for input(s): APTT in the last 72 hours. LIVER PROFILE: 
Recent Labs 04/04/21 
7989 04/03/21 
6368 04/02/21 
0448 AST 25 32 30 ALT 23 24 25 Lab Results Component Value Date/Time ALT (SGPT) 23 04/04/2021 04:54 AM  
 AST (SGOT) 25 04/04/2021 04:54 AM  
 Alk. phosphatase 177 (H) 04/04/2021 04:54 AM  
 Bilirubin, direct 0.08 11/05/2020 11:23 AM  
 Bilirubin, total 0.2 04/04/2021 04:54 AM  
 
 
Imaging Last 24 Hours: 
No results found. Assessment//Plan Principal Problem: 
  Status epilepticus ([facility] [address] (3/30/2021) Active Problems: 
  Iron deficiency (12/11/2019) VICK (acute kidney injury) ([facility] [address] (3/31/2021) Edema (3/31/2021) Hypotension (3/31/2021) Hyperphosphatemia (3/31/2021) Hyponatremia (3/31/2021) Thrombocytosis ([facility] [address] (3/31/2021) Acute cystitis without hematuria (4/3/2021) Elevated Dilantin level (4/3/2021) Assessment & Plan A/P: 
49 with pmh Downs, c-diff and seizures admitted with status. Assessment: 
 
Status Epilepticus Acute Resp Failure/Trach Dependence: Hypotension Anemia VICK, improving Hx Recurrent C-diff: s/p fecal transplant Plan: 
Start PO oxycodone. DC Fentanyl,start lower dose of morphine. Decrease Ativan frequency. Continue Keppra & Phenytoin per neurology. Follow Neuro exam  
Increase midodrine dose Continue Vent support. Continue to attempt PSV as tolerated. Add Robinul for secretions. CXR in AM   
Continue to monitor H/H Monitor UO and renal indices. Continue Vanco and zosyn. Follow CX. And de-escalate accordingly TF Replace K Protonix/SQ Lovenox Work on placement Poor prognosis 
 
 I performed all aspects of the physical examination via Telemedicine associated with two way audio and video communication and with the on-site assistance of  the bedside nurse. I am located in [place], [place] and the patient is located in [place] at [facility]. The patient is critically ill in the ICU. I  personally  reviewed the pertinent medical records, laboratory/ pathology data and radiographic images. The decision making regarding this patient is as documented above, which was generated  following  discussion  with the multidisciplinary ICU team and creation of a treatment plan for  the patient. We discussed the patient's interval history and future coordination of care and  plans. The patient's medications  were reviewed and changes made as stipulated above. Due to  critical illness impairing one or more vital organs of this patient resulting in life threatening clinical situation  I have provided direct, frequent personal  assessment and manipulation in management plan and spent 35 minutes  of  critical care time excluding the time spent on procedures and teaching. Greater than 50% of this time  in patients care was  employed  in counseling and coordination of care and engaged in face to face discussion of case management issues, addressing questions, and outlining a plan of  therapy.  
 
 Pt at risk of life threatening deterioration from seizure, acute on chronic resp failure Pt seen and evaluated via tele encounter. Audio and Video were used for this interaction.  
 
Electronically signed by Colleen Block MD on 4/4/2021 at 10:50 AM

## 2021-04-04 NOTE — PROGRESS NOTES
1300- Bedside and Verbal shift change report given to Nimco (oncoming nurse) by Karen Renteria (offgoing nurse). Report included the following information SBAR, Kardex, ED Summary, OR Summary, Recent Results, Cardiac Rhythm sinus rhythm and Alarm Parameters . Verified drips with off going nurse Levo 2 Potasium 10 meq Shift assessment complete, pt is not alert and oriented, opens eyes spontaneously, upper extremities have no movement,lower extremities withdrawal to pain. Pupils equal and reactive. Lungs coarse in all fields. #6 trach in place on the ventilator with the following settings; tidal volume 400, rate 10, peep 6, fi02 40%. Cough with suction, copious amounts of yellow secretions obtained. NSR on tele. Abdomen soft and intact with active bowel sounds. Peg tube in place running akiko high protein 45 ml/hr. Sergio Feller in place, patent and draining. Scheduled meds provided. Pt turned and repositioned. 1600- Re-assessment complete, no changes noted. Pt turned and repositioned, vap bundle complete 1800- Pt turned and repositioned, vap bundle complete 1900- Bedside and Verbal shift change report given to Lady Aaron (oncoming nurse) by Esha Diaz (offgoing nurse). Report included the following information SBAR, Kardex, ED Summary, OR Summary, MAR, Accordion, Recent Results and Cardiac Rhythm sinsu rhythm.

## 2021-04-05 NOTE — PROGRESS NOTES
[id number] [facility] PROGRAM 
PROGRESS NOTE  
 
4/5/2021 PCP: Darrell Benavidez NP Assessment/Plan:  
 
Mario Peguero is a 49 y.o. female with PMHx of Down Syndrome (non-verbal at baseline), S/p Trach and PEG tube, seizures, encephalomalacia, ANNE, h/o recurrent Cdiff colitis admitted for status epilepticus. 24 Hour Events: none Sepsis 2/2 VAP and UTI: Pt originally admitted w/ 3/4 SIRs that resolved after initial fluid resuscitation. No infectious etiology suspected as leukocytosis and tachycardia believed 2/2 to seizures. CXR 4/2: minimal interstitial pulmonary edema versus interstitial pneumonia. BCx 3/31 and rBCx 4/2 NGTD. LA wnl. UCx w/ probable pseudomonas. Resp culture w/ heavy probably pseudomonas. MRSA negative. Repeat CXR today shows worsening PNA. -f/u BCx 4/2 
-Zosyn (started 4/2) for 8 day course rec by pulm 
-As pt has not had any improvement over 4 days on zosyn will add double coverage for pseudomonas by adding levaquin 
-MRSA culture negative, will d/c vanc (4/2-4/5) -Daily CBC Seizures: Pt admitted in status epilepticus. CT head no acute abnormality, chronic encephalomalacia in L&R frontal lobe, R parietal lobe. Supra-therapeutic phenytoin level at 34. POA EKG w/ sinus tach. F/w neuro Dr. Jose Guadalupe Mtz versed gtt. EEG w/ no evidence of seizure activity. Phenytoin now sub-therapeutic. 
-Mechanical ventilation though chronic trach tube, wean as tolerated  
-Per neurology: appreciate recs 
 -Phenytoin 100mg q8 (4/2) (home dose 125mg q8) -Keppra 750mg IV BID 
            -STOP lacosamide  
            -Will await further neuro recs as phenytoin level now sub-therapeutic 
-Daily CBC, CMP 
-Seizure and fall precautions   
 
Acute respiratory failure/trach dependence s/p lasix 10mg x1 4/3. Trach placed due to hx of seizures, aspiration PNA, and previous intubation.  
-wean vent as tolerated 
-Oxy 5mg Q6h scheduled, ativan 0.5mg Q4h PRN, morphine 1mg Q4h PRN -Robinul added for secretions 
-goal fluid status: net neg 500 daily as tolerated by BP and kidney function 
-Will give lasix 20mg IV x1 now as pt is net positive 2L over the past 24hr 
-Chest PT ordered as pt lung exam is rhonchorous and junky -appreciate intensivist sarahi Nash q4h 
-Daily SBT 
-Palliative care consulted for goals of care conversation Hypotension: Likely 2/2 sedatives. S/p levo gtt. -Levo gtt PRN 
-Midodrine 15mg TID 
-Strict Is/Os 
-Pt still borderline low, requiring periodic pressor support. Lasix 20mg IV given x1 which may adversely affect BP, however concern for volume overload outweighs this risk. Anemia of chronic disease in setting of hx of GI bleed: POA Hgb 10.1 (b/l 13) > 6.3. Hx of GI bleed, admission 3/2021 to [address] [place] [address], EGD & Colonoscopy at that time revealed gastritis and colitis. FOBT neg. No obvious source of bleeding, possibly 2/2 hemodilution. S/p 1u pRBC. Iron studies suggest ACD. -Per GI no active bleed/ no intervention at this time 
-Protonix 40mg IV daily 
-Daily CBC VICK - resolved. POA Cr 1.24 (BL 0.5). -Daily CMP 
-HOLD home Torsemide 40mg  
  
Hyponatremia - resolved: Likely 2/2 IVVD. POA Na 130.  
-Daily CMP 
  
Hyperphosphatemia- resolved:  POA P 5.6. Improved with IVF 
-Daily phos 
  Thrombocytosis- resolved: Likely 2/2 IVVD as resolved after fluids. POA plt 584.  
-Daily CBC 
  
H/o ANNE: Fe wnl this admission. POA Hgb 10.1 (BL 13) > 6.3, s/p 1u pRBC. Not on home iron. Recently hospitalized in March 2021 for GI bleed at [facility].   
-Monitor on daily labs 
  
Elevated ALP, AST in setting of hepatic steatosis: Chronic. POA , AST 60.  
-Monitor on daily labs  
  
S/p PEG-tube:  
-Continue tube feeds  
  
Chronic edema: Home Torsemide 40mg daily. F/w Nephrology OP. S/p lasix 10mg IV x1 
-HOLD home Torsemide due to hypotension 
  
GERD: stable.  
-Protonix 40mg IV daily  
 
H/o Cdiff colitis: S/p fecal transplant.  
-Enteric precuations 
  
  
FEN/GI -NG tube feeds Activity - Activity w/assitance DVT prophylaxis - Held GI prophylaxis - Lovenox Fall prophylaxis - Fall precautions ordered Code Status - Full. Discussed with Caregiver Next of Kin Name and [address], mother/ [id number]. [facility], group home . Viki Nyhan, MD 
Family Medicine Resident Subjective: No reported events overnight. Pt asleep. Does not respond to voice. Objective:  
Physical examination Patient Vitals for the past 24 hrs: 
 Temp Pulse Resp BP SpO2  
04/05/21 0800 98.8 °F (37.1 °C)      
04/05/21 0731     99 % 04/05/21 0724  71 10  100 % 04/05/21 0715  69 10 125/73 99 % 04/05/21 0701  70     
04/05/21 0700  71 10 122/74 99 % 04/05/21 0645  77 10 112/66 100 % 04/05/21 0630  97 16  100 % 04/05/21 0615  77 10 (!) 100/59 100 % 04/05/21 0600  76 10 102/64 99 % 04/05/21 0545  79 11 102/60 99 % 04/05/21 0530  81 10 107/65 100 % 04/05/21 0515  85 10 105/63 99 % 04/05/21 0500  85 10 (!) 104/59 99 % 04/05/21 0445  89 10 (!) 108/59 99 % 04/05/21 0430  97 13 107/71 99 % 04/05/21 0415  97 11 108/63 98 % 04/05/21 0400 99 °F (37.2 °C) (!) 101 10 112/62 99 % 04/05/21 0345  (!) 118 15 126/76 99 % 04/05/21 0330  93 10 101/61 100 % 04/05/21 0322  89 10  100 % 04/05/21 0321     100 % 04/05/21 0315  89 10 104/61 99 % 04/05/21 0300  90 10 (!) 100/57 99 % 04/05/21 0245  99 11 (!) 103/53 99 % 04/05/21 0230  99 10 (!) 102/56 99 % 04/05/21 0215  (!) 121 16 116/84 99 % 04/05/21 0200  (!) 110 11 (!) 90/56 98 % 04/05/21 0145  (!) 102 10 (!) 92/53 98 % 04/05/21 0130  (!) 124 22 (!) 106/57 97 % 04/05/21 0115     99 % 04/05/21 0100  (!) 150 (!) 59  100 % 04/05/21 0045  (!) 133 24 136/74 98 % 04/05/21 0030  (!) 137 (!) 31  99 % 04/05/21 0015  (!) 140 21 123/87 97 % 04/05/21 0000 99 °F (37.2 °C) (!) 142 27 112/81 98 % 04/04/21 2352  (!) 120     
04/04/21 2345  (!) 132 18 (!) 149/84 99 % 04/04/21 2330  (!) 126 22 128/70 99 % 04/04/21 2321  (!) 122 21  99 % 04/04/21 2320     99 % 04/04/21 2315  (!) 119 15 (!) 138/92 99 % 04/04/21 2300  (!) 122 21 135/81 98 % 04/04/21 2245  (!) 121 26  99 % 04/04/21 2230  (!) 107 16 122/73 99 % 04/04/21 2215  (!) 108 19 117/70 99 % 04/04/21 2200  100 24 (!) 167/90 96 % 04/04/21 2145  (!) 106 17 120/65 99 % 04/04/21 2130  (!) 115 17 126/68 99 % 04/04/21 2116  (!) 114 17 112/61 99 % 04/04/21 2115  (!) 116 17  98 % 04/04/21 2100  (!) 113 17 (!) 92/43 99 % 04/04/21 2045  (!) 103 13 116/67 100 % 04/04/21 2030  97 14 116/67 100 % 04/04/21 2015  (!) 103 14 (!) 117/54 100 % 04/04/21 2000 99.2 °F (37.3 °C) (!) 131 23 121/73 100 % 04/04/21 1945  100 12 113/68 99 % 04/04/21 1930  98 17 (!) 124/96 100 % 04/04/21 1926  81 12  99 % 04/04/21 1925     99 % 04/04/21 1915  (!) 105 18 113/83 99 % 04/04/21 1900  92 11 (!) 101/56 99 % 04/04/21 1845  85 13 114/62 100 % 04/04/21 1830  83 13  100 % 04/04/21 1815  80 12 100/66 100 % 04/04/21 1800  74 13 114/71 99 % 04/04/21 1745  77 19 120/69 100 % 04/04/21 1730  75 18 107/62 100 % 04/04/21 1715  77 20 103/64 100 % 04/04/21 1700  76 15 111/66 100 % 04/04/21 1645  78 15 118/70 100 % 04/04/21 1630  79 21 115/66 100 % 04/04/21 1615  81 20 109/65 100 % 04/04/21 1600 98.7 °F (37.1 °C) 87 16 106/66 100 % 04/04/21 1545  85 14 115/69 100 % 04/04/21 1530  82 9 136/77 100 % 04/04/21 1515  74 16 (!) 154/87 100 % 04/04/21 1513  70 10  100 % 04/04/21 1500  72 16 (!) 153/89 100 % 04/04/21 1445  73 17 (!) 146/83 100 % 04/04/21 1432  73     
04/04/21 1430  77 18 (!) 142/86 100 % 04/04/21 1415  77 18 122/75 100 % 04/04/21 1400  76 20 (!) 145/86 100 % 04/04/21 1345  82 18 136/83 100 % 04/04/21 1330  91 16 110/69 100 % 04/04/21 1315  91 17 106/66 100 % 21 1300 99.8 °F (37.7 °C) (!) 106 12 (!) 82/51 99 % 21 1245  99 10 (!) 90/52 99 % 21 1230  (!) 112 12 (!) 81/43 98 % 21 1215  (!) 148 28 (!) 148/97 98 % 21 1200 99.8 °F (37.7 °C) (!) 156 (!) 44 (!) 153/95 99 % 21 1130    (!) 155/110 100 % 21 1124  (!) 130 (!) 31  100 % 21 1115  (!) 119 27 (!) 143/91 100 % 21 1100  74 11 133/74 100 % 21 1045  70 17 136/76 100 % 21 1030  66 18 (!) 144/85 100 % 21 1015  (!) 116 21  100 % 21 1000  82 10 (!) 87/52 100 % 21 0945  85 10  100 % 21 0930  86 10  100 % 21 0915  90 10  100 % 21 0900  95 10 (!) 90/56 100 % 21 0845  95 10  99 % Temp (24hrs), Av.2 °F (37.3 °C), Min:98.7 °F (37.1 °C), Max:99.8 °F (37.7 °C) O2 Flow Rate (L/min): 50 l/min O2 Device: Tracheostomy, Ventilator Date 21 - 21 6538 21 - 21 6760 Shift 4592-5153 7586-7349 24 Hour Total 6773-6925 6784-8052 24 Hour Total  
INTAKE  
I.V.(mL/kg/hr) 568.3(0.9) 1188(1.8) 1756.3(1.3) Versed Volume 0  0 Levophed Volume 68.3 38 106.3 Volume (piperacillin-tazobactam (ZOSYN) 3.375 g in 0.9% sodium chloride (MBP/ADV) 100 mL MBP)  400 400 Volume (vancomycin (VANCOCIN) 750 mg in 0.9% sodium chloride 250 mL (VIAL-MATE))  750 750 Volume (potassium chloride 10 mEq in 100 ml IVPB) 500 0 500 NG/ 104 6291 Water Flush Volume (mL) (PEG/Gastrostomy Tube) 345 225 570 Medication Volume (PEG/Gastrostomy Tube)  50 50 Intake (ml) (PEG/Gastrostomy Tube) 135 590 725 Shift Total(mL/kg) 1048. 3(18.9) 5571(82.8) 3101.3(56)     
OUTPUT Urine(mL/kg/hr) 550(0.8) 400(0.6) 950(0.7) Urine Occurrence(s)  1 x 1 x Urine Output (mL) (External Female Catheter 21) 550 400 950 Stool Stool Occurrence(s)  1 x 1 x Shift Total(mL/kg) 550(9.9) 400(7.2) 950(17.1) .3 1653 2151.3 Weight (kg) 55.4 55.4 55.4 55.4 55.4 55.4 General appearance - No acute distress. Asleep. Does not respond to voice. Chest - Trach tube in place, intubated Rhonchi and coarse breath sounds bilaterally, symmetric air entry Heart - tachycardic, regular rhythm, normal S1, S2, no murmurs, rubs, clicks or gallops, Abdomen - soft, nontender, mildly distended, +BS,no rebound or guarding Neurological - Asleep. Extremities - Trace pitting edema to BUE. No pedal edema. Data Review: CBC: 
Recent Labs 04/05/21 
1904 04/04/21 
5845 04/03/21 
1710 WBC 12.8* 8.4 12.0* HGB 7.9* 7.9* 8.7* HCT 25.0* 25.5* 28.0*  
 305 320 Metabolic Panel: 
Recent Labs 04/05/21 
6491 04/04/21 
1686 04/03/21 
9525  144 142  
K 3.4* 3.0* 3.5 * 112* 112* CO2 22 23 20* BUN 13 14 17 CREA 0.65 0.61 0.74 * 139* 116* CA 8.8 8.7 9.0 MG 1.9 1.9 2.2 PHOS 3.2 3.7 4.2 ALB 2.0* 2.1* 2.1* TBILI 0.2 0.2 0.3 ALT 22 23 24 Micro: 
Lab Results Component Value Date/Time Culture result: MRSA NOT PRESENT 04/03/2021 02:11 PM  
 Culture result:  04/03/2021 02:11 PM  
  Screening of patient nares for MRSA is for surveillance purposes and, if positive, to facilitate isolation considerations in high risk settings. It is not intended for automatic decolonization interventions per se as regimens are not sufficiently effective to warrant routine use. Culture result: HEAVY PROBABLE PSEUDOMONAS SPECIES (A) 04/03/2021 03:46 AM  
 Culture result: SCANT PROBABLE NORMAL RESPIRATORY MARINA 04/03/2021 03:46 AM  
 
Imaging: 
Ct Head Wo Cont Result Date: 3/30/2021 EXAM: CT HEAD WO CONT INDICATION: seizures COMPARISON: None. CONTRAST: None. TECHNIQUE: Unenhanced CT of the head was performed using 5 mm images. Brain and bone windows were generated. Coronal and sagittal reformats.  CT dose reduction was achieved through use of a standardized protocol tailored for this examination and automatic exposure control for dose modulation. FINDINGS: There is an incidental cavum septum pellucidum. There is mild atrophy and compensatory dilatation of the ventricles. Areas of chronic encephalomalacia are seen in the right frontal lobe, right parietal lobe, and left frontal lobe. There are incidental bilateral basal ganglia calcifications. There is no intracranial hemorrhage, extra-axial collection, or mass effect. The basilar cisterns are open. No CT evidence of acute infarct. The bone windows demonstrate no abnormalities. The visualized portions of the paranasal sinuses and mastoid air cells are clear. Significant chronic encephalomalacia in the right frontal lobe, right parietal lobe, and left frontal lobe. Xr Chest [facility] Result Date: 3/30/2021 EXAM: XR CHEST PORT HISTORY: trach exchange. COMPARISON: 3/30/2021 FINDINGS: Portable AP. A tracheostomy tube is in place. The heart is normal size. There is diffuse interstitial prominence again seen likely related to edema. No pleural effusion or pneumothorax. Tracheostomy tube in appropriate position. Unchanged diffuse interstitial prominence likely related to edema. Xr Chest [facility] Result Date: 3/30/2021 EXAM: XR CHEST PORT HISTORY: Chest pain. COMPARISON: 4/1/2020 FINDINGS: Portable AP. A tracheostomy tube is in place. The heart is normal size. There is unchanged diffuse interstitial prominence likely related to edema. No focal consolidation, pleural effusion, or pneumothorax. No significant interval change. Medications reviewed Current Facility-Administered Medications Medication Dose Route Frequency  midodrine (PROAMATINE) tablet 15 mg  15 mg Oral Q8H  
 LORazepam (ATIVAN) injection 0.5 mg  0.5 mg IntraVENous Q6H PRN  
 oxyCODONE IR (ROXICODONE) tablet 5 mg  5 mg Per G Tube Q6H  
 naloxone (NARCAN) injection 0.4 mg  0.4 mg IntraVENous EVERY 2 MINUTES AS NEEDED  
 glycopyrrolate (ROBINUL) tablet 1 mg  1 mg Oral TID  morphine injection 1 mg  1 mg IntraVENous Q4H PRN  piperacillin-tazobactam (ZOSYN) 3.375 g in 0.9% sodium chloride (MBP/ADV) 100 mL MBP  3.375 g IntraVENous Q8H  phenytoin (DILANTIN) 100 mg/4 mL oral suspension 100 mg  100 mg PEG Tube Q8H  
 enoxaparin (LOVENOX) injection 40 mg  40 mg SubCUTAneous Q24H  
 alteplase (CATHFLO) 1 mg in sterile water (preservative free) 1 mL injection  1 mg InterCATHeter PRN  
 guaiFENesin (ROBITUSSIN) 100 mg/5 mL oral liquid 200 mg  200 mg Oral Q6H PRN  
 albuterol-ipratropium (DUO-NEB) 2.5 MG-0.5 MG/3 ML  3 mL Nebulization Q4H RT  
 sodium chloride (NS) flush 5-40 mL  5-40 mL IntraVENous Q8H  
 sodium chloride (NS) flush 5-40 mL  5-40 mL IntraVENous PRN  
 acetaminophen (TYLENOL) tablet 650 mg  650 mg Oral Q6H PRN Or  
 acetaminophen (TYLENOL) suppository 650 mg  650 mg Rectal Q6H PRN  
 sodium chloride (NS) flush 5-40 mL  5-40 mL IntraVENous Q8H  
 sodium chloride (NS) flush 5-40 mL  5-40 mL IntraVENous PRN  
 NOREPINephrine (LEVOPHED) 8 mg in 5% dextrose 250mL (32 mcg/mL) infusion  0.5-16 mcg/min IntraVENous TITRATE  levETIRAcetam (KEPPRA) 750 mg in 0.9% sodium chloride 100 mL IVPB  750 mg IntraVENous BID  
 0.9% sodium chloride infusion 250 mL  250 mL IntraVENous PRN  pantoprazole (PROTONIX) 40 mg in 0.9% sodium chloride 10 mL injection  40 mg IntraVENous Q24H  
 alteplase (CATHFLO) 1 mg in sterile water (preservative free) 1 mL injection  1 mg InterCATHeter PRN Signed: 
 Roney Riddle MD 
 Resident, Family Medicine Attending note: Attending note to follow. ..

## 2021-04-05 NOTE — PROGRESS NOTES
30 [facility] [address] with [address] [facility] Resident Progress Note in Brief S: Patient seen and examined at bedside. Awake, and Intubated. O: 
Visit Vitals BP (!) 91/59 Pulse (!) 101 Temp 99 °F (37.2 °C) Resp 13 Ht 4' 9\" (1.448 m) Wt 122 lb 2.2 oz (55.4 kg) SpO2 99% BMI 26.43 kg/m² Physical Examination General appearance - No acute distress. Alert. Tracking with eyes. Appears comfortable Chest - Trach tube in place. Clear to auscultation, no wheezes, rales or rhonchi, symmetric air entry Heart - normal rate, regular rhythm, normal S1, S2, no murmurs, rubs, clicks or gallops, Abdomen - soft, nontender, nondistended, Extremities - No peripheral edema A/P:  
Sepsis 2/2 Ventilator-associated PNA and UTI: Pt originally admitted w/ 3/4 SIRs that resolved after initial fluid resuscitation. No infectious etiology suspected as leukocytosis and tachycardia believed 2/2 to seizures. CXR (4/5): increased diffuse bilateral patchy airspace disease. Ucx (04/2): Pseudomonas 
-Daily CBC 
-Zosyn and Levaquin Status epilepticus: CT head no acute abnormality, chronic encephalomalacia in L&R frontal lobe, R parietal lobe. Supra-therapeutic phenytoin level at 34. POA EKG w/ sinus tach. F/w neuro Dr. Cuba Hernandez versed gtt. -Mechanical ventilation though chronic trach tube, wean as tolerated  
-Ativan 2mg q5min prn for seizure, max 3 doses 
-Per neurology: appreciate recs 
            -Phenytoin 100mg q8 (4/2) (home dose 125mg q8) -Keppra 750mg IV BID 
-Daily CBC, CMP 
-Seizure and fall precautions   
 
Acute respiratory failure/trach dependence s/p lasix 10mg x1 4/3. Trach placed due to hx of seizures, aspiration PNA, and previous intubation. -wean vent as tolerated 
-goal fluid status: net neg 500 daily as tolerated by BP and kidney function -appreciate intensivist recs 
-Duonebs q4h 
-Daily SBT 
  
Anemia in setting of hx of GI bleed: POA Hgb 10.1 (b/l 13). FOBT neg. S/p 1u pRBC. -Protonix 40mg IV daily 
-Per GI no indication of GI bleed, no intervention at this time  
  
Hypotension: Likely 2/2 sedatives. -Levo: 0.9 mcg/min 
-Increase to Midodrine 10mg TID (4/2) Please see daily progress note for detailed plan. Vicki Roberson MD 
Family Medicine Resident

## 2021-04-05 NOTE — PROGRESS NOTES
Spiritual Care Assessment/Progress Note [address] 
 
 
NAME: Cate Abdalla      MRN: [id number] AGE: 49 y.o. SEX: female Muslim Affiliation: No preference Language: [place] 4/5/2021     Total Time (in minutes): 10 Spiritual Assessment begun in [facility] 3 INTERVNTNL CARE through conversation with: 
  
    []Patient        [] Family    [] Friend(s) Reason for Consult: Initial/Spiritual assessment, critical care Spiritual beliefs: (Please include comment if needed) 
   [] Identifies with a tj tradition:     
   [] Supported by a tj community:        
   [] Claims no spiritual orientation:       
   [] Seeking spiritual identity:            
   [] Adheres to an individual form of spirituality:       
   [x] Not able to assess:                   
 
    
Identified resources for coping:  
   [] Prayer                           
   [] Music                  [] Guided Imagery 
   [] Family/friends                 [] Pet visits [] Devotional reading                         [x] Unknown 
   [] Other:                                         
 
 
Interventions offered during this visit: (See comments for more details) Plan of Care: 
 
 [] Support spiritual and/or cultural needs  
 [] Support AMD and/or advance care planning process    
 [] Support grieving process 
 [] Coordinate Rites and/or Rituals  
 [] Coordination with community clergy [] No spiritual needs identified at this time 
 [] Detailed Plan of Care below (See Comments)  [] Make referral to Music Therapy 
[] Make referral to Pet Therapy    
[] Make referral to Addiction services 
[] Make referral to [facility] 
[] Make referral to Spiritual Care Partner 
[] No future visits requested       
[x] Follow up upon further referrals Comments:  visit for initial spiritual assessment. Patient resting quietly in bed, appeared comfortable. No family or visitors present.   Please contact spiritual care for further referral or consult. Rev. Mandy Mendez MDiv, [facility], [facility]  paging service: [phone])

## 2021-04-05 NOTE — CONSULTS
Palliative Medicine Consult Pranay: [phone] ([phone]) Patient Name: Adithya Johnson YOB: 1970 Date of Initial Consult: 4/5/2021 Reason for Consult: Goals of care discussion Requesting Provider: Dr. Jeffrey Camarena Primary Care Physician: Lolis Thomas NP 
 
 SUMMARY:  
Adithya Johnson is a 49 y.o. with a past history of Downs Syndrome, seizures, recurring C Diff s/p fecal transplant, HLD,  Tracheostomy (2/2020), PEG, Sleep apnea and Debility. Patient presented from [address] [place] [address] with report of uncontrolled seizures x 5 days. Patient was admitted on 3/30/2021 with a diagnosis of Seizures, VICK,  UTI, hypoxia and  Hypotension. Patient paced on vent via tracheostomy. Chart review/Course of Hospitalization: Seizures now controlled, renal function improved Remains on vent, Failing daily SBTs, continues to require pressor support, with combination midodrine and  Levophed. Current medical issues leading to Palliative Medicine involvement include: GOC discussion in setting of severe debility and hypoxic respiratory failure. PALLIATIVE DIAGNOSES:  
1. Hypoxic Respiratory Failure 2. Altered mental status, unspecified 3. Debility 4. Advanced Care planning Discussion 5. DNR Discussion 6. Goals of care Discussion PLAN:  
1. Prior to visit chart reviewed and we spoke with patients nurse Rosendo Dior RN and unit ROSA Hernandez. 2. Palliative clinical SW and I met with patient, no family at bedside. Patient sleeping, but did wake to voice, no eye contact and she did not visually track to voice, was also  non verbal and not following commands. 3. Hypoxia- Unresolved, continues to require vent to trach, failing SBTs daily due to low volumes and apnea. 4. Altered mental status, unspecified- Unresolved? Not clear patients baseline. No eye contact or tracking during visit. 5. Advanced care planning discussion- No AMD in EMR. Patient single.  Her Mother is legal NOK/primary health care decision maker. Unclear if she has siblings to serve as secondary. 6. DNR Discussion- Patient continues to have Full Code status. 7. Goals of care Discussion-  Full Restorative at this time. · We called the patients mother/legal NOK, gave brief update. Ms. Angelika Laws was on the bus at time of call, we were unable to find a mutually agreed upon time to speak later today. Palliative team to reach out to Ms. Angelika Laws tomorrow. 8. Thank you for including the Palliative Medicine team in Ms. Godwin's care. 9.  Initial consult note routed to primary continuity provider and/or primary health care team members 10. Communicated plan of care with: Palliative IDT, [facility] Team 
 
 GOALS OF CARE / TREATMENT PREFERENCES:  
 
GOALS OF CARE: 
  
 
TREATMENT PREFERENCES:  
Code Status: Full Code Advance Care Planning: 
[x] The [facility] Interdisciplinary Team has updated the ACP Navigator with [address] and Patient Capacity Primary Decision Maker[facility] - Mother - [phone] No flowsheet data found. Other Instructions: Other: As far as possible, the palliative care team has discussed with patient / health care proxy about goals of care / treatment preferences for patient. HISTORY:  
 
History obtained from: medical records/ nurse CHIEF COMPLAINT: N/A 
 
HPI/SUBJECTIVE: The patient is:  
[] Verbal and participatory [x] Non-participatory due to:  
Patient non verbal and not following commands, unable to provide ROS or HPI. 
 
4/5- Vent to trach, failed SBT d/t low volumes and apnea, CXR with worsening pna, BP soft, on midodrine and levophed, tolerating tube feeds Clinical Pain Assessment (nonverbal scale for severity on nonverbal patients): Activity (Movement): Lying quietly, normal position Duration: for how long has pt been experiencing pain (e.g., 2 days, 1 month, years) Frequency: how often pain is an issue (e.g., several times per day, once every few days, constant) FUNCTIONAL ASSESSMENT:  
 
Palliative Performance Scale (PPS): 20 PSYCHOSOCIAL/SPIRITUAL SCREENING:  
 
Palliative IDT has assessed this patient for cultural preferences / practices and a referral made as appropriate to needs (Cultural Services, Patient Advocacy, Ethics, etc.) Any spiritual / Religion concerns: 
[] Yes /  [x] No 
 
Caregiver Burnout: 
[] Yes /  [] No /  [x] No Caregiver Present Anticipatory grief assessment:  
[x] Normal  / [] Maladaptive ESAS Anxiety: ESAS Depression:    
 
 
 REVIEW OF SYSTEMS:  
 
Positive and pertinent negative findings in ROS are noted above in HPI. The following systems were [] reviewed / [x] unable to be reviewed as noted in HPI Other findings are noted below. Systems: constitutional, ears/nose/mouth/throat, respiratory, gastrointestinal, genitourinary, musculoskeletal, integumentary, neurologic, psychiatric, endocrine. Positive findings noted below. Modified ESAS Completed by: provider Stool Occurrence(s): 1 PHYSICAL EXAM:  
 
From RN flowsheet: 
Wt Readings from Last 3 Encounters:  
04/01/21 122 lb 2.2 oz (55.4 kg) 02/03/21 129 lb 12.8 oz (58.9 kg) 07/08/20 110 lb (49.9 kg) Blood pressure (!) 90/59, pulse (!) 104, temperature 98.7 °F (37.1 °C), resp. rate 19, height 4' 9\" (1.448 m), weight 122 lb 2.2 oz (55.4 kg), SpO2 98 %. Pain Scale 1: Adult Nonverbal Pain Scale Pain Intensity 1: 0 Pain Intervention(s) 1: Medication (see MAR) Last bowel movement, if known:  
 
Constitutional: Appears younger than stated age, petite, well nourished, weak, NAD Eyes: pupils equal, anicteric ENMT: no nasal discharge, moist mucous membranes, vent to trach Cardiovascular: regular rhythm, distal pulses intact Respiratory: breathing not labored, symmetric Gastrointestinal: soft non-tender, +bowel sounds Musculoskeletal: no deformity, no tenderness to palpation, bilateral foot drop Skin: warm, dry, edema in arms and hands bilaterally, trace edema feet Neurologic: Opens eyes to voice, no tracking, non verbal, did not follow commands Psychiatric:unable to assess Other: 
 
 
 HISTORY:  
 
Principal Problem: 
  Status epilepticus ([address] (3/30/2021) Active Problems: 
  Iron deficiency (12/11/2019) VICK (acute kidney injury) ([address] (3/31/2021) Edema (3/31/2021) Hypotension (3/31/2021) Hyperphosphatemia (3/31/2021) Hyponatremia (3/31/2021) Thrombocytosis ([address] (3/31/2021) Acute cystitis without hematuria (4/3/2021) Elevated Dilantin level (4/3/2021) Acute respiratory failure with hypoxia ([address] (4/4/2021) Past Medical History:  
Diagnosis Date  Down syndrome  History of vascular access device 06/30/2020  
 4 Fr midline, 10 cm L brachial, poor access  History of vascular access device 04/01/2021 [facility] VAT 5fr double PICC LFT brachial at 39cm, arm cir 31 cm  Seizures ([address]  Sleep apnea   
 humidifier and oxygen w/trach  Stroke [facility]) 2019 \"old stroke seen on CT\" Past Surgical History:  
Procedure Laterality Date  COLONOSCOPY N/A 7/8/2020 COLONOSCOPY with fecal transplant (consents in red file) performed by Casandra Dick MD at [address] HX GI    
 gtube 2/2020  HX OTHER SURGICAL    
 tracheostomy 2/2020 Family History Family history unknown: Yes History reviewed, no pertinent family history. Social History Tobacco Use  Smoking status: Never Smoker  Smokeless tobacco: Never Used Substance Use Topics  Alcohol use: Never Frequency: Never Allergies Allergen Reactions  Depakote [Divalproex] Swelling Current Facility-Administered Medications Medication Dose Route Frequency  NOREPINephrine (LEVOPHED) 8 mg in 5% dextrose 250mL (32 mcg/mL) infusion  0.5-16 mcg/min IntraVENous TITRATE  levoFLOXacin (LEVAQUIN) 750 mg in D5W IVPB 750 mg IntraVENous Q24H  Phenytoin - Please HOLD Tube Feed for 1 hour before and 1 hour after Tube Feed   1 Each Other TID  midodrine (PROAMATINE) tablet 15 mg  15 mg Oral Q8H  
 LORazepam (ATIVAN) injection 0.5 mg  0.5 mg IntraVENous Q6H PRN  
 oxyCODONE IR (ROXICODONE) tablet 5 mg  5 mg Per G Tube Q6H  
 naloxone (NARCAN) injection 0.4 mg  0.4 mg IntraVENous EVERY 2 MINUTES AS NEEDED  
 glycopyrrolate (ROBINUL) tablet 1 mg  1 mg Oral TID  morphine injection 1 mg  1 mg IntraVENous Q4H PRN  piperacillin-tazobactam (ZOSYN) 3.375 g in 0.9% sodium chloride (MBP/ADV) 100 mL MBP  3.375 g IntraVENous Q8H  phenytoin (DILANTIN) 100 mg/4 mL oral suspension 100 mg  100 mg PEG Tube Q8H  
 enoxaparin (LOVENOX) injection 40 mg  40 mg SubCUTAneous Q24H  
 alteplase (CATHFLO) 1 mg in sterile water (preservative free) 1 mL injection  1 mg InterCATHeter PRN  
 guaiFENesin (ROBITUSSIN) 100 mg/5 mL oral liquid 200 mg  200 mg Oral Q6H PRN  
 albuterol-ipratropium (DUO-NEB) 2.5 MG-0.5 MG/3 ML  3 mL Nebulization Q4H RT  
 sodium chloride (NS) flush 5-40 mL  5-40 mL IntraVENous Q8H  
 sodium chloride (NS) flush 5-40 mL  5-40 mL IntraVENous PRN  
 acetaminophen (TYLENOL) tablet 650 mg  650 mg Oral Q6H PRN Or  
 acetaminophen (TYLENOL) suppository 650 mg  650 mg Rectal Q6H PRN  
 sodium chloride (NS) flush 5-40 mL  5-40 mL IntraVENous Q8H  
 sodium chloride (NS) flush 5-40 mL  5-40 mL IntraVENous PRN  
 levETIRAcetam (KEPPRA) 750 mg in 0.9% sodium chloride 100 mL IVPB  750 mg IntraVENous BID  
 0.9% sodium chloride infusion 250 mL  250 mL IntraVENous PRN  pantoprazole (PROTONIX) 40 mg in 0.9% sodium chloride 10 mL injection  40 mg IntraVENous Q24H  
 alteplase (CATHFLO) 1 mg in sterile water (preservative free) 1 mL injection  1 mg InterCATHeter PRN  
 
 
 
 LAB AND IMAGING FINDINGS:  
 
Lab Results Component Value Date/Time  WBC 12.8 (H) 04/05/2021 04:36 AM  
 HGB 7.9 (L) 04/05/2021 04:36 AM  
 PLATELET 085 04/76/5581 04:36 AM  
 
Lab Results Component Value Date/Time Sodium 144 04/05/2021 04:36 AM  
 Potassium 3.4 (L) 04/05/2021 04:36 AM  
 Chloride 115 (H) 04/05/2021 04:36 AM  
 CO2 22 04/05/2021 04:36 AM  
 BUN 13 04/05/2021 04:36 AM  
 Creatinine 0.65 04/05/2021 04:36 AM  
 Calcium 8.8 04/05/2021 04:36 AM  
 Magnesium 1.9 04/05/2021 04:36 AM  
 Phosphorus 3.2 04/05/2021 04:36 AM  
  
Lab Results Component Value Date/Time Alk. phosphatase 172 (H) 04/05/2021 04:36 AM  
 Protein, total 6.6 04/05/2021 04:36 AM  
 Albumin 2.0 (L) 04/05/2021 04:36 AM  
 Globulin 4.6 (H) 04/05/2021 04:36 AM  
 
Lab Results Component Value Date/Time INR 1.0 03/30/2021 09:19 PM  
 Prothrombin time 10.4 03/30/2021 09:19 PM  
  
Lab Results Component Value Date/Time Iron 101 03/31/2021 11:18 PM  
 TIBC 205 (L) 03/31/2021 11:18 PM  
 Iron % saturation 49 03/31/2021 11:18 PM  
 Ferritin 211 03/31/2021 11:18 PM  
  
Lab Results Component Value Date/Time pH 7.45 03/31/2021 12:25 AM  
 PCO2 36 03/31/2021 12:25 AM  
 PO2 114 (H) 03/31/2021 12:25 AM  
 
No components found for: [place] No results found for: CPK, CKMB Total time: 70 min Counseling / coordination time, spent as noted above: 55 min 
> 50% counseling / coordination?: yes Prolonged service was provided for  []30 min   []75 min in face to face time in the presence of the patient, spent as noted above. Time Start:  
Time End:  
Note: this can only be billed with 17612 (initial) or 91139 (follow up). If multiple start / stop times, list each separately.

## 2021-04-05 NOTE — PROGRESS NOTES
1900: Bedside shift change report given to Salvador Crook RN (oncoming nurse) by Prince Jacob RN (offgoing nurse). Report included the following information SBAR, Kardex, Procedure Summary, Intake/Output, MAR, Recent Results and Cardiac Rhythm NSR/ST. Primary Nurse Ross Eisenberg RN and Prince Jacob RN performed a dual skin assessment on this patient Impairment noted- see wound doc flow sheet Rolf score is 11 
 
2000: Shift  Assessment completed, pt turned Mental Status:  A/Ox 0, not interactive, eyes open spontaneously Respiratory: Lungs coarse, crackles on LLL; On vent via trach, AC mode , Rate 10, PEEP 5, FiO2 40; VAP Bundle Care provided Cardiac: S1S2, ST, BP stable, Pulses present/palp, periph edema noted GI/: Pt voiding, purewick intact; abd distended semi-soft, PEG intact infusing Vital HP 45ml/hr w/ 75ml flush Q4H, min gastric residual 
          IV Drips: L PICC intact/infusing +blood return; Levo 2 
 
2200: Pt turned, VAP Bundle care provided 
 
0000: Reassessment completed. Changes noted, see flow sheet. Pt turned, VAP Bundle care provided, pt agitated with increased HR and RR, PRN Ativan administered. CHG bath, incontinence care, and trach care provided. 0100: Pt with continued restlessness/agitation, PRN Morphine administered 
 
0200: Pt turned, VAP Bundle care provided 0400: Reassessment completed. No changes from previous assessment Pt turned, VAP Bundle care provided 0600:  Pt turned, VAP Bundle Care 
 
0700: Bedside shift change report given to Hipolito Birch RN (oncoming nurse) by Salvador Crook RN (offgoing nurse). Report included the following information SBAR, Kardex, Procedure Summary, Intake/Output, MAR, Recent Results and Cardiac Rhythm NSR/ST.

## 2021-04-05 NOTE — PROGRESS NOTES
Transition of care note: 
 
RUR 15% Plan: 1. Pt failed SBT this am due to apnea. 2.Pt has/trach/vent/peg (Peep 5,Fio2 40%) 3. Pt remains on a levophed gtt. 4.pt is on lovenox for DVT prophylaxis 5. Pt is on iv keppra and iv dilantin for seizure control. 6.Pt is on iv zosyn 7. Pt is on iv proptonix for GI prophylaxis. 8.I called pt.'s mother. Nilesh Dickinson @ [phone] and left her a voice message to please call me back regarding future disposition planning for her daughter. 9.Pt lives in a group home managed by Libertad Gauthier. 10.Mother called me back but there was a very bad connection so she will call me back. 11.I contacted Libertad Gauthier @ the group home to see if pt has an AMD She will be here later to visit pt . Caretaker states pt does not have an AMD or any official paperwork but Nadene Gaucher is the [facility] and main decision-maker for pt. 
12.I will discuss pt with intensivist to see if once pt is off of the levophed,if a LTAC is projected in the near future for vent-weaning and then will discuss with pt.'s mother for choices of LTACs. Jeni Walsh Case Management [phone] Addendum-Pt.'s Nadene Gaucher called me back and we discussed LTAC choices just in case pt needs a LTAC in the future. Mother lives in [address] would be her first choice if pt needs vent weaning beyond the acute hospital phase.  
 
Jeni Walsh

## 2021-04-05 NOTE — PROGRESS NOTES
Palliative Medicine Code Status: Full Code Advance Care Planning: 
Advance Care Planning 4/5/2021 Patient's Healthcare Decision Maker is: Legal Next of Kin: Mother Katherin kim, [phone] Confirm Advance Directive None Patient Would Like to Complete Advance Directive Unable Patient / Family Encounter Documentation Participants (names):  Pt, mother Donna Hensley by phone, Palliative Medicine (NP [place], [address]) Narrative: Palliative team made supportive visit to pt earlier in day, no family present. Pt was resting but opened eyes when greeted, did not otherwise engage. Pt is currently intubated, did not tolerate SBT this morning. Per review of chart, pt appeared to have been living with mother until transitioning to group home between age 39-44. 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 PCP note from Dec 2019 indicates pt was experiencing physical and cognitive decline over the weeks prior, showing signs of abnormal behavior/confusion. Pt was hospitalized at [facility] for two months from Jan-March 2020, had trach and peg placed at that time. Pt returned to group home following prolonged hospitalization. Phone call was placed to mother in afternoon to obtain additional history and discuss goals of care. Mother answered phone but was unable to engage in detailed conversation at this time (was on a bus). Past notes indicate pt has a sister, Ron Chavira. No contact information on file. Psychosocial Issues Identified/ Resilience Factors: Pt has PMHx of Down Syndrome, has had increased care needs over the past year. No spiritual concerns identified at this time; Chaplains are following for support. Goals of Care / Plan: Palliative team will attempt follow up with mother at later time per mother's request.  Discussed with RN, Care Manager.  
 
Thank you for including Palliative Medicine in the care of MsReese Nancie Daveygs. [place] [address] N[address] Ig[address]ias, [facility] 
[phone])

## 2021-04-05 NOTE — PROGRESS NOTES
0700- Bedside and Verbal shift change report given to Nimco (oncoming nurse) by Darrel Marie (offgoing nurse). Report included the following information SBAR, Kardex, ED Summary, OR Summary, MAR, Accordion, Recent Results, Cardiac Rhythm sinus rhythm and Alarm Parameters . Verified drips with off going nurse Levophed 2  
 
0730- Respiratory therapey beside, SBT attempted, no patient effort  
 
0800- Shift assessment complete, pt is not alert and oriented, opens eyes spontaneously, upper extremities have no movement,lower extremities withdrawal to pain. Pupils equal and reactive. Lungs coarse in all fields. #6 trach in place on the ventilator with the following settings; tidal volume 400, rate 10, peep 5, fi02 40%. Cough with suction, copious amounts of yellow secretions obtained. NSR on tele. Abdomen soft and intact with active bowel sounds. Peg tube in place running akiko high protein 45 ml/hr. Diantha Makenzie in place, patent and draining. Scheduled meds provided. Pt turned and repositioned, oral care complete. 8706- Levophed decreased to [facility] Dr. Bobbi Lu in room assessing patient 21 - Spoke with Trina Bernal from [facility] where pt resides about dilantin level. Provided an update at this time as well 
 
1200- Re-assessment complete, no changes noted. Pt turned and repositioned, oral care complete 1400- Pt turned and repositioned, oral care complete. Trach care completed at this time, inner cannula changed at this time. Pt tolerated well, no s/s of distress. Copious secretions obtained. 1600- Re-assessment complete, no changes noted. Pt turned and repositioned, oral care complete 1900- Bedside and Verbal shift change report given to Yamileth Broderick (oncoming nurse) by Tay Stevenson (offgoing nurse). Report included the following information SBAR, Kardex, ED Summary, MAR, Accordion, Cardiac Rhythm sinsu rhytm and Alarm Parameters .

## 2021-04-05 NOTE — PROGRESS NOTES
Comprehensive Nutrition Assessment Type and Reason for Visit: Shady Finch Nutrition Recommendations/Plan: 1. Adjust TF via PEG: 
 
Vital AF 1.2 bolus feeds 5x daily (10am, 4pm, 8pm, 12 midnight, 4am) Give 100ml at first bolus, INCREASE by 50ml with EACH bolus to goal volume of 200ml, Water flush with 75ml with each bolus. TF provides 1000ml of tf, 1200 kcals, 75 g protein, 1186 mL of H2O from tf & flushes - this meets 94% estimated energy needs and 100% protein needs. Nutrition Assessment:     
4/5: follow up. RD is asked to evaluate TF orders for medication interaction with dilantin. Pt had TF orders bolus PTA to limit malabsorption of medication. Current dilantin is ordered q8hr (6a, 2p, 10p). RD will adjust TF to Vital 1.2 and schedule bolus feeds in between meds. Pt has been tolerating TF without residuals. Family is hoping for extubation however, may need LTAC if unable to wean from vent. K+ 3.4 - being replaced IV. H/H 7.9/25 (L) Meds: vanc, Levo, KCl, lasix, dilantin, zosyn, levaquin, keppra 4/2: Follow up. Pt remains on vent and pressors (Levo @2). TFs running at goal. Per RN, pt tolerating. Minimal to no GRVs. Small amt of loose stools yesterday per RN note. Continue TF at goal. 
Labs- K 3.1, Hgb 8.6. Meds- cefepime, fentanyl, [email or web address], Protonix. 3/31: 50 y/o female admitted with status epilepticus. PMH includes Down's syndrome, nonverbal, trach, PEG. +cdiff. Pt currently on vent and pressors (Levo @2). Pt normally resides at group home and her normal TF regimen is Osmolite 1.2 237mL bolus 5x/day + 75mL H2O flush before and after each bolus. Hospital does not carry Osmolite TF so will start pt on hospital's no-fiber formula, Vital HP. Monitor tolerance. Weight up from last year, down from two months ago. Will continue to monitor weight trends. Labs- phos 4.9, Hgb 6.3. Meds- [email or web address], Levo@2mcg/kg/min. Weight hx:  
Wt Readings from Last 10 Encounters:  
04/01/21 55.4 kg (122 lb 2.2 oz) 02/03/21 58.9 kg (129 lb 12.8 oz) 07/08/20 49.9 kg (110 lb)  
06/30/20 49.9 kg (110 lb) 03/31/20 50.1 kg (110 lb 7.2 oz) 03/17/20 44.5 kg (98 lb)  
01/14/20 44.5 kg (98 lb) 01/09/20 44.6 kg (98 lb 6 oz) 12/11/19 44 kg (97 lb) 02/11/19 49.9 kg (110 lb) Malnutrition Assessment: 
Malnutrition Status: insufficient data Estimated Daily Nutrient Needs: 
Energy (kcal): 1276 kcal ([id number] xPAL 1.1); Weight Used for Energy Requirements: Current(55.4 kg) Protein (g): 66 - 100g (1.2-1.8 g/kg); Weight Used for Protein Requirements: Current(55.4 kg) Fluid (ml/day): 1276ml; Method Used for Fluid Requirements: 1 ml/kcal 
 
Nutrition Related Findings:  
 Last BM 4/4 Abd: active, intact 1+ LE edema Wounds:   
Deep tissue injury(heel) Current Nutrition Therapies: DIET TUBE FEEDING Anthropometric Measures: 
· Height:  4' 9\" (144.8 cm) · Current Body Wt:  55.4 kg (122 lb 2.2 oz) · Ideal Body Wt:  85 lbs:  143.7 % · BMI Category: Overweight (BMI 25.0-29. 9) Body mass index is 26.43 kg/m². Nutrition Diagnosis:  
· Increased nutrient needs, In context of chronic illness related to inadequate enteral nutrition infusion, impaired respiratory function as evidenced by intubation, wounds, nutrition support-enteral nutrition 4/2: Nutrition dx resolved. No nutrition dx at this time. 4/5: New Nutr Dx noted Nutrition Interventions:  
Food and/or Nutrient Delivery: Modify tube feeding Nutrition Education and Counseling: No recommendations at this time Coordination of Nutrition Care: Continue to monitor while inpatient, Interdisciplinary rounds Goals: Tolerance of bolus feeds at goal within the next 2-3 days Nutrition Monitoring and Evaluation:  
Behavioral-Environmental Outcomes: None identified Food/Nutrient Intake Outcomes: Enteral nutrition intake/tolerance Physical Signs/Symptoms Outcomes: GI status, Weight Discharge Planning: Enteral nutrition - resume home TF as tolerated bolus feeds Electronically signed by Quentin Guerrero RD, RDN on 4/5/2021 Contact: [phone]

## 2021-04-05 NOTE — PROGRESS NOTES
Mother called me back again to inform me that a family member contacted her and expressed concerns about [facility]. I will talk with mother about other LTAC options ([facility] in [facility] in [place] in [place] in [place], [place] .) Placement in LTAC will also be contingent upon who accepts pt.'s [facility] medicaid. Order for LTAC obtained from Dr Gabriele Argueta. Gwen Beck Case management [phone]

## 2021-04-05 NOTE — PROGRESS NOTES
I discussed other LTAC choices with pt.'s mother . Mother  would like for me to try [facility] in [address] [facility] in [place]. Clinicals faxed to these facilities. [facility] and [facility] through [employer] [facility](fax [phone] or [phone]) Waiting on responses from Marley Hdz. Aubree Sensor Case management Nurse @ the group home,Colleen ([phone])-I will keep in touch with me regarding disposition.  
 
Aubree Sensor

## 2021-04-05 NOTE — PROGRESS NOTES
Mother called me back to tell me she is very hopeful that her daughter will be extubated while @ [facility] but if necessary,she is in agreement for pt to go to LTAC if vent weaning is necessary past the acute phase for hospitalization.  
 
Maceo Cooks

## 2021-04-05 NOTE — PROGRESS NOTES
Progress Note Date:2021       Room:[address] [place] [address] Patient Hayder Scott     YOB: 1970     Age:51 y.o. Subjective Subjective afebrile; no interval seizure or change in mental status Review of Systems unable to obtain secondary to baseline mental status Objective Vitals Last 24 Hours: TEMPERATURE:  Temp  Av.2 °F (37.3 °C)  Min: 98.7 °F (37.1 °C)  Max: 99.8 °F (37.7 °C) RESPIRATIONS RANGE: Resp  Av  Min: 9  Max: 59 PULSE OXIMETRY RANGE: SpO2  Av.3 %  Min: 96 %  Max: 100 % PULSE RANGE: Pulse  Av.8  Min: 69  Max: 156 BLOOD PRESSURE RANGE: Systolic (38JCV), QQB:246 , Min:81 , PENNY:579  
; Diastolic (44HEA), BIX:26, Min:43, Max:110 I/O (24Hr): Intake/Output Summary (Last 24 hours) at 2021 1044 Last data filed at 2021 0800 Gross per 24 hour Intake 2837.31 ml Output 800 ml Net 2037.31 ml Examination facilitated by use of telemedicine platform with assistance of in-house nursing General-ill-appearing female; lower extremity contractures greater than upper extremities; she is responsive to stimuli but noncommunicative and does not follow commands Head-normocephalic atraumatic Neck-trach site without discharge ENT-normal external anatomy Cardiovascular-sinus rhythm without evidence of hypoperfusion; systolic blood pressure currently in the 140 range on Levophed Pulmonary-normal work of breathing Abdomen-no apparent tenderness with passive movement Skin-intact anteriorly where visible Neuro no spontaneous motor activity beyond turning of her head left and right; no tremor Objective Labs/Imaging/Diagnostics Labs: CBC: 
Recent Labs 21 
6956 21 
3102 21 
7628 WBC 12.8* 8.4 12.0*  
RBC 2.56* 2.60* 2.83* HGB 7.9* 7.9* 8.7* HCT 25.0* 25.5* 28.0*  
MCV 97.7 98.1 98.9 RDW 14.8* 15.0* 15.0*  
 305 320 CHEMISTRIES: 
Recent Labs 21 
8433 21 
7427 21 
4240  144 142  
K 3.4* 3.0* 3.5 * 112* 112* CO2 22 23 20* BUN 13 14 17  
CA 8.8 8.7 9.0 PHOS 3.2 3.7 4.2 MG 1.9 1.9 2.2 PT/INR:No results for input(s): INR, INREXT in the last 72 hours. No lab exists for component: PROTIME APTT:No results for input(s): APTT in the last 72 hours. LIVER PROFILE: 
Recent Labs 04/05/21 
8465 04/04/21 
4954 04/03/21 
9999 AST 25 25 32 ALT 22 23 24 Lab Results Component Value Date/Time ALT (SGPT) 22 04/05/2021 04:36 AM  
 AST (SGOT) 25 04/05/2021 04:36 AM  
 Alk. phosphatase 172 (H) 04/05/2021 04:36 AM  
 Bilirubin, direct 0.08 11/05/2020 11:23 AM  
 Bilirubin, total 0.2 04/05/2021 04:36 AM  
 
 
Imaging Last 24 Hours: 
Xr Chest Im [address] Result Date: 4/5/2021 INDICATION: Fever COMPARISON: 4/2/2021 FINDINGS: Single AP portable view of the chest obtained at 3:37 AM demonstrates no change in position of the lines and tubes. The cardiomediastinal silhouette is stable. Diffuse bilateral patchy airspace disease is increased. No pneumothorax is seen. There is no evidence of pleural effusion. Increased diffuse bilateral patchy airspace disease, compatible with worsening pneumonia. Assessment//Plan Principal Problem: 
  Status epilepticus ([facility] [address] (3/30/2021) Active Problems: 
  Iron deficiency (12/11/2019) VICK (acute kidney injury) ([address] (3/31/2021) Edema (3/31/2021) Hypotension (3/31/2021) Hyperphosphatemia (3/31/2021) Hyponatremia (3/31/2021) Thrombocytosis ([address] (3/31/2021) Acute cystitis without hematuria (4/3/2021) Elevated Dilantin level (4/3/2021) Acute respiratory failure with hypoxia ([address] (4/4/2021) Assessment & Plan Neuro-patient has poor functional status at baseline secondary to failure to thrive from Down syndrome and epileptic disease; she presented with status epilepticus which has been controlled more recently on multimodal regimen; her Dilantin level was low but this may improve with holding tube feeds before and after dosing-we will discuss with pharmacy 
-She does not appear to be in pain 
 
Cardiovascular-remains on low-dose vasopressors and midodrine for hypotension; that said, given her size/habitus and baseline functional status, I would be willing to tolerate a lower mean arterial pressure and/or systolic blood pressure; I will drop the goal mean arterial pressure for her Levophed to a map of 60 and we can monitor for signs or symptoms of hypoperfusion; if there are none, subsequent alterations in goal blood pressures can be made Pulmonary-patient has not progressed with pressure support trials and this may be a consequence of deconditioning in general as acute on chronic weakness; more recently she is fair with low generated tidal volumes and although this is consistent with her deconditioning I would bear in mind that her height suggest an ideal body weight of 46 kg and 8, 7, 6 mL/kilogram translates to 370, 320, and 280; as such I would expect that she has lower tidal volumes and so long as she does not demonstrate signs/symptoms of respiratory or hemodynamic decompensation and normal work of breathing I would be willing to tolerate such lower tidal volumes GI-she has been tolerating tube feeds and having bowel movements -initial acute kidney injury improving, nonetheless,  a \"normal\" creatinine and some with her habitus/lack of muscle mass may hello reflect injury; that said, her numbers have plateaued/improved and she is demonstrating adequate micturition GI-bowel movements are not suggestive of recurrent C. difficile infection; already status post fecal transplant Heme-no evidence of hemorrhage; would consider decreasing frequency and amount of labs ID-we will review course of antibiotics and potential stop dates on rounds Endocrine-acceptable blood sugar ranges without a history of diabetes FEN will require ongoing electrolyte repletion 
  
 
Critical care time excluding teaching of procedures 35 minutes I performed all aspects of the physical examination via Telemedicine associated with two way audio and video communication and with the on-site assistance of the bedside nurse. I am located in [place] and the patient is located in [facility]   The patient is critically ill in the ICU. I  personally  reviewed the pertinent medical records, laboratory/ pathology data and radiographic images. The decision making regarding this patient is as documented above, which was generated  following  discussion  with the multidisciplinary ICU team and creation of a treatment plan for  the patient. We discussed the patient's interval history and future coordination of care and  plans. The patient's medications  were reviewed and changes made as stipulated above. Due to  critical illness impairing one or more vital organs of this patient resulting in life threatening clinical situation  I have provided direct, frequent personal  assessment and manipulation in management plan.  
 
Electronically signed by Gregorio Barber MD on 4/5/2021 at 10:44 AM

## 2021-04-05 NOTE — PROGRESS NOTES
30 [facility] [address] with [address] [facility] Resident Progress Note in Brief S: Patient seen and examined at bedside. Intubated. O: 
Visit Vitals /61 Pulse (!) 114 Temp 99.2 °F (37.3 °C) Resp 17 Ht 4' 9\" (1.448 m) Wt 122 lb 2.2 oz (55.4 kg) SpO2 99% BMI 26.43 kg/m² Physical Examination General appearance - No acute distress. Alert. Tracking with eyes. Appears comfortable Chest - Trach tube in place. Clear to auscultation, no wheezes, rales or rhonchi, symmetric air entry Heart - normal rate, regular rhythm, normal S1, S2, no murmurs, rubs, clicks or gallops, Abdomen - soft, nontender, nondistended, no masses or organomegaly Extremities - peripheral pulses normal, no pedal edema, no clubbing or cyanosis A/P:  
Sepsis 2/2 Ventilator-associated PNA: Pt originally admitted w/ 3/4 SIRs that resolved after initial fluid resuscitation. No infectious etiology suspected as leukocytosis and tachycardia believed 2/2 to seizures.  
-Daily CBC 
-Vanc and Zosyn (cefepime DC'd 4/2 as it lowers seizure threshold) UTI: UCx p aeruginosa sensitive to zosyn 
- Continue zosyn Status epilepticus: CT head no acute abnormality, chronic encephalomalacia in L&R frontal lobe, R parietal lobe. Supra-therapeutic phenytoin level at 34. POA EKG w/ sinus tach. F/w neuro Dr. Sriram Ac versed gtt. -Mechanical ventilation though chronic trach tube, wean as tolerated  
-Ativan 2mg q5min prn for seizure, max 3 doses 
-Per neurology: appreciate recs 
            -Phenytoin 100mg q8 (4/2) (home dose 125mg q8) -Keppra 750mg IV BID 
-Daily CBC, CMP 
-Seizure and fall precautions   
 
Acute respiratory failure/trach dependence s/p lasix 10mg x1 4/3. Trach placed due to hx of seizures, aspiration PNA, and previous intubation. -wean vent as tolerated 
-goal fluid status: net neg 500 daily as tolerated by BP and kidney function -appreciate intensivist recs 
-Duonebs q4h 
-Daily SBT 
  
Anemia in setting of hx of GI bleed: POA Hgb 10.1 (b/l 13). FOBT neg. S/p 1u pRBC. -Protonix 40mg IV daily 
-Per GI no indication of GI bleed, no intervention at this time  
  
Hypotension: Likely 2/2 sedatives. -Levo gtt PRN 
-Increase to Midodrine 10mg TID (4/2) Please see daily progress note for detailed plan. Katheryn Moreno MD 
Family Medicine Resident

## 2021-04-06 PROBLEM — R09.02 HYPOXIA: Status: ACTIVE | Noted: 2021-01-01

## 2021-04-06 PROBLEM — R41.82 ALTERED MENTAL STATUS: Status: ACTIVE | Noted: 2021-01-01

## 2021-04-06 NOTE — PROGRESS NOTES
30 [facility] [address] [facility] Resident Progress Note in Brief S: Patient seen and examined at bedside. Patient was sleeping. O: 
Visit Vitals /74 Pulse 78 Temp 99.7 °F (37.6 °C) Resp 12 Ht 4' 9\" (1.448 m) Wt 122 lb 2.2 oz (55.4 kg) SpO2 100% BMI 26.43 kg/m² Physical Examination General appearance - No acute distress. Sleeping Chest - Trach tube in place. Clear to auscultation, no wheezes, rales or rhonchi, symmetric air entry Heart - normal rate, regular rhythm, normal S1, S2, no murmurs, rubs, clicks or gallops, Abdomen - soft, nontender, nondistended, Extremities - No peripheral edema A/P:  
Sepsis 2/2 Ventilator-associated PNA and UTI: Pt originally admitted w/ 3/4 SIRs that resolved after initial fluid resuscitation. No infectious etiology suspected as leukocytosis and tachycardia believed 2/2 to seizures. CXR (4/5): increased diffuse bilateral patchy airspace disease. Ucx (04/2): Pseudomonas 
-Daily CBC 
-Pt on Zosyn and Levaquin Status epilepticus: CT head no acute abnormality, chronic encephalomalacia in L&R frontal lobe, R parietal lobe. Supra-therapeutic phenytoin level at 34. POA EKG w/ sinus tach. F/w neuro Dr. Harris Good versed gtt. -Mechanical ventilation though chronic trach tube, wean as tolerated  
-Ativan 2mg q5min prn for seizure, max 3 doses 
-Per neurology: appreciate recs 
            -Phenytoin 100mg q8 (4/2) (home dose 125mg q8) -Keppra 750mg IV BID 
-Daily CBC, CMP 
-Seizure and fall precautions   
 
Acute respiratory failure/trach dependence s/p lasix 10mg x1 4/3. Trach placed due to hx of seizures, aspiration PNA, and previous intubation. -wean vent as tolerated 
-goal fluid status: net neg 500 daily as tolerated by BP and kidney function -appreciate intensivist recs 
-Duonebs q4h 
-Daily SBT 
  
Anemia in setting of hx of GI bleed: POA Hgb 10.1 (b/l 13). FOBT neg. S/p 1u pRBC. -Protonix 40mg IV daily 
-Per GI no indication of GI bleed, no intervention at this time  
  
Hypotension: Likely 2/2 sedatives. -Levo: 2 mcg/min 
-Increase to Midodrine 10mg TID (4/2) Please see daily progress note for detailed plan. Cb Carmichael MD 
Family Medicine Resident

## 2021-04-06 NOTE — PROGRESS NOTES
Transition of care note: 
 
RUR 15% In researching LTAC placement for pt once she is off the pressors,I have received the following notifications based on pt.'s mother's choices for lTAC placement: 
 
[facility] in [facility] has declined pt as they do not accept [facility] medicaid as a payor source. [facility] in [address] [facility] has declined pt. [facility] in [facility] is interested in pt but they require a Level 2 screening @ the state level due to her DS diagnosis. For [facility],Covid testing would need to be completed  within 24 hours of pt going to [facility]. I discussed pt this am with the [facility] team enlisting their help with discussing the local LTAC with pt.'s mother. In the interim,I am checking with [facility] to see if they accept [facility] Medicaid. Pt remains on levophed so is not stable for LTAC yet. Trach #6 yao Pt remains intubated (Peep 5 ,Fio2 35%) Mother and caretakers remain hopeful that pt will be weaned off the ventilator so pt can return to her group home. Keyanna Rosenbaum Case management [phone]

## 2021-04-06 NOTE — PROGRESS NOTES
Spiritual Care Assessment/Progress Note [address] 
 
 
NAME: Adithya Johnson      MRN: [id number] AGE: 49 y.o. SEX: female Mandaeism Affiliation: No preference Language: [place] 4/6/2021     Total Time (in minutes): 7 Spiritual Assessment begun in [facility] through conversation with: 
  
    []Patient        [] Family    [] Friend(s) Reason for Consult: Palliative Care, Initial/Spiritual Assessment Spiritual beliefs: (Please include comment if needed) 
   [] Identifies with a tj tradition:     
   [] Supported by a tj community:        
   [] Claims no spiritual orientation:       
   [] Seeking spiritual identity:            
   [] Adheres to an individual form of spirituality:       
   [x] Not able to assess:                   
 
    
Identified resources for coping:  
   [] Prayer                           
   [] Music                  [] Guided Imagery 
   [] Family/friends                 [] Pet visits [] Devotional reading                         [x] Unknown 
   [] Other:                                   
 
 
Interventions offered during this visit: (See comments for more details) Patient Interventions: Initial visit(Attempted) Plan of Care: 
 
 [] Support spiritual and/or cultural needs  
 [] Support AMD and/or advance care planning process    
 [] Support grieving process 
 [] Coordinate Rites and/or Rituals  
 [] Coordination with community clergy [] No spiritual needs identified at this time 
 [] Detailed Plan of Care below (See Comments)  [] Make referral to Music Therapy 
[] Make referral to Pet Therapy    
[] Make referral to Addiction services 
[] Make referral to [facility] 
[] Make referral to Spiritual Care Partner 
[] No future visits requested       
[x] Follow up upon further referrals Comments: Attempted Initial Palliative Care spiritual assessment in ICU.   Miss Cedric Yañez did not respond as I spoke with her, he eyes stayed closed. Her spirituality is unknown. Provided words of encouragement. Contact Spiritual Care for any further referrals. Visited by: Hayley Walsh., MS., [address] ([id number])

## 2021-04-06 NOTE — PROGRESS NOTES
Bedside and Verbal shift change report given to Samra Nunn RN (oncoming nurse) by Mitchel Saucedo RN (offgoing nurse). Report included the following information SBAR, Kardex, MAR and Cardiac Rhythm NSR.  
 
0730: Patient in SBT on the Vent. No signs of distress. 0800: Levophed titrated to 0.5 mcg/min 
0955: Heart rate in the 130s. Ativan 0.5 mg administered for anxiety. Patient suctioned. Back on Assist Control  on the Vent. Patient now resting quietly. 1040: Morphine 1 mg administered. 1245: Levophed up to 1 mcg/min 1433: Levophed down to 0.5 mcg/min 1745: Levophed back up to 1 mcg/min 1830: Patient resting quietly. Bedside and Verbal shift change report given to Elvia Powers RN (oncoming nurse) by Samra Nunn RN (offgoing nurse). Report included the following information SBAR, Kardex, MAR and Cardiac Rhythm NSR.

## 2021-04-06 NOTE — PROGRESS NOTES
I greatly appreciate Dr Levy calling the pt.'s mother. I called mother who wants me to call pt.'s sister [facility] to discuss LTAC choices. Her number is [phone]. I contacted Rickie Duke with the state level to see if pt needs a Level 2 for a LTAC  and if so,if pt needs to be evaluated by a psychiatrist for the level 2 as pt has Down's syndrome. I contacted pt.'s sister,Karen who does not want the local LTAc and was shocked to hear about pt maybe needing to go to [facility] which is two hours away. I will try [facility] in [place] also as sister asked about facilities in the [facility], I told her [facility] declined [facility].  
 
Aubree Diehl

## 2021-04-06 NOTE — PROGRESS NOTES
Received bedside report ongoing nurse KLAUDIA Rinaldi. Report included, SBAR, procedures, neuro-status, cardiac rhythm, blood pressure parameters and plan of care. 1930-Shift assessment completed. Patient in bed warm to touch, temp 99.1, diaphoretic, and heart rate 87. Tylenol given. Tube feeding bolus started. 2030- Patient turned and cleaned, purewick replaced. 2200-Repositioned and mouth care completed. 0000- Bolus tube feeding started 0030-Was called to patients bedside by respiratory therapist that patient, \"is stressing\" hr 150s, patient was rigid and tense. . Ativan 0.5mg given. Heart rate decreased to 90s and patient seems relaxed and comfortable minutes after administration. 0115-Increased Levo 2mcg. 0300-Repositioned patient and decreased Levo 1mcg. 0400-Patient bathed and repositioned. Bolus tube feeding started. Labs drawn. 0600-Repositioned and morning medications administered. 06

## 2021-04-06 NOTE — PROGRESS NOTES
[id number] [facility] PROGRAM 
PROGRESS NOTE  
 
4/6/2021 PCP: David Barreto NP Assessment/Plan:  
 
Bhaskar Norton is a 49 y.o. female with PMHx of Down Syndrome (non-verbal at baseline), S/p Trach and PEG tube, seizures, encephalomalacia, ANNE, h/o recurrent Cdiff colitis admitted for status epilepticus. 24 Hour Events: none Sepsis 2/2 VAP and UTI: CXR 4/2: minimal interstitial pulmonary edema versus interstitial pneumonia, CXR 4/5 worsening PNA. BCx 3/31 and rBCx 4/2 NGTD. LA wnl. UCx w/ pseudomonas susceptible to zosyn, levaquin. Resp culture w/ pseudomonas. MRSA negative. S/p vanc 4/2-4/5. 
-f/u BCx 4/2 -Final resp Cx sensitivities pending 
-Zosyn (started 4/2) and levaquin (started 4/5) for pseudomonas double coverage  
-Daily CBC Seizures: Pt admitted in status epilepticus. CT head no acute abnormality, chronic encephalomalacia in L&R frontal lobe, R parietal lobe. Supra-therapeutic phenytoin level at 34. POA EKG w/ sinus tach. F/w neuro Dr. Seay Spore versed gtt. EEG w/ no evidence of seizure activity. Phenytoin now sub-therapeutic. 
-Mechanical ventilation though chronic trach tube, wean as tolerated  
-Per neurology: appreciate recs 
 -Phenytoin 100mg q8 (4/2) (home dose 125mg q8) -Keppra 750mg IV BID 
            -STOP lacosamide  
            -Will await further neuro recs as phenytoin level now sub-therapeutic 
-Daily CBC, CMP 
-Seizure and fall precautions   
 
Acute respiratory failure/trach dependence s/p lasix 10mg IV 4/3, 20mg IV 4/5. Trach placed due to hx of seizures, aspiration PNA, and previous intubation. -wean vent as tolerated - pt has failed all SBT to date 
-Oxy 5mg Q6h scheduled, ativan 0.5mg Q4h PRN, morphine 1mg Q4h PRN 
-Robinul added for secretions 
-Chest PT ordered -appreciate intensivist recs 
-Duonebs q4h 
-Palliative engaging in discussion w/ family about goals of care, for now family wishes to pursue LTAC placement Hypotension: Likely 2/2 sedatives. S/p levo gtt. -Levo gtt PRN - goal MAP lowered to 60 by intensivist 
-Midodrine 15mg TID 
-Strict Is/Os Anemia of chronic disease in setting of hx of GI bleed: POA Hgb 10.1 (b/l 13) > 6.3. Hx of GI bleed, admission 3/2021 to [address] [place] [address], EGD & Colonoscopy at that time revealed gastritis and colitis. FOBT neg. No obvious source of bleeding, possibly 2/2 hemodilution. S/p 1u pRBC. Iron studies suggest ACD. -Per GI no active bleed/ no intervention at this time 
-Protonix 40mg IV daily 
-Daily CBC VICK - resolved. POA Cr 1.24 (BL 0.5). -Daily CMP 
-HOLD home Torsemide 40mg 
  
Hyponatremia - resolved: Likely 2/2 IVVD. POA Na 130.  
-Daily CMP 
  
Hyperphosphatemia- resolved:  POA P 5.6. Improved with IVF 
-Daily phos 
  Thrombocytosis- resolved: Likely 2/2 IVVD as resolved after fluids. POA plt 584.  
-Daily CBC 
  
H/o ANNE: Fe wnl this admission. POA Hgb 10.1 (BL 13) > 6.3, s/p 1u pRBC. Not on home iron. Recently hospitalized in March 2021 for GI bleed at [facility].   
-Monitor on daily labs 
  
Elevated ALP, AST in setting of hepatic steatosis: Chronic. POA , AST 60.  
-Monitor on daily labs  
  
S/p PEG-tube:  
-Continue tube feeds  
  
Chronic edema: Home Torsemide 40mg daily. F/w Nephrology OP. S/p lasix 10mg IV x1 
-HOLD home Torsemide due to hypotension 
  
GERD: stable. -Protonix 40mg IV daily  
 
H/o Cdiff colitis: S/p fecal transplant.  
-Enteric precuations 
  
  
FEN/GI -NG tube feeds Activity - Activity w/assitance DVT prophylaxis - Held GI prophylaxis - Lovenox Fall prophylaxis - Fall precautions ordered Code Status - Full. Discussed with Caregiver Next of Kin Name and [address], mother/ [phone]. Janeen Lazar, group home . Johnnie Rodriguez MD 
Family Medicine Resident Subjective: No reported events overnight. Pt awake this morning. Does not respond to voice. Objective:  
Physical examination Patient Vitals for the past 24 hrs: 
 Temp Pulse Resp BP SpO2  
04/06/21 0445  87 10 112/64 100 % 04/06/21 0430  92 11 117/60 99 % 04/06/21 0415  91 10 115/62 99 % 04/06/21 0400 98.6 °F (37 °C) (!) 121 22 128/76 100 % 04/06/21 0345  78 10  99 % 04/06/21 0331  76 10  99 % 04/06/21 0330  77 10 (!) 91/51 99 % 04/06/21 0315  75 10 (!) 98/51 99 % 04/06/21 0300  78 10 116/64 100 % 04/06/21 0245  79 10 109/61 100 % 04/06/21 0230  80 10 (!) 97/55 99 % 04/06/21 0215  86 14 (!) 96/52 99 % 04/06/21 0130  (!) 108 14 (!) 86/46 99 % 04/06/21 0115  98 10 (!) 111/50 99 % 04/06/21 0100  96 10 (!) 86/43 97 % 04/06/21 0043  100 11  98 % 04/06/21 0000 98.4 °F (36.9 °C) (!) 101 13 116/61 98 % 04/05/21 2345  88 12 116/60 99 % 04/05/21 2330  (!) 118 20 124/75 99 % 04/05/21 2315  (!) 115 17 112/67 100 % 04/05/21 2300  (!) 126 27 116/70 100 % 04/05/21 2245  98 11 (!) 94/48 99 % 04/05/21 2230  (!) 120 17 (!) 101/54 99 % 04/05/21 2215  (!) 127 19 (!) 113/54 99 % 04/05/21 2200  (!) 129 19 (!) 102/54 99 % 04/05/21 2145  (!) 127 20 (!) 99/54 100 % 04/05/21 2130  (!) 138 20 119/75 99 % 04/05/21 2115  (!) 122 22 (!) 106/52 98 % 04/05/21 2105  (!) 113 20  99 % 04/05/21 2100  (!) 114 17 (!) 108/57 99 % 04/05/21 2045  (!) 118 20 103/61 99 % 04/05/21 2030  (!) 132 (!) 32 100/68 99 % 04/05/21 2015  (!) 119 23 (!) 111/49 99 % 04/05/21 2000 99.1 °F (37.3 °C) 87 12 (!) 98/55 99 % 04/05/21 1945  (!) 121 20 128/66 100 % 04/05/21 1930  92 11 110/64 100 % 04/05/21 1915  (!) 107 22 (!) 110/59 98 % 04/05/21 1900  (!) 137 (!) 50 119/69 99 % 04/05/21 1845  (!) 109 16 108/66 99 % 04/05/21 1830  (!) 107 18 108/66 99 % 04/05/21 1815  100 13 102/69 99 % 04/05/21 1800  (!) 101 13 (!) 91/59 99 % 04/05/21 1745  83 10 104/61 99 % 04/05/21 1730  (!) 102 16 95/64 98 % 04/05/21 1715  (!) 121 21 111/63 100 % 04/05/21 1700  95 11 (!) 106/45 98 % 04/05/21 1645  99 10 (!) 103/54 100 % 04/05/21 1630  (!) 120 21 112/89 100 % 04/05/21 1615  (!) 105 13 (!) 103/43 98 % 04/05/21 1600 99 °F (37.2 °C) 87 10 106/60 99 % 04/05/21 1545  93 12 (!) 98/53 100 % 04/05/21 1530  80 10 (!) 94/55 99 % 04/05/21 1527  80 10  98 % 04/05/21 1515  86 10 (!) 86/46 99 % 04/05/21 1501  83     
04/05/21 1500  83 10 (!) 91/53 99 % 04/05/21 1445  99 17 (!) 99/53 98 % 04/05/21 1430  83 12 (!) 83/46 99 % 04/05/21 1415  82 10 (!) 88/52 98 % 04/05/21 1400  79 10 (!) 90/54 98 % 04/05/21 1345  (!) 104 19  98 % 04/05/21 1330  77 10 (!) 90/59 100 % 04/05/21 1315  77 10 (!) 90/57 100 % 04/05/21 1300  81 10 (!) 95/58 100 % 04/05/21 1245  80 10 (!) 88/58 100 % 04/05/21 1230  82 10 (!) 90/55 100 % 04/05/21 1215  89 10 (!) 87/50 100 % 04/05/21 1200 98.7 °F (37.1 °C) 82 11 (!) 89/50 99 % 04/05/21 1145  79 10 (!) 97/57 99 % 04/05/21 1134  73 10  99 % 04/05/21 1130  81 10 (!) 89/59 100 % 04/05/21 1115  74 10 (!) 95/59 100 % 04/05/21 1100  92 11 116/78 100 % 04/05/21 1045  68 10 104/68 99 % 04/05/21 1030  65 10 101/60 99 % 04/05/21 1015  69 10 (!) 96/58 100 % 04/05/21 1000  70 10 105/60 100 % 04/05/21 0945  73 10 (!) 95/55 99 % 21 0930  77 10 (!) 88/50 99 % 21 0915  80 11 (!) 86/49 99 % 21 0900  84 10 (!) 85/45 98 % 21 0845  91 16 (!) 99/57 99 % 21 0830  73 12 (!) 141/71 99 % 21 0815  72 15 137/73 100 % 21 0800 98.7 °F (37.1 °C) 72 10 135/73 100 % 21 0745  70 10 121/72 100 % 21 0731     99 % 21 0730  72 10 (!) 102/57 99 % 21 0724  71 10  100 % 21 0715  69 10 125/73 99 % 21 0701  70     
21 0700  71 10 122/74 99 % Temp (24hrs), Av.8 °F (37.1 °C), Min:98.4 °F (36.9 °C), Max:99.1 °F (37.3 °C) O2 Flow Rate (L/min): 50 l/min O2 Device: Tracheostomy Date 21 07 - 21 6067 04/06/21 0700 - 04/07/21 6978 Shift [id number] 1900-0659 24 Hour Total 4629-5252 4689-5962 24 Hour Total  
INTAKE  
I.V.(mL/kg/hr) 384.5(0.6) 117.3 501.8 Levophed Volume 34.5 17.3 51.8 Volume (piperacillin-tazobactam (ZOSYN) 3.375 g in 0.9% sodium chloride (MBP/ADV) 100 mL MBP) 200 100 300 Volume (levoFLOXacin (LEVAQUIN) 750 mg in D5W IVPB) 150  150 NG/ 525 750 Water Flush Volume (mL) (PEG/Gastrostomy Tube) 225 225 450 Medication Volume (PEG/Gastrostomy Tube)  100 100 Intake (ml) (PEG/Gastrostomy Tube)  200 200 Shift Total(mL/kg) 609.5(11) 642. 3(11.6) 1251.8(22.6) OUTPUT Urine(mL/kg/hr) 1300(2) 250 1550 Urine Occurrence(s)  1 x 1 x Urine Output (mL) (External Female Catheter 03/31/21) 3247 307 2326 Shift Total(mL/kg) 1300(23.5) 250(4.5) [id number]) NET -690.5 392.3 -298.2 Weight (kg) 55.4 55.4 55.4 55.4 55.4 55.4 General appearance - No acute distress. Eyes open. Does not respond to voice. Chest - Trach tube in place, intubated Rhonchi and coarse breath sounds bilaterally - improved, symmetric air entry Heart - normal rate, regular rhythm, normal S1, S2, no murmurs, rubs, clicks or gallops, Abdomen - soft, nontender, mildly distended, +BS,no rebound or guarding Neurological - Awake but not alert. Extremities - Trace pitting edema to BUE. No pedal edema. Data Review: CBC: 
Recent Labs 04/06/21 
9315 04/05/21 
0368 04/04/21 
2903 WBC 9.6 12.8* 8.4 HGB 8.5* 7.9* 7.9*  
HCT 27.2* 25.0* 25.5*  
 289 305 Metabolic Panel: 
Recent Labs 04/06/21 
6594 04/05/21 
0504 04/04/21 
6691  144 144  
K 3.3* 3.4* 3.0*  
* 115* 112* CO2 23 22 23 BUN 13 13 14 CREA 0.70 0.65 0.61 * 165* 139* CA 8.9 8.8 8.7 MG 1.9 1.9 1.9 PHOS 3.9 3.2 3.7 ALB  --  2.0* 2.1* TBILI  --  0.2 0.2 ALT  --  22 23 Micro: 
Lab Results Component Value Date/Time  Culture result: MRSA NOT PRESENT 04/03/2021 02:11 PM  
 Culture result:  04/03/2021 02:11 PM  
  Screening of patient nares for MRSA is for surveillance purposes and, if positive, to facilitate isolation considerations in high risk settings. It is not intended for automatic decolonization interventions per se as regimens are not sufficiently effective to warrant routine use. Culture result: HEAVY PSEUDOMONAS AERUGINOSA (A) 04/03/2021 03:46 AM  
 Culture result: (A) 04/03/2021 03:46 AM  
  MODERATE PROVIDENCIA STUARTII SENSITIVITY TO FOLLOW Imaging: 
Ct Head Wo Cont Result Date: 3/30/2021 EXAM: CT HEAD WO CONT INDICATION: seizures COMPARISON: None. CONTRAST: None. TECHNIQUE: Unenhanced CT of the head was performed using 5 mm images. Brain and bone windows were generated. Coronal and sagittal reformats. CT dose reduction was achieved through use of a standardized protocol tailored for this examination and automatic exposure control for dose modulation. FINDINGS: There is an incidental cavum septum pellucidum. There is mild atrophy and compensatory dilatation of the ventricles. Areas of chronic encephalomalacia are seen in the right frontal lobe, right parietal lobe, and left frontal lobe. There are incidental bilateral basal ganglia calcifications. There is no intracranial hemorrhage, extra-axial collection, or mass effect. The basilar cisterns are open. No CT evidence of acute infarct. The bone windows demonstrate no abnormalities. The visualized portions of the paranasal sinuses and mastoid air cells are clear. Significant chronic encephalomalacia in the right frontal lobe, right parietal lobe, and left frontal lobe. Xr Chest Margie Holy Result Date: 3/30/2021 EXAM: XR CHEST PORT HISTORY: trach exchange. COMPARISON: 3/30/2021 FINDINGS: Portable AP. A tracheostomy tube is in place. The heart is normal size. There is diffuse interstitial prominence again seen likely related to edema. No pleural effusion or pneumothorax. Tracheostomy tube in appropriate position. Unchanged diffuse interstitial prominence likely related to edema. Xr Chest [facility] Result Date: 3/30/2021 EXAM: XR CHEST PORT HISTORY: Chest pain. COMPARISON: 4/1/2020 FINDINGS: Portable AP. A tracheostomy tube is in place. The heart is normal size. There is unchanged diffuse interstitial prominence likely related to edema. No focal consolidation, pleural effusion, or pneumothorax. No significant interval change. Medications reviewed Current Facility-Administered Medications Medication Dose Route Frequency  NOREPINephrine (LEVOPHED) 8 mg in 5% dextrose 250mL (32 mcg/mL) infusion  0.5-16 mcg/min IntraVENous TITRATE  levoFLOXacin (LEVAQUIN) 750 mg in D5W IVPB  750 mg IntraVENous Q24H  Phenytoin - Please HOLD Tube Feed for 1 hour before and 1 hour after Tube Feed   1 Each Other TID  midodrine (PROAMATINE) tablet 15 mg  15 mg Oral Q8H  
 LORazepam (ATIVAN) injection 0.5 mg  0.5 mg IntraVENous Q6H PRN  
 oxyCODONE IR (ROXICODONE) tablet 5 mg  5 mg Per G Tube Q6H  
 naloxone (NARCAN) injection 0.4 mg  0.4 mg IntraVENous EVERY 2 MINUTES AS NEEDED  
 glycopyrrolate (ROBINUL) tablet 1 mg  1 mg Oral TID  morphine injection 1 mg  1 mg IntraVENous Q4H PRN  piperacillin-tazobactam (ZOSYN) 3.375 g in 0.9% sodium chloride (MBP/ADV) 100 mL MBP  3.375 g IntraVENous Q8H  phenytoin (DILANTIN) 100 mg/4 mL oral suspension 100 mg  100 mg PEG Tube Q8H  
 enoxaparin (LOVENOX) injection 40 mg  40 mg SubCUTAneous Q24H  
 alteplase (CATHFLO) 1 mg in sterile water (preservative free) 1 mL injection  1 mg InterCATHeter PRN  
 guaiFENesin (ROBITUSSIN) 100 mg/5 mL oral liquid 200 mg  200 mg Oral Q6H PRN  
 albuterol-ipratropium (DUO-NEB) 2.5 MG-0.5 MG/3 ML  3 mL Nebulization Q4H RT  
 sodium chloride (NS) flush 5-40 mL  5-40 mL IntraVENous Q8H  
 sodium chloride (NS) flush 5-40 mL  5-40 mL IntraVENous PRN  
 acetaminophen (TYLENOL) tablet 650 mg  650 mg Oral Q6H PRN Or  
 acetaminophen (TYLENOL) suppository 650 mg  650 mg Rectal Q6H PRN  
 sodium chloride (NS) flush 5-40 mL  5-40 mL IntraVENous Q8H  
 sodium chloride (NS) flush 5-40 mL  5-40 mL IntraVENous PRN  
 levETIRAcetam (KEPPRA) 750 mg in 0.9% sodium chloride 100 mL IVPB  750 mg IntraVENous BID  
 0.9% sodium chloride infusion 250 mL  250 mL IntraVENous PRN  pantoprazole (PROTONIX) 40 mg in 0.9% sodium chloride 10 mL injection  40 mg IntraVENous Q24H  
 alteplase (CATHFLO) 1 mg in sterile water (preservative free) 1 mL injection  1 mg InterCATHeter PRN Signed: 
 Aiden Baugh MD 
 Resident, Family Medicine Attending note: Attending note to follow. ..

## 2021-04-06 NOTE — PROGRESS NOTES
Progress Note Date:2021       Room:[address] [place] [address] Patient Lizzy Carrasco     YOB: 1970     Age:51 y.o. Subjective CC: unable to obtain 24 HOUR: no new events. Remains on vent, NE 0.05 Objective Vitals Last 24 Hours: TEMPERATURE:  Temp  Av.8 °F (37.1 °C)  Min: 98.4 °F (36.9 °C)  Max: 99.1 °F (37.3 °C) RESPIRATIONS RANGE: Resp  Av.4  Min: 10  Max: 53 PULSE OXIMETRY RANGE: SpO2  Av.5 %  Min: 81 %  Max: 100 % PULSE RANGE: Pulse  Av.3  Min: 74  Max: 139 BLOOD PRESSURE RANGE: Systolic (71VCN), CST:148 , Min:80 , DDS:618  
; Diastolic (35ICQ), FEY:11, Min:31, Max:89 I/O (24Hr): Intake/Output Summary (Last 24 hours) at 2021 1528 Last data filed at 2021 0900 Gross per 24 hour Intake 1213.37 ml Output 1000 ml Net 213.37 ml Objective: 
Vital signs: (most recent): Blood pressure (!) 103/53, pulse (!) 106, temperature 98.6 °F (37 °C), resp. rate 11, height 4' 9\" (1.448 m), weight 55.4 kg (122 lb 2.2 oz), SpO2 97 %. Gen: trach, sedated HEENT: trach in place Chest: symmetrical chest rise CV: r/r/r Abd: non-distended Ext: no LE edema. 1+ b/l UE Neuro: not following commands at time of my eval.   
Labs/Imaging/Diagnostics Labs: CBC: 
Recent Labs 21 
2980 21 
7195 21 
3070 WBC 9.6 12.8* 8.4  
RBC 2.72* 2.56* 2.60* HGB 8.5* 7.9* 7.9*  
HCT 27.2* 25.0* 25.5*  
.0* 97.7 98.1 RDW 14.7* 14.8* 15.0*  
 289 305 CHEMISTRIES: 
Recent Labs 21 
4372 21 
2045 21 
2824  144 144  
K 3.3* 3.4* 3.0*  
* 115* 112* CO2 23 22 23 BUN 13 13 14 CA 8.9 8.8 8.7 PHOS 3.9 3.2 3.7 MG 1.9 1.9 1.9 PT/INR:No results for input(s): INR, INREXT, INREXT in the last 72 hours. No lab exists for component: PROTIME APTT:No results for input(s): APTT in the last 72 hours. LIVER PROFILE: 
Recent Labs   21 
3529 21 
6432 AST 25 25 ALT 22 23 Lab Results Component Value Date/Time ALT (SGPT) 22 04/05/2021 04:36 AM  
 AST (SGOT) 25 04/05/2021 04:36 AM  
 Alk. phosphatase 172 (H) 04/05/2021 04:36 AM  
 Bilirubin, direct 0.08 11/05/2020 11:23 AM  
 Bilirubin, total 0.2 04/05/2021 04:36 AM  
 
 
Imaging Last 24 Hours: 
No results found. Assessment//Plan A/P: 
49 with pmh Downs, c-diff and seizures admitted with status. 
  
Status Epilepticus: 
-- appreciate neuro assistance 
-- currently on keppra and dilantin per neuro Pneumonia: 
-- cultures with pseudomonas 
-- currently on zosyn/levaquin 4/2, 4/5 UTI: 
-- culture with  pseudomonas. -- tx as above.  
  
Hypotension: 
-- suspect sedation related -- pressors for MAP >65 
-- midodrine increased to 10 TID 4/2. On minimal NE. If still requiring 4/4 would increase midodrine to 15 TID. 
  
Acute Resp Failure/Trach Dependence: -- pt trach'd at baseline but not on vent -- wean vent to off as able. -- continues to struggle with SBTs  
-- remains net +. Will schedule lasix 
  
VICK: 
-- likely pre-renal/ATN and UTI 
-- avoid nephrotoxins -- no acute indication for renal replacement. -- improved 
  
Hx Recurrent C-diff: 
-- s/p fecal transplant 
-- avoid abx unless clear infection 
  
Hx GIB: 
-- had GIB 3/21 
-- EGD and colonoscopy at that time -- no thought to have GI bleeding currently -- continue PPI. CCM time 40 min. Pt at risk of life threatening deterioration from acute resp failure, seizures, UTI Pt seen and evaluated via tele encounter. Audio and video used for this encounter.    
 
Electronically signed by Gina Broderick DO on 4/6/2021 at 1:16 PM

## 2021-04-06 NOTE — PROGRESS NOTES
Spoke w/ pt's mother Femi Ordonez regarding pt's clinical status. I explained that we have not made any progress in weaning the ventilation for the pt and that she is likely dependent on mechanical ventilation. Ms. Rani Bravo endorsed her understanding of this and also expressed her understanding that the pt would likely not survive in her current state off of the vent. She expressed hope that her daughter might be able to come off of the vent but understands that this is an unlikely possibility. For now she would like to continue full interventional efforts but is open to a discussion with her family and palliative about goals of care. I also spoke w/ Ms. Rani Bravo about different LTAC options for her daughter. She had heard from other family members that [address] [place] [address] [facility] was \"not up to scratch,\" but also acknowledged that everyone has their own opinions. I explained from my experience that [facility] [employer] [facility] takes good care of it's pts. Additionally I spoke about the fact that [address] [place] [address] [facility] is local and that they allow visitation from family members. Ms. Rani Bravo seemed open to the possibility of the pt going to [address] [place] [address] but wanted to hear back from the other LTACs to which referrals had been sent. I informed Ms. Rani Bravo that palliative care would likely speak with her regarding goals of care for her daughter in the near future.  
 
Blair Antunez MD 
11:43 AM

## 2021-04-07 NOTE — PROGRESS NOTES
Bedside and Verbal shift change report given to Ashley Brandon RN and [facility] KLAUDIA HENDRICKS (oncoming nurse) by KLAUDIA Myles (offgoing nurse). Report included the following information SBAR, Intake/Output, MAR, Recent Results, Cardiac Rhythm NSR, ST and Alarm Parameters . Primary Nurse Jose Gomez, KLAUDIA and KLAUDIA Myles performed a dual skin assessment on this patient No impairment noted Rolf score is 10 
 
0800 Assessment, see flowhseets. Meds administered, see flowsheets. Dr. Maurisio Eastman assessed pt at bedside via telehealth. Discussed pt condition and plan of care. Orders received. Oral care and suction complete. Pt repositioned. Pt had 2 medium sized brown liquid bowel movements. Pt pericare and cleanup performed. 4302 Norepinephrine stopped. /74 MAP 82 
 
0845 RT at bedside, attempting SBT. [address] Pt failed SBT d/t high RR, low TV. Put back on AC mode. Oral care and suction complete. Pt repositioned. Meds administered d/t high RR and HR, see MAR. [address] Pt  at bedside, updated with pt condition and plan of care. 80 IDR rounds. Discussed pt condition and plan of care. [address] Pt RR and HR came back WNL and RT was at bedside, wanted to retry SBT so pt placed on spontaneous mode with 5/5.  
 
1200 Reassessments, see flowsheets. Meds administered, see MAR. Oral care and suction complete. Pt cleaned up and repositioned. Tube feed set changed and run. GR 0ml. Pt tube feed dressing changed, clean dry and intact. PICC line dressing changed per protocol. Trach care performed per protocol. Grady Garza Family medicine MD at bedside assessing pt. Discussed pt condition and plan of care. [address] RT at bedside, pt placed on Trach collar with 40% FiO2 at 10lpm. Pt sats % Pt RR in mid 25s. 
 
1400 Oral care and suction complete. Pt turned and repositioned. [facility] pt condition and plan of care. [address] C-diff stool sample collected and sent to lab.  Patient care performed. Assisted RT with U/S guided ABG draw. See results in Epic. 
 
1600 Reassessment, see flowsheets. Meds administered, see MAR. Suction and oral care complete. Pt repositioned. Tube feed bolus administered. GR 0ml. Pt remains on trach collar at 40% FiO2 and 10lpm. Sats % and RR mid to upper 20s. 
 
1800 Oral care and suction complete. Pt repositioned in bed. Patient care performed. Meds administered, see MAR. Pt RR and HR increased, admninistered PRN ativan and morphine, see MAR. Pt RR and HR came back down. Bedside and Verbal shift change report given to Na Bella RN (oncoming nurse) by Gabbi Downing RN and KLAUDIA Marion (offgoing nurse). Report included the following information SBAR, Intake/Output, MAR, Recent Results, Med Rec Status, Cardiac Rhythm NSR, ST and Alarm Parameters .

## 2021-04-07 NOTE — PROGRESS NOTES
Transition of care note: 
 
RUR 16%,LOS 7 days,GLOS 4.3 Pt remains intubated (Peep5,Fio2 35%) Pt is trach dependent. She resides @ a group home . Pt remains on levophed. I have had extensive conversations with both pt.'s mother and pt.'s sister. I have explained the need for LTAC for ventilator weaning. I have explained to family that the group home is not equipped to manage vent weaning and therefore,the need for LTAC. I have explained to family that once pt is safely weaned off the vent,then she can return to the group home as family desires for her return there. I explained to pt.'s family that some of the LTACs do not accept certain types of medicaid or they have x amount of designated medicaid beds . I explained to the family that there is only one LTAC in [place] and unfortunately,mother was told by a family member that they read a bad review so mother and pt.'s sister do not want pt to pursue this facility even though they do accept [facility] Medicaid. I updated mother and sister that [facility] and [facility] in [facility] have declined pt. I contacted [facility] in [place] They do accept [facility] Medicaid and they are now open to receive patients outside of [facility]. She will update Justin Durán admissions coordinator regarding the referral. 
[facility] in [facility] is interested in pt. Updated clinicals faxed to [facility] , [facility] and [facility]. I discussed pt .with caretaker @ the group home ,Johana Fierro @ [phone]. Megha Massey will work on [facility] and level 2. I will update pt.'s mother later today, 
 
Per group home,pt received both moderna vaccines and will call me with the vaccine dates as [facility] is asking. [facility] [phone]

## 2021-04-07 NOTE — WOUND CARE
Wound Consult: Follow Up Visit. Chart reviewed. In to check skin. Spoke with patients nurseRachel and we were at bedside together to assess and provide care to [facility]. Patient is resting on a Progress bed with air support mattress. Heels off loaded with boots. Patient is on trach collar; requires two person assist to move side to side in bed. Rolf score 10. Assessment: 
Buttocks/sacrum/gluteal cleft intact, no redness; having rather constant liquid stool while at bedside; has bolus tube feeds. Barrier ointment is in use and reapplied. Left lateral plantar heel - dry callus lifted off with moisturizing with intact pale pink skin beneath. Right lateral plantar heel - dry dark ~ 1 x 0.6 cm area noted, no surrounding redness, rough in texture. No redness to posterior heels. Skin Care / PI Prevention Recommendations: 1. Minimize friction/shear: minimize layers of linen/pads under patient. 2. Off load pressure/reposition: continue to turn and reposition approximately every 2 hours; float heels with pillows or use off loading heel boots. 3. Manage Moisture - keep skin folds dry; incontinence skin care with incontinence wipes; zinc barrier ointment; Pure wick in use to help contain urine. 4. Continue to monitor nutrition, pain, and skin risk scale, and skin assessment. Plan: We will continue to reassess routinely and as needed. Please re-consult should concerns arise despite continued skin/PI prevention measures. Jeannine Vargas RN, [facility], [facility]. [facility], Wound / Ostomy Department Wound Healing Office [phone]

## 2021-04-07 NOTE — DISCHARGE SUMMARY
Stacey Eng [address]  Office [phone] Fax [phone] Discharge / Transfer / Off-Service Note Name: Bhaskar Norton MRN: [id number]  Sex: Female YOB: 1970  Age: 49 y.o. PCP: David Barreto NP Date of admission: 3/30/2021 Date of discharge/transfer: 4/14/2021 Attending physician at admission: Jone Gracia. Attending physician at discharge/transfer: Mala Sidhu MD 
Resident physician at discharge/transfer: Junior Phill MD 
  
Consultants during hospitalization IP CONSULT TO NEUROLOGY 
IP CONSULT TO FAMILY PRACTICE 
IP CONSULT TO NEUROLOGY 
IP CONSULT TO INTENSIVIST 
IP CONSULT TO GASTROENTEROLOGY 
IP CONSULT TO PALLIATIVE CARE - PROVIDER Admission diagnoses Status epilepticus ([facility] [P71.962] Recommended follow-up after discharge 1. PCP-Neville Dacosta NP 
2. Neurology - Dr. Cristina Johnston Things to follow up on with PCP:  
-Recheck CBC to monitor Hgb as pt has hx of GI bleed -Monitor renal function, sodium level, phosphorus 
-Monitor CBC to make sure no signs of new infection 
-F/u final blood culture results from 4/12 
 ---------------------------------------------------------------------------------------------------------------------------- The patient was well enough to be discharged from the hospital. However, because they were inpatient in a hospital, they are at greater risk of having been exposed to the coronavirus. PLEASE stay inside and self-quarantine for 14 days to prevent further spread of the coronavirus. 
------------------------------------------------------------------------------------------------------------------ Medication Changes: START Robinul 1mg TID PRN increased trach secretions Midodrine 15mg Q8h Oxycodone 5mg Q6h x7 days Effer-k packet daily Augmentin 875mg Q12h x10 doses Doxycycline 100mg Q12h x8 doses STOP None CHANGES Phenytoin 100mg Q8h No other changes were made to your medications, please take all your other home medicines as previously prescribed. History of Present Illness As per admitting provider:  
 
Laurie Hernandez is a 49 y.o. female with PMHx of Down Syndrome (non-verbal at baseline), S/p Trach and PEG tube, seizures, encephalomalacia, ANNE, h/o recurrent Cdiff colitis who presents to the ED via EMS from group home for breakthrough seizures. Per group home caregiver, pt has had multiple breakthrough seizures over the last 3 days with ~5 noted today. Seizures presented as stiffening of body and R sided gaze preference. Patient found to have supratheraputic Phenytoin level yesterday (28) by neurologist (Dr. Gavin Clark) and instructed to hold Phenytoin starting tonight for 24 hours. EMS witnessed seizures x2.  
  
Per group home caregiver at baseline patient is nonverbal, moves only mouth head and arms. Group home caregiver denies all acute symptoms, including fever, cough, nasal congestion, rhinorrhea, SOB, diarrhea. S/p 2 doses of Covid Moderna vaccine. 
  
Patient was recently hospitalized at [facility] in February and March. Hospitalized for Pseudomonas pneumonia 2/6/2021 - 2/13/2021. Hospitalized for GI bleed and discharged on 3/19/2021. EGD and colonoscopy found gastritis and colitis. Patient was discharged on erythromycin, completed course today. Covid tests were negative during both hospitalizations at [facility]. 
  
  
COVID Questions:  
Experiencing any of the following symptoms: fever, chills, cough, SOB, diarrhea, URI symptoms. NO Any Sick contacts with fever, cough, diarrhea, SOB, URI symptoms. NO Traveled out of state or out of country. NO Lives at group home. Has been staying at home. YES, except for recent hospitalizations at [facility] 
  In the ED: 
Vitals: Temp 97.5   /111      RR 35   SatO2  95% on RA Labs: WBC 12.5, Hgb 10.1 (BL 13), Plt 584, Na 130, Cr 1.24 (BL 0.5), , AST 60, T protien 9.8, Globulin 6.8, Alb 3, Trop <0.05, Mg 2.8 (inc), Phenytoin 34, INR 1, PT 10.4 Imaging: CT head chronic encephalomalacia in L&R frontal lobe and R parietal lobe. CXR tracheostomy tube in position, unchanged diffuse interstitial prominence (likely edema) > rpt CXR no sig change. Treatment: Keppra 750mg x1, Ativan 5mg total (ED & EMS), Versed 2mg, Propofol > Versed gtt, NS 2L bolus 
  
EKG: NA Hospital course Pt w/ hx of Down syndrome and seizure disorder, non-verbal, bed-bound at baseline, admitted in status epilepticus and found to have supra-therapeutic phenytoin levels. Neurology was consulted and performed an EEG which did not show any seizure like activity. Pt was intubated and sedated, causing hypotension which was treated w/ levophed. Additionally pt was found to have worsening resp status and was subsequently diagnosed w/ sepsis 2/2 VAP and UTI, UCx and resp cultures both positive for pseudomonas. It was a difficult process to wean pt from pressor support, finally d/c'd on 4/7. Has been on room air via trach collar however had a rapid response called on 4/12 due to tachypnea and tachycardia, found to have hospital-acquired PNA. She will be discharged to finish a 7-day course of antibiotics for HAP. Sepsis 2/2 HAP: 4/4 SIRS criteria met 4/12 AM during rapid response. CXR 4/12 w/ diffuse b/l airspace disease compatible w/ PNA. Procal 0.16, d-dimer 2.04, UA wnl. CTA chest negative for PE. Pt initially dx w/ VAP and pseudomonas UTI 4/2, s/p full treatment course w/ zosyn (4/2-4/9). BCx 3/31 and rBCx 4/2 NG. MRSA negative 4/3. BCx 4/12 NGTD. Treated w/ IV vanc and zosyn for HAP. -F/u final BCx - no growth to date 
-Will discharge on doxycycline 100mg BID and augmentin 875mg BID to finish full 7-day course  
-Recheck CBC OP to monitor for infection 
  
Acute respiratory failure/trach dependence: Trach placed due to hx of seizures, aspiration PNA, and previous intubation. Pt off pressors, vent.   
-Pt is on room air via trach collar - if pt develops an O2 requirement she can f/u w/ PCP to get set up for home O2 
-Oxy 5mg Q6h scheduled while in hospital for sedation, analgesia - will send w/ 7-day supply and have PCP f/u to wean pt from this medication 
-Robinul TID PRN for increased trach secretions 
-Continue home torsemide 40mg daily 
  
Hypotension: Likely 2/2 sedatives. S/p levo gtt 4/7 AM. 
-Midodrine 15mg TID 
-Monitor BP daily at home 
  
Seizures: Pt admitted in status epilepticus. CT head chronic encephalomalacia. Phenytoin level supra-therapeutic on admission. F/w neuro Dr. Lupis Christopher. EEG w/ no evidence of seizure activity. 
-Phenytoin 100mg q8h (decreased from home dose 125mg q8) -Keppra 750mg BID 
-STOP lacosamide  
-Phenytoin level therapeutic when corrected for albumin, stable 
-F/u w/ neurology OP  
  
Anemia of chronic disease in setting of hx of GI bleed: POA Hgb 10.1 (b/l 13) > 6.3. Hx of GI bleed, admission 3/2021 to [address] [place] [address], EGD & Colonoscopy at that time revealed gastritis and colitis. FOBT neg. Possibly 2/2 hemodilution. S/p 1u pRBC. Iron studies suggest ACD. -Per GI no active bleed/ no intervention at this time 
-Omeprazole 40mg BID 
-Monitor Hgb OP 
  
H/o recurrent C Diff s/p fecal transplant. Fecal WBC neg. 
  
VICK - resolved. POA Cr 1.24 (BL 0.5). ATN and UTI 
-Daily CMP 
-Continue home Torsemide 40mg 
  
Hyponatremia - resolved: Likely 2/2 IVVD. POA Na 130.  
-Recheck CMP OP to monitor Na 
  
Hyperphosphatemia- resolved:  POA P 5.6. Improved with IVF 
-Recheck phos OP 
  
Thrombocytosis- resolved: Likely 2/2 IVVD as resolved after fluids. POA plt 584. -Recheck CBC OP 
  
Elevated ALP, AST in setting of hepatic steatosis - resolved: Chronic. POA , AST 60.  
  
S/p PEG-tube:  
-Continue tube feeds  
  
Chronic edema: Home Torsemide 40mg daily. F/w Nephrology OP.  S/p lasix 10mg IV x1 
-Continue home Torsemide 40mg daily 
  
GERD: stable. 
-Omeprazole 40mg BID  
 
 
 
Physical exam at discharge: 
 
Vitals Reviewed. Patient Vitals for the past 12 hrs: 
 Temp Pulse Resp BP SpO2  
04/14/21 1234 98.2 °F (36.8 °C) (!) 118 20 115/68 95 % 04/14/21 0759 98.9 °F (37.2 °C) 100 24 127/79 100 % 04/14/21 0726     98 % 04/14/21 0700  73     
04/14/21 0300 99.2 °F (37.3 °C) 99 21 110/64 98 % General appearance - No acute distress.  Eyes open. Chest - Trach collar in place, pt breathing independently. Vesicular breath sounds b/l, symmetric air entry Heart - normal rate, regular rhythm, normal S1, S2, no murmurs, rubs, clicks or gallops, Abdomen - soft, nontender, mildly distended, +BS,no rebound or guarding Neurological - Awake, eyes open Extremities - Dependent edema to BUE. Trace LE edema, unna boots in place Condition at discharge: Stable. Labs Recent Labs 04/14/21 
0126 04/12/21 
1122 04/12/21 
0420 WBC 7.4 14.5* 5.6 HGB 9.7* 10.9* 9.5* HCT 30.9* 34.5* 30.4*  389 293 Recent Labs 04/14/21 0126 04/12/21 
1122 04/12/21 
0420  136 140  
K 3.4* HEMOLYZED,RECOLLECT REQUESTED 4.0  
 99 103 CO2 29 22 30 BUN 23* 21* 21* CREA 1.10* 1.25* 0.86 * 145* 114* CA 9.4 10.3* 9.2 MG 2.5* HEMOLYZED,RECOLLECT REQUESTED 2.4 PHOS 4.4  --  5.6* No results for input(s): ALT, AP, TBIL, TBILI, TP, ALB, GLOB, GGT, AML, LPSE in the last 72 hours. No lab exists for component: SGOT, GPT, AMYP, HLPSE Recent Labs 04/12/21 
1122 04/12/21 
1110 TROIQ <0.05  --   
GLUCPOC  --  141* Micro: 
Lab Results Component Value Date/Time Culture result: NO GROWTH 2 DAYS 04/12/2021 04:55 PM  
 Culture result: MRSA NOT PRESENT 04/03/2021 02:11 PM  
 Culture result:  04/03/2021 02:11 PM  
  Screening of patient nares for MRSA is for surveillance purposes and, if positive, to facilitate isolation considerations in high risk settings.  It is not intended for automatic decolonization interventions per se as regimens are not sufficiently effective to warrant routine use. Imaging: 
Ct Head Wo Cont Result Date: 3/30/2021 EXAM: CT HEAD WO CONT INDICATION: seizures COMPARISON: None. CONTRAST: None. TECHNIQUE: Unenhanced CT of the head was performed using 5 mm images. Brain and bone windows were generated. Coronal and sagittal reformats. CT dose reduction was achieved through use of a standardized protocol tailored for this examination and automatic exposure control for dose modulation. FINDINGS: There is an incidental cavum septum pellucidum. There is mild atrophy and compensatory dilatation of the ventricles. Areas of chronic encephalomalacia are seen in the right frontal lobe, right parietal lobe, and left frontal lobe. There are incidental bilateral basal ganglia calcifications. There is no intracranial hemorrhage, extra-axial collection, or mass effect. The basilar cisterns are open. No CT evidence of acute infarct. The bone windows demonstrate no abnormalities. The visualized portions of the paranasal sinuses and mastoid air cells are clear. Significant chronic encephalomalacia in the right frontal lobe, right parietal lobe, and left frontal lobe. Xr Chest [address] Result Date: 4/5/2021 INDICATION: Fever COMPARISON: 4/2/2021 FINDINGS: Single AP portable view of the chest obtained at 3:37 AM demonstrates no change in position of the lines and tubes. The cardiomediastinal silhouette is stable. Diffuse bilateral patchy airspace disease is increased. No pneumothorax is seen. There is no evidence of pleural effusion. Increased diffuse bilateral patchy airspace disease, compatible with worsening pneumonia. Xr Chest [address] Result Date: 4/2/2021 EXAM: XR CHEST PORT INDICATION: Fever and leukocytosis. Status epilepticus. COMPARISON: Portable chest on 3/30/2021 and 4/1/2020. TECHNIQUE: Upright portable chest AP view FINDINGS: Tracheostomy tube is in good position. Left PICC line extends into the distal superior vena cava.  Cardiac monitoring wires overlie the thorax. The cardiomediastinal and hilar contours are within normal limits. The pulmonary vasculature is within normal limits. Reticular interstitial opacities are decreased and minimal. No focal airspace opacity. Pleural spaces are clear. Bones are osteopenic. Decreased now minimal interstitial pulmonary edema versus interstitial pneumonia. No pneumothorax. Left PICC line is new and in good position. Xr Chest [facility] Result Date: 3/30/2021 EXAM: XR CHEST PORT HISTORY: trach exchange. COMPARISON: 3/30/2021 FINDINGS: Portable AP. A tracheostomy tube is in place. The heart is normal size. There is diffuse interstitial prominence again seen likely related to edema. No pleural effusion or pneumothorax. Tracheostomy tube in appropriate position. Unchanged diffuse interstitial prominence likely related to edema. Xr Chest [facility] Result Date: 3/30/2021 EXAM: XR CHEST PORT HISTORY: Chest pain. COMPARISON: 4/1/2020 FINDINGS: Portable AP. A tracheostomy tube is in place. The heart is normal size. There is unchanged diffuse interstitial prominence likely related to edema. No focal consolidation, pleural effusion, or pneumothorax. No significant interval change. Xr Us Guided Vascular Access Result Date: 4/2/2021 INDICATION:   NO VENOUS ACCESS  EXAMINATION:  US GUIDE VAS ACCESS FINDINGS: Ultrasound was provided for PICC line placement. Ultrasound guidance for PICC line  placement. GBP Procedures / Diagnostic Studies · EEG w/ no seizure like activity Chronic diagnoses Problem List as of 4/14/2021 Date Reviewed: 2/3/2021 Codes Class Noted - Resolved [facility]eps[facility] ICD-10-CM: A41.9 ICD-9-CM: 038.9, 995.91  4/9/2021 - Present VAP (ventilator-associated pneumonia) ([facility] ICD-10-CM: M18.746 ICD-9-CM: 997.31  4/9/2021 - Present Altered mental status ICD-10-CM: R41.82 
ICD-9-CM: 780.97  4/6/2021 - Present  Hypoxia ICD-10-CM: R09.02 ICD-9-CM: 799.02  4/6/2021 - Present Acute respiratory failure with hypoxia [facility]) ICD-10-CM: J96.01 
ICD-9-CM: 518.81  4/4/2021 - Present Acute cystitis without hematuria ICD-10-CM: N30.00 ICD-9-CM: 595.0  4/3/2021 - Present Elevated Dilantin level ICD-10-CM: R78.89 ICD-9-CM: 790.6  4/3/2021 - Present VICK (acute kidney injury) ([facility] ICD-10-CM: N17.9 ICD-9-CM: 584.9  3/31/2021 - Present Edema (Chronic) ICD-10-CM: R60.9 ICD-9-CM: 782.3  3/31/2021 - Present Hypotension ICD-10-CM: I95.9 ICD-9-CM: 458.9  3/31/2021 - Present Hyperphosphatemia ICD-10-CM: E83.39 
ICD-9-CM: 275.3  3/31/2021 - Present Hyponatremia ICD-10-CM: E87.1 ICD-9-CM: 276.1  3/31/2021 - Present Thrombocytosis ([facility] ICD-10-CM: D47.3 ICD-9-CM: 238.71  3/31/2021 - Present * (Principal) Status epilepticus ([facility] ICD-10-CM: Becca Byes ICD-9-CM: 345.3  3/30/2021 - Present On tube feeding diet ICD-10-CM: Z78.9 ICD-9-CM: V49.89  Unknown - Present Clostridium difficile diarrhea ICD-10-CM: A04.72 
ICD-9-CM: 008.45  6/30/2020 - Present Wheelchair bound ICD-10-CM: Z99.3 ICD-9-CM: V46.3  6/9/2020 - Present Inability to walk ICD-10-CM: R26.2 ICD-9-CM: 719.7  6/9/2020 - Present Positive fecal occult blood test ICD-10-CM: R19.5 ICD-9-CM: 792.1  3/29/2020 - Present Oral thrush ICD-10-CM: B37.0 ICD-9-CM: 112.0  3/29/2020 - Present Diarrhea in adult patient ICD-10-CM: R19.7 ICD-9-CM: 787.91  3/28/2020 - Present C. difficile diarrhea ICD-10-CM: A04.72 
ICD-9-CM: 008.45  3/28/2020 - Present Seizure ([address] ICD-10-CM: R56.9 ICD-9-CM: 780.39  12/11/2019 - Present Gastroesophageal reflux disease without esophagitis ICD-10-CM: K21.9 ICD-9-CM: 530.81  12/11/2019 - Present Down's syndrome ICD-10-CM: Q90.9 ICD-9-CM: 758.0  12/11/2019 - Present Iron deficiency ICD-10-CM: E61.1 ICD-9-CM: 280.9  12/11/2019 - Present Constipation ICD-10-CM: K59.00 ICD-9-CM: 564.00  12/11/2019 - Present Severe intellectual disability ICD-10-CM: F72 
ICD-9-CM: 318.1  2/2/2012 - Present Overview Signed 2/2/2012  1:40 PM by Alicia Ferreira MD  
  Mongolism RESOLVED: UTI (urinary tract infection) ICD-10-CM: N39.0 ICD-9-CM: 599.0  4/9/2021 - 4/9/2021 Current Discharge Medication List  
  
START taking these medications Details  
oxyCODONE IR (ROXICODONE) 5 mg immediate release tablet 1 Tab by Per G Tube route every six (6) hours for 7 days. Max Daily Amount: 20 mg. 
Qty: 28 Tab, Refills: 0 Associated Diagnoses: Status epilepticus ([address] Altered mental status, unspecified altered mental status type  
  
naloxone (NARCAN) 4 mg/actuation nasal spray Use 1 spray intranasally, then discard. Repeat with new spray every 2 min as needed for opioid overdose symptoms, alternating nostrils. Qty: 2 Each, Refills: 0  
  
doxycycline (ADOXA) 100 mg tablet 1 Tab by Per G Tube route two (2) times a day for 8 doses. Qty: 8 Tab, Refills: 0  
  
amoxicillin-clavulanate (AUGMENTIN) 875-125 mg per tablet 1 Tab by Per G Tube route every twelve (12) hours for 10 doses. Qty: 10 Tab, Refills: 0  
  
midodrine (PROAMATINE) 5 mg tablet 3 Tabs by Per G Tube route every eight (8) hours for 30 days. Qty: 270 Tab, Refills: 0  
  
glycopyrrolate (ROBINUL) 1 mg tablet 1 Tab by Per G Tube route three (3) times daily as needed (increased secretions from tracheal tube) for up to 30 doses. Qty: 30 Tab, Refills: 0  
  
potassium bicarb-citric acid (EFFER-K) 20 mEq tablet 1 Tab by Per G Tube route daily for 30 days. Indications: low amount of potassium in the blood Qty: 30 Tab, Refills: 0 CONTINUE these medications which have CHANGED Details  
phenytoin (DILANTIN) 100 mg/4 mL susp oral suspension 4 mL by PEG Tube route every eight (8) hours for 30 days. Qty: 360 mL, Refills: 0 CONTINUE these medications which have NOT CHANGED Details  
cetirizine (ZYRTEC) 10 mg tablet 10 mg by Per G Tube route daily. omeprazole (PRILOSEC) 40 mg capsule Take 40 mg by mouth two (2) times a day. guaiFENesin (Siltussin SA) 100 mg/5 mL liquid Take 200 mg by mouth four (4) times daily. torsemide (DEMADEX) 20 mg tablet Take 40 mg by mouth daily. !! albuterol-ipratropium (DUO-NEB) 2.5 mg-0.5 mg/3 ml nebu 3 mL by Nebulization route as needed for Shortness of Breath.  
  
melatonin 3 mg tablet 1 Tab by Per G Tube route nightly as needed for Insomnia. Must give by 10pm 
Qty: 30 Tab, Refills: 11  
 Associated Diagnoses: Insomnia, unspecified type  
  
diazePAM (VALIUM) 5-7.5-10 mg kit Insert 10 mg into rectum as needed (for seizures that last longer than 5 minutes). OTHER,NON-FORMULARY, by Per G Tube route four (4) times daily. OSMOLITE 1.2 [place]ino Liquid  
  
levETIRAcetam (KEPPRA) 100 mg/mL solution 750 mg by Per G Tube route every twelve (12) hours. !! albuterol-ipratropium (DUO-NEB) 2.5 mg-0.5 mg/3 ml nebu 3 mL by Nebulization route every 4 hours daily while awake resp. !! - Potential duplicate medications found. Please discuss with provider. Diet:  NG tube feeds. Activity:  As tolerated Disposition: Home or Self Care Discharge instructions to patient/family Please seek medical attention for any new or worsening symptoms particularly fever, chest pain, shortness of breath, abdominal pain, nausea, vomiting Follow up plans/appointments Follow-up Information Follow up With Specialties Details Why Contact Info Parris Favre, MD Family Medicine On 4/19/2021 10:30 AM - [facility] follow up appointment [address]  [phone] [place] [id number] [phone] Alondra Joseph MD Neurology, Pain Management Schedule an appointment as soon as possible for a visit Follow up for seizure management [place] [address] [address] [facility] 
[phone] Leonel Contreras MD 
Family Medicine Resident For Billing Chief Complaint Patient presents with  Seizure Hospital Problems  Date Reviewed: 2/3/2021 Codes Class Noted POA Sepsis ([facility] ICD-10-CM: A41.9 ICD-9-CM: 038.9, 995.91  4/9/2021 Unknown VAP (ventilator-associated pneumonia) ([facility] ICD-10-CM: G14.815 ICD-9-CM: 997.31  4/9/2021 Unknown Altered mental status ICD-10-CM: R41.82 
ICD-9-CM: 780.97  4/6/2021 Unknown Hypoxia ICD-10-CM: R09.02 
ICD-9-CM: 799.02  4/6/2021 Unknown Acute respiratory failure with hypoxia [facility]) ICD-10-CM: J96.01 
ICD-9-CM: 518.81  4/4/2021 Unknown Acute cystitis without hematuria ICD-10-CM: N30.00 ICD-9-CM: 595.0  4/3/2021 Unknown Elevated Dilantin level ICD-10-CM: R78.89 ICD-9-CM: 790.6  4/3/2021 Unknown VICK (acute kidney injury) ([facility] ICD-10-CM: N17.9 ICD-9-CM: 584.9  3/31/2021 Unknown Edema (Chronic) ICD-10-CM: R60.9 ICD-9-CM: 782.3  3/31/2021 Unknown Hypotension ICD-10-CM: I95.9 ICD-9-CM: 458.9  3/31/2021 Unknown Hyperphosphatemia ICD-10-CM: E83.39 
ICD-9-CM: 275.3  3/31/2021 Unknown Hyponatremia ICD-10-CM: E87.1 ICD-9-CM: 276.1  3/31/2021 Unknown Thrombocytosis ([facility] ICD-10-CM: D47.3 ICD-9-CM: 238.71  3/31/2021 Unknown * (Principal) Status epilepticus ([facility] ICD-10-CM: Atha Bossman ICD-9-CM: 345.3  3/30/2021 Unknown Iron deficiency ICD-10-CM: E61.1 ICD-9-CM: 280.9  12/11/2019 Yes

## 2021-04-07 NOTE — PROGRESS NOTES
Plan of Care faxed to [facility]. Fax number [phone] confirmation number [id number].

## 2021-04-07 NOTE — PROGRESS NOTES
[id number] [facility] PROGRAM 
PROGRESS NOTE  
 
4/8/2021 PCP: Chucky Rosas, NP Assessment/Plan:  
 
Grupo Deluna is a 49 y.o. female with PMHx of Down Syndrome (non-verbal at baseline), S/p Trach and PEG tube, seizures, encephalomalacia, ANNE, h/o recurrent Cdiff colitis admitted for status epilepticus. 24 Hour Events: Nathalia Casiano Sepsis 2/2 VAP and UTI: On vent overnight, off today. CXR 4/2: minimal interstitial pulmonary edema versus interstitial pneumonia, CXR 4/5 worsening PNA. BCx 3/31 and rBCx 4/2 NG. LA wnl. UCx w/ pseudomonas susceptible to zosyn, levaquin. Resp culture w/ pseudomonas and providencia stuartii. MRSA negative. S/p vanc 4/2-4/5. 
-Zosyn (4/2-4/9) -Levaquin DC'd yesterday 
-Daily CBC Acute respiratory failure/trach dependence: Trach placed due to hx of seizures, aspiration PNA, and previous intubation. 
-Continue to wean vent as tolerated - pt off vent 2h 4/6 
-Oxy 5mg Q6h scheduled, ativan 0.5mg Q4h PRN, morphine 1mg Q4h PRN 
-Sedation likely contributing to hypotension - aggressively wean pressors (MAP goal >60) -Robinul added for secretions 
-Increase to Lasix 20mg IV BID added as pt is still fluid net positive  
-Duonebs q4h 
-Palliative engaging in discussion w/ family about goals of care, for now family wishes to pursue LTAC placement Hypotension: Likely 2/2 sedatives. S/p levo gtt. -Levo gtt PRN  
-Aggressively wean pressors - per intensivist note 4/5 goal MAP >60 
-Pt must be weaned off pressors prior to transfer to LTAC 
-Midodrine 15mg TID 
-Strict Is/Os Seizures: Pt admitted in status epilepticus. CT head no acute abnormality, chronic encephalomalacia in L&R frontal lobe, R parietal lobe. Phenytoin level supra-therapeutic (34) on admission. POA EKG w/ sinus tach. F/w neuro Dr. Rivera Pod versed gtt. EEG w/ no evidence of seizure activity.  Phenytoin now sub-therapeutic. 
-Mechanical ventilation though chronic trach tube, wean as tolerated  
-Per neurology: appreciate recs 
 -Phenytoin 100mg q8 (4/2) (home dose 125mg q8) -Keppra 750mg IV BID 
            -STOP lacosamide  
            -Will await further neuro recs as phenytoin level now sub-therapeutic 
-Phenytoin level therapeutic when corrected for albumin, stable 7 days. Stop trending. 
-Seizure and fall precautions   
 
Anemia of chronic disease in setting of hx of GI bleed: POA Hgb 10.1 (b/l 13) > 6.3. Hx of GI bleed, admission 3/2021 to [address] [place] [address], EGD & Colonoscopy at that time revealed gastritis and colitis. FOBT neg. No obvious source of bleeding, possibly 2/2 hemodilution. S/p 1u pRBC. Iron studies suggest ACD. -Per GI no active bleed/ no intervention at this time 
-Protonix 40mg IV daily 
-Daily CBC 
 
H/o recurrent C Diff s/p fecal transplant 
- f/u C diff VICK - resolved. POA Cr 1.24 (BL 0.5). ATN and UTI 
-Daily CMP 
-HOLD home Torsemide 40mg 
  
Hyponatremia - resolved: Likely 2/2 IVVD. POA Na 130.  
-Daily CMP 
  
Hyperphosphatemia- resolved:  POA P 5.6. Improved with IVF 
-Daily phos 
  Thrombocytosis- resolved: Likely 2/2 IVVD as resolved after fluids. POA plt 584.  
-Daily CBC 
  
Elevated ALP, AST in setting of hepatic steatosis - resolved: Chronic. POA , AST 60.  
  
S/p PEG-tube:  
-Continue tube feeds  
  
Chronic edema: Home Torsemide 40mg daily. F/w Nephrology OP. S/p lasix 10mg IV x1 
-HOLD home Torsemide due to hypotension 
  
GERD: stable. -Protonix 40mg IV daily  
 
H/o Cdiff colitis: S/p fecal transplant.   
  
FEN/GI -NG tube feeds Activity - Activity w/assitance DVT prophylaxis - Lovenox GI prophylaxis - Protonix Fall prophylaxis - Fall precautions ordered Code Status - Full. Discussed with Caregiver Next of Kin Name and [address] Casiano, mother/ [phone]. [facility] group [facility] . Braulio Chavira MD 
Family Medicine Resident Subjective:  
 
Pt off vent all day yesterday, placed back on at 5PM. RT in room this am attempting back on trach collar. Patient does not respond to voice. Objective:  
Physical examination Patient Vitals for the past 24 hrs: 
 Temp Pulse Resp BP SpO2  
04/08/21 0730  88 22 (!) 95/58 96 % 04/08/21 0709  90 14  99 % 04/08/21 0700  77 13 (!) 84/47 98 % 04/08/21 0630  85 10 (!) 91/51 98 % 04/08/21 0600  (!) 103 18 105/65 99 % 04/08/21 0530  (!) 111 12 101/69 98 % 04/08/21 0500  95 16 (!) 95/48 98 % 04/08/21 0430  (!) 122 18 107/60 98 % 04/08/21 0400 99 °F (37.2 °C) 96 12 (!) 98/59 96 % 04/08/21 0352  88 14  98 % 04/08/21 0330  74 17 (!) 99/55 99 % 04/08/21 0300  81 15 (!) 98/51 99 % 04/08/21 0230  81 15 (!) 87/49 98 % 04/08/21 0210     95 % 04/08/21 0200  88 21 (!) 91/46 95 % 04/08/21 0130  94 22 (!) 95/51 93 % 04/08/21 0100  (!) 114     
04/08/21 0030  (!) 102 17 110/63 92 % 04/08/21 0001  (!) 102 23  99 % 04/08/21 0000 99 °F (37.2 °C) (!) 101 21 107/67 97 % 04/07/21 2330  72 13 117/79 100 % 04/07/21 2306  72     
04/07/21 2300  73 15 132/69 98 % 04/07/21 2230  72 13 102/85 97 % 04/07/21 2200 98.7 °F (37.1 °C) 85 20 118/65 97 % 04/07/21 2130  90 19 (!) 92/54 96 % 04/07/21 2100  83 18 109/63 96 % 04/07/21 2030  94 14 100/67 96 % 04/07/21 2000 98.8 °F (37.1 °C) 96 18 (!) 86/49 94 % 04/07/21 1935     96 % 04/07/21 1924  94 17  97 % 04/07/21 1900  91 23 (!) 92/55 97 % 04/07/21 1809  93  (!) 156/93   
04/07/21 1800  (!) 134 (!) 51 (!) 156/93 100 % 04/07/21 1700  88 27 103/62 97 % 04/07/21 1600 98.9 °F (37.2 °C) 96 29 130/70 99 % 04/07/21 1501  91     
04/07/21 1500  93 25 (!) 92/51 98 % 04/07/21 1400  89 23 (!) 95/51 99 % 04/07/21 1328     99 % 04/07/21 1300  95 20 (!) 83/40 99 % 04/07/21 1200 98.9 °F (37.2 °C) (!) 121 23 103/78 93 % 04/07/21 1130  96 20 96/60 96 % 04/07/21 1129   15    
04/07/21 1100  88 10 (!) 97/54 99 % 04/07/21 1040   (!) 36   21 1030  100 18 102/61 98 % 21 1000  91 22 107/67 98 % 21 0930  86 22 (!) 96/52 98 % 21 0900  (!) 112 29 108/85 99 % 21 0845  89 24  96 % 21 0830  95 29 (!) 93/55 96 % 21 0815  95 15 110/74 98 % 21 0800 98.2 °F (36.8 °C) 92 15 114/73 97 % 21 0745  (!) 127 27 99/63 100 % Temp (24hrs), Av.8 °F (37.1 °C), Min:98.2 °F (36.8 °C), Max:99 °F (37.2 °C) O2 Flow Rate (L/min): 10 l/min O2 Device: Tracheal collar Date 21 - 21 0320 21 - 21 4318 Shift 9115-0722 6769-6625 24 Hour Total 4759-0778 5508-0032 24 Hour Total  
INTAKE  
I.V.(mL/kg/hr) 366.2(0.5)  366.2(0.3) I.V. 150  150 Levophed Volume 16.2  16.2 Volume (piperacillin-tazobactam (ZOSYN) 3.375 g in 0.9% sodium chloride (MBP/ADV) 100 mL MBP) 200  200 NG/ 066 1995 Water Flush Volume (mL) (PEG/Gastrostomy Tube) 190 75 265 Medication Volume (PEG/Gastrostomy Tube) 60  60 Intake (ml) (PEG/Gastrostomy Tube) 650 175 825 Shift Total(mL/kg) 8762.0(42.9) 250(4.1) 9905.3(42)     
OUTPUT Urine(mL/kg/hr) 450(0.6) 350(0.5) 800(0.5) Urine Voided 350  350 Urine Occurrence(s) 6 x 1 x 7 x Urine Output (mL) (External Female Catheter 21) 100 350 450 Emesis/NG output 0  0 Emesis Occurrence(s)  0 x 0 x Output (ml) (PEG/Gastrostomy Tube) 0  0 Stool 1  1 Stool Occurrence(s) 7 x 0 x 7 x Stool 1  1 Shift Total(mL/kg) 451(7.4) 350(5.8) [id number] .2 -100 715.2 Weight (kg) 60.7 60.7 60.7 60.7 60.7 60.7 General appearance - No acute distress. Eyes open. Does not respond to voice. Chest - Trach tube in place, intubated. Improved rhonchi and coarse breath sounds bilaterally, symmetric air entry Heart - normal rate, regular rhythm, normal S1, S2, no murmurs, rubs, clicks or gallops, Abdomen - soft, nontender, mildly distended, +BS,no rebound or guarding Neurological - Asleep Extremities - Trace pedal edema, unna boots in place. Data Review: CBC: 
Recent Labs 04/08/21 
0410 04/07/21 
0330 04/06/21 
9204 WBC 7.7 10.5 9.6 HGB 8.0* 8.2* 8.5* HCT 25.2* 25.9* 27.2*  
 279 325 Metabolic Panel: 
Recent Labs 04/08/21 
0410 04/07/21 
0330 04/06/21 
5127  143 144  
K 3.2* 3.2* 3.3*  
* 112* 113* CO2 25 23 23 BUN 11 12 13 CREA 0.64 0.68 0.70 * 92 114* CA 8.9 8.7 8.9 MG 2.2 1.7 1.9 PHOS 3.9 4.0 3.9 Micro: 
Lab Results Component Value Date/Time Culture result: MRSA NOT PRESENT 04/03/2021 02:11 PM  
 Culture result:  04/03/2021 02:11 PM  
  Screening of patient nares for MRSA is for surveillance purposes and, if positive, to facilitate isolation considerations in high risk settings. It is not intended for automatic decolonization interventions per se as regimens are not sufficiently effective to warrant routine use. Culture result: HEAVY PSEUDOMONAS AERUGINOSA (A) 04/03/2021 03:46 AM  
 Culture result: MODERATE PROVIDENCIA STUARTII (A) 04/03/2021 03:46 AM  
 
Imaging: 
Ct Head Wo Cont Result Date: 3/30/2021 EXAM: CT HEAD WO CONT INDICATION: seizures COMPARISON: None. CONTRAST: None. TECHNIQUE: Unenhanced CT of the head was performed using 5 mm images. Brain and bone windows were generated. Coronal and sagittal reformats. CT dose reduction was achieved through use of a standardized protocol tailored for this examination and automatic exposure control for dose modulation. FINDINGS: There is an incidental cavum septum pellucidum. There is mild atrophy and compensatory dilatation of the ventricles. Areas of chronic encephalomalacia are seen in the right frontal lobe, right parietal lobe, and left frontal lobe. There are incidental bilateral basal ganglia calcifications. There is no intracranial hemorrhage, extra-axial collection, or mass effect.  The basilar cisterns are open. No CT evidence of acute infarct. The bone windows demonstrate no abnormalities. The visualized portions of the paranasal sinuses and mastoid air cells are clear. Significant chronic encephalomalacia in the right frontal lobe, right parietal lobe, and left frontal lobe. Xr Chest [facility] Result Date: 3/30/2021 EXAM: XR CHEST PORT HISTORY: trach exchange. COMPARISON: 3/30/2021 FINDINGS: Portable AP. A tracheostomy tube is in place. The heart is normal size. There is diffuse interstitial prominence again seen likely related to edema. No pleural effusion or pneumothorax. Tracheostomy tube in appropriate position. Unchanged diffuse interstitial prominence likely related to edema. Xr Chest [facility] Result Date: 3/30/2021 EXAM: XR CHEST PORT HISTORY: Chest pain. COMPARISON: 4/1/2020 FINDINGS: Portable AP. A tracheostomy tube is in place. The heart is normal size. There is unchanged diffuse interstitial prominence likely related to edema. No focal consolidation, pleural effusion, or pneumothorax. No significant interval change. Medications reviewed Current Facility-Administered Medications Medication Dose Route Frequency  NOREPINephrine (LEVOPHED) 8 mg in 5% dextrose 250mL (32 mcg/mL) infusion  0.5-16 mcg/min IntraVENous TITRATE  furosemide (LASIX) injection 20 mg  20 mg IntraVENous BID  acetylcysteine (MUCOMYST) 100 mg/mL (10 %) nebulizer solution 400 mg  4 mL Nebulization PRN  
 acetylcysteine (MUCOMYST) 100 mg/mL (10 %) nebulizer solution 400 mg  4 mL Nebulization Q6H RT  
 Phenytoin - Please HOLD Tube Feed for 1 hour before and 1 hour after Tube Feed   1 Each Other TID  midodrine (PROAMATINE) tablet 15 mg  15 mg Oral Q8H  
 LORazepam (ATIVAN) injection 0.5 mg  0.5 mg IntraVENous Q6H PRN  
 oxyCODONE IR (ROXICODONE) tablet 5 mg  5 mg Per G Tube Q6H  
 naloxone (NARCAN) injection 0.4 mg  0.4 mg IntraVENous EVERY 2 MINUTES AS NEEDED  
 glycopyrrolate (ROBINUL) tablet 1 mg  1 mg Oral TID  morphine injection 1 mg  1 mg IntraVENous Q4H PRN  piperacillin-tazobactam (ZOSYN) 3.375 g in 0.9% sodium chloride (MBP/ADV) 100 mL MBP  3.375 g IntraVENous Q8H  phenytoin (DILANTIN) 100 mg/4 mL oral suspension 100 mg  100 mg PEG Tube Q8H  
 enoxaparin (LOVENOX) injection 40 mg  40 mg SubCUTAneous Q24H  
 alteplase (CATHFLO) 1 mg in sterile water (preservative free) 1 mL injection  1 mg InterCATHeter PRN  
 guaiFENesin (ROBITUSSIN) 100 mg/5 mL oral liquid 200 mg  200 mg Oral Q6H PRN  
 albuterol-ipratropium (DUO-NEB) 2.5 MG-0.5 MG/3 ML  3 mL Nebulization Q4H RT  
 sodium chloride (NS) flush 5-40 mL  5-40 mL IntraVENous Q8H  
 sodium chloride (NS) flush 5-40 mL  5-40 mL IntraVENous PRN  
 acetaminophen (TYLENOL) tablet 650 mg  650 mg Oral Q6H PRN Or  
 acetaminophen (TYLENOL) suppository 650 mg  650 mg Rectal Q6H PRN  
 sodium chloride (NS) flush 5-40 mL  5-40 mL IntraVENous Q8H  
 sodium chloride (NS) flush 5-40 mL  5-40 mL IntraVENous PRN  
 levETIRAcetam (KEPPRA) 750 mg in 0.9% sodium chloride 100 mL IVPB  750 mg IntraVENous BID  
 0.9% sodium chloride infusion 250 mL  250 mL IntraVENous PRN  pantoprazole (PROTONIX) 40 mg in 0.9% sodium chloride 10 mL injection  40 mg IntraVENous Q24H  
 alteplase (CATHFLO) 1 mg in sterile water (preservative free) 1 mL injection  1 mg InterCATHeter PRN Signed: 
 Uma Finn MD 
 Resident, Family Medicine Attending note: Attending note to follow. ..

## 2021-04-07 NOTE — PROGRESS NOTES
Problem: Risk for Spread of Infection Goal: Prevent transmission of infectious organism to others Description: Prevent the transmission of infectious organisms to other patients, staff members, and visitors. Outcome: Progressing Towards Goal 
  
Problem: Patient Education:  Go to Education Activity Goal: Patient/Family Education Outcome: Progressing Towards Goal 
  
Problem: Ventilator Management Goal: *Adequate oxygenation and ventilation Outcome: Progressing Towards Goal 
Goal: *Patient maintains clear airway/free of aspiration Outcome: Progressing Towards Goal 
Goal: *Absence of infection signs and symptoms Outcome: Progressing Towards Goal 
Goal: *Normal spontaneous ventilation Outcome: Progressing Towards Goal 
  
Problem: Patient Education: Go to Patient Education Activity Goal: Patient/Family Education Outcome: Progressing Towards Goal 
  
Problem: Falls - Risk of 
Goal: *Absence of Falls Description: Document Cha Biswas Fall Risk and appropriate interventions in the flowsheet. Outcome: Progressing Towards Goal 
Note: Fall Risk Interventions: 
  
 
Mentation Interventions: Bed/chair exit alarm, Adequate sleep, hydration, pain control, Evaluate medications/consider consulting pharmacy, Family/sitter at bedside, Increase mobility, More frequent rounding, Room close to nurse's station Medication Interventions: Bed/chair exit alarm, Evaluate medications/consider consulting pharmacy, Patient to call before getting OOB Elimination Interventions: Call light in reach, Bed/chair exit alarm, Patient to call for help with toileting needs, Toileting schedule/hourly rounds Problem: Patient Education: Go to Patient Education Activity Goal: Patient/Family Education Outcome: Progressing Towards Goal 
  
Problem: Pressure Injury - Risk of 
Goal: *Prevention of pressure injury Description: Document Rolf Scale and appropriate interventions in the flowsheet.  
Outcome: Progressing Towards Goal Note: Pressure Injury Interventions: 
Sensory Interventions: Assess changes in LOC, Assess need for specialty bed, Avoid rigorous massage over bony prominences, Check visual cues for pain, Discuss PT/OT consult with provider, Float heels, Keep linens dry and wrinkle-free, Minimize linen layers, Monitor skin under medical devices, Maintain/enhance activity level, Pressure redistribution bed/mattress (bed type) Moisture Interventions: Absorbent underpads, Apply protective barrier, creams and emollients, Assess need for specialty bed, Check for incontinence Q2 hours and as needed, Internal/External urinary devices, Maintain skin hydration (lotion/cream), Minimize layers, Moisture barrier Activity Interventions: Assess need for specialty bed, Increase time out of bed, Pressure redistribution bed/mattress(bed type), PT/OT evaluation Mobility Interventions: Assess need for specialty bed, Float heels, HOB 30 degrees or less, Pressure redistribution bed/mattress (bed type), PT/OT evaluation, Turn and reposition approx. every two hours(pillow and wedges), Suspension boots Nutrition Interventions: Document food/fluid/supplement intake, Discuss nutritional consult with provider Friction and Shear Interventions: Apply protective barrier, creams and emollients, Foam dressings/transparent film/skin sealants, HOB 30 degrees or less, Lift sheet, Minimize layers, Lift team/patient mobility team, Transferring/repositioning devices Problem: Patient Education: Go to Patient Education Activity Goal: Patient/Family Education Outcome: Progressing Towards Goal

## 2021-04-07 NOTE — PROGRESS NOTES
Contacted [employer] ,[email or web address] [phone] extension [phone],contacted [employer] and left a VM for them to call me back.  
 
Aixa Perry

## 2021-04-07 NOTE — PROGRESS NOTES
[id number] [facility] PROGRAM 
PROGRESS NOTE  
 
4/7/2021 PCP: David Barreto NP Assessment/Plan:  
 
Bhaskar Norton is a 49 y.o. female with PMHx of Down Syndrome (non-verbal at baseline), S/p Trach and PEG tube, seizures, encephalomalacia, ANNE, h/o recurrent Cdiff colitis admitted for status epilepticus. 24 Hour Events: Pt off of vent for 2h yesterday Sepsis 2/2 VAP and UTI: CXR 4/2: minimal interstitial pulmonary edema versus interstitial pneumonia, CXR 4/5 worsening PNA. BCx 3/31 and rBCx 4/2 NGTD. LA wnl. UCx w/ pseudomonas susceptible to zosyn, levaquin. Resp culture w/ pseudomonas. MRSA negative. S/p vanc 4/2-4/5. 
-f/u BCx 4/2 -Final resp Cx sensitivities pending 
-Zosyn (started 4/2) and levaquin (started 4/5) for pseudomonas double coverage  
-Daily CBC Acute respiratory failure/trach dependence: Trach placed due to hx of seizures, aspiration PNA, and previous intubation. 
-Continue to wean vent as tolerated - pt off vent 2h yesterday 
-Oxy 5mg Q6h scheduled, ativan 0.5mg Q4h PRN, morphine 1mg Q4h PRN 
-Sedation likely contributing to hypotension - aggressively wean pressors (MAP goal >60) -Robinul added for secretions 
-Lasix 10mg IV BID added as pt is still fluid net positive - appreciate intensivist recs 
-Duonebs q4h 
-Palliative engaging in discussion w/ family about goals of care, for now family wishes to pursue LTAC placement Hypotension: Likely 2/2 sedatives. S/p levo gtt. -Levo gtt PRN  
-Aggressively wean pressors - per intensivist note 4/5 goal MAP >60 
-Pt must be weaned off pressors prior to transfer to LTAC 
-Midodrine 15mg TID 
-Strict Is/Os Seizures: Pt admitted in status epilepticus. CT head no acute abnormality, chronic encephalomalacia in L&R frontal lobe, R parietal lobe. Supra-therapeutic phenytoin level at 34. POA EKG w/ sinus tach. F/w neuro Dr. Seay Spore versed gtt. EEG w/ no evidence of seizure activity.  Phenytoin now sub-therapeutic. 
-Mechanical ventilation though chronic trach tube, wean as tolerated  
-Per neurology: appreciate recs 
 -Phenytoin 100mg q8 (4/2) (home dose 125mg q8) -Keppra 750mg IV BID 
            -STOP lacosamide  
            -Will await further neuro recs as phenytoin level now sub-therapeutic 
-Daily CBC, CMP 
-Seizure and fall precautions   
 
Anemia of chronic disease in setting of hx of GI bleed: POA Hgb 10.1 (b/l 13) > 6.3. Hx of GI bleed, admission 3/2021 to [address] [place] [address], EGD & Colonoscopy at that time revealed gastritis and colitis. FOBT neg. No obvious source of bleeding, possibly 2/2 hemodilution. S/p 1u pRBC. Iron studies suggest ACD. -Per GI no active bleed/ no intervention at this time 
-Protonix 40mg IV daily 
-Daily CBC VICK - resolved. POA Cr 1.24 (BL 0.5). -Daily CMP 
-HOLD home Torsemide 40mg 
  
Hyponatremia - resolved: Likely 2/2 IVVD. POA Na 130.  
-Daily CMP 
  
Hyperphosphatemia- resolved:  POA P 5.6. Improved with IVF 
-Daily phos 
  Thrombocytosis- resolved: Likely 2/2 IVVD as resolved after fluids. POA plt 584.  
-Daily CBC 
  
Elevated ALP, AST in setting of hepatic steatosis - resolved: Chronic. POA , AST 60.  
  
S/p PEG-tube:  
-Continue tube feeds  
  
Chronic edema: Home Torsemide 40mg daily. F/w Nephrology OP. S/p lasix 10mg IV x1 
-HOLD home Torsemide due to hypotension 
  
GERD: stable. -Protonix 40mg IV daily  
 
H/o Cdiff colitis: S/p fecal transplant.   
  
FEN/GI -NG tube feeds Activity - Activity w/assitance DVT prophylaxis - Held GI prophylaxis - Lovenox Fall prophylaxis - Fall precautions ordered Code Status - Full. Discussed with Caregiver Next of Kin Name and [address], mother/ [phone] [facility] group [facility] . Leonel Contreras MD 
Family Medicine Resident Subjective: No reported events overnight. Pt off vent for 2h yesterday. Pt awake this morning. Does not respond to voice.  
 
Objective:  
Physical examination Patient Vitals for the past 24 hrs: 
 Temp Pulse Resp BP SpO2  
04/07/21 0730  99 20  99 % 04/07/21 0645  71 13 (!) 146/76 100 % 04/07/21 0630  72 11 (!) 156/76 100 % 04/07/21 0615  75 11 (!) 146/83 99 % 04/07/21 0600  85 16 139/77 99 % 04/07/21 0545  (!) 102 19 120/81 98 % 04/07/21 0530  89 16 118/67 99 % 04/07/21 0515  81 10 126/71 99 % 04/07/21 0500  86 12 124/68 98 % 04/07/21 0445  (!) 104 13 136/89 98 % 04/07/21 0430  81 10 123/63 99 % 04/07/21 0415  81 10 121/64 100 % 04/07/21 0400 98.8 °F (37.1 °C) 84 14 116/61 99 % 04/07/21 0350     99 % 04/07/21 0345  80  129/65 99 % 04/07/21 0343  77 12  98 % 04/07/21 0330  84  (!) 100/56 98 % 04/07/21 0315  92  119/75 97 % 04/07/21 0300  100 26 112/71 97 % 04/07/21 0245  81 13 125/68 99 % 04/07/21 0230  (!) 103 20 (!) 116/55 99 % 04/07/21 0215  73 10 116/65 99 % 04/07/21 0200  75 11 116/66 99 % 04/07/21 0145  85 13 (!) 108/55 99 % 04/07/21 0130  80 11 121/67 99 % 04/07/21 0115  83 11 (!) 109/59 98 % 04/07/21 0100  87 11 120/62 99 % 04/07/21 0045  83 11 130/69 99 % 04/07/21 0028  78 12  100 % 04/07/21 0015  82 13  99 % 04/07/21 0000 99.7 °F (37.6 °C) (!) 134 (!) 54  100 % 04/06/21 2345  78 10 114/67 99 % 04/06/21 2332  (!) 119     
04/06/21 2330  88 14 (!) 101/57 99 % 04/06/21 2315  97 14 (!) 94/53 98 % 04/06/21 2300  (!) 127 22 117/74 98 % 04/06/21 2245  100 14 (!) 96/35 97 % 04/06/21 2230  (!) 115 20 (!) 93/37 97 % 04/06/21 2215  84 15 123/66 100 % 04/06/21 2200  92 10 (!) 97/50 98 % 04/06/21 2145  94 10 (!) 99/57 99 % 04/06/21 2130  (!) 104 13 (!) 91/45 98 % 04/06/21 2115  (!) 105 14 (!) 98/53 98 % 04/06/21 2100  (!) 104 12 (!) 95/55 99 % 04/06/21 2045  (!) 116 20 (!) 103/43 97 % 04/06/21 2030  97 11 (!) 101/58 98 % 04/06/21 2015  (!) 105 11 (!) 81/45 96 % 04/06/21 2000 99 °F (37.2 °C) (!) 112 14 (!) 83/33 95 % 04/06/21 1945  (!) 112 17 (!) 92/47 96 % 04/06/21 1930  (!) 139 (!) 45 (!) 140/93 98 % 04/06/21 1917  (!) 111 30  100 % 04/06/21 1915  (!) 130 (!) 45 120/83 99 % 04/06/21 1900  81 12 (!) 92/58 99 % 04/06/21 1815  (!) 107 19 102/72 99 % 04/06/21 1800  86 11 (!) 92/50 99 % 04/06/21 1745  87 11 (!) 88/47 98 % 04/06/21 1730  90 10 (!) 86/47 98 % 04/06/21 1715  89 10 (!) 93/53 98 % 04/06/21 1700  98 10 (!) 97/53 97 % 04/06/21 1645  (!) 103 12 (!) 99/52 97 % 04/06/21 1630  (!) 142 (!) 48 (!) 144/60 100 % 04/06/21 1620  (!) 111 22  98 % 04/06/21 1615  (!) 107 16 (!) 109/56 98 % 04/06/21 1600 99.6 °F (37.6 °C) 100 14 106/65 99 % 04/06/21 1545  (!) 105 17 (!) 99/56 98 % 04/06/21 1530  (!) 110 16 (!) 96/57 97 % 04/06/21 1515  (!) 106 11 (!) 103/53 97 % 04/06/21 1501  (!) 112     
04/06/21 1500  (!) 101 13 (!) 112/55 96 % 04/06/21 1439     98 % 04/06/21 1430  (!) 101 19 128/75 99 % 04/06/21 1415  89 12 135/84 99 % 04/06/21 1400  92 15 117/67 99 % 04/06/21 1345 98.6 °F (37 °C) 82 10 107/65 100 % 04/06/21 1330  85 10 (!) 101/59 100 % 04/06/21 1315  86 10 (!) 96/53 100 % 04/06/21 1300  86 10 (!) 86/48 99 % 04/06/21 1245  92 10 (!) 88/47 100 % 04/06/21 1230  90 10 (!) 80/43 98 % 04/06/21 1215  95 11 (!) 81/41 98 % 04/06/21 1200  (!) 107 12 (!) 88/41 98 % 04/06/21 1145  (!) 106 11 (!) 81/31 97 % 04/06/21 1130  (!) 110 17 (!) 91/48 97 % 04/06/21 1115  (!) 139 30 (!) 149/73 100 % 04/06/21 1109  (!) 128 (!) 51  98 % 04/06/21 1100  (!) 116 22 (!) 132/57 98 % 04/06/21 1045  (!) 103 12 100/67 95 % 04/06/21 1030  (!) 116 26 115/81 (!) 81 % 04/06/21 1024     97 % 04/06/21 1015  (!) 134 (!) 53 112/71 99 % 04/06/21 1000  96 14 (!) 91/45 95 % 04/06/21 0945  (!) 128 (!) 42 124/77 100 % 04/06/21 0930  81 14 111/66 98 % 04/06/21 0915  (!) 102 21 116/73 100 % 04/06/21 0900  (!) 104 25 116/72 97 % 21 0845  75 13 117/69 99 % 21 0830  79 16 116/69 98 % 21 0815  80 14 126/79 99 % 21 0800  74 13 117/61 99 % 21 0745 98.8 °F (37.1 °C) 79 15 (!) 103/54 98 % Temp (24hrs), Av.1 °F (37.3 °C), Min:98.6 °F (37 °C), Max:99.7 °F (37.6 °C) O2 Flow Rate (L/min): 50 l/min O2 Device: Ventilator, Tracheostomy Date 21 - 21 6303 21 - 21 Shift 0219-1422 9308-8813 24 Hour Total 4583-9869 6284-8487 24 Hour Total  
INTAKE  
I.V.(mL/kg/hr) 367.5(0.6) 825. 6(1.1) 1193.1(0.8) I.V.  580 580 Levophed Volume 17.5 45.6 63.1 Volume (piperacillin-tazobactam (ZOSYN) 3.375 g in 0.9% sodium chloride (MBP/ADV) 100 mL MBP) 200 100 300 Volume (levoFLOXacin (LEVAQUIN) 750 mg in D5W IVPB) 150  150 Volume (magnesium sulfate 1 g/100 ml IVPB (premix or compounded))  100 100 NG/ 936 2503 Water Flush Volume (mL) (PEG/Gastrostomy Tube) 150 150 300 Medication Volume (PEG/Gastrostomy Tube) 100 200 300 Intake (ml) (PEG/Gastrostomy Tube) 475  475 Shift Total(mL/kg) 5162.4(56.7) 9316.6(43.2) 2268. 1(37.4) OUTPUT Urine(mL/kg/hr) 550(0.8) 120(0.2) 670(0.5) Urine Voided  120 120 Urine Occurrence(s)  4 x 4 x Urine Output (mL) (External Female Catheter 21) 550  550 Other  600 600 Other Output  600 600 Shift Total(mL/kg) 550(9.9) 720(11.9) 1270(20.9) .5 455.6 998.1 Weight (kg) 55.4 60.7 60.7 60.7 60.7 60.7 General appearance - No acute distress. Eyes open. Does not respond to voice. Chest - Trach tube in place, intubated Rhonchi and coarse breath sounds bilaterally, symmetric air entry Heart - normal rate, regular rhythm, normal S1, S2, no murmurs, rubs, clicks or gallops, Abdomen - soft, nontender, mildly distended, +BS,no rebound or guarding Neurological - Awake but not alert. Extremities - Trace pitting edema to BUE. Trace pedal edema. Data Review: CBC: 
Recent Labs 04/07/21 
0330 04/06/21 
0419 04/05/21 
3650 WBC 10.5 9.6 12.8* HGB 8.2* 8.5* 7.9*  
HCT 25.9* 27.2* 25.0*  
 325 289 Metabolic Panel: 
Recent Labs 04/07/21 
0330 04/06/21 
0419 04/05/21 
6430  144 144  
K 3.2* 3.3* 3.4*  
* 113* 115* CO2 23 23 22 BUN 12 13 13 CREA 0.68 0.70 0.65 GLU 92 114* 165* CA 8.7 8.9 8.8 MG 1.7 1.9 1.9 PHOS 4.0 3.9 3.2 ALB  --   --  2.0*  
TBILI  --   --  0.2 ALT  --   --  22 Micro: 
Lab Results Component Value Date/Time Culture result: MRSA NOT PRESENT 04/03/2021 02:11 PM  
 Culture result:  04/03/2021 02:11 PM  
  Screening of patient nares for MRSA is for surveillance purposes and, if positive, to facilitate isolation considerations in high risk settings. It is not intended for automatic decolonization interventions per se as regimens are not sufficiently effective to warrant routine use. Culture result: HEAVY PSEUDOMONAS AERUGINOSA (A) 04/03/2021 03:46 AM  
 Culture result: MODERATE PROVIDENCIA STUARTII (A) 04/03/2021 03:46 AM  
 
Imaging: 
Ct Head Wo Cont Result Date: 3/30/2021 EXAM: CT HEAD WO CONT INDICATION: seizures COMPARISON: None. CONTRAST: None. TECHNIQUE: Unenhanced CT of the head was performed using 5 mm images. Brain and bone windows were generated. Coronal and sagittal reformats. CT dose reduction was achieved through use of a standardized protocol tailored for this examination and automatic exposure control for dose modulation. FINDINGS: There is an incidental cavum septum pellucidum. There is mild atrophy and compensatory dilatation of the ventricles. Areas of chronic encephalomalacia are seen in the right frontal lobe, right parietal lobe, and left frontal lobe. There are incidental bilateral basal ganglia calcifications. There is no intracranial hemorrhage, extra-axial collection, or mass effect. The basilar cisterns are open.  No CT evidence of acute infarct. The bone windows demonstrate no abnormalities. The visualized portions of the paranasal sinuses and mastoid air cells are clear. Significant chronic encephalomalacia in the right frontal lobe, right parietal lobe, and left frontal lobe. Xr Chest [facility] Result Date: 3/30/2021 EXAM: XR CHEST PORT HISTORY: trach exchange. COMPARISON: 3/30/2021 FINDINGS: Portable AP. A tracheostomy tube is in place. The heart is normal size. There is diffuse interstitial prominence again seen likely related to edema. No pleural effusion or pneumothorax. Tracheostomy tube in appropriate position. Unchanged diffuse interstitial prominence likely related to edema. Xr Chest [facility] Result Date: 3/30/2021 EXAM: XR CHEST PORT HISTORY: Chest pain. COMPARISON: 4/1/2020 FINDINGS: Portable AP. A tracheostomy tube is in place. The heart is normal size. There is unchanged diffuse interstitial prominence likely related to edema. No focal consolidation, pleural effusion, or pneumothorax. No significant interval change. Medications reviewed Current Facility-Administered Medications Medication Dose Route Frequency  magnesium sulfate 1 g/100 ml IVPB (premix or compounded)  1 g IntraVENous ONCE  
 acetylcysteine (MUCOMYST) 100 mg/mL (10 %) nebulizer solution 400 mg  4 mL Nebulization PRN  
 acetylcysteine (MUCOMYST) 100 mg/mL (10 %) nebulizer solution 400 mg  4 mL Nebulization Q6H RT  
 furosemide (LASIX) injection 10 mg  10 mg IntraVENous BID  NOREPINephrine (LEVOPHED) 8 mg in 5% dextrose 250mL (32 mcg/mL) infusion  0.5-16 mcg/min IntraVENous TITRATE  levoFLOXacin (LEVAQUIN) 750 mg in D5W IVPB  750 mg IntraVENous Q24H  Phenytoin - Please HOLD Tube Feed for 1 hour before and 1 hour after Tube Feed   1 Each Other TID  midodrine (PROAMATINE) tablet 15 mg  15 mg Oral Q8H  
 LORazepam (ATIVAN) injection 0.5 mg  0.5 mg IntraVENous Q6H PRN  
 oxyCODONE IR (ROXICODONE) tablet 5 mg  5 mg Per G Tube Q6H  
 naloxone (NARCAN) injection 0.4 mg  0.4 mg IntraVENous EVERY 2 MINUTES AS NEEDED  
 glycopyrrolate (ROBINUL) tablet 1 mg  1 mg Oral TID  morphine injection 1 mg  1 mg IntraVENous Q4H PRN  piperacillin-tazobactam (ZOSYN) 3.375 g in 0.9% sodium chloride (MBP/ADV) 100 mL MBP  3.375 g IntraVENous Q8H  phenytoin (DILANTIN) 100 mg/4 mL oral suspension 100 mg  100 mg PEG Tube Q8H  
 enoxaparin (LOVENOX) injection 40 mg  40 mg SubCUTAneous Q24H  
 alteplase (CATHFLO) 1 mg in sterile water (preservative free) 1 mL injection  1 mg InterCATHeter PRN  
 guaiFENesin (ROBITUSSIN) 100 mg/5 mL oral liquid 200 mg  200 mg Oral Q6H PRN  
 albuterol-ipratropium (DUO-NEB) 2.5 MG-0.5 MG/3 ML  3 mL Nebulization Q4H RT  
 sodium chloride (NS) flush 5-40 mL  5-40 mL IntraVENous Q8H  
 sodium chloride (NS) flush 5-40 mL  5-40 mL IntraVENous PRN  
 acetaminophen (TYLENOL) tablet 650 mg  650 mg Oral Q6H PRN Or  
 acetaminophen (TYLENOL) suppository 650 mg  650 mg Rectal Q6H PRN  
 sodium chloride (NS) flush 5-40 mL  5-40 mL IntraVENous Q8H  
 sodium chloride (NS) flush 5-40 mL  5-40 mL IntraVENous PRN  
 levETIRAcetam (KEPPRA) 750 mg in 0.9% sodium chloride 100 mL IVPB  750 mg IntraVENous BID  
 0.9% sodium chloride infusion 250 mL  250 mL IntraVENous PRN  pantoprazole (PROTONIX) 40 mg in 0.9% sodium chloride 10 mL injection  40 mg IntraVENous Q24H  
 alteplase (CATHFLO) 1 mg in sterile water (preservative free) 1 mL injection  1 mg InterCATHeter PRN Signed: 
 Shavon Pelayo MD 
 Resident, Family Medicine Attending note: Attending note to follow. ..

## 2021-04-07 NOTE — PROGRESS NOTES
1900 Received bedside report from [facility], [address] [facility]. Discussed todays events, plan, cardiac rhythm,condition, skin, vent settings. Assumed care of this 49 yr old female, Benja Pacini syndrome pt. Pt is trached to vent. Pt becomes very stiff, rigid and agitated w/ any stimulation. 1930 Assessment as charted. Oral care given, pt turned and repositioned for comfort. Mechelle care given. Pt very agitated w/ moving, took a good few min for her to settle. 2043 Morphine 1 mg IV given for comfort as she was again very restless and agitated and not settling. 2100 Quiet, restful in bed. 0000 Turned and repositioned in bed, oral care done. No change in condition. 0240 Restless episode, HR and RR elevated. Morphine 1 mg IV given, Pt quickly settled. Complete bath and bed change done. Pt then incontinent of large, liq orange, brown BM and cleaned again. Purewick in place. 0600 Dr Blaise Alexander made aware of low K this a.m. and orders obtained for MG and K replacements.

## 2021-04-07 NOTE — PROGRESS NOTES
I discussed pt with [facility] in [place] and have forwarded updated clinicals to [email or web address] 
 
I left a VM with pt.'s mother to update her that [facility] is another LTAC to consider and I contacted her sister Yulia Bynum to update her. Yulia Bynum is requesting a zoom conference for later this afternoon. I will update her nurse. Karen's email address for the zoom is: 
[email or web address] [place]

## 2021-04-07 NOTE — PROGRESS NOTES
Physical Therapy Spoke with OT who attended IDR, patient currently at her baseline. Will not requiring any skilled therapy. We will complete the order. Thank you.  
Katelynn Weldon PT,DPT,NCS

## 2021-04-07 NOTE — PROGRESS NOTES
Progress Note Date:2021       Room:[address] [place] [address] Patient [facility]     YOB: 1970     Age:51 y.o. Subjective CC: unable to obtain 24 HOUR: no new events. Remains on vent, NE 1. Diarrhea. Agitated with turns Objective Vitals Last 24 Hours: TEMPERATURE:  Temp  Av °F (37.2 °C)  Min: 98.2 °F (36.8 °C)  Max: 99.7 °F (37.6 °C) RESPIRATIONS RANGE: Resp  Av.6  Min: 10  Max: 54 PULSE OXIMETRY RANGE: SpO2  Av.2 %  Min: 81 %  Max: 100 % PULSE RANGE: Pulse  Av.8  Min: 71  Max: 142 BLOOD PRESSURE RANGE: Systolic (87DAQ), VZY:636 , Min:80 , LWR:405  
; Diastolic (69SLC), KMX:15, Min:31, Max:93 I/O (24Hr): Intake/Output Summary (Last 24 hours) at 2021 1022 Last data filed at 2021 0800 Gross per 24 hour Intake 1878.9 ml Output 1020 ml Net 858.9 ml  
 
Objective: 
Vital signs: (most recent): Blood pressure (!) 96/52, pulse 86, temperature 98.2 °F (36.8 °C), resp. rate 22, height 4' 9\" (1.448 m), weight 60.7 kg (133 lb 13.1 oz), SpO2 98 %. Gen: trach, sedated HEENT: trach in place Chest: symmetrical chest rise CV: r/r/r Abd: non-distended Ext: no LE edema. 1+ b/l UE Neuro: not following commands at time of my eval.   
Labs/Imaging/Diagnostics Labs: CBC: 
Recent Labs 21 
0330 21 
0419 218 WBC 10.5 9.6 12.8*  
RBC 2.66* 2.72* 2.56* HGB 8.2* 8.5* 7.9*  
HCT 25.9* 27.2* 25.0*  
MCV 97.4 100.0* 97.7 RDW 14.9* 14.7* 14.8*  
 325 289 CHEMISTRIES: 
Recent Labs 21 
0330 21 
0419 21 
5020  144 144  
K 3.2* 3.3* 3.4*  
* 113* 115* CO2 23 23 22 BUN 12 13 13 CA 8.7 8.9 8.8 PHOS 4.0 3.9 3.2 MG 1.7 1.9 1.9 PT/INR:No results for input(s): INR, INREXT, INREXT in the last 72 hours. No lab exists for component: PROTIME APTT:No results for input(s): APTT in the last 72 hours. LIVER PROFILE: 
Recent Labs   21 2980 AST 25 ALT 22 Lab Results Component Value Date/Time ALT (SGPT) 22 04/05/2021 04:36 AM  
 AST (SGOT) 25 04/05/2021 04:36 AM  
 Alk. phosphatase 172 (H) 04/05/2021 04:36 AM  
 Bilirubin, direct 0.08 11/05/2020 11:23 AM  
 Bilirubin, total 0.2 04/05/2021 04:36 AM  
 
 
Imaging Last 24 Hours: 
No results found. Assessment//Plan A/P: 
49 with pmh Downs, c-diff and seizures admitted with status. 
  
Status Epilepticus: 
-- appreciate neuro assistance 
-- currently on keppra and dilantin per neuro Pneumonia: 
-- cultures with pseudomonas 
-- currently on zosyn/levaquin 4/2, 4/5 UTI: 
-- culture with  pseudomonas. -- tx as above.  
  
Hypotension: 
-- suspect sedation related -- pressors for MAP >60 or SBP >90.   
-- midodrine 15 TID. 
  
Acute Resp Failure/Trach Dependence: -- pt trach'd at baseline but not on vent -- wean vent to off as able. -- continue to do TC trials -- remains net + despite lasix. Will increase 4/7 
  
VICK: 
-- likely pre-renal/ATN and UTI 
-- avoid nephrotoxins -- no acute indication for renal replacement. -- improved 
  
Hx Recurrent C-diff: 
-- s/p fecal transplant 
-- now with diarrhea. Have re-sent c-diff 
  
Hx GIB: 
-- had GIB 3/21 
-- EGD and colonoscopy at that time -- no thought to have GI bleeding currently -- continue PPI. CCM time 35 min. Pt at risk of life threatening deterioration from acute resp failure, seizures, UTI Pt seen and evaluated via tele encounter. Audio and video used for this encounter.    
 
Electronically signed by Syl Martin DO on 4/7/2021 at 1:16 PM

## 2021-04-08 NOTE — PROGRESS NOTES
[address] [facility] [address] with [address] [facility] Resident Progress Note in Brief S: Patient seen and examined at bedside. Patient was sleeping, back on vent. O: 
Visit Vitals /63 Pulse (!) 114 Temp 99 °F (37.2 °C) Resp 17 Ht 4' 9\" (1.448 m) Wt 133 lb 13.1 oz (60.7 kg) SpO2 92% BMI 28.96 kg/m² Physical Examination General appearance - No acute distress. Sleeping Chest - Trach tube in place. Clear to auscultation, no wheezes, rales or rhonchi, symmetric air entry Heart - normal rate, regular rhythm, normal S1, S2, no murmurs, rubs, clicks or gallops, Abdomen - soft, nontender, nondistended, Extremities - No peripheral edema A/P:  
 
Sepsis 2/2 VAP and UTI: CXR 4/2: minimal interstitial pulmonary edema versus interstitial pneumonia, CXR 4/5 worsening PNA. BCx 3/31 and rBCx 4/2 NGTD. LA wnl. UCx w/ pseudomonas susceptible to zosyn, levaquin. Resp culture w/ pseudomonas. MRSA negative. S/p vanc 4/2-4/5. Bcx: NGTD 
-Zosyn (started 4/2) for pseudomonas coverage. 
-Daily CBC 
   
Acute respiratory failure/trach dependence: Trach placed due to hx of seizures, aspiration PNA, and previous intubation. 
- Patient back on vent. -Oxy 5mg Q6h scheduled, ativan 0.5mg Q4h PRN, morphine 1mg Q4h PRN 
-Robinul added for secretions 
-Lasix 10mg IV BID added as pt is still fluid net positive - appreciate intensivist recs 
-Duonebs q4h 
-Palliative engaging in discussion w/ family about goals of care, for now family wishes to pursue LTAC placement 
  
Hypotension: Likely 2/2 sedatives. S/p levo gtt. -Levo gtt PRN  
-Aggressively wean pressors - per intensivist note 4/5 goal MAP >60 
-Midodrine 15mg TID 
-Strict Is/Os  
 
Anemia in setting of hx of GI bleed: POA Hgb 10.1 (b/l 13). FOBT neg. S/p 1u pRBC. -Protonix 40mg IV daily 
-Per GI no indication of GI bleed, no intervention at this time  
  
 
Please see daily progress note for detailed plan. Parul Mercado MD 
Family Medicine Resident

## 2021-04-08 NOTE — PROGRESS NOTES
Palliative Medicine Consult [place]: [phone]) Patient Name: Alee Lauren YOB: 1970 Date of Initial Consult: 4/5/2021 Reason for Consult: Goals of care discussion Requesting Provider: Dr. Tatiana Luis Primary Care Physician: Ramon Yan NP 
 
 SUMMARY:  
Alee Lauren is a 49 y.o. with a past history of Downs Syndrome, seizures, recurring C Diff s/p fecal transplant, HLD,  Tracheostomy (2/2020), PEG, Sleep apnea and Debility. Patient presented from [address] [place] [address] with report of uncontrolled seizures x 5 days. Patient was admitted on 3/30/2021 with a diagnosis of Seizures, VICK,  UTI, hypoxia and  Hypotension. Patient paced on vent via tracheostomy. Chart review/Course of Hospitalization: Seizures now controlled, renal function improved Remains on vent, Failing daily SBTs, continues to require pressor support, with combination midodrine and  Levophed. Current medical issues leading to Palliative Medicine involvement include: GOC discussion in setting of severe debility and hypoxic respiratory failure. PALLIATIVE DIAGNOSES:  
1. Hypoxic Respiratory Failure 2. Altered mental status, unspecified 3. Debility 4. DNR Discussion 5. Goals of care Discussion PLAN:  
1. Prior to visit spoke with margo Hernandez, also present during rounds as patient discussed 2. Patient was seen, no family at bedside. Patient open eyes to voice,  
3. Hypoxia- improving, tolerating longer periods of times off vent. 4. Altered mental status, unspecified- May be at baseline? Opens eyes to voice, still no eye contact, not following commands 5. Advanced care planning discussion- No AMD in EMR. Patient single. Her Mother is legal NOK/primary health care decision maker. Patient also has a sister, who would serve as secondary decision maker. 6. DNR Discussion- Patient continues to have Full Code status. 7. Goals of care Discussion-  Full Restorative at this time.  
· Called patients mother, no answer. Spoke with CM who stated that the patients mother works. 8. Thank you for including the Palliative Medicine team in Ms. Godwin's care. We will continue to follow along throughout the course of hospitalization and will continue to reach out to her mother, possibly after hours 9. Initial consult note routed to primary continuity provider and/or primary health care team members 10. Communicated plan of care with: Palliative IDT, [facility] Team,  Mary GOALS OF CARE / TREATMENT PREFERENCES:  
 
GOALS OF CARE: 
  
 
TREATMENT PREFERENCES:  
Code Status: Full Code Advance Care Planning: 
[x] The [facility] Interdisciplinary Team has updated the ACP Navigator with [address] and Patient Capacity Primary Decision Maker (Active): Brandie López - Mother - [phone] Advance Care Planning 4/5/2021 Patient's Healthcare Decision Maker is: Legal Next of Kin Confirm Advance Directive None Patient Would Like to Complete Advance Directive Unable Other Instructions: Other: As far as possible, the palliative care team has discussed with patient / health care proxy about goals of care / treatment preferences for patient. HISTORY:  
 
History obtained from: medical records/ nurse CHIEF COMPLAINT: N/A 
 
HPI/SUBJECTIVE: The patient is:  
[] Verbal and participatory [x] Non-participatory due to:  
Patient non verbal and not following commands, unable to provide ROS or HPI. 
 
4/5- Vent to trach, failed SBT d/t low volumes and apnea, CXR with worsening pna, BP soft, on midodrine and levophed, tolerating tube feeds 4/8- Off levo,, on Midodrin 15mg 15 ng every 8 hours. tolerating SBTs for longer periods,  
 
Clinical Pain Assessment (nonverbal scale for severity on nonverbal patients): Activity (Movement): Lying quietly, normal position Duration: for how long has pt been experiencing pain (e.g., 2 days, 1 month, years) Frequency: how often pain is an issue (e.g., several times per day, once every few days, constant) FUNCTIONAL ASSESSMENT:  
 
Palliative Performance Scale (PPS): 20 PSYCHOSOCIAL/SPIRITUAL SCREENING:  
 
Palliative IDT has assessed this patient for cultural preferences / practices and a referral made as appropriate to needs (Cultural Services, Patient Advocacy, Ethics, etc.) Any spiritual / Buddhism concerns: 
[] Yes /  [x] No 
 
Caregiver Burnout: 
[] Yes /  [] No /  [x] No Caregiver Present Anticipatory grief assessment:  
[x] Normal  / [] Maladaptive ESAS Anxiety: ESAS Depression:    
 
 
 REVIEW OF SYSTEMS:  
 
Positive and pertinent negative findings in ROS are noted above in HPI. The following systems were [] reviewed / [x] unable to be reviewed as noted in HPI Other findings are noted below. Systems: constitutional, ears/nose/mouth/throat, respiratory, gastrointestinal, genitourinary, musculoskeletal, integumentary, neurologic, psychiatric, endocrine. Positive findings noted below. Modified ESAS Completed by: provider Stool Occurrence(s): 1 PHYSICAL EXAM:  
 
From RN flowsheet: 
Wt Readings from Last 3 Encounters:  
04/08/21 131 lb 13.4 oz (59.8 kg) 02/03/21 129 lb 12.8 oz (58.9 kg) 07/08/20 110 lb (49.9 kg) Blood pressure 127/82, pulse (!) 151, temperature 98.7 °F (37.1 °C), resp. rate (!) 51, height 4' 9\" (1.448 m), weight 131 lb 13.4 oz (59.8 kg), SpO2 100 %. Pain Scale 1: Adult Nonverbal Pain Scale Pain Intensity 1: 0 Pain Intervention(s) 1: Medication (see MAR) Last bowel movement, if known:  
 
Constitutional: Appears younger than stated age, petite, well nourished, weak, NAD Eyes: pupils equal, anicteric ENMT: no nasal discharge, moist mucous membranes, vent to trach Cardiovascular: regular rhythm, distal pulses intact Respiratory: breathing not labored, symmetric Gastrointestinal: soft non-tender, +bowel sounds Musculoskeletal: no deformity, no tenderness to palpation, bilateral foot drop Skin: warm, dry, edema in arms and hands bilaterally, trace edema feet Neurologic: Opens eyes to voice, no tracking, non verbal, did not follow commands Psychiatric:unable to assess Other: 
 
 
 HISTORY:  
 
Principal Problem: 
  Status epilepticus ([address] (3/30/2021) Active Problems: 
  Iron deficiency (12/11/2019) VICK (acute kidney injury) ([address] (3/31/2021) Edema (3/31/2021) Hypotension (3/31/2021) Hyperphosphatemia (3/31/2021) Hyponatremia (3/31/2021) Thrombocytosis ([address] (3/31/2021) Acute cystitis without hematuria (4/3/2021) Elevated Dilantin level (4/3/2021) Acute respiratory failure with hypoxia ([address] (4/4/2021) Altered mental status (4/6/2021) Hypoxia (4/6/2021) Past Medical History:  
Diagnosis Date  Down syndrome  History of vascular access device 06/30/2020  
 4 Fr midline, 10 cm L brachial, poor access  History of vascular access device 04/01/2021 [facility] VAT 5fr double PICC LFT brachial at 39cm, arm cir 31 cm  Seizures ([address]  Sleep apnea   
 humidifier and oxygen w/trach  Stroke [facility]) 2019 \"old stroke seen on CT\" Past Surgical History:  
Procedure Laterality Date  COLONOSCOPY N/A 7/8/2020 COLONOSCOPY with fecal transplant (consents in red file) performed by Russel Peng MD at [address] [facility] HX GI    
 gtube 2/2020  HX OTHER SURGICAL    
 tracheostomy 2/2020 Family History Family history unknown: Yes History reviewed, no pertinent family history. Social History Tobacco Use  Smoking status: Never Smoker  Smokeless tobacco: Never Used Substance Use Topics  Alcohol use: Never Frequency: Never Allergies Allergen Reactions  Depakote [Divalproex] Swelling Current Facility-Administered Medications Medication Dose Route Frequency  furosemide (LASIX) injection 20 mg  20 mg IntraVENous TID  NOREPINephrine (LEVOPHED) 8 mg in 5% dextrose 250mL (32 mcg/mL) infusion  0.5-16 mcg/min IntraVENous TITRATE  acetylcysteine (MUCOMYST) 100 mg/mL (10 %) nebulizer solution 400 mg  4 mL Nebulization PRN  
 acetylcysteine (MUCOMYST) 100 mg/mL (10 %) nebulizer solution 400 mg  4 mL Nebulization Q6H RT  
 Phenytoin - Please HOLD Tube Feed for 1 hour before and 1 hour after Tube Feed   1 Each Other TID  midodrine (PROAMATINE) tablet 15 mg  15 mg Oral Q8H  
 LORazepam (ATIVAN) injection 0.5 mg  0.5 mg IntraVENous Q6H PRN  
 oxyCODONE IR (ROXICODONE) tablet 5 mg  5 mg Per G Tube Q6H  
 naloxone (NARCAN) injection 0.4 mg  0.4 mg IntraVENous EVERY 2 MINUTES AS NEEDED  
 glycopyrrolate (ROBINUL) tablet 1 mg  1 mg Oral TID  morphine injection 1 mg  1 mg IntraVENous Q4H PRN  piperacillin-tazobactam (ZOSYN) 3.375 g in 0.9% sodium chloride (MBP/ADV) 100 mL MBP  3.375 g IntraVENous Q8H  phenytoin (DILANTIN) 100 mg/4 mL oral suspension 100 mg  100 mg PEG Tube Q8H  
 enoxaparin (LOVENOX) injection 40 mg  40 mg SubCUTAneous Q24H  
 alteplase (CATHFLO) 1 mg in sterile water (preservative free) 1 mL injection  1 mg InterCATHeter PRN  
 guaiFENesin (ROBITUSSIN) 100 mg/5 mL oral liquid 200 mg  200 mg Oral Q6H PRN  
 albuterol-ipratropium (DUO-NEB) 2.5 MG-0.5 MG/3 ML  3 mL Nebulization Q4H RT  
 sodium chloride (NS) flush 5-40 mL  5-40 mL IntraVENous Q8H  
 sodium chloride (NS) flush 5-40 mL  5-40 mL IntraVENous PRN  
 acetaminophen (TYLENOL) tablet 650 mg  650 mg Oral Q6H PRN Or  
 acetaminophen (TYLENOL) suppository 650 mg  650 mg Rectal Q6H PRN  
 sodium chloride (NS) flush 5-40 mL  5-40 mL IntraVENous PRN  
 levETIRAcetam (KEPPRA) 750 mg in 0.9% sodium chloride 100 mL IVPB  750 mg IntraVENous BID  
 0.9% sodium chloride infusion 250 mL  250 mL IntraVENous PRN  pantoprazole (PROTONIX) 40 mg in 0.9% sodium chloride 10 mL injection  40 mg IntraVENous Q24H  
 alteplase (CATHFLO) 1 mg in sterile water (preservative free) 1 mL injection  1 mg InterCATHeter PRN  
 
 
 
 LAB AND IMAGING FINDINGS:  
 
Lab Results Component Value Date/Time WBC 7.7 04/08/2021 04:10 AM  
 HGB 8.0 (L) 04/08/2021 04:10 AM  
 PLATELET 423 23/60/1003 04:10 AM  
 
Lab Results Component Value Date/Time Sodium 143 04/08/2021 04:10 AM  
 Potassium 3.2 (L) 04/08/2021 04:10 AM  
 Chloride 109 (H) 04/08/2021 04:10 AM  
 CO2 25 04/08/2021 04:10 AM  
 BUN 11 04/08/2021 04:10 AM  
 Creatinine 0.64 04/08/2021 04:10 AM  
 Calcium 8.9 04/08/2021 04:10 AM  
 Magnesium 2.2 04/08/2021 04:10 AM  
 Phosphorus 3.9 04/08/2021 04:10 AM  
  
Lab Results Component Value Date/Time Alk. phosphatase 172 (H) 04/05/2021 04:36 AM  
 Protein, total 6.6 04/05/2021 04:36 AM  
 Albumin 2.0 (L) 04/05/2021 04:36 AM  
 Globulin 4.6 (H) 04/05/2021 04:36 AM  
 
Lab Results Component Value Date/Time INR 1.0 03/30/2021 09:19 PM  
 Prothrombin time 10.4 03/30/2021 09:19 PM  
  
Lab Results Component Value Date/Time Iron 101 03/31/2021 11:18 PM  
 TIBC 205 (L) 03/31/2021 11:18 PM  
 Iron % saturation 49 03/31/2021 11:18 PM  
 Ferritin 211 03/31/2021 11:18 PM  
  
Lab Results Component Value Date/Time pH 7.46 (H) 04/07/2021 03:32 PM  
 PCO2 33 (L) 04/07/2021 03:32 PM  
 PO2 120 (H) 04/07/2021 03:32 PM  
 
No components found for: [place] No results found for: CPK, CKMB Total time: 15min Counseling / coordination time, spent as noted above: 10 min 
> 50% counseling / coordination?: yes Prolonged service was provided for  []30 min   []75 min in face to face time in the presence of the patient, spent as noted above. Time Start:  
Time End:  
Note: this can only be billed with 32466 (initial) or 89260 (follow up). If multiple start / stop times, list each separately.

## 2021-04-08 NOTE — PROGRESS NOTES
[facility], [phone] with [address] [facility] Resident Progress Note in Brief S: Patient seen and examined at bedside. Awake, responds to her name. O: 
Visit Vitals /70 Pulse 89 Temp 99.2 °F (37.3 °C) Resp 24 Ht 4' 9\" (1.448 m) Wt 131 lb 13.4 oz (59.8 kg) SpO2 98% BMI 28.53 kg/m² Physical Examination General appearance - No acute distress. Chest - Trach tube in place. Clear to auscultation, no wheezes, rales or rhonchi, symmetric air entry Heart - normal rate, regular rhythm, normal S1, S2, no murmurs, rubs, clicks or gallops, Abdomen - soft, nontender, nondistended, Extremities - No peripheral edema A/P:  
 
Sepsis 2/2 VAP and UTI: CXR 4/2: minimal interstitial pulmonary edema versus interstitial pneumonia, CXR 4/5 worsening PNA. BCx 3/31 and rBCx 4/2 NGTD. LA wnl. UCx w/ pseudomonas susceptible to zosyn, levaquin. Resp culture w/ pseudomonas. MRSA negative. S/p vanc 4/2-4/5. Bcx: NGTD 
-Zosyn (started 4/2) for pseudomonas coverage. 
-Daily CBC/BMP 
   
Acute respiratory failure/trach dependence: Trach placed due to hx of seizures, aspiration PNA, and previous intubation. 
- Patient currently on 8L of oxygen.  
-Oxy 5mg Q6h scheduled, ativan 0.5mg Q4h PRN, morphine 1mg Q4h PRN 
-Robinul added for secretions 
-Lasix 10mg IV BID added as pt is still fluid net positive - appreciate intensivist recs 
-Duonebs q4h 
  
Hypotension: Likely 2/2 sedatives. S/p levo gtt. -Levo gtt PRN  
-Midodrine 15mg TID 
-Strict Is/Os  
 
Anemia in setting of hx of GI bleed: POA Hgb 10.1 (b/l 13). FOBT neg. S/p 1u pRBC. -Protonix 40mg IV daily 
-Per GI no indication of GI bleed, no intervention at this time  
  
 
Please see daily progress note for detailed plan. Kinjal Gonzalez MD 
Family Medicine Resident

## 2021-04-08 NOTE — PROGRESS NOTES
[address] [facility] PROGRAM 
PROGRESS NOTE  
 
4/9/2021 PCP: Eleazar Calhoun, NP Assessment/Plan:  
 
Amairani Brown is a 49 y.o. female with PMHx of Down Syndrome (non-verbal at baseline), S/p Trach and PEG tube, seizures, encephalomalacia, ANNE, h/o recurrent Cdiff colitis admitted for status epilepticus. 24 Hour Events: Off of vent, satting well on 8L via trach collar Sepsis 2/2 VAP and UTI: On vent overnight, off today. CXR 4/2: minimal interstitial pulmonary edema versus interstitial pneumonia, CXR 4/5 worsening PNA. BCx 3/31 and rBCx 4/2 NG. LA wnl. UCx w/ pseudomonas susceptible to zosyn, levaquin. Resp culture w/ pseudomonas and providencia stuartii. MRSA negative. S/p vanc 4/2-4/5, levaquin 4/2-4/7. 
-Zosyn (4/2-4/9), final dose today 
-Daily CBC Acute respiratory failure/trach dependence: Trach placed due to hx of seizures, aspiration PNA, and previous intubation. Pt off pressors. 
-Continue off vent as tolerated- currently on 8L O2 via trach collar 
-Oxy 5mg Q6h scheduled, ativan 0.5mg Q4h PRN, morphine 1mg Q4h PRN 
-Robinul added for secretions 
-Increase to Lasix 20mg IV TID added as pt is still fluid net positive  
-Duonebs q4h 
-Palliative engaging in discussion w/ family about goals of care, for now family wishes to pursue LTAC placement Hypotension: Likely 2/2 sedatives. S/p levo gtt 4/7 AM. 
-Levo gtt PRN  
-Midodrine 15mg TID 
-Strict Is/Os Seizures: Pt admitted in status epilepticus. CT head no acute abnormality, chronic encephalomalacia in L&R frontal lobe, R parietal lobe. Phenytoin level supra-therapeutic (34) on admission. POA EKG w/ sinus tach. F/w neuro Dr. Zachary Capone versed gtt. EEG w/ no evidence of seizure activity. Phenytoin now sub-therapeutic. 
-Mechanical ventilation though chronic trach tube, wean as tolerated  
-Per neurology: appreciate recs 
 -Phenytoin 100mg q8 (4/2) (home dose 125mg q8)  -Keppra 750mg IV BID 
            -STOP lacosamide  
            -Will await further neuro recs as phenytoin level now sub-therapeutic 
-Phenytoin level therapeutic when corrected for albumin, stable 7 days. Stop trending. 
-Seizure and fall precautions   
 
Anemia of chronic disease in setting of hx of GI bleed: POA Hgb 10.1 (b/l 13) > 6.3. Hx of GI bleed, admission 3/2021 to [address] [place] [address], EGD & Colonoscopy at that time revealed gastritis and colitis. FOBT neg. No obvious source of bleeding, possibly 2/2 hemodilution. S/p 1u pRBC. Iron studies suggest ACD. -Per GI no active bleed/ no intervention at this time 
-Protonix 40mg IV daily 
-Daily CBC 
 
H/o recurrent C Diff s/p fecal transplant 
- f/u C diff VICK - resolved. POA Cr 1.24 (BL 0.5). ATN and UTI 
-Daily CMP 
-HOLD home Torsemide 40mg 
  
Hyponatremia - resolved: Likely 2/2 IVVD. POA Na 130.  
-Daily CMP 
  
Hyperphosphatemia- resolved:  POA P 5.6. Improved with IVF 
-Daily phos 
  Thrombocytosis- resolved: Likely 2/2 IVVD as resolved after fluids. POA plt 584.  
-Daily CBC 
  
Elevated ALP, AST in setting of hepatic steatosis - resolved: Chronic. POA , AST 60.  
  
S/p PEG-tube:  
-Continue tube feeds  
  
Chronic edema: Home Torsemide 40mg daily. F/w Nephrology OP. S/p lasix 10mg IV x1 
-HOLD home Torsemide due to hypotension 
  
GERD: stable. -Protonix 40mg IV daily  
 
H/o Cdiff colitis: S/p fecal transplant.   
  
FEN/GI -NG tube feeds Activity - Activity w/assitance DVT prophylaxis - Lovenox GI prophylaxis - Protonix Fall prophylaxis - Fall precautions ordered Code Status - Full. Discussed with Caregiver Next of Kin Name and [address], mother/ [phone]. [facility], group home . Leonel Contreras MD 
Family Medicine Resident Subjective:  
 
Pt has been off vent, satting well on O2 via trach collar. Patient does not respond to voice. Objective:  
Physical examination Patient Vitals for the past 24 hrs: 
 Temp Pulse Resp BP SpO2 04/09/21 0430  (!) 106 23 109/65 96 % 04/09/21 0427 99.1 °F (37.3 °C)      
04/09/21 0415  95 28  100 % 04/09/21 0400  (!) 101 22 (!) 142/78 99 % 04/09/21 0345  (!) 113 28  100 % 04/09/21 0330  86 27  97 % 04/09/21 0315  (!) 101 27  98 % 04/09/21 0300  79 25 128/66 98 % 04/09/21 0245  73 23  97 % 04/09/21 0230  92 24 125/89 99 % 04/09/21 0215  69 21  99 % 04/09/21 0200  71 19 (!) 95/52 98 % 04/09/21 0145  78 20  96 % 04/09/21 0130  74 19 (!) 90/49 96 % 04/09/21 0115  (!) 52 20  100 % 04/09/21 0100  81 21 (!) 85/41 94 % 04/09/21 0045  76 22  94 % 04/09/21 0030    98/60   
04/09/21 0000 98.8 °F (37.1 °C)      
04/08/21 2345  86 24  97 % 04/08/21 2330  82 24 121/82 96 % 04/08/21 2315  84 24  97 % 04/08/21 2300  80 22 (!) 129/90 98 % 04/08/21 2245  74 22  98 % 04/08/21 2230  79 23 114/68 96 % 04/08/21 2215  91 24  97 % 04/08/21 2200  87 24 105/62 96 % 04/08/21 2154  93     
04/08/21 2145  76 22  97 % 04/08/21 2130  85 24 100/62 97 % 04/08/21 2115  94 21  98 % 04/08/21 2100  75 19 103/60 97 % 04/08/21 2045  80 27  98 % 04/08/21 2030  69 19 104/66 98 % 04/08/21 2015  70 20  98 % 04/08/21 2000 99.1 °F (37.3 °C) 74 23 109/69 98 % 04/08/21 1945  75 22  98 % 04/08/21 1930  75 21 114/63 99 % 04/08/21 1915  88 22  100 % 04/08/21 1900  89 24 108/70 98 % 04/08/21 1830  86 26 107/62 97 % 04/08/21 1800  (!) 104 24 113/70 98 % 04/08/21 1730  (!) 113 29 111/69 96 % 04/08/21 1700  96 25 (!) 102/51 94 % 04/08/21 1630  (!) 104 (!) 32 112/76 94 % 04/08/21 1617  (!) 151 (!) 51  100 % 04/08/21 1615  (!) 149 (!) 48  100 % 04/08/21 1600 99.2 °F (37.3 °C) (!) 124 (!) 31 127/82 100 % 04/08/21 1530  85 25 107/69 97 % 04/08/21 1502  (!) 106     
04/08/21 1500  96 24 112/89 100 % 04/08/21 1430  79 22 109/60 98 % 04/08/21 1400  81 23 110/68 98 % 04/08/21 1330  77 21 (!) 105/58 98 % 21 1300  93 23 130/67 100 % 21 1230  81 22 (!) 96/55 100 % 21 1200 98.7 °F (37.1 °C) (!) 105 25 120/84 97 % 21 1130  95 20 (!) 94/49 100 % 21 1030  96 19 (!) 118/48 97 % 21 1000  90 22 112/67 99 % 21 0930  (!) 105 (!) 32 103/68 100 % 21 0900  91 23 105/75 99 % 21 0830  87 22 (!) 100/52 99 % 21 0800 98.7 °F (37.1 °C) 91 24 100/71 97 % 21 0730  88 22 (!) 95/58 96 % 21 0709  90 14  99 % 21 0700  77 13 (!) 84/47 98 % 21 0658  5001 [facility] Temp (24hrs), Av.9 °F (37.2 °C), Min:98.7 °F (37.1 °C), Max:99.2 °F (37.3 °C) O2 Flow Rate (L/min): 8 l/min O2 Device: Tracheal collar Date 21 - 21 5317 21 - 04/10/21 4431 Shift  24 Hour Total 1900-0659 24 Hour Total  
INTAKE  
I.V.(mL/kg/hr) 300(0.4)  300 Volume (piperacillin-tazobactam (ZOSYN) 3.375 g in 0.9% sodium chloride (MBP/ADV) 100 mL MBP) 300  300 NG/  940 Water Flush Volume (mL) (PEG/Gastrostomy Tube) 150  150 Medication Volume (PEG/Gastrostomy Tube) 120  120 Intake (ml) (PEG/Gastrostomy Tube) 670  670 Shift Total(mL/kg) 1240(20.7)  1240(20.7) OUTPUT Urine(mL/kg/hr) 0780(9.0)  1650 Urine Output (mL) (External Female Catheter 21) 1650  1650 Stool Stool Occurrence(s) 1 x  1 x Shift Total(mL/kg) [id number])  [id number]) NET -410  -410 Weight (kg) 59.8 59.8 59.8 59.8 59.8 59.8 General appearance - No acute distress. Eyes open. Does not respond to voice. Chest - Trach collar in place, pt breathing independently. Improved rhonchi and coarse breath sounds bilaterally, symmetric air entry Heart - normal rate, regular rhythm, normal S1, S2, no murmurs, rubs, clicks or gallops, Abdomen - soft, nontender, mildly distended, +BS,no rebound or guarding Neurological - Awake, not alert Extremities - Trace pedal edema, unna boots in place. Data Review: CBC: 
Recent Labs 04/09/21 
0408 04/08/21 
0410 04/07/21 
0330 WBC 6.6 7.7 10.5 HGB 9.7* 8.0* 8.2* HCT 30.4* 25.2* 25.9*  
 266 279 Metabolic Panel: 
Recent Labs 04/09/21 
0408 04/08/21 
0410 04/07/21 
0330  143 143  
K 3.4* 3.2* 3.2*  
 109* 112* CO2 26 25 23 BUN 13 11 12 CREA 0.77 0.64 0.68 * 113* 92  
CA 9.3 8.9 8.7 MG 1.9 2.2 1.7 PHOS 4.2 3.9 4.0 Micro: 
Lab Results Component Value Date/Time Culture result: MRSA NOT PRESENT 04/03/2021 02:11 PM  
 Culture result:  04/03/2021 02:11 PM  
  Screening of patient nares for MRSA is for surveillance purposes and, if positive, to facilitate isolation considerations in high risk settings. It is not intended for automatic decolonization interventions per se as regimens are not sufficiently effective to warrant routine use. Culture result: HEAVY PSEUDOMONAS AERUGINOSA (A) 04/03/2021 03:46 AM  
 Culture result: MODERATE PROVIDENCIA STUARTII (A) 04/03/2021 03:46 AM  
 
Imaging: 
Ct Head Wo Cont Result Date: 3/30/2021 EXAM: CT HEAD WO CONT INDICATION: seizures COMPARISON: None. CONTRAST: None. TECHNIQUE: Unenhanced CT of the head was performed using 5 mm images. Brain and bone windows were generated. Coronal and sagittal reformats. CT dose reduction was achieved through use of a standardized protocol tailored for this examination and automatic exposure control for dose modulation. FINDINGS: There is an incidental cavum septum pellucidum. There is mild atrophy and compensatory dilatation of the ventricles. Areas of chronic encephalomalacia are seen in the right frontal lobe, right parietal lobe, and left frontal lobe. There are incidental bilateral basal ganglia calcifications. There is no intracranial hemorrhage, extra-axial collection, or mass effect. The basilar cisterns are open. No CT evidence of acute infarct.  The bone windows demonstrate no abnormalities. The visualized portions of the paranasal sinuses and mastoid air cells are clear. Significant chronic encephalomalacia in the right frontal lobe, right parietal lobe, and left frontal lobe. Xr Chest [facility] Result Date: 3/30/2021 EXAM: XR CHEST PORT HISTORY: trach exchange. COMPARISON: 3/30/2021 FINDINGS: Portable AP. A tracheostomy tube is in place. The heart is normal size. There is diffuse interstitial prominence again seen likely related to edema. No pleural effusion or pneumothorax. Tracheostomy tube in appropriate position. Unchanged diffuse interstitial prominence likely related to edema. Xr Chest [facility] Result Date: 3/30/2021 EXAM: XR CHEST PORT HISTORY: Chest pain. COMPARISON: 4/1/2020 FINDINGS: Portable AP. A tracheostomy tube is in place. The heart is normal size. There is unchanged diffuse interstitial prominence likely related to edema. No focal consolidation, pleural effusion, or pneumothorax. No significant interval change. Medications reviewed Current Facility-Administered Medications Medication Dose Route Frequency  furosemide (LASIX) injection 20 mg  20 mg IntraVENous TID  albuterol-ipratropium (DUO-NEB) 2.5 MG-0.5 MG/3 ML  3 mL Nebulization Q6H RT  
 NOREPINephrine (LEVOPHED) 8 mg in 5% dextrose 250mL (32 mcg/mL) infusion  0.5-16 mcg/min IntraVENous TITRATE  acetylcysteine (MUCOMYST) 100 mg/mL (10 %) nebulizer solution 400 mg  4 mL Nebulization PRN  
 acetylcysteine (MUCOMYST) 100 mg/mL (10 %) nebulizer solution 400 mg  4 mL Nebulization Q6H RT  
 Phenytoin - Please HOLD Tube Feed for 1 hour before and 1 hour after Tube Feed   1 Each Other TID  midodrine (PROAMATINE) tablet 15 mg  15 mg Oral Q8H  
 LORazepam (ATIVAN) injection 0.5 mg  0.5 mg IntraVENous Q6H PRN  
 oxyCODONE IR (ROXICODONE) tablet 5 mg  5 mg Per G Tube Q6H  
 naloxone (NARCAN) injection 0.4 mg  0.4 mg IntraVENous EVERY 2 MINUTES AS NEEDED  glycopyrrolate (ROBINUL) tablet 1 mg  1 mg Oral TID  morphine injection 1 mg  1 mg IntraVENous Q4H PRN  piperacillin-tazobactam (ZOSYN) 3.375 g in 0.9% sodium chloride (MBP/ADV) 100 mL MBP  3.375 g IntraVENous Q8H  phenytoin (DILANTIN) 100 mg/4 mL oral suspension 100 mg  100 mg PEG Tube Q8H  
 enoxaparin (LOVENOX) injection 40 mg  40 mg SubCUTAneous Q24H  
 alteplase (CATHFLO) 1 mg in sterile water (preservative free) 1 mL injection  1 mg InterCATHeter PRN  
 guaiFENesin (ROBITUSSIN) 100 mg/5 mL oral liquid 200 mg  200 mg Oral Q6H PRN  
 sodium chloride (NS) flush 5-40 mL  5-40 mL IntraVENous Q8H  
 sodium chloride (NS) flush 5-40 mL  5-40 mL IntraVENous PRN  
 acetaminophen (TYLENOL) tablet 650 mg  650 mg Oral Q6H PRN Or  
 acetaminophen (TYLENOL) suppository 650 mg  650 mg Rectal Q6H PRN  
 sodium chloride (NS) flush 5-40 mL  5-40 mL IntraVENous PRN  
 levETIRAcetam (KEPPRA) 750 mg in 0.9% sodium chloride 100 mL IVPB  750 mg IntraVENous BID  
 0.9% sodium chloride infusion 250 mL  250 mL IntraVENous PRN  pantoprazole (PROTONIX) 40 mg in 0.9% sodium chloride 10 mL injection  40 mg IntraVENous Q24H  
 alteplase (CATHFLO) 1 mg in sterile water (preservative free) 1 mL injection  1 mg InterCATHeter PRN Signed: 
 Evelyne Wright MD 
 Resident, Family Medicine Attending note: Attending note to follow. ..

## 2021-04-08 NOTE — PROGRESS NOTES
Progress Note Date:2021       Room:[address] [place] [address] Patient Reggie Jimenez     YOB: 1970     Age:51 y.o. Subjective CC: unable to obtain 24 HOUR: Tolerated TC for about 6-matt hours . Back on TC . Thick secretions. Objective Vitals Last 24 Hours: TEMPERATURE:  Temp  Av.9 °F (37.2 °C)  Min: 98.7 °F (37.1 °C)  Max: 99 °F (37.2 °C) RESPIRATIONS RANGE: Resp  Av.5  Min: 10  Max: 51 PULSE OXIMETRY RANGE: SpO2  Av.3 %  Min: 92 %  Max: 100 % PULSE RANGE: Pulse  Av  Min: 72  Max: 134 BLOOD PRESSURE RANGE: Systolic (35CEZ), LNL:600 , Min:83 , TFD:777  
; Diastolic (35OGG), AM, Min:40, Max:93 I/O (24Hr): Intake/Output Summary (Last 24 hours) at 2021 0780 Last data filed at 2021 0800 Gross per 24 hour Intake 1710.95 ml Output 1351 ml Net 359.95 ml Objective: 
Vital signs: (most recent): Blood pressure (!) 95/58, pulse 88, temperature 98.7 °F (37.1 °C), resp. rate 22, height 4' 9\" (1.448 m), weight 59.8 kg (131 lb 13.4 oz), SpO2 96 %. Gen: trach, sedated HEENT: trach in place Chest: symmetrical chest rise CV: r/r/r Abd: non-distended Ext: UE/LE non-pitting edema Neuro: not following commands at time of my eval.  Grimaces with painful stim Labs/Imaging/Diagnostics Labs: CBC: 
Recent Labs 21 
0410 21 
0330 21 
6915 WBC 7.7 10.5 9.6  
RBC 2.55* 2.66* 2.72* HGB 8.0* 8.2* 8.5* HCT 25.2* 25.9* 27.2*  
MCV 98.8 97.4 100.0*  
RDW 14.7* 14.9* 14.7*  
 279 325 CHEMISTRIES: 
Recent Labs 21 
0410 21 
0330 21 
6920  143 144  
K 3.2* 3.2* 3.3*  
* 112* 113* CO2 25 23 23 BUN 11 12 13 CA 8.9 8.7 8.9 PHOS 3.9 4.0 3.9 MG 2.2 1.7 1.9 PT/INR:No results for input(s): INR, INREXT, INREXT in the last 72 hours. No lab exists for component: PROTIME APTT:No results for input(s): APTT in the last 72 hours.  
LIVER PROFILE: 
No results for input(s): AST, ALT in the last 72 hours. No lab exists for component: BILIDIR, BILITOT, ALKPHOS Lab Results Component Value Date/Time ALT (SGPT) 22 04/05/2021 04:36 AM  
 AST (SGOT) 25 04/05/2021 04:36 AM  
 Alk. phosphatase 172 (H) 04/05/2021 04:36 AM  
 Bilirubin, direct 0.08 11/05/2020 11:23 AM  
 Bilirubin, total 0.2 04/05/2021 04:36 AM  
 
 
Imaging Last 24 Hours: 
No results found. Assessment//Plan A/P: 
49 with pmh Downs, c-diff and seizures admitted with status. 
  
Status Epilepticus: 
-- appreciate neuro assistance 
-- currently on keppra and dilantin per neuro Pneumonia: 
-- cultures with pseudomonas 
-- on zosyn/levaquin 4/2, 4/5. D/C'd levaquin 4/7 based on sensitivities. No need for ongoing double coverage. UTI: 
-- culture with  pseudomonas. -- tx as above.  
  
Hypotension: 
-- suspect sedation related -- pressors for MAP >60 or SBP >90.   
-- midodrine 15 TID. -- 4/8 remains off pressors 
  
Acute Resp Failure/Trach Dependence: -- pt trach'd at baseline but not on vent -- wean vent to off as able. -- continue to do TC trials -- remains net + despite lasix. Increased 4/7, but still 700+. Increased to TID 4/8 
  
VICK: 
-- likely pre-renal/ATN and UTI 
-- avoid nephrotoxins -- no acute indication for renal replacement. -- improved 
  
Hx Recurrent C-diff: 
-- s/p fecal transplant 
-- now with diarrhea and c-diff pending. 
-- challenge might be that c-diff can remain + long after clinical disease has resolved. Will need to eval results carefully in context of clinical findings if +. Does not necessarily represent new infection.  
  
Hx GIB: 
-- had GIB 3/21 
-- EGD and colonoscopy at that time -- no thought to have GI bleeding currently -- continue PPI. CCM time 35 min. Pt at risk of life threatening deterioration from acute resp failure, seizures, UTI Pt seen and evaluated via tele encounter.   Audio and video used for this encounter.    
 
Electronically signed by Kirti Olmos DO on 4/8/2021 at 1:16 PM

## 2021-04-08 NOTE — PROGRESS NOTES
Received bedside report from [facility] and [address] aM[address] RN. Report included SBAR, Intake/Output, MAR, Cardiac rhythm and plan of care. 1930-Shift assessment completed. Patient repositioned. 2000-Patient placed on vent. Spontaneous mode. 2130-Patient cleaned and purewick changed. Patient suctioned and mouth care completed. 2200-Spoke with care giver, Magan Reynoso, about patient's day. 0055-Patient became tachycardiac and restless, cleaned and turned patient. Administered 0.5mg Ativan. .. 
0115-Reassessed patient. .Calm and resting with eyes closed. 0200- Patient vent alarming, apneic. [facility] Rt called patient placed on assist control. 0430-labs drawn

## 2021-04-08 NOTE — PROGRESS NOTES
Comprehensive Nutrition Assessment Type and Reason for Visit: Juan Duke Nutrition Recommendations/Plan: 1. Continue TF via PEG: 
Vital AF 1.2 bolus feeds 200mL 5x daily (10am, 4pm, 8pm, 12 midnight, 4am), Water flush with 75ml with each bolus. TF provides 1000ml of tf, 1200 kcals, 75 g protein, 1186 mL of H2O from tf & flushes - this meets 94% estimated energy needs and 100% protein needs. Nutrition Assessment:     
4/8:  RD attended & discussed patient during interdisciplinary rounds on unit. Off vent since 7am per Respiratory. Wt is trending down with diuresis noted. Calorie needs based on admit weight. Will continue to evaluate as patient progresses. Low Potassium being replaced. Lab Results Component Value Date/Time Potassium 3.2 (L) 04/08/2021 04:10 AM  
 
 
4/5: follow up. RD is asked to evaluate TF orders for medication interaction with dilantin. Pt had TF orders bolus PTA to limit malabsorption of medication. Current dilantin is ordered q8hr (6a, 2p, 10p). RD will adjust TF to Vital 1.2 and schedule bolus feeds in between meds. Pt has been tolerating TF without residuals. Family is hoping for extubation however, may need LTAC if unable to wean from vent. K+ 3.4 - being replaced IV. H/H 7.9/25 (L) Meds: vanc, Levo, KCl, lasix, dilantin, zosyn, levaquin, keppra 4/2: Follow up. Pt remains on vent and pressors (Levo @2). TFs running at goal. Per RN, pt tolerating. Minimal to no GRVs. Small amt of loose stools yesterday per RN note. Continue TF at goal. 
Labs- K 3.1, Hgb 8.6. Meds- cefepime, fentanyl, [email or web address], Protonix. 3/31: 48 y/o female admitted with status epilepticus. PMH includes Down's syndrome, nonverbal, trach, PEG. +cdiff. Pt currently on vent and pressors (Levo @2). Pt normally resides at group home and her normal TF regimen is Osmolite 1.2 237mL bolus 5x/day + 75mL H2O flush before and after each bolus.  Hospital does not carry Osmolite TF so will start pt on hospital's no-fiber formula, Vital HP. Monitor tolerance. Weight up from last year, down from two months ago. Will continue to monitor weight trends. Labs- phos 4.9, Hgb 6.3. Meds- [email or web address], [email or web address]/kg/min. Weight hx: Wt Readings from Last 10 Encounters:  
04/08/21 59.8 kg (131 lb 13.4 oz) 02/03/21 58.9 kg (129 lb 12.8 oz) 07/08/20 49.9 kg (110 lb)  
06/30/20 49.9 kg (110 lb) 03/31/20 50.1 kg (110 lb 7.2 oz) 03/17/20 44.5 kg (98 lb)  
01/14/20 44.5 kg (98 lb) 01/09/20 44.6 kg (98 lb 6 oz) 12/11/19 44 kg (97 lb) 02/11/19 49.9 kg (110 lb) Malnutrition Assessment: 
Malnutrition Status: at risk of malnutrition TF dependant,  Deep tissue injury on Heel - POA Estimated Daily Nutrient Needs: 
Energy (kcal): 1276 kcal ([id number] xPAL 1.1); Weight Used for Energy Requirements: Current(55.4 kg) Protein (g): 66 - 100g (1.2-1.8 g/kg); Weight Used for Protein Requirements: Current(55.4 kg) Fluid (ml/day): 1276ml; Method Used for Fluid Requirements: 1 ml/kcal 
 
Nutrition Related Findings:  
 Last BM 4/7 Abd: active, intact, distended Edema: non-pitting - all extremities Wounds:   
Deep tissue injury(heel) Current Nutrition Therapies: DIET TUBE FEEDING Vital AF 1.2 bolus feed 5 times daily (schedule 10a, 4p, 8p, 12midnight, 4a) Give 100ml at first bolus, increase by 50ml with each feeding until goal of 200ml volume, Water flush with 75mll after each bolus. Anthropometric Measures: 
· Height:  4' 9\" (144.8 cm) · Current Body Wt:  55.4 kg (122 lb 2.2 oz) · Ideal Body Wt:  85 lbs:  143.7 % · BMI Category: Overweight (BMI 25.0-29. 9) Body mass index is 28.53 kg/m². Last 3 Recorded Weights in this Encounter 04/01/21 1357 04/07/21 0629 04/08/21 0800 Weight: 55.4 kg (122 lb 2.2 oz) 60.7 kg (133 lb 13.1 oz) 59.8 kg (131 lb 13.4 oz) Nutrition Diagnosis:  
· Increased nutrient needs, In context of chronic illness related to inadequate enteral nutrition infusion, impaired respiratory function as evidenced by intubation, wounds, nutrition support-enteral nutrition - resolved · Increased nutrient needs in context of chronic illness related to protein needs as evidence by deep tissue injury POA and respiratory function. Nutrition Interventions:  
Food and/or Nutrient Delivery: Continue tube feeding Nutrition Education and Counseling: Education not indicated Coordination of Nutrition Care: Continue to monitor while inpatient, Interdisciplinary rounds Goals: Tolerance of bolus feeds at goal over the next 4-5 days Nutrition Monitoring and Evaluation:  
Behavioral-Environmental Outcomes: None identified Food/Nutrient Intake Outcomes: Enteral nutrition intake/tolerance Physical Signs/Symptoms Outcomes: GI status, Weight Discharge Planning:   
Enteral nutrition - resume home TF as tolerated bolus feeds Electronically signed by Enrico Montez RD, MS on 4/8/2021 Contact: [phone]

## 2021-04-08 NOTE — PROGRESS NOTES
Transition of care note: 
 
RUR 16% LOS 8 days Plan: I discussed pt with pt.'s nurse. Pt is currently on trach collar-40% Fio2 I recontacted Britt @ [phone] and talked with [facility]. I faxed the [facility] and level 2 to [phone]. Britt is finding out for me if pt actually needed a Level 2 screening for a LTAC. Updated clinicals submitted to [address] [place] [address] in [place] and to [facility] in [place] as a back-up plan in case pt cannot be weaned off the ventilator . The hopes of the family and treatment team are for pt to be weaned off the ventilator in house so pt can return to her group home. I will update pt.'s mother later today or tomorrow depending on how pt.'s trach trials go today [facility] Case management

## 2021-04-08 NOTE — PROGRESS NOTES
0700 Bedside and Verbal shift change report given to Giovanna Chin RN (oncoming nurse) by Uriel Pablo RN (offgoing nurse). Report included the following information SBAR, Kardex, ED Summary, Intake/Output, MAR, Recent Results and Cardiac Rhythm sr. Primary Nurse Ford Van RN and Uriel Pablo RN performed a dual skin assessment on this patient Impairment noted- see wound doc flow sheet Rolf score is 9  
0800 Pt assessed. Eyes open, withdraws to pain via withdrawing extremities. Rigid extremities. RT changed pt to trach collar after chest PT. 40% trach collar. Sat 98%, RR 20s. Rhonchi throughout ant, post. Suctioned trach after lavage. Moderate amt of thick tan secretions. Mouth care performed. SR. BP stable. Generalized nonpitting edema. Abd distended. BS active x4. No BM. No residual through peg tube. Purewick in place. Shalini urine with sediment. BPPP. Heel boots on due to bilateral DTI to heels. Turned, repositioned. HOB 30 degrees. See assessment. Dr. Dara Oppenheim at bedside via telemed. Updated regarding pt's condition, assessment, VS, labs. Orders received. 1000 Turned, repositioned. Mouth care performed. Vital HF 1.2 bolus given. 1200 Pt reassessed. Assessment unchanged. Incontinent of large loose yellow  BM. Pt care performed. Turned, repositioned. Mouth care performed. 1250 SFFP at bedside, assessed pt.  
1400 Turned, repositioned. Mouth care performed. 1600 Pt reassessed. No change. Turned, repositioned. Mouth care performed. 1618 ST 130s, RR 40s. Sat 100%, RT suctioned scant amt of tan secretions. Ambu bagged, no resistance. Medicated with Morphine Sulfate and Ativan IV.  
1620 , RR 29, Sat 97%. 1630 , RR 32, Sat 94%. 1800 Turned, repositioned. Mouth care performed. , RR 24, Sat 98%. 1900 Bedside and Verbal shift change report given to Ashley Montano RN (oncoming nurse) by Giovanna Chin RN (offgoing nurse).  Report included the following information SBAR, Kardex, ED Summary, Intake/Output, MAR, Recent Results and Cardiac Rhythm sr.

## 2021-04-09 PROBLEM — J95.851 VAP (VENTILATOR-ASSOCIATED PNEUMONIA) (HCC): Status: ACTIVE | Noted: 2021-01-01

## 2021-04-09 PROBLEM — A41.9 SEPSIS (HCC): Status: ACTIVE | Noted: 2021-01-01

## 2021-04-09 PROBLEM — N39.0 UTI (URINARY TRACT INFECTION): Status: RESOLVED | Noted: 2021-01-01 | Resolved: 2021-01-01

## 2021-04-09 PROBLEM — N39.0 UTI (URINARY TRACT INFECTION): Status: ACTIVE | Noted: 2021-01-01

## 2021-04-09 NOTE — PROGRESS NOTES
Change of Shift Report: 
 
1930:  Bedside and Verbal shift change report given to Maite Ibanez, RN (oncoming nurse) by Zahraa Gonzalez RN (offgoing nurse). Report included the following information SBAR, Kardex, ED Summary, Intake/Output, MAR, Accordion, Recent Results and Cardiac Rhythm NSR. Shift Summary: 
 
2000:  Patient assessment completed. Patient has Down's Syndrome and is non-verbal and unable to follow commands. Patient on trach collar at 40%, lung sounds diminished, O2 sats in the upper 90%. Patient's BPs stable with MAPs >65. Heart rates in the 90s. Patient's heart rates get tachycardic with suctioning. Patient given bolus 200 TF with 75 mL water flush. 2251:  Patient's heart rates in the 150s. Patient given PRN ativan. End of Shift Report: 
 
0730: Bedside and Verbal shift change report given to Eric Corea RN and Cade Frankel RN (oncoming nurse) by Daphnie Alfred RN (offgoing nurse). Report included the following information SBAR, Kardex, ED Summary, Intake/Output, MAR, Accordion, Recent Results and Cardiac Rhythm NSR.

## 2021-04-09 NOTE — PROGRESS NOTES
Progress Note Date:2021       Room:[address] [place] [address] Patient Lizzy Carrasco     YOB: 1970     Age:51 y.o. Subjective CC: unable to obtain 24 HOUR: No seizures reported. Responds to pain. Nonverbal.  Off levophed. On midodrine. On TC with sats in high 90s. Feedings tolerated well. Objective Vitals Last 24 Hours: TEMPERATURE:  Temp  Av °F (37.2 °C)  Min: 98.7 °F (37.1 °C)  Max: 99.2 °F (37.3 °C) RESPIRATIONS RANGE: Resp  Av.8  Min: 19  Max: 51 PULSE OXIMETRY RANGE: SpO2  Av.7 %  Min: 94 %  Max: 100 % PULSE RANGE: Pulse  Av.9  Min: 52  Max: 151 BLOOD PRESSURE RANGE: Systolic (08KGC), NQX:215 , Min:85 , LXW:434  
; Diastolic (59DEM), RJM:70, Min:41, Max:90 I/O (24Hr): Intake/Output Summary (Last 24 hours) at 2021 1002 Last data filed at 2021 3437 Gross per 24 hour Intake 1945 ml Output 2250 ml Net -305 ml Objective: 
Vital signs: (most recent): Blood pressure (!) 88/62, pulse 70, temperature 99.1 °F (37.3 °C), resp. rate 19, height 4' 9\" (1.448 m), weight 59.8 kg (131 lb 13.4 oz), SpO2 97 %. Gen: trach, nonverbal.  Doesn't follow commands HEENT: trach in place Chest: symmetrical chest rise CV: r/r/r Abd: non-distended Ext: UE/LE non-pitting edema Neuro: not following commands at time of my eval.  Grimaces with painful stim Labs/Imaging/Diagnostics Labs: CBC: 
Recent Labs 21 
0408 21 
0410 21 
0330 WBC 6.6 7.7 10.5 RBC 3.09* 2.55* 2.66* HGB 9.7* 8.0* 8.2* HCT 30.4* 25.2* 25.9*  
MCV 98.4 98.8 97.4 RDW 14.9* 14.7* 14.9*  
 266 279 CHEMISTRIES: 
Recent Labs 21 
0408 21 
0410 21 
0330  143 143  
K 3.4* 3.2* 3.2*  
 109* 112* CO2 26 25 23 BUN 13 11 12 CA 9.3 8.9 8.7 PHOS 4.2 3.9 4.0  
MG 1.9 2.2 1.7 PT/INR:No results for input(s): INR, INREXT, INREXT in the last 72 hours.  
 
No lab exists for component: PROTIME APTT:No results for input(s): APTT in the last 72 hours. LIVER PROFILE: 
No results for input(s): AST, ALT in the last 72 hours. No lab exists for component: BILIDIR, BILITOT, ALKPHOS Lab Results Component Value Date/Time ALT (SGPT) 22 04/05/2021 04:36 AM  
 AST (SGOT) 25 04/05/2021 04:36 AM  
 Alk. phosphatase 172 (H) 04/05/2021 04:36 AM  
 Bilirubin, direct 0.08 11/05/2020 11:23 AM  
 Bilirubin, total 0.2 04/05/2021 04:36 AM  
 
 
Imaging Last 24 Hours: 
No results found. Assessment//Plan A/P: 
49 with pmh Downs, c-diff and seizures admitted with status. 
  
Status Epilepticus: 
-- appreciate neuro assistance 
-- currently on keppra and dilantin per neuro Pneumonia: 
-- cultures with pseudomonas 
-- on zosyn/levaquin 4/2, 4/5. D/C'd levaquin 4/7 based on sensitivities. No need for ongoing double coverage. Finished zosyn course. UTI: 
-- culture with  pseudomonas. -- tx completed.  
  
Hypotension: 
-- suspect sedation related   
-- midodrine 15 TID. -- 4/9 Off levophed 
  
Acute Resp Failure/Trach Dependence: -- pt trach'd at baseline but not on vent 
-- On TC. Tolerating well  
-- Lasix TID 
  
VICK: 
-- likely pre-renal/ATN and UTI 
-- avoid nephrotoxins -- no acute indication for renal replacement. -- improved 
  
Hx Recurrent C-diff: 
-- s/p fecal transplant 
-- now with diarrhea and c-diff pending. 
  
Hx GIB: 
-- had GIB 3/21 
-- EGD and colonoscopy at that time -- no thought to have GI bleeding currently -- continue PPI. Dispo: Pt stable to transfer out of ICU. Discussed with Dr. Stefan Sinclair. CCM time 35 min. Pt at risk of life threatening deterioration from acute resp failure, seizures, UTI Pt seen and evaluated via tele encounter. Audio and video used for this encounter.    
 
Electronically signed by Heath Ponce DO on 4/9/2021 at 1:16 PM

## 2021-04-09 NOTE — PROGRESS NOTES
0700 Bedside and Verbal shift change report given to Kenji Isabel RN (oncoming nurse) by Clem Arroyo RN (offgoing nurse). Report included the following information SBAR, Kardex, ED Summary, Intake/Output, MAR, Recent Results and Cardiac Rhythm sr. Primary Nurse Cristal Sierra RN and Clem Arroyo RN performed a dual skin assessment on this patient No impairment noted Rolf score is 15 
0800 Pt assessed. Eyes open, nonverbal, unable to follow commands. Responds to pain via withdrawing in extremities. Extremities rigid. Breath sounds clear upper, diminished bases, crackles Lt base. Trach, 40% trach collar. Sat 97%, RR 20. Suctioned small amt of thick white secretions. SR. BP stable. Generalized edema. Abd distended, peg in place. BS active x4. Incontinent of watery yellow stool. Pt care performed. Turned, repositioned. Changed purewick, lynne urine with sediment. BPPP. Mouth care performed. Dr. Rozina Jackson at bedside via [employer]. Updated regarding pt's condition, assessment, and plan of care. Orders received. May transfer to tele. 1000 Incontinent of large watery yellow stool. Pt care performed. Changed purewick. Turned, repositioned. Mouth care performed. Tube feeding bolus given. 1200 Pt reassessed. No changes. Incontinent of medium yellow stool. Pt care performed. Turned, repositioned. 1400 Turned, repositioned. 1600 Pt reassessed. Assessment unchanged. Turned, repositioned. Mouth care performed. Suctioned small amt of thick tan secretions via trach. Tube feeding bolus given. 1648 Pt rapid resp 40s, ST 160s, Sat 100%. Diaphoretic. Medicated with Ativan IV. 
1715  Pt calm. , RR 19, Sat 96%. 1800 Turned, repositioned. 1900 Bedside and Verbal shift change report given to KLAUDIA Arora (oncoming nurse) by Kenji Isabel RN (offgoing nurse). Report included the following information SBAR, Kardex, ED Summary, Intake/Output, Recent Results and Cardiac Rhythm sr.

## 2021-04-09 NOTE — PROGRESS NOTES
[id number] PROGRAM 
PROGRESS NOTE  
 
4/10/2021 PCP: Maryana Biswas NP Assessment/Plan:  
 
Randal Hernandez is a 49 y.o. female with PMHx of Down Syndrome (non-verbal at baseline), S/p Trach and PEG tube, seizures, encephalomalacia, ANNE, h/o recurrent Cdiff colitis admitted for status epilepticus. 24 Hour Events: Remains off of vent, satting well on 8L via trach collar Sepsis 2/2 VAP and UTI: On vent overnight, off today. CXR 4/2: minimal interstitial pulmonary edema versus interstitial pneumonia, CXR 4/5 worsening PNA. BCx 3/31 and rBCx 4/2 NG. LA wnl. UCx pseudomonas. Resp culture w/ pseudomonas and providencia stuartii. MRSA negative. Completed course Zosyn to cover VAP and UTI. -Daily CBC Acute respiratory failure/trach dependence: Trach placed due to hx of seizures, aspiration PNA, and previous intubation. Pt off pressors. 
-Continue off vent as tolerated- currently on 8L O2 via trach collar 
-Oxy 5mg Q6h scheduled, ativan 0.5mg Q4h PRN, morphine 1mg Q4h PRN 
-Robinul added for secretions 
-Increase to Lasix 20mg IV TID added as pt is still fluid net positive  
-Duonebs q4h 
-Palliative engaging in discussion w/ family about goals of care Hypotension: Likely 2/2 sedatives. S/p levo gtt 4/7 AM. 
-Midodrine 15mg TID 
-Strict I/Os Seizures: Pt admitted in status epilepticus. CT head no acute abnormality, chronic encephalomalacia in L&R frontal lobe, R parietal lobe. Phenytoin level supra-therapeutic (34) on admission. POA EKG w/ sinus tach. F/w neuro Dr. Sony Gonzalez versed gtt. EEG w/ no evidence of seizure activity. Phenytoin now sub-therapeutic. 
-Mechanical ventilation though chronic trach tube, wean as tolerated  
-Per neurology: appreciate recs 
 -Phenytoin 100mg q8 (4/2) (home dose 125mg q8)  -Keppra 750mg IV BID 
            -STOP lacosamide  
            -Will await further neuro recs as phenytoin level now sub-therapeutic 
-Phenytoin level therapeutic when corrected for albumin, stable 7 days. Stop trending. 
-Seizure and fall precautions   
 
Anemia of chronic disease in setting of hx of GI bleed: POA Hgb 10.1 (b/l 13) > 6.3. Hx of GI bleed, admission 3/2021 to [address] [place] [address], EGD & Colonoscopy at that time revealed gastritis and colitis. FOBT neg. No obvious source of bleeding, possibly 2/2 hemodilution. S/p 1u pRBC. Iron studies suggest ACD. -Per GI no active bleed/ no intervention at this time 
-Protonix 40mg IV daily 
-Daily CBC 
 
H/o recurrent C Diff s/p fecal transplant. Fecal WBC neg. - C diff study disctoninued VICK - resolved. POA Cr 1.24 (BL 0.5). ATN and UTI 
-Daily CMP 
-HOLD home Torsemide 40mg 
  
Hyponatremia - resolved: Likely 2/2 IVVD. POA Na 130.  
-Daily CMP 
  
Hyperphosphatemia- resolved:  POA P 5.6. Improved with IVF 
-Daily phos 
  Thrombocytosis- resolved: Likely 2/2 IVVD as resolved after fluids. POA plt 584.  
-Daily CBC 
  
Elevated ALP, AST in setting of hepatic steatosis - resolved: Chronic. POA , AST 60.  
  
S/p PEG-tube:  
-Continue tube feeds  
  
Chronic edema: Home Torsemide 40mg daily. F/w Nephrology OP. S/p lasix 10mg IV x1 
-HOLD home Torsemide due to hypotension 
  
GERD: stable. -Protonix 40mg IV daily  
 
H/o Cdiff colitis: S/p fecal transplant.   
  
FEN/GI -NG tube feeds Activity - Activity w/assitance DVT prophylaxis - Lovenox GI prophylaxis - Protonix Fall prophylaxis - Fall precautions ordered Code Status - Full. Discussed with Caregiver Next of Kin Name and [address], mother/ [phone]. Eduarda Gusman, group home . Lucy Segura MD 
Family Medicine Resident Subjective:  
 
Pt has been off vent, satting well on O2 via trach collar. Patient with eyes wide open, not following commands to squeeze hands. Objective:  
Physical examination Patient Vitals for the past 24 hrs: 
 Temp Pulse Resp BP SpO2  
04/10/21 0739     97 % 04/10/21 [address] 429 %  
04/10/21 0530  88 23 (!) 100/50 95 % 04/10/21 0515  (!) 102 27  96 % 04/10/21 0500  92 27 (!) 136/123 94 % 04/10/21 0445  (!) 104 (!) 33  97 % 04/10/21 0430  (!) 116 30  98 % 04/10/21 0415  100 27  99 % 04/10/21 0400 (P) 98.8 °F (37.1 °C) 91 22 121/78 99 % 04/10/21 0345  94 19  100 % 04/10/21 0330  82 18 (!) 102/54 100 % 04/10/21 0315  85 17  100 % 04/10/21 0300  69 17 (!) 84/51 98 % 04/10/21 0245  70 18  97 % 04/10/21 0230  71 17 (!) 86/54 97 % 04/10/21 0215  73 20  96 % 04/10/21 0200  77 19 (!) 101/57 97 % 04/10/21 0145  (!) 110 17  100 % 04/10/21 0130    103/82   
04/10/21 0115  74 21  99 % 04/10/21 0100  74 22 (!) 100/56 99 % 04/10/21 0045  (!) 102 20  100 % 04/10/21 0030    104/62   
04/10/21 0000  77 13 (!) 80/48 97 % 04/09/21 2333 (P) 98.8 °F (37.1 °C) 84 22 (!) 90/57 95 % 04/09/21 2311  89     
04/09/21 2300  96 26 111/62 96 % 04/09/21 2255  98 (!) 44 116/72 98 % 04/09/21 2230  100 18 (!) 132/102 100 % 04/09/21 2200  87 19 (!) 106/48 97 % 04/09/21 2130  87 21 (!) 93/58 99 % 04/09/21 2100  92 17 108/72 99 % 04/09/21 2030  74 21 (!) 96/56 100 % 04/09/21 2000 98.6 °F (37 °C) 82 17 114/60 99 % 04/09/21 1930  71 18 (!) 88/57 98 % 04/09/21 1902  84 21 (!) 93/51 98 % 04/09/21 1900  83 18 (!) 86/53 99 % 04/09/21 1832  90 21 110/63 100 % 04/09/21 1831  75 20 (!) 77/44 97 % 04/09/21 1830  76 21 (!) 79/45 96 % 04/09/21 1800  86 20 (!) 82/45 96 % 04/09/21 1730  96 20 (!) 93/48 94 % 04/09/21 1700  (!) 162 (!) 42  100 % 04/09/21 1630  (!) 102 24 131/82 96 % 04/09/21 1600 99.1 °F (37.3 °C) 83 19 122/68 99 % 04/09/21 1530  79 19 114/78 99 % 04/09/21 1500  81 19 113/67 99 % 04/09/21 1448     100 % 04/09/21 1430  84 19 (!) 101/56 99 % 04/09/21 1406  79 20 (!) 97/55 97 % 04/09/21 1400  80 19 (!) 87/58 97 % 04/09/21 1330  80 23 (!) 100/52 96 % 04/09/21 1230  84 25 101/82 98 % 21 1200 99.1 °F (37.3 °C) 89 29 103/63 96 % 21 1130  (!) 114 29 104/70 99 % 21 1100  96 23 (!) 115/59 95 % 21 1030  94 26 (!) 109/57 93 % 21 1000  (!) 105 24 (!) 109/94 95 % 21 0930  (!) 119 18 118/89 93 % 21 0900  85  (!) 102/56 96 % 21 0837     97 % 21 0830  95 20 (!) 106/56 99 % Temp (24hrs), Av.9 °F (37.2 °C), Min:98.6 °F (37 °C), Max:99.1 °F (37.3 °C) O2 Flow Rate (L/min): 6 l/min O2 Device: Tracheal collar Date 21 - 04/10/21 4146 04/10/21 0700 - 21 3818 Shift 7417-6734 6836-0026 24 Hour Total 4413-9128 5768-7137 24 Hour Total  
INTAKE  
I.V.(mL/kg/hr) 300(0.4) 71.8(0.1) 371.8(0.3) Levophed Volume  71.8 71.8 Volume (piperacillin-tazobactam (ZOSYN) 3.375 g in 0.9% sodium chloride (MBP/ADV) 100 mL MBP) 200  200 Volume (magnesium sulfate 1 g/100 ml IVPB (premix or compounded)) 100  100 NG/GT 1760 047 9747 Water Flush Volume (mL) (PEG/Gastrostomy Tube) 150 75 225 Medication Volume (PEG/Gastrostomy Tube) 300  300 Intake (ml) (PEG/Gastrostomy Tube)  Shift Total(mL/kg) 0666(63.6) 421.8(7.2) 2031. 8(34.9) OUTPUT Urine(mL/kg/hr) 1449(4.2) 100(0.1) 2050(1.5) Urine Voided 1250  1250 Urine Output (mL) (External Female Catheter 21) 700 100 800 Stool Stool Occurrence(s) 2 x  2 x Shift Total(mL/kg) 1950(33.5) 100(1.7) [id number]) NET -340 321.8 -18.2 Weight (kg) 58.2 58.2 58.2 58.2 58.2 58.2 General appearance - No acute distress. Eyes open. Chest - Trach collar in place, pt breathing independently. Improved rhonchi and coarse breath sounds bilaterally, symmetric air entry Heart - normal rate, regular rhythm, normal S1, S2, no murmurs, rubs, clicks or gallops, Abdomen - soft, nontender, mildly distended, +BS,no rebound or guarding Neurological - Awake, eyes open Extremities - No LE edema, unna boots in place. Data Review: CBC: 
Recent Labs 04/10/21 
4074 04/09/21 
0408 04/08/21 
0410 WBC 5.7 6.6 7.7 HGB 9.1* 9.7* 8.0*  
HCT 30.0* 30.4* 25.2*  
 329 266 Metabolic Panel: 
Recent Labs 04/10/21 
9904 04/09/21 
0408 04/08/21 
0410  143 143  
K 3.2* 3.4* 3.2*  
* 107 109* CO2 30 26 25 BUN 12 13 11 CREA 0.70 0.77 0.64 * 119* 113* CA 9.2 9.3 8.9 MG 2.2 1.9 2.2 PHOS 4.0 4.2 3.9 Micro: 
Lab Results Component Value Date/Time Culture result: MRSA NOT PRESENT 04/03/2021 02:11 PM  
 Culture result:  04/03/2021 02:11 PM  
  Screening of patient nares for MRSA is for surveillance purposes and, if positive, to facilitate isolation considerations in high risk settings. It is not intended for automatic decolonization interventions per se as regimens are not sufficiently effective to warrant routine use. Culture result: HEAVY PSEUDOMONAS AERUGINOSA (A) 04/03/2021 03:46 AM  
 Culture result: MODERATE PROVIDENCIA STUARTII (A) 04/03/2021 03:46 AM  
 
Imaging: 
Ct Head Wo Cont Result Date: 3/30/2021 EXAM: CT HEAD WO CONT INDICATION: seizures COMPARISON: None. CONTRAST: None. TECHNIQUE: Unenhanced CT of the head was performed using 5 mm images. Brain and bone windows were generated. Coronal and sagittal reformats. CT dose reduction was achieved through use of a standardized protocol tailored for this examination and automatic exposure control for dose modulation. FINDINGS: There is an incidental cavum septum pellucidum. There is mild atrophy and compensatory dilatation of the ventricles. Areas of chronic encephalomalacia are seen in the right frontal lobe, right parietal lobe, and left frontal lobe. There are incidental bilateral basal ganglia calcifications. There is no intracranial hemorrhage, extra-axial collection, or mass effect. The basilar cisterns are open. No CT evidence of acute infarct.  The bone windows demonstrate no abnormalities. The visualized portions of the paranasal sinuses and mastoid air cells are clear. Significant chronic encephalomalacia in the right frontal lobe, right parietal lobe, and left frontal lobe. Xr Chest [facility] Result Date: 3/30/2021 EXAM: XR CHEST PORT HISTORY: trach exchange. COMPARISON: 3/30/2021 FINDINGS: Portable AP. A tracheostomy tube is in place. The heart is normal size. There is diffuse interstitial prominence again seen likely related to edema. No pleural effusion or pneumothorax. Tracheostomy tube in appropriate position. Unchanged diffuse interstitial prominence likely related to edema. Xr Chest [facility] Result Date: 3/30/2021 EXAM: XR CHEST PORT HISTORY: Chest pain. COMPARISON: 4/1/2020 FINDINGS: Portable AP. A tracheostomy tube is in place. The heart is normal size. There is unchanged diffuse interstitial prominence likely related to edema. No focal consolidation, pleural effusion, or pneumothorax. No significant interval change. Medications reviewed Current Facility-Administered Medications Medication Dose Route Frequency  potassium bicarb-citric acid (EFFER-K) tablet 40 mEq  40 mEq Oral DAILY  furosemide (LASIX) injection 20 mg  20 mg IntraVENous TID  albuterol-ipratropium (DUO-NEB) 2.5 MG-0.5 MG/3 ML  3 mL Nebulization Q6H RT  
 acetylcysteine (MUCOMYST) 100 mg/mL (10 %) nebulizer solution 400 mg  4 mL Nebulization PRN  
 acetylcysteine (MUCOMYST) 100 mg/mL (10 %) nebulizer solution 400 mg  4 mL Nebulization Q6H RT  
 Phenytoin - Please HOLD Tube Feed for 1 hour before and 1 hour after Tube Feed   1 Each Other TID  midodrine (PROAMATINE) tablet 15 mg  15 mg Oral Q8H  
 LORazepam (ATIVAN) injection 0.5 mg  0.5 mg IntraVENous Q6H PRN  
 oxyCODONE IR (ROXICODONE) tablet 5 mg  5 mg Per G Tube Q6H  
 naloxone (NARCAN) injection 0.4 mg  0.4 mg IntraVENous EVERY 2 MINUTES AS NEEDED  
 glycopyrrolate (ROBINUL) tablet 1 mg  1 mg Oral TID  morphine injection 1 mg  1 mg IntraVENous Q4H PRN  phenytoin (DILANTIN) 100 mg/4 mL oral suspension 100 mg  100 mg PEG Tube Q8H  
 enoxaparin (LOVENOX) injection 40 mg  40 mg SubCUTAneous Q24H  
 alteplase (CATHFLO) 1 mg in sterile water (preservative free) 1 mL injection  1 mg InterCATHeter PRN  
 guaiFENesin (ROBITUSSIN) 100 mg/5 mL oral liquid 200 mg  200 mg Oral Q6H PRN  
 sodium chloride (NS) flush 5-40 mL  5-40 mL IntraVENous Q8H  
 sodium chloride (NS) flush 5-40 mL  5-40 mL IntraVENous PRN  
 acetaminophen (TYLENOL) tablet 650 mg  650 mg Oral Q6H PRN Or  
 acetaminophen (TYLENOL) suppository 650 mg  650 mg Rectal Q6H PRN  
 sodium chloride (NS) flush 5-40 mL  5-40 mL IntraVENous PRN  
 levETIRAcetam (KEPPRA) 750 mg in 0.9% sodium chloride 100 mL IVPB  750 mg IntraVENous BID  
 0.9% sodium chloride infusion 250 mL  250 mL IntraVENous PRN  pantoprazole (PROTONIX) 40 mg in 0.9% sodium chloride 10 mL injection  40 mg IntraVENous Q24H  
 alteplase (CATHFLO) 1 mg in sterile water (preservative free) 1 mL injection  1 mg InterCATHeter PRN Signed: 
 Henrietta Lima MD 
 Resident, Family Medicine Attending note: Attending note to follow. ..

## 2021-04-10 NOTE — PROGRESS NOTES
1pm:  CM attempted to contact Mack Resendez at patient's group home. Unable to reach her; voicemail left. Awaiting a return call. Hayder Vuong LCSW

## 2021-04-10 NOTE — PROGRESS NOTES
Bedside and Verbal shift change report given to Kei (oncoming nurse) by Lena Bo (offgoing nurse). Report included the following information SBAR, Intake/Output, MAR, Recent Results, Cardiac Rhythm NSR/ Sinus tach and Alarm Parameters . Primary Nurse Lacie Sierra, KLAUDIA and Rosa Maria Steen RN performed a dual skin assessment on this patient Impairment noted- see wound doc flow sheet Rolf score is 13 
 
0800 Assessment complete. Meds given. Tube feeds as scheduled throughout the day. Mouth care and turns q2h. Mechelle care included twice during shift for incontinent episodes. CHG complete during transfer time. Pt weaned off trach collar today by RT. Pt placed on medical air for humidification. 1800 TRANSFER - OUT REPORT: 
 
Verbal report given to Tamica(name) kylee Fraser  being transferred to [facility](unit) for routine progression of care Report consisted of patients Situation, Background, Assessment and  
Recommendations(SBAR). Information from the following report(s) SBAR was reviewed with the receiving nurse. Lines: PICC Double Lumen 04/01/21 Left;Brachial (Active) Central Line Being Utilized Yes 04/10/21 1600 Criteria for Appropriate Use Limited/no vessel suitable for conventional peripheral access 04/10/21 1600 Site Assessment Clean, dry, & intact 04/10/21 1600 Phlebitis Assessment 0 04/10/21 1600 Infiltration Assessment 0 04/10/21 1600 Arm Circumference (cm) 31 cm 04/01/21 1648 Date of Last Dressing Change 04/07/21 04/10/21 1600 Dressing Status Clean, dry, & intact 04/10/21 1600 Action Taken Open ports on tubing capped 04/10/21 1600 External Catheter Length (cm) 0 centimeters 04/10/21 1200 Dressing Type Disk with Chlorhexadine gluconate (CHG); Transparent 04/10/21 1600 Hub Color/Line Status Purple; Infusing 04/10/21 1600 Positive Blood Return (Site #1) Yes 04/10/21 1600 Hub Color/Line Status Red; Infusing 04/10/21 1600 Positive Blood Return (Site #2) Yes 04/10/21 1600 Alcohol Cap Used Yes 04/10/21 1600 Opportunity for questions and clarification was provided. Patient transported with: 
 Registered Nurse

## 2021-04-10 NOTE — PROGRESS NOTES
1845- 
TRANSFER - IN REPORT: 
 
Verbal report received from [facility] RN(name) on Cha Gray  being received from ICU (unit) for routine progression of care Report consisted of patients Situation, Background, Assessment and  
Recommendations(SBAR). Information from the following report(s) SBAR, Kardex and Recent Results was reviewed with the receiving nurse. Opportunity for questions and clarification was provided. Assessment completed upon patients arrival to unit and care assumed. .. Bedside and Verbal shift change report given to Jack Crowley RN  (oncoming nurse) by Elza Christie RN  (offgoing nurse). Report included the following information SBAR, Kardex and Accordion.

## 2021-04-11 NOTE — PROGRESS NOTES
Bedside and Verbal shift change report given to Luz Vergara (oncoming nurse) by Sari López (offgoing nurse). Report included the following information SBAR, Kardex, Med Rec Status and Cardiac Rhythm NSR. Bedside and Verbal shift change report given to Tamica (oncoming nurse) by Radha Pearl (offgoing nurse). Report included the following information SBAR, Kardex and Cardiac Rhythm NSR-ST. This patient was assisted with Intentional Toileting every 2 hours during this shift. Documentation of ambulation and output reflected on Flowsheet.

## 2021-04-11 NOTE — PROGRESS NOTES
[address] [facility] PROGRAM 
PROGRESS NOTE  
 
4/11/2021 PCP: Lance Snell NP Assessment/Plan:  
 
Lety Lubin is a 49 y.o. female with PMHx of Down Syndrome (non-verbal at baseline), S/p Trach and PEG tube, seizures, encephalomalacia, ANNE, h/o recurrent Cdiff colitis admitted for status epilepticus. 24 Hour Events: none Sepsis 2/2 VAP and UTI: On vent overnight, off today. CXR 4/2: minimal interstitial pulmonary edema versus interstitial pneumonia, CXR 4/5 worsening PNA. BCx 3/31 and rBCx 4/2 NG. LA wnl. UCx pseudomonas. Resp culture w/ pseudomonas and providencia stuartii. MRSA negative. Completed course Zosyn to cover VAP and UTI. -Daily CBC Acute respiratory failure/trach dependence: Trach placed due to hx of seizures, aspiration PNA, and previous intubation. Pt off pressors, vent. 
-Continue off vent as tolerated- currently on O2 via trach collar, wean as tolerated 
-Will set up home O2 in preparation for pt to discharge back to group home 
-Group home unable to accept pt unless weaned to 4L O2 
-Oxy 5mg Q6h scheduled, ativan 0.5mg Q6h PRN, morphine 1mg Q4h PRN 
-Robinul TID, mucomyst nebs Q6h, duonebs Q6h 
-Lasix 20mg IV TID Hypotension: Likely 2/2 sedatives. S/p levo gtt 4/7 AM. 
-Midodrine 15mg TID 
-Strict I/Os Seizures: Pt admitted in status epilepticus. CT head chronic encephalomalacia. Phenytoin level supra-therapeutic on admission. F/w neuro Dr. Sharron Hull. EEG w/ no evidence of seizure activity. Phenytoin now sub-therapeutic.  
-Per neurology: appreciate recs 
 -Phenytoin 100mg q8 (4/2) (home dose 125mg q8) -Keppra 750mg IV BID 
            -STOP lacosamide  
-Phenytoin level therapeutic when corrected for albumin, stable 7 days 
-Seizure and fall precautions   
 
Anemia of chronic disease in setting of hx of GI bleed: POA Hgb 10.1 (b/l 13) > 6.3.  Hx of GI bleed, admission 3/2021 to [address], EGD & Colonoscopy at that time revealed gastritis and colitis. FOBT neg. Possibly 2/2 hemodilution. S/p 1u pRBC. Iron studies suggest ACD. -Per GI no active bleed/ no intervention at this time 
-Protonix 40mg IV daily 
-Daily CBC 
 
H/o recurrent C Diff s/p fecal transplant. Fecal WBC neg. VICK - resolved. POA Cr 1.24 (BL 0.5). ATN and UTI 
-Daily CMP 
-HOLD home Torsemide 40mg 
  
Hyponatremia - resolved: Likely 2/2 IVVD. POA Na 130.  
-Daily CMP 
  
Hyperphosphatemia- resolved:  POA P 5.6. Improved with IVF 
-Daily phos 
  Thrombocytosis- resolved: Likely 2/2 IVVD as resolved after fluids. POA plt 584.  
-Daily CBC 
  
Elevated ALP, AST in setting of hepatic steatosis - resolved: Chronic. POA , AST 60.  
  
S/p PEG-tube:  
-Continue tube feeds  
  
Chronic edema: Home Torsemide 40mg daily. F/w Nephrology OP. S/p lasix 10mg IV x1 
-HOLD home Torsemide due to hypotension 
  
GERD: stable. -Protonix 40mg IV daily  
 
  
FEN/GI -NG tube feeds Activity - Activity w/assitance DVT prophylaxis - Lovenox GI prophylaxis - Protonix Fall prophylaxis - Fall precautions ordered Code Status - Full. Discussed with Caregiver Next of Kin Name and [address], mother/ [phone]. Errol Guerra, group home . López Lehman MD 
Family Medicine Resident Subjective:  
 
Pt has been off vent, satting well on O2 via trach collar. Patient with eyes wide open, not following commands, not alert. Objective:  
Physical examination Patient Vitals for the past 24 hrs: 
 Temp Pulse Resp BP SpO2  
04/11/21 0721  (!) 101     
04/11/21 0718     96 % 04/11/21 0600 98.6 °F (37 °C) (!) 112 20 118/66 96 % 04/11/21 0004     93 % 04/10/21 2354    (!) 143/69   
04/10/21 2348 98.2 °F (36.8 °C) (!) 108 25 (!) 163/106 92 % 04/10/21 2302  72     
04/10/21 1949 98.1 °F (36.7 °C) 86 12 97/62 93 % 04/10/21 1852 98 °F (36.7 °C) 100 19 93/67 93 % 04/10/21 1840 97.3 °F (36.3 °C) (!) 101 24 93/63 93 % 04/10/21 1800  (!) 130 (!) 34 (!) 149/63 96 % 04/10/21 1753  (!) 154 (!) 44  95 % 04/10/21 1752  (!) 152 (!) 36  96 % 04/10/21 1751  (!) 156 (!) 40  96 % 04/10/21 1750  (!) 149 30  97 % 04/10/21 1749  (!) 152 (!) 39  97 % 04/10/21 1700  74 16 117/61 93 % 04/10/21 1614     94 % 04/10/21 1600 98.1 °F (36.7 °C) 89 19 109/69 97 % 04/10/21 1518  97  99/61   
04/10/21 1500  95 17 99/61 94 % 04/10/21 1400  (!) 111 15 (!) 101/58 93 % 04/10/21 1337     94 % 04/10/21 1300  (!) 129 28 (!) 141/95 (!) 89 % 04/10/21 1230  96 13 106/70 93 % 04/10/21 1200 99.7 °F (37.6 °C) 85 14 119/61 96 % 04/10/21 1130  91 13 (!) 100/57 95 % 04/10/21 1113     95 % 04/10/21 1100  88 23 98/65 94 % 04/10/21 1030  98 23 94/60 91 % 04/10/21 1000  (!) 110 14 (!) 112/56 98 % 04/10/21 0930  (!) 145 (!) 34 126/82 95 % 04/10/21 0900 99.9 °F (37.7 °C) (!) 147 (!) 43 123/78 100 % 04/10/21 0830  80 18 (!) 93/57 96 % 04/10/21 0800  90 21  98 % Temp (24hrs), Av.5 °F (36.9 °C), Min:97.3 °F (36.3 °C), Max:99.9 °F (37.7 °C) O2 Flow Rate (L/min): 8 l/min(air) O2 Device: Tracheal collar, Humidifier Date 04/10/21 0700 - 21 7593 21 - 21 8958 Shift 5808-0296 2248-0587 24 Hour Total 5464-5942 0335-8189 24 Hour Total  
INTAKE  
I.V.(mL/kg/hr) 135(0.2)  135(0.1) I.V. 135  135 NG/GT -[id number] Water Flush Volume (mL) (PEG/Gastrostomy Tube) 95 425 520 Medication Volume (PEG/Gastrostomy Tube) 145 120 265 Intake (ml) (PEG/Gastrostomy Tube)  Shift Total(mL/kg) [id number]) [id number]) [id number])     
OUTPUT Urine(mL/kg/hr) 650(0.9) 300(0.4) 950(0.6) Urine Occurrence(s) 1 x 1 x 2 x Urine Output (mL) (External Female Catheter 21) 650 300 950 Emesis/NG output 0 0 0 Output (ml) (PEG/Gastrostomy Tube) 0 0 0 Stool Stool Occurrence(s) 1 x  1 x Shift Total(mL/kg) 650(11.2) 300(4.9) 950(15.5)  467 9899 Weight (kg) 58.2 61.3 61.3 61.3 61.3 61.3 General appearance - No acute distress. Eyes open. Chest - Trach collar in place, pt breathing independently. Improved rhonchi and coarse breath sounds bilaterally, symmetric air entry Heart - normal rate, regular rhythm, normal S1, S2, no murmurs, rubs, clicks or gallops, Abdomen - soft, nontender, mildly distended, +BS,no rebound or guarding Neurological - Awake, eyes open Extremities - Trace LE edema, unna boots in place. Data Review: CBC: 
Recent Labs 04/11/21 
3262 04/10/21 
0542 04/09/21 
0408 WBC 4.9 5.7 6.6 HGB 9.1* 9.1* 9.7* HCT 28.7* 30.0* 30.4*  322 329 Metabolic Panel: 
Recent Labs 04/11/21 
2698 04/10/21 
0542 04/09/21 
0408  144 143  
K 3.0* 3.2* 3.4*  
 109* 107 CO2 32 30 26 BUN 17 12 13 CREA 0.65 0.70 0.77 GLU 90 133* 119* CA 9.6 9.2 9.3 MG 2.3 2.2 1.9 PHOS 5.0* 4.0 4.2 Micro: 
Lab Results Component Value Date/Time Culture result: MRSA NOT PRESENT 04/03/2021 02:11 PM  
 Culture result:  04/03/2021 02:11 PM  
  Screening of patient nares for MRSA is for surveillance purposes and, if positive, to facilitate isolation considerations in high risk settings. It is not intended for automatic decolonization interventions per se as regimens are not sufficiently effective to warrant routine use. Culture result: HEAVY PSEUDOMONAS AERUGINOSA (A) 04/03/2021 03:46 AM  
 Culture result: MODERATE PROVIDENCIA STUARTII (A) 04/03/2021 03:46 AM  
 
Imaging: 
Ct Head Wo Cont Result Date: 3/30/2021 EXAM: CT HEAD WO CONT INDICATION: seizures COMPARISON: None. CONTRAST: None. TECHNIQUE: Unenhanced CT of the head was performed using 5 mm images. Brain and bone windows were generated. Coronal and sagittal reformats.  CT dose reduction was achieved through use of a standardized protocol tailored for this examination and automatic exposure control for dose modulation. FINDINGS: There is an incidental cavum septum pellucidum. There is mild atrophy and compensatory dilatation of the ventricles. Areas of chronic encephalomalacia are seen in the right frontal lobe, right parietal lobe, and left frontal lobe. There are incidental bilateral basal ganglia calcifications. There is no intracranial hemorrhage, extra-axial collection, or mass effect. The basilar cisterns are open. No CT evidence of acute infarct. The bone windows demonstrate no abnormalities. The visualized portions of the paranasal sinuses and mastoid air cells are clear. Significant chronic encephalomalacia in the right frontal lobe, right parietal lobe, and left frontal lobe. Xr Chest Tiny [place] Result Date: 3/30/2021 EXAM: XR CHEST PORT HISTORY: trach exchange. COMPARISON: 3/30/2021 FINDINGS: Portable AP. A tracheostomy tube is in place. The heart is normal size. There is diffuse interstitial prominence again seen likely related to edema. No pleural effusion or pneumothorax. Tracheostomy tube in appropriate position. Unchanged diffuse interstitial prominence likely related to edema. Xr Chest Tiny [place] Result Date: 3/30/2021 EXAM: XR CHEST PORT HISTORY: Chest pain. COMPARISON: 4/1/2020 FINDINGS: Portable AP. A tracheostomy tube is in place. The heart is normal size. There is unchanged diffuse interstitial prominence likely related to edema. No focal consolidation, pleural effusion, or pneumothorax. No significant interval change. Medications reviewed Current Facility-Administered Medications Medication Dose Route Frequency  potassium bicarb-citric acid (EFFER-K) tablet 40 mEq  40 mEq Oral DAILY  furosemide (LASIX) injection 20 mg  20 mg IntraVENous TID  albuterol-ipratropium (DUO-NEB) 2.5 MG-0.5 MG/3 ML  3 mL Nebulization Q6H RT  
 acetylcysteine (MUCOMYST) 100 mg/mL (10 %) nebulizer solution 400 mg  4 mL Nebulization PRN  
 acetylcysteine (MUCOMYST) 100 mg/mL (10 %) nebulizer solution 400 mg  4 mL Nebulization Q6H RT  
 Phenytoin - Please HOLD Tube Feed for 1 hour before and 1 hour after Tube Feed   1 Each Other TID  midodrine (PROAMATINE) tablet 15 mg  15 mg Oral Q8H  
 LORazepam (ATIVAN) injection 0.5 mg  0.5 mg IntraVENous Q6H PRN  
 oxyCODONE IR (ROXICODONE) tablet 5 mg  5 mg Per G Tube Q6H  
 naloxone (NARCAN) injection 0.4 mg  0.4 mg IntraVENous EVERY 2 MINUTES AS NEEDED  
 glycopyrrolate (ROBINUL) tablet 1 mg  1 mg Oral TID  morphine injection 1 mg  1 mg IntraVENous Q4H PRN  phenytoin (DILANTIN) 100 mg/4 mL oral suspension 100 mg  100 mg PEG Tube Q8H  
 enoxaparin (LOVENOX) injection 40 mg  40 mg SubCUTAneous Q24H  
 alteplase (CATHFLO) 1 mg in sterile water (preservative free) 1 mL injection  1 mg InterCATHeter PRN  
 guaiFENesin (ROBITUSSIN) 100 mg/5 mL oral liquid 200 mg  200 mg Oral Q6H PRN  
 sodium chloride (NS) flush 5-40 mL  5-40 mL IntraVENous Q8H  
 sodium chloride (NS) flush 5-40 mL  5-40 mL IntraVENous PRN  
 acetaminophen (TYLENOL) tablet 650 mg  650 mg Oral Q6H PRN Or  
 acetaminophen (TYLENOL) suppository 650 mg  650 mg Rectal Q6H PRN  
 sodium chloride (NS) flush 5-40 mL  5-40 mL IntraVENous PRN  
 levETIRAcetam (KEPPRA) 750 mg in 0.9% sodium chloride 100 mL IVPB  750 mg IntraVENous BID  
 0.9% sodium chloride infusion 250 mL  250 mL IntraVENous PRN  pantoprazole (PROTONIX) 40 mg in 0.9% sodium chloride 10 mL injection  40 mg IntraVENous Q24H  
 alteplase (CATHFLO) 1 mg in sterile water (preservative free) 1 mL injection  1 mg InterCATHeter PRN Signed: 
 Claudean Ruiz, MD 
 Resident, Family Medicine Attending note: Attending note to follow. ..

## 2021-04-11 NOTE — PROGRESS NOTES
Bedside and Verbal shift change report given to FLAQUITO RN  (oncoming nurse) by Bruno Birch RN  (offgoing nurse). Report included the following information SBAR, Kardex and Recent Results. .. This patient was assisted with Intentional Toileting every 2 hours during this shift. Documentation of ambulation and output reflected on Flowsheet. .. This patient was assisted with Intentional Toileting every 2 hours during this shift. Documentation of ambulation and output reflected on Flowsheet. Bedside and Verbal shift change report given to CHRISTINA RN  (oncoming nurse) by Romaine Levy RN  (offgoing nurse). Report included the following information SBAR, Kardex and Recent Results.

## 2021-04-11 NOTE — PROGRESS NOTES
Ban Henriquez (group home nurse) states that she can return on 4L O2 through the trach. She states that she will need to be sent back with O2 parameters should the O2 need to be uptitrated. If patient cannot be maintained or weaned to 4L then she would need to go to [facility] where she can continue to be weaned. NOTE: If she goes back to the group home they use [facility] if she needs O2.    
 
Ban Henriquez can be reached at [phone]. Shanda Hayes D.O. Family Medicine Resident PGY2

## 2021-04-11 NOTE — PROGRESS NOTES
11:44 AM 
CM received call back from [facility] from the group home. She confirmed pt would need to be weaned to 4L of O2 before they can accept her back. Zohra Hunter can be reached on Monday at [phone] or [phone].  
 
10:38 AM 
CM reviewed EMR and noted MD's discussion with group home nurse-- pt can return on 4L to the group home, if can't be weaned to 4L would need an LTAC to assist with weaning. CM will continue to follow. Tony Holt 9:16 AM 
CM noted CM consult to work on home O2. CM called and spoke to Ms. Jake Beltran at the pt's group home to discuss orders. She is checking with the nurse to see if they could accept pt at 6L. Gave contact number for Ms. Jake Beltran to call back once she finds out what O2 they can accept. Of note, the group home works with [facility] for O2 needs. Also pt will need a home oximetry test and orders before CM can work on obtaining home O2 for pt. Tony Holt

## 2021-04-12 NOTE — PROGRESS NOTES
Interdisciplinary Rounds were completed on the patient concurrent with CHF rounds. Patient's needs for facilitating discharge were discussed as were potential discharge medications, needs, equipment, follow up appointments, and education. Be Smart Leave Smart leaflet left for primary RN to provide for patient in anticipation of discharge.

## 2021-04-12 NOTE — PROGRESS NOTES
Spiritual Care Assessment/Progress Note [address] 
 
 
NAME: Parish Alicea      MRN: [id number] AGE: 49 y.o. SEX: female Amish Affiliation: No preference Language: [place] 4/12/2021     Total Time (in minutes): 4 Spiritual Assessment begun in SF 3 PROG CARE TELE 1 through conversation with: 
  
    []Patient        [] Family    [] Friend(s) Reason for Consult: Palliative Care, Initial/Spiritual Assessment Spiritual beliefs: (Please include comment if needed) 
   [] Identifies with a tj tradition:     
   [] Supported by a tj community:        
   [] Claims no spiritual orientation:       
   [] Seeking spiritual identity:            
   [] Adheres to an individual form of spirituality:       
   [x] Not able to assess:                   
 
    
Identified resources for coping:  
   [] Prayer                           
   [] Music                  [] Guided Imagery 
   [] Family/friends                 [] Pet visits [] Devotional reading                         [x] Unknown 
   [] Other:                                          
 
 
Interventions offered during this visit: (See comments for more details) Patient Interventions: Other (comment)(Unable to assess) Plan of Care: 
 
 [] Support spiritual and/or cultural needs  
 [] Support AMD and/or advance care planning process    
 [] Support grieving process 
 [] Coordinate Rites and/or Rituals  
 [] Coordination with community clergy [] No spiritual needs identified at this time 
 [] Detailed Plan of Care below (See Comments)  [] Make referral to Music Therapy 
[] Make referral to Pet Therapy    
[] Make referral to Addiction services 
[] Make referral to [facility] 
[] Make referral to Spiritual Care Partner 
[] No future visits requested       
[x] Follow up upon further referrals Comments:  responded to an RRT for  Tato on the [facility] unit.  Mrs. Jonh Muñiz was being assessed by numerous providers at the time of the 's visit.  remained present, providing a supportive presence outside of . Oneal Lund door, as the team continued providing care. 's are available for further support upon referral 
Leda Ruiz.  Paging Service: [phone])

## 2021-04-12 NOTE — PROGRESS NOTES
Accessed pt chart to assist assigned nurse with medication return. 2010: This nurse was a witness to the return of scheduled Roxicodone 5 mg tab by Ashley Nicholson which she pulled on 4/10/2021 at approximately 1700, but was not given to the pt.

## 2021-04-12 NOTE — PROGRESS NOTES
4/12/2021  
4:44 PM 
EMR reviewed, pt now on Vancomycin, medical following for possible sepsis. Home O2 order open. Will follow and assist. 
Claudeen Nation, BS  11:39 AM 
Pt discussed in IDR rounds, per [facility] pt now on 3L O2 and stable for DC to group home, order for home O2 placed in CC. Before CM could contact group home to verify DC, [facility] was called, providers at bedside. Will follow and assist. 
Claudeen Nation, BS

## 2021-04-12 NOTE — PROGRESS NOTES
[address] Dosing Services: Antimicrobial Stewardship Progress Note Consult for antibiotic dosing of Vancomycin by Dr. Levy Pharmacist reviewed antibiotic appropriateness for 50 yo female for indication of Sepsis/Febrile Day of Therapy: 1 (was previously on Vancomycin on this admission) Plan: 
Vancomycin therapy: 
Loading dose: Vancomycin 1250 mg IV x1 dose now Maintenance dose: Vancomycin 1000 mg IV q24h Dose calculated to approximate a therapeutic trough of 15-20 mcg/mL. Last trough level / Plan for level: after 3rd dose Pharmacy to follow daily and will make changes to dose and/or frequency based on clinical status Date Dose & Interval Measured (mcg/mL) Extrapolated (mcg/mL) ? ? ? ?  
? ? ? ?  
? ? ? ? Other Antimicrobial 
(not dosed by pharmacist) Zosyn 3.375 gm IV q8hr Cultures 4/3: Nares: neg final 
4/3: Sputum: pseudomonas/providencii sens to zosyn final 
4/2: Blood: NG final 
4/2: Urine: pseudomonas sens to zosyn final 
3/31: Blood: NG final  
Serum Creatinine Lab Results Component Value Date/Time Creatinine 1.25 (H) 04/12/2021 11:22 AM  
   
Creatinine Clearance Estimated Creatinine Clearance: 42.8 mL/min (A) (based on SCr of 1.25 mg/dL (H)). Procalcitonin Lab Results Component Value Date/Time Procalcitonin <0.05 04/01/2020 02:06 AM  
 
  
Temp  
(!) 100.8 °F (38.2 °C) WBC Lab Results Component Value Date/Time WBC 14.5 (H) 04/12/2021 11:22 AM  
   
H/H Lab Results Component Value Date/Time HGB 10.9 (L) 04/12/2021 11:22 AM  
  
Platelets Lab Results Component Value Date/Time PLATELET 874 57/32/8649 11:22 AM  
 
  
 
Pharmacist: Chapito Crawford Contact information: [phone]

## 2021-04-12 NOTE — PROGRESS NOTES
Problem: Falls - Risk of 
Goal: *Absence of Falls Description: Document Nancy Hennessy Fall Risk and appropriate interventions in the flowsheet. Outcome: Progressing Towards Goal 
Note: Fall Risk Interventions: 
  
 
Mentation Interventions: Adequate sleep, hydration, pain control, Bed/chair exit alarm, Door open when patient unattended, Increase mobility, More frequent rounding, Toileting rounds, Update white board Medication Interventions: Bed/chair exit alarm Elimination Interventions: Bed/chair exit alarm, Call light in reach, Stay With Me (per policy) Problem: Patient Education: Go to Patient Education Activity Goal: Patient/Family Education Outcome: Progressing Towards Goal 
  
Problem: Pressure Injury - Risk of 
Goal: *Prevention of pressure injury Description: Document Rolf Scale and appropriate interventions in the flowsheet. Outcome: Progressing Towards Goal 
Note: Pressure Injury Interventions: 
Sensory Interventions: Assess need for specialty bed, Float heels, Keep linens dry and wrinkle-free, Minimize linen layers, Pressure redistribution bed/mattress (bed type), Turn and reposition approx. every two hours (pillows and wedges if needed) Moisture Interventions: Absorbent underpads, Apply protective barrier, creams and emollients, Check for incontinence Q2 hours and as needed, Internal/External urinary devices Activity Interventions: Increase time out of bed, Pressure redistribution bed/mattress(bed type), PT/OT evaluation Mobility Interventions: HOB 30 degrees or less, Pressure redistribution bed/mattress (bed type), PT/OT evaluation, Turn and reposition approx. every two hours(pillow and wedges) Nutrition Interventions: Offer support with meals,snacks and hydration Friction and Shear Interventions: HOB 30 degrees or less, Minimize layers Problem: Patient Education: Go to Patient Education Activity Goal: Patient/Family Education Outcome: Progressing Towards Goal

## 2021-04-12 NOTE — PROGRESS NOTES
[facility] [place] 
PROGRESS NOTE  
 
4/12/2021 PCP: Promise Carson, NP Assessment/Plan:  
 
Calin Hunt is a 49 y.o. female with PMHx of Down Syndrome (non-verbal at baseline), S/p Trach and PEG tube, seizures, encephalomalacia, ANNE, h/o recurrent Cdiff colitis admitted for status epilepticus. 24 Hour Events: none Sepsis 2/2 VAP and UTI: CXR 4/5 worsening PNA. BCx 3/31 and rBCx 4/2 NG. LA wnl. UCx pseudomonas. Resp culture w/ pseudomonas and providencia stuartii. MRSA negative. Completed course Zosyn to cover VAP and UTI. -Daily CBC Acute respiratory failure/trach dependence: Trach placed due to hx of seizures, aspiration PNA, and previous intubation. Pt off pressors, vent. 
-Continue off vent as tolerated- currently on O2 via trach collar, wean as tolerated 
-Group home unable to accept pt unless weaned to 4L O2 
-Oxy 5mg Q6h scheduled, ativan 0.5mg Q6h PRN, morphine 1mg Q4h PRN 
-Robinul TID, mucomyst nebs Q6h, duonebs Q6h 
-Lasix 20mg IV TID Hypotension: Likely 2/2 sedatives. S/p levo gtt 4/7 AM. 
-Midodrine 15mg TID 
-Strict I/Os Seizures: Pt admitted in status epilepticus. CT head chronic encephalomalacia. Phenytoin level supra-therapeutic on admission. F/w neuro Dr. Jim Pittman. EEG w/ no evidence of seizure activity. Phenytoin now sub-therapeutic.  
-Per neurology: appreciate recs 
 -Phenytoin 100mg q8 (4/2) (home dose 125mg q8) -Keppra 750mg IV BID 
            -STOP lacosamide  
-Phenytoin level therapeutic when corrected for albumin, stable 7 days 
-Seizure and fall precautions   
 
Anemia of chronic disease in setting of hx of GI bleed: POA Hgb 10.1 (b/l 13) > 6.3. Hx of GI bleed, admission 3/2021 to [address] [place] [address], EGD & Colonoscopy at that time revealed gastritis and colitis. FOBT neg. Possibly 2/2 hemodilution. S/p 1u pRBC. Iron studies suggest ACD.   
-Per GI no active bleed/ no intervention at this time 
-Protonix 40mg IV daily 
-Daily CBC 
 
H/o recurrent C Diff s/p fecal transplant. Fecal WBC neg. VICK - resolved. POA Cr 1.24 (BL 0.5). ATN and UTI 
-Daily CMP 
-HOLD home Torsemide 40mg 
  
Hyponatremia - resolved: Likely 2/2 IVVD. POA Na 130.  
-Daily CMP 
  
Hyperphosphatemia- resolved:  POA P 5.6. Improved with IVF 
-Daily phos 
  Thrombocytosis- resolved: Likely 2/2 IVVD as resolved after fluids. POA plt 584.  
-Daily CBC 
  
Elevated ALP, AST in setting of hepatic steatosis - resolved: Chronic. POA , AST 60.  
  
S/p PEG-tube:  
-Continue tube feeds  
  
Chronic edema: Home Torsemide 40mg daily. F/w Nephrology OP. S/p lasix 10mg IV x1 
-HOLD home Torsemide due to hypotension 
  
GERD: stable. -Protonix 40mg IV daily  
 
  
FEN/GI -NG tube feeds Activity - Activity w/assitance DVT prophylaxis - Lovenox GI prophylaxis - Protonix Fall prophylaxis - Fall precautions ordered Code Status - Full. Discussed with Caregiver Next of Kin Name and [address] Casiano, mother/ [phone]. [facility], group home . Craley Sandoval MD 
Family Medicine Resident Subjective:  
 
Pt has been off vent, satting well on O2 via trach collar. Pt currently on 8L of forced room air w/ FiO2 of 21% via the trach collar. Per nursing report pt becomes restless any time she is moved, becoming tachypneic and tachycardic. Patient with eyes wide open, not following commands, not alert. Objective:  
Physical examination Patient Vitals for the past 24 hrs: 
 Temp Pulse Resp BP SpO2  
04/12/21 0339 98.4 °F (36.9 °C) 94 16 111/72 94 % 04/11/21 2331 98.6 °F (37 °C) 86 20 112/80 98 % 04/11/21 2259  73     
04/11/21 1934 98.3 °F (36.8 °C) 90 16 (!) 105/56 92 % 04/11/21 1824  80 19 105/60 94 % 04/11/21 1512 99.1 °F (37.3 °C) 79 18 108/61 93 % 04/11/21 1417  77     
04/11/21 1409     95 % 04/11/21 1217 97.8 °F (36.6 °C) 88 18 108/66 94 % 04/11/21 0800 98 °F (36.7 °C) 99 19 100/60 98 % 04/11/21 0753 98.6 °F (37 °C) 100 16 (!) 100/57 94 % 21 0721  (!) 101     
21 0718     96 % Temp (24hrs), Av.4 °F (36.9 °C), Min:97.8 °F (36.6 °C), Max:99.1 °F (37.3 °C) O2 Flow Rate (L/min): 8 l/min O2 Device: Tracheal collar Date 21 - 21 02121 - 21 4929 Shift 4078-3084 8561-8740 24 Hour Total 5841-4939 6761-3149 24 Hour Total  
INTAKE  
P.O.  0 0     
  P. O.  0 0 NG/GT 22 [id number] Water Flush Volume (mL) (PEG/Gastrostomy Tube) 175 225 400 Medication Volume (PEG/Gastrostomy Tube) 275  275 Intake (ml) (PEG/Gastrostomy Tube)  Shift Total(mL/kg) [id number]) 5083(32.1) 8981(11.7) OUTPUT Urine(mL/kg/hr) 750(1) 500 1250 Urine Voided  Urine Occurrence(s) 3 x 0 x 3 x Emesis/NG output  0 0 Output (ml) (PEG/Gastrostomy Tube)  0 0 Stool Stool Occurrence(s) 3 x 1 x 4 x Shift Total(mL/kg) 903(54.3) 500(8.7) 1250(21.7)  789 0690 Weight (kg) 61.3 57.7 57.7 57.7 57.7 57.7 General appearance - No acute distress. Eyes open. Chest - Trach collar in place, pt breathing independently. CTAB, symmetric air entry Heart - normal rate, regular rhythm, normal S1, S2, no murmurs, rubs, clicks or gallops, Abdomen - soft, nontender, mildly distended, +BS,no rebound or guarding Neurological - Awake, eyes open Extremities - Dependent edema to BUE. Trace LE edema, unna boots in place. Data Review: CBC: 
Recent Labs 21 
5781 21 
7400 04/10/21 
0542 WBC 5.6 4.9 5.7 HGB 9.5* 9.1* 9.1*  
HCT 30.4* 28.7* 30.0*  
 299 322 Metabolic Panel: 
Recent Labs 21 
2627 21 
8062 04/10/21 
0542  143 144  
K 4.0 3.0* 3.2*  
 105 109* CO2 30 32 30 BUN 21* 17 12 CREA 0.86 0.65 0.70 * 90 133* CA 9.2 9.6 9.2 MG 2.4 2.3 2.2 PHOS 5.6* 5.0* 4.0 Micro: 
Lab Results Component Value Date/Time Culture result: MRSA NOT PRESENT 04/03/2021 02:11 PM  
 Culture result:  04/03/2021 02:11 PM  
  Screening of patient nares for MRSA is for surveillance purposes and, if positive, to facilitate isolation considerations in high risk settings. It is not intended for automatic decolonization interventions per se as regimens are not sufficiently effective to warrant routine use. Culture result: HEAVY PSEUDOMONAS AERUGINOSA (A) 04/03/2021 03:46 AM  
 Culture result: MODERATE PROVIDENCIA STUARTII (A) 04/03/2021 03:46 AM  
 
Imaging: 
Ct Head Wo Cont Result Date: 3/30/2021 EXAM: CT HEAD WO CONT INDICATION: seizures COMPARISON: None. CONTRAST: None. TECHNIQUE: Unenhanced CT of the head was performed using 5 mm images. Brain and bone windows were generated. Coronal and sagittal reformats. CT dose reduction was achieved through use of a standardized protocol tailored for this examination and automatic exposure control for dose modulation. FINDINGS: There is an incidental cavum septum pellucidum. There is mild atrophy and compensatory dilatation of the ventricles. Areas of chronic encephalomalacia are seen in the right frontal lobe, right parietal lobe, and left frontal lobe. There are incidental bilateral basal ganglia calcifications. There is no intracranial hemorrhage, extra-axial collection, or mass effect. The basilar cisterns are open. No CT evidence of acute infarct. The bone windows demonstrate no abnormalities. The visualized portions of the paranasal sinuses and mastoid air cells are clear. Significant chronic encephalomalacia in the right frontal lobe, right parietal lobe, and left frontal lobe. Xr Chest [facility] Result Date: 3/30/2021 EXAM: XR CHEST PORT HISTORY: trach exchange. COMPARISON: 3/30/2021 FINDINGS: Portable AP. A tracheostomy tube is in place. The heart is normal size. There is diffuse interstitial prominence again seen likely related to edema. No pleural effusion or pneumothorax. Tracheostomy tube in appropriate position. Unchanged diffuse interstitial prominence likely related to edema. Xr Chest [facility] Result Date: 3/30/2021 EXAM: XR CHEST PORT HISTORY: Chest pain. COMPARISON: 4/1/2020 FINDINGS: Portable AP. A tracheostomy tube is in place. The heart is normal size. There is unchanged diffuse interstitial prominence likely related to edema. No focal consolidation, pleural effusion, or pneumothorax. No significant interval change. Medications reviewed Current Facility-Administered Medications Medication Dose Route Frequency  levETIRAcetam (KEPPRA) oral solution 750 mg  750 mg Per G Tube BID  potassium bicarb-citric acid (EFFER-K) tablet 40 mEq  40 mEq Oral DAILY  furosemide (LASIX) injection 20 mg  20 mg IntraVENous TID  albuterol-ipratropium (DUO-NEB) 2.5 MG-0.5 MG/3 ML  3 mL Nebulization Q6H RT  
 acetylcysteine (MUCOMYST) 100 mg/mL (10 %) nebulizer solution 400 mg  4 mL Nebulization PRN  
 acetylcysteine (MUCOMYST) 100 mg/mL (10 %) nebulizer solution 400 mg  4 mL Nebulization Q6H RT  
 Phenytoin - Please HOLD Tube Feed for 1 hour before and 1 hour after Tube Feed   1 Each Other TID  midodrine (PROAMATINE) tablet 15 mg  15 mg Oral Q8H  
 LORazepam (ATIVAN) injection 0.5 mg  0.5 mg IntraVENous Q6H PRN  
 oxyCODONE IR (ROXICODONE) tablet 5 mg  5 mg Per G Tube Q6H  
 naloxone (NARCAN) injection 0.4 mg  0.4 mg IntraVENous EVERY 2 MINUTES AS NEEDED  
 glycopyrrolate (ROBINUL) tablet 1 mg  1 mg Oral TID  phenytoin (DILANTIN) 100 mg/4 mL oral suspension 100 mg  100 mg PEG Tube Q8H  
 enoxaparin (LOVENOX) injection 40 mg  40 mg SubCUTAneous Q24H  
 alteplase (CATHFLO) 1 mg in sterile water (preservative free) 1 mL injection  1 mg InterCATHeter PRN  
 guaiFENesin (ROBITUSSIN) 100 mg/5 mL oral liquid 200 mg  200 mg Oral Q6H PRN  
 sodium chloride (NS) flush 5-40 mL  5-40 mL IntraVENous Q8H  
 sodium chloride (NS) flush 5-40 mL  5-40 mL IntraVENous PRN  acetaminophen (TYLENOL) tablet 650 mg  650 mg Oral Q6H PRN Or  
 acetaminophen (TYLENOL) suppository 650 mg  650 mg Rectal Q6H PRN  
 sodium chloride (NS) flush 5-40 mL  5-40 mL IntraVENous PRN  
 0.9% sodium chloride infusion 250 mL  250 mL IntraVENous PRN  pantoprazole (PROTONIX) 40 mg in 0.9% sodium chloride 10 mL injection  40 mg IntraVENous Q24H  
 alteplase (CATHFLO) 1 mg in sterile water (preservative free) 1 mL injection  1 mg InterCATHeter PRN Signed: 
 Altagracia Pierce MD 
 Resident, Family Medicine Attending note: Attending note to follow. ..

## 2021-04-12 NOTE — PROGRESS NOTES
[facility] [facility] PROGRAM 
PROGRESS NOTE  
 
4/13/2021 PCP: Zeus Simpson NP Assessment/Plan:  
 
Tomi Lemus is a 49 y.o. female with PMHx of Down Syndrome (non-verbal at baseline), S/p Trach and PEG tube, seizures, encephalomalacia, ANNE, h/o recurrent Cdiff colitis admitted for status epilepticus. 24 Hour Events: Rapid response called 4/12 11am for tachypnea and tachycardia, pt found to be febrile w/ subsequent leukocytosis, meeting 4/4 SIRS criteria. Vanc and zosyn started. Given 500cc bolus for hypotension overnight. Sepsis 2/2 HAP: 4/4 SIRS criteria met 4/12 AM during rapid response. CXR 4/12 w/ diffuse b/l airspace disease compatible w/ PNA. Procal 0.16, d-dimer 2.04, UA wnl. CTA chest negative for PE. Pt initially dx w/ VAP and pseudomonas UTI 4/2, s/p full treatment course w/ zosyn (4/2-4/9). BCx 3/31 and rBCx 4/2 NG. MRSA negative 4/3. BCx 4/12 NGTD   
-F/u BCx - if negative for 24h can consider transition to PO abx and possible discharge 
-Vanc and zosyn started 
-Daily CBC Acute respiratory failure/trach dependence: Trach placed due to hx of seizures, aspiration PNA, and previous intubation. Pt off pressors, vent. -Pt has been on room air since 4/10 however had been charted as requiring 8L O2 via trach collar 
-Group home unable to accept pt unless weaned to 4L O2 
-Oxy 5mg Q6h scheduled, ativan 0.5mg Q6h PRN, morphine 1mg Q4h PRN 
-Robinul TID, mucomyst nebs Q6h, duonebs Q6h 
-Lasix 20mg IV TID Hypotension: Likely 2/2 sedatives. S/p levo gtt 4/7 AM. 
-Midodrine 15mg TID 
-Strict I/Os Seizures: Pt admitted in status epilepticus. CT head chronic encephalomalacia. Phenytoin level supra-therapeutic on admission. F/w neuro Dr. Ronna Huber. EEG w/ no evidence of seizure activity. 
-Per neurology: appreciate recs 
 -Phenytoin 100mg q8 (4/2) (home dose 125mg q8)  -Keppra 750mg IV BID 
            -STOP lacosamide  
-Phenytoin level therapeutic when corrected for albumin, stable 
-Seizure and fall precautions   
 
Anemia of chronic disease in setting of hx of GI bleed: POA Hgb 10.1 (b/l 13) > 6.3. Hx of GI bleed, admission 3/2021 to [address] [place] [address], EGD & Colonoscopy at that time revealed gastritis and colitis. FOBT neg. Possibly 2/2 hemodilution. S/p 1u pRBC. Iron studies suggest ACD. -Per GI no active bleed/ no intervention at this time 
-Protonix 40mg IV daily 
-Daily CBC 
 
H/o recurrent C Diff s/p fecal transplant. Fecal WBC neg. VICK - resolved. POA Cr 1.24 (BL 0.5). ATN and UTI 
-Daily CMP 
-HOLD home Torsemide 40mg 
  
Hyponatremia - resolved: Likely 2/2 IVVD. POA Na 130.  
-Daily CMP 
  
Hyperphosphatemia- resolved:  POA P 5.6. Improved with IVF 
-Daily phos 
  Thrombocytosis- resolved: Likely 2/2 IVVD as resolved after fluids. POA plt 584.  
-Daily CBC 
  
Elevated ALP, AST in setting of hepatic steatosis - resolved: Chronic. POA , AST 60.  
  
S/p PEG-tube:  
-Continue tube feeds  
  
Chronic edema: Home Torsemide 40mg daily. F/w Nephrology OP. S/p lasix 10mg IV x1 
-HOLD home Torsemide due to hypotension 
  
GERD: stable. -Protonix 40mg IV daily  
 
  
FEN/GI -NG tube feeds Activity - Activity w/assitance DVT prophylaxis - Lovenox GI prophylaxis - Protonix Fall prophylaxis - Fall precautions ordered Code Status - Full. Discussed with Caregiver Next of Kin Name and [address], mother/ [phone]. [facility], group home . Bailey Benavides MD 
Family Medicine Resident Subjective:  
 
Pt has been off vent, satting well on humidified room air via trach collar. Pt less agitated overnight. Patient with eyes wide open, not following commands, not alert. Objective:  
Physical examination Patient Vitals for the past 24 hrs: 
 Temp Pulse Resp BP SpO2  
04/13/21 0700  93     
04/13/21 0624  90 20 105/67   
04/13/21 0407 97.8 °F (36.6 °C) 70 16 100/61 100 % 04/13/21 0216    (!) 153/82   
04/13/21 0101 Lashay Juliane Bradley (!) 106/55   
21 0030    (!) 85/47   
21 0010 98.2 °F (36.8 °C) 88 20 (!) 83/42 96 % 21 2323  (!) 162     
21 2239 99.7 °F (37.6 °C)      
21 1929 98.7 °F (37.1 °C) 85 20 (!) 102/54 98 % 21 1513 99 °F (37.2 °C) (!) 120 26 138/78 97 % 21 1500  (!) 101     
21 1127  (!) 165 (!) 60  94 % 21 1120  (!) 167 (!) 60 (!) 158/90 94 % 21 1117   (!) 60    
21 1117  (!) 168 (!) 45 (!) 185/86 94 % 21 1113 (!) 100.8 °F (38.2 °C) (!) 149 (!) 45 (!) 160/56 93 % 21 0800  99     
21 0742     99 % 21 0737 98.8 °F (37.1 °C) (!) 107 20 130/62 100 % Temp (24hrs), Av °F (37.2 °C), Min:97.8 °F (36.6 °C), Max:100.8 °F (38.2 °C) O2 Flow Rate (L/min): 8 l/min O2 Device: Tracheal collar, Humidifier Date 21 - 21 3004 21 - 21 9595 Shift 0323-0106 9048-9511 24 Hour Total 2834-7028 6989-8051 24 Hour Total  
INTAKE  
P.O. 0 0 0     
  P. O. 0 0 0     
I. V.(mL/kg/hr) 30(0) 100(0.1) 130(0.1) I.V. 30  30 Volume (0.9% sodium chloride infusion 500 mL)  0 0 Volume (piperacillin-tazobactam (ZOSYN) 3.375 g in 0.9% sodium chloride (MBP/ADV) 100 mL MBP)  100 100 Volume (vancomycin (VANCOCIN) 1,000 mg in 0.9% sodium chloride 250 mL (VIAL-MATE))  0 0 NG/GT 22 [id number] Water Flush Volume (mL) (PEG/Gastrostomy Tube) 400 150 550 Medication Volume (PEG/Gastrostomy Tube)  150 150 Intake (ml) (PEG/Gastrostomy Tube)  Shift Total(mL/kg) 1000(17.3) [id number]) [id number]) OUTPUT Urine(mL/kg/hr) 400(0.6) 600(0.9) 1000(0.7) Urine Occurrence(s) 1 x 1 x 2 x Urine Output (mL) (External Female Catheter 21)  Emesis/NG output 0 0 0 Output (ml) (PEG/Gastrostomy Tube) 0 0 0 Stool Stool Occurrence(s) 1 x 1 x 2 x Shift Total(mL/kg) 400(6.9) 600(10.2) 1000(17.1)  350 950 Weight (kg) 57.7 58.6 58.6 58.6 58.6 58.6 General appearance - No acute distress. Eyes open. Chest - Trach collar in place, pt breathing independently. Vesicular breath sounds b/l, symmetric air entry Heart - normal rate, regular rhythm, normal S1, S2, no murmurs, rubs, clicks or gallops, Abdomen - soft, nontender, mildly distended, +BS,no rebound or guarding Neurological - Awake, eyes open Extremities - Dependent edema to BUE. Trace LE edema, unna boots in place. Data Review: CBC: 
Recent Labs 04/12/21 
1122 04/12/21 
0420 04/11/21 
2118 WBC 14.5* 5.6 4.9 HGB 10.9* 9.5* 9.1*  
HCT 34.5* 30.4* 28.7*  
 293 299 Metabolic Panel: 
Recent Labs 04/12/21 
1122 04/12/21 
0420 04/11/21 
2810  140 143 K HEMOLYZED,RECOLLECT REQUESTED 4.0 3.0*  
CL 99 103 105 CO2 22 30 32 BUN 21* 21* 17  
CREA 1.25* 0.86 0.65 * 114* 90  
CA 10.3* 9.2 9.6 MG HEMOLYZED,RECOLLECT REQUESTED 2.4 2.3 PHOS  --  5.6* 5.0* Micro: 
Lab Results Component Value Date/Time Culture result: NO GROWTH AFTER 13 HOURS 04/12/2021 04:55 PM  
 Culture result: MRSA NOT PRESENT 04/03/2021 02:11 PM  
 Culture result:  04/03/2021 02:11 PM  
  Screening of patient nares for MRSA is for surveillance purposes and, if positive, to facilitate isolation considerations in high risk settings. It is not intended for automatic decolonization interventions per se as regimens are not sufficiently effective to warrant routine use. Imaging: 
Ct Head Wo Cont Result Date: 3/30/2021 EXAM: CT HEAD WO CONT INDICATION: seizures COMPARISON: None. CONTRAST: None. TECHNIQUE: Unenhanced CT of the head was performed using 5 mm images. Brain and bone windows were generated. Coronal and sagittal reformats. CT dose reduction was achieved through use of a standardized protocol tailored for this examination and automatic exposure control for dose modulation.   FINDINGS: There is an incidental cavum septum pellucidum. There is mild atrophy and compensatory dilatation of the ventricles. Areas of chronic encephalomalacia are seen in the right frontal lobe, right parietal lobe, and left frontal lobe. There are incidental bilateral basal ganglia calcifications. There is no intracranial hemorrhage, extra-axial collection, or mass effect. The basilar cisterns are open. No CT evidence of acute infarct. The bone windows demonstrate no abnormalities. The visualized portions of the paranasal sinuses and mastoid air cells are clear. Significant chronic encephalomalacia in the right frontal lobe, right parietal lobe, and left frontal lobe. Xr Chest [facility] Result Date: 3/30/2021 EXAM: XR CHEST PORT HISTORY: trach exchange. COMPARISON: 3/30/2021 FINDINGS: Portable AP. A tracheostomy tube is in place. The heart is normal size. There is diffuse interstitial prominence again seen likely related to edema. No pleural effusion or pneumothorax. Tracheostomy tube in appropriate position. Unchanged diffuse interstitial prominence likely related to edema. Xr Chest [facility] Result Date: 3/30/2021 EXAM: XR CHEST PORT HISTORY: Chest pain. COMPARISON: 4/1/2020 FINDINGS: Portable AP. A tracheostomy tube is in place. The heart is normal size. There is unchanged diffuse interstitial prominence likely related to edema. No focal consolidation, pleural effusion, or pneumothorax. No significant interval change. Medications reviewed Current Facility-Administered Medications Medication Dose Route Frequency  piperacillin-tazobactam (ZOSYN) 3.375 g in 0.9% sodium chloride (MBP/ADV) 100 mL MBP  3.375 g IntraVENous Q8H  
 vancomycin (VANCOCIN) 1,000 mg in 0.9% sodium chloride 250 mL (VIAL-MATE)  1,000 mg IntraVENous Q24H  
 levETIRAcetam (KEPPRA) oral solution 750 mg  750 mg Per G Tube BID  potassium bicarb-citric acid (EFFER-K) tablet 40 mEq  40 mEq Oral DAILY  furosemide (LASIX) injection 20 mg  20 mg IntraVENous TID  albuterol-ipratropium (DUO-NEB) 2.5 MG-0.5 MG/3 ML  3 mL Nebulization Q6H RT  
 acetylcysteine (MUCOMYST) 100 mg/mL (10 %) nebulizer solution 400 mg  4 mL Nebulization PRN  
 acetylcysteine (MUCOMYST) 100 mg/mL (10 %) nebulizer solution 400 mg  4 mL Nebulization Q6H RT  
 Phenytoin - Please HOLD Tube Feed for 1 hour before and 1 hour after Tube Feed   1 Each Other TID  midodrine (PROAMATINE) tablet 15 mg  15 mg Oral Q8H  
 LORazepam (ATIVAN) injection 0.5 mg  0.5 mg IntraVENous Q6H PRN  
 oxyCODONE IR (ROXICODONE) tablet 5 mg  5 mg Per G Tube Q6H  
 naloxone (NARCAN) injection 0.4 mg  0.4 mg IntraVENous EVERY 2 MINUTES AS NEEDED  
 glycopyrrolate (ROBINUL) tablet 1 mg  1 mg Oral TID  phenytoin (DILANTIN) 100 mg/4 mL oral suspension 100 mg  100 mg PEG Tube Q8H  
 enoxaparin (LOVENOX) injection 40 mg  40 mg SubCUTAneous Q24H  
 alteplase (CATHFLO) 1 mg in sterile water (preservative free) 1 mL injection  1 mg InterCATHeter PRN  
 guaiFENesin (ROBITUSSIN) 100 mg/5 mL oral liquid 200 mg  200 mg Oral Q6H PRN  
 sodium chloride (NS) flush 5-40 mL  5-40 mL IntraVENous Q8H  
 sodium chloride (NS) flush 5-40 mL  5-40 mL IntraVENous PRN  
 acetaminophen (TYLENOL) tablet 650 mg  650 mg Oral Q6H PRN Or  
 acetaminophen (TYLENOL) suppository 650 mg  650 mg Rectal Q6H PRN  
 sodium chloride (NS) flush 5-40 mL  5-40 mL IntraVENous PRN  
 0.9% sodium chloride infusion 250 mL  250 mL IntraVENous PRN  pantoprazole (PROTONIX) 40 mg in 0.9% sodium chloride 10 mL injection  40 mg IntraVENous Q24H  
 alteplase (CATHFLO) 1 mg in sterile water (preservative free) 1 mL injection  1 mg InterCATHeter PRN Signed: 
 Kate Simmons MD 
 Resident, Family Medicine Attending note: Attending note to follow. ..

## 2021-04-12 NOTE — PROGRESS NOTES
30 [facility] [address] with 301 [facility] Resident Progress Note in Brief S: Patient seen and examined at bedside with Dr. Radha Gray, Dr. Gagandeep Baptiste, and Dr. Panfilo Trujillo. Rapid response called as pt was found to be tachypneic w/ respiratory rate ~60 and tachycardic to the 160s. Pt currently on humidified room air via trach collar and dessated to upper 80s. At the time of onset pt was laying in bed alone in her room, no known provoking factor. Per nursing pt has been intermittently agitated when she is repositioned in bed however she had not been recently moved prior to this episode. Pt also noted to be diaphoretic. O: 
Visit Vitals BP (!) 158/90 Pulse (!) 165 Temp (!) 100.8 °F (38.2 °C) Resp (!) 60 Ht 4' 9\" (1.448 m) Wt 127 lb 3.2 oz (57.7 kg) SpO2 94% BMI 27.53 kg/m² Physical Examination:  
General appearance - Awake, eyes open, does not respond to voice or command, diaphoretic, satting well on 3L via trach collar. No eye deviation. Chest - tachypneic, clear to auscultation, no wheezes, rales or rhonchi, symmetric air entry Heart - tachycardic, regular rhythm, normal S1, S2, no murmurs, rubs, clicks or gallops, Abdomen - soft, nontender, nondistended, no masses or organomegaly Neurological - awake but not alert, does not respond to voice or follow commands Extremities - trace b/l pitting edema, dependent edema to b/l upper extremities A/P:  
 
Tachycardia and respiratory distress: Pt suddenly tachypneic and tachycardic, no known provocation. EKG shows sinus tach. POC glucose 141. Pt given 0.5mg ativan, 1mg morphine and scheduled oxycodone 5mg early w/ good response. Unknown etiology possibly infectious vs PE, however seizure seems unlikely. 
-Of note, respiratory therapy was in the room and confirmed that pt has been breathing room air for >48h.  She has been receiving humidified air through the trach collar however this has been charted as 8L O2. 
-CBC, CMP, Mg, trop, d-dimer pending 
-Will get CXR - if worsened will add on procal and consider additional infectious work up Please see the primary team's daily progress note for the patient's full plan. Laurence Morfin MD 
Family Medicine Resident ADDENDUM 
12:06 PM 
 
D-dimer elevated at 2.04, WBC 14.5 up from 5.6 this morning. Will place order for CTA chest to evaluate for PE and obtain a UA, procal, and paired blood cultures. Will also start zosyn and vanc to cover for possible infection. ADDENDUM 
12:46 PM 
 
Spoke w/ Flory Salas at pt's group home about the episode described above and informed her that pt would not be discharged from the hospital today. I described the workup being conducted to determine the source of the pt's symptoms and informed her that we would continue to keep her updated. ADDENDUM 
4:39 PM 
 
Called to pt's bedside as she is still tachycardic and tachypneic. Pt still diaphoretic and warm to touch, lungs clear to auscultation. Instructed blood cultures to be drawn and antibiotics to be given as these orders were originally placed at the time of the rapid response and have not yet been completed and pt is meeting SIRS criteria w/ concern for sepsis. Will also give 1x dose of ativan 0.5mg now.

## 2021-04-12 NOTE — PROGRESS NOTES
1915 Bedside and Verbal shift change report given to Stephy RN (oncoming nurse) by Manuel Lemon RN (offgoing nurse). Report included the following information SBAR, Kardex, Intake/Output, MAR, Recent Results and Cardiac Rhythm NSR. This patient was assisted with Intentional Toileting every 2 hours during this shift. Documentation of ambulation and output reflected on Flowsheet. 0730 Bedside and Verbal shift change report given to Madelyn Luis RN (oncoming nurse) by Sugar Sanchez RN (offgoing nurse). Report included the following information SBAR, Kardex, Intake/Output, MAR, Recent Results and Cardiac Rhythm NSR.

## 2021-04-12 NOTE — PROGRESS NOTES
Spoke to Dr Mariam Cuello, [address] [facility] with regard to PICC line. MD states Central line Type: PICC Central Line Insert Date: 4/1/2021 Reason Central Line Placed: was hemodynamically unstable. On pressors. [facility] Financial Dressing Date:4/7/2021 Bi[address]? Yes No: yes Last G bath (time&date):4/11/2021 1824 Reviewed with provider and central line must stay in for the following reasons: Ok to dc PICC once peripheral IV access is achieved.

## 2021-04-12 NOTE — WOUND CARE
Wound Consult: Follow Up Visit. Chart reviewed. Consulted for skin concerns. Spoke with patients nurse,  Shani Plascencia who consulted. Patient is resting on a [employer]acare bed with Tempurpedic mattress. Heels off loaded with boots. Patient is alert - eyes open but not interactive; requires two to move side to side in bed. Rolf score 10. Assessment: 
Left heel - pink, intact skin, blanching. No surrounding redness. Right lateral plantar heel - 1 x 0.5 cm area of rough dark intact skin, pale skin surrounding, no redness. No redness or injury to sides of feet/ankles, buttocks, sacrum or hips. Treatment: 
Resumed heel boots. Skin Care / PI Prevention Recommendations: 1. Minimize friction/shear: minimize layers of linen/pads under patient. 2. Off load pressure/reposition: continue to turn and reposition approximately every 2 hours; float heels with pillows or use off loading heel boots. 3. Manage Moisture - keep skin folds dry; incontinence skin care with incontinence wipes; zinc barrier ointment; appropriate sized briefs if needed; Pure wick in use to help contain urine. 4. Continue to monitor nutrition, pain, and skin risk scale, and skin assessment. Plan: We will continue to reassess weekly and as needed. Please re-consult should concerns arise despite continued skin/PI prevention measures. Kody Slade RN [facility], Wound / Ostomy Department Wound Healing Office [phone]

## 2021-04-13 NOTE — PROGRESS NOTES
Comprehensive Nutrition Assessment Type and Reason for Visit: Jory Ahmadi Nutrition Recommendations/Plan: 1. Continue TF via PEG: 
Vital AF 1.2 bolus feeds 200mL 5x daily (10am, 4pm, 8pm, 12 midnight, 4am), Water flush with 75ml with each bolus. TF provides 1000ml of tf, 1200 kcals, 75 g protein, 1186 mL of H2O from tf & flushes - this meets 93% estimated energy needs and 100% protein needs. Nutrition Assessment:     
4/13: Follow up. Rapid response called yesterday 4/12, SIRS criteria met. Pt continues to tolerate TF at goal. Sohan Lara needs updated now that pt off vent and new weight is in. Continues to have loose BMs. On abx. K+ WNL. Phos elevated. Will continue to monitor. Labs- phos 5.6, Cr 1.25. Meds- Protonix, phenytoin, zosyn, Effer-K. 
 
4/8:  RD attended & discussed patient during interdisciplinary rounds on unit. Off vent since 7am per Respiratory. Wt is trending down with diuresis noted. Calorie needs based on admit weight. Will continue to evaluate as patient progresses. Low Potassium being replaced. Lab Results Component Value Date/Time Potassium HEMOLYZED,RECOLLECT REQUESTED 04/12/2021 11:22 AM  
 
 
4/5: follow up. RD is asked to evaluate TF orders for medication interaction with dilantin. Pt had TF orders bolus PTA to limit malabsorption of medication. Current dilantin is ordered q8hr (6a, 2p, 10p). RD will adjust TF to Vital 1.2 and schedule bolus feeds in between meds. Pt has been tolerating TF without residuals. Family is hoping for extubation however, may need LTAC if unable to wean from vent. K+ 3.4 - being replaced IV. H/H 7.9/25 (L) Meds: vanc, Levo, KCl, lasix, dilantin, zosyn, levaquin, keppra 4/2: Follow up. Pt remains on vent and pressors (Levo @2). TFs running at goal. Per RN, pt tolerating. Minimal to no GRVs. Small amt of loose stools yesterday per RN note. Continue TF at goal. 
Labs- K 3.1, Hgb 8.6.  Meds- cefepime, fentanyl, [email or web address], Protonix. 3/31: 50 y/o female admitted with status epilepticus. PMH includes Down's syndrome, nonverbal, trach, PEG. +cdiff. Pt currently on vent and pressors (Levo @2). Pt normally resides at group home and her normal TF regimen is Osmolite 1.2 237mL bolus 5x/day + 75mL H2O flush before and after each bolus. Hospital does not carry Osmolite TF so will start pt on hospital's no-fiber formula, Vital HP. Monitor tolerance. Weight up from last year, down from two months ago. Will continue to monitor weight trends. Labs- phos 4.9, Hgb 6.3. Isabel- [email or web address], Levo@2mcg/kg/min. Weight hx: Wt Readings from Last 10 Encounters:  
04/13/21 58.7 kg (129 lb 4.8 oz) 02/03/21 58.9 kg (129 lb 12.8 oz) 07/08/20 49.9 kg (110 lb)  
06/30/20 49.9 kg (110 lb) 03/31/20 50.1 kg (110 lb 7.2 oz) 03/17/20 44.5 kg (98 lb)  
01/14/20 44.5 kg (98 lb) 01/09/20 44.6 kg (98 lb 6 oz) 12/11/19 44 kg (97 lb) 02/11/19 49.9 kg (110 lb) Malnutrition Assessment: 
Malnutrition Status: at risk of malnutrition TF dependant,  Deep tissue injury on Heel - POA Estimated Daily Nutrient Needs: 
Energy (kcal): 8000(0113 x 1.2 AF); Weight Used for Energy Requirements: Current(58.7 kg) Protein (g): 71-83(1.2-1.4 g/kg); Weight Used for Protein Requirements: Current(58.7 kg) Fluid (ml/day): 1296; Method Used for Fluid Requirements: 1 ml/kcal 
 
Nutrition Related Findings:  
Last BM 4/12 (loose); 2+ UE, 1+ LE edema Wounds:   
Deep tissue injury(heel) Current Nutrition Therapies: DIET TUBE FEEDING Vital AF 1.2 bolus feed 5 times daily (schedule 10a, 4p, 8p, 12midnight, 4a) Give 100ml at first bolus, increase by 50ml with each feeding until goal of 200ml volume, Water flush with 75mll after each bolus. Anthropometric Measures: 
· Height:  4' 9\" (144.8 cm) · Current Body Wt:  58.7 kg (129 lb 6.6 oz) · Ideal Body Wt:  85 lbs:  152.2 % · BMI Category: Overweight (BMI 25.0-29. 9) Body mass index is 27.98 kg/m². Last 3 Recorded Weights in this Encounter 04/11/21 4347 04/12/21 6941 04/13/21 5023 Weight: 61.3 kg (135 lb 2.3 oz) 57.7 kg (127 lb 3.2 oz) 58.7 kg (129 lb 4.8 oz) Nutrition Diagnosis:  
· Increased nutrient needs, In context of chronic illness related to inadequate enteral nutrition infusion, impaired respiratory function as evidenced by intubation, wounds, nutrition support-enteral nutrition - resolved Increased nutrient needs in context of chronic illness related to protein needs as evidence by deep tissue injury POA and respiratory function. 4/13: Nutrition dx continues. Nutrition Interventions:  
Food and/or Nutrient Delivery: Continue tube feeding Nutrition Education and Counseling: Education not indicated Coordination of Nutrition Care: Continue to monitor while inpatient, Interdisciplinary rounds Goals: Tolerance of bolus feeds at goal next 5-7 days Nutrition Monitoring and Evaluation:  
Behavioral-Environmental Outcomes: None identified Food/Nutrient Intake Outcomes: Enteral nutrition intake/tolerance Physical Signs/Symptoms Outcomes: GI status, Weight Discharge Planning:   
Enteral nutrition - resume home TF as tolerated bolus feeds Electronically signed by Dilip Cabrera RDN on 4/13/2021 Contact: [phone]

## 2021-04-13 NOTE — PROGRESS NOTES
4/13/2021  
4:21 PM 
CM spoke w/ [facility] [phone] x [phone], requesting documentation of pt's O2 sats on RA for home O2  Approval, she advised pt's insurance require auth. Will follow and assist 
ALVA Gallardo  CM spoke w/ nursing who will document. 11:46 AM 
Pt discussed in IDR rounds, is continuing to require medical management for sepsis 2/2 HAP, acute respiratory failure/trach dependence, hypotension, seizures, Anemia of chronic disease in setting of hx of GI bleed Transitions of Care Plan: 
RUR 22 % Moderate Risk LOS 14 Days 1. Medical management continues 2. sepsis 2/2 HAP, acute respiratory failure/trach dependence, blood cultures pending, pt on  Vancomycin, zosyn 3. Order for home O2, CM spoke w/  Monisha Hercules [phone], confirmed pt could return to group home on  Max of 4L O2 , pt has suction machine at home and humidified air. Group home uses [facility] for DME, order for O2 faxed to [phone], also sent in [employer]. Await response 4. Updated Family Practice who will discuss DC plan w/ pt's POA/mother Judson Wallace 5. DC when stable to home 6. Pt would benefit from [facility] transition appt at [place] 7. outpatient f/u PCP 8. Pt placed on will call for [facility] 4/14/21, Gloria iSmmons # [id number], CM uploaded face sheet and PCS in [employer], transport packet to bedside chart 9. CM to follow and assist 
ALAV Gallardo

## 2021-04-13 NOTE — PROGRESS NOTES
Bedside and Verbal shift change report given to Daniel (oncoming nurse) by Hank Mendoza RN (offgoing nurse). Report included the following information SBAR, Kardex, Intake/Output, MAR, Recent Results and Cardiac Rhythm sinus. 1930:Francisco RN inserted Cathflo, check blood return at [address] [place] [address]. No PIV access. PICC line neccessary. Blood return post Cathflo on red port. 2000:Patient agitated and restless. HR elevated patient diaphoretic. Notified respiratory of patient needing deep suction. Respiratory switched patient to inline suction. Patient HR lowered to low 100s. Patient given PRN ativan for agitation. Patient HR lowered to normal, but patient became hypotensive. 3135: 500cc bolus adminitered per MD for hypotension. Patient BP responsive to bolus. See docflowsheet. Patient slept majority of night after ativan given. Vitals normal.  
 
PICC line not giving blood return from either port. Phlebotomy attempted labs without success.

## 2021-04-13 NOTE — PROGRESS NOTES
30 [facility], [address] with 301 [facility] Resident Progress Note in Brief S: Received a call in regards to pt's BP, initial was 83/42, re-check it was 85/47. Patient is awake, no acute distress. O: 
Visit Vitals BP (!) 106/55 Pulse 88 Temp 98.2 °F (36.8 °C) Resp 20 Ht 4' 9\" (1.448 m) Wt 127 lb 3.2 oz (57.7 kg) SpO2 96% BMI 27.53 kg/m² Physical Examination General appearance - Awake, non-verbal.  
Chest - Trach collar in place. clear to auscultation, no wheezes, rales or rhonchi, symmetric air entry Heart - normal rate, regular rhythm, normal S1, S2, no murmurs, rubs, clicks or gallops, Abdomen - soft, nontender, nondistended, no masses or organomegaly Neurological - awake, non-verbal. 
 
A/P:  
Hypotension: reported BP was 83/42 on initial check and 85/47 on repeat. Pt is afebrile, HDS.  
- Patient given 500 cc of normal saline - Continue midodrine 15 mg every 8 hours. - Will revaluate and consider giving another 500 cc if blood pressure does not improve. Please see daily progress note for detailed plan. Cindy Becerra MD 
Family Medicine Resident

## 2021-04-13 NOTE — PROGRESS NOTES
Attempted to call pt's mother Ms. Kennedi Medel on multiple occassions w/ no answer. Will try again later to speak with her and provide her with an update regarding her daughter's care.  
 
Eulogio Retana MD 
2:45 PM

## 2021-04-14 NOTE — PALLIATIVE CARE DISCHARGE
The Palliative Medicine team was consulted as part of your / your loved one's care in the hospital. Our team is a supportive service; we strive to relieve suffering and improve quality of life. You identified the following goal(s) as your main focus for healthcare: Patient/Health Care Proxy Stated Goals: Prolong life We reviewed advance care planning information, which includes the following: 
Advance Care Planning 4/5/2021 Patient's Healthcare Decision Maker is: Legal Next of Kin Confirm Advance Directive None Patient Would Like to Complete Advance Directive Unable We reviewed / discussed your code status as: Full Code Full Code means perform CPR in the event of cardiac arrest 
   [facility] means do NOT perform CPR in the event of cardiac arrest 
   Partial Code means you have specific preferences, please discuss with your health care team 
   No Order means this issue was not addressed / resolved during your stay Because of the importance of this information, we are providing you with a printed copy to share with other healthcare providers after this hospitalization is complete. If any of the above information is incomplete or incorrect, please contact the Palliative Medicine team at [phone].

## 2021-04-14 NOTE — DISCHARGE INSTRUCTIONS
Patient Discharge Instructions    Cate Abdalla / [id number] : 1970    Admitted 3/30/2021 Discharged: 2021 10:35 AM     Primary care provider: Trisha Mera NP    Discharging provider:  Willam Hsieh DO  - Family Medicine Resident  Vidhi Cuenca MD, MD - Family Medicine, Attending    . . . . . . . . . . . . . . . . . . . . . . . . . . . . . . . . . . . . . . . . . . . . . . . . . . . . . . . . . . . . . . . . . . . . . . . . MEDICATION CHANGES  START   Robinul 1mg TID PRN through G tube  increased trach secretions  Midodrine 15mg Q8h through G tube   Oxycodone 5mg Q6h through G tube  x7 days  Effer-k packet daily through G tube   Augmentin 875mg Q12h through G tube x10 doses  Doxycycline 100mg Q12h through G tube x8 doses  Ativan 0.5mg 1 tablets through G tube every 6 hours as needed for agitation      STOP   None     CHANGES   Phenytoin 100mg Q8h    No other changes were made to your medications, please take all your other home medicines as previously prescribed. . . . . . . . . . . . . . . . . . . . . . . . . . . . . . . . . . . . . . . . . . . . . . . . . . . . . . . . . . . . . . . . . . . . . . . Jake Shea FINAL DIAGNOSES & HOSPITAL COURSE:    Sepsis /2 HAP:  SIRS criteria met  AM during rapid response. CXR  w/ diffuse b/l airspace disease compatible w/ PNA. Procal 0.16, d-dimer 2.04, UA wnl. CTA chest negative for PE. Pt initially dx w/ VAP and pseudomonas UTI , s/p full treatment course w/ zosyn (-). BCx 3/31 and rBCx  NG. MRSA negative 4/3. BCx  NGTD. Treated w/ IV vanc and zosyn for HAP. -F/u final BCx - no growth to date  -Will discharge on doxycycline 100mg BID and augmentin 875mg BID to finish full 7-day course   -Recheck CBC OP to monitor for infection     Acute respiratory failure/trach dependence: Trach placed due to hx of seizures, aspiration PNA, and previous intubation.  Pt off pressors, vent.   -Pt is on room air via trach collar - if pt develops an O2 requirement she can f/u w/ PCP to get set up for home O2  -Oxy 5mg Q6h scheduled while in hospital for sedation, analgesia - will send w/ 7-day supply and have PCP f/u to wean pt from this medication  -Robinul TID PRN for increased trach secretions  -Continue home torsemide 40mg daily     Hypotension: Likely 2/2 sedatives. S/p levo gtt 4/7 AM.  -Midodrine 15mg TID  -Monitor BP daily at home     Seizures: Pt admitted in status epilepticus. CT head chronic encephalomalacia. Phenytoin level supra-therapeutic on admission. F/w neuro Dr. Dean Jernigan. EEG w/ no evidence of seizure activity.  -Phenytoin 100mg q8h (decreased from home dose 125mg q8)  -Keppra 750mg BID  -STOP lacosamide   -Phenytoin level therapeutic when corrected for albumin, stable  -F/u w/ neurology OP      Anemia of chronic disease in setting of hx of GI bleed: POA Hgb 10.1 (b/l 13) > 6.3. Hx of GI bleed, admission 3/2021 to [address] [place] [address], EGD & Colonoscopy at that time revealed gastritis and colitis. FOBT neg. Possibly 2/2 hemodilution. S/p 1u pRBC. Iron studies suggest ACD. -Per GI no active bleed/ no intervention at this time  -Omeprazole 40mg BID  -Monitor Hgb OP     H/o recurrent C Diff s/p fecal transplant. Fecal WBC neg.     VICK - resolved. POA Cr 1.24 (BL 0.5). ATN and UTI  -Daily CMP  -Continue home Torsemide 40mg     Hyponatremia - resolved: Likely 2/2 IVVD. POA Na 130.   -Recheck CMP OP to monitor Na     Hyperphosphatemia- resolved:  POA P 5.6. Improved with IVF  -Recheck phos OP     Thrombocytosis- resolved: Likely 2/2 IVVD as resolved after fluids. POA plt 584. -Recheck CBC OP     Elevated ALP, AST in setting of hepatic steatosis - resolved: Chronic. POA , AST 60.      S/p PEG-tube:   -Continue tube feeds      Chronic edema: Home Torsemide 40mg daily. F/w Nephrology OP.  S/p lasix 10mg IV x1  -Continue home Torsemide 40mg daily     GERD: stable.  -Omeprazole 40mg BID       FOLLOW-UP CARE RECOMMENDATIONS:    APPOINTMENTS:  Follow-up Information     Follow up With Specialties Details Why Contact Info    Sven Reina MD Family Medicine On 4/19/2021 10:30 AM - [facility] follow up appointment [address]  [address] [facility]  [phone]      Jareth Calderón MD Neurology, Pain Management Schedule an appointment as soon as possible for a visit Follow up for seizure management [address] [facility] 7[address] [facility] [address] [facility]      Lydia Olsen, [address] [facility]  [address] [facility]  [address]  [phone]              It is very important that you keep follow-up appointment(s). Bring discharge papers, medication list (and/or medication bottles) to follow-up appointments for review by outpatient provider(s). FOLLOW-UP TESTS RECOMMENDED:   · See above    ONGOING TREATMENT PLAN: See above    PENDING TEST RESULTS:  At the time of discharge the following test results are still pending: Final blood culture results 4/12. Please review these results as they become available. Specific symptoms to watch for: chest pain, shortness of breath, fever, chills, nausea, vomiting, diarrhea, change in mentation, falling, weakness, bleeding. DIET:  NG tube feeds    ACTIVITY:  Activity as tolerated    WOUND CARE: none     EQUIPMENT needed:  none    INCIDENTAL FINDINGS:  none    GOALS OF CARE:       [] Eventual return to home/independent/assisted living       [x] Long term SNF       [] Hospice    Patient condition at discharge:   Functional status       [x] Poor       [] Deconditioned       [] Independent  Cognition       [] Lucid       [] Forgetful (Some senescence)       [x] Dementia  Catheters/lines (plus indication)       [] Serna       [] PICC           [] PEG       [x] None  Code status       [x] Full code       [] DNR  . . . . . . . . . . . . . . . . . . . . . . . . . . . . . . . . . . . . . . . . . . . . . . . . . . . . . . . . Maty Rizzo . . . . . . . . . . . . . . Laura Thapa      CHRONIC MEDICAL CONDITIONS:  Problem List as of 4/14/2021 Date Reviewed: 2/3/2021          Codes Class Noted - Resolved    Sepsis ([facility] ICD-10-CM: A41.9  ICD-9-CM: 038.9, 995.91  4/9/2021 - Present        VAP (ventilator-associated pneumonia) ([facility] ICD-10-CM: W93.314  ICD-9-CM: 997.31  4/9/2021 - Present        Altered mental status ICD-10-CM: R41.82  ICD-9-CM: 780.97  4/6/2021 - Present        Hypoxia ICD-10-CM: R09.02  ICD-9-CM: 799.02  4/6/2021 - Present        Acute respiratory failure with hypoxia (HCC) ICD-10-CM: J96.01  ICD-9-CM: 518.81  4/4/2021 - Present        Acute cystitis without hematuria ICD-10-CM: N30.00  ICD-9-CM: 595.0  4/3/2021 - Present        Elevated Dilantin level ICD-10-CM: R78.89  ICD-9-CM: 790.6  4/3/2021 - Present        VICK (acute kidney injury) ([place] [address].) ICD-10-CM: N17.9  ICD-9-CM: 584.9  3/31/2021 - Present        Edema (Chronic) ICD-10-CM: R60.9  ICD-9-CM: 782.3  3/31/2021 - Present        Hypotension ICD-10-CM: I95.9  ICD-9-CM: 458.9  3/31/2021 - Present        Hyperphosphatemia ICD-10-CM: E83.39  ICD-9-CM: 275.3  3/31/2021 - Present        Hyponatremia ICD-10-CM: E87.1  ICD-9-CM: 276.1  3/31/2021 - Present        Thrombocytosis ([facility] ICD-10-CM: D47.3  ICD-9-CM: 238.71  3/31/2021 - Present        * (Principal) Status epilepticus ([address] ICD-10-CM: G40.901  ICD-9-CM: 345.3  3/30/2021 - Present        On tube feeding diet ICD-10-CM: Z78.9  ICD-9-CM: V49.89  Unknown - Present        Clostridium difficile diarrhea ICD-10-CM: A04.72  ICD-9-CM: 008.45  6/30/2020 - Present        Wheelchair bound ICD-10-CM: Z99.3  ICD-9-CM: V46.3  6/9/2020 - Present        Inability to walk ICD-10-CM: R26.2  ICD-9-CM: 719.7  6/9/2020 - Present        Positive fecal occult blood test ICD-10-CM: R19.5  ICD-9-CM: 792.1  3/29/2020 - Present        Oral thrush ICD-10-CM: B37.0  ICD-9-CM: 112.0  3/29/2020 - Present        Diarrhea in adult patient ICD-10-CM: R19.7  ICD-9-CM: 787.91 3/28/2020 - Present        C. difficile diarrhea ICD-10-CM: A04.72  ICD-9-CM: 008.45  3/28/2020 - Present        Seizure ([address] ICD-10-CM: R56.9  ICD-9-CM: 780.39  12/11/2019 - Present        Gastroesophageal reflux disease without esophagitis ICD-10-CM: K21.9  ICD-9-CM: 530.81  12/11/2019 - Present        Down's syndrome ICD-10-CM: Q90.9  ICD-9-CM: 758.0  12/11/2019 - Present        Iron deficiency ICD-10-CM: E61.1  ICD-9-CM: 280.9  12/11/2019 - Present        Constipation ICD-10-CM: K59.00  ICD-9-CM: 564.00  12/11/2019 - Present        Severe intellectual disability ICD-10-CM: F72  ICD-9-CM: 318.1  2/2/2012 - Present    Overview Signed 2/2/2012  1:40 PM by Ayesha Paz MD     Mongolism               RESOLVED: UTI (urinary tract infection) ICD-10-CM: N39.0  ICD-9-CM: 599.0  4/9/2021 - 4/9/2021              Information obtained by :   I understand that if any problems occur once I am at home I am to contact my physician. I understand and acknowledge receipt of the instructions indicated above. Physician's or R.N.'s Signature                                                                  Date/Time                                                                                                                                              Patient or Representative Signature                                                          Date/Time          [facility]/ED Visit Follow-Up Instructions/Information    You may have an in home follow up visit set up with [facility] or may wish to contact [facility] to set-up a visit:    What are we? [facility] is an in-home urgent care service staffed with emergency trained medical teams. We come to your home in a vehicle stocked with medical supplies and technology.  An ER physician is always available if needed. When? As a part of your hospital follow-up, an appointment has been/ or can be set up for us to come see you. Usually, this will be 24-72 hours after you leave the hospital or as needed. [facility] is open 7am-9pm, 7 days a week, 365 days a year, including holidays. Why? We know that you cannot always get to your doctor after being in the hospital and that your doctor is not always available when you need them. Once your workup is complete, we'll call in your prescriptions, update your family doctor, and handle billing with your insurance so you can focus on feeling better, faster without leaving home. How much? We accept most major health insurance plans, including Medicaid, Medicare, and Medicare Advantage [address] [facility], [facility] [facility], [employer], and [employer]. We also accept: credit, debit, health savings account (HSA), health reimbursement account (HRA) and flexible spending account (FSA) payments. [facility]'s prices compare to conventional urgent care facilities, but we bring the care to you. How to reach us? Getting care is easy- use our mobile [employer]i ([employer]), website ([email or web address]) or call us [phone].

## 2021-04-14 NOTE — PROGRESS NOTES
4/14/2021  
4:50 PM 
CM updated Shannon Carroll RN at group home of transport time of 5:45 PM. 
Nursing please call report to [phone] ask for [facility] ALVA Shaw  3:49 PM 
CM faxed updated AVS and MAR to Soheila Meza for group home [phone]. CM [facility] ALVA Shaw  2:27 PM 
CM contacted [facility] for updated  time, spoke w/ Brandy Nieves advised ETA 3:45 PM. CM updated nursing. ALVA Shaw  1:58 PM 
Nursing advised pt HR elevated, I advised they call Family Practice. ALVA Shaw  1:38 PM 
[facility] confirmed they are able to accept pt back for SN, will contact Shannon Carroll to schedule appt. [facility]. Care Management Interventions PCP Verified by CM: Yes(Dr Luz Deleon) Transition of Care Consult (CM Consult): Discharge Planning, LTAC Current Support Network: Adult Group Home The Plan for Transition of Care is Related to the Following Treatment Goals : discharge Discharge Location Discharge Placement: [facility]) Pt is ready for DC from CM standpoint ALVA Shaw  1:27 PM 
[facility] has declined, unable to staff in timely manner, additional referral sent to [facility] who is in network in [facility]. Await response CM confirmed w/ Dr Frias he spoke w/ nursing manager Carmenza Veras to confirm DC for today ALVA Shaw  Lillian Alarconiff 12:02 PM 
CM received call from [facility] advising squad to transport in 10 minutes, advised nursing can not have pt ready, CM given 1:30 PM  that they will have t honor, she agreed. CM advised nursing ALVA Shaw  11:52 AM 
Pt discussed in IDR rounds, pt stable for DC today, now on RA.   
CM attempted to arrange [facility] transition appt, however, spoke w/ Ayaan who verified they are not in network w/ pt's [employer] and pt would be self-pay, unable to schedule. CM scheduled transport w/ [facility], spoke w/ Rosemary, 1:30 PM . ROSA confirmed w/ Omar Mcduffie  that pt would return today, she requested MD call pt's nurse Manager Altamese Rubinstein [phone] to discuss DC, I faxed MAR, DC Summary and AVS to her @ [phone]. Confirmed Dr Frias will call her. ROSA spoke w/ Claritza at [address] [facility] [address] [place] [phone], she confirmed pt will not currently qualify for home O2, requested further documentation. CM spoke w/ nursing who will attempt documentation at transfer, as pt is non-ambulatory. Family Practice ordered [facility] [place] [facility], sent to Dasha Newell, they are reviewing DC order is in. 
ALVA Perez  9:09 AM 
O2 documentation faxed to Huan Quiles [phone] for insurance auth. Will follow and assist 
ALVA Perez

## 2021-04-14 NOTE — PROGRESS NOTES
Problem: Falls - Risk of 
Goal: *Absence of Falls Description: Document Mindi Cuevas Fall Risk and appropriate interventions in the flowsheet. Outcome: Progressing Towards Goal 
Note: Fall Risk Interventions: 
  
 
Mentation Interventions: Adequate sleep, hydration, pain control, Bed/chair exit alarm Medication Interventions: Bed/chair exit alarm, Patient to call before getting OOB Elimination Interventions: Bed/chair exit alarm Problem: Patient Education: Go to Patient Education Activity Goal: Patient/Family Education Outcome: Progressing Towards Goal 
  
Problem: Pressure Injury - Risk of 
Goal: *Prevention of pressure injury Description: Document Rolf Scale and appropriate interventions in the flowsheet. 4/14/2021 0203 by J Luis Smith, RN Outcome: Progressing Towards Goal 
Note: Pressure Injury Interventions: 
Sensory Interventions: Assess changes in LOC, Assess need for specialty bed, Turn and reposition approx. every two hours (pillows and wedges if needed) Moisture Interventions: Absorbent underpads, Apply protective barrier, creams and emollients, Check for incontinence Q2 hours and as needed, Internal/External urinary devices Activity Interventions: Increase time out of bed Mobility Interventions: Turn and reposition approx. every two hours(pillow and wedges), Assess need for specialty bed Nutrition Interventions: Document food/fluid/supplement intake Friction and Shear Interventions: Apply protective barrier, creams and emollients, Lift team/patient mobility team 
 
  
 
 
 
4/14/2021 0145 by J Luis Smith, RN Outcome: Progressing Towards Goal 
Note: Pressure Injury Interventions: 
Sensory Interventions: Assess changes in LOC, Assess need for specialty bed, Turn and reposition approx. every two hours (pillows and wedges if needed) Moisture Interventions: Absorbent underpads, Apply protective barrier, creams and emollients, Check for incontinence Q2 hours and as needed, Internal/External urinary devices Activity Interventions: Increase time out of bed Mobility Interventions: Turn and reposition approx. every two hours(pillow and wedges), Assess need for specialty bed Nutrition Interventions: Document food/fluid/supplement intake Friction and Shear Interventions: Apply protective barrier, creams and emollients, Lift team/patient mobility team 
 
  
 
 
 
  
Problem: Patient Education: Go to Patient Education Activity Goal: Patient/Family Education Outcome: Progressing Towards Goal 
  
Problem: Infection - Risk of, Central Venous Catheter-Associated Bloodstream Infection Goal: *Absence of infection signs and symptoms Outcome: Progressing Towards Goal 
  
Problem: Patient Education: Go to Patient Education Activity Goal: Patient/Family Education Outcome: Progressing Towards Goal

## 2021-04-14 NOTE — PROGRESS NOTES
Bedside and Verbal shift change report given to Gaby (oncoming nurse) by Vicki Lazo (offgoing nurse). Report included the following information SBAR, Kardex, ED Summary, Procedure Summary, Intake/Output, MAR, Recent Results and Cardiac Rhythm NSR. Central line Type: double picc Central Line Insert Date: 4/1/21 Reason Central Line Placed: Limited access Central Line Dressing Date: 04/14/[address]? Yes No: yes Tubing labeled and appropriate? Yes No: yes Alcohol caps on all open ports? Yes No: yes Last G bath (time&date): 04/14/21 @ 7943 Reviewed with provider and central line must stay in for the following reasons: Limited access.

## 2021-04-14 NOTE — PROGRESS NOTES
Problem: Falls - Risk of 
Goal: *Absence of Falls Description: Document Cha Pagepatrice Fall Risk and appropriate interventions in the flowsheet. Outcome: Progressing Towards Goal 
Note: Fall Risk Interventions: 
  
 
Mentation Interventions: Adequate sleep, hydration, pain control, Bed/chair exit alarm Medication Interventions: Bed/chair exit alarm, Patient to call before getting OOB Elimination Interventions: Bed/chair exit alarm Problem: Patient Education: Go to Patient Education Activity Goal: Patient/Family Education Outcome: Progressing Towards Goal 
  
Problem: Pressure Injury - Risk of 
Goal: *Prevention of pressure injury Description: Document Rolf Scale and appropriate interventions in the flowsheet. Outcome: Progressing Towards Goal 
Note: Pressure Injury Interventions: 
Sensory Interventions: Assess changes in LOC, Assess need for specialty bed, Turn and reposition approx. every two hours (pillows and wedges if needed) Moisture Interventions: Absorbent underpads, Apply protective barrier, creams and emollients, Check for incontinence Q2 hours and as needed, Internal/External urinary devices Activity Interventions: Increase time out of bed Mobility Interventions: Turn and reposition approx. every two hours(pillow and wedges), Assess need for specialty bed Nutrition Interventions: Document food/fluid/supplement intake Friction and Shear Interventions: Apply protective barrier, creams and emollients, Lift team/patient mobility team

## 2021-04-14 NOTE — PROGRESS NOTES
Spoke with Papito Ortiz, nurse from [facility], regarding the patient's discharge. All questions answered at this time. Patient ok to be discharged, [facility] scheduled for 1:30pm.  
 
Marycruz Kowalski, [address] [place] [address] Resident

## 2021-04-14 NOTE — ACP (ADVANCE CARE PLANNING)
Primary Decision Maker (Active): Rambo Martinez - Mother - [phone] Advance Care Planning 4/5/2021 Patient's Healthcare Decision Maker is: Legal Next of Kin Confirm Advance Directive None Patient Would Like to Complete Advance Directive Unable Pt does not have AMD on file, is currently unable to complete due to cognitive/physical limitations. Pt's mother, Khris Bejarano, is legal NOK and surrogate decision maker. Pt remains full code.

## 2021-04-14 NOTE — PROGRESS NOTES
Pt discharge  Report given to [facility] to help care for pt. Sent on RA no grimace of pain, O2sat 97%,  With trach.

## 2021-04-15 NOTE — PROGRESS NOTES
Music Therapy Assessment [address] [id number]    
1970  49 y.o.  female Patient Telephone Number: [phone] (home) Sikh Affiliation: No preference Language: [place] Patient Active Problem List  
 Diagnosis Date Noted  Sepsis ([address] 04/09/2021  VAP (ventilator-associated pneumonia) ([address] 04/09/2021  Altered mental status 04/06/2021  Hypoxia 04/06/2021  Acute respiratory failure with hypoxia ([address] 04/04/2021  Acute cystitis without hematuria 04/03/2021  Elevated Dilantin level 04/03/2021  VICK (acute kidney injury) ([address] 03/31/2021  Edema 03/31/2021  Hypotension 03/31/2021  Hyperphosphatemia 03/31/2021  Hyponatremia 03/31/2021  Thrombocytosis ([address] 03/31/2021  Status epilepticus ([address] 03/30/2021  On tube feeding diet  Clostridium difficile diarrhea 06/30/2020  Wheelchair bound 06/09/2020  Inability to walk 06/09/2020  Positive fecal occult blood test 03/29/2020  
 Oral thrush 03/29/2020  Diarrhea in adult patient 03/28/2020  
 C. difficile diarrhea 03/28/2020  Seizure ([address] 12/11/2019  Gastroesophageal reflux disease without esophagitis 12/11/2019  Down's syndrome 12/11/2019  Iron deficiency 12/11/2019  Constipation 12/11/2019  Severe intellectual disability 02/02/2012 Date: 4/13/2021            Total Time (in minutes): 23          [facility] [place] Mental Status:   [x] Alert [  ] Lars Pear [  ]  Confused  [  ] Minimally responsive  [  ] Sleeping Communication Status: [  ] Impaired Speech [x] Nonverbal  
 
Physical Status:   [x] Oxygen in use  [  ] Hard of Hearing [  ] Vision Impaired [  ] Ambulatory  [  ] Ambulatory with assistance [  ] Non-ambulatory Music Preferences, Background: Radha Jensenbe shared pt likes Ye, R&B, Hip-hop. Clinical Problem addressed: Decrease feelings of stress, offer positive social interaction.  
 
Goal(s) met in session: Physical/Pain management (Scale of 1-10): Pre-session rating: Pt couldn't report on pain. Post-session rating: Pt couldn't report on pain. [  ] Increased relaxation   [  ] Affected breathing patterns [  ] Decreased muscle tension   [  ] Decreased agitation [  ] Affected heart rate    [  ] Increased alertness Emotional/Psychological: 
[  ] Increased self-expression   [  ] Decreased aggressive behavior [  ] Decreased feelings of stress  [  ] Discussed healthy coping skills [  ] Improved mood    [  ] Decreased withdrawn behavior Social: 
[  ] Decreased feelings of isolation/loneliness [  ] Positive social interaction [  ] Provided support and/or comfort for family/friends Spiritual: 
[  ] Spiritual support    [  ] Expressed peace [  ] Expressed tj    [  ] Discussed beliefs Techniques Utilized (Check all that apply):  
[  ] Procedural support MT [x] Music for relaxation [x] Patient preferred music 
[  ] Lula analysis  [  ] Song choice  [  ] Music for validation [  ] Entrainment  [  ] Movement to music [  ] Guided visualization [  ] Christianen Reid  [  ] Patient instrument playing [  ] Brittny Mabry writing [  ] Dorthula Foil along   [  ] Es Stacey  [  ] Sensory stimulation [  ] Active Listening  [  ] Music for spiritual support [  ] Making of CDs as gifts Session Observations:  Referral from [facility], Amandeep Guzmán. This music therapist (MT) called the patient's mother on the phone to offer her support and ask her about the patient's (pt's) music preferences, but the call went to her mother's voicemail. MT then called and spoke on the phone with Jese Kelsey.  She is listed in the pt's chart as \"other non-relative\", and based on the context of the phone call, sounded to be a staff member at the [address] [place] [address] where the pt has resided, though that is speculation by this MT. Ms. Bren Hennessy shared that the pt is from [place] and likes [employer] music, along with several other types of music (see listed above). Pt was alert lying in bed and was nonverbal. MT tried to establish a way to communicate by asking the pt to answer yes/no questions through opening and closing her eyes and suggesting several other nonverbal ways for her to answer, but MT didn't observe a consistent response from pt during the session. Referrer shared that pt has struggled with feelings of stress during her hospitalization, and so MT invited the pt to listen and relax to the live music MT would provide. MT sat at bedside and softly sang and played the [employer] Three Little Birds, the Cheral Nettle song My All and the Ph[employer] Happy with guitar. Pt closed her eyes at times during the music, but MT couldn't assess if this was indicative of the pt feeling more relaxed or if it was coincidental. 
 
Karen Jarrett MT-BC (Music Therapist-Board Certified) Spiritual Care Department Referral-based service

## 2021-04-16 PROBLEM — A41.9 SEVERE SEPSIS (HCC): Status: RESOLVED | Noted: 2021-01-01 | Resolved: 2021-01-01

## 2021-04-16 PROBLEM — Z93.0 TRACHEOSTOMY DEPENDENCE (HCC): Status: ACTIVE | Noted: 2021-01-01

## 2021-04-16 PROBLEM — D75.839 THROMBOCYTOSIS: Status: RESOLVED | Noted: 2021-01-01 | Resolved: 2021-01-01

## 2021-04-16 PROBLEM — A41.9 SEVERE SEPSIS (HCC): Status: ACTIVE | Noted: 2021-01-01

## 2021-04-16 PROBLEM — J96.11 CHRONIC RESPIRATORY FAILURE WITH HYPOXIA (HCC): Status: ACTIVE | Noted: 2021-01-01

## 2021-04-16 PROBLEM — R78.89 SUBTHERAPEUTIC PHENYTOIN LEVEL: Status: ACTIVE | Noted: 2021-01-01

## 2021-04-16 PROBLEM — A41.9 SEPSIS (HCC): Status: RESOLVED | Noted: 2021-01-01 | Resolved: 2021-01-01

## 2021-04-16 PROBLEM — R65.20 SEVERE SEPSIS (HCC): Status: ACTIVE | Noted: 2021-01-01

## 2021-04-16 PROBLEM — D64.9 ANEMIA: Status: ACTIVE | Noted: 2021-01-01

## 2021-04-16 PROBLEM — R19.7 DIARRHEA IN ADULT PATIENT: Status: RESOLVED | Noted: 2020-03-28 | Resolved: 2021-01-01

## 2021-04-16 PROBLEM — R41.82 ALTERED MENTAL STATUS: Status: RESOLVED | Noted: 2021-01-01 | Resolved: 2021-01-01

## 2021-04-16 PROBLEM — R19.5 POSITIVE FECAL OCCULT BLOOD TEST: Status: RESOLVED | Noted: 2020-03-29 | Resolved: 2021-01-01

## 2021-04-16 PROBLEM — N17.9 AKI (ACUTE KIDNEY INJURY) (HCC): Status: RESOLVED | Noted: 2021-01-01 | Resolved: 2021-01-01

## 2021-04-16 PROBLEM — E87.1 HYPONATREMIA: Status: RESOLVED | Noted: 2021-01-01 | Resolved: 2021-01-01

## 2021-04-16 PROBLEM — G40.901 STATUS EPILEPTICUS (HCC): Status: RESOLVED | Noted: 2021-01-01 | Resolved: 2021-01-01

## 2021-04-16 PROBLEM — M79.89 SWELLING OF HAND: Status: ACTIVE | Noted: 2021-01-01

## 2021-04-16 PROBLEM — R65.20 SEVERE SEPSIS (HCC): Status: RESOLVED | Noted: 2021-01-01 | Resolved: 2021-01-01

## 2021-04-16 PROBLEM — Z51.81 SUBTHERAPEUTIC PHENYTOIN LEVEL: Status: ACTIVE | Noted: 2021-01-01

## 2021-04-16 PROBLEM — K59.00 CONSTIPATION: Status: RESOLVED | Noted: 2019-12-11 | Resolved: 2021-01-01

## 2021-04-16 PROBLEM — I95.9 HYPOTENSION: Status: RESOLVED | Noted: 2021-01-01 | Resolved: 2021-01-01

## 2021-04-16 PROBLEM — R09.02 HYPOXIA: Status: RESOLVED | Noted: 2021-01-01 | Resolved: 2021-01-01

## 2021-04-16 PROBLEM — A04.72 C. DIFFICILE DIARRHEA: Status: RESOLVED | Noted: 2020-03-28 | Resolved: 2021-01-01

## 2021-04-16 PROBLEM — N30.00 ACUTE CYSTITIS WITHOUT HEMATURIA: Status: RESOLVED | Noted: 2021-01-01 | Resolved: 2021-01-01

## 2021-04-16 PROBLEM — J18.9 PNEUMONIA: Status: ACTIVE | Noted: 2021-01-01

## 2021-04-16 PROBLEM — G93.49 CHRONIC STATIC ENCEPHALOPATHY: Status: ACTIVE | Noted: 2021-01-01

## 2021-04-16 PROBLEM — A04.72 CLOSTRIDIUM DIFFICILE DIARRHEA: Status: RESOLVED | Noted: 2020-01-01 | Resolved: 2021-01-01

## 2021-04-16 PROBLEM — J95.851 VAP (VENTILATOR-ASSOCIATED PNEUMONIA) (HCC): Status: RESOLVED | Noted: 2021-01-01 | Resolved: 2021-01-01

## 2021-04-16 PROBLEM — R78.89 ELEVATED DILANTIN LEVEL: Status: RESOLVED | Noted: 2021-01-01 | Resolved: 2021-01-01

## 2021-04-16 PROBLEM — R60.9 EDEMA: Chronic | Status: RESOLVED | Noted: 2021-01-01 | Resolved: 2021-01-01

## 2021-04-16 PROBLEM — B37.0 ORAL THRUSH: Status: RESOLVED | Noted: 2020-03-29 | Resolved: 2021-01-01

## 2021-04-16 NOTE — CONSULTS
Comprehensive Nutrition Assessment Type and Reason for Visit: Initial, Consult, Wound, Positive nutrition screen Nutrition Recommendations/Plan: 1. Start TF via PEG. Recommend: 
 
Vital AF 1.2 bolus feeds 200mL 5x daily (10am, 4pm, 8pm, 12 midnight, 4am), Water flush with 75ml with each bolus.   
  
TF provides 1000ml of TF, 1200 kcals, 75 g protein, 1186 mL of H2O from TF & flushes - this meets 92% estimated energy needs and 100% protein needs. Nutrition Assessment:     
4/16: 48 y/o female admitted with severe sepsis. PMH includes Down's syndrome, nonverbal, trach, PEG. Pt currently COVID-19 ruleout. She was recently admitted. Last RD assessment three days ago, 4/13. Pt normally resides at group home and her normal TF regimen is Osmolite 1.2 237mL bolus 5x/day + 75mL H2O flush before and after each bolus. Hospital does not carry Osmolite TF. Recommend restarting pt on TF regimen from recent admission. May need to readjust TF bolus times based on medications. Will adjust as needed. Weight appears stable/trending up per EMR. Will continue to monitor. Labs- Cr 1.17, 0.9% Erik@[employer] Weight hx: Wt Readings from Last 10 Encounters:  
04/16/21 59.9 kg (132 lb) 04/14/21 59.2 kg (130 lb 8 oz) 02/03/21 58.9 kg (129 lb 12.8 oz) 07/08/20 49.9 kg (110 lb)  
06/30/20 49.9 kg (110 lb) 03/31/20 50.1 kg (110 lb 7.2 oz) 03/17/20 44.5 kg (98 lb)  
01/14/20 44.5 kg (98 lb) 01/09/20 44.6 kg (98 lb 6 oz) 12/11/19 44 kg (97 lb) Malnutrition Assessment: 
Malnutrition Status: at risk- TF dependent, wound Estimated Daily Nutrient Needs: 
Energy (kcal): 6737(9172 x 1.2 AF); Weight Used for Energy Requirements: Current Protein (g): 72-84(1.2-1.4 g/kg); Weight Used for Protein Requirements: Current Fluid (ml/day): 1310; Method Used for Fluid Requirements: 1 ml/kcal 
 
 
Nutrition Related Findings:  last BM not documented; 3+ nonpitting UE edema Wounds:   
Deep tissue injury(heel) Current Nutrition Therapies: DIET NPO Anthropometric Measures: 
· Height:  4' 9\" (144.8 cm) · Current Body Wt:  59.9 kg (132 lb) · Admission Body Wt:      
· Usual Body Wt:       
· Ideal Body Wt:  85 lbs:  155.3 % · Adjusted Body Weight:   ; Weight Adjustment for: No adjustment · Adjusted BMI:      
· BMI Category: Overweight (BMI 25.0-29. 9) Nutrition Diagnosis: · Altered GI function related to swallowing difficulty as evidenced by nutrition support-enteral nutrition Nutrition Interventions:  
Food and/or Nutrient Delivery: Start tube feeding Nutrition Education and Counseling: No recommendations at this time Coordination of Nutrition Care: Continue to monitor while inpatient, Interdisciplinary rounds Goals: 
Initiate tube feeds to meet >90% energy/protein needs next 1-3 days Nutrition Monitoring and Evaluation:  
Behavioral-Environmental Outcomes: None identified Food/Nutrient Intake Outcomes: Enteral nutrition intake/tolerance Physical Signs/Symptoms Outcomes: Weight, Biochemical data, Skin, Nutrition focused physical findings, GI status, Fluid status or edema Discharge Planning:   
Enteral nutrition Electronically signed by Karthik Washington RDN on 4/16/2021 at 10:12 AM 
 
Contact: [phone]

## 2021-04-16 NOTE — PROGRESS NOTES
[address] Dosing Services: Antimicrobial Stewardship Progress Note Automatic antibiotic dosing of Vancomyicn by Dr. Autumn Weber Pharmacist reviewed antibiotic appropriateness for 49year old , 60 kg, female  for indication of sepsis 2/2 HAP Day of Therapy 1 Plan: 
Vancomycin therapy: 
Vancomycin 1250 mg x 1 loading dose given. Follow with maintenance dose of 750 mg every 24 hours. Goal trough of 15-20 mcg/mL. Plan for level: prior third dose Pharmacy to follow daily and will make changes to dose and/or frequency based on clinical status. Pharmacist Irena CROCKETT:[phone]

## 2021-04-16 NOTE — H&P
Stacey [address] Office [phone] Fax [phone] Admission H&P Name: Carisa Brasher MRN: [id number]  Sex: Female YOB: 1970  Age: 49 y.o. PCP: Karen Gordon NP Source of Information: patient, medical records Chief complaint: Labored breathing. History of Present Illness Carisa Brasher is a 49 y.o. female medical history of Down Syndrome (non-verbal at baseline), S/p Trach and PEG tube, seizures, encephalomalacia, ANNE, h/o recurrent Cdiff colitis presents to the ED difficulty breathing. Patient is non-verbal at baseline, hx was obtained from her care giver [facility]). According to the care giver, patient was sent in because BP was elevated at164/94 the day of admission. She also noted heavy breathing and sweating that has been going on for a couple of days now, pt would soak through her clothing. Per caretaker, Afebrile, able to track with eyes, In the ED: 
Vitals: Temp: 98.5 F  BP: 135/79  HR: 149 RR: 65  SatO2  91 % on 9l/min trach collar Labs: wbc: 8.0 hbg: 10 MCV: 102 Cr: 1.43 AST: 44 Alk phos: 144. LA: 4.19 Imaging: CXR: Diffuse patchy opacities are seen in the lungs bilaterally. Differential diagnostic possibilities include pneumonia and edema Treatment: Zosyn 3.375 g Patient Vitals for the past 12 hrs: 
 Temp Pulse Resp BP SpO2  
04/16/21 0739 98.6 °F (37 °C) 70 17 (!) 90/50 96 % 04/16/21 0700  66     
04/16/21 0601  94 30 (!) 110/94   
04/16/21 0550  (!) 123     
04/16/21 0548 98.9 °F (37.2 °C) (!) 102 (!) 40 (!) 130/101 97 % 04/16/21 0415 97.3 °F (36.3 °C) 98 23 109/70 99 % 04/16/21 0400  93 21  99 % 04/16/21 0345  80 16 (!) 87/57 100 % 04/16/21 0330  79 16 (!) 92/54 100 % 04/16/21 0315  77 17 (!) 94/52 100 % 04/16/21 0300  92 21 (!) 91/54 98 % 04/16/21 0245  86 16 91/62 99 % 04/16/21 0230  80 10 (!) 87/53 100 % 04/16/21 0215  86 17 (!) 86/60 97 % 04/16/21 0200  87 18 (!) 102/56 97 % 04/16/21 0145  94 18 (!) 88/53 98 % 04/16/21 0130  91 19 100/67 98 % 04/16/21 0115  92 19 107/64 98 % 04/16/21 0100  94 18 110/72 97 % 04/16/21 0045  87 19 107/70 97 % 04/16/21 0030  91 22 99/63 96 % 04/16/21 0021     97 % 04/15/21 2315  (!) 104 27 (!) 88/51 93 % 04/15/21 2303 98.5 °F (36.9 °C) (!) 120 (!) 36 135/79 (!) 88 % 04/15/21 2300  (!) 149 (!) 65 135/79 91 % Review of Systems (ROS by Caretaker, given pt is non-verbal) Review of Systems Constitutional: Positive for diaphoresis. Negative for fever. HENT: Negative for nosebleeds. Eyes: Negative for discharge. Respiratory: Negative for cough. Cardiovascular: Negative for leg swelling. Gastrointestinal: Negative for nausea and vomiting. Musculoskeletal: Negative for falls. Skin: Negative for rash. Neurological: Negative for seizures. Endo/Heme/Allergies: Negative for environmental allergies. Home Medications Prior to Admission medications Medication Sig Start Date End Date Taking? Authorizing Provider  
oxyCODONE IR (ROXICODONE) 5 mg immediate release tablet 1 Tab by Per G Tube route every six (6) hours for 7 days. Max Daily Amount: 20 mg. 4/14/21 4/21/21  Murray Bravo MD  
naloxone [facility]) 4 mg/actuation nasal spray Use 1 spray intranasally, then discard. Repeat with new spray every 2 min as needed for opioid overdose symptoms, alternating nostrils. 4/14/21   Murray Bravo MD  
doxycycline (ADOXA) 100 mg tablet 1 Tab by Per G Tube route two (2) times a day for 8 doses. 4/15/21 4/19/21  Murray Bravo MD  
amoxicillin-clavulanate (AUGMENTIN) 875-125 mg per tablet 1 Tab by Per G Tube route every twelve (12) hours for 10 doses. 4/14/21 4/19/21  Murray Bravo MD  
midodrine (PROAMATINE) 5 mg tablet 3 Tabs by Per G Tube route every eight (8) hours for 30 days.  4/14/21 5/14/21  Murray Bravo MD  
glycopyrrolate (ROBINUL) 1 mg tablet 1 Tab by Per G Tube route three (3) times daily as needed (increased secretions from tracheal tube) for up to 30 doses. 4/14/21   Linda Randall MD  
potassium bicarb-citric acid (EFFER-K) 20 mEq tablet 1 Tab by Per G Tube route daily for 30 days. Indications: low amount of potassium in the blood 4/14/21 5/14/21  Linda Randall MD  
LORazepam (ATIVAN) 0.5 mg tablet 1 Tab by Per G Tube route every six (6) hours as needed for Anxiety or Agitation for up to 5 days. Max Daily Amount: 2 mg. 4/14/21 4/19/21  Alma Quintero DO  
phenytoin (DILANTIN) 100 mg/4 mL susp oral suspension 4 mL by PEG Tube route every eight (8) hours for 30 days. 4/12/21 5/12/21  Linda Randall MD  
cetirizine (ZYRTEC) 10 mg tablet 10 mg by Per G Tube route daily. Provider, Historical  
omeprazole (PRILOSEC) 40 mg capsule Take 40 mg by mouth two (2) times a day. Provider, Historical  
guaiFENesin (Siltussin SA) 100 mg/5 mL liquid Take 200 mg by mouth four (4) times daily. Provider, Historical  
torsemide (DEMADEX) 20 mg tablet Take 40 mg by mouth daily. Provider, Historical  
albuterol-ipratropium (DUO-NEB) 2.5 mg-0.5 mg/3 ml nebu 3 mL by Nebulization route as needed for Shortness of Breath. Provider, Historical  
melatonin 3 mg tablet 1 Tab by Per G Tube route nightly as needed for Insomnia. Must give by 10pm 10/8/20   Sidney Sawyer DO  
diazePAM (VALIUM) 5-7.5-10 mg kit Insert 10 mg into rectum as needed (for seizures that last longer than 5 minutes). Provider, Historical  
OTHER,NON-FORMULARY, by Per G Tube route four (4) times daily. OSMOLITE 1.2 Marino Liquid    Provider, Historical  
levETIRAcetam (KEPPRA) 100 mg/mL solution 750 mg by Per G Tube route every twelve (12) hours. Provider, Historical  
albuterol-ipratropium (DUO-NEB) 2.5 mg-0.5 mg/3 ml nebu 3 mL by Nebulization route every 4 hours daily while awake resp. Provider, Historical  
 
 
Allergies Allergies Allergen Reactions  Depakote [Divalproex] Swelling Past Medical History Past Medical History:  
Diagnosis Date  Acute cystitis without hematuria 4/3/2021  VICK (acute kidney injury) ([facility] 3/31/2021  
 C. difficile diarrhea 3/28/2020  Clostridium difficile diarrhea 6/30/2020  Constipation 12/11/2019  Diarrhea in adult patient 3/28/2020  Down syndrome  Elevated Dilantin level 4/3/2021  
 History of vascular access device 06/30/2020  
 4 Fr midline, 10 cm L brachial, poor access  History of vascular access device 04/01/2021 [facility] VAT 5fr double PICC LFT brachial at 39cm, arm cir 31 cm   
 Oral thrush 3/29/2020  Positive fecal occult blood test 3/29/2020  Seizures ([facility] [address]  Sleep apnea   
 humidifier and oxygen w/trach  Status epilepticus ([facility] 3/30/2021  Stroke [facility]) 2019 \"old stroke seen on CT\"  Thrombocytosis ([facility] 3/31/2021  VAP (ventilator-associated pneumonia) ([facility] 4/9/2021 Previous Hospitalization(s) Past Surgical History:  
Procedure Laterality Date  COLONOSCOPY N/A 7/8/2020 COLONOSCOPY with fecal transplant (consents in red file) performed by Aidee Welch MD at [address] HX GI    
 gtube 2/2020  HX OTHER SURGICAL    
 tracheostomy 2/2020 Family History Family History Family history unknown: Yes  
 
 
Social History Social History Socioeconomic History  Marital status: SINGLE Spouse name: Not on file  Number of children: Not on file  Years of education: Not on file  Highest education level: Not on file Occupational History  Not on file Social Needs  Financial resource strain: Not on file  Food insecurity Worry: Not on file Inability: Not on file  Transportation needs Medical: Not on file Non-medical: Not on file Tobacco Use  Smoking status: Never Smoker  Smokeless tobacco: Never Used Substance and Sexual Activity  Alcohol use: Never Frequency: Never  Drug use: Never  Sexual activity: Never Lifestyle  Physical activity Days per week: Not on file Minutes per session: Not on file  Stress: Not on file Relationships  Social connections Talks on phone: Not on file Gets together: Not on file Attends Bahai service: Not on file Active member of club or organization: Not on file Attends meetings of clubs or organizations: Not on file Relationship status: Not on file  Intimate partner violence Fear of current or ex partner: Not on file Emotionally abused: Not on file Physically abused: Not on file Forced sexual activity: Not on file Other Topics Concern [address] Service Not Asked  Blood Transfusions Not Asked  Caffeine Concern Not Asked  Occupational Exposure Not Asked Marnell Ai Hazards Not Asked  Sleep Concern Not Asked  Stress Concern Not Asked  Weight Concern Not Asked  Special Diet Not Asked  Back Care Not Asked  Exercise Not Asked  Bike Helmet Not Asked  Seat Belt Not Asked  Self-Exams Not Asked Social History Narrative ** Merged History Encounter ** Alcohol history: Not at all Smoking history: Non-smoker Illicit drug history: Not at all Ambulates: Bedbound Vital Signs Visit Vitals BP (!) 90/50 (BP 1 Location: Right upper arm, BP Patient Position: At rest) Pulse 70 Temp 98.6 °F (37 °C) Resp 17 Ht 4' 9\" (1.448 m) Wt 132 lb (59.9 kg) SpO2 96% BMI 28.56 kg/m² Physical Exam 
General: No acute distress. Alert. Able to track with eyes. Non-verbal 
Head: Normocephalic. Atraumatic Respiratory: CTAB. No w/r/r/c. 
Cardiovascular: Tachycardic No m/r/g. GI: + bowel sounds. Nontender. No rebound tenderness or guarding. Nondistended. Neuro: Alert. Non-verbal.  
 
Laboratory Data Recent Results (from the past 8 hour(s)) PHENYTOIN Collection Time: 04/16/21 12:03 AM  
Result Value Ref Range Phenytoin 7.9 (L) 10.0 - 20.0 ug/mL SAMPLES BEING HELD  Collection Time: 04/16/21 12:03 AM  
Result Value Ref Range SAMPLES BEING HELD NO SAMPLE RECEIVED COMMENT Add-on orders for these samples will be processed based on acceptable specimen integrity and analyte stability, which may vary by analyte. CULTURE, BLOOD, PAIRED Collection Time: 04/16/21 12:03 AM  
 Specimen: Blood Result Value Ref Range Special Requests: NO SPECIAL REQUESTS Culture result: NO GROWTH AFTER 7 HOURS    
NT-PRO BNP Collection Time: 04/16/21 12:03 AM  
Result Value Ref Range NT pro- (H) <125 PG/ML  
TROPONIN I Collection Time: 04/16/21 12:03 AM  
Result Value Ref Range Troponin-I, Qt. <0.05 <0.05 ng/mL PROCALCITONIN Collection Time: 04/16/21  5:30 AM  
Result Value Ref Range Procalcitonin 0.23 ng/mL METABOLIC PANEL, COMPREHENSIVE Collection Time: 04/16/21  5:30 AM  
Result Value Ref Range Sodium 142 136 - 145 mmol/L Potassium 3.5 3.5 - 5.1 mmol/L Chloride 108 97 - 108 mmol/L  
 CO2 26 21 - 32 mmol/L Anion gap 8 5 - 15 mmol/L Glucose 107 (H) 65 - 100 mg/dL BUN 25 (H) 6 - 20 MG/DL Creatinine 1.17 (H) 0.55 - 1.02 MG/DL  
 BUN/Creatinine ratio 21 (H) 12 - 20 GFR est AA 59 (L) >60 ml/min/1.73m2 GFR est non-AA 49 (L) >60 ml/min/1.73m2 Calcium 9.5 8.5 - 10.1 MG/DL Bilirubin, total 0.3 0.2 - 1.0 MG/DL  
 ALT (SGPT) 32 12 - 78 U/L  
 AST (SGOT) 38 (H) 15 - 37 U/L Alk. phosphatase 146 (H) 45 - 117 U/L Protein, total 8.2 6.4 - 8.2 g/dL Albumin 2.8 (L) 3.5 - 5.0 g/dL Globulin 5.4 (H) 2.0 - 4.0 g/dL A-G Ratio 0.5 (L) 1.1 - 2.2    
CBC WITH AUTOMATED DIFF Collection Time: 04/16/21  5:30 AM  
Result Value Ref Range WBC 8.0 3.6 - 11.0 K/uL  
 RBC 3.24 (L) 3.80 - 5.20 M/uL  
 HGB 10.0 (L) 11.5 - 16.0 g/dL HCT 33.1 (L) 35.0 - 47.0 % .2 (H) 80.0 - 99.0 FL  
 MCH 30.9 26.0 - 34.0 PG  
 MCHC 30.2 30.0 - 36.5 g/dL  
 RDW 16.5 (H) 11.5 - 14.5 % PLATELET 752 236 - 097 K/uL MPV 10.2 8.9 - 12.9 FL  
 NRBC 0.0 0  WBC ABSOLUTE NRBC 0.00 0.00 - 0.01 K/uL NEUTROPHILS 69 32 - 75 % LYMPHOCYTES 21 12 - 49 % MONOCYTES 9 5 - 13 % EOSINOPHILS 0 0 - 7 % BASOPHILS 1 0 - 1 % IMMATURE GRANULOCYTES 0 0.0 - 0.5 % ABS. NEUTROPHILS 5.5 1.8 - 8.0 K/UL  
 ABS. LYMPHOCYTES 1.7 0.8 - 3.5 K/UL  
 ABS. MONOCYTES 0.7 0.0 - 1.0 K/UL  
 ABS. EOSINOPHILS 0.0 0.0 - 0.4 K/UL  
 ABS. BASOPHILS 0.1 0.0 - 0.1 K/UL  
 ABS. IMM. GRANS. 0.0 0.00 - 0.04 K/UL  
 DF AUTOMATED    
LACTIC ACID Collection Time: 04/16/21  6:45 AM  
Result Value Ref Range Lactic acid 1.3 0.4 - 2.0 MMOL/L Imaging CXR Results  (Last 48 hours) 04/15/21 2311  XR CHEST PORT Final result Impression:  Diffuse patchy opacities are seen in the lungs bilaterally. Differential diagnostic possibilities include pneumonia and edema. Narrative:  EXAM: XR CHEST PORT INDICATION: sob COMPARISON: April 12 FINDINGS: A portable AP radiograph of the chest was obtained at 2307 hours. The  
tracheostomy tube is stable. The patient is on a cardiac monitor. There are  
diffuse patchy opacities in the lungs bilaterally, most significantly the right  
lower lobe. The cardiac and mediastinal contours and pulmonary vascularity are  
normal.  The bones and soft tissues are grossly within normal limits. CT Results  (Last 48 hours) None Assessment and Plan Jon Reynoso is a 49 y.o. female medical history of Down Syndrome (non-verbal at baseline), S/p Trach and PEG tube, seizures, encephalomalacia, ANNE, h/o recurrent Cdiff colitis admitted for severe sepsis. Severe sepis 2/2 HAP: POA: HR: 149 RR: 65 POC LA: 4.19. CXR: Diffuse patchy opacities are seen in the lungs bilaterally. Patient was admitted from 3/30 - 4/14 for sepsis 2/2 HAP. Pt was discharged on Doxy and Augmentin. - Admit to telemetry - Vitals per unit protocol 
- CBC/ CMP daily - Zosyn/Vanc/Levaquin - Follow up on blood culture and sputum culture. Acute respiratory failure/trach dependence: Patient is currently on 9 l/min of oxgyen via trach collar. likely 2/2 Severe sepsis 2/2 HAP 
- Treatment for HAP as above. - wean oxygen as tolerated.  
  
Hypotension: POA blood pressure: 135/79  
- Continue home Midodrine 15mg TID 
- Continue to monitor BP Elevated BNP: POA: 388. No baseline BNP level. Echo (03/29/2020): left ventricular ejection fraction is 50 - 55%. No regional wall motion abnormality noted. Due to severe sepsis, hydration is needed. - Will continue to monitor respiratory status. - Consider repeat Echo if worsening respiratory status. Seizures:  POA phenytoin level: 7.9 subtherapeutic level. - Continue home Keppra 750 mg BID 
- Continue Phenytoin 100 mg every 8 hours - Follow up on Keppra level. Anemia of chronic disease: POA Hbg: 10.0 Bl: 9-10. Stable. Iron studies in 2021, no need for repeat. - CBC daily  
   
VICK:  POA cr: 1.43. Bl (0.9) likely 2/2 severe sepsis 2/2 HAP. Will improve with IV hydration. - MIVF 
   
Chronic UE edema: Stable 
- Hold home torsemide 40mg daily given elevated cr.  
  
GERD: stable. Omeprazole 40mg BID   
- Protonix 40 mg BID 
 
 
FEN/GI - NPO. 100 ml/hr Activity - Pt is bed bound DVT prophylaxis - Lovenox GI prophylaxis - Protonix Fall prophylaxis - Not indicated at this time. Disposition - Admit to Telemetry. Plan to d/c to Home. Code Status - Full, discussed with patient / caregivers. Next of Kin Name and Contact: Silvia Vargas: [phone] Patient Nonda Lines will be discussed with Dr. Slim Middleton. 4:13 AM, 04/16/21 Stephanie Mejia MD 
Family Medicine Resident For Billing Chief Complaint Patient presents with  Shortness of Breath Hospital Problems  Date Reviewed: 2/3/2021 Codes Class Noted POA Pneumonia ICD-10-CM: J18.9 ICD-9-CM: 907  4/16/2021 Unknown  * (Principal) Severe sepsis ([facility] ICD-10-CM: A41.9, R65.20 ICD-9-CM: 038.9, 995.92  4/16/2021 Unknown Anemia ICD-10-CM: D64.9 ICD-9-CM: 285.9  4/16/2021 Yes Chronic static encephalopathy ICD-10-CM: G93.49 
ICD-9-CM: 348.39  4/16/2021 Yes Swelling of hand ICD-10-CM: M79.89 ICD-9-CM: 729.81  4/16/2021 Yes Subtherapeutic phenytoin level ICD-10-CM: Z51.81 
ICD-9-CM: V58.83  4/16/2021 Unknown Tracheostomy dependence ([facility] ICD-10-CM: Z93.0 ICD-9-CM: V44.0  4/16/2021 Unknown Acute respiratory failure with hypoxia ([facility] ICD-10-CM: J96.01 
ICD-9-CM: 518.81  4/4/2021 Yes Acute kidney injury ([facility] ICD-10-CM: N17.9 ICD-9-CM: 584.9  3/31/2021 Unknown Inability to walk ICD-10-CM: R26.2 ICD-9-CM: 719.7  6/9/2020 Yes Seizure ([facility] ICD-10-CM: R56.9 ICD-9-CM: 780.39  12/11/2019 Yes Down's syndrome ICD-10-CM: Q90.9 ICD-9-CM: 758.0  12/11/2019 Yes Iron deficiency ICD-10-CM: E61.1 ICD-9-CM: 280.9  12/11/2019 Yes Severe intellectual disability ICD-10-CM: F72 
ICD-9-CM: 318.1  2/2/2012 Yes Overview Signed 2/2/2012  1:40 PM by Jaxon Bauman MD  
  [facility]

## 2021-04-16 NOTE — PROGRESS NOTES
Bedside and Verbal shift change report given to GM RN  (oncoming nurse) by CP RN  (offgoing nurse). Report included the following information SBAR, Kardex and Recent Results. Tamra Chester This patient was assisted with Intentional Toileting every 2 hours during this shift. Documentation of ambulation and output reflected on Flowsheet. .. Bedside and Verbal shift change report given to [address]  (oncoming nurse) by Araceli Naidu RN  (offgoing nurse). Report included the following information SBAR, Kardex and Recent Results. .. Late entrey. .. 
 
07/16/2021. Tamra Chester At this Evening along with all others medicine lorazem 0.5 mg given by PEG tube. Not sure why the medicine did not scan, could be the BAR CODE was broken. .. The medicine was pulled out by charge nurse Darek Rosales and she crush it and given to me . . pt was isolation due to R/O covid 19. .. Tamra Chester

## 2021-04-16 NOTE — PROGRESS NOTES
Spiritual Care Assessment/Progress Note Samuel Hillman 
 
 
NAME: Alee Lauren      MRN: [id number] AGE: 49 y.o. SEX: female Episcopalian Affiliation: No preference Language: [place] 4/16/2021 Spiritual Assessment begun in [facility] TELE 2 through conversation with: 
  
    []Patient        [] Family    [] Friend(s) Reason for Consult: Palliative Care, Initial/Spiritual Assessment Spiritual beliefs: (Please include comment if needed) 
   [] Identifies with a tj tradition:     
   [] Supported by a tj community:        
   [] Claims no spiritual orientation:       
   [] Seeking spiritual identity:            
   [] Adheres to an individual form of spirituality:       
   [x] Not able to assess:                   
 
    
Identified resources for coping:  
   [] Prayer                           
   [] Music                  [] Guided Imagery 
   [] Family/friends                 [] Pet visits [] Devotional reading                         [] Unknown 
   [] Other:                                          
 
 
Interventions offered during this visit: (See comments for more details) Plan of Care: 
 
 [] Support spiritual and/or cultural needs  
 [] Support AMD and/or advance care planning process    
 [] Support grieving process 
 [] Coordinate Rites and/or Rituals  
 [] Coordination with community clergy [] No spiritual needs identified at this time 
 [] Detailed Plan of Care below (See Comments)  [] Make referral to Music Therapy 
[] Make referral to Pet Therapy    
[] Make referral to Addiction services 
[] Make referral to [facility] 
[] Make referral to Spiritual Care Partner 
[] No future visits requested       
[] Follow up upon further referrals Comments: It was noted that Ms Otis Dinh in room 324 was on Droplet Plus Isolation Precautions;  did not enter her room for spiritual assessment.  Chart indicated that patient was non-verbal, therefore, no attempt was made to contact her by phone. Chaplains will be available for patient/family support by phone as able/desired. : Rev. Carlo Coto. Sergio Anaya; [facility], to contact [phone] [address] call: [phone]

## 2021-04-16 NOTE — PROGRESS NOTES
Reason for Re-Admission:   Severe sepsis, increased blood pressure, shortness of breath, diaphoresis, CKI, acute hypoxic respiratory failure. Hx - Down's syndrome, non-verbal, PEG and tracheostomy, colitis, GERD, c.diff RUR Score:     22%/ moderate risk PCP: First and Last name:   Jatinder White NP Name of Practice:  
 Are you a current patient: Yes/No: yes Approximate date of last visit:  
 Can you participate in a virtual visit if needed: no Do you (patient/family) have any concerns for transition/discharge? Oxygenation requirements. Plan for utilizing home health:   Open to Advance Care. Current Advanced Directive/Advance Care Plan:  Full Code Healthcare Decision Maker:  
Click here to complete [employer] including selection of the Healthcare Decision Maker Relationship (ie \"Primary\") Primary Decision Maker (Active): Irineo Zayas Mother - [phone] Transition of Care Plan:       
Chart reviewed, demographics verified. CM role and follow up discussed. CM assessment completed via EMR review and collaboration with nursing staff. Phone call with patient's mother [facility] [phone], and caregiver Kalpana Mariam [phone]. No contact with patient for this assessment. Patient lives in an adult group home, primary caregiver is Kalpana Becerra. Patient has prescription drug coverage, uses [facility] pharmacy. Patient requires assistance with all care needs, is non-verbal with a trach and PEG tube. Current status: 
Patient currently requiring medical management including oxygen at 10L, IV antibiotics, PEG and trach tube (present on admission). Spoke with patient's mother via phone [phone], she states patient resides at an adult group home and her primary cargiver is Kalpana Becerra.   Discussed possible LTAC placement, she states she does not want patient to be placed at an LTAC but wants her to return to the adult group home where she resides. From prior hospitalization, several LTAC facilities were contacted. [facility] declined patient due to insurance, family declined placement at [facility]. [facility] accepted patient and would need COVID negative 24 prior to admission to facility. Patient did not qualify for home oxygen last admission. Spoke with Shannon Carroll, caregiver, who states patient returned to hospital due to increased respiratory effort and tachycardia. Readmission Assessment Number of days since last admission?: 1-7 days(adult group home) Previous disposition: Other (comment) Who is being interviewed?: Caregiver What was the patient's/caregiver's perception as to why they think they needed to return back to the hospital?: Other (Comment)(dyspnea and tachycardia) Did you visit your Primary Care Physician after you left the hospital, before you returned this time?: No 
Why weren't you able to visit your PCP?: Other (Comment)(readmission to hospital) Did you see a specialist, such as Cardiac, Pulmonary, Orthopedic Physician, etc. after you left the hospital?: No 
Who advised the patient to return to the hospital?: Caregiver Does the patient report anything that got in the way of taking their medications?: No 
In our efforts to provide the best possible care to you and others like you, can you think of anything that we could have done to help you after you left the hospital the first time, so that you might not have needed to return so soon?: Additional Community resources available for illness support, Arrange for more help when leaving the hospital 
 
PLAN: 
1. Monitor patient response to treatment and recommendations. 2. Medical management continues. 3. Plan is for patient to return to adult group home at [place]. 4. Patient transport home per ambulance transport at discharge.  
5. CM to monitor clinical progress and disposition recommendations. Care Management Interventions PCP Verified by CM: Hunter Sands NP) Mode of Transport at Discharge: BLS Transition of Care Consult (CM Consult): Group Home Current Support Network: Adult Group Home Confirm Follow Up Transport: Other (see comment)(per group home) The Plan for Transition of Care is Related to the Following Treatment Goals : return to group home at [place] Discharge Location Discharge Placement: Group home Roseline Rob RN, MSN, Care manager

## 2021-04-16 NOTE — WOUND CARE
Wound Consult:  New Patient Visit. Chart reviewed. Consulted for skin check/heels. Spoke with patients nurse,  Marisela Conley and we turned, assessed and provided care at bedside. Patient is resting on a Styker bed with isolfex mattress. Heels off loaded with boots at conclusion of visit. Patient is known to me from recent inpatient stay - discharge on 4/14; requires one to two person assist to move side to side in bed. Rolf score 9. Assessment: 
Large loose BM and urine at time of visit - slightly pink on buttocks secondary to incontinence, no redness in sacral area or indication of pressure injury. Left heel with an small area of non-blanching pink intact skin, ~ 1.5 x 1.2 cm, POA Stage 1. Right plantar/lateral heel with rough dark area, callus like, no surrounding redness, noted same on previous admission. POA. No redness to ankles/sides of feet, knees, elbows, spine or hips. Groin slightly pink. Treatment: 
Boots applied. Wound Recommendations: 
Desitin ointment to buttocks/perianal area TID. Mycostatin powder to groin BID. Off loading heel boots. Skin Care / PI Prevention Recommendations: 1. Minimize friction/shear: minimize layers of linen/pads under patient. 2. Off load pressure/reposition:  turn and reposition approximately every 2 hours; float heels with  off loading heel boots. 3. Manage Moisture - keep skin folds dry; incontinence skin care with incontinence wipes; Desitin barrier ointment; appropriate sized briefs if needed; Pure wick in use to help contain urine; barrier for skin with stool. 4. Continue to monitor nutrition, pain, and skin risk scale, and skin assessment. Plan: 
Spoke with Dr. Mckay Mandujano  regarding findings and proposed orders for treatment. We will continue to reassess routinely and as needed. Please re-consult should concerns arise despite continued skin/PI prevention measures. Nandini Daniel RN,[facility], Wound / Ostomy Department Wound Healing Office [phone]

## 2021-04-16 NOTE — PROGRESS NOTES
[id number] [facility] PROGRAM 
PROGRESS NOTE  
 
4/17/2021 PCP: Noe Vaughn NP Assessment/Plan:  
 
Marcial Van is a 49 y.o. female admitted for sepsis 2/2 HAP. Patient was admitted on 4/15/2021. 
 
24 Hour Events: Rapid response called for tachypnea and tachycardia, CXR shows unchanged b/l airspace dz. Morphine 0.5mg x1 given. Sepis 2/2 HAP: POA: HR: 149 RR: 65 POC LA: 4.19 > resolved. CXR 4/16 w/ persistent b/l airspace disease. Patient was admitted from 3/30 - 4/14 and was treated for VAP/pseudomonas UTI (s/p full couse zosyn) as well as for HAP. Pt was discharged on Doxy and Augmentin. BCx 4/16 NGTD, resp cx prelim NG. 
- CBC/ CMP daily - Zosyn/Vanc/Levaquin - will consider weaning broad spectrum coverage - Follow up on blood culture and sputum culture Tachypnea and tachycardia: Intermittent. Pt w/ multiple similar episodes during last hospital stay as well as at home prior to this admission. Unclear etiology however could be a component of dysautonomia related to pt's hx of significant seizures vs agitation vs seizure-like activity. -Ativan 0.5mg Q6h PRN and morphine 1mg Q4h PRN agitation 
-Oxycodone 5mg Q6h scheduled 
  
Acute respiratory failure/trach dependence: Patient is currently on 9 l/min of oxgyen via trach collar. likely 2/2 Severe sepsis 2/2 HAP 
- Treatment for HAP as above - Wean oxygen as tolerated - Palliative consulted 
  
Hypotension: POA blood pressure: 135/79  
- Continue home Midodrine 15mg TID 
- Continue to monitor BP 
  
Elevated BNP: POA: 388. No baseline BNP level. Echo (03/29/2020): left ventricular ejection fraction is 50 - 55%. No regional wall motion abnormality noted. Due to severe sepsis, hydration is needed. - Will continue to monitor respiratory status  
  
Seizures:  POA phenytoin level: 7.9 subtherapeutic level.  Possible seizure-like activity noted by group home staff prior to admission. 
- Continue home Keppra 750 mg BID - Per neuro home phenytoin switched to 100mg tab Q8h 
  
Anemia of chronic disease: POA Hbg: 10.0 Bl: 9-10. Stable. Iron studies in 2021, no need for repeat. - CBC daily  
   
VICK:  POA cr: 1.43. Bl (0.9) likely 2/2 severe sepsis 2/2 HAP. Will improve with IV hydration.  
- D/c MIVF due to inc UE swelling 
   
Chronic UE edema: Stable - Restart home torsemide 40mg daily as creatinine improved  
  
GERD: stable. Omeprazole 40mg BID   
- Sub protonix 40 mg BID 
  
  
FEN/GI - NPO. Ng tube feeds. Activity - Pt is bed bound DVT prophylaxis - Lovenox GI prophylaxis - Protonix Fall prophylaxis - Not indicated at this time. Code Status - Full, discussed with patient / caregivers. Next of Kin Name and Contact: Zelda Williamson: [phone] Roney Riddle MD 
Family Medicine Resident Clara Brewer discussed with Dr. Mariana Howard. Subjective: Pt was seen and examined at bedside. Afebrile and hemodynamically stable. Concerns overnight include: episodes of tachypnea and tachycardia. Objective:  
Physical examination Patient Vitals for the past 24 hrs: 
 Temp Pulse Resp BP SpO2  
04/17/21 0355 99.1 °F (37.3 °C) (!) 140 27 (!) 142/79 100 % 04/17/21 0354   21  93 % 04/17/21 0353  (!) 143 (!) 39  100 % 04/17/21 0352   23  99 % 04/17/21 0351  (!) 133 29  100 % 04/17/21 0350  (!) 149 29  100 % 04/17/21 0349  (!) 141 27  95 % 04/17/21 0348  (!) 126 20  100 % 04/17/21 0347  (!) 135 (!) 31  96 % 04/17/21 0346  (!) 145 (!) 47  98 % 04/17/21 0345  (!) 129 24  99 % 04/17/21 0344  (!) 146 29  100 % 04/17/21 0343   (!) 39  99 % 04/17/21 0342   30  98 % 04/17/21 0341   (!) 32  100 % 04/17/21 0340  (!) 141 27  100 % 04/17/21 0339   28  100 % 04/17/21 0338  (!) 127 26  99 % 04/17/21 0337  (!) 129 27  100 % 04/17/21 0336  (!) 142 (!) 41  99 % 04/17/21 0335  (!) 140 (!) 33  100 % 04/17/21 0334  (!) 125 (!) 31  100 % 04/17/21 0333  (!) 124 22  99 % 04/17/21 0332  (!) 139 26  100 % 04/17/21 0331  (!) 133 30  98 % 04/17/21 0330  (!) 119 24  99 % 04/17/21 0329  (!) 117 28  100 % 04/17/21 0328  (!) 120 28  100 % 04/17/21 0327  (!) 132 (!) 35  100 % 04/17/21 0326  (!) 121 29  100 % 04/17/21 0325  (!) 122 26  100 % 04/17/21 0324  (!) 120 30  100 % 04/17/21 0323  (!) 125 (!) 38  100 % 04/17/21 0322  (!) 113 17  100 % 04/17/21 0321  (!) 117 25  100 % 04/17/21 0320  (!) 121 24  100 % 04/17/21 0319  (!) 118 21  100 % 04/17/21 0318  (!) 110 22  100 % 04/17/21 0317  (!) 105 17  100 % 04/17/21 0316  92 21  100 % 04/17/21 0315  68 17  100 % 04/17/21 0314  68 14  100 % 04/17/21 0313  68 13  100 % 04/17/21 0312  71 15  100 % 04/17/21 0311  69 17  100 % 04/17/21 0310  69 14  100 % 04/17/21 0309  71 18  100 % 04/17/21 0308  70 15  99 % 04/17/21 0307  70 18  100 % 04/17/21 0306  72 17  100 % 04/17/21 0305  69 17  100 % 04/17/21 0304  68 16  100 % 04/17/21 0303  72 15  100 % 04/17/21 0302  77 17  100 % 04/17/21 0301  72 16  100 % 04/17/21 0300  72 18  100 % 04/17/21 0259  78 16  100 % 04/17/21 0258  71 16  100 % 04/17/21 0257  73 18  100 % 04/17/21 0256  72 17  100 % 04/17/21 0255  74 16  100 % 04/17/21 0254  75 19  100 % 04/17/21 0253  73 19  100 % 04/17/21 0252  72 19  100 % 04/17/21 0251  73 16  100 % 04/17/21 0250  73 15  100 % 04/17/21 0249  74 15  100 % 04/17/21 0247  73 15  100 % 04/17/21 0246  72 15  100 % 04/17/21 0245  73 14  100 % 04/17/21 0244  73 13  100 % 04/17/21 0243  74 14  99 % 04/17/21 0242  74 14  99 % 04/17/21 0241  73 13  99 % 04/17/21 0240  73 13  99 % 04/17/21 0239  73 14  99 % 04/17/21 0238  73 14  99 % 04/17/21 0237  74 14  99 % 04/17/21 0236  74 14  99 % 04/17/21 0235  74 14  99 % 04/17/21 0234  74 14  99 % 21 0233  76 14  99 % 21 0232  75 14  99 % 21 0231  76 15  99 % 21 0230  76 14  99 % 21 0229  74 14  99 % 21 0228  75 14  99 % 21 0227  77 15  99 % 21 0226  76 15  99 % 21 0225  76 15  99 % 21 0224  77 15  98 % 21 0223  76 14  98 % 21 0222  75 15  98 % 21 0042  (!) 151     
21 1600 99 °F (37.2 °C) 95 21 106/84 99 % 21 1527 99 °F (37.2 °C) 95 24 104/65 100 % 21 1500  83     
21 1400  91 22 109/73 100 % 21 1259    (!) 107/56   
21 1200 98.9 °F (37.2 °C) 76 18 (!) 88/48 93 % 21 1127 99.1 °F (37.3 °C) 93 21 (!) 108/57 98 % 21 1000  (!) 115 (!) 42 132/74 98 % 21 0800 98 °F (36.7 °C) 63 16 (!) 92/46 96 % 21 0739 98.6 °F (37 °C) 70 17 (!) 90/50 96 % Temp (24hrs), Av.8 °F (37.1 °C), Min:98 °F (36.7 °C), Max:99.1 °F (37.3 °C) O2 Flow Rate (L/min): 9 l/min O2 Device: Tracheal collar Date 21 - 21 8731 21 - 21 8266 Shift [id number] 24 Hour Total 1822-0132 0170-1939 24 Hour Total  
INTAKE  
P.O. 0 0 0     
  P. O. 0 0 0     
I. V.(mL/kg/hr) 1488.3(2.1) 100(0.1) 1588.3(1.1) Volume (0.9% sodium chloride infusion) 988.3  988. 3 Volume (vancomycin (VANCOCIN) 750 mg in 0.9% sodium chloride 250 mL (VIAL-MATE)) 250  250 Volume (levoFLOXacin (LEVAQUIN) 750 mg in D5W IVPB) 150  150 Volume (piperacillin-tazobactam (ZOSYN) 3.375 g in 0.9% sodium chloride (MBP/ADV) 100 mL MBP) 100 100 200 NG/GT  500 500 Water Flush Volume (mL) (PEG/Gastrostomy Tube )  450 450 Medication Volume (PEG/Gastrostomy Tube )  50 50 Shift Total(mL/kg) 1488. 3(24.9) 600(10) 2088. 3(34.9) OUTPUT Urine(mL/kg/hr) 400(0.6) 0(0) 400(0.3) Urine Voided 400  400 Urine Occurrence(s) 3 x 2 x 5 x   Urine Output (mL) (External Female Catheter 04/17/21)  0 0 Stool Stool Occurrence(s) 3 x  3 x Shift Total(mL/kg) 400(6.7) 0(0) 400(6.7) NET 1088. 3 600 1688. 3 Weight (kg) 59.9 59.9 59.9 59.9 59.9 59.9 General: No acute distress. Alert. Unable to track with eyes. Non-verbal, does not respond to voice or follow commands. Head: Normocephalic. Atraumatic Respiratory: Vesicular breath sounds. No w/r/r/c. 
Cardiovascular: Tachycardic. No m/r/g. GI: + bowel sounds. Nontender. No rebound tenderness or guarding. Nondistended. Extremities: BUE dependent edema, trace pitting edema to BLE Neuro: Alert. Non-verbal.  
 
Data Review: CBC: 
Recent Labs 04/16/21 
0530 04/15/21 
2351 WBC 8.0 9.2 HGB 10.0* 10.0* HCT 33.1* 32.0*  
 327 Metabolic Panel: 
Recent Labs 04/16/21 
0530 04/15/21 
2351  143  
K 3.5 3.6  109* CO2 26 25 BUN 25* 27* CREA 1.17* 1.43* * 125* CA 9.5 9.5 MG  --  2.4 ALB 2.8* 2.9* TBILI 0.3 0.2 ALT 32 35 Micro: 
Lab Results Component Value Date/Time Culture result: PENDING 04/16/2021 06:45 AM  
 Culture result: NO GROWTH 1 DAY 04/16/2021 12:03 AM  
 Culture result: NO GROWTH 5 DAYS 04/12/2021 04:55 PM  
 
Imaging: 
Ct Head Wo Cont Result Date: 3/30/2021 EXAM: CT HEAD WO CONT INDICATION: seizures COMPARISON: None. CONTRAST: None. TECHNIQUE: Unenhanced CT of the head was performed using 5 mm images. Brain and bone windows were generated. Coronal and sagittal reformats. CT dose reduction was achieved through use of a standardized protocol tailored for this examination and automatic exposure control for dose modulation. FINDINGS: There is an incidental cavum septum pellucidum. There is mild atrophy and compensatory dilatation of the ventricles. Areas of chronic encephalomalacia are seen in the right frontal lobe, right parietal lobe, and left frontal lobe.  There are incidental bilateral basal ganglia calcifications. There is no intracranial hemorrhage, extra-axial collection, or mass effect. The basilar cisterns are open. No CT evidence of acute infarct. The bone windows demonstrate no abnormalities. The visualized portions of the paranasal sinuses and mastoid air cells are clear. Significant chronic encephalomalacia in the right frontal lobe, right parietal lobe, and left frontal lobe. Cta Chest W Or W Wo Cont Result Date: 4/12/2021 Clinical history: Dyspnea INDICATION:   Dyspnea COMPARISON: None CONTRAST: 100 ml Isovue 370 TECHNIQUE: CT of the chest with  IV contrast , Isovue-370 is performed. Axial images from the thoracic inlet to the level of the upper abdomen are obtained. Manual post-processing of the images and coronal reformatting is also performed. CT dose reduction was achieved through use of a standardized protocol tailored for this examination and automatic exposure control for dose modulation. Multiplanar reformatted imaging was performed. Sagittal and coronal reformatting. 3-D Postprocessing of imaging was performed. 3-D MIP reconstructed images were obtained in the coronal plane. FINDINGS: There is no pulmonary embolism. There is no aortic aneurysm or dissection. Small right-sided pleural effusion. Minimal scattered groundglass opacity may related to volume overload. Mildly prominent right axillary lymph nodes. Tracheostomy in place. There is no mediastinal, axillary or hilar lymphadenopathy. The aorta is normal in course and caliber. The proximal pulmonary arteries are without evidence of filling defects. No lytic or blastic lesions are identified. The remainder of the upper abdomen visualized is unremarkable. Left subclavian PICC line catheter tip terminates in superior vena cava. There is no pulmonary embolism. There is no aortic aneurysm or dissection. Small right-sided pleural effusion.  Scattered interstitial and parenchymal opacities on the right and on the left may be related to volume overload alternatively multifocal infectious process. Xr Chest [facility] Result Date: 4/15/2021 EXAM: XR CHEST PORT INDICATION: sob COMPARISON: April 12 FINDINGS: A portable AP radiograph of the chest was obtained at 2307 hours. The tracheostomy tube is stable. The patient is on a cardiac monitor. There are diffuse patchy opacities in the lungs bilaterally, most significantly the right lower lobe. The cardiac and mediastinal contours and pulmonary vascularity are normal.  The bones and soft tissues are grossly within normal limits. Diffuse patchy opacities are seen in the lungs bilaterally. Differential diagnostic possibilities include pneumonia and edema. Xr Chest [facility] Result Date: 4/12/2021 EXAM: XR CHEST PORT INDICATION: Tachycardia and tachypnea COMPARISON: 4/5/2021 FINDINGS: A portable AP radiograph of the chest was obtained at 1145 hours. The patient is on a cardiac monitor. A tracheostomy tube is present. The PICC line extends into the heart. The cardiac mediastinal silhouette is unchanged. There is a diffuse bilateral airspace process, asymmetric to the left lung. Diffuse bilateral airspace disease compatible with pneumonia, asymmetric to the left lung. Xr Chest [facility] Result Date: 4/5/2021 INDICATION: Fever COMPARISON: 4/2/2021 FINDINGS: Single AP portable view of the chest obtained at 3:37 AM demonstrates no change in position of the lines and tubes. The cardiomediastinal silhouette is stable. Diffuse bilateral patchy airspace disease is increased. No pneumothorax is seen. There is no evidence of pleural effusion. Increased diffuse bilateral patchy airspace disease, compatible with worsening pneumonia. Xr Chest [facility] Result Date: 4/2/2021 EXAM: XR CHEST PORT INDICATION: Fever and leukocytosis. Status epilepticus. COMPARISON: Portable chest on 3/30/2021 and 4/1/2020. TECHNIQUE: Upright portable chest AP view FINDINGS: Tracheostomy tube is in good position. Left PICC line extends into the distal superior vena cava. Cardiac monitoring wires overlie the thorax. The cardiomediastinal and hilar contours are within normal limits. The pulmonary vasculature is within normal limits. Reticular interstitial opacities are decreased and minimal. No focal airspace opacity. Pleural spaces are clear. Bones are osteopenic. Decreased now minimal interstitial pulmonary edema versus interstitial pneumonia. No pneumothorax. Left PICC line is new and in good position. Xr Chest [facility] [address] Result Date: 3/30/2021 EXAM: XR CHEST PORT HISTORY: trach exchange. COMPARISON: 3/30/2021 FINDINGS: Portable AP. A tracheostomy tube is in place. The heart is normal size. There is diffuse interstitial prominence again seen likely related to edema. No pleural effusion or pneumothorax. Tracheostomy tube in appropriate position. Unchanged diffuse interstitial prominence likely related to edema. Xr Chest [facility] Result Date: 3/30/2021 EXAM: XR CHEST PORT HISTORY: Chest pain. COMPARISON: 4/1/2020 FINDINGS: Portable AP. A tracheostomy tube is in place. The heart is normal size. There is unchanged diffuse interstitial prominence likely related to edema. No focal consolidation, pleural effusion, or pneumothorax. No significant interval change. Xr Us Guided Vascular Access Result Date: 4/2/2021 INDICATION:   NO VENOUS ACCESS  EXAMINATION:  US GUIDE VAS ACCESS FINDINGS: Ultrasound was provided for PICC line placement. Ultrasound guidance for PICC line  placement. P Medications reviewed Current Facility-Administered Medications Medication Dose Route Frequency  vancomycin (VANCOCIN) 750 mg in 0.9% sodium chloride 250 mL (VIAL-MATE)  750 mg IntraVENous Q24H  piperacillin-tazobactam (ZOSYN) 3.375 g in 0.9% sodium chloride (MBP/ADV) 100 mL MBP  3.375 g IntraVENous Q8H  
 levoFLOXacin (LEVAQUIN) 750 mg in D5W IVPB  750 mg IntraVENous Q48H  
 sodium chloride (NS) flush 5-40 mL 5-40 mL IntraVENous Q8H  
 sodium chloride (NS) flush 5-40 mL  5-40 mL IntraVENous PRN  
 acetaminophen (TYLENOL) tablet 650 mg  650 mg Oral Q6H PRN Or  
 acetaminophen (TYLENOL) suppository 650 mg  650 mg Rectal Q6H PRN  
 enoxaparin (LOVENOX) injection 40 mg  40 mg SubCUTAneous DAILY  [Held by provider] 0.9% sodium chloride infusion  100 mL/hr IntraVENous CONTINUOUS  
 levETIRAcetam (KEPPRA) oral solution 750 mg  750 mg Per G Tube Q12H  
 midodrine (PROAMATINE) tablet 15 mg  15 mg Per G Tube Q8H  
 glycopyrrolate (ROBINUL) tablet 1 mg  1 mg Per G Tube TID PRN  
 guaiFENesin (ROBITUSSIN) 100 mg/5 mL oral liquid 200 mg  200 mg Oral QID  LORazepam (ATIVAN) tablet 0.5 mg  0.5 mg Per G Tube Q6H PRN  
 melatonin (rapid dissolve) tablet 5 mg  5 mg Oral QHS PRN  pantoprazole (PROTONIX) tablet 40 mg  40 mg Oral BID  phenytoin (DILANTIN) chewable tablet 100 mg  100 mg Per G Tube TID  diazePAM (VALIUM) injection 5 mg  5 mg IntraVENous ONCE PRN  
 diazePAM (VALIUM) injection 5 mg  5 mg IntraVENous Q5MIN PRN  
 zinc oxide (DESITIN) 13 % topical cream   Topical PRN  
 miconazole (MICOTIN) 2 % powder   Topical BID  torsemide (DEMADEX) tablet 40 mg  40 mg Per G Tube DAILY  morphine injection 1 mg  1 mg IntraVENous Q4H PRN Signed: 
 Shavon Pelayo MD 
 Resident, Family Medicine Attending note: Attending note to follow. ..

## 2021-04-16 NOTE — PROGRESS NOTES
TRANSFER - IN REPORT: 
 
Verbal report received from [facility] (name) on Yadi Spencer  being received from ED (unit) for routine progression of care Report consisted of patients Situation, Background, Assessment and  
Recommendations(SBAR). Information from the following report(s) SBAR, ED Summary and Recent Results was reviewed with the receiving nurse. Opportunity for questions and clarification was provided. 5767- Assessment completed upon patients arrival to unit and care assumed. Patient tachypneic and tachycardic on arrival to floor. Suctioned by RT. Patient stabilized. This patient was assisted with Intentional Toileting every 2 hours during this shift. Documentation of ambulation and output reflected on Flowsheet. 0730- Bedside and Verbal shift change report given to Tamica (oncoming nurse) by Alejandra Catherine (offgoing nurse). Report included the following information SBAR, ED Summary, Intake/Output and Recent Results.

## 2021-04-16 NOTE — PROGRESS NOTES
Admission Medication Reconciliation: 
  
Information obtained from:  
Pharmacist called and spoke with Becki Tesfaye  RxQuery data available¹:  YES Comments/Recommendations:  
Patient's care giver denies changes to allergies, preferred pharmacy, or medication list since discharge 21. Last doses confirmed with Becki Tesfaye. ¹RxQuery pharmacy benefit data reflects medications filled and processed through the patient's insurance, however  
this data does NOT capture whether the medication was picked up or is currently being taken by the patient. Prior to Admission Medications Prescriptions Last Dose Informant Taking? LORazepam (ATIVAN) 0.5 mg tablet  Care Giver Yes Si Tab by Per G Tube route every six (6) hours as needed for Anxiety or Agitation for up to 5 days. Max Daily Amount: 2 mg. OTHER,NON-FORMULARY, 4/15/2021 at Unknown time Care Giver Yes Sig: by Per G Tube route four (4) times daily. OSMOLITE 1.2 [place] Liquid  
albuterol-ipratropium (DUO-NEB) 2.5 mg-0.5 mg/3 ml nebu 4/15/2021 at Unknown time Care Giver Yes Sig: 3 mL by Nebulization route every 4 hours daily while awake resp. albuterol-ipratropium (DUO-NEB) 2.5 mg-0.5 mg/3 ml nebu  Care Giver Yes Sig: 3 mL by Nebulization route every four (4) hours as needed for Shortness of Breath. amoxicillin-clavulanate (AUGMENTIN) 875-125 mg per tablet 4/15/2021 at Unknown time [address] Yes Si Tab by Per G Tube route every twelve (12) hours for 10 doses. cetirizine (ZYRTEC) 10 mg tablet 4/15/2021 at Unknown time Care Giver Yes Sig: 10 mg by Per G Tube route daily. diazePAM (VALIUM) 5-7.5-10 mg kit  Care Giver Yes Sig: Insert 10 mg into rectum as needed (for seizures that last longer than 5 minutes). doxycycline (ADOXA) 100 mg tablet 4/15/2021 at Unknown time [address] Yes Si Tab by Per G Tube route two (2) times a day for 8 doses. glycopyrrolate (ROBINUL) 1 mg tablet  Care Giver Yes Si Tab by Per G Tube route three (3) times daily as needed (increased secretions from tracheal tube) for up to 30 doses. guaiFENesin (Siltussin SA) 100 mg/5 mL liquid 4/15/2021 at Unknown time Care Giver Yes Si mg by Per G Tube route four (4) times daily. levETIRAcetam (KEPPRA) 100 mg/mL solution 4/15/2021 at Unknown time [address] [facility] Yes Si mg by Per G Tube route every twelve (12) hours. melatonin 3 mg tablet 4/15/2021 at Unknown time [address] Yes Si Tab by Per G Tube route nightly as needed for Insomnia. Must give by 10pm  
midodrine (PROAMATINE) 5 mg tablet 4/15/2021 at Unknown time [address] [facility] Yes Sig: 3 Tabs by Per G Tube route every eight (8) hours for 30 days. naloxone [facility]) 4 mg/actuation nasal spray  Care Giver Yes Sig: Use 1 spray intranasally, then discard. Repeat with new spray every 2 min as needed for opioid overdose symptoms, alternating nostrils. omeprazole (PRILOSEC) 40 mg capsule 4/15/2021 at Unknown time Care Giver Yes Si mg two (2) times a day. Per tube  
oxyCODONE IR (ROXICODONE) 5 mg immediate release tablet 4/15/2021 at Unknown time [address] [facility] Yes Si Tab by Per G Tube route every six (6) hours for 7 days. Max Daily Amount: 20 mg.  
phenytoin (DILANTIN) 100 mg/4 mL susp oral suspension 4/15/2021 at Unknown time [address] [facility] Yes Si mL by PEG Tube route every eight (8) hours for 30 days. potassium bicarb-citric acid (EFFER-K) 20 mEq tablet 4/15/2021 at Unknown time [address] Yes Si Tab by Per G Tube route daily for 30 days. Indications: low amount of potassium in the blood  
torsemide (DEMADEX) 20 mg tablet 4/15/2021 at Unknown time [address] [facility] Yes Si mg by Per G Tube route daily. Facility-Administered Medications: None Please contact the main inpatient pharmacy with any questions or concerns at [phone] and we will direct you to the clinical pharmacist covering this patient's care while in-house.   
Xiao Mandujano, PharmD, BCPS

## 2021-04-16 NOTE — ED TRIAGE NOTES
Pt arrives from home with c/c of difficulty breathing x \"a couple of days\" and seizure type activity starting today. EMS reports recently discharged from this facility with a dx of pneumonia. Pt non-verbal and bed bound at baseline.

## 2021-04-16 NOTE — ED NOTES
TRANSFER - OUT REPORT: 
 
Verbal report given to Nurse (name) on Phyllis Yusuf  being transferred to 3rd floor (unit) for routine progression of care Report consisted of patients Situation, Background, Assessment and  
Recommendations(SBAR). Information from the following report(s) SBAR, ED Summary, [facility] and Recent Results was reviewed with the receiving nurse. Lines:  
Peripheral IV 04/15/21 Left Antecubital (Active) Opportunity for questions and clarification was provided. Patient transported with: 
 Monitor O2 @ 10 liters Registered Nurse

## 2021-04-16 NOTE — PROGRESS NOTES
[facility]  
Senior Resident Admission Note CC: dyspnea/SOB 
 
HPI: 
Carias Brasher is a 49 y.o. female who presents w/ her caretaker for SOB/dyspnea. Pt was d/c from our service 2d ago for sepsis 2/2 HAP; she received vanc and zosyn then was d/c w/ a course of doxycycline after neg BCx. Since hospital d/c she has had persistent SOB and report of seizure activity per her caretaker. No fevers. She is non-verbal at [facility]. In the ED, vitals were remarkable for RR 36, . Labs showed Chart reviewed. Patient seen, examined, and discussed with Dr. Arash Grimes (PGY-1). See Dr. Shanta Flores note for more details. A/P: Symptoms likely d/t sepsis 2/2 HAP. Will admit to tele. 1. Severe Sepsis 2/2 HAP: 2/4 SIRS and Lactic acidoses. CXR w/ diffuse b/l airspace disease. +pseudomonas on sputum cx 4/3 and had completed course of treatment for VAP. Sputum cx not repeated w/ onset of HAP during last admission; she received Vanc/zosyn and was d/c on doxy + augmentin. Will treat w/ vanc/zosyn/levaquin to cover HAP-pseudomonas. LA 4.19->3.48; continue to trend q4hrs. MIVF. PNA labs, BCx, sputum Cx 
2. AHRF: +resp alkalosis on ABG. RR 36. Currently on 9L O2 through trach. Treatment of HAP as above 3. Seizures: Corrected phenytoin level subtherapeutic (9.1). Continue phenytoin, keppra. Will check keppra level and c/s neuro for dose adjustment 4. ACD hgb 10 POA (at [facility]) 5. VICK: POA Cr 1.43 (BL 0.5). Not resolved since last admission. MIVF 6. Chronic UE Edema: hold torsemide until Cr improves in setting VICK 7. GERD/hx GI bleed: protonix for home omeprazole I agree with remaining assessment and plan as documented in Dr. Shanta Flores H&P note. Pt discussed with Dr. Faustino Blackmon (on-call attending physician) Jhon Espinoza MD 
Family Medicine Resident

## 2021-04-16 NOTE — CONSULTS
[facility]: [address] [facility] [address] [phone] Leatha TORIE BUDDY Wilson Name:   Ehsan Carcamo Medical record #: [id number] Admission Date: 4/15/2021 Who Consulted: Dr. Sheehan  Reason for Consult: seizures w/ subtherapeutic phenytoin HISTORY OF PRESENT ILLNESS:  
 
This is a 49 y.o. female who is admitted for SOB. Miss Minal Zhong presented to the ED with  with concern for difficulty breathing and seizure like activity. According to her caretaker, she had been having heavy breathing, diaphoresis, and hypotension. Since her discharge 2 days ago, she has had reports of \"seizure activity\". Upon review of her chart, her Dilantin level has been steadily falling since 4/3/2021, when we last saw her. It was 8.1 at the time of admission. The Neurology Service is asked to evaluate for seizures. At baseline she is nonverbal 
 
Seizure History · Age of onset: 
· Last seizure:  3/29/21, status · Description of normal seizure:  generalized seizures with right-sided gaze · Current AED's: Kepra 750 mg and Dilantin 100 mg qhrs (Level on 4/8/21 was 8.1) · Past AED's:  Vimpat added and stopped during last admission · Home neurologist:  Dr. Chantal Judd EKG: Talia Polo Care Plan discussed with: 
Patient x Family RN Care Manager Consultant/Specialist:    
 
 
Thank you for allowing the Neurology Service the pleasure of participating in the care of your patient. This patient will be discussed with my collaborating care team physician Ryley, and he may have further recommendations regarding this patient's care BUDDY Preciado 
==================== Attending Attestation:  
 
 
I have reviewed the documentation provided by the nurse practitioner, Mrs. Venessa Emerson, and we have discussed her findings and the clinical impression.  I have formulated with her the proposed management plans for this patient. Additionally,  I have personally evaluated the patient to verify the history and to confirm physical findings. Below are my additional comments: 
 
72-year-old lady known to our service for intractable epilepsy. She follows with  as an outpatient. She had recent hospitalization here and she was on Keppra Dilantin and due to increasing seizures was started on some Vimpat and that was pulled off after seizures were controlled. She had dropping Dilantin levels and came in with seizure-like activity with subtherapeutic Dilantin level. We reviewed her Dilantin levels in the computer. There are no free Dilantin levels. She is currently a PUI at present and is on isolation. She is using the Dilantin suspension through PEG tube. Examination secondary to the isolation status is taken from this morning's exam 
 
\"General: No acute distress. Alert. Able to track with eyes. Non-verbal 
Head: Normocephalic. Atraumatic Respiratory: CTAB. No w/r/r/c. 
Cardiovascular: Tachycardic No m/r/g. GI: + bowel sounds. Nontender. No rebound tenderness or guarding. Nondistended. Neuro: Alert. Non-verbal. ' 
 
 
Total Dilantin level 7.9 Keppra level pending Metabolic panel markable Impression/plan 72-year-old with Down syndrome and epilepsy and subtherapeutic Dilantin level and breakthrough seizure-like activity Continue Keppra at current dose Change Dilantin over to the 50 mg chewable tabs The Dilantin suspension is notorious for inaccurate dosing secondary to the fact that each time it is used it has to be shaken copiously in order to get the medication equally dispersed through the suspension Follow free levels not total level 7 we have ordered a stat free level so hopefully that will be resulted sometime before the weekend This is just to see where we are at this point and should not be acted upon because we just changed to the chewable tablets We will check another free Dilantin level in a week Call for questions or concerns and will return as needed Re Dick MD 
 
 
 
 
 
 
 
 
 
  
 
 
Impression/ Plan: 1. Rule out seizure/spell:   
· Seizure precautions · Admission WBC 9.2, LFT's elevated,  
· Urine unremarkable upon admission · B12 on 3/31 1406, folate 8/5 · EEG   
· AED dosing:  Continue Kepra,  
· Do not treat seizures lasting less than 5 minutes. 2.  Mobility:  
· Has not been OOB. · PT/OT to eval for rehab 3. Diet:   
· Does not need SLP, will get all medications via PEG 4. VTE Prophylaxes: · Per primary team 
  
 
Review of Systems: 10 point ROS was performed. Pertinent positives listed in HPI. Negative ROS is as follows. Pt denies: angina, palpitations, paresthesias, weakness, vision loss, slurred speech, aphasia, confusion, fever, chills, falls, headache, diplopia, back pain, neck pain, prior episodes of vertigo, hallucinations, new medications or dosage changes. Physical Exam 
 
Patient Vitals for the past 8 hrs: 
 Temp Pulse Resp BP SpO2  
04/16/21 0739 98.6 °F (37 °C) 70 17 (!) 90/50 96 % 04/16/21 0700  66     
04/16/21 0601  94 30 (!) 110/94   
04/16/21 0550  (!) 123     
04/16/21 0548 98.9 °F (37.2 °C) (!) 102 (!) 40 (!) 130/101 97 % 04/16/21 0415 97.3 °F (36.3 °C) 98 23 109/70 99 % 04/16/21 0400  93 21  99 % 04/16/21 0345  80 16 (!) 87/57 100 % 04/16/21 0330  79 16 (!) 92/54 100 % 04/16/21 0315  77 17 (!) 94/52 100 % 04/16/21 0300  92 21 (!) 91/54 98 % 04/16/21 0245  86 16 91/62 99 % 04/16/21 0230  80 10 (!) 87/53 100 % 04/16/21 0215  86 17 (!) 86/60 97 % 04/16/21 0200  87 18 (!) 102/56 97 % 04/16/21 0145  94 18 (!) 88/53 98 % 04/16/21 0130  91 19 100/67 98 % 04/16/21 0115  92 19 107/64 98 % 04/16/21 0100  94 18 110/72 97 % Allergies: Allergies Allergen Reactions  Depakote [Divalproex] Swelling Outpatient Meds No current facility-administered medications on file prior to encounter. Current Outpatient Medications on File Prior to Encounter Medication Sig Dispense Refill  oxyCODONE IR (ROXICODONE) 5 mg immediate release tablet 1 Tab by Per G Tube route every six (6) hours for 7 days. Max Daily Amount: 20 mg. 28 Tab 0  
 naloxone (NARCAN) 4 mg/actuation nasal spray Use 1 spray intranasally, then discard. Repeat with new spray every 2 min as needed for opioid overdose symptoms, alternating nostrils. 2 Each 0  
 doxycycline (ADOXA) 100 mg tablet 1 Tab by Per G Tube route two (2) times a day for 8 doses. 8 Tab 0  
 amoxicillin-clavulanate (AUGMENTIN) 875-125 mg per tablet 1 Tab by Per G Tube route every twelve (12) hours for 10 doses. 10 Tab 0  
 midodrine (PROAMATINE) 5 mg tablet 3 Tabs by Per G Tube route every eight (8) hours for 30 days. 270 Tab 0  
 glycopyrrolate (ROBINUL) 1 mg tablet 1 Tab by Per G Tube route three (3) times daily as needed (increased secretions from tracheal tube) for up to 30 doses. 30 Tab 0  
 potassium bicarb-citric acid (EFFER-K) 20 mEq tablet 1 Tab by Per G Tube route daily for 30 days. Indications: low amount of potassium in the blood 30 Tab 0  
 LORazepam (ATIVAN) 0.5 mg tablet 1 Tab by Per G Tube route every six (6) hours as needed for Anxiety or Agitation for up to 5 days. Max Daily Amount: 2 mg. 20 Tab 0  phenytoin (DILANTIN) 100 mg/4 mL susp oral suspension 4 mL by PEG Tube route every eight (8) hours for 30 days. 360 mL 0  
 cetirizine (ZYRTEC) 10 mg tablet 10 mg by Per G Tube route daily.  omeprazole (PRILOSEC) 40 mg capsule Take 40 mg by mouth two (2) times a day.  guaiFENesin (Siltussin SA) 100 mg/5 mL liquid Take 200 mg by mouth four (4) times daily.  torsemide (DEMADEX) 20 mg tablet Take 40 mg by mouth daily.  albuterol-ipratropium (DUO-NEB) 2.5 mg-0.5 mg/3 ml nebu 3 mL by Nebulization route as needed for Shortness of Breath.     
 melatonin 3 mg tablet 1 Tab by Per G Tube route nightly as needed for Insomnia. Must give by 10pm 30 Tab 11  
 diazePAM (VALIUM) 5-7.5-10 mg kit Insert 10 mg into rectum as needed (for seizures that last longer than 5 minutes).  OTHER,NON-FORMULARY, by Per G Tube route four (4) times daily. OSMOLITE 1.2 [place]ino Liquid  levETIRAcetam (KEPPRA) 100 mg/mL solution 750 mg by Per G Tube route every twelve (12) hours.  albuterol-ipratropium (DUO-NEB) 2.5 mg-0.5 mg/3 ml nebu 3 mL by Nebulization route every 4 hours daily while awake resp. Inpatient Meds Current Facility-Administered Medications Medication Dose Route Frequency Provider Last Rate Last Admin  [START ON 4/17/2021] vancomycin (VANCOCIN) 750 mg in 0.9% sodium chloride 250 mL (VIAL-MATE)  750 mg IntraVENous Q24H Thad Dodge MD      
 piperacillin-tazobactam (ZOSYN) 3.375 g in 0.9% sodium chloride (MBP/ADV) 100 mL MBP  3.375 g IntraVENous Q8H Thad Dodge MD      
 levoFLOXacin (LEVAQUIN) 750 mg in D5W IVPB  750 mg IntraVENous Q48H Thad Dodge MD      
 sodium chloride (NS) flush 5-40 mL  5-40 mL IntraVENous Q8H Thad Dodge MD   10 mL at 04/16/21 3436  sodium chloride (NS) flush 5-40 mL  5-40 mL IntraVENous PRN Thad Dodge MD      
 acetaminophen (TYLENOL) tablet 650 mg  650 mg Oral Q6H PRN Thad Dodge MD      
 Or  
 acetaminophen (TYLENOL) suppository 650 mg  650 mg Rectal Q6H PRN Thad Dodge MD      
 enoxaparin (LOVENOX) injection 40 mg  40 mg SubCUTAneous DAILY Thad Dodge MD      
 0.9% sodium chloride infusion  100 mL/hr IntraVENous CONTINUOUS Thad Dodge  mL/hr at 04/16/21 0607 100 mL/hr at 04/16/21 2131  levETIRAcetam (KEPPRA) oral solution 750 mg  750 mg Per G Tube Q12H Thad Dodge MD      
 midodrine (PROAMATINE) tablet 15 mg  15 mg Per G Tube Q8H Thad Dodge MD   15 mg at 04/16/21 7575  phenytoin (DILANTIN) 100 mg/4 mL oral suspension 100 mg  100 mg PEG Tube Q8H Solano MD Dar      
 diazePAM (VALIUM) 5-7.5-10 mg rectal kit 10 mg  10 mg Rectal PRN Dilma Armijo MD      
 glycopyrrolate (ROBINUL) tablet 1 mg  1 mg Per G Tube TID PRN Dilma Armijo MD      
 guaiFENesin (ROBITUSSIN) 100 mg/5 mL oral liquid 200 mg  200 mg Oral QID Dilma Armijo MD      
 LORazepam (ATIVAN) tablet 0.5 mg  0.5 mg Per G Tube Q6H PRN Dilma Armijo MD      
 melatonin (rapid dissolve) tablet 5 mg  5 mg Oral QHS PRN Dilma Armijo MD      
 pantoprazole (PROTONIX) tablet 40 mg  40 mg Oral BID Tato Mulligan MD      
 
 
 
 
Past Medical History:  
Diagnosis Date  Acute cystitis without hematuria 4/3/2021  VICK (acute kidney injury) ([facility] [address] 3/31/2021  
 C. difficile diarrhea 3/28/2020  Clostridium difficile diarrhea 6/30/2020  Constipation 12/11/2019  Diarrhea in adult patient 3/28/2020  Down syndrome  Elevated Dilantin level 4/3/2021  
 History of vascular access device 06/30/2020  
 4 Fr midline, 10 cm L brachial, poor access  History of vascular access device 04/01/2021 [facility] VAT 5fr double PICC LFT brachial at 39cm, arm cir 31 cm   
 Oral thrush 3/29/2020  Positive fecal occult blood test 3/29/2020  Seizures ([facility] [address]  Sleep apnea   
 humidifier and oxygen w/trach  Status epilepticus ([facility] [address] 3/30/2021  Stroke [facility]) 2019 \"old stroke seen on CT\"  Thrombocytosis ([facility] [address] 3/31/2021  VAP (ventilator-associated pneumonia) ([facility] [address] 4/9/2021 Past Surgical History:  
Procedure Laterality Date  COLONOSCOPY N/A 7/8/2020 COLONOSCOPY with fecal transplant (consents in red file) performed by Moe Arceo MD at [address] [facility] HX GI    
 gtube 2/2020  HX OTHER SURGICAL    
 tracheostomy 2/2020 Family history is unknown by patient. reports that she has never smoked. She has never used smokeless tobacco. She reports that she does not drink alcohol or use drugs. Lab Results (last 24 hrs) Recent Results (from the past 24 hour(s)) BLOOD GAS, ARTERIAL Collection Time: 04/15/21 11:50 PM  
Result Value Ref Range pH 7.47 (H) 7.35 - 7.45    
 PCO2 32 (L) 35 - 45 mmHg PO2 90 80 - 100 mmHg O2 SAT 97 92 - 97 % BICARBONATE 23 22 - 26 mmol/L  
 BASE DEFICIT 0.1 mmol/L  
 O2 METHOD TRACH COLLAR    
 FIO2 40 % SPONTANEOUS RATE 22 Sample source ARTERIAL    
 SITE LEFT RADIAL DOMENICA'S TEST YES    
CBC WITH AUTOMATED DIFF Collection Time: 04/15/21 11:51 PM  
Result Value Ref Range WBC 9.2 3.6 - 11.0 K/uL  
 RBC 3.15 (L) 3.80 - 5.20 M/uL  
 HGB 10.0 (L) 11.5 - 16.0 g/dL HCT 32.0 (L) 35.0 - 47.0 % .6 (H) 80.0 - 99.0 FL  
 MCH 31.7 26.0 - 34.0 PG  
 MCHC 31.3 30.0 - 36.5 g/dL  
 RDW 16.4 (H) 11.5 - 14.5 % PLATELET 254 731 - 943 K/uL MPV 10.6 8.9 - 12.9 FL  
 NRBC 0.0 0  WBC ABSOLUTE NRBC 0.00 0.00 - 0.01 K/uL NEUTROPHILS 85 (H) 32 - 75 % LYMPHOCYTES 10 (L) 12 - 49 % MONOCYTES 4 (L) 5 - 13 % EOSINOPHILS 0 0 - 7 % BASOPHILS 0 0 - 1 % IMMATURE GRANULOCYTES 0 0.0 - 0.5 % ABS. NEUTROPHILS 7.9 1.8 - 8.0 K/UL  
 ABS. LYMPHOCYTES 0.9 0.8 - 3.5 K/UL  
 ABS. MONOCYTES 0.3 0.0 - 1.0 K/UL  
 ABS. EOSINOPHILS 0.0 0.0 - 0.4 K/UL  
 ABS. BASOPHILS 0.0 0.0 - 0.1 K/UL  
 ABS. IMM. GRANS. 0.0 0.00 - 0.04 K/UL  
 DF AUTOMATED METABOLIC PANEL, COMPREHENSIVE Collection Time: 04/15/21 11:51 PM  
Result Value Ref Range Sodium 143 136 - 145 mmol/L Potassium 3.6 3.5 - 5.1 mmol/L Chloride 109 (H) 97 - 108 mmol/L  
 CO2 25 21 - 32 mmol/L Anion gap 9 5 - 15 mmol/L Glucose 125 (H) 65 - 100 mg/dL BUN 27 (H) 6 - 20 MG/DL Creatinine 1.43 (H) 0.55 - 1.02 MG/DL  
 BUN/Creatinine ratio 19 12 - 20 GFR est AA 47 (L) >60 ml/min/1.73m2 GFR est non-AA 39 (L) >60 ml/min/1.73m2 Calcium 9.5 8.5 - 10.1 MG/DL Bilirubin, total 0.2 0.2 - 1.0 MG/DL  
 ALT (SGPT) 35 12 - 78 U/L  
 AST (SGOT) 44 (H) 15 - 37 U/L Alk. phosphatase 144 (H) 45 - 117 U/L  Protein, total 8.5 (H) 6.4 - 8.2 g/dL Albumin 2.9 (L) 3.5 - 5.0 g/dL Globulin 5.6 (H) 2.0 - 4.0 g/dL A-G Ratio 0.5 (L) 1.1 - 2.2 MAGNESIUM Collection Time: 04/15/21 11:51 PM  
Result Value Ref Range Magnesium 2.4 1.6 - 2.4 mg/dL SAMPLES BEING HELD Collection Time: 04/15/21 11:51 PM  
Result Value Ref Range SAMPLES BEING HELD 1SST,1BLU   
 COMMENT Add-on orders for these samples will be processed based on acceptable specimen integrity and analyte stability, which may vary by analyte. POC LACTIC ACID Collection Time: 04/15/21 11:52 PM  
Result Value Ref Range Lactic Acid (POC) 4.19 (HH) 0.40 - 2.00 mmol/L POC LACTIC ACID Collection Time: 04/16/21 12:02 AM  
Result Value Ref Range Lactic Acid (POC) 3.48 (HH) 0.40 - 2.00 mmol/L PHENYTOIN Collection Time: 04/16/21 12:03 AM  
Result Value Ref Range Phenytoin 7.9 (L) 10.0 - 20.0 ug/mL SAMPLES BEING HELD Collection Time: 04/16/21 12:03 AM  
Result Value Ref Range SAMPLES BEING HELD NO SAMPLE RECEIVED COMMENT Add-on orders for these samples will be processed based on acceptable specimen integrity and analyte stability, which may vary by analyte. CULTURE, BLOOD, PAIRED Collection Time: 04/16/21 12:03 AM  
 Specimen: Blood Result Value Ref Range Special Requests: NO SPECIAL REQUESTS Culture result: NO GROWTH AFTER 7 HOURS    
NT-PRO BNP Collection Time: 04/16/21 12:03 AM  
Result Value Ref Range NT pro- (H) <125 PG/ML  
TROPONIN I Collection Time: 04/16/21 12:03 AM  
Result Value Ref Range Troponin-I, Qt. <0.05 <0.05 ng/mL PROCALCITONIN Collection Time: 04/16/21  5:30 AM  
Result Value Ref Range Procalcitonin 0.23 ng/mL METABOLIC PANEL, COMPREHENSIVE Collection Time: 04/16/21  5:30 AM  
Result Value Ref Range Sodium 142 136 - 145 mmol/L Potassium 3.5 3.5 - 5.1 mmol/L Chloride 108 97 - 108 mmol/L  
 CO2 26 21 - 32 mmol/L Anion gap 8 5 - 15 mmol/L  Glucose 107 (H) 65 - 100 mg/dL BUN 25 (H) 6 - 20 MG/DL Creatinine 1.17 (H) 0.55 - 1.02 MG/DL  
 BUN/Creatinine ratio 21 (H) 12 - 20 GFR est AA 59 (L) >60 ml/min/1.73m2 GFR est non-AA 49 (L) >60 ml/min/1.73m2 Calcium 9.5 8.5 - 10.1 MG/DL Bilirubin, total 0.3 0.2 - 1.0 MG/DL  
 ALT (SGPT) 32 12 - 78 U/L  
 AST (SGOT) 38 (H) 15 - 37 U/L Alk. phosphatase 146 (H) 45 - 117 U/L Protein, total 8.2 6.4 - 8.2 g/dL Albumin 2.8 (L) 3.5 - 5.0 g/dL Globulin 5.4 (H) 2.0 - 4.0 g/dL A-G Ratio 0.5 (L) 1.1 - 2.2    
CBC WITH AUTOMATED DIFF Collection Time: 04/16/21  5:30 AM  
Result Value Ref Range WBC 8.0 3.6 - 11.0 K/uL  
 RBC 3.24 (L) 3.80 - 5.20 M/uL  
 HGB 10.0 (L) 11.5 - 16.0 g/dL HCT 33.1 (L) 35.0 - 47.0 % .2 (H) 80.0 - 99.0 FL  
 MCH 30.9 26.0 - 34.0 PG  
 MCHC 30.2 30.0 - 36.5 g/dL  
 RDW 16.5 (H) 11.5 - 14.5 % PLATELET 723 607 - 851 K/uL MPV 10.2 8.9 - 12.9 FL  
 NRBC 0.0 0  WBC ABSOLUTE NRBC 0.00 0.00 - 0.01 K/uL NEUTROPHILS 69 32 - 75 % LYMPHOCYTES 21 12 - 49 % MONOCYTES 9 5 - 13 % EOSINOPHILS 0 0 - 7 % BASOPHILS 1 0 - 1 % IMMATURE GRANULOCYTES 0 0.0 - 0.5 % ABS. NEUTROPHILS 5.5 1.8 - 8.0 K/UL  
 ABS. LYMPHOCYTES 1.7 0.8 - 3.5 K/UL  
 ABS. MONOCYTES 0.7 0.0 - 1.0 K/UL  
 ABS. EOSINOPHILS 0.0 0.0 - 0.4 K/UL  
 ABS. BASOPHILS 0.1 0.0 - 0.1 K/UL  
 ABS. IMM. GRANS. 0.0 0.00 - 0.04 K/UL  
 DF AUTOMATED    
LACTIC ACID Collection Time: 04/16/21  6:45 AM  
Result Value Ref Range  Lactic acid 1.3 0.4 - 2.0 MMOL/L

## 2021-04-16 NOTE — ED PROVIDER NOTES
HPI  
48 yo F with PMH of trach, PEG, seizure, recurrent pneumonia with recent admission presents via EMS with concern for difficulty breathing and seizure like activity. Pt with recent pseudomonas pneumonia. Pt is nonverbal at baseline. Per EMS glucose 167. Past Medical History:  
Diagnosis Date  Down syndrome  History of vascular access device 06/30/2020  
 4 Fr midline, 10 cm L brachial, poor access  History of vascular access device 04/01/2021 [facility] VAT 5fr double PICC LFT brachial at 39cm, arm cir 31 cm  Seizures ([address]  Sleep apnea   
 humidifier and oxygen w/trach  Stroke [facility]) 2019 \"old stroke seen on CT\" Past Surgical History:  
Procedure Laterality Date  COLONOSCOPY N/A 7/8/2020 COLONOSCOPY with fecal transplant (consents in red file) performed by Casandra Dick MD at [facility] HX GI    
 gtube 2/2020  HX OTHER SURGICAL    
 tracheostomy 2/2020 Family History:  
Family history unknown: Yes  
 
 
Social History Socioeconomic History  Marital status: SINGLE Spouse name: Not on file  Number of children: Not on file  Years of education: Not on file  Highest education level: Not on file Occupational History  Not on file Social Needs  Financial resource strain: Not on file  Food insecurity Worry: Not on file Inability: Not on file  Transportation needs Medical: Not on file Non-medical: Not on file Tobacco Use  Smoking status: Never Smoker  Smokeless tobacco: Never Used Substance and Sexual Activity  Alcohol use: Never Frequency: Never  Drug use: Never  Sexual activity: Never Lifestyle  Physical activity Days per week: Not on file Minutes per session: Not on file  Stress: Not on file Relationships  Social connections Talks on phone: Not on file Gets together: Not on file Attends Anabaptist service: Not on file   Active member of club or organization: Not on file Attends meetings of clubs or organizations: Not on file Relationship status: Not on file  Intimate partner violence Fear of current or ex partner: Not on file Emotionally abused: Not on file Physically abused: Not on file Forced sexual activity: Not on file Other Topics Concern [address] Service Not Asked  Blood Transfusions Not Asked  Caffeine Concern Not Asked  Occupational Exposure Not Asked [employer] Hazards Not Asked  Sleep Concern Not Asked  Stress Concern Not Asked  Weight Concern Not Asked  Special Diet Not Asked  Back Care Not Asked  Exercise Not Asked  Bike Helmet Not Asked  Seat Belt Not Asked  Self-Exams Not Asked Social History Narrative ** Merged History Encounter ** ALLERGIES: Depakote [divalproex] Review of Systems Unable to perform ROS: Patient nonverbal  
 
 
Vitals:  
 04/15/21 2303 04/16/21 0021 BP: 135/79 Pulse: (!) 120 Resp: (!) 36 Temp: 98.5 °F (36.9 °C) SpO2: (!) 88% 97% Weight: 59.2 kg (130 lb 8.2 oz) Height: 4' 9\" (1.448 m) Physical Exam  
Physical Examination: General appearance -moderate distress Eyes - pupils equal and reactive, extraocular eye movements intact Neck - supple, no significant adenopathy, trach collar in place Chest - coarse breath sounds b/l with increased wob and accessory muscle use Heart - regular tachycardia Abdomen - soft, nontender, nondistended, no masses or organomegaly, g-tube in place, c/d/i Neurological - opens eyes to voice, nonverbal 
Musculoskeletal - no joint tenderness, diffuse edema to extremities Extremities - peripheral pulses normal, no clubbing or cyanosis Skin - normal coloration and turgor, no rashes, no suspicious skin lesions noted MDM Number of Diagnoses or Management Options Amount and/or Complexity of Data Reviewed Clinical lab tests: ordered and reviewed Tests in the radiology section of CPT®: ordered and reviewed Decide to obtain previous medical records or to obtain history from someone other than the patient: yes Obtain history from someone other than the patient: yes (EMS) Review and summarize past medical records: yes Discuss the patient with other providers: yes (Family medicine) Independent visualization of images, tracings, or specimens: yes Patient Progress Patient progress: improved Procedures ED EKG interpretation: 
Rhythm: normal sinus rhythm; and regular . Rate (approx.): 90; Axis: normal; P wave: normal; QRS interval: normal ; ST/T wave: non-specific changes; prolonged QTc  
EKG documented by Tavon Rivera MD 
 
 
Perfect Serve Consult for Admission 4:07 AM 
 
ED Room Number: NH58/26 Patient Name and age:  Luisa Palma 49 y.o.  female Working Diagnosis: 1. Pneumonia of both lungs due to Pseudomonas species, unspecified part of lung ([facility] 2. Hypoxia 3. VICK (acute kidney injury) ([facility] COVID-19 Suspicion:  no 
Sepsis present:  yes  Reassessment needed: yes Code Status:  Full Code Readmission: yes Isolation Requirements:  no 
Recommended Level of Care:  telemetry Department:[facility] - [phone] Pt with respiratory distress with hypoxia. Improved significantly after suctioning and trach care. Elevated lactic. Pt on antibiotics at home for pneumonia. Will tx with IV abx and admit. Total critical care time spent exclusive of procedures:  40 min

## 2021-04-17 NOTE — PROGRESS NOTES
1900 Shift change:  
 
Bedside and Verbal shift change report given to [address] [place] [address] (oncoming nurse) by Tien Kearney (offgoing nurse). Report included the following information SBAR, Kardex, Intake/Output, MAR, and Recent Results. Shift Summary: This patient was assisted with Intentional Toileting every 2 hours during this shift. Documentation of ambulation and output reflected on Flowsheet. 0730 Shift change:  
 
Bedside and Verbal shift change report given to Kane Taylor RN (oncoming nurse) by [address] [place] [address] (offgoing nurse). Report included the following information SBAR, Kardex, Intake/Output, MAR, and Recent Results.

## 2021-04-17 NOTE — PROGRESS NOTES
[address] [facility], [address] with [address] [facility] Resident Progress Note in Brief S:  
Patient seen and examined at bedside. Received a call from the nurse stating Ms. Destiny Ahuja was diaphoretic, tachypenic w/ RR of 48. Went to exam patient, pt is non-verbal but can opens eyes when name is called, no acute distress. CXR was ordered STAT and morphine 1 mg q4 prn was ordered. At about 9:20 pm, rapid response was called on pt. Due to elevated HR in the 160's and RR: 58. Patient was given 0.5 ml Morphine. Patient was in no acute distress, tachycardia and RR improved. CXR showed bilateral airspace disease increased on the left. O: 
Visit Vitals /84 (BP 1 Location: Right arm, BP Patient Position: At rest) Pulse 95 Temp 99 °F (37.2 °C) Resp 21 Ht 4' 9\" (1.448 m) Wt 132 lb (59.9 kg) SpO2 99% BMI 28.56 kg/m² Physical Examination General appearance - alert,  in no distress. Non-verbal 
Chest - clear to auscultation, no wheezes, rales or rhonchi, symmetric air entry Heart - Tachycardic no rubs, clicks or gallops, Abdomen - soft, nontender, non distended Neurological - alert, UE: R hand more swollen when compared to L 
LE: no LE edema noted A/P:  
 
Acute respiratory failure/trach dependence: Patient is currently on 9 l/min of oxgyen via trach collar. CXR (4/16): Bilateral airspace disease persists, increased on the left. - Held MIVF fluids - On Levaquin/zosyn/Vanc for HAP  
- wean oxygen as tolerated. Chronic UE edema: Right hand more swollen compared to Left - Restarted home torsemide 40 mg daily Please see daily progress note for detailed plan. Manpreet Perez MD 
Family Medicine Resident

## 2021-04-17 NOTE — PROGRESS NOTES
Bedside and Verbal shift change report given to Nelida Vargas (oncoming nurse) by Marcos Sheffield (offgoing nurse). Report included the following information SBAR, Kardex, ED Summary and Cardiac Rhythm Sinus Tach. 2000- Paged Respiratory therapist Kishan Garcia to bedside d/t pt. Being tachypneic. Pt. Producing thick secretions. Needed to be deep suctioned. 2002- RN paged Family practice to bedside d/t concern for pt. Status at this time 2119- RRT called pt. RR 58, HR:160's. Pt. Given 0.5ml Morphine. RR decreased to 25. Bp: 134/88, HR: 117 O2Sat:94% on 9L O2 Trach Collar 
 
0055-  Bolus dose of vital AF tube feeds  (50 ml given to start) administered to pt. 
 
0406- Bolus dose of vital AF tube feeds (100 ml total) administerd to pt. Bedside and Verbal shift change report given to Zoltan Chaney (oncoming nurse) by Nelida Vargas (offgoing nurse). Report included the following information SBAR, Kardex, ED Summary and Cardiac Rhythm Sinus Tach. This patient was assisted with Intentional Toileting every 2 hours during this shift. Documentation of ambulation and output reflected on Flowsheet.

## 2021-04-17 NOTE — PROGRESS NOTES
Pt's , Nohemi Mcwilliams was present in the room. She was on the phone with Silvestre Lay (RN director) at the group home and put her on speaker. I updated both of them on the plan of care and answered all questions. They were in agreement that Pt would benefit from transfer to LTAC at discharge. They feel an LTAC would be better equipped to manage this patients care. Of note: While in the room Pt O2 sat was 100% on 9L. Per chart review Pt has been in the % all day. I personally weaned Pt's O2 down to 6L on trach collar. I stayed in the room for 5-10 more minutes and monitored her saturations. She maintained O2 sat at 97-98% on 6L. Reggie Nye D.O. Family Medicine Resident PGY2

## 2021-04-17 NOTE — PROGRESS NOTES
[address] [facility] PROGRAM 
PROGRESS NOTE  
 
4/18/2021 PCP: Karen Gordon NP Assessment/Plan:  
 
Carisa Brasher is a 49 y.o. female admitted for sepsis 2/2 HAP. Patient was admitted on 4/15/2021. 
 
24 Hour Events: Mary Nicolas 2/2 HAP: POA: HR: 149 RR: 65 POC LA: 4.19 > resolved. CXR 4/16 w/ persistent b/l airspace disease. Patient was admitted from 3/30 - 4/14 and was treated for VAP/pseudomonas UTI (s/p full couse zosyn) as well as for HAP. RVP/covid negative. Pt was discharged on Doxy and Augmentin. BCx 4/16 NGTD, resp cx prelim scant gram negative rods. - CBC/ CMP daily - Zosyn/Levaquin until discharge - DC vanc - Follow up on blood culture and sputum culture Tachypnea and tachycardia: Intermittent. Pt w/ multiple similar episodes during last hospital stay as well as at home prior to this admission. Unclear etiology however could be a component of dysautonomia related to pt's hx of significant seizures vs agitation vs seizure-like activity. -Ativan 0.5mg Q6h PRN and morphine 1mg Q4h PRN agitation 
-Oxycodone 5mg Q6h scheduled for agitation 
  
Acute respiratory failure/trach dependence: Patient is currently on 9 l/min of oxgyen via trach collar. likely 2/2 Severe sepsis 2/2 HAP 
- Treatment for HAP as above - Wean oxygen as tolerated - Palliative consulted 
  
Hypotension: POA blood pressure: 135/79  
- Continue home Midodrine 15mg TID 
- Continue to monitor BP 
  
Elevated BNP: POA: 388. No baseline BNP level. Echo (03/29/2020): left ventricular ejection fraction is 50 - 55%. No regional wall motion abnormality noted. Due to severe sepsis, hydration is needed. - Will continue to monitor respiratory status  
  
Seizures:  POA phenytoin level: 7.9 subtherapeutic level.  Possible seizure-like activity noted by group home staff prior to admission.   
- Continue home Keppra 750 mg BID 
- Per neuro home phenytoin switched to 100mg tab Q8h 
- subtheraputic, expected as we just switched phenytoin formula- recheck 1wk 
  
Anemia of chronic disease: POA Hbg: 10.0 Bl: 9-10. Stable. Iron studies in , no need for repeat. - CBC daily  
   
VICK:  POA cr: 1.43. Bl (0.9) likely 2/2 severe sepsis 2/2 HAP. -IVF @ 50cc/hr 
   
Chronic UE edema: Stable - Restart home torsemide 40mg daily as creatinine improved  
  
GERD: stable. Omeprazole 40mg BID   
- Sub protonix 40 mg BID 
  
  
FEN/GI - NPO. Ng tube feeds. Activity - Pt is bed bound DVT prophylaxis - Lovenox GI prophylaxis - Protonix Fall prophylaxis - Not indicated at this time. Code Status - Full, discussed with patient / caregivers. Next of Kin Name and Contact: Mahamed Hussein: [phone] Vida Ford MD 
Family Medicine Resident Dinorah Goins discussed with Dr. Yosef Clay. Subjective: Eyes open, nonverbal. 
 
Objective:  
Physical examination Patient Vitals for the past 24 hrs: 
 Temp Pulse Resp BP SpO2  
21 0800 99 °F (37.2 °C) 100 20 (!) 122/57 99 % 21 0700  80     
21 0430     98 % 21 0323 98.8 °F (37.1 °C) 88 23 98/68 96 % 21 0010 98.8 °F (37.1 °C) 87 22 95/62 100 % 21 2345     96 % 21 2328  (!) 52     
21 1920     100 % 21 1737 99.9 °F (37.7 °C) (!) 109 19 (!) 141/94 100 % 21 1600  74 15 91/63 100 % 21 1549 98.4 °F (36.9 °C) 77 16 114/64 97 % 21 1500  71     
21 1320     97 % 21 1200 98.9 °F (37.2 °C) 60 12 117/85 100 % 21 0914 98.7 °F (37.1 °C) 80 18 106/68 97 % Temp (24hrs), Av.9 °F (37.2 °C), Min:98.4 °F (36.9 °C), Max:99.9 °F (37.7 °C) O2 Flow Rate (L/min): 8 l/min O2 Device: Tracheal collar Date 21 - 21 5697 21 - 21 3352 Shift [id number] 24 Hour Total 1900-0659 24 Hour Total  
INTAKE  
I.V.(mL/kg/hr) 650(0.9)  650(0.5)   Volume (0.9% sodium chloride infusion) 300  300 Volume (levoFLOXacin (LEVAQUIN) 750 mg in D5W IVPB) 150  150 Volume (piperacillin-tazobactam (ZOSYN) 3.375 g in 0.9% sodium chloride (MBP/ADV) 100 mL MBP) 200  200 NG/ 1100 1600 Water Flush Volume (mL) (PEG/Gastrostomy Tube ) 200 500 700 Intake (ml) (PEG/Gastrostomy Tube ) 300 600 900 Shift Total(mL/kg) 1709(31.3) 1313(23.0) [id number]) OUTPUT Urine(mL/kg/hr) 700(1)  700(0.5) Urine Voided 700  700 Urine Occurrence(s) 1 x 1 x 2 x Stool Stool Occurrence(s)  1 x 1 x Shift Total(mL/kg) 700(11.8)  700(11.8)  1100 1550 Weight (kg) 59.4 59.4 59.4 59.4 59.4 59.4 General: No acute distress. Alert. Unable to track with eyes. Non-verbal, does not respond to voice or follow commands. Head: Normocephalic. Atraumatic Respiratory: Vesicular breath sounds. No w/r/r/c. 
Cardiovascular: RRR. No m/r/g. GI: + bowel sounds. Nontender. No rebound tenderness or guarding. Nondistended. Extremities: BUE dependent edema, trace pitting edema to BLE Neuro: Alert. Non-verbal.  
 
Data Review: CBC: 
Recent Labs 04/18/21 
0350 04/17/21 
1450 04/16/21 
0530 WBC 9.6 8.0 8.0 HGB 10.4* 10.2* 10.0* HCT 33.8* 32.5* 33.1*  
 353 286 Metabolic Panel: 
Recent Labs 04/18/21 
0350 04/17/21 
1450 04/16/21 
0530 04/15/21 
2351  140 142 143  
K 3.9 3.6 3.5 3.6  106 108 109* CO2 24 27 26 25 BUN 26* 23* 25* 27* CREA 1.37* 1.37* 1.17* 1.43* GLU 97 100 107* 125* CA 9.6 9.9 9.5 9.5 MG  --   --   --  2.4 ALB 2.9* 2.9* 2.8* 2.9* TBILI 0.4 0.2 0.3 0.2 ALT 47 48 32 35 Micro: 
Lab Results Component Value Date/Time Culture result: SCANT GRAM NEGATIVE RODS (A) 04/16/2021 06:45 AM  
 Culture result: NO NORMAL RESPIRATORY MARINA ISOLATED 04/16/2021 06:45 AM  
 Culture result: NO GROWTH 2 DAYS 04/16/2021 12:03 AM  
 
Imaging: 
Ct Head Wo Cont Result Date: 3/30/2021 EXAM: CT HEAD WO CONT INDICATION: seizures COMPARISON: None. CONTRAST: None. TECHNIQUE: Unenhanced CT of the head was performed using 5 mm images. Brain and bone windows were generated. Coronal and sagittal reformats. CT dose reduction was achieved through use of a standardized protocol tailored for this examination and automatic exposure control for dose modulation. FINDINGS: There is an incidental cavum septum pellucidum. There is mild atrophy and compensatory dilatation of the ventricles. Areas of chronic encephalomalacia are seen in the right frontal lobe, right parietal lobe, and left frontal lobe. There are incidental bilateral basal ganglia calcifications. There is no intracranial hemorrhage, extra-axial collection, or mass effect. The basilar cisterns are open. No CT evidence of acute infarct. The bone windows demonstrate no abnormalities. The visualized portions of the paranasal sinuses and mastoid air cells are clear. Significant chronic encephalomalacia in the right frontal lobe, right parietal lobe, and left frontal lobe. Cta Chest W Or W Wo Cont Result Date: 4/12/2021 Clinical history: Dyspnea INDICATION:   Dyspnea COMPARISON: None CONTRAST: 100 ml Isovue 370 TECHNIQUE: CT of the chest with  IV contrast , Isovue-370 is performed. Axial images from the thoracic inlet to the level of the upper abdomen are obtained. Manual post-processing of the images and coronal reformatting is also performed. CT dose reduction was achieved through use of a standardized protocol tailored for this examination and automatic exposure control for dose modulation. Multiplanar reformatted imaging was performed. Sagittal and coronal reformatting. 3-D Postprocessing of imaging was performed. 3-D MIP reconstructed images were obtained in the coronal plane. FINDINGS: There is no pulmonary embolism. There is no aortic aneurysm or dissection. Small right-sided pleural effusion.  Minimal scattered groundglass opacity may related to volume overload. Mildly prominent right axillary lymph nodes. Tracheostomy in place. There is no mediastinal, axillary or hilar lymphadenopathy. The aorta is normal in course and caliber. The proximal pulmonary arteries are without evidence of filling defects. No lytic or blastic lesions are identified. The remainder of the upper abdomen visualized is unremarkable. Left subclavian PICC line catheter tip terminates in superior vena cava. There is no pulmonary embolism. There is no aortic aneurysm or dissection. Small right-sided pleural effusion. Scattered interstitial and parenchymal opacities on the right and on the left may be related to volume overload alternatively multifocal infectious process. Xr Chest [facility] Result Date: 4/15/2021 EXAM: XR CHEST PORT INDICATION: sob COMPARISON: April 12 FINDINGS: A portable AP radiograph of the chest was obtained at 2307 hours. The tracheostomy tube is stable. The patient is on a cardiac monitor. There are diffuse patchy opacities in the lungs bilaterally, most significantly the right lower lobe. The cardiac and mediastinal contours and pulmonary vascularity are normal.  The bones and soft tissues are grossly within normal limits. Diffuse patchy opacities are seen in the lungs bilaterally. Differential diagnostic possibilities include pneumonia and edema. Xr Chest Margie Holy Result Date: 4/12/2021 EXAM: XR CHEST PORT INDICATION: Tachycardia and tachypnea COMPARISON: 4/5/2021 FINDINGS: A portable AP radiograph of the chest was obtained at 1145 hours. The patient is on a cardiac monitor. A tracheostomy tube is present. The PICC line extends into the heart. The cardiac mediastinal silhouette is unchanged. There is a diffuse bilateral airspace process, asymmetric to the left lung. Diffuse bilateral airspace disease compatible with pneumonia, asymmetric to the left lung. Xr Chest [facility]ie Holy Result Date: 4/5/2021 INDICATION: Fever COMPARISON: 4/2/2021 FINDINGS: Single AP portable view of the chest obtained at 3:37 AM demonstrates no change in position of the lines and tubes. The cardiomediastinal silhouette is stable. Diffuse bilateral patchy airspace disease is increased. No pneumothorax is seen. There is no evidence of pleural effusion. Increased diffuse bilateral patchy airspace disease, compatible with worsening pneumonia. Xr Chest [facility] Result Date: 4/2/2021 EXAM: XR CHEST PORT INDICATION: Fever and leukocytosis. Status epilepticus. COMPARISON: Portable chest on 3/30/2021 and 4/1/2020. TECHNIQUE: Upright portable chest AP view FINDINGS: Tracheostomy tube is in good position. Left PICC line extends into the distal superior vena cava. Cardiac monitoring wires overlie the thorax. The cardiomediastinal and hilar contours are within normal limits. The pulmonary vasculature is within normal limits. Reticular interstitial opacities are decreased and minimal. No focal airspace opacity. Pleural spaces are clear. Bones are osteopenic. Decreased now minimal interstitial pulmonary edema versus interstitial pneumonia. No pneumothorax. Left PICC line is new and in good position. Xr Chest [facility] Result Date: 3/30/2021 EXAM: XR CHEST PORT HISTORY: trach exchange. COMPARISON: 3/30/2021 FINDINGS: Portable AP. A tracheostomy tube is in place. The heart is normal size. There is diffuse interstitial prominence again seen likely related to edema. No pleural effusion or pneumothorax. Tracheostomy tube in appropriate position. Unchanged diffuse interstitial prominence likely related to edema. Xr Chest [facility] Result Date: 3/30/2021 EXAM: XR CHEST PORT HISTORY: Chest pain. COMPARISON: 4/1/2020 FINDINGS: Portable AP. A tracheostomy tube is in place. The heart is normal size. There is unchanged diffuse interstitial prominence likely related to edema. No focal consolidation, pleural effusion, or pneumothorax. No significant interval change.  
 
Xr Us Guided Vascular Access Result Date: 4/2/2021 INDICATION:   NO VENOUS ACCESS  EXAMINATION:  US GUIDE VAS ACCESS FINDINGS: Ultrasound was provided for PICC line placement. Ultrasound guidance for PICC line  placement. GBP Medications reviewed Current Facility-Administered Medications Medication Dose Route Frequency  oxyCODONE IR (ROXICODONE) tablet 5 mg  5 mg Per G Tube Q6H  
 piperacillin-tazobactam (ZOSYN) 3.375 g in 0.9% sodium chloride (MBP/ADV) 100 mL MBP  3.375 g IntraVENous Q8H  
 levoFLOXacin (LEVAQUIN) 750 mg in D5W IVPB  750 mg IntraVENous Q48H  
 sodium chloride (NS) flush 5-40 mL  5-40 mL IntraVENous Q8H  
 sodium chloride (NS) flush 5-40 mL  5-40 mL IntraVENous PRN  
 acetaminophen (TYLENOL) tablet 650 mg  650 mg Oral Q6H PRN Or  
 acetaminophen (TYLENOL) suppository 650 mg  650 mg Rectal Q6H PRN  
 enoxaparin (LOVENOX) injection 40 mg  40 mg SubCUTAneous DAILY  0.9% sodium chloride infusion  50 mL/hr IntraVENous CONTINUOUS  
 levETIRAcetam (KEPPRA) oral solution 750 mg  750 mg Per G Tube Q12H  
 midodrine (PROAMATINE) tablet 15 mg  15 mg Per G Tube Q8H  
 glycopyrrolate (ROBINUL) tablet 1 mg  1 mg Per G Tube TID PRN  
 guaiFENesin (ROBITUSSIN) 100 mg/5 mL oral liquid 200 mg  200 mg Oral QID  LORazepam (ATIVAN) tablet 0.5 mg  0.5 mg Per G Tube Q6H PRN  
 melatonin (rapid dissolve) tablet 5 mg  5 mg Oral QHS PRN  pantoprazole (PROTONIX) tablet 40 mg  40 mg Oral BID  phenytoin (DILANTIN) chewable tablet 100 mg  100 mg Per G Tube TID  diazePAM (VALIUM) injection 5 mg  5 mg IntraVENous Q5MIN PRN  
 zinc oxide (DESITIN) 13 % topical cream   Topical PRN  
 miconazole (MICOTIN) 2 % powder   Topical BID  torsemide (DEMADEX) tablet 40 mg  40 mg Per G Tube DAILY  morphine injection 1 mg  1 mg IntraVENous Q4H PRN Signed: 
 Miri Carrillo MD 
 Resident, Family Medicine Attending note: Attending note to follow. ..

## 2021-04-18 NOTE — PROGRESS NOTES
1900 Shift change:  
 
Bedside and Verbal shift change report given to [address] [place] [address] (oncoming nurse) by Macario Lowe (offgoing nurse). Report included the following information SBAR, Kardex, Intake/Output, MAR, and Recent Results. Shift Summary: This patient was assisted with Intentional Toileting every 2 hours during this shift. Documentation of ambulation and output reflected on Flowsheet. 0145: Due to pt getting midline in the AM, MD ok with obtaining daily labs with midline placement to avoid stress on pt.  
 
0730 Shift change:  
 
Bedside and Verbal shift change report given to Saulo Bush and [employer] (oncoming nurse) by [address] [place] [address] (offgoing nurse). Report included the following information SBAR, Kardex, Intake/Output, MAR, and Recent Results.

## 2021-04-18 NOTE — PROGRESS NOTES
Bedside and Verbal shift change report given to FLAQUITO RN  (oncoming nurse) by YOUNG RN (offgoing nurse). Report included the following information SBAR, Kardex and Recent Results. .. This patient was assisted with Intentional Toileting every 2 hours during this shift. Documentation of ambulation and output reflected on Flowsheet. .. 1000- Pt is very sensitive to touch , DOING anything with her makes her heart rate goes up upto 100-160's, RR goes upto  50's, BUT O2 sat remain stable. . whezzes easily. Need trach suction PRN,  Drooling off and on. . 
 
1700-  
O2 is now 3 L, O2 sat is %. Traci Finley Bedside and Verbal shift change report given to [address] [place] [address]  (oncoming nurse) by Flaca Flores RN (offgoing nurse). Report included the following information SBAR, Kardex and Recent Results.

## 2021-04-18 NOTE — PROGRESS NOTES
[id number] [place] [facility] 
PROGRESS NOTE  
 
4/19/2021 PCP: Karen Gordon NP Assessment/Plan:  
 
Carisa Brasher is a 49 y.o. female admitted for sepsis 2/2 HAP. Patient was admitted on 4/15/2021. 
 
24 Hour Events: none Sepis 2/2 HAP: POA: HR: 149 RR: 65 POC LA: 4.19 > resolved. CXR 4/16 w/ persistent b/l airspace disease. Patient was admitted from 3/30 - 4/14 and was treated for VAP/pseudomonas UTI (s/p full couse zosyn) as well as for HAP. RVP/covid negative. Pt was discharged on Doxy and Augmentin. BCx 4/16 NGTD, resp cx w/ scant pseudomonas. S/p vanc 4/16-4/17. 
- CBC/ CMP daily - Zosyn/Levaquin until discharge - Plan for midline placement due to poor IV access Tachypnea and tachycardia: Intermittent. Pt w/ multiple similar episodes during last hospital stay as well as at home prior to this admission. Unclear etiology however could be a component of dysautonomia related to pt's hx of significant seizures vs agitation vs seizure-like activity. -Ativan 0.5mg Q6h PRN and morphine 1mg Q4h PRN agitation 
-Oxycodone 5mg Q6h scheduled for agitation 
  
Acute respiratory failure/trach dependence: Patient is currently on 9 l/min of room air via trach collar w/ FiO2 of 35%. Likely 2/2 Severe sepsis 2/2 HAP 
- Treatment for HAP as above - Wean oxygen as tolerated - Awaiting LTAC placement - Palliative consulted 
  
Hypotension: POA blood pressure: 135/79  
- Continue home Midodrine 15mg TID 
- Continue to monitor BP 
  
Elevated BNP: POA: 388. No baseline BNP level. Echo (03/29/2020): left ventricular ejection fraction is 50 - 55%. No regional wall motion abnormality noted. Due to severe sepsis, hydration is needed. - Will continue to monitor respiratory status  
  
Seizures:  POA phenytoin level: 7.9 subtherapeutic level.  Possible seizure-like activity noted by group home staff prior to admission.   
- Continue home Keppra 750 mg BID 
- Per neuro home phenytoin switched to 100mg tab Q8h 
- Subtheraputic, recheck  
  
Anemia of chronic disease: POA Hbg: 10.0 Bl: 9-10. Stable. Iron studies in , no need for repeat. - CBC daily  
   
VICK:  POA cr: 1.43. Bl (0.9) likely 2/2 severe sepsis 2/2 HAP. - IVF @ 50cc/hr 
   
Chronic UE edema: Stable 
- Hold home torsemide 40mg daily due to renal function  
  
GERD: stable. Omeprazole 40mg BID   
- Sub protonix 40 mg BID 
  
  
FEN/GI - NPO. Ng tube feeds. Activity - Pt is bed bound DVT prophylaxis - Lovenox GI prophylaxis - Protonix Fall prophylaxis - Not indicated at this time. Code Status - Full, discussed with patient / caregivers. Next of Kin Name and Contact: Jesse Louise: [phone] Yeni Basurto MD 
Family Medicine Resident Alee Lauren discussed with Dr. Estela Huffman. Subjective: Eyes open, nonverbal. Alert but does not respond or follow commands. Objective:  
Physical examination Patient Vitals for the past 24 hrs: 
 Temp Pulse Resp BP SpO2  
21 0721     (P) 100 % 21 0407     96 % 21 0316 99.6 °F (37.6 °C) 85 27 107/63 95 % 21 0034     95 % 21 2321 98.6 °F (37 °C) 67 15 107/65 98 % 21 2310  72     
21 1941     97 % 21 1911 99.3 °F (37.4 °C) 78 26 (!) 112/94 97 % 21 1604 98 °F (36.7 °C) 100 19 114/78 98 % 21 1600  72 17 (!) 83/43 97 % 21 1500  97     
21 1202 98 °F (36.7 °C) 99 19 98/72 97 % 21 1200 98.8 °F (37.1 °C) 73 19 (!) 88/38 95 % 21 1000  (!) 156  125/60 100 % 21 0800 99 °F (37.2 °C) 100 20 (!) 122/57 99 % Temp (24hrs), Av.8 °F (37.1 °C), Min:98 °F (36.7 °C), Max:99.6 °F (37.6 °C) O2 Flow Rate (L/min): 9 l/min O2 Device: (P) Tracheal collar, Humidifier Date 21 - 21 6535 21 - 21 3295 Shift 0826-0756 8770-3341 24 Hour Total 0123-7150 7868-4564 24 Hour Total  
INTAKE I.V.(mL/kg/hr) 450(0.6)  450(0.3) Volume (levoFLOXacin (LEVAQUIN) 750 mg in D5W IVPB) 150  150 Volume (piperacillin-tazobactam (ZOSYN) 3.375 g in 0.9% sodium chloride (MBP/ADV) 100 mL MBP) 300  300 NG/GT 1300 1250 2550 Water Flush Volume (mL) (PEG/Gastrostomy Tube ) 200 550 750 Medication Volume (PEG/Gastrostomy Tube ) 200 100 300 Intake (ml) (PEG/Gastrostomy Tube )  Shift Total(mL/kg) 1750(29.5) 1250(21.1) 3000(50.5) OUTPUT Urine(mL/kg/hr) 500(0.7)  500(0.4) Urine Voided 500  500 Urine Occurrence(s) 3 x 1 x 4 x Stool Stool Occurrence(s) 2 x 1 x 3 x Shift Total(mL/kg) 500(8.4)  500(8.4) NET 1250 1250 2500 Weight (kg) 59.4 59.4 59.4 59.4 59.4 59.4 General: No acute distress. Alert. Unable to track with eyes. Non-verbal, does not respond to voice or follow commands. Head: Normocephalic. Atraumatic Respiratory: Vesicular breath sounds. No w/r/r/c. 
Cardiovascular: RRR. No m/r/g. GI: + bowel sounds. Nontender. No rebound tenderness or guarding. Nondistended. Extremities: BUE dependent edema, trace pitting edema to BLE Neuro: Alert. Non-verbal.  
 
Data Review: CBC: 
Recent Labs 04/18/21 
0350 04/17/21 
1450 WBC 9.6 8.0 HGB 10.4* 10.2* HCT 33.8* 32.5*  
 353 Metabolic Panel: 
Recent Labs 04/18/21 
0350 04/17/21 
1450  140  
K 3.9 3.6  106 CO2 24 27 BUN 26* 23* CREA 1.37* 1.37* GLU 97 100 CA 9.6 9.9 ALB 2.9* 2.9* TBILI 0.4 0.2 ALT 47 48 Micro: 
Lab Results Component Value Date/Time Culture result: SCANT PSEUDOMONAS AERUGINOSA (A) 04/16/2021 06:45 AM  
 Culture result: NO NORMAL RESPIRATORY MARINA ISOLATED 04/16/2021 06:45 AM  
 Culture result: NO GROWTH 3 DAYS 04/16/2021 12:03 AM  
 
Imaging: 
Ct Head Wo Cont Result Date: 3/30/2021 EXAM: CT HEAD WO CONT INDICATION: seizures COMPARISON: None. CONTRAST: None.  TECHNIQUE: Unenhanced CT of the head was performed using 5 mm images. Brain and bone windows were generated. Coronal and sagittal reformats. CT dose reduction was achieved through use of a standardized protocol tailored for this examination and automatic exposure control for dose modulation. FINDINGS: There is an incidental cavum septum pellucidum. There is mild atrophy and compensatory dilatation of the ventricles. Areas of chronic encephalomalacia are seen in the right frontal lobe, right parietal lobe, and left frontal lobe. There are incidental bilateral basal ganglia calcifications. There is no intracranial hemorrhage, extra-axial collection, or mass effect. The basilar cisterns are open. No CT evidence of acute infarct. The bone windows demonstrate no abnormalities. The visualized portions of the paranasal sinuses and mastoid air cells are clear. Significant chronic encephalomalacia in the right frontal lobe, right parietal lobe, and left frontal lobe. Cta Chest W Or W Wo Cont Result Date: 4/12/2021 Clinical history: Dyspnea INDICATION:   Dyspnea COMPARISON: None CONTRAST: 100 ml Isovue 370 TECHNIQUE: CT of the chest with  IV contrast , Isovue-370 is performed. Axial images from the thoracic inlet to the level of the upper abdomen are obtained. Manual post-processing of the images and coronal reformatting is also performed. CT dose reduction was achieved through use of a standardized protocol tailored for this examination and automatic exposure control for dose modulation. Multiplanar reformatted imaging was performed. Sagittal and coronal reformatting. 3-D Postprocessing of imaging was performed. 3-D MIP reconstructed images were obtained in the coronal plane. FINDINGS: There is no pulmonary embolism. There is no aortic aneurysm or dissection. Small right-sided pleural effusion. Minimal scattered groundglass opacity may related to volume overload. Mildly prominent right axillary lymph nodes. Tracheostomy in place.  There is no mediastinal, axillary or hilar lymphadenopathy. The aorta is normal in course and caliber. The proximal pulmonary arteries are without evidence of filling defects. No lytic or blastic lesions are identified. The remainder of the upper abdomen visualized is unremarkable. Left subclavian PICC line catheter tip terminates in superior vena cava. There is no pulmonary embolism. There is no aortic aneurysm or dissection. Small right-sided pleural effusion. Scattered interstitial and parenchymal opacities on the right and on the left may be related to volume overload alternatively multifocal infectious process. Xr Chest [facility] Result Date: 4/15/2021 EXAM: XR CHEST PORT INDICATION: sob COMPARISON: April 12 FINDINGS: A portable AP radiograph of the chest was obtained at 2307 hours. The tracheostomy tube is stable. The patient is on a cardiac monitor. There are diffuse patchy opacities in the lungs bilaterally, most significantly the right lower lobe. The cardiac and mediastinal contours and pulmonary vascularity are normal.  The bones and soft tissues are grossly within normal limits. Diffuse patchy opacities are seen in the lungs bilaterally. Differential diagnostic possibilities include pneumonia and edema. Xr Chest [facility] Result Date: 4/12/2021 EXAM: XR CHEST PORT INDICATION: Tachycardia and tachypnea COMPARISON: 4/5/2021 FINDINGS: A portable AP radiograph of the chest was obtained at 1145 hours. The patient is on a cardiac monitor. A tracheostomy tube is present. The PICC line extends into the heart. The cardiac mediastinal silhouette is unchanged. There is a diffuse bilateral airspace process, asymmetric to the left lung. Diffuse bilateral airspace disease compatible with pneumonia, asymmetric to the left lung. Xr Chest [facility] Result Date: 4/5/2021 INDICATION: Fever COMPARISON: 4/2/2021 FINDINGS: Single AP portable view of the chest obtained at 3:37 AM demonstrates no change in position of the lines and tubes. The cardiomediastinal silhouette is stable. Diffuse bilateral patchy airspace disease is increased. No pneumothorax is seen. There is no evidence of pleural effusion. Increased diffuse bilateral patchy airspace disease, compatible with worsening pneumonia. Xr Chest [facility] Result Date: 4/2/2021 EXAM: XR CHEST PORT INDICATION: Fever and leukocytosis. Status epilepticus. COMPARISON: Portable chest on 3/30/2021 and 4/1/2020. TECHNIQUE: Upright portable chest AP view FINDINGS: Tracheostomy tube is in good position. Left PICC line extends into the distal superior vena cava. Cardiac monitoring wires overlie the thorax. The cardiomediastinal and hilar contours are within normal limits. The pulmonary vasculature is within normal limits. Reticular interstitial opacities are decreased and minimal. No focal airspace opacity. Pleural spaces are clear. Bones are osteopenic. Decreased now minimal interstitial pulmonary edema versus interstitial pneumonia. No pneumothorax. Left PICC line is new and in good position. Xr Chest [facility] Result Date: 3/30/2021 EXAM: XR CHEST PORT HISTORY: trach exchange. COMPARISON: 3/30/2021 FINDINGS: Portable AP. A tracheostomy tube is in place. The heart is normal size. There is diffuse interstitial prominence again seen likely related to edema. No pleural effusion or pneumothorax. Tracheostomy tube in appropriate position. Unchanged diffuse interstitial prominence likely related to edema. Xr Chest [facility] Result Date: 3/30/2021 EXAM: XR CHEST PORT HISTORY: Chest pain. COMPARISON: 4/1/2020 FINDINGS: Portable AP. A tracheostomy tube is in place. The heart is normal size. There is unchanged diffuse interstitial prominence likely related to edema. No focal consolidation, pleural effusion, or pneumothorax. No significant interval change. Xr [facility] Guided Vascular Access Result Date: 4/2/2021 INDICATION:   NO VENOUS ACCESS  EXAMINATION:  US GUIDE VAS ACCESS FINDINGS: Ultrasound was provided for PICC line placement. Ultrasound guidance for PICC line  placement. GBP Medications reviewed Current Facility-Administered Medications Medication Dose Route Frequency  levalbuterol (XOPENEX) nebulizer soln 1.25 mg/3 mL  1.25 mg Nebulization Q4H RT  
 oxyCODONE IR (ROXICODONE) tablet 5 mg  5 mg Per G Tube Q6H  
 piperacillin-tazobactam (ZOSYN) 3.375 g in 0.9% sodium chloride (MBP/ADV) 100 mL MBP  3.375 g IntraVENous Q8H  
 levoFLOXacin (LEVAQUIN) 750 mg in D5W IVPB  750 mg IntraVENous Q48H  
 sodium chloride (NS) flush 5-40 mL  5-40 mL IntraVENous Q8H  
 sodium chloride (NS) flush 5-40 mL  5-40 mL IntraVENous PRN  
 acetaminophen (TYLENOL) tablet 650 mg  650 mg Oral Q6H PRN Or  
 acetaminophen (TYLENOL) suppository 650 mg  650 mg Rectal Q6H PRN  
 enoxaparin (LOVENOX) injection 40 mg  40 mg SubCUTAneous DAILY  0.9% sodium chloride infusion  50 mL/hr IntraVENous CONTINUOUS  
 levETIRAcetam (KEPPRA) oral solution 750 mg  750 mg Per G Tube Q12H  
 midodrine (PROAMATINE) tablet 15 mg  15 mg Per G Tube Q8H  
 glycopyrrolate (ROBINUL) tablet 1 mg  1 mg Per G Tube TID PRN  
 guaiFENesin (ROBITUSSIN) 100 mg/5 mL oral liquid 200 mg  200 mg Oral QID  LORazepam (ATIVAN) tablet 0.5 mg  0.5 mg Per G Tube Q6H PRN  
 melatonin (rapid dissolve) tablet 5 mg  5 mg Oral QHS PRN  pantoprazole (PROTONIX) tablet 40 mg  40 mg Oral BID  phenytoin (DILANTIN) chewable tablet 100 mg  100 mg Per G Tube TID  diazePAM (VALIUM) injection 5 mg  5 mg IntraVENous Q5MIN PRN  
 zinc oxide (DESITIN) 13 % topical cream   Topical PRN  
 miconazole (MICOTIN) 2 % powder   Topical BID  torsemide (DEMADEX) tablet 40 mg  40 mg Per G Tube DAILY  morphine injection 1 mg  1 mg IntraVENous Q4H PRN Signed: 
 Blair Antunez MD 
 Resident, Family Medicine Attending note: Attending note to follow. ..

## 2021-04-19 NOTE — PROGRESS NOTES
Comprehensive Nutrition Assessment Type and Reason for Visit: Omar Reno Nutrition Recommendations/Plan: 1. Continue TF via PEG: 
 
Vital AF 1.2 bolus feeds 200mL 5x daily (10am, 4pm, 8pm, 12 midnight, 4am) + 75mL H2O flush after each bolus + Banatrol TID. Instructions for Banatrol: 
Mix 1 pack of Banatrol Plus with at least 120 mls of warm water. Administer via syringe immediately after mixing. Flush tube with 30 mls water before and after administration. 
  
TF provides 1200 kcals, 75 g protein, 1726 mL of H2O from TF & flushes - this meets 92% estimated energy needs and 100% protein needs. Nutrition Assessment:     
4/19: Follow up. TF at goal. Pt tolerating. May need to increase water flushes if IVF decreased or discontinued. Weight stable. Per chart, pt awaiting placement. Labs- Cr 1.37. Meds- zosyn, Protonix, phenytoin, 0.9% NaCl @100mL/h. Addendum: Notified by RN that pt is having some loose BMs and may benefit from banatrol. Will add TID to tube feed regimen. Instructions for use of banatrol above. 4/16: 50 y/o female admitted with severe sepsis. PMH includes Down's syndrome, nonverbal, trach, PEG. Pt currently COVID-19 ruleout. She was recently admitted. Last RD assessment three days ago, 4/13. Pt normally resides at group home and her normal TF regimen is Osmolite 1.2 237mL bolus 5x/day + 75mL H2O flush before and after each bolus. Hospital does not carry Osmolite TF. Recommend restarting pt on TF regimen from recent admission. May need to readjust TF bolus times based on medications. Will adjust as needed. Weight appears stable/trending up per EMR. Will continue to monitor. Labs- Cr 1.17, 0.9% [email or web address]. Weight hx: Wt Readings from Last 10 Encounters:  
04/18/21 59.4 kg (130 lb 14.4 oz) 04/14/21 59.2 kg (130 lb 8 oz) 02/03/21 58.9 kg (129 lb 12.8 oz) 07/08/20 49.9 kg (110 lb)  
06/30/20 49.9 kg (110 lb) 03/31/20 50.1 kg (110 lb 7.2 oz) 03/17/20 44.5 kg (98 lb) 01/14/20 44.5 kg (98 lb) 01/09/20 44.6 kg (98 lb 6 oz) 12/11/19 44 kg (97 lb) Malnutrition Assessment: 
Malnutrition Status: at risk- TF dependent, wound Estimated Daily Nutrient Needs: 
Energy (kcal): 4101(4130 x 1.2 AF); Weight Used for Energy Requirements: Current Protein (g): 72-84(1.2-1.4 g/kg); Weight Used for Protein Requirements: Current Fluid (ml/day): 1310; Method Used for Fluid Requirements: 1 ml/kcal 
 
 
Nutrition Related Findings:  last BM 4/18 (loose); 2+ LE, 3+ UE edema Wounds:   
Deep tissue injury(heel) Current Nutrition Therapies: DIET NPO 
DIET TUBE FEEDING Vital AF 1.2 bolus feeds 200mL 5x daily (10am, 4pm, 8pm, 12 midnight, 4am), Water flush with 75ml with each bolus. Anthropometric Measures: 
· Height:  4' 9\" (144.8 cm) · Current Body Wt:  59.4 kg (130 lb 15.3 oz) · Admission Body Wt:      
· Usual Body Wt:       
· Ideal Body Wt:  85 lbs:  155.3 % · Adjusted Body Weight:   ; Weight Adjustment for: No adjustment · Adjusted BMI:      
· BMI Category: Overweight (BMI 25.0-29. 9) Nutrition Diagnosis: · Altered GI function related to swallowing difficulty as evidenced by nutrition support-enteral nutrition 4/19: Nutrition dx continues. Nutrition Interventions:  
Food and/or Nutrient Delivery: Continue tube feeding Nutrition Education and Counseling: No recommendations at this time Coordination of Nutrition Care: Continue to monitor while inpatient, Interdisciplinary rounds Goals: 
Continue TF to meet >90% energy/protein needs next 2-4 days Nutrition Monitoring and Evaluation:  
Behavioral-Environmental Outcomes: None identified Food/Nutrient Intake Outcomes: Enteral nutrition intake/tolerance Physical Signs/Symptoms Outcomes: Weight, Biochemical data, Skin, Nutrition focused physical findings, GI status, Fluid status or edema Discharge Planning:   
Enteral nutrition Electronically signed by Minnie Antunez RDN on 4/19/2021 Contact: [phone]

## 2021-04-19 NOTE — CONSULTS
Palliative Medicine Consult Pranay: [phone] ([phone]) Patient Name: Calin Hunt YOB: 1970 Date of Initial Consult: 4/19/2021 Reason for Consult: Goals of care discussion Requesting Provider: Dr. Levy Primary Care Physician: Promise Carson, AMINA 
 
 SUMMARY:  
Calin Hunt is a 49 y.o. with a past history of Downs Syndrome, seizures, recurring C Diff s/p fecal transplant, HLD, Recurring hypoxic respiratory failure s/p Tracheostomy (2/2020), Pseudomonas A PNA,  PEG, Sleep apnea and severe debility. Patient presented to ER from her residential [address] [place] [address] with staff reporting patient to have increased difficulty breathing and new onset of seizure like activity. Patient had just been discharged from [facility] on 4/14, after a prolonged hospitalization d/t hypoxic respiratory failure and sepsis. Patient was admitted on 4/15/2021 with a diagnosis Hypoxia, PNA, VICK, sepsis and breakthrough seizures secondary to subtherapeutic phenytoin  levels. Larua Thapa Chart review/Course of Hospitalization: Patient continues to require supplemental O2 to trach collar Repeat CXR worsening PNA. Respiratory cultures continue to grow Pseudomonas Aerg. 4/19 RR called due care/turning causing severely elevated HR and RR/  
 
Current medical issues leading to Palliative Medicine involvement include: GOC discussion in setting of severe debility and hypoxic respiratory failure. PALLIATIVE DIAGNOSES:  
1. Hypoxic Respiratory Failure 2. Altered mental status, unspecified 3. Debility 4. Advanced Care planning Discussion 5. DNR Discussion 6. Goals of care Discussion PLAN:  
1. Prior to visit spoke with patients nurse [facility] RN 
2. Patient was seen, no family at bedside. Patient open eyes to voice, no eye contact, no tracking 3.  I called the patients mother, assessed her understanding of health and hospitalization, as well as what she understands of patients decline over the past 1+ years.  
4. I began to ruby about our concerns regarding patients recurrent hospitalizations s/p 1 day after discharge, and that she is at very HR for continued decline despite treatments and interventions. 5. Unfortunately, patients mother is unable to come into hospital during weekday, but is able to come this evening at approx 81. II spoke with Family medicine team and they have agreed to meet with family this evening 6. Hypoxia-unresolved, HR for decline. still requiring 9L to trach collar. Last CXR w/ increased 7. Altered mental status, unspecified- May be at baseline? Opens eyes to voice, still no eye contact with me, nor does she track my voice, not following commands 8. Advanced care planning discussion- No AMD in EMR. Patient single. Her Mother is legal NOK/primary health care decision maker. Patient also has a sister, who would serve as secondary decision maker. 9. DNR Discussion- Patient continues to have Full Code status. 10. Goals of care Discussion-  Full Restorative at this time. Family medicine team to meet with patients mother and her sister at 26 tonight, to discuss [address], Unfortunately, palliative team is unable to participate at that time/   
11.  
12.  Initial consult note routed to primary continuity provider and/or primary health care team members 13. Communicated plan of care with: Palliative IDT, [facility] Team, spoke with family medicine team, as well as unit supervisor Stephy and daytime supervisor Shin Ndiaye RN and Angeles Smith RN 
 
 GOALS OF CARE / TREATMENT PREFERENCES:  
 
GOALS OF CARE: 
  
 
TREATMENT PREFERENCES:  
Code Status: Full Code Advance Care Planning: 
[x] [facility] Interdisciplinary Team has updated the ACP Navigator with Scott Starks and Patient Capacity Primary Decision Maker (Active): Jam Guthrie - Mother - [phone] Advance Care Planning 4/5/2021 Patient's Healthcare Decision Maker is: Legal Next of Kin Confirm Advance Directive None Patient Would Like to Complete Advance Directive Unable Other Instructions: Other: As far as possible, the palliative care team has discussed with patient / health care proxy about goals of care / treatment preferences for patient. HISTORY:  
 
History obtained from: medical records/ nurse CHIEF COMPLAINT: N/A 
 
HPI/SUBJECTIVE: The patient is:  
[] Verbal and participatory [x] Non-participatory due to:  
Patient non verbal and not following commands, unable to provide ROS or HPI. Clinical Pain Assessment (nonverbal scale for severity on nonverbal patients): Activity (Movement): Lying quietly, normal position Duration: for how long has pt been experiencing pain (e.g., 2 days, 1 month, years) Frequency: how often pain is an issue (e.g., several times per day, once every few days, constant) FUNCTIONAL ASSESSMENT:  
 
Palliative Performance Scale (PPS): 20 PSYCHOSOCIAL/SPIRITUAL SCREENING:  
 
Palliative IDT has assessed this patient for cultural preferences / practices and a referral made as appropriate to needs (Cultural Services, Patient Advocacy, Ethics, etc.) Any spiritual / Temple concerns: 
[] Yes /  [x] No 
 
Caregiver Burnout: 
[] Yes /  [] No /  [x] No Caregiver Present Anticipatory grief assessment:  
[x] Normal  / [] Maladaptive ESAS Anxiety: ESAS Depression:    
 
 
 REVIEW OF SYSTEMS:  
 
Positive and pertinent negative findings in ROS are noted above in HPI. The following systems were [] reviewed / [x] unable to be reviewed as noted in HPI Other findings are noted below. Systems: constitutional, ears/nose/mouth/throat, respiratory, gastrointestinal, genitourinary, musculoskeletal, integumentary, neurologic, psychiatric, endocrine. Positive findings noted below. Modified ESAS Completed by: provider Stool Occurrence(s): 1  PHYSICAL EXAM:  
 
From RN flowsheet: 
Wt Readings from Last 3 Encounters:  
04/19/21 133 lb 4.8 oz (60.5 kg) 04/14/21 130 lb 8 oz (59.2 kg) 02/03/21 129 lb 12.8 oz (58.9 kg) Blood pressure 109/64, pulse (!) 118, temperature 98.3 °F (36.8 °C), resp. rate 28, height 4' 9\" (1.448 m), weight 133 lb 4.8 oz (60.5 kg), SpO2 96 %. Pain Scale 1: Adult Nonverbal Pain Scale Pain Intensity 1: 0 Last bowel movement, if known:  
 
Constitutional: Appears younger than stated age, petite, well nourished, weak, NAD Eyes: pupils equal, anicteric ENMT: no nasal discharge, moist mucous membranes, supplemental O2 to  trach Cardiovascular: regular rhythm, distal pulses intact Respiratory: breathing not labored, symmetric, +coarse breath sounds Gastrointestinal: soft non-tender, +bowel sounds Musculoskeletal: no deformity, no tenderness to palpation, bilateral foot drop Skin: warm, dry, 3+ edema in arms and hands bilaterally, trace edema feet Neurologic: Opens eyes to voice, no tracking, non verbal, did not follow commands Psychiatric:unable to assess Other: 
 
 
 HISTORY:  
 
Principal Problem: 
  Severe sepsis ([address] (4/16/2021) Active Problems: 
  Severe intellectual disability (2/2/2012) Overview: Mongolism Seizure ([address] (12/11/2019) Down's syndrome (12/11/2019) Iron deficiency (12/11/2019) Inability to walk (6/9/2020) Acute kidney injury ([address] (3/31/2021) Acute respiratory failure with hypoxia ([address] (4/4/2021) Pneumonia (4/16/2021) Anemia (4/16/2021) Chronic static encephalopathy (4/16/2021) Swelling of hand (4/16/2021) Subtherapeutic phenytoin level (4/16/2021) Tracheostomy dependence ([address] (4/16/2021) Past Medical History:  
Diagnosis Date  Acute cystitis without hematuria 4/3/2021  VICK (acute kidney injury) ([address] 3/31/2021  
 C. difficile diarrhea 3/28/2020  Clostridium difficile diarrhea 6/30/2020  Constipation 12/11/2019  Diarrhea in adult patient 3/28/2020  Down syndrome  Elevated Dilantin level 4/3/2021  
 History of vascular access device 06/30/2020  
 4 Fr midline, 10 cm L brachial, poor access  History of vascular access device 04/01/2021 [facility] VAT 5fr double PICC LFT brachial at 39cm, arm cir 31 cm   
 Oral thrush 3/29/2020  Positive fecal occult blood test 3/29/2020  Seizures [facility]  Sleep apnea   
 humidifier and oxygen w/trach  Status epilepticus ([facility] 3/30/2021  Stroke [facility]) 2019 \"old stroke seen on CT\"  Thrombocytosis ([facility] 3/31/2021  VAP (ventilator-associated pneumonia) ([facility] 4/9/2021 Past Surgical History:  
Procedure Laterality Date  COLONOSCOPY N/A 7/8/2020 COLONOSCOPY with fecal transplant (consents in red file) performed by Rohan Mueller MD at [address] [place] [address] HX GI    
 gtube 2/2020  HX OTHER SURGICAL    
 tracheostomy 2/2020 Family History Family history unknown: Yes History reviewed, no pertinent family history. Social History Tobacco Use  Smoking status: Never Smoker  Smokeless tobacco: Never Used Substance Use Topics  Alcohol use: Never Frequency: Never Allergies Allergen Reactions  Depakote [Divalproex] Swelling Current Facility-Administered Medications Medication Dose Route Frequency  levalbuterol (XOPENEX) nebulizer soln 1.25 mg/3 mL  1.25 mg Nebulization Q4H RT  
 oxyCODONE IR (ROXICODONE) tablet 5 mg  5 mg Per G Tube Q6H  
 levoFLOXacin (LEVAQUIN) 750 mg in D5W IVPB  750 mg IntraVENous Q48H  
 sodium chloride (NS) flush 5-40 mL  5-40 mL IntraVENous Q8H  
 sodium chloride (NS) flush 5-40 mL  5-40 mL IntraVENous PRN  
 acetaminophen (TYLENOL) tablet 650 mg  650 mg Oral Q6H PRN Or  
 acetaminophen (TYLENOL) suppository 650 mg  650 mg Rectal Q6H PRN  
 enoxaparin (LOVENOX) injection 40 mg  40 mg SubCUTAneous DAILY  levETIRAcetam (KEPPRA) oral solution 750 mg  750 mg Per G Tube Q12H  
 midodrine (PROAMATINE) tablet 15 mg  15 mg Per G Tube Q8H  
 glycopyrrolate (ROBINUL) tablet 1 mg  1 mg Per G Tube TID PRN  
 guaiFENesin (ROBITUSSIN) 100 mg/5 mL oral liquid 200 mg  200 mg Oral QID  LORazepam (ATIVAN) tablet 0.5 mg  0.5 mg Per G Tube Q6H PRN  
 melatonin (rapid dissolve) tablet 5 mg  5 mg Oral QHS PRN  pantoprazole (PROTONIX) tablet 40 mg  40 mg Oral BID  phenytoin (DILANTIN) chewable tablet 100 mg  100 mg Per G Tube TID  diazePAM (VALIUM) injection 5 mg  5 mg IntraVENous Q5MIN PRN  
 zinc oxide (DESITIN) 13 % topical cream   Topical PRN  
 miconazole (MICOTIN) 2 % powder   Topical BID  torsemide (DEMADEX) tablet 40 mg  40 mg Per G Tube DAILY  morphine injection 1 mg  1 mg IntraVENous Q4H PRN  
 
 
 
 LAB AND IMAGING FINDINGS:  
 
Lab Results Component Value Date/Time WBC 8.0 04/19/2021 06:33 PM  
 HGB 9.4 (L) 04/19/2021 06:33 PM  
 PLATELET 695 46/54/5220 06:33 PM  
 
Lab Results Component Value Date/Time Sodium 143 04/19/2021 06:33 PM  
 Potassium 2.8 (L) 04/19/2021 06:33 PM  
 Chloride 110 (H) 04/19/2021 06:33 PM  
 CO2 29 04/19/2021 06:33 PM  
 BUN 24 (H) 04/19/2021 06:33 PM  
 Creatinine 1.33 (H) 04/19/2021 06:33 PM  
 Calcium 9.3 04/19/2021 06:33 PM  
 Magnesium 2.4 04/15/2021 11:51 PM  
 Phosphorus 4.4 04/14/2021 01:26 AM  
  
Lab Results Component Value Date/Time Alk. phosphatase 126 (H) 04/19/2021 06:33 PM  
 Protein, total 7.8 04/19/2021 06:33 PM  
 Albumin 2.8 (L) 04/19/2021 06:33 PM  
 Globulin 5.0 (H) 04/19/2021 06:33 PM  
 
Lab Results Component Value Date/Time INR 1.0 03/30/2021 09:19 PM  
 Prothrombin time 10.4 03/30/2021 09:19 PM  
  
Lab Results Component Value Date/Time Iron 101 03/31/2021 11:18 PM  
 TIBC 205 (L) 03/31/2021 11:18 PM  
 Iron % saturation 49 03/31/2021 11:18 PM  
 Ferritin 211 03/31/2021 11:18 PM  
  
Lab Results Component Value Date/Time  pH 7.47 (H) 04/15/2021 11:50 PM  
 PCO2 32 (L) 04/15/2021 11:50 PM  
 PO2 90 04/15/2021 11:50 PM  
 
No components found for: [place] No results found for: CPK, CKMB Total time: 70 min Counseling / coordination time, spent as noted above: 55  min 
> 50% counseling / coordination?: yes Prolonged service was provided for  []30 min   []75 min in face to face time in the presence of the patient, spent as noted above. Time Start:  
Time End:  
Note: this can only be billed with 33934 (initial) or 84275 (follow up). If multiple start / stop times, list each separately.

## 2021-04-19 NOTE — PROGRESS NOTES
Care Management follow up Patient admitted for Severe sepsis, increased blood pressure, shortness of breath, diaphoresis, CKI, acute hypoxic respiratory failure. Hx - Down's syndrome, non-verbal, PEG and tracheostomy, colitis, GERD, c.diff RUR score 21%/ moderate risk Current status Patient continues to have episodes of tachycardiac and tachypnea with good response to ativan and morphine, rapid response today. Discussed potential discharge disposition with Dr. Gagandeep Baptiste. If comfort measures are appropriate, patient my need long term care placement with Hospice care. Patient remains on IV antibiotics and oxygen at 9L/30% per trach. Patient is from and adult group home but the care providers at the group home are challenged by the patient's complex care needs. Await family meeting to discuss patient's current care needs, and palliative consult. Transition of Care Plan 1. Monitor patient status and response to treatment. 2. Medical management continues. 3. Episodes of tachycardia, tachypnea, diaphoresis prompting rapid response. 4. Unsure of discharge disposition at this time. 5. Await palliative consult and family meeting input. 6. CM to follow recommendations. Meghna Esparza, RN, MSN/Care manager

## 2021-04-19 NOTE — PROGRESS NOTES
Bedside shift change report given to [facility] (oncoming nurse) by Winter Amin (offgoing nurse). Report included the following information SBAR, Kardex, ED Summary, Intake/Output, MAR, Recent Results and Cardiac Rhythm NSR.  
 
[address] [place] [address] notified of Potassium of 2.8. Bedside shift change report given to Sheeba (oncoming nurse) by Kevin Leslie (offgoing nurse). Report included the following information SBAR, Kardex, ED Summary, Procedure Summary, Intake/Output, MAR, Recent Results and Cardiac Rhythm NSR.

## 2021-04-19 NOTE — PROGRESS NOTES
RRT Called for tachycardia and tachypnea. FP to bedside and requests administer Morphine and Ativan 1mg IV now.

## 2021-04-19 NOTE — PROGRESS NOTES
patient has 20g 8cm long endurance catheter in right basilic placed using ultrasound guidance, secured with stat lock and tegaderm. +flush, +blood return

## 2021-04-19 NOTE — PROGRESS NOTES
0700 Bedside and Verbal shift change report given to [address] and Farhat RN (oncoming nurse) by Sabiha Parson (offgoing nurse). Report included the following information SBAR, Kardex, ED Summary, Intake/Output, MAR and Recent Results. This patient was assisted with Intentional Toileting every 2 hours during this shift. Documentation of ambulation and output reflected on Flowsheet. 1005 After feeding patient, patient sounding congested. HR 140s and RR 30-40s. Respiratory Therapist notified. Deep Suction completed. 1030 HR Sinus Tach 140s-150s, RR 50 sustaining O2 Sat 97%. Let Family Practice MD know, no orders at this time. 1039  RR 42 O2 Sat 98%. Ulises Griffin with Family Practice at bedside. No further orders at this time. Will continue to monitor. 4146 [facility] Per Dr. Ulises Oliva MD, give IV ativan to help with HR and RR.  
 
1345 Pt Heart Rate in 150s-160s and RR 50s, this RN and Lenora Alejandre RN went to bedside to assess. Patient deep suctioned, repositioned, and cleaned up. HR sustaining in the 150s-160s and RR 50s, SPO2 (%). 1348 Rapid Response called for Elevated HR and high RR (50s). Nursing superviser, Family Practice, ICU RN and [facility] CCL at bedside. Orders to give IV ativan 1mg and Morphine 1mg obtained and administered to pt.  
 
1355  and RR 59 with stable SPO2 (100%). No further orders at this time. 1410 Rapid Response assessment resolved. 1415  and RR 30 with SPO2 97, will continue to monitor. [phone] [phone] with transitional care regarding patient plan of care. 1545 Discussed Pt plan of care and gave updates to [facility] Pt Mother. 1930 Bedside and Verbal shift change [facility]) by Cricket Mcdaniels RN and Lenora Alejandre RN (offgoing nurse). Report included the following information SBAR, Kardex, ED Summary, Procedure Summary, Intake/Output, MAR, Recent Results and Cardiac Rhythm NSR/Sinus Tach.

## 2021-04-19 NOTE — PROGRESS NOTES
[address] Medicine Residency Resident Note in Brief 
---------------------------------------- Responded to RR at 1348 due to tachycardia and tachypnea. RR was very similar to other episodes patient has had while admitted. Per nursing, patient was alone in the room and noted on tele to be tachycardic to the 150s and tachypneic to the 50s. On arrival to the room patient's vitals were similar to the above and BP was stable. She is nonverbal and unable to give history to what has happened. Visit Vitals BP (!) 164/64 Pulse (!) 120 Temp 99.4 °F (37.4 °C) Resp 30 Ht 4' 9\" (1.448 m) Wt 130 lb 14.4 oz (59.4 kg) SpO2 97% BMI 28.33 kg/m² Physical Exam 
Constitutional:   
   General: She is in acute distress. Appearance: She is diaphoretic. Cardiovascular:  
   Rate and Rhythm: Regular rhythm. Tachycardia present. Heart sounds: No murmur. Pulmonary:  
   Comments: Tachypneic A/P Intermittent tachycardia and tachypnea: Patient with frequent episodes of tachycardia and tachypnea over the past few days. Unsure exact etiology. May be 2/2 agitation/being repositioned, discomfort from secretions. No concern for new infection at this time as O2 saturation stable and patient currently on Abx for pneumonia. - Gave 1x 1mg IV ativan and one time 1mg morphine with good response from patient. Continue prn ativan, oxi, and morphine. Will consider transitioning to comfort care, if mother agreeable. - Palliative consulted. Plan to have family meeting with mother and palliative as soon as able. - Continue deep suction prn Please see full daily progress note for complete plan. Dennise Sun, [address] [facility] Family Medicine Resident

## 2021-04-20 NOTE — PROGRESS NOTES
[id number] [facility] PROGRAM 
PROGRESS NOTE  
 
4/20/2021 PCP: Peña Street NP Assessment/Plan:  
 
Halie Nieves is a 49 y.o. female admitted for sepsis 2/2 HAP. Patient was admitted on 4/15/2021. 
 
24 Hour Events: Family meeting convened last night, family continues to desire full restorative measures at this time and wish for pt to return to her group home if possible. Episodic agitation w/ tachypnea and tachycardia: Pt w/ multiple similar episodes during last hospital stay as well as at group home prior to this admission, usually provoked by moving pt. Unclear etiology however could be a component of dysautonomia related to pt's hx of significant seizures vs agitation vs seizure-like activity. - Per pt's mother/POA they would like her to return back to her group home, however they are unable to administer morphine. 
- Will attempt to manage future episodes w/ ativan alone - will check w/ pharmacy about possible formulations for this - Ativan 0.5mg Q6h PRN and morphine 1mg Q4h PRN agitation 
- Oxycodone 5mg Q6h scheduled for agitation Sepis 2/2 agitation vs HAP: POA: HR: 149 RR: 65 POC LA: 4.19 > resolved. CXR 4/16 w/ persistent b/l airspace disease, procal 0.23. Patient was admitted from 3/30 - 4/14 and was treated for VAP/pseudomonas UTI (s/p full couse zosyn) as well as for HAP. RVP/covid negative. Pt was discharged on Doxy and Augmentin. BCx 4/16 NGTD, resp cx w/ scant pseudomonas. S/p vanc 4/16-4/17, zosyn 4/16-4/19. Pt has been afebrile w/o leukocytosis. - CBC/ CMP daily  
- Continue levaquin through 4/22 for full 7-day course - Treatment for agitation as above 
  
Acute respiratory failure/trach dependence: Patient is currently on 9 l/min of room air via trach collar w/ FiO2 of 28%. Likely 2/2 HAP vs agitation. 
- Treatment for HAP as above - Wean oxygen as tolerated - Deep suctioning as needed - Palliative consulted 
  
Hypotension: POA blood pressure: 135/79  
- Continue home Midodrine 15mg TID 
- Continue to monitor BP 
  
Elevated BNP: POA: 388. No baseline BNP level. Echo (03/29/2020): left ventricular ejection fraction is 50 - 55%. No regional wall motion abnormality noted. Due to severe sepsis, hydration is needed. - Will continue to monitor respiratory status  
  
Seizures:  POA phenytoin level: 7.9 subtherapeutic level. Possible seizure-like activity noted by group home staff prior to admission.   
- Continue home Keppra 750 mg BID 
- Per neuro home phenytoin switched to 100mg tab Q8h 
- Subtheraputic, recheck 4/24 
  
Anemia of chronic disease: POA Hbg: 10.0 Bl: 9-10. Stable. Iron studies in 2021, no need for repeat. - CBC daily  
   
VICK - improved:  POA cr: 1.43. Bl (0.9) likely 2/2 severe sepsis 2/2 HAP.    
Chronic UE edema: Stable 
- Hold home torsemide 40mg daily due to renal function  
  
GERD: stable. Omeprazole 40mg BID   
- Sub protonix 40 mg BID 
  
  
FEN/GI - NPO. Ng tube feeds. Activity - Pt is bed bound DVT prophylaxis - Lovenox GI prophylaxis - Protonix Fall prophylaxis - Not indicated at this time. Code Status - Full, discussed with patient / caregivers. Next of Kin Name and Contact: Drake Bueno: [phone] Altagracia Pierce MD 
Family Medicine Resident Lety Lubin discussed with Dr. Chris Varner. Subjective: Eyes open, nonverbal. Alert but does not respond or follow commands. Objective:  
Physical examination Patient Vitals for the past 24 hrs: 
 Temp Pulse Resp BP SpO2  
04/20/21 0800 98.8 °F (37.1 °C) (!) 110 25 (!) 141/84 98 % 04/20/21 0737     100 % 04/20/21 0700  73     
04/20/21 0600  72 17  100 % 04/20/21 0510  87 18  99 % 04/20/21 0400 98.9 °F (37.2 °C) (!) 107 (!) 31 126/77 100 % 04/20/21 0327     100 % 04/20/21 0054  71 18  100 % 04/20/21 0004     96 % 04/19/21 2323 99.4 °F (37.4 °C) (!) 112 (!) 31 114/81 100 % 04/19/21 2300  87     
04/19/21 195     99 % 21 1941 98.6 °F (37 °C) 99 21 99/64 99 % 21 1539 98.3 °F (36.8 °C) (!) 118 28 109/64 96 % 21 1504     97 % 21 1500  (!) 105     
21 1451  (!) 104  (!) 103/52   
21 1415  (!) 120 30  97 % 21 1355  (!) 164 (!) 59  100 % 21 1354  (!) 126 27  96 % 21 1353  (!) 124 (!) 37  98 % 21 1352  (!) 154 (!) 40  94 % 21 1351  (!) 158 (!) 36  100 % 21 1350  (!) 159 (!) 64    
21 1349   (!) 58  98 % 21 1349  (!) 163 (!) 65  97 % 21 1348  (!) 132 (!) 95 (!) 164/64 (!) 88 % 21 1330  (!) 145   98 % 21 1221     98 % 21 1147 99.4 °F (37.4 °C) 89 21 (!) 111/49 99 % 21 1108 100.1 °F (37.8 °C) 94 26 (!) 96/46 96 % Temp (24hrs), Av.1 °F (37.3 °C), Min:98.3 °F (36.8 °C), Max:100.1 °F (37.8 °C) O2 Flow Rate (L/min): 9 l/min O2 Device: None (Room air), Humidifier Date 21 - 21 6763 21 - 21 7568 Shift 0123-3794 0077-2223 24 Hour Total 1673-6959 9919-5540 24 Hour Total  
INTAKE  
P.O. 0  0 0  0  
  P. O. 0  0 0  0  
I. V.(mL/kg/hr) 0(0)  0(0) I.V. 0  0 Blood 0  0 Autotransfused 0  0 Other 0  0 Other 0  0 NG/GT [address] Water Flush Volume (mL) (PEG/Gastrostomy Tube ) 150 225 375 Medication Volume (PEG/Gastrostomy Tube ) 420 100 520 Intake (ml) (PEG/Gastrostomy Tube )  Shift Total(mL/kg) 970(16) 925(15.2) 1895(31.1) 0(0)  0(0) OUTPUT Urine(mL/kg/hr) 500(0.7)  500(0.3) Urine Voided 500  500 Urine Occurrence(s)  1 x 1 x 1 x  1 x Emesis/NG output 0 0 0 Emesis 0  0 Output (ml) (PEG/Gastrostomy Tube ) 0 0 0 Other 0  0 Other Output 0  0 Stool 0  0 Stool Occurrence(s)  1 x 1 x 1 x  1 x Stool 0  0 Blood 0  0 Quantitative Blood Loss 0  0   Blood 0  0 Shift Total(mL/kg) 500(8.3) 0(0) 500(8.2)  749 5567 0  0 Weight (kg) 60.5 60.9 60.9 60.9 60.9 60.9 General: No acute distress. Alert. Unable to track with eyes. Non-verbal, does not respond to voice or follow commands. Head: Normocephalic. Atraumatic Respiratory: Vesicular breath sounds. No w/r/r/c. 
Cardiovascular: RRR. No m/r/g. GI: + bowel sounds. Nontender. No rebound tenderness or guarding. Nondistended. Extremities: BUE dependent edema, trace pitting edema to BLE Neuro: Alert. Non-verbal.  
 
Data Review: CBC: 
Recent Labs  
  04/20/21 0248 04/19/21 1833 04/18/21 
0350 WBC 6.3 8.0 9.6 HGB 9.1* 9.4* 10.4* HCT 29.0* 30.3* 33.8*  
 310 366 Metabolic Panel: 
Recent Labs  
  04/20/21 0248 04/19/21 1833 04/18/21 
0350  143 139  
K 3.5 2.8* 3.9  110* 105 CO2 30 29 24 BUN 23* 24* 26* CREA 1.14* 1.33* 1.37* * 131* 97  
CA 9.5 9.3 9.6 ALB 2.8* 2.8* 2.9* TBILI 0.2 0.2 0.4 ALT 37 36 47 Micro: 
Lab Results Component Value Date/Time Culture result: SCANT PSEUDOMONAS AERUGINOSA (A) 04/16/2021 06:45 AM  
 Culture result: NO NORMAL RESPIRATORY MARINA ISOLATED 04/16/2021 06:45 AM  
 Culture result: NO GROWTH 4 DAYS 04/16/2021 12:03 AM  
 
Imaging: 
Ct Head Wo Cont Result Date: 3/30/2021 EXAM: CT HEAD WO CONT INDICATION: seizures COMPARISON: None. CONTRAST: None. TECHNIQUE: Unenhanced CT of the head was performed using 5 mm images. Brain and bone windows were generated. Coronal and sagittal reformats. CT dose reduction was achieved through use of a standardized protocol tailored for this examination and automatic exposure control for dose modulation. FINDINGS: There is an incidental cavum septum pellucidum. There is mild atrophy and compensatory dilatation of the ventricles. Areas of chronic encephalomalacia are seen in the right frontal lobe, right parietal lobe, and left frontal lobe.  There are incidental bilateral basal ganglia calcifications. There is no intracranial hemorrhage, extra-axial collection, or mass effect. The basilar cisterns are open. No CT evidence of acute infarct. The bone windows demonstrate no abnormalities. The visualized portions of the paranasal sinuses and mastoid air cells are clear. Significant chronic encephalomalacia in the right frontal lobe, right parietal lobe, and left frontal lobe. Cta Chest W Or W Wo Cont Result Date: 4/12/2021 Clinical history: Dyspnea INDICATION:   Dyspnea COMPARISON: None CONTRAST: 100 ml Isovue 370 TECHNIQUE: CT of the chest with  IV contrast , Isovue-370 is performed. Axial images from the thoracic inlet to the level of the upper abdomen are obtained. Manual post-processing of the images and coronal reformatting is also performed. CT dose reduction was achieved through use of a standardized protocol tailored for this examination and automatic exposure control for dose modulation. Multiplanar reformatted imaging was performed. Sagittal and coronal reformatting. 3-D Postprocessing of imaging was performed. 3-D MIP reconstructed images were obtained in the coronal plane. FINDINGS: There is no pulmonary embolism. There is no aortic aneurysm or dissection. Small right-sided pleural effusion. Minimal scattered groundglass opacity may related to volume overload. Mildly prominent right axillary lymph nodes. Tracheostomy in place. There is no mediastinal, axillary or hilar lymphadenopathy. The aorta is normal in course and caliber. The proximal pulmonary arteries are without evidence of filling defects. No lytic or blastic lesions are identified. The remainder of the upper abdomen visualized is unremarkable. Left subclavian PICC line catheter tip terminates in superior vena cava. There is no pulmonary embolism. There is no aortic aneurysm or dissection. Small right-sided pleural effusion.  Scattered interstitial and parenchymal opacities on the right and on the left may be related to volume overload alternatively multifocal infectious process. Xr Chest [facility] Result Date: 4/15/2021 EXAM: XR CHEST PORT INDICATION: sob COMPARISON: April 12 FINDINGS: A portable AP radiograph of the chest was obtained at 2307 hours. The tracheostomy tube is stable. The patient is on a cardiac monitor. There are diffuse patchy opacities in the lungs bilaterally, most significantly the right lower lobe. The cardiac and mediastinal contours and pulmonary vascularity are normal.  The bones and soft tissues are grossly within normal limits. Diffuse patchy opacities are seen in the lungs bilaterally. Differential diagnostic possibilities include pneumonia and edema. Xr Chest [facility] Result Date: 4/12/2021 EXAM: XR CHEST PORT INDICATION: Tachycardia and tachypnea COMPARISON: 4/5/2021 FINDINGS: A portable AP radiograph of the chest was obtained at 1145 hours. The patient is on a cardiac monitor. A tracheostomy tube is present. The PICC line extends into the heart. The cardiac mediastinal silhouette is unchanged. There is a diffuse bilateral airspace process, asymmetric to the left lung. Diffuse bilateral airspace disease compatible with pneumonia, asymmetric to the left lung. Xr Chest [facility] Result Date: 4/5/2021 INDICATION: Fever COMPARISON: 4/2/2021 FINDINGS: Single AP portable view of the chest obtained at 3:37 AM demonstrates no change in position of the lines and tubes. The cardiomediastinal silhouette is stable. Diffuse bilateral patchy airspace disease is increased. No pneumothorax is seen. There is no evidence of pleural effusion. Increased diffuse bilateral patchy airspace disease, compatible with worsening pneumonia. Xr Chest [facility] Result Date: 4/2/2021 EXAM: XR CHEST PORT INDICATION: Fever and leukocytosis. Status epilepticus. COMPARISON: Portable chest on 3/30/2021 and 4/1/2020.  TECHNIQUE: Upright portable chest AP view FINDINGS: Tracheostomy tube is in good position. Left PICC line extends into the distal superior vena cava. Cardiac monitoring wires overlie the thorax. The cardiomediastinal and hilar contours are within normal limits. The pulmonary vasculature is within normal limits. Reticular interstitial opacities are decreased and minimal. No focal airspace opacity. Pleural spaces are clear. Bones are osteopenic. Decreased now minimal interstitial pulmonary edema versus interstitial pneumonia. No pneumothorax. Left PICC line is new and in good position. Xr Chest [facility] Result Date: 3/30/2021 EXAM: XR CHEST PORT HISTORY: trach exchange. COMPARISON: 3/30/2021 FINDINGS: Portable AP. A tracheostomy tube is in place. The heart is normal size. There is diffuse interstitial prominence again seen likely related to edema. No pleural effusion or pneumothorax. Tracheostomy tube in appropriate position. Unchanged diffuse interstitial prominence likely related to edema. Xr Chest [facility] Result Date: 3/30/2021 EXAM: XR CHEST PORT HISTORY: Chest pain. COMPARISON: 4/1/2020 FINDINGS: Portable AP. A tracheostomy tube is in place. The heart is normal size. There is unchanged diffuse interstitial prominence likely related to edema. No focal consolidation, pleural effusion, or pneumothorax. No significant interval change. Xr Us Guided Vascular Access Result Date: 4/2/2021 INDICATION:   NO VENOUS ACCESS  EXAMINATION:  US GUIDE VAS ACCESS FINDINGS: Ultrasound was provided for PICC line placement. Ultrasound guidance for PICC line  placement. GBP Medications reviewed Current Facility-Administered Medications Medication Dose Route Frequency  levalbuterol (XOPENEX) nebulizer soln 1.25 mg/3 mL  1.25 mg Nebulization Q4H RT  
 oxyCODONE IR (ROXICODONE) tablet 5 mg  5 mg Per G Tube Q6H  
 levoFLOXacin (LEVAQUIN) 750 mg in D5W IVPB  750 mg IntraVENous Q48H  
 sodium chloride (NS) flush 5-40 mL  5-40 mL IntraVENous Q8H  
 sodium chloride (NS) flush 5-40 mL  5-40 mL IntraVENous PRN  
 acetaminophen (TYLENOL) tablet 650 mg  650 mg Oral Q6H PRN Or  
 acetaminophen (TYLENOL) suppository 650 mg  650 mg Rectal Q6H PRN  
 enoxaparin (LOVENOX) injection 40 mg  40 mg SubCUTAneous DAILY  levETIRAcetam (KEPPRA) oral solution 750 mg  750 mg Per G Tube Q12H  
 midodrine (PROAMATINE) tablet 15 mg  15 mg Per G Tube Q8H  
 glycopyrrolate (ROBINUL) tablet 1 mg  1 mg Per G Tube TID PRN  
 guaiFENesin (ROBITUSSIN) 100 mg/5 mL oral liquid 200 mg  200 mg Oral QID  LORazepam (ATIVAN) tablet 0.5 mg  0.5 mg Per G Tube Q6H PRN  
 melatonin (rapid dissolve) tablet 5 mg  5 mg Oral QHS PRN  pantoprazole (PROTONIX) tablet 40 mg  40 mg Oral BID  phenytoin (DILANTIN) chewable tablet 100 mg  100 mg Per G Tube TID  diazePAM (VALIUM) injection 5 mg  5 mg IntraVENous Q5MIN PRN  
 zinc oxide (DESITIN) 13 % topical cream   Topical PRN  
 miconazole (MICOTIN) 2 % powder   Topical BID  torsemide (DEMADEX) tablet 40 mg  40 mg Per G Tube DAILY  morphine injection 1 mg  1 mg IntraVENous Q4H PRN Signed: 
 Sarah Aviles MD 
 Resident, Family Medicine Attending note: Attending note to follow. ..

## 2021-04-20 NOTE — PROGRESS NOTES
Questioned by primary RN with regard to O2 Liter/min being documented 9Liters 21%. [address] bought to conversation and she educates the 9 liters is NOT supplemental Oxygen. 9 liters is being used to carry the water particles and humidify the ROOM AIR coming from the trach collar O2. Andres Lemus further advises that 8-10 O2 liters/minute are generally used for carrying humidification. Patient does NOT require supplemental oxygen. Andres Lemus will request primary respiratory therapist to update patient flow sheet with this information so it is clear that the patient is not getting or requiring supplemental oxygen.

## 2021-04-20 NOTE — PROGRESS NOTES
Shift Change: 
 
Bedside and Verbal shift change report given to Sheeba (oncoming nurse) by Jonathan Harris (offgoing nurse). Report included the following information SBAR, Kardex, and Recent Results. This patient was assisted with Intentional Toileting every 2 hours during this shift. Documentation of ambulation and output reflected on Flowsheet. Shift Report: 
 
Bedside and Verbal shift change report given to Javier Barahona (oncoming nurse) by Zane Bergman (offgoing nurse). Report included the following information SBAR, Kardex, and Recent Results.

## 2021-04-20 NOTE — WOUND CARE
Wound Consult: Follow Up Visit. Chart reviewed. Consulted for skin check/heels. Spoke with patients nurse,  Jose Luis Begum at bedside and we turned, assessed and provided incontinence skin care. Patient is resting on a Styker bed with Isoflex mattress. Heels off loaded with boots. Patient is with eyes open; requires two person assist to move side to side in bed. Rolf score 12. Assessment/Treatment: 
Sacrum, buttocks, gluteal folds - intact, no redness; has fecal and urinary incontinence - bolus tube feeds with thick liquid consistency stool. Incontinence skin care and Desitin applied. Right heel - callused area peeled off; pink, hypopigmented intact skin revealed. Left heel - small area of mixed blanching pink to non-blanching red erythema, POA Stage 1; entire area ~ 1.5 x 1.5 cm. No redness to hips/back/ankles. She has improved rash in groin. Antifungal in use. Skin Care / PI Prevention Recommendations: 1. Minimize friction/shear: minimize layers of linen/pads under patient. 2. Off load pressure/reposition:  turn and reposition approximately every 2 hours; float heels with  off loading heel boots. 3. Manage Moisture - keep skin folds dry; incontinence skin care with incontinence wipes; Desitin barrier ointment; appropriate sized briefs if needed; Pure wick in use to help contain urine; barrier for skin with stool. 4. Continue to monitor nutrition, pain, and skin risk scale, and skin assessment. Plan: We will continue to reassess routinely and as needed. Please re-consult should concerns arise despite continued skin/PI prevention measures. Sven Watts RN,[facility] , Wound / Ostomy Department Wound Healing Office [phone]

## 2021-04-20 NOTE — PROGRESS NOTES
Palliative Medicine Consult Pranay: [phone] ([phone]) Patient Name: Adithya Johnson YOB: 1970 Date of Initial Consult: 4/19/2021 Reason for Consult: Goals of care discussion Requesting Provider: Dr. Levy Primary Care Physician: Lolis Thomas NP 
 
 SUMMARY:  
Adithya Johnson is a 49 y.o. with a past history of Downs Syndrome, Remote CVA,  seizures, recurring C Diff s/p fecal transplant, HLD, Recurring hypoxic respiratory failure s/p Tracheostomy (2/2020), Pseudomonas A PNA,  PEG, Sleep apnea and severe debility. Patient presented to ER from her residential [address] [place] [address] with staff reporting patient to have increased difficulty breathing and new onset of seizure like activity. Patient had just been discharged from [facility] on 4/14, after a prolonged hospitalization d/t hypoxic respiratory failure and sepsis. Patient was admitted on 4/15/2021 with a diagnosis Hypoxia, PNA, VICK, sepsis and breakthrough seizures secondary to subtherapeutic phenytoin  levels. Jitendra Saunders Chart review/Course of Hospitalization: Patient continues to require supplemental O2 to trach collar Repeat CXR worsening PNA. Respiratory cultures continue to grow Pseudomonas Aerg. 4/19 RR called due care/turning causing severely elevated HR and RR/  
 
Current medical issues leading to Palliative Medicine involvement include: GOC discussion in setting of severe debility and hypoxic respiratory failure. PALLIATIVE DIAGNOSES:  
1. Hypoxic Respiratory Failure 2. Pain 3. Altered mental status, unspecified 4. Debility 5. Advanced Care planning Discussion 6. DNR Discussion 7. Goals of care Discussion PLAN:  
1. Prior to visit spoke with patients nurse Aiden Bella RN, unit ROSA gamez and unit pharmacist Penny Mena.    
2. Team informs me that patient w/ intermittent rapid onset tachycardia and tachypnea associated with repositioning, however prn lorazepam via peg or prn IV morphine, is reported to be effective with alleviating the signs and symptoms of distress. However team is concerned that if she should continue to have this response to care  s/p discharge, she is likely going to be sent back to hospital.  
3. Patient was seen, no family at bedside. Patient continues to open eyes to voice, however no eye contact and  no tracking . 4. Unable to reach patients mother she is working, not available until after 6 PM.  
5. I called the [facility] nursing Supervisor Ms. Alem Medina, provided medical update, discussed current concerns. Ms. Marilee Plunkett stated that their FACILITY IS ABLE TO ADMINISTER MORPHINE AND LORAZEPAM as needed. 6. Hypoxia-Improved vs resolved? She is at extremely HR for decline, with trach collar, severe debility totally dependent with all care,on  tube feedings and unable to protect her airway, suspect there will be ongoing complications. Now off supplemental O2, with humidified air only. 7. Tachypnea/ Dyspnea/increased work of breathing- Intermittent. Etiology unknown, suspect related to pain and anxiety/agitation. · Current treatment plan included Lorazepam 0.5 mg tab via PEG every 6 hours as needed (none given over past 24 hours) and morphine 1mg IV every 4 hours as needed (received x2 over past 24 hours). · For discharge, I would consider continuing PRN  Lorazepam 0.5 mg tabs via peg, but  would NOT give oral concentrate Rx at discharge for  a non hospice patient, due to HR of incorrect dosing. · If patients BP permits, primary medical team can also consider using Oxycodone IR 5mg tab, can give 1/2 tab (2.5mg) via PEG every 6 hours as needed, uncontrolled pain and/or SOB. · Patients BP is soft, so  I recommend including vital sign parameters with lorazepam and/or oxycodone orders given at discharge. 8. GOC discussion- Mother hopeful patient will be discharged back to the [address] [place] [address]. Goals continue to be for [facility].  
9. I will attempt to call the patients mother after hours this evening to revisit [address], will update chart as needed. 10.  Initial consult note routed to primary continuity provider and/or primary health care team members 11. Communicated plan of care with: Palliative IDT, [address] Team, spoke with family medicine team, nurse [facility] RN, ROSA Blank GOALS OF CARE / TREATMENT PREFERENCES:  
 
GOALS OF CARE: 
Patient/Health Care Proxy Stated Goals: Prolong life TREATMENT PREFERENCES:  
Code Status: Full Code Advance Care Planning: 
[x] The [facility] Interdisciplinary Team has updated the ACP Navigator with [address] and Patient Capacity Primary Decision Maker (Active): Alba Corbett - [phone] Advance Care Planning 4/5/2021 Patient's Healthcare Decision Maker is: Legal Next of Kin Confirm Advance Directive None Patient Would Like to Complete Advance Directive Unable Medical Interventions: Full interventions Other Instructions:  
Artificially Administered Nutrition: Feeding tube long-term, if indicated Other: As far as possible, the palliative care team has discussed with patient / health care proxy about goals of care / treatment preferences for patient. HISTORY:  
 
History obtained from: medical records/ unit nurse [facility] and Group Home nurse Luz Mcintosh CHIEF COMPLAINT: N/A 
 
HPI/SUBJECTIVE: The patient is:  
[] Verbal and participatory [x] Non-participatory due to:  
Patient non verbal and not following commands, unable to provide ROS or HPI. 
 
4/20- BP soft, off supplemental O2 , on humidified air only. Over past couple of days, Patient with intermittent episodes of tachycardia and tachypnea secondary to repositioning,  w/ HR in 150s and RR in 40sepositioning, effectively treated with prn morphine IV or Lorazepam via peg. Clinical Pain Assessment (nonverbal scale for severity on nonverbal patients): Activity (Movement): Lying quietly, normal position Duration: for how long has pt been experiencing pain (e.g., 2 days, 1 month, years) Frequency: how often pain is an issue (e.g., several times per day, once every few days, constant) FUNCTIONAL ASSESSMENT:  
 
Palliative Performance Scale (PPS): 20 
PPS: 20 
 
 
 PSYCHOSOCIAL/SPIRITUAL SCREENING:  
 
Palliative IDT has assessed this patient for cultural preferences / practices and a referral made as appropriate to needs (Cultural Services, Patient Advocacy, Ethics, etc.) Any spiritual / Anabaptist concerns: 
[] Yes /  [x] No 
 
Caregiver Burnout: 
[] Yes /  [] No /  [x] No Caregiver Present Anticipatory grief assessment:  
[x] Normal  / [] Maladaptive ESAS Anxiety: ESAS Depression:    
 
 
 REVIEW OF SYSTEMS:  
 
Positive and pertinent negative findings in ROS are noted above in HPI. The following systems were [] reviewed / [x] unable to be reviewed as noted in HPI Other findings are noted below. Systems: constitutional, ears/nose/mouth/throat, respiratory, gastrointestinal, genitourinary, musculoskeletal, integumentary, neurologic, psychiatric, endocrine. Positive findings noted below. Modified ESAS Completed by: provider Stool Occurrence(s): 0 PHYSICAL EXAM:  
 
From RN flowsheet: 
Wt Readings from Last 3 Encounters:  
04/20/21 134 lb 3.2 oz (60.9 kg) 04/14/21 130 lb 8 oz (59.2 kg) 02/03/21 129 lb 12.8 oz (58.9 kg) Blood pressure (!) 138/98, pulse (!) 125, temperature 99.3 °F (37.4 °C), resp. rate 21, height 4' 9\" (1.448 m), weight 134 lb 3.2 oz (60.9 kg), SpO2 98 %. Pain Scale 1: Adult Nonverbal Pain Scale Pain Intensity 1: 0 Last bowel movement, if known:  
 
Constitutional: Appears younger than stated age, petite, well nourished, weak, frail,  NAD Eyes: pupils equal, anicteric ENMT: no nasal discharge, moist mucous membranes, humidified air to  trach Cardiovascular: regular rhythm, distal pulses intact Respiratory: breathing not labored, symmetric, +coarse breath sounds Gastrointestinal: soft non-tender, +bowel sounds, PEG Musculoskeletal: no deformity, no tenderness to palpation, bilateral foot drop Skin: warm, dry, 3+ edema in arms and hands bilaterally, trace edema feet Neurologic: Opens eyes to voice, no tracking, non verbal, did not follow commands Psychiatric:unable to assess Other: 
 
 
 HISTORY:  
 
Principal Problem: 
  Severe sepsis ([address] (4/16/2021) Active Problems: 
  Severe intellectual disability (2/2/2012) Overview: Mongolism Seizure ([address] (12/11/2019) Down's syndrome (12/11/2019) Iron deficiency (12/11/2019) Inability to walk (6/9/2020) Acute kidney injury ([address] (3/31/2021) Acute respiratory failure with hypoxia ([address] (4/4/2021) Pneumonia (4/16/2021) Anemia (4/16/2021) Chronic static encephalopathy (4/16/2021) Swelling of hand (4/16/2021) Subtherapeutic phenytoin level (4/16/2021) Tracheostomy dependence ([address] (4/16/2021) Past Medical History:  
Diagnosis Date  Acute cystitis without hematuria 4/3/2021  VICK (acute kidney injury) ([address] 3/31/2021  
 C. difficile diarrhea 3/28/2020  Clostridium difficile diarrhea 6/30/2020  Constipation 12/11/2019  Diarrhea in adult patient 3/28/2020  Down syndrome  Elevated Dilantin level 4/3/2021  
 History of vascular access device 06/30/2020  
 4 Fr midline, 10 cm L brachial, poor access  History of vascular access device 04/01/2021 [facility] VAT 5fr double PICC LFT brachial at 39cm, arm cir 31 cm   
 Oral thrush 3/29/2020  Positive fecal occult blood test 3/29/2020  Seizures ([address]  Sleep apnea   
 humidifier and oxygen w/trach  Status epilepticus ([address] 3/30/2021  Stroke [facility]) 2019 \"old stroke seen on CT\"  Thrombocytosis ([address] 3/31/2021  [facility]il[facility] 4/9/2021 Past Surgical History: Procedure Laterality Date  COLONOSCOPY N/A 7/8/2020 COLONOSCOPY with fecal transplant (consents in red file) performed by Slava Fortune MD at [address] [facility] HX GI    
 gtube 2/2020  HX OTHER SURGICAL    
 tracheostomy 2/2020 Family History Family history unknown: Yes History reviewed, no pertinent family history. Social History Tobacco Use  Smoking status: Never Smoker  Smokeless tobacco: Never Used Substance Use Topics  Alcohol use: Never Frequency: Never Allergies Allergen Reactions  Depakote [Divalproex] Swelling Current Facility-Administered Medications Medication Dose Route Frequency  guaiFENesin (ROBITUSSIN) 100 mg/5 mL oral liquid 200 mg  200 mg Per G Tube QID  levalbuterol (XOPENEX) nebulizer soln 1.25 mg/3 mL  1.25 mg Nebulization Q4H RT  
 oxyCODONE IR (ROXICODONE) tablet 5 mg  5 mg Per G Tube Q6H  
 levoFLOXacin (LEVAQUIN) 750 mg in D5W IVPB  750 mg IntraVENous Q48H  
 sodium chloride (NS) flush 5-40 mL  5-40 mL IntraVENous Q8H  
 sodium chloride (NS) flush 5-40 mL  5-40 mL IntraVENous PRN  
 acetaminophen (TYLENOL) tablet 650 mg  650 mg Oral Q6H PRN Or  
 acetaminophen (TYLENOL) suppository 650 mg  650 mg Rectal Q6H PRN  
 enoxaparin (LOVENOX) injection 40 mg  40 mg SubCUTAneous DAILY  levETIRAcetam (KEPPRA) oral solution 750 mg  750 mg Per G Tube Q12H  
 midodrine (PROAMATINE) tablet 15 mg  15 mg Per G Tube Q8H  
 glycopyrrolate (ROBINUL) tablet 1 mg  1 mg Per G Tube TID PRN  
 LORazepam (ATIVAN) tablet 0.5 mg  0.5 mg Per G Tube Q6H PRN  
 melatonin (rapid dissolve) tablet 5 mg  5 mg Oral QHS PRN  pantoprazole (PROTONIX) tablet 40 mg  40 mg Oral BID  phenytoin (DILANTIN) chewable tablet 100 mg  100 mg Per G Tube TID  diazePAM (VALIUM) injection 5 mg  5 mg IntraVENous Q5MIN PRN  
 zinc oxide (DESITIN) 13 % topical cream   Topical PRN  
 miconazole (MICOTIN) 2 % powder   Topical BID  torsemide (DEMADEX) tablet 40 mg  40 mg Per G Tube DAILY  morphine injection 1 mg  1 mg IntraVENous Q4H PRN  
 
 
 
 LAB AND IMAGING FINDINGS:  
 
Lab Results Component Value Date/Time WBC 6.3 04/20/2021 02:48 AM  
 HGB 9.1 (L) 04/20/2021 02:48 AM  
 PLATELET 051 18/30/7245 02:48 AM  
 
Lab Results Component Value Date/Time Sodium 143 04/20/2021 02:48 AM  
 Potassium 3.5 04/20/2021 02:48 AM  
 Chloride 108 04/20/2021 02:48 AM  
 CO2 30 04/20/2021 02:48 AM  
 BUN 23 (H) 04/20/2021 02:48 AM  
 Creatinine 1.14 (H) 04/20/2021 02:48 AM  
 Calcium 9.5 04/20/2021 02:48 AM  
 Magnesium 2.4 04/15/2021 11:51 PM  
 Phosphorus 4.4 04/14/2021 01:26 AM  
  
Lab Results Component Value Date/Time Alk. phosphatase 124 (H) 04/20/2021 02:48 AM  
 Protein, total 7.6 04/20/2021 02:48 AM  
 Albumin 2.8 (L) 04/20/2021 02:48 AM  
 Globulin 4.8 (H) 04/20/2021 02:48 AM  
 
Lab Results Component Value Date/Time INR 1.0 03/30/2021 09:19 PM  
 Prothrombin time 10.4 03/30/2021 09:19 PM  
  
Lab Results Component Value Date/Time Iron 101 03/31/2021 11:18 PM  
 TIBC 205 (L) 03/31/2021 11:18 PM  
 Iron % saturation 49 03/31/2021 11:18 PM  
 Ferritin 211 03/31/2021 11:18 PM  
  
Lab Results Component Value Date/Time pH 7.47 (H) 04/15/2021 11:50 PM  
 PCO2 32 (L) 04/15/2021 11:50 PM  
 PO2 90 04/15/2021 11:50 PM  
 
No components found for: [place] No results found for: CPK, CKMB Total time: 35min Counseling / coordination time, spent as noted above: 305min 
> 50% counseling / coordination?: yes Prolonged service was provided for  []30 min   []75 min in face to face time in the presence of the patient, spent as noted above. Time Start:  
Time End:  
Note: this can only be billed with 94926 (initial) or 42178 (follow up). If multiple start / stop times, list each separately.

## 2021-04-20 NOTE — PROGRESS NOTES
Bedside shift change report given to Kevin Glynn (oncoming nurse) by Tracy Saunders (offgoing nurse). Report included the following information SBAR, Kardex, ED Summary, Procedure Summary, Intake/Output, MAR, Recent Results and Cardiac Rhythm Sinus tachy. 0940 Pt HR in 150's, RR 40's after repositioning. Bedside shift change report given to Sheeba (oncoming nurse) by Joanie Guillermo (offgoing nurse). Report included the following information SBAR, Kardex, ED Summary, Procedure Summary, Intake/Output, MAR, Recent Results, Med Rec Status and Cardiac Rhythm Sinus tachy.

## 2021-04-20 NOTE — PROGRESS NOTES
Had meeting with patient's mother and sister as well as caretaker at her group home at patient's bedside. They would preferably like her to return to her group home after discharge. They are able to give Ativan but not Morphine at the group home. She is currently at 28% FiO2 through trach cuff. They would like us to try and wean her to RA and give Ativan only if she has another agitation episode in the hopes of stabilizing her enough to be discharged back to her group home.   
 
Luigi Mayfield, DO

## 2021-04-20 NOTE — PROGRESS NOTES
Spiritual Care Assessment/Progress Note Adrian Boo 
 
 
NAME: Roseanna Dominguez      MRN: [id number] AGE: 49 y.o. SEX: female Rastafarian Affiliation: No preference Language: [place] 4/20/2021     Total Time (in minutes): 10 Spiritual Assessment begun in [facility] 3 PRO CARE TELE 2 through conversation with: 
  
    []Patient        [] Family    [] Friend(s) Reason for Consult: Initial/Spiritual assessment, patient floor Spiritual beliefs: (Please include comment if needed) 
   [] Identifies with a tj tradition:     
   [] Supported by a tj community:        
   [] Claims no spiritual orientation:       
   [] Seeking spiritual identity:            
   [] Adheres to an individual form of spirituality:       
   [x] Not able to assess:                   
 
    
Identified resources for coping:  
   [] Prayer                           
   [] Music                  [] Guided Imagery 
   [] Family/friends                 [] Pet visits [] Devotional reading                         [x] Unknown 
   [] Other:                                        
 
 
Interventions offered during this visit: (See comments for more details) Patient Interventions: Iconic (affirming the presence of God/Higher Power) Plan of Care: 
 
 [] Support spiritual and/or cultural needs  
 [] Support AMD and/or advance care planning process    
 [] Support grieving process 
 [] Coordinate Rites and/or Rituals  
 [] Coordination with community clergy [] No spiritual needs identified at this time 
 [] Detailed Plan of Care below (See Comments)  [] Make referral to Music Therapy 
[] Make referral to Pet Therapy    
[] Make referral to Addiction services 
[] Make referral to [facility] 
[] Make referral to Spiritual Care Partner 
[] No future visits requested       
[x] Follow up upon further referrals Comments:  visit for initial spiritual assessment.   Patient lying in bed, eyes open, but not responsive to voice, presence at bedside, or conversation. Appears comfortable. Spoke words of comfort and hope. Offered silent prayer at bedside. Please contact spiritual care for further referral or consult. Rev. Denis Harper MDiv, [facility], [facility]  paging service: [phone])

## 2021-04-20 NOTE — PROGRESS NOTES
Care Management follow up 
  
Patient admitted for Severe sepsis, increased blood pressure, shortness of breath, diaphoresis, CKI, acute hypoxic respiratory failure. Hx - Down's syndrome, non-verbal, PEG and tracheostomy, colitis, GERD, c.diff 
  
RUR score 20%/ moderate risk 
  
Current status Patient continues to have episodes of tachycardiac and tachypnea with good response to ativan and morphine, rapid response today. Discussed potential discharge disposition with Dr. Rosey Valdes. Patient remains on IV antibiotics, nebs and has been weaned to room air with medical air per trach. Patient is from an adult group home but the care providers at the group home are challenged by the patient's complex care needs. Collaboration with physician, palliative care, and CM. Considering patient to return to group home with medications and humidified air to trach.   
  
Transition of Care Plan 1. Monitor patient status and response to treatment. 2. Medical management continues. 3. Episodes of tachycardia, tachypnea, diaphoresis prompting rapid response. 4. Unsure of discharge disposition at this time.  
5. CM to follow recommendations. 
  
Aiyana Guthrie RN, MSN/Care manager

## 2021-04-21 NOTE — PROGRESS NOTES
We were approached by the nurse while on the 3rd floor, regarding pt's HR in the 150's and RR in the 50's. Dr. Benja Peoples and I went to evaluate the pt, she was having labored breathing and tachycardic on exam. Patient was given ativan 0.5 mg through the G-tube and her HR and RR improved. We will continue to monitor patient. Advised the nurse to contact the night team if there is any change in vitals. Signed By: Manpreet Perez MD   
 April 21, 2021

## 2021-04-21 NOTE — PROGRESS NOTES
Care Management follow up, LOS 5 days 
  
Patient admitted for Severe sepsis, increased blood pressure, shortness of breath, diaphoresis, CKI, acute hypoxic respiratory failure. Hx - Down's syndrome, non-verbal, PEG and tracheostomy, colitis, GERD, c.diff 
  
RUR score 20%/ moderate risk 
  
Current status Patient having episodes of tachycardiac and tachypnea with good response to ativan and morphine. Discussed potential discharge disposition with . Astrid Villasenor remains on IV antibiotics, nebs and has been weaned to room air with humidified air per trach. Miriam Reyna is from an adult group home but the care providers at the group home are challenged by the patient's complex care needs.  Collaboration with physician, palliative care, and CM. Plan is for patient to return to group home tomorrow (4/22/21) with medications and humidified air to [facility]. Spoke with Edwin Valadez, nurse at [facility]. She states if the pharmacy obtains prescriptions today, they can be at the group home tomorrow (Ativan and  oxycodone). Prescriptions can be faxed to [address] [place] [address] at [phone]. Order for continuous humidified air to [facility] obtained and faxed to [facility] at [phone]. Family Practice team and Palliative care spoke with family, plan is for patient to remain full code at this time with medication management for symptoms.  
  
Transition of Care Plan 1. Monitor patient status and response to treatment. 2. Medical management continues. 3. Patient to return to group home with medications and parameters for treatment. 4. Transport via [facility], referral sent/accepted for will call for 4/22/21.  
5. CM to follow recommendations. 
  
Angela Topete, RN, MSN/Care manager

## 2021-04-21 NOTE — PROGRESS NOTES
Palliative Medicine Consult Pranay: [phone] ([phone]) Patient Name: Vikram Butler YOB: 1970 Date of Initial Consult: 4/19/2021 Reason for Consult: Goals of care discussion Requesting Provider: Dr. Levy Primary Care Physician: Carlyn Castillo NP 
 
 SUMMARY:  
Vikram Butler is a 49 y.o. with a past history of Downs Syndrome, Remote CVA,  seizures, recurring C Diff s/p fecal transplant, HLD, Recurring hypoxic respiratory failure s/p Tracheostomy (2/2020), Pseudomonas A PNA,  PEG, Sleep apnea and severe debility. Patient presented to ER from her residential [address] [place] [address] with staff reporting patient to have increased difficulty breathing and new onset of seizure like activity. Patient had just been discharged from [facility] on 4/14, after a prolonged hospitalization d/t hypoxic respiratory failure and sepsis. Patient was admitted on 4/15/2021 with a diagnosis Hypoxia, PNA, VICK, sepsis and breakthrough seizures secondary to subtherapeutic phenytoin  levels. Danielle Chamberlain Chart review/Course of Hospitalization: Patient continues to require supplemental O2 to trach collar Repeat CXR worsening PNA. Respiratory cultures continue to grow Pseudomonas Aerg. 4/19 RR called due care/turning causing severely elevated HR and RR/  
 
Current medical issues leading to Palliative Medicine involvement include: GOC discussion in setting of severe debility and hypoxic respiratory failure. PALLIATIVE DIAGNOSES:  
1. Hypoxic Respiratory Failure 2. Pain 3. Altered mental status, unspecified 4. Debility 5. Advanced Care planning Discussion 6. DNR Discussion 7. Goals of care Discussion PLAN:  
1. Prior to visit spoke with margo Winn and nurse 2. She continues to have intermittent tachycardia and tachypnea associated with repositioning, however prn lorazepam via peg and/or prn IV morphine, this regimen has been  reported to effectively control symptoms.  
3. Symptom Management after discharge- I spoke with Dr. Mina Cintron, recommended Lorazepam 0.5mg tab via peg every 6 hours as needed, anxiety/agitation and Oxycodone IR 5mg tab, give 1/2 -1 tab via PEG every 6 hours as needed, pain/SOB/WOB. We also discussed giving group home staff parameters, so they know when not to administer meds. 4. Patient was seen, no changes in presentation noted today. still open eyes to voice, however no eye contact and  no tracking . Laura Thapa 5. Hypoxia- resolved, however she is at extremely HR for recurring respiratory failure and decline in setting of trach collar, severe debility and totally dependent on care, tube feedings and unable to protect her airway, suspect there will be ongoing complications. 6. GOC discussion- Plan to continue with Full restorative treatments and interventions, remains Full Code. Family would like for patient o be discharged back to group home. 7.  Initial consult note routed to primary continuity provider and/or primary health care team members 8. Communicated plan of care with: Palliative ID[facility]bed [facility] Team, spoke with family medicine team, Tyesha Guerra, Dr. Zabrina Summers GOALS OF CARE / TREATMENT PREFERENCES:  
 
GOALS OF CARE: 
Patient/Health Care Proxy Stated Goals: Prolong life TREATMENT PREFERENCES:  
Code Status: Full Code Advance Care Planning: 
[x] [facility] Interdisciplinary Team has updated the ACP Navigator with Aleksander and Patient Capacity Primary Decision Maker (Active): My Corbett - [phone] Advance Care Planning 4/5/2021 Patient's Healthcare Decision Maker is: Legal Next of Kin Confirm Advance Directive None Patient Would Like to Complete Advance Directive Unable Medical Interventions: Full interventions Other Instructions:  
Artificially Administered Nutrition: Feeding tube long-term, if indicated Other: As far as possible, the palliative care team has discussed with patient / health care proxy about goals of care / treatment preferences for patient. HISTORY:  
 
History obtained from: medical records/ unit nurse [facility] [place] and Group Home nurse Haseeb James CHIEF COMPLAINT: N/A 
 
HPI/SUBJECTIVE: The patient is:  
[] Verbal and participatory [x] Non-participatory due to:  
Patient non verbal and not following commands, unable to provide ROS or HPI. 
 
4/20- BP soft, off supplemental O2 , on humidified air only. Over past couple of days, Patient with intermittent episodes of tachycardia and tachypnea secondary to repositioning,  w/ HR in 150s and RR in 40sepositioning, effectively treated with prn morphine IV or Lorazepam via peg. Clinical Pain Assessment (nonverbal scale for severity on nonverbal patients): Activity (Movement): Lying quietly, normal position Duration: for how long has pt been experiencing pain (e.g., 2 days, 1 month, years) Frequency: how often pain is an issue (e.g., several times per day, once every few days, constant) FUNCTIONAL ASSESSMENT:  
 
Palliative Performance Scale (PPS): 20 
PPS: 20 
 
 
 PSYCHOSOCIAL/SPIRITUAL SCREENING:  
 
Palliative IDT has assessed this patient for cultural preferences / practices and a referral made as appropriate to needs (Cultural Services, Patient Advocacy, Ethics, etc.) Any spiritual / Orthodoxy concerns: 
[] Yes /  [x] No 
 
Caregiver Burnout: 
[] Yes /  [] No /  [x] No Caregiver Present Anticipatory grief assessment:  
[x] Normal  / [] Maladaptive ESAS Anxiety: ESAS Depression:    
 
 
 REVIEW OF SYSTEMS:  
 
Positive and pertinent negative findings in ROS are noted above in HPI. The following systems were [] reviewed / [x] unable to be reviewed as noted in HPI Other findings are noted below. Systems: constitutional, ears/nose/mouth/throat, respiratory, gastrointestinal, genitourinary, musculoskeletal, integumentary, neurologic, psychiatric, endocrine.  Positive findings noted below. Modified ESAS Completed by: provider Stool Occurrence(s): 1 PHYSICAL EXAM:  
 
From RN flowsheet: 
Wt Readings from Last 3 Encounters:  
04/21/21 133 lb (60.3 kg) 04/14/21 130 lb 8 oz (59.2 kg) 02/03/21 129 lb 12.8 oz (58.9 kg) Blood pressure 127/84, pulse 77, temperature 98.1 °F (36.7 °C), resp. rate 20, height 4' 9\" (1.448 m), weight 133 lb (60.3 kg), SpO2 97 %. Pain Scale 1: Adult Nonverbal Pain Scale Pain Intensity 1: 0 Last bowel movement, if known:  
 
Constitutional: Appears younger than stated age, petite, well nourished, weak, frail,  NAD Eyes: pupils equal, anicteric ENMT: no nasal discharge, moist mucous membranes, humidified air to  trach Cardiovascular: regular rhythm, distal pulses intact Respiratory: breathing not labored, symmetric, +coarse breath sounds Gastrointestinal: soft non-tender, +bowel sounds, PEG Musculoskeletal: no deformity, no tenderness to palpation, bilateral foot drop Skin: warm, dry, 3+ edema in arms and hands bilaterally, trace edema feet Neurologic: Opens eyes to voice, no tracking, non verbal, did not follow commands Psychiatric:unable to assess Other: 
 
 
 HISTORY:  
 
Principal Problem: 
  Severe sepsis ([address] (4/16/2021) Active Problems: 
  Severe intellectual disability (2/2/2012) Overview: Mongolism Seizure ([address] (12/11/2019) Down's syndrome (12/11/2019) Iron deficiency (12/11/2019) Inability to walk (6/9/2020) Acute kidney injury ([address] (3/31/2021) Acute respiratory failure with hypoxia ([address] (4/4/2021) Pneumonia (4/16/2021) Anemia (4/16/2021) Chronic static encephalopathy (4/16/2021) Swelling of hand (4/16/2021) Subtherapeutic phenytoin level (4/16/2021) Tracheostomy dependence ([address] (4/16/2021) Past Medical History:  
Diagnosis Date  Acute cystitis without hematuria 4/3/2021  VICK (acute kidney injury) ([facility] 3/31/2021  
 C. difficile diarrhea 3/28/2020  Clostridium difficile diarrhea 6/30/2020  Constipation 12/11/2019  Diarrhea in adult patient 3/28/2020  Down syndrome  Elevated Dilantin level 4/3/2021  
 History of vascular access device 06/30/2020  
 4 Fr midline, 10 cm L brachial, poor access  History of vascular access device 04/01/2021 [place] [facility] VAT 5fr double PICC LFT brachial at 39cm, arm cir 31 cm   
 Oral thrush 3/29/2020  Positive fecal occult blood test 3/29/2020  Seizures ([facility]  Sleep apnea   
 humidifier and oxygen w/trach  Status epilepticus ([facility] 3/30/2021  Stroke [facility]) 2019 \"old stroke seen on CT\"  Thrombocytosis ([facility] 3/31/2021  VAP (ventilator-associated pneumonia) ([facility] 4/9/2021 Past Surgical History:  
Procedure Laterality Date  COLONOSCOPY N/A 7/8/2020 COLONOSCOPY with fecal transplant (consents in red file) performed by Carolyne Hu MD at [address] HX GI    
 gtube 2/2020  HX OTHER SURGICAL    
 tracheostomy 2/2020 Family History Family history unknown: Yes History reviewed, no pertinent family history. Social History Tobacco Use  Smoking status: Never Smoker  Smokeless tobacco: Never Used Substance Use Topics  Alcohol use: Never Frequency: Never Allergies Allergen Reactions  Depakote [Divalproex] Swelling Current Facility-Administered Medications Medication Dose Route Frequency  morphine injection 1 mg  1 mg IntraVENous Q6H PRN  
 guaiFENesin (ROBITUSSIN) 100 mg/5 mL oral liquid 200 mg  200 mg Per G Tube QID  levalbuterol (XOPENEX) nebulizer soln 1.25 mg/3 mL  1.25 mg Nebulization Q4H RT  
 oxyCODONE IR (ROXICODONE) tablet 5 mg  5 mg Per G Tube Q6H  
 levoFLOXacin (LEVAQUIN) 750 mg in D5W IVPB  750 mg IntraVENous Q48H  
 sodium chloride (NS) flush 5-40 mL  5-40 mL IntraVENous Q8H  
 sodium chloride (NS) flush 5-40 mL  5-40 mL IntraVENous PRN  
 acetaminophen (TYLENOL) tablet 650 mg  650 mg Oral Q6H PRN Or  
 acetaminophen (TYLENOL) suppository 650 mg  650 mg Rectal Q6H PRN  
 enoxaparin (LOVENOX) injection 40 mg  40 mg SubCUTAneous DAILY  levETIRAcetam (KEPPRA) oral solution 750 mg  750 mg Per G Tube Q12H  
 midodrine (PROAMATINE) tablet 15 mg  15 mg Per G Tube Q8H  
 glycopyrrolate (ROBINUL) tablet 1 mg  1 mg Per G Tube TID PRN  
 LORazepam (ATIVAN) tablet 0.5 mg  0.5 mg Per G Tube Q6H PRN  
 melatonin (rapid dissolve) tablet 5 mg  5 mg Oral QHS PRN  pantoprazole (PROTONIX) tablet 40 mg  40 mg Oral BID  phenytoin (DILANTIN) chewable tablet 100 mg  100 mg Per G Tube TID  diazePAM (VALIUM) injection 5 mg  5 mg IntraVENous Q5MIN PRN  
 zinc oxide (DESITIN) 13 % topical cream   Topical PRN  
 miconazole (MICOTIN) 2 % powder   Topical BID  torsemide (DEMADEX) tablet 40 mg  40 mg Per G Tube DAILY  
 
 
 
 LAB AND IMAGING FINDINGS:  
 
Lab Results Component Value Date/Time WBC 9.5 04/21/2021 03:05 AM  
 HGB 10.4 (L) 04/21/2021 03:05 AM  
 PLATELET 629 62/19/4982 03:05 AM  
 
Lab Results Component Value Date/Time Sodium 142 04/21/2021 03:23 PM  
 Potassium 3.0 (L) 04/21/2021 03:23 PM  
 Chloride 105 04/21/2021 03:23 PM  
 CO2 28 04/21/2021 03:23 PM  
 BUN 25 (H) 04/21/2021 03:23 PM  
 Creatinine 1.19 (H) 04/21/2021 03:23 PM  
 Calcium 9.8 04/21/2021 03:23 PM  
 Magnesium 2.4 04/15/2021 11:51 PM  
 Phosphorus 4.4 04/14/2021 01:26 AM  
  
Lab Results Component Value Date/Time Alk. phosphatase 148 (H) 04/21/2021 03:05 AM  
 Protein, total 8.7 (H) 04/21/2021 03:05 AM  
 Albumin 3.2 (L) 04/21/2021 03:05 AM  
 Globulin 5.5 (H) 04/21/2021 03:05 AM  
 
Lab Results Component Value Date/Time INR 1.0 03/30/2021 09:19 PM  
 Prothrombin time 10.4 03/30/2021 09:19 PM  
  
Lab Results Component Value Date/Time  Iron 101 03/31/2021 11:18 PM  
 TIBC 205 (L) 03/31/2021 11:18 PM  
 Iron % saturation 49 03/31/2021 11:18 PM  
 Ferritin 211 03/31/2021 11:18 PM  
  
Lab Results Component Value Date/Time pH 7.47 (H) 04/15/2021 11:50 PM  
 PCO2 32 (L) 04/15/2021 11:50 PM  
 PO2 90 04/15/2021 11:50 PM  
 
No components found for: [place] No results found for: CPK, CKMB Total time: 35 min Counseling / coordination time, spent as noted above: 20min 
> 50% counseling / coordination?: yes Prolonged service was provided for  []30 min   []75 min in face to face time in the presence of the patient, spent as noted above. Time Start:  
Time End:  
Note: this can only be billed with 47291 (initial) or 77568 (follow up). If multiple start / stop times, list each separately.

## 2021-04-21 NOTE — PROGRESS NOTES
Tried calling Alissa Ortega, care giver at group home, regarding a lab but no answer. Will try again later.

## 2021-04-21 NOTE — PROGRESS NOTES
Shift Change: 
 
Bedside and Verbal shift change report given to Sheeba (oncoming nurse) by Alexa Melo (offgoing nurse). Report included the following information SBAR, Kardex, and Recent Results. This patient was assisted with Intentional Toileting every 2 hours during this shift. Documentation of ambulation and output reflected on Flowsheet. Shift Report: 
 
Bedside and Verbal shift change report given to April (oncoming nurse) by Carlyn Ornelas (offgoing nurse). Report included the following information SBAR, Kardex, and Recent Results.

## 2021-04-21 NOTE — PROGRESS NOTES
0730 Bedside and Verbal shift change report given to Joselyn Caraballo RN (oncoming nurse) by Donna Fry RN (offgoing nurse). Report included the following information SBAR and Kardex. This patient was assisted with Intentional Toileting every 2 hours during this shift. Documentation of ambulation and output reflected on Flowsheet. 0 Dr Gavino Berger notified patient potassium 3.0, orders will be noted. Coni Arceo checked on Patient, Dr Romelia Lozano to Floor to see patient, states history of this response at times. Will give Patient ativan per peg, does not want further interventions, declined nurse to call a rapid at this time. 1930 Bedside and Verbal shift change report given to Casandra Ye RN (oncoming nurse) by Joselyn Pineda RN (offgoing nurse). Report included the following information SBAR and Kardex. 1940 Follow up VS  respirations 19.

## 2021-04-21 NOTE — PROGRESS NOTES
Nutrition Note 4/21: MD requested increase in H2O flushes with TF. Noted that IVF is no longer running. Will increase water flushes to 125mL after each bolus. Updated TF order. TF regimen will now provide 1976 mL water. Electronically signed by Bhavin Gould RDN on 4/21/2021 at 9:02 AM 
 
Contact: [phone]

## 2021-04-21 NOTE — PROGRESS NOTES
Nurse made aware about patient's HR in the 150's and RR in the 50's. Dr. Vic Draper at the bedside. Ordered not to call rapid and give ativan PO.

## 2021-04-21 NOTE — PROGRESS NOTES
The following prescriptions were faxed/confirmed to [address] [place] [address] at [phone]: 
 
· Oxycodone IR 5mg tab · Dilantin 50mg chewable tabs · Oxycodone 5mg (PRN) · Lorazepam 0.5mg tab Requested delivery to group home by tomorrow am 4/22/21. Winter Barlow RN, MSN/Care manager [phone]

## 2021-04-22 NOTE — PROGRESS NOTES
04/21/21 2370 Chart and Patient  Assessment Pulmonary History 0 Surgical History 0 Chest X-ray 2 Respiratory Pattern 0 Mental Status 0 Breath Sounds 2 Cough 0 Level of Activity/Mobility 3 Respiratory Assessment Total Score 7

## 2021-04-22 NOTE — DISCHARGE INSTRUCTIONS
Patient Discharge Instructions    Vidal Longo / [id number] : 1970    Admitted 4/15/2021 Discharged: 2021 11:27 AM     Primary care provider: Tracey Muñoz NP    Discharging provider:  Sean Nielson MD  - Family Medicine Resident  Evelyn Klein MD, MD - Family Medicine, Attending    . . . . . . . . . . . . . . . . . . . . . . . . . . . . . . . . . . . . . . . . . . . . . . . . . . . . . . . . . . . . . . . . . . . . . . . . MEDICATION CHANGES  START  -Ativan 0.5mg per PEG Q6h PRN agitation  -Oxycodone 5mg per PEG Q6h PRN agitation    STOP    CHANGES  -Phenytoin 100mg tab Q8h per PEG      No other changes were made to your medications, please take all your other home medicines as previously prescribed. . . . . . . . . . . . . . . . . . . . . . . . . . . . . . . . . . . . . . . . . . . . . . . . . . . . . . . . . . . . . . . . . . . . . . . Gardenia Troncoso FINAL DIAGNOSES & HOSPITAL COURSE:    Episodic agitation w/ tachypnea and tachycardia: Pt w/ multiple similar episodes during last hospital stay as well as at group home prior to this admission, usually provoked by moving pt.  Unclear etiology however could be a component of dysautonomia related to pt's hx of significant seizures vs agitation vs seizure-like activity.  - Oxycodone 5mg Q6h per PEG tube scheduled for pain/agitation  - Ativan 0.5mg per PEG Q6h PRN and additional oxycodone 5mg per PEG Q6h PRN agitation  - For episodes of agitation:        -do not administer ativan or additional oxycodone unless pt's HR sustained >120BPM and/or RR >35       -monitor pt's O2 saturation during episodes of agitation, it should remain >90% at all times       -do not give ativan or additional oxycodone if pt's blood pressure is <100/60       -re-evaluate pt's symptoms 15 minutes after administering medications and again at 30 minutes       -if pt's symptoms have not improved after 30 minutes pt should be brought to the hospital for further evaluation     Sepis 2/2 agitation vs HAP: CXR 4/16 w/ persistent b/l airspace disease. Patient was admitted from 3/30 - 4/14 and was treated for VAP/pseudomonas UTI (s/p full couse zosyn) as well as for HAP. RVP/covid negative. BCx 4/16 no growth, resp cx w/ scant pseudomonas. S/p vanc 4/16-4/17, zosyn 4/16-4/19. Pt has been afebrile w/o leukocytosis. S/p full 7-day course of levaquin  - repeat CBC OP to monitor for leukocytosis   - Treatment for agitation as above     Acute respiratory failure/trach dependence: Patient is currently on room air.  - Deep suctioning as needed     Hypotension: POA blood pressure: 135/79   - Continue home Midodrine 15mg TID  - Continue to monitor BP     VICK - improved:  POA cr: 1.43. Bl (0.9) likely 2/2 severe sepsis 2/2 HAP. - repeat CMP OP to monitor kidney function  - Increase free water w/ PEG feeds (see dietary instructions below)     Elevated BNP: POA: 388. No baseline BNP level.  Echo (03/29/2020): left ventricular ejection fraction is 50 - 55%. No regional wall motion abnormality noted. Due to severe sepsis, hydration is needed. - Monitor respiratory status and for clinical signs of volume overload      Seizures:  POA phenytoin level: 7.9 subtherapeutic level. Possible seizure-like activity noted by group home staff prior to admission.    - Continue home Keppra 750 mg BID  - Per neuro home phenytoin switched to 100mg tab Q8h  - Subtheraputic, recheck 4/24     Anemia of chronic disease: POA Hbg: 10.0 Bl: 9-10. Stable. Iron studies in 2021, no need for repeat. - Repeat CBC OP to monitor for anemia       Chronic UE edema: Stable  - Continue home torsemide 40mg daily due to renal function      GERD: stable.  Omeprazole 40mg BID        FOLLOW-UP CARE RECOMMENDATIONS:    APPOINTMENTS:  Follow-up Information     Follow up With Specialties Details Why Contact James Carey, DO Family Medicine Schedule an appointment as soon as possible for a visit Your PCP will call you to set up an appointment for follow up after hospitalization [address]  [address]  [phone]      Ptaricia Stout MD Neurology Schedule an appointment as soon as possible for a visit Follow up for seizure disorder and medication management [address]  [phone]              It is very important that you keep follow-up appointment(s). Bring discharge papers, medication list (and/or medication bottles) to follow-up appointments for review by outpatient provider(s). FOLLOW-UP TESTS RECOMMENDED:   · Repeat phenytoin level 4/26 to monitor whether pt is in the therapeutic range  · Repeat CBC to monitor for anemia and infection  · Repeat BMP to monitor renal function    ONGOING TREATMENT PLAN: as above    PENDING TEST RESULTS:  At the time of discharge the following test results are still pending: none. Please review these results as they become available. Specific symptoms to watch for: chest pain, shortness of breath, fever, chills, nausea, vomiting, diarrhea, change in mentation, falling, weakness, bleeding. DIET:    -Vital AF 1.2 bolus feeds 200mL 5x daily (10am, 4pm, 8pm, 12 midnight, 4am) + 125mL H2O flush after each bolus + Banatrol TID. -Mix 1 pack of Banatrol Plus with at least 120 mls of warm water. Administer via syringe immediately after mixing. Flush tube with 30 mls water before and after administration. ACTIVITY:  Activity as tolerated    WOUND CARE:    1. Minimize friction/shear: minimize layers of linen/pads under patient. 2. Off load pressure/reposition:  turn and reposition approximately every 2 hours; float heels with  off loading heel boots. 3. Manage Moisture - keep skin folds dry; incontinence skin care with incontinence wipes; Desitin barrier ointment; appropriate sized briefs if needed; Pure wick in use to help contain urine; barrier for skin with stool.   4. Continue to monitor nutrition, pain, and skin risk scale, and skin assessment. EQUIPMENT needed:  none    INCIDENTAL FINDINGS:  none    GOALS OF CARE:       [] Eventual return to home/independent/assisted living       [x] Long term SNF       [] Hospice    Patient condition at discharge:   Functional status       [x] Poor       [] Deconditioned       [] Independent  Cognition       [] Lucid       [] Forgetful (Some senescence)       [x] Dementia  Catheters/lines (plus indication)       [] Serna       [] PICC           [] PEG       [x] None  Code status       [x] Full code       [] DNR  . . . . . . . . . . . . . . . . . . . . . . . . . . . . . . . . . . . . . . . . . . . . . . . . . . . . . . . . . . . . . . . . . . . . . . . Maty Rizzo      CHRONIC MEDICAL CONDITIONS:  Problem List as of 4/22/2021 Date Reviewed: 2/3/2021          Codes Class Noted - Resolved    Pneumonia ICD-10-CM: J18.9  ICD-9-CM: 181  4/16/2021 - Present        * (Principal) Severe sepsis ([facility] ICD-10-CM: A41.9, R65.20  ICD-9-CM: 038.9, 995.92  4/16/2021 - Present        Anemia ICD-10-CM: D64.9  ICD-9-CM: 285.9  4/16/2021 - Present        Chronic respiratory failure with hypoxia ([facility] ICD-10-CM: J96.11  ICD-9-CM: 518.83, 799.02  4/16/2021 - Present        Chronic static encephalopathy ICD-10-CM: G93.49  ICD-9-CM: 348.39  4/16/2021 - Present        Swelling of hand ICD-10-CM: M79.89  ICD-9-CM: 729.81  4/16/2021 - Present        Subtherapeutic phenytoin level ICD-10-CM: Z51.81  ICD-9-CM: V58.83  4/16/2021 - Present        Tracheostomy dependence ([facility] ICD-10-CM: Z93.0  ICD-9-CM: V44.0  4/16/2021 - Present        Acute respiratory failure with hypoxia (HCC) ICD-10-CM: J96.01  ICD-9-CM: 518.81  4/4/2021 - Present        Acute kidney injury ([facility] ICD-10-CM: N17.9  ICD-9-CM: 584.9  3/31/2021 - Present        Hyperphosphatemia ICD-10-CM: E83.39  ICD-9-CM: 275.3  3/31/2021 - Present        On tube feeding diet ICD-10-CM: Z78.9  ICD-9-CM: V49.89  Unknown - Present        Wheelchair bound ICD-10-CM: Z99.3  ICD-9-CM: V46.3  6/9/2020 - Present        Inability to walk ICD-10-CM: R26.2  ICD-9-CM: 719.7  6/9/2020 - Present        Seizure ([facility] ICD-10-CM: R56.9  ICD-9-CM: 780.39  12/11/2019 - Present        Gastroesophageal reflux disease without esophagitis ICD-10-CM: K21.9  ICD-9-CM: 530.81  12/11/2019 - Present        Down's syndrome ICD-10-CM: Q90.9  ICD-9-CM: 758.0  12/11/2019 - Present        Iron deficiency ICD-10-CM: E61.1  ICD-9-CM: 280.9  12/11/2019 - Present        Severe intellectual disability ICD-10-CM: F72  ICD-9-CM: 318.1  2/2/2012 - Present    Overview Signed 2/2/2012  1:40 PM by Nina Duncan MD     Mongolism               RESOLVED: Sepsis ([facility] ICD-10-CM: A41.9  ICD-9-CM: 038.9, 995.91  4/9/2021 - 4/16/2021        RESOLVED: VAP (ventilator-associated pneumonia) ([facility] ICD-10-CM: M88.952  ICD-9-CM: 997.31  4/9/2021 - 4/16/2021        RESOLVED: UTI (urinary tract infection) ICD-10-CM: N39.0  ICD-9-CM: 599.0  4/9/2021 - 4/9/2021        RESOLVED: Altered mental status ICD-10-CM: R41.82  ICD-9-CM: 780.97  4/6/2021 - 4/16/2021        RESOLVED: Hypoxia ICD-10-CM: R09.02  ICD-9-CM: 799.02  4/6/2021 - 4/16/2021        RESOLVED: Acute cystitis without hematuria ICD-10-CM: N30.00  ICD-9-CM: 595.0  4/3/2021 - 4/16/2021        RESOLVED: Elevated Dilantin level ICD-10-CM: R78.89  ICD-9-CM: 790.6  4/3/2021 - 4/16/2021        RESOLVED: Edema (Chronic) ICD-10-CM: R60.9  ICD-9-CM: 782.3  3/31/2021 - 4/16/2021        RESOLVED: Hypotension ICD-10-CM: I95.9  ICD-9-CM: 458.9  3/31/2021 - 4/16/2021        RESOLVED: Hyponatremia ICD-10-CM: E87.1  ICD-9-CM: 276.1  3/31/2021 - 4/16/2021        RESOLVED: Thrombocytosis ([facility] ICD-10-CM: D47.3  ICD-9-CM: 238.71  3/31/2021 - 4/16/2021        RESOLVED: Status epilepticus ([facility] ICD-10-CM: G40.901  ICD-9-CM: 345.3  3/30/2021 - 4/16/2021        RESOLVED: Clostridium difficile diarrhea ICD-10-CM: A04.72  ICD-9-CM: 008.45  6/30/2020 - 4/16/2021        RESOLVED: Positive fecal occult blood test ICD-10-CM: R19.5  ICD-9-CM: 792.1  3/29/2020 - 4/16/2021        RESOLVED: Oral thrush ICD-10-CM: B37.0  ICD-9-CM: 112.0  3/29/2020 - 4/16/2021        RESOLVED: Diarrhea in adult patient ICD-10-CM: R19.7  ICD-9-CM: 787.91  3/28/2020 - 4/16/2021        RESOLVED: C. difficile diarrhea ICD-10-CM: A04.72  ICD-9-CM: 008.45  3/28/2020 - 4/16/2021        RESOLVED: Constipation ICD-10-CM: K59.00  ICD-9-CM: 564.00  12/11/2019 - 4/16/2021              Information obtained by :   I understand that if any problems occur once I am at home I am to contact my physician. I understand and acknowledge receipt of the instructions indicated above.                                                                                                                                              Physician's or R.N.'s Signature                                                                  Date/Time                                                                                                                                              Patient or Representative Signature                                                          Date/Time

## 2021-04-22 NOTE — ACP (ADVANCE CARE PLANNING)
Primary Decision Maker (Active): Brandie López - Mother - [phone] Advance Care Planning 4/22/2021 Patient's Healthcare Decision Maker is: Legal Next of Kin Confirm Advance Directive None Patient Would Like to Complete Advance Directive Unable Pt does not have AMD in place, is unable to complete due to physical/cognitive status. Mother Nilesh Dickinson is legal NOK and surrogate decision maker. Pt remains full code at this time.

## 2021-04-22 NOTE — DISCHARGE SUMMARY
Stacey Eng [address]  Office [phone] Fax [phone] Discharge / Transfer / Off-Service Note Name: Sam Mittal MRN: [id number]  Sex: Female YOB: 1970  Age: 49 y.o. PCP: Autumn Newton NP Date of admission: 4/15/2021 Date of discharge/transfer: 4/22/2021 Attending physician at admission: Bharti Reilly. Attending physician at discharge/transfer: Tiffany Varela MD 
Resident physician at discharge/transfer: Eulogio Retana MD 
  
Consultants during hospitalization IP CONSULT TO FAMILY PRACTICE 
IP CONSULT TO NEUROLOGY 
IP CONSULT TO PALLIATIVE CARE - PROVIDER Admission diagnoses Severe sepsis ([facility] [address] [A41.9, R65.20] Hypoxia [R09.02] Pneumonia [J18.9] Recommended follow-up after discharge 1. PCP-Mony Dacosta NP 
2. Neurology - Dr. Umer Black Things to follow up on with PCP:  
· Repeat phenytoin level 4/26 to monitor whether pt is in the therapeutic range · Repeat CBC to monitor for anemia and infection · Repeat BMP to monitor renal function 
 ---------------------------------------------------------------------------------------------------------------------------- The patient was well enough to be discharged from the hospital. However, because they were inpatient in a hospital, they are at greater risk of having been exposed to the coronavirus. PLEASE stay inside and self-quarantine for 14 days to prevent further spread of the coronavirus. 
------------------------------------------------------------------------------------------------------------------ Medication Changes: START  
-Ativan 0.5mg per PEG Q6h PRN agitation 
-Oxycodone 5mg per PEG Q6h PRN agitation STOP None CHANGES  
-Phenytoin 100mg tab Q8h per PEG No other changes were made to your medications, please take all your other home medicines as previously prescribed. History of Present Illness As per admitting provider:  
 
Vikram Butler is a 49 y.o. female medical history of Down Syndrome (non-verbal at baseline), S/p Trach and PEG tube, seizures, encephalomalacia, ANNE, h/o recurrent Cdiff colitis presents to the ED difficulty breathing. Patient is non-verbal at baseline, hx was obtained from her care giver [facility], [facility]). According to the care giver, patient was sent in because BP was elevated at164/94 the day of admission. She also noted heavy breathing and sweating that has been going on for a couple of days now, pt would soak through her clothing. Per caretaker, Afebrile, able to track with eyes,  
  
In the ED: 
Vitals: Temp: 98.5 F  BP: 135/79  HR: 149 RR: 65  SatO2  91 % on 9l/min trach collar Labs: wbc: 8.0 hbg: 10 MCV: 102 Cr: 1.43 AST: 44 Alk phos: 144. LA: 4.19 Imaging: CXR: Diffuse patchy opacities are seen in the lungs bilaterally. Differential diagnostic possibilities include pneumonia and edema Treatment: Zosyn 3.375 g Hospital course Episodic agitation w/ tachypnea and tachycardia: Pt w/ multiple similar episodes during last hospital stay as well as at group home prior to this admission, usually provoked by moving pt. Unclear etiology however could be a component of dysautonomia related to pt's hx of significant seizures vs agitation vs seizure-like activity. 
- Oxycodone 5mg Q6h per PEG tube scheduled for pain/agitation - Ativan 0.5mg per PEG Q6h PRN and additional oxycodone 5mg per PEG Q6h PRN agitation - OP consider low dose metoprolol if persistently tachycardic - caution starting this medication w/ pt's respiratory status - For episodes of agitation:  
     -do not administer ativan or additional oxycodone unless pt's HR sustained >120BPM and/or RR >35 
     -monitor pt's O2 saturation during episodes of agitation, it should remain >90% at all times 
     -do not give ativan or additional oxycodone if pt's blood pressure is <100/60 
     -re-evaluate pt's symptoms 15 minutes after administering medications and again at 30 minutes 
     -if pt's symptoms have not improved after 30 minutes pt should be brought to the hospital for further evaluation 
- Pt's family in agreement w/ plan - pt is very high risk for return to hospital if current symptoms unable to be controlled. If pt unable to stay in her group home pt's family is aware that a татьяна conversation would need to be conducted at that time as to how to best control her symptoms. Pt would likely benefit from comfort care measures and/or long term care in a place that would be able to provide her with more support than her group home.  
  
Sepis 2/2 agitation vs HAP: CXR 4/16 w/ persistent b/l airspace disease. Patient was admitted from 3/30 - 4/14 and was treated for VAP/pseudomonas UTI (s/p full couse zosyn) as well as for HAP. RVP/covid negative. BCx 4/16 no growth, resp cx w/ scant pseudomonas. S/p vanc 4/16-4/17, zosyn 4/16-4/19. Pt has been afebrile w/o leukocytosis. S/p full 7-day course of levaquin 
- repeat CBC OP to monitor for leukocytosis - Treatment for agitation as above 
  
Acute respiratory failure/trach dependence: Patient is currently on room air. 
- Deep suctioning as needed 
  
Hypotension: POA blood pressure: 135/79  
- Continue home Midodrine 15mg TID 
- Continue to monitor BP 
  
VICK - improved:  POA cr: 1.43. Bl (0.9) likely 2/2 severe sepsis 2/2 HAP. - repeat CMP OP to monitor kidney function - Increase free water w/ PEG feeds 
  
Elevated BNP: POA: 388. No baseline BNP level.  Echo (03/29/2020): left ventricular ejection fraction is 50 - 55%. No regional wall motion abnormality noted. Due to severe sepsis, hydration is needed. - Monitor respiratory status and for clinical signs of volume overload 
  
Seizures:  POA phenytoin level: 7.9 subtherapeutic level.  Possible seizure-like activity noted by group home staff prior to admission.   
- Continue home Keppra 750 mg BID 
- Per neuro home phenytoin switched to 100mg tab per PEG Q8h 
- Subtheraputic, recheck 4/26 
- F/u neuro Dr. Catherine Adjutant OP 
  
Anemia of chronic disease: POA Hbg: 10.0 Bl: 9-10. Stable. Iron studies in 2021, no need for repeat. - Repeat CBC OP to monitor for anemia 
   
Chronic UE edema: Stable - Continue home torsemide 40mg daily 
  
GERD: stable. Omeprazole 40mg BID   
 
 
Physical exam at discharge: 
 
Vitals Reviewed. Patient Vitals for the past 12 hrs: 
 Temp Pulse Resp BP SpO2  
04/22/21 1012 98.6 °F (37 °C) (!) 120 25 (!) 154/78 98 % 04/22/21 0803 98.6 °F (37 °C) 99 25 (!) 155/75 98 % 04/22/21 0802     100 % 04/22/21 0702  93     
04/21/21 2317  (!) 103     
04/21/21 2314 98.8 °F (37.1 °C) (!) 101 19 (!) 149/91 98 % General Oriented to person, place, and time and well-developed. Appears well-nourished, no distress. Not diaphoretic. HENT Head Normocephalic and atraumatic. Eyes Conjunctivae are normal, no discharge. No scleral icterus. Nose Nose normal, clear turbinates. Oral Oropharynx is clear and moist. No oropharyngeal exudate. Neck No thyromegaly present. No cervical adenopathy. Cardio Normal rate, regular rhythm. Exam reveals no gallop and no friction rub. No murmur heard. No chest wall tenderness. Pulmonary Effort normal and breath sounds normal. No respiratory distress. No wheezes, no rales. Abdominal Soft. Bowel sounds normal. No distension. No tenderness.  Deferred. Extremities No edema of lower extremities. No tenderness. Distal pulses intact. Neurological Alert and oriented to person, place, and time. Dermatology Skin is warm and dry. No rash noted. No erythema or pallor. Psychiatric Affect and judgment normal.   
 
 
Condition at discharge: Stable. Labs Recent Labs  
  04/22/21 
0404 04/21/21 
0305 04/20/21 
0248 WBC 10.1 9.5 6.3 HGB 10.2* 10.4* 9.1*  
HCT 31.7* 33.7* 29.0*  
 365 306 Recent Labs  
  04/22/21 
0404 04/21/21 
1523 04/21/21 
0305 NA 140 142 142  
K 4.5 3.0* 3.6  105 106 CO2 27 28 28 BUN 29* 25* 25* CREA 1.12* 1.19* 1.31* * 130* 90  
CA 9.5 9.8 10.4* Recent Labs  
  04/22/21 
0404 04/21/21 
0305 04/20/21 
0248 ALT 45 42 37 * 148* 124* TBILI 0.2 0.2 0.2 TP 7.9 8.7* 7.6 ALB 2.9* 3.2* 2.8*  
GLOB 5.0* 5.5* 4.8* Recent Labs 04/19/21 
1349 GLUCPOC 130* Micro: 
Lab Results Component Value Date/Time Culture result: SCANT PSEUDOMONAS AERUGINOSA (A) 04/16/2021 06:45 AM  
 Culture result: NO NORMAL RESPIRATORY MARINA ISOLATED 04/16/2021 06:45 AM  
 Culture result: NO GROWTH 5 DAYS 04/16/2021 12:03 AM  
 
 
Imaging: 
Ct Head Wo Cont Result Date: 3/30/2021 EXAM: CT HEAD WO CONT INDICATION: seizures COMPARISON: None. CONTRAST: None. TECHNIQUE: Unenhanced CT of the head was performed using 5 mm images. Brain and bone windows were generated. Coronal and sagittal reformats. CT dose reduction was achieved through use of a standardized protocol tailored for this examination and automatic exposure control for dose modulation. FINDINGS: There is an incidental cavum septum pellucidum. There is mild atrophy and compensatory dilatation of the ventricles. Areas of chronic encephalomalacia are seen in the right frontal lobe, right parietal lobe, and left frontal lobe. There are incidental bilateral basal ganglia calcifications. There is no intracranial hemorrhage, extra-axial collection, or mass effect. The basilar cisterns are open. No CT evidence of acute infarct. The bone windows demonstrate no abnormalities. The visualized portions of the paranasal sinuses and mastoid air cells are clear. Significant chronic encephalomalacia in the right frontal lobe, right parietal lobe, and left frontal lobe. Cta Chest W Or W Wo Cont Result Date: 4/12/2021 Clinical history: Dyspnea INDICATION:   Dyspnea COMPARISON: None CONTRAST: 100 ml Isovue 370 TECHNIQUE: CT of the chest with  IV contrast , Isovue-370 is performed. Axial images from the thoracic inlet to the level of the upper abdomen are obtained. Manual post-processing of the images and coronal reformatting is also performed. CT dose reduction was achieved through use of a standardized protocol tailored for this examination and automatic exposure control for dose modulation. Multiplanar reformatted imaging was performed. Sagittal and coronal reformatting. 3-D Postprocessing of imaging was performed. 3-D MIP reconstructed images were obtained in the coronal plane. FINDINGS: There is no pulmonary embolism. There is no aortic aneurysm or dissection. Small right-sided pleural effusion. Minimal scattered groundglass opacity may related to volume overload. Mildly prominent right axillary lymph nodes. Tracheostomy in place. There is no mediastinal, axillary or hilar lymphadenopathy. The aorta is normal in course and caliber. The proximal pulmonary arteries are without evidence of filling defects. No lytic or blastic lesions are identified. The remainder of the upper abdomen visualized is unremarkable. Left subclavian PICC line catheter tip terminates in superior vena cava. There is no pulmonary embolism. There is no aortic aneurysm or dissection. Small right-sided pleural effusion. Scattered interstitial and parenchymal opacities on the right and on the left may be related to volume overload alternatively multifocal infectious process. Xr Chest [facility] Result Date: 4/16/2021 EXAM: Portable CXR. 2048 hours. COMPARISON: 4/15/2021  INDICATION: respiratory distress FINDINGS: Bilateral airspace disease/edema persists, increased on the left. Heart is normal in size. There is no midline shift. There is no pneumothorax. Tracheostomy is stable. Small right pleural effusion persists. Bilateral airspace disease persists, increased on the left. Xr Chest [facility] Result Date: 4/15/2021 EXAM: XR CHEST PORT INDICATION: sob COMPARISON: April 12 FINDINGS: A portable AP radiograph of the chest was obtained at 2307 hours. The tracheostomy tube is stable. The patient is on a cardiac monitor. There are diffuse patchy opacities in the lungs bilaterally, most significantly the right lower lobe. The cardiac and mediastinal contours and pulmonary vascularity are normal.  The bones and soft tissues are grossly within normal limits. Diffuse patchy opacities are seen in the lungs bilaterally. Differential diagnostic possibilities include pneumonia and edema. Xr Chest [facility] Result Date: 4/12/2021 EXAM: XR CHEST PORT INDICATION: Tachycardia and tachypnea COMPARISON: 4/5/2021 FINDINGS: A portable AP radiograph of the chest was obtained at 1145 hours. The patient is on a cardiac monitor. A tracheostomy tube is present. The PICC line extends into the heart. The cardiac mediastinal silhouette is unchanged. There is a diffuse bilateral airspace process, asymmetric to the left lung. Diffuse bilateral airspace disease compatible with pneumonia, asymmetric to the left lung. Xr Chest [facility] Result Date: 4/5/2021 INDICATION: Fever COMPARISON: 4/2/2021 FINDINGS: Single AP portable view of the chest obtained at 3:37 AM demonstrates no change in position of the lines and tubes. The cardiomediastinal silhouette is stable. Diffuse bilateral patchy airspace disease is increased. No pneumothorax is seen. There is no evidence of pleural effusion. Increased diffuse bilateral patchy airspace disease, compatible with worsening pneumonia. Xr Chest [facility] Result Date: 4/2/2021 EXAM: XR CHEST PORT INDICATION: Fever and leukocytosis. Status epilepticus. COMPARISON: Portable chest on 3/30/2021 and 4/1/2020. TECHNIQUE: Upright portable chest AP view FINDINGS: Tracheostomy tube is in good position. Left PICC line extends into the distal superior vena cava. Cardiac monitoring wires overlie the thorax. The cardiomediastinal and hilar contours are within normal limits.  The pulmonary vasculature is within normal limits. Reticular interstitial opacities are decreased and minimal. No focal airspace opacity. Pleural spaces are clear. Bones are osteopenic. Decreased now minimal interstitial pulmonary edema versus interstitial pneumonia. No pneumothorax. Left PICC line is new and in good position. Xr Chest [facility] Result Date: 3/30/2021 EXAM: XR CHEST PORT HISTORY: trach exchange. COMPARISON: 3/30/2021 FINDINGS: Portable AP. A tracheostomy tube is in place. The heart is normal size. There is diffuse interstitial prominence again seen likely related to edema. No pleural effusion or pneumothorax. Tracheostomy tube in appropriate position. Unchanged diffuse interstitial prominence likely related to edema. Xr Chest [facility] Result Date: 3/30/2021 EXAM: XR CHEST PORT HISTORY: Chest pain. COMPARISON: 4/1/2020 FINDINGS: Portable AP. A tracheostomy tube is in place. The heart is normal size. There is unchanged diffuse interstitial prominence likely related to edema. No focal consolidation, pleural effusion, or pneumothorax. No significant interval change. Xr Us Guided Vascular Access Result Date: 4/2/2021 INDICATION:   NO VENOUS ACCESS  EXAMINATION:  US GUIDE VAS ACCESS FINDINGS: Ultrasound was provided for PICC line placement. Ultrasound guidance for PICC line  placement. GBP Procedures / Diagnostic Studies · none Chronic diagnoses Problem List as of 4/22/2021 Date Reviewed: 2/3/2021 Codes Class Noted - Resolved Pneumonia ICD-10-CM: J18.9 ICD-9-CM: 150  4/16/2021 - Present * (Principal) Severe sepsis ([facility] [address] ICD-10-CM: A41.9, R65.20 ICD-9-CM: 038.9, 995.92  4/16/2021 - Present Anemia ICD-10-CM: D64.9 ICD-9-CM: 285.9  4/16/2021 - Present Chronic respiratory failure with hypoxia [facility]) ICD-10-CM: J96.11 
ICD-9-CM: 518.83, 799.02  4/16/2021 - Present  Chronic static encephalopathy ICD-10-CM: G93.49 
ICD-9-CM: 348.39 4/16/2021 - Present Swelling of hand ICD-10-CM: M79.89 ICD-9-CM: 729.81  4/16/2021 - Present Subtherapeutic phenytoin level ICD-10-CM: Z51.81 
ICD-9-CM: V58.83  4/16/2021 - Present Tracheostomy dependence ([facility] ICD-10-CM: Z93.0 ICD-9-CM: V44.0  4/16/2021 - Present Acute respiratory failure with hypoxia [facility]) ICD-10-CM: J96.01 
ICD-9-CM: 518.81  4/4/2021 - Present Acute kidney injury ([facility] [place] [facility] ICD-10-CM: N17.9 ICD-9-CM: 584.9  3/31/2021 - Present Hyperphosphatemia ICD-10-CM: E83.39 
ICD-9-CM: 275.3  3/31/2021 - Present On tube feeding diet ICD-10-CM: Z78.9 ICD-9-CM: V49.89  Unknown - Present Wheelchair bound ICD-10-CM: Z99.3 ICD-9-CM: V46.3  6/9/2020 - Present Inability to walk ICD-10-CM: R26.2 ICD-9-CM: 719.7  6/9/2020 - Present Seizure ([facility] [place] [facility] ICD-10-CM: R56.9 ICD-9-CM: 780.39  12/11/2019 - Present Gastroesophageal reflux disease without esophagitis ICD-10-CM: K21.9 ICD-9-CM: 530.81  12/11/2019 - Present Down's syndrome ICD-10-CM: Q90.9 ICD-9-CM: 758.0  12/11/2019 - Present Iron deficiency ICD-10-CM: E61.1 ICD-9-CM: 280.9  12/11/2019 - Present Severe intellectual disability ICD-10-CM: F72 
ICD-9-CM: 318.1  2/2/2012 - Present Overview Signed 2/2/2012  1:40 PM by Rodríguez Dominguez MD  
  Mongolism RESOLVED: Sepsis ([facility] [place] [facility] ICD-10-CM: A41.9 ICD-9-CM: 038.9, 995.91  4/9/2021 - 4/16/2021 RESOLVED: VAP (ventilator-associated pneumonia) ([facility] [place] [facility] ICD-10-CM: F92.571 ICD-9-CM: 997.31  4/9/2021 - 4/16/2021 RESOLVED: UTI (urinary tract infection) ICD-10-CM: N39.0 ICD-9-CM: 599.0  4/9/2021 - 4/9/2021 RESOLVED: Altered mental status ICD-10-CM: R41.82 
ICD-9-CM: 780.97  4/6/2021 - 4/16/2021 RESOLVED: Hypoxia ICD-10-CM: R09.02 
ICD-9-CM: 799.02  4/6/2021 - 4/16/2021 RESOLVED: Acute cystitis without hematuria ICD-10-CM: N30.00 ICD-9-CM: 595.0  4/3/2021 - 4/16/2021  RESOLVED: Elevated Dilantin level ICD-10-CM: R78.89 ICD-9-CM: 790.6  4/3/2021 - 4/16/2021 RESOLVED: Edema (Chronic) ICD-10-CM: R60.9 ICD-9-CM: 782.3  3/31/2021 - 4/16/2021 RESOLVED: Hypotension ICD-10-CM: I95.9 ICD-9-CM: 458.9  3/31/2021 - 4/16/2021 RESOLVED: Hyponatremia ICD-10-CM: E87.1 ICD-9-CM: 276.1  3/31/2021 - 4/16/2021 RESOLVED: Thrombocytosis ([facility] [address] ICD-10-CM: D47.3 ICD-9-CM: 238.71  3/31/2021 - 4/16/2021 RESOLVED: Status [facility]pticus ([facility] ICD-10-CM: V72.644 ICD-9-CM: 345.3  3/30/2021 - 4/16/2021 RESOLVED: Clostridium difficile diarrhea ICD-10-CM: A04.72 
ICD-9-CM: 008.45  6/30/2020 - 4/16/2021 RESOLVED: Positive fecal occult blood test ICD-10-CM: R19.5 ICD-9-CM: 792.1  3/29/2020 - 4/16/2021 RESOLVED: Oral thrush ICD-10-CM: B37.0 ICD-9-CM: 112.0  3/29/2020 - 4/16/2021 RESOLVED: Diarrhea in adult patient ICD-10-CM: R19.7 ICD-9-CM: 787.91  3/28/2020 - 4/16/2021 RESOLVED: C. difficile diarrhea ICD-10-CM: A04.72 
ICD-9-CM: 008.45  3/28/2020 - 4/16/2021 RESOLVED: Constipation ICD-10-CM: K59.00 ICD-9-CM: 564.00  12/11/2019 - 4/16/2021 Current Discharge Medication List  
  
START taking these medications Details  
miconazole (MICOTIN) 2 % topical powder Apply  to affected area two (2) times a day for 28 days. Qty: 1 Bottle, Refills: 0 phenytoin (DILANTIN) 50 mg chewable tablet 2 Tabs by Per G Tube route three (3) times daily for 30 days. Qty: 180 Tab, Refills: 0 CONTINUE these medications which have CHANGED Details  
!! oxyCODONE IR (ROXICODONE) 5 mg immediate release tablet 1 Tab by Per G Tube route every six (6) hours for 30 days. Max Daily Amount: 20 mg. 
Qty: 120 Tab, Refills: 0 Associated Diagnoses: Agitation;  Shortness of breath; Pain  
  
!! oxyCODONE IR (ROXICODONE) 5 mg immediate release tablet 1 Tab by Per G Tube route every six (6) hours as needed for Pain (Agitation: For heart rate >120 and/or respiratory rate >35. Do not give if blood pressure <100/60) for up to 7 days. Max Daily Amount: 20 mg. 
Qty: 28 Tab, Refills: 0 Associated Diagnoses: Agitation; Shortness of breath; Pain LORazepam (ATIVAN) 0.5 mg tablet 1 Tab by Per G Tube route every six (6) hours as needed for Anxiety or Agitation (Agitation: For heart rate >120 and/or respiratory rate >35. Do not give if blood pressure <100/60) for up to 7 days. Max Daily Amount: 2 mg. Qty: 28 Tab, Refills: 0 Associated Diagnoses: Seizure ([address] Agitation; Shortness of breath  
  
 !! - Potential duplicate medications found. Please discuss with provider. CONTINUE these medications which have NOT CHANGED Details  
naloxone (NARCAN) 4 mg/actuation nasal spray Use 1 spray intranasally, then discard. Repeat with new spray every 2 min as needed for opioid overdose symptoms, alternating nostrils. Qty: 2 Each, Refills: 0  
  
midodrine (PROAMATINE) 5 mg tablet 3 Tabs by Per G Tube route every eight (8) hours for 30 days. Qty: 270 Tab, Refills: 0  
  
glycopyrrolate (ROBINUL) 1 mg tablet 1 Tab by Per G Tube route three (3) times daily as needed (increased secretions from tracheal tube) for up to 30 doses. Qty: 30 Tab, Refills: 0  
  
potassium bicarb-citric acid (EFFER-K) 20 mEq tablet 1 Tab by Per G Tube route daily for 30 days. Indications: low amount of potassium in the blood Qty: 30 Tab, Refills: 0  
  
cetirizine (ZYRTEC) 10 mg tablet 10 mg by Per G Tube route daily. omeprazole (PRILOSEC) 40 mg capsule 40 mg two (2) times a day. Per tube  
  
guaiFENesin (Siltussin SA) 100 mg/5 mL liquid 200 mg by Per G Tube route four (4) times daily. torsemide (DEMADEX) 20 mg tablet 40 mg by Per G Tube route daily.   
  
!! albuterol-ipratropium (DUO-NEB) 2.5 mg-0.5 mg/3 ml nebu 3 mL by Nebulization route every four (4) hours as needed for Shortness of Breath.  
  
melatonin 3 mg tablet 1 Tab by Per G Tube route nightly as needed for Insomnia. Must give by 10pm 
Qty: 30 Tab, Refills: 11  
 Associated Diagnoses: Insomnia, unspecified type  
  
diazePAM (VALIUM) 5-7.5-10 mg kit Insert 10 mg into rectum as needed (for seizures that last longer than 5 minutes). OTHER,NON-FORMULARY, by Per G Tube route four (4) times daily. OSMOLITE 1.2 [place]ino Liquid  
  
levETIRAcetam (KEPPRA) 100 mg/mL solution 750 mg by Per G Tube route every twelve (12) hours. !! albuterol-ipratropium (DUO-NEB) 2.5 mg-0.5 mg/3 ml nebu 3 mL by Nebulization route every 4 hours daily while awake resp. !! - Potential duplicate medications found. Please discuss with provider. STOP taking these medications  
  
 doxycycline (ADOXA) 100 mg tablet Comments:  
Reason for Stopping:   
   
 amoxicillin-clavulanate (AUGMENTIN) 875-125 mg per tablet Comments:  
Reason for Stopping:   
   
 phenytoin (DILANTIN) 100 mg/4 mL susp oral suspension Comments:  
Reason for Stopping:   
   
  
 
  
Diet:   
-Vital AF 1.2 bolus feeds 200mL 5x daily (10am, 4pm, 8pm, 12 midnight, 4am) + 125mL H2O flush after each bolus + Banatrol TID. -Mix 1 pack of Banatrol Plus with at least 120 mls of warm water. Administer via syringe immediately after mixing. Flush tube with 30 mls water before and after administration. Activity:  As tolerated Disposition: Group home Discharge instructions to patient/family Please seek medical attention for any new or worsening symptoms particularly fever, chest pain, shortness of breath, abdominal pain, nausea, vomiting Follow up plans/appointments Follow-up Information Follow up With Specialties Details Why Contact Dominic Lazo, DO Family Medicine Schedule an appointment as soon as possible for a visit Your PCP will call you to set up an appointment for follow up after hospitalization [address] [place] [address] [id number] [facility] [address] [phone]  Jenny Salas MD Neurology Schedule an appointment as soon as possible for a visit Follow up for seizure disorder and medication management Barbara Baker [address] [id number] [phone] Letitia Kerr MD 
Family Medicine Resident For Billing Chief Complaint Patient presents with  Shortness of Breath Hospital Problems  Date Reviewed: 2/3/2021 Codes Class Noted POA Pneumonia ICD-10-CM: J18.9 ICD-9-CM: 638  4/16/2021 Unknown * (Principal) Severe sepsis ([facility] ICD-10-CM: A41.9, R65.20 ICD-9-CM: 038.9, 995.92  4/16/2021 Unknown Anemia ICD-10-CM: D64.9 ICD-9-CM: 285.9  4/16/2021 Yes Chronic static encephalopathy ICD-10-CM: G93.49 
ICD-9-CM: 348.39  4/16/2021 Yes Swelling of hand ICD-10-CM: M79.89 ICD-9-CM: 729.81  4/16/2021 Yes Subtherapeutic phenytoin level ICD-10-CM: Z51.81 
ICD-9-CM: V58.83  4/16/2021 Unknown Tr[facility] dependence [facility] ICD-10-CM: Z93.0 ICD-9-CM: V44.0  4/16/2021 Unknown Acute respiratory failure with hypoxia ([facility] ICD-10-CM: J96.01 
ICD-9-CM: 518.81  4/4/2021 Yes Acute kidney injury ([facility] ICD-10-CM: N17.9 ICD-9-CM: 584.9  3/31/2021 Unknown Inability to walk ICD-10-CM: R26.2 ICD-9-CM: 719.7  6/9/2020 Yes Seizure ([facility] ICD-10-CM: R56.9 ICD-9-CM: 780.39  12/11/2019 Yes Down's syndrome ICD-10-CM: Q90.9 ICD-9-CM: 758.0  12/11/2019 Yes Iron deficiency ICD-10-CM: E61.1 ICD-9-CM: 280.9  12/11/2019 Yes Severe intellectual disability ICD-10-CM: F72 
ICD-9-CM: 318.1  2/2/2012 Yes Overview Signed 2/2/2012  1:40 PM by Negrito Alcantar MD  
  [facility]

## 2021-04-22 NOTE — PROGRESS NOTES
0700 Bedside and Verbal shift change report given to Ambika Casiano and Darling Montgomery RN(oncoming nurse) by Shannan Good (offgoing nurse). Report included the following information SBAR, Kardex, ED Summary, Intake/Output, MAR, Recent Results and Cardiac Rhythm NSR/Sinus Tach. This patient was assisted with Intentional Toileting every 2 hours during this shift. Documentation of ambulation and output reflected on Flowsheet. 1550 Report given to [facility]. Patient transferred to group home by YAYA.

## 2021-04-22 NOTE — PROGRESS NOTES
CM follow up:    Patient being discharged today. Spoke with Kalyn Esparza and Kalpana Becerra at [facility] where patient will return to at [place]. [facility] has received medications from prescriptions that were faxed yesterday: Oxycodone scheduled, oxycodone PRN, lorazepam, and dilantin. Group home states they have nursing care 24/7 scheduled for patient upon her return. Nurse to call report to [facility] at [phone]. [facility] transport confirmed/scheduled for today at 1535. [facility] forms scanned into [facility] referral and placed on front of chart. Discharge summary, COVID rapid results, and phenytoin lab order for 4/24/21 faxed confirmed to [facility] at [phone]. Instructed to call neurologist with lab results. Home health resumption of care order received, patient followed by [facility] prior to hospitalization. Referral sent and accepted by [facility], AVS updated.     Ewa Plunkett RN, MSN/Care manager  [phone]

## 2021-04-22 NOTE — PALLIATIVE CARE DISCHARGE
The Palliative Medicine team was consulted as part of your / your loved one's care in the hospital. Our team is a supportive service; we strive to relieve suffering and improve quality of life. You identified the following goal(s) as your main focus for healthcare: Patient/Health Care Proxy Stated Goals: Prolong life We reviewed advance care planning information, which includes the following: 
[facility] 4/5/2021 Patient's Healthcare Decision Maker is: Legal Next of Kin: Mother Katherin kim, [phone] Confirm Advance Directive None Patient Would Like to Complete Advance Directive Unable We reviewed / discussed your code status as: Full Code Full Code means perform CPR in the event of cardiac arrest 
   [facility] means do NOT perform CPR in the event of cardiac arrest 
   Partial Code means you have specific preferences, please discuss with your health care team 
   No Order means this issue was not addressed / resolved during your stay Because of the importance of this information, we are providing you with a printed copy to share with other healthcare providers after this hospitalization is complete. If any of the above information is incomplete or incorrect, please contact the Palliative Medicine team at [phone].

## 2021-04-26 NOTE — PROGRESS NOTES
I called Pt's neurologist Dr. Durga Lockett and left a VM with his nurse providing the repeat phenytoin level of 3.9 that was checked 1 week after adjusting her dosing to 50mg CHEWABLE tablets 2 tablets TID through G tube). Nurse will relay the message and Dr. Durga Lockett will reach out to PCP Dr. Edith Hodges.

## 2021-04-26 NOTE — TELEPHONE ENCOUNTER
Barbara Puri calling from group home and wants to speak to the nurse about ER visit and medications. She can be reached at [phone].

## 2021-04-27 NOTE — PROGRESS NOTES
Progress Note    she is a 49y.o. year old female who presents for evalution. Subjective:     Patient here for follow-up for recent hospitalization for sepsis. Patient has had significant decline since a hospitalization approximately 2 years ago which caused her to no longer be verbal mostly bedbound in a wheelchair now. Multiple hospitalizations over the past couple of years. There is talk during the hospitalization about long-term care stay mom would like for her to stay in the group home if possible do not want her going to long-term care facility patient is currently getting around the clock nursing care. She was having significant agitation in the hospital was started on Ativan and oxycodone. However, the oxycodone is causing some sedation and she is on a scheduled dose of this so we will change this to an as needed with parameters. She was also started on midodrine and the group home does need parameters on this. We discussed referral to palliative care which recommend mom go to the appointment with as well to discuss comfort measures per patient. Dilantin level has been off since in the hospital last level was 3.9 on 100 mg every 8 hours. Group home staff is put a call out to neurology. We will recheck this level. Creatinine was at 1.19 potassium was a little bit low will recheck BMP. Anemia with a hemoglobin at 10.2. Will recheck as requested from the hospital team.  Reviewed PmHx, RxHx, FmHx, SocHx, AllgHx and updated and dated in the chart. Review of Systems - negative except as listed above in the HPI    Objective: There were no vitals filed for this visit. Current Outpatient Medications   Medication Sig    midodrine (PROAMATINE) 5 mg tablet 3 Tabs by Per G Tube route every eight (8) hours for 30 days.  Hold for systolic over 892 or diastolic over 70    oxyCODONE IR (ROXICODONE) 5 mg immediate release tablet 1 Tab by Per G Tube route every six (6) hours as needed for Pain for up to 30 days. Max Daily Amount: 20 mg. 1 Tab by Per G Tube route every six (6) hours as needed for Pain (Agitation: For heart rate >120 and/or respiratory rate >35. Do not give if blood pressure <100/60)    miconazole (MICOTIN) 2 % topical powder Apply  to affected area two (2) times a day for 28 days.  oxyCODONE IR (ROXICODONE) 5 mg immediate release tablet 1 Tab by Per G Tube route every six (6) hours as needed for Pain (Agitation: For heart rate >120 and/or respiratory rate >35. Do not give if blood pressure <100/60) for up to 7 days. Max Daily Amount: 20 mg.  phenytoin (DILANTIN) 50 mg chewable tablet 2 Tabs by Per G Tube route three (3) times daily for 30 days.  LORazepam (ATIVAN) 0.5 mg tablet 1 Tab by Per G Tube route every six (6) hours as needed for Anxiety or Agitation (Agitation: For heart rate >120 and/or respiratory rate >35. Do not give if blood pressure <100/60) for up to 7 days. Max Daily Amount: 2 mg.  naloxone (NARCAN) 4 mg/actuation nasal spray Use 1 spray intranasally, then discard. Repeat with new spray every 2 min as needed for opioid overdose symptoms, alternating nostrils.  glycopyrrolate (ROBINUL) 1 mg tablet 1 Tab by Per G Tube route three (3) times daily as needed (increased secretions from tracheal tube) for up to 30 doses.  potassium bicarb-citric acid (EFFER-K) 20 mEq tablet 1 Tab by Per G Tube route daily for 30 days. Indications: low amount of potassium in the blood    cetirizine (ZYRTEC) 10 mg tablet 10 mg by Per G Tube route daily.  omeprazole (PRILOSEC) 40 mg capsule 40 mg two (2) times a day. Per tube    guaiFENesin (Siltussin SA) 100 mg/5 mL liquid 200 mg by Per G Tube route four (4) times daily.  torsemide (DEMADEX) 20 mg tablet 40 mg by Per G Tube route daily.  albuterol-ipratropium (DUO-NEB) 2.5 mg-0.5 mg/3 ml nebu 3 mL by Nebulization route every four (4) hours as needed for Shortness of Breath.     melatonin 3 mg tablet 1 Tab by Per G Tube route nightly as needed for Insomnia. Must give by 10pm    diazePAM (VALIUM) 5-7.5-10 mg kit Insert 10 mg into rectum as needed (for seizures that last longer than 5 minutes).  OTHER,NON-FORMULARY, by Per G Tube route four (4) times daily. OSMOLITE 1.2 [place] Liquid    levETIRAcetam (KEPPRA) 100 mg/mL solution 750 mg by Per G Tube route every twelve (12) hours.  albuterol-ipratropium (DUO-NEB) 2.5 mg-0.5 mg/3 ml nebu 3 mL by Nebulization route every 4 hours daily while awake resp. No current facility-administered medications for this visit. Physical Examination: General appearance - chronically ill appearing sleeping throughout the visit. Wheelchair-bound    Assessment/ Plan:   Diagnoses and all orders for this visit:    1. Seizure disorder ([facility]  -     PHENYTOIN; Future    2. Medication monitoring encounter  -     PHENYTOIN; Future    3. Anemia, unspecified type  -     CBC W/O DIFF; Future    4. Hypokalemia  -     METABOLIC PANEL, BASIC; Future    5. Elevated serum creatinine  -     METABOLIC PANEL, BASIC; Future    6. Chronic static encephalopathy  -     REFERRAL TO PALLIATIVE MEDICINE    7. Chronic respiratory failure with hypoxia (HCC)  -     REFERRAL TO PALLIATIVE MEDICINE    8. Hypotension, unspecified hypotension type  -     midodrine (PROAMATINE) 5 mg tablet; 3 Tabs by Per G Tube route every eight (8) hours for 30 days. Hold for systolic over 305 or diastolic over 70    9. Agitation  -     oxyCODONE IR (ROXICODONE) 5 mg immediate release tablet; 1 Tab by Per G Tube route every six (6) hours as needed for Pain for up to 30 days. Max Daily Amount: 20 mg. 1 Tab by Per G Tube route every six (6) hours as needed for Pain (Agitation: For heart rate >120 and/or respiratory rate >35. Do not give if blood pressure <100/60)    10. Shortness of breath  -     oxyCODONE IR (ROXICODONE) 5 mg immediate release tablet; 1 Tab by Per G Tube route every six (6) hours as needed for Pain for up to 30 days. Max Daily Amount: 20 mg. 1 Tab by Per G Tube route every six (6) hours as needed for Pain (Agitation: For heart rate >120 and/or respiratory rate >35. Do not give if blood pressure <100/60)    11. Pain  -     oxyCODONE IR (ROXICODONE) 5 mg immediate release tablet; 1 Tab by Per G Tube route every six (6) hours as needed for Pain for up to 30 days. Max Daily Amount: 20 mg. 1 Tab by Per G Tube route every six (6) hours as needed for Pain (Agitation: For heart rate >120 and/or respiratory rate >35. Do not give if blood pressure <100/60)    Patient has a follow-up appointment next week with myself in the office. They will call and make an appointment with palliative care to discuss further options for comfort care measures. Follow-up and Dispositions    · Return if symptoms worsen or fail to improve. I have discussed the diagnosis with the patient and the intended plan as seen in the above orders. The patient has received an after-visit summary and questions were answered concerning future plans. Pt conveyed understanding of plan. Medication Side Effects and Warnings were discussed with patient      Mario Peguero is being evaluated by a Virtual Visit (video visit) encounter to address concerns as mentioned above. A caregiver was present when appropriate. Due to this being a TeleHealth encounter ([facility] public health emergency), evaluation of the following organ systems was limited: Vitals/Constitutional/EENT/Resp/CV/GI//MS/Neuro/Skin/Heme-Lymph-Imm. Pursuant to the emergency declaration under the [address] [place] [address], [address] [place] [address] authority and the [employer] Act, this Virtual Visit was conducted with patient's (and/or legal guardian's) consent, to reduce the patient's risk of exposure to COVID-19 and provide necessary medical care.   The patient (and/or legal guardian) has also been advised to contact this office for worsening conditions or problems, and seek emergency medical treatment and/or call 911 if deemed necessary. Patient identification was verified at the start of the visit: Yes    Services were provided through a video synchronous discussion virtually to substitute for in-person clinic visit. Patient and provider were located at their individual homes.   --Sheeba Diana DO on 4/27/2021 at 2:04 PM

## 2021-04-27 NOTE — TELEPHONE ENCOUNTER
Called and spoke with Arnold Booker. She states that patient was put on midodrine when she was discharged from [facility] and they need some parameters put into place of when to give. She asked if she could get a sooner apt than what was scheduled so that they could get these parameters into place ASAP. Advised I could get in today for VV. Caretaker verbalized understanding.  Apt r/s for today

## 2021-04-27 NOTE — PROGRESS NOTES
I just increase the phenytoin dose 250 mg 3 times a day.   Tell him to start that and wait a week before they check the phenytoin level through the lab

## 2021-04-27 NOTE — PATIENT INSTRUCTIONS
A Healthy Lifestyle: Care Instructions Your Care Instructions A healthy lifestyle can help you feel good, stay at a healthy weight, and have plenty of energy for both work and play. A healthy lifestyle is something you can share with your whole family. A healthy lifestyle also can lower your risk for serious health problems, such as high blood pressure, heart disease, and diabetes. You can follow a few steps listed below to improve your health and the health of your family. Follow-up care is a key part of your treatment and safety. Be sure to make and go to all appointments, and call your doctor if you are having problems. It's also a good idea to know your test results and keep a list of the medicines you take. How can you care for yourself at home? · Do not eat too much sugar, fat, or fast foods. You can still have dessert and treats now and then. The goal is moderation. · Start small to improve your eating habits. Pay attention to portion sizes, drink less juice and soda pop, and eat more fruits and vegetables. ? Eat a healthy amount of food. A 3-ounce serving of meat, for example, is about the size of a deck of cards. Fill the rest of your plate with vegetables and whole grains. ? Limit the amount of soda and sports drinks you have every day. Drink more water when you are thirsty. ? Eat plenty of fruits and vegetables every day. Have an apple or some carrot sticks as an afternoon snack instead of a candy bar. Try to have fruits and/or vegetables at every meal. 
· Make exercise part of your daily routine. You may want to start with simple activities, such as walking, bicycling, or slow swimming. Try to be active 30 to 60 minutes every day. You do not need to do all 30 to 60 minutes all at once. For example, you can exercise 3 times a day for 10 or 20 minutes.  Moderate exercise is safe for most people, but it is always a good idea to talk to your doctor before starting an exercise program. 
· Keep moving. Willi Bettencourt the lawn, work in the garden, or [employer]. Take the stairs instead of the elevator at work. · If you smoke, quit. People who smoke have an increased risk for heart attack, stroke, cancer, and other lung illnesses. Quitting is hard, but there are ways to boost your chance of quitting tobacco for good. ? Use nicotine gum, patches, or lozenges. ? Ask your doctor about stop-smoking programs and medicines. ? Keep trying. In addition to reducing your risk of diseases in the future, you will notice some benefits soon after you stop using tobacco. If you have shortness of breath or asthma symptoms, they will likely get better within a few weeks after you quit. · Limit how much alcohol you drink. Moderate amounts of alcohol (up to 2 drinks a day for men, 1 drink a day for women) are okay. But drinking too much can lead to liver problems, high blood pressure, and other health problems. Family health If you have a family, there are many things you can do together to improve your health. · Eat meals together as a family as often as possible. · Eat healthy foods. This includes fruits, vegetables, lean meats and dairy, and whole grains. · Include your family in your fitness plan. Most people think of activities such as jogging or tennis as the way to fitness, but there are many ways you and your family can be more active. Anything that makes you breathe hard and gets your heart pumping is exercise. Here are some tips: 
? Walk to do errands or to take your child to school or the bus. 
? Go for a family bike ride after dinner instead of watching TV. Where can you learn more? Go to http://www.gray.com/ Enter Y831 in the search box to learn more about \"A Healthy Lifestyle: Care Instructions. \" Current as of: September 23, 2020               Content Version: 12.8 © 8984-2971 Healthwise, [employer]orporated.   
Care instructions adapted under license by [employer] (which disclaims liability or warranty for this information). If you have questions about a medical condition or this instruction, always ask your healthcare professional. [address] any warranty or liability for your use of this information.

## 2021-04-30 NOTE — TELEPHONE ENCOUNTER
[facility] Palliative Medicine Office  Nursing Note  [phone]  Fax [phone]      Name:  Cha Gray  YOB: 1970    Received outpatient Palliative Medicine referral from Dr. Ehsan Scott. Chart  Reviewed. No comments on referral other than diagnoses of chronic static encephalopathy and chronic respiratory failure with hypoxia. Chart reviewed. Cha Gray is a 49 y.o. 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 Pt was hospitalized at [facility] 4/15-4/22/21 with SOB and episodic agitation w/ tachypnea and tachycardia. ACP:     No ACP documents. Next of kin is pt's mother Toni Dennis. Nurse called pt's mother Toni Dennis [phone] and introduced outpatient Palliative Medicine services. She states she is unaware of the Palliative referral.  She knows that her daughter was recently hospitalized and states the referral may be related to pt's possible need for LTC. Ms. Alexander Burr requests that this nurse  Omar Mcduffie at [facility] where pt resides. This nurse called Ms. rTemayne Reyes at [phone]. She states that Dr. Tessa Mauricio told her that he was going to refer pt to Hospice/Palliative if pt did not acclimate well to going back to her group home after hospital discharge. Ms. Tremayne Reyes reports that pt has done well after hospital discharge. Nurse explained Palliative Medicine and Hospice services (similarities and differences) including:     Palliative Medicine is an office-based specialty service. Provides \"extra layer of support\" for patients and families living with serious illness. Focuses on  symptom management, care decisions, improving quality of life. Palliative team works alongside current health care team and is provided with all other medical treatments. Palliative Medicine does not provide personal care aides in the home. Amrik Britton sees patients in the latter stages of a progressive serious illness. Patient can be receiving aggressive treatments or they can be seeking comfort measures. Hospice is an all-inclusive service in terms of payment (meds, DME, oxygen, RN visits, aides, respite are covered). Treatment concentrates on comfort rather than aggressive disease abatement. Hospice care is generally for people with a life expectancy of 6 months or less (if the illness runs its normal course). Ms. Kohler Pean states pt does not need Palliative or Hospice at this time since she is back to her baseline after hospital discharge.     ALVA HaileN, RN-BC, [facility]

## 2021-05-03 NOTE — TELEPHONE ENCOUNTER
----- Message from Maxwell Banks sent at 4/30/2021  5:32 PM EDT -----  Regarding: Dr Kennedi Brown first and last name: Delores Estevez, Nurse with [facility]       Reason for call:wanted to  let the MD know the pt has had frequent yellowish  fetus resembling stool, since Monday and she has a hx of C-Diff, and would like an order to get a stool sample     Callback required yes/no and why:yes  can call back with a verbal,      Best contact number(s):[phone]      Details to clarify the request:pt care giver can obtain the sample also, caller declined speaking with the on call service       Maxwell Banks

## 2021-05-04 NOTE — TELEPHONE ENCOUNTER
[facility] to call the office back.

## 2021-05-04 NOTE — TELEPHONE ENCOUNTER
Received RC from [place] [id number].. Gave VO to collect stool specimen to check for c-diff. Hortencia verbalized understanding.

## 2021-05-06 NOTE — TELEPHONE ENCOUNTER
I refilled the Ativan. There is supposed to be following up with palliative care. Referral was given at the last visit.

## 2021-05-06 NOTE — TELEPHONE ENCOUNTER
[facility] called. Pt's C. Diff came back positive. They already let the [facility] know and the [facility] is asking for meds to be sent to the [address] [place] [address],[address] as a stat so they can get the meds sooner. Also [facility] was under the impression that pt still had appt today and wanted to change it to VV appt so they could ask questions about the c diff. PSR saw that appt was cx on 4.27.2021 so it can not be changed. [facility] stated that results were faxed and that she will give [facility] a call in regards to appt being cx. Please call [facility] in regards to questions about c diff.

## 2021-05-06 NOTE — TELEPHONE ENCOUNTER
Called and spoke with Capri Leija. She states that when patient was in the hospital she was sent home on ativan but it was only a short term supply. She states that they have had to give it to her 3 times since being discharged from the hospital. She would like to know if you would like to continue the script. She would need a new script if so. Also since the appointment that was scheduled for today got cancelled, she would like to know when you would like patient to f/u. She states that the patient is not in a good position to f/u in office at this time.

## 2021-05-06 NOTE — TELEPHONE ENCOUNTER
Called and spoke with Jey Sandy. Advised of Ativan being sent in to pharmacy. Also advised she is to f/u with palliative care. Kinza verbalized understanding.

## 2021-05-06 NOTE — TELEPHONE ENCOUNTER
Called and spoke with [facility]. Advised of probiotic sent to pharmacy. Young verbalized understanding.

## 2021-05-06 NOTE — TELEPHONE ENCOUNTER
Received a call from [phone] [facility] with the group [place]. He states that he feels that since the patient has been on so many abx recently and has had reoccurring C-diff could you prescribe a probiotic for the patient?  He can be reached at [phone]

## 2021-05-07 NOTE — TELEPHONE ENCOUNTER
Gill Fears calling from [facility] and states for the medication Vancomycin capsules with the directions to open capsule and pour into g-tube is not working. The powder is very thick and will not go through the tube. They want to know if you can change this to a liquid form. She can be reached at [phone].

## 2021-05-07 NOTE — TELEPHONE ENCOUNTER
Called and spoke with [place] Gabriella. Advised that this would have to be sent to a compounding pharmacy. She states that she would prefer not to do that. She states they will just work with what they have.

## 2021-05-07 NOTE — TELEPHONE ENCOUNTER
That would have to be done through compounding pharmacy. Which compounding pharmacy do they want to use?

## 2021-05-12 NOTE — TELEPHONE ENCOUNTER
Lina from [facility] need a order for fever. She has had a fever on & off & needs prn med on file.  [facility]  Any questions Arturo Nolan can be reached @[phone]

## 2021-05-13 NOTE — PROGRESS NOTES
Plan of Care faxed to [facility]. Fax number [phone] confirmation number [phone].

## 2021-05-17 NOTE — TELEPHONE ENCOUNTER
Called and spoke with Shanel Valencia. Advised that this was refilled on Friday with [facility]. Shanel Valencia verbalized understanding and states that she will contact [facility].

## 2021-05-17 NOTE — TELEPHONE ENCOUNTER
[facility]/ Sonia Mejia- post hospitalization patient was put on potassium for 30 days and should patient continue on with this.  Please call Ms Lagos Him at: [phone]

## 2021-05-18 NOTE — PATIENT INSTRUCTIONS
A Healthy Lifestyle: Care Instructions Your Care Instructions A healthy lifestyle can help you feel good, stay at a healthy weight, and have plenty of energy for both work and play. A healthy lifestyle is something you can share with your whole family. A healthy lifestyle also can lower your risk for serious health problems, such as high blood pressure, heart disease, and diabetes. You can follow a few steps listed below to improve your health and the health of your family. Follow-up care is a key part of your treatment and safety. Be sure to make and go to all appointments, and call your doctor if you are having problems. It's also a good idea to know your test results and keep a list of the medicines you take. How can you care for yourself at home? · Do not eat too much sugar, fat, or fast foods. You can still have dessert and treats now and then. The goal is moderation. · Start small to improve your eating habits. Pay attention to portion sizes, drink less juice and soda pop, and eat more fruits and vegetables. ? Eat a healthy amount of food. A 3-ounce serving of meat, for example, is about the size of a deck of cards. Fill the rest of your plate with vegetables and whole grains. ? Limit the amount of soda and sports drinks you have every day. Drink more water when you are thirsty. ? Eat plenty of fruits and vegetables every day. Have an apple or some carrot sticks as an afternoon snack instead of a candy bar. Try to have fruits and/or vegetables at every meal. 
· Make exercise part of your daily routine. You may want to start with simple activities, such as walking, bicycling, or slow swimming. Try to be active 30 to 60 minutes every day. You do not need to do all 30 to 60 minutes all at once. For example, you can exercise 3 times a day for 10 or 20 minutes.  Moderate exercise is safe for most people, but it is always a good idea to talk to your doctor before starting an exercise program. 
· Keep moving. Mere Argue the lawn, work in the garden, or Archive. Take the stairs instead of the elevator at work. · If you smoke, quit. People who smoke have an increased risk for heart attack, stroke, cancer, and other lung illnesses. Quitting is hard, but there are ways to boost your chance of quitting tobacco for good. ? Use nicotine gum, patches, or lozenges. ? Ask your doctor about stop-smoking programs and medicines. ? Keep trying. In addition to reducing your risk of diseases in the future, you will notice some benefits soon after you stop using tobacco. If you have shortness of breath or asthma symptoms, they will likely get better within a few weeks after you quit. · Limit how much alcohol you drink. Moderate amounts of alcohol (up to 2 drinks a day for men, 1 drink a day for women) are okay. But drinking too much can lead to liver problems, high blood pressure, and other health problems. Family health If you have a family, there are many things you can do together to improve your health. · Eat meals together as a family as often as possible. · Eat healthy foods. This includes fruits, vegetables, lean meats and dairy, and whole grains. · Include your family in your fitness plan. Most people think of activities such as jogging or tennis as the way to fitness, but there are many ways you and your family can be more active. Anything that makes you breathe hard and gets your heart pumping is exercise. Here are some tips: 
? Walk to do errands or to take your child to school or the bus. 
? Go for a family bike ride after dinner instead of watching TV. Where can you learn more? Go to http://www.gray.com/ Enter L118 in the search box to learn more about \"A Healthy Lifestyle: Care Instructions. \" Current as of: September 23, 2020               Content Version: 12.8 © 8395-7689 [employer]wise, [employer]orporated.   
Care instructions adapted under license by [employer] (which disclaims liability or warranty for this information). If you have questions about a medical condition or this instruction, always ask your healthcare professional. [address] any warranty or liability for your use of this information.

## 2021-05-18 NOTE — PROGRESS NOTES
Progress Note 
 
she is a 49y.o. year old female who presents for evalution. Subjective: She with staff and staff member states she was bathing the patient today noticed 2 wounds on her scalp. There is no pustulant discharge no drainage patient does not appear to be in pain from this. Have not done anything for this yet except for just clean the area. She does have home health coming there will be a nurse coming to take a look at these in person as well. Reviewed PmHx, RxHx, FmHx, SocHx, AllgHx and updated and dated in the chart. Review of Systems - negative except as listed above in the HPI Objective: There were no vitals filed for this visit. Current Outpatient Medications Medication Sig  mupirocin (BACTROBAN) 2 % ointment Apply  to affected area two (2) times a day.  potassium chloride (KAON 10%) 20 mEq/15 mL solution 15 mL by Per G Tube route daily.  acetaminophen (TYLENOL) 100 mg/mL suspension 5 mL by Per G Tube route every six (6) hours as needed for Fever.  calamine-menthoL-petrolat-zinc (Remedy Calazime Protect Paste) 3.5-0.2-69-16.5 % pste Apply to affected area as needed with each pullup change  vancomycin (VANCOCIN) 250 mg capsule 1 cap open and administer thru G tube qid x 10 days  LORazepam (ATIVAN) 0.5 mg tablet 1 Tab by Per G Tube route every six (6) hours as needed for Anxiety or Agitation (Agitation: For heart rate >120 and/or respiratory rate >35. Do not give if blood pressure <100/60). Max Daily Amount: 2 mg.  Saccharomyces boulardii (FLORASTOR) 250 mg capsule Take 1 Cap by mouth two (2) times a day for 14 days.  midodrine (PROAMATINE) 5 mg tablet 3 Tabs by Per G Tube route every eight (8) hours for 30 days. Hold for systolic over 763 or diastolic over 70  
 oxyCODONE IR (ROXICODONE) 5 mg immediate release tablet 1 Tab by Per G Tube route every six (6) hours as needed for Pain for up to 30 days.  Max Daily Amount: 20 mg. 1 Tab by Per Von Roles Tube route every six (6) hours as needed for Pain (Agitation: For heart rate >120 and/or respiratory rate >35. Do not give if blood pressure <100/60)  phenytoin (DILANTIN) 50 mg chewable tablet 3 Tabs by Per G Tube route three (3) times daily for 30 days.  miconazole (MICOTIN) 2 % topical powder Apply  to affected area two (2) times a day for 28 days.  naloxone (NARCAN) 4 mg/actuation nasal spray Use 1 spray intranasally, then discard. Repeat with new spray every 2 min as needed for opioid overdose symptoms, alternating nostrils.  glycopyrrolate (ROBINUL) 1 mg tablet 1 Tab by Per G Tube route three (3) times daily as needed (increased secretions from tracheal tube) for up to 30 doses.  cetirizine (ZYRTEC) 10 mg tablet 10 mg by Per G Tube route daily.  omeprazole (PRILOSEC) 40 mg capsule 40 mg two (2) times a day. Per tube  guaiFENesin (Siltussin SA) 100 mg/5 mL liquid 200 mg by Per G Tube route four (4) times daily.  torsemide (DEMADEX) 20 mg tablet 40 mg by Per G Tube route daily.  albuterol-ipratropium (DUO-NEB) 2.5 mg-0.5 mg/3 ml nebu 3 mL by Nebulization route every four (4) hours as needed for Shortness of Breath.  melatonin 3 mg tablet 1 Tab by Per G Tube route nightly as needed for Insomnia. Must give by 10pm  
 diazePAM (VALIUM) 5-7.5-10 mg kit Insert 10 mg into rectum as needed (for seizures that last longer than 5 minutes).  OTHER,NON-FORMULARY, by Per G Tube route four (4) times daily. OSMOLITE 1.2 Marino Liquid  levETIRAcetam (KEPPRA) 100 mg/mL solution 750 mg by Per G Tube route every twelve (12) hours.  albuterol-ipratropium (DUO-NEB) 2.5 mg-0.5 mg/3 ml nebu 3 mL by Nebulization route every 4 hours daily while awake resp. No current facility-administered medications for this visit. Physical Examination: Skin -2 wounds back of the head nonerythematous they both look like they are starting to scab.   Difficult to tell size but look like approximately a quarter size. Assessment/ Plan:  
Diagnoses and all orders for this visit: 
 
1. Open wound of scalp, unspecified open wound type, initial encounter 
-     mupirocin (BACTROBAN) 2 % ointment; Apply  to affected area two (2) times a day. Home health nurse will also evaluate in person patient to follow-up in 1 to 1.5 weeks. Follow-up and Dispositions · Return in about 1 week (around 5/25/2021), or if symptoms worsen or fail to improve. I have discussed the diagnosis with the patient and the intended plan as seen in the above orders. The patient has received an after-visit summary and questions were answered concerning future plans. Pt conveyed understanding of plan. Medication Side Effects and Warnings were discussed with patient Greta Desai is being evaluated by a Virtual Visit (video visit) encounter to address concerns as mentioned above. A caregiver was present when appropriate. Due to this being a TeleHealth encounter (During GMMAU-93 public health emergency), evaluation of the following organ systems was limited: Vitals/Constitutional/EENT/Resp/CV/GI//MS/Neuro/Skin/Heme-Lymph-Imm. Pursuant to the emergency declaration under the [facility], [address] [place] [address] authority and the [employer] Act, this Virtual Visit was conducted with patient's (and/or legal guardian's) consent, to reduce the patient's risk of exposure to COVID-19 and provide necessary medical care. The patient (and/or legal guardian) has also been advised to contact this office for worsening conditions or problems, and seek emergency medical treatment and/or call 911 if deemed necessary. Patient identification was verified at the start of the visit: Yes Services were provided through a video synchronous discussion virtually to substitute for in-person clinic visit.  Patient and provider were located at their individual homes. 
--Frederick Moses DO on 5/18/2021 at 1:50 PM

## 2021-05-21 NOTE — TELEPHONE ENCOUNTER
----- Message from Debra Tamayo sent at 5/20/2021  4:51 PM EDT -----  Regarding: Dr. Phoebe Fowler  Appointment not available    Caller's first and last name and relationship to patient (if not the patient): [facility]       Best contact number: [phone]      Preferred date and time: 05/25/21      Scheduled appointment date and time: N/A      Reason for appointment: One week follow up for bed sores. Details to clarify the request: Per Dr. Hair Lnig patient needs an in office appointment.        Debra Tamayo
2nd attempt. Called and spoke with Lina. She states that she called back earlier and was scheduled for 6/3/21. She states that patient's bed sore is healing well and is okay with waiting until then. Advised if anything changes and it gets worse to give us a call and we will see if we can get her in any sooner. Lina verbalized understanding.
Called and LVM for Lina to call the office back. Dr. Hang Blue does not have anything available next week. Can offer an opening with another provider.
(1) assistive equipment

## 2021-05-24 PROBLEM — E87.20 LACTIC ACIDOSIS: Status: ACTIVE | Noted: 2021-01-01

## 2021-05-24 PROBLEM — N18.9 CKD (CHRONIC KIDNEY DISEASE): Status: ACTIVE | Noted: 2021-01-01

## 2021-05-24 PROBLEM — I16.0 HYPERTENSIVE URGENCY: Status: ACTIVE | Noted: 2021-01-01

## 2021-05-24 NOTE — ED PROVIDER NOTES
Patient is a 69-year-old female present emergency department for tachycardia, hypotension per EMS. Patient is nonverbal lives in a group home is trach dependent Per EMS they had just completed a course of antibiotics for C. difficile today patient was running a fever to 102 staff noticed she was also tachycardic and hypotensive. When EMS arrived she was no longer hypotensive blood pressure was in the 533U systolic. Staff had given her antiparetic's earlier today. Past Medical History:  
Diagnosis Date  Acute cystitis without hematuria 4/3/2021  VICK (acute kidney injury) ([facility] [address] 3/31/2021  
 C. difficile diarrhea 3/28/2020  Clostridium difficile diarrhea 6/30/2020  Constipation 12/11/2019  Diarrhea in adult patient 3/28/2020  Down syndrome  Elevated Dilantin level 4/3/2021  
 History of vascular access device 06/30/2020  
 4 Fr midline, 10 cm L brachial, poor access  History of vascular access device 04/01/2021 [facility] VAT 5fr double PICC LFT brachial at 39cm, arm cir 31 cm   
 Oral thrush 3/29/2020  Positive fecal occult blood test 3/29/2020  Seizures ([address]  Sleep apnea   
 humidifier and oxygen w/trach  Status epilepticus ([address] 3/30/2021  Stroke [facility]) 2019 \"old stroke seen on CT\"  Thrombocytosis ([address] 3/31/2021  VAP (ventilator-associated pneumonia) ([facility] [address] 4/9/2021 Past Surgical History:  
Procedure Laterality Date  COLONOSCOPY N/A 7/8/2020 COLONOSCOPY with fecal transplant (consents in red file) performed by Erica Russo MD at [address] [facility]    
 gtube 2/2020  HX OTHER SURGICAL    
 tracheostomy 2/2020 Family History:  
Family history unknown: Yes  
 
 
Social History Socioeconomic History  Marital status: SINGLE Spouse name: Not on file  Number of children: Not on file  Years of education: Not on file  Highest education level: Not on file Occupational History  Not on file Tobacco Use  Smoking status: Never Smoker  Smokeless tobacco: Never Used Substance and Sexual Activity  Alcohol use: Never  Drug use: Never  Sexual activity: Never Other Topics Concern [address] Service Not Asked  Blood Transfusions Not Asked  Caffeine Concern Not Asked  Occupational Exposure Not Asked [place] Hazards Not Asked  Sleep Concern Not Asked  Stress Concern Not Asked  Weight Concern Not Asked  Special Diet Not Asked  Back Care Not Asked  Exercise Not Asked  Bike Helmet Not Asked  Seat Belt Not Asked  Self-Exams Not Asked Social History Narrative ** Merged History Encounter ** Social Determinants of Health Financial Resource Strain:  Difficulty of Paying Living Expenses:   
Food Insecurity:  Worried About [address] in the Last Year:   
[address] in the Last Year:   
Transportation Needs:   
 Lack of Transportation (Medical):  Lack of Transportation (Non-Medical): Physical Activity:   
 Days of Exercise per Week:  Minutes of Exercise per Session:   
Stress:  Feeling of Stress :   
Social Connections:  Frequency of Communication with Friends and Family:  Frequency of Social Gatherings with Friends and Family:  Attends [employer] Services:  Active Member of Clubs or Organizations:  Attends Club or Organization Meetings:  Marital Status: Intimate Partner Violence:  Fear of Current or Ex-Partner:  Emotionally Abused:   
 Physically Abused:   
 Sexually Abused: ALLERGIES: Depakote [divalproex] Review of Systems Unable to perform ROS: Patient nonverbal  
 
 
Vitals:  
 05/24/21 1803 05/24/21 1822 BP: (!) 134/112 Pulse: (!) 116 Resp: (!) 50 Temp: (!) 102.3 °F (39.1 °C) SpO2: 95% 97% Weight: 64.3 kg (141 lb 12.8 oz) Height: 4' 9\" (1.448 m) Physical Exam 
Vitals and nursing note reviewed.   
Constitutional:   
   Appearance: She is ill-appearing and toxic-appearing. Eyes:  
   Extraocular Movements: Extraocular movements intact. Pupils: Pupils are equal, round, and reactive to light. Neck:  
   Trachea: Tracheostomy present. Comments: Copious secretions that are thick suction from tracheostomy. Cardiovascular:  
   Rate and Rhythm: Tachycardia present. Pulmonary:  
   Effort: Tachypnea present. Breath sounds: Transmitted upper airway sounds present. Abdominal:  
   General: There is distension. Comments: J tube ostomy site clean dry intact. Skin: 
   General: Skin is warm. Neurological:  
   Mental Status: Mental status is at baseline. MDM Number of Diagnoses or Management Options Sepsis with acute organ dysfunction without septic shock, due to unspecified organism, unspecified type ([address] Diagnosis management comments: /V: Recurrent C. difficile, HCAP, sepsis. 81-year-old female presented emergency department tachycardic in the 130s patient is not hypotensive however she is not breathing at increased rate between 50 and 60. Patient maintaining room air sats greater than 94% code sepsis initiated. Amount and/or Complexity of Data Reviewed Clinical lab tests: ordered and reviewed Tests in the radiology section of CPT®: reviewed and ordered Risk of Complications, Morbidity, and/or Mortality Presenting problems: moderate Diagnostic procedures: moderate Management options: moderate Patient Progress Patient progress: stable Procedures ED EKG interpretation: 
Rhythm: sinus tachycardia; and regular . Rate (approx.): 131; Axis: normal; P wave: normal; QRS interval: normal ; ST/T wave: non-specific changes; in  Lead: II ; Other findings: borderline ekg. EKG documented by Gloria Solis MD, IMPRESSION: 
1. Sepsis with acute organ dysfunction without septic shock, due to unspecified organism, unspecified type ([facility] [address] - [employer] Antibiotics ordered: Levofloxacin - Repeat lactic acid ordered for time 9 pm 
- Re-assessment performed at time 9 pm and clinical condition stable. - Hypotension or Lactic Acidosis present (SBP<90, MAP<65, Lactate >4): NO IVF:  30cc/kg actual Body Weight - Persistent Hypotension despite IVF resuscitation: NO  Vasopressors: Not indicated due to septic shock not present Plan: 
Admit to Step down Total critical care time spent exclusive of procedures:  75 minutes Monae Urban MD 
 
 
Perfect Serve Consult for Admission 7:38 PM 
 
ED Room Number: ER10/10 Patient Name and age:  Wilfredo Basurto 49 y.o.  female Working Diagnosis: 1. Sepsis with acute organ dysfunction without septic shock, due to unspecified organism, unspecified type ([facility] [address] COVID-19 Suspicion:  no 
Sepsis present:  yes  Reassessment needed: yes Code Status:  Full Code Readmission: yes Isolation Requirements:  yes Recommended Level of Care:  step down Department:[facility] ED - [phone] Other:

## 2021-05-24 NOTE — ED NOTES
Bedside shift change report given to Reyna Mckenna RN (oncoming nurse) by Kitty Saunders RN (offgoing nurse). Report included the following information SBAR, ED Summary, Intake/Output, MAR and Recent Results.

## 2021-05-24 NOTE — ED TRIAGE NOTES
Patient arrived via EMS from [facility]. Per EMS patient nonverbal ar baseline, had an episode of hypotension at home. Patient has Hx of seizures. Patient is positive for Cdiff, had fever at home.

## 2021-05-25 NOTE — PROGRESS NOTES
PHYSICAL THERAPY EVALUATION/DISCHARGE Patient: Carisa Brasher (39 y.o. female) Date: 5/25/2021 Primary Diagnosis: Severe sepsis ([facility] [address] [A41.9, R65.20] Hypertensive urgency [I16.0] Lactic acidosis [E87.2] Precautions:     
 
 
ASSESSMENT Based on the objective data described below, the patient presents with complete functional mobility dependence, severe UE and LE contractures with increased tone, inability to verbalize or follow commands, s/p admission for severe sepsis. Patient familiar to this therapist, was evaluated in March 2020. Patient lives in a group home, RN reports patient admitted here last month and reports her report from the group home indicated patient is bedbound and dependent for all self-care and mobility. Completed repositioning for comfort and to promote right sided gaze as strong tone noted in L cervical deviation. At this time, patient is not appropriate for skilled PT and does not exhibit ability to benefit. Cont to recommend frequent repositioning and boots/adaptive aids to prevent contractures and optimize neutral positioning. Will complete order. Functional Outcome Measure: The patient scored 0 on the Barthel outcome measure which is indicative of total dependence. Other factors to consider for discharge: Further skilled acute physical therapy is not indicated at this time. PLAN : 
Recommendation for discharge: (in order for the patient to meet his/her long term goals) No skilled physical therapy/ follow up rehabilitation needs identified at this time. This discharge recommendation: 
Has been made in collaboration with the attending provider and/or case management IF patient discharges home will need the following DME: patient owns DME required for discharge and none SUBJECTIVE:  
Patient stated non verbal. OBJECTIVE DATA SUMMARY:  
HISTORY:   
Past Medical History:  
Diagnosis Date  Acute cystitis without hematuria 4/3/2021  VICK (acute kidney injury) ([facility] [address] 3/31/2021  
 C. difficile diarrhea 3/28/2020  Clostridium difficile diarrhea 2020  Constipation 2019  Diarrhea in adult patient 3/28/2020  Down syndrome  Elevated Dilantin level 4/3/2021  
 History of vascular access device 2020  
 4 Fr midline, 10 cm L brachial, poor access  History of vascular access device 2021 [facility] VAT 5fr double PICC LFT brachial at 39cm, arm cir 31 cm   
 Oral thrush 3/29/2020  Positive fecal occult blood test 3/29/2020  Seizures ([facility] [address]  Sleep apnea   
 humidifier and oxygen w/trach  Status epilepticus ([facility] [address] 3/30/2021  Stroke [facility]) 2019 \"old stroke seen on CT\"  Thrombocytosis ([facility] [address] 3/31/2021  VAP (ventilator-associated pneumonia) ([facility] [address] 2021 Past Surgical History:  
Procedure Laterality Date  COLONOSCOPY N/A 2020 COLONOSCOPY with fecal transplant (consents in red file) performed by Ashko Jensen MD at [facility] 112 HX GI    
 gtube 2020  HX OTHER SURGICAL    
 tracheostomy 2020 Prior level of function: total dependence per prior chart notes and RN 
Personal factors and/or comorbidities impacting plan of care: Rafael Bermudez EXAMINATION/PRESENTATION/DECISION MAKING:  
Critical Behavior: 
Neurologic State: Eyes open to voice Orientation Level: Unable to verbalize (baseline) Cognition: Unable to assess (comment) Range Of Motion: 
AROM: Grossly decreased, non-functional 
  
  
  
PROM: Grossly decreased, non-functional 
  
  
  
Strength:   
Strength: Grossly decreased, non-functional 
  
  
  
  
  
  
Tone & Sensation:  
Tone: Abnormal 
  
  
  
  
Sensation:  (unable to assess) Coordination: 
Coordination: Grossly decreased, non-functional 
Vision:  
  
Functional Mobility: 
Bed Mobility: 
Rolling: Total assistance Transfers: 
  
   Unable Functional Measure: 
Barthel Index: 
 
Bathin Bladder: 0 Bowels: 0 Groomin Dressin Feedin Mobility: 0 Stairs: 0 Toilet Use: 0 Transfer (Bed to Chair and Back): 0 Total: 0/100 The Barthel ADL Index: Guidelines 1. The index should be used as a record of what a patient does, not as a record of what a patient could do. 2. The main aim is to establish degree of independence from any help, physical or verbal, however minor and for whatever reason. 3. The need for supervision renders the patient not independent. 4. A patient's performance should be established using the best available evidence. Asking the patient, friends/relatives and nurses are the usual sources, but direct observation and common sense are also important. However direct testing is not needed. 5. Usually the patient's performance over the preceding 24-48 hours is important, but occasionally longer periods will be relevant. 6. Middle categories imply that the patient supplies over 50 per cent of the effort. 7. Use of aids to be independent is allowed. Aris Hu., Barthel, D.W. (5632). Functional evaluation: the Barthel Index. [address] [facility] (14)2. Caitlin Cifuentes jose g JOSÉ LUIS Yusuf, Nguyen Fierro., Braden Trujillo., [place], [address] [place] [address] (). Measuring the change indisability after inpatient rehabilitation; comparison of the responsiveness of the Barthel Index and Functional [place] Measure. Journal of Neurology, Neurosurgery, and Psychiatry, 66(4), 838-041. RUSSELL Marcos, MARIBELL Mosquera.ANKIT.TRAMAINE, & Odalys Abdullahi MReeseA. (2004.) Assessment of post-stroke quality of life in cost-effectiveness studies: The usefulness of the Barthel Index and the EuroQoL-5D. [facility], 13, 111-79 Physical Therapy Evaluation Charge Determination History Examination Presentation Decision-Making HIGH Complexity :3+ comorbidities / personal factors will impact the outcome/ POC  LOW Complexity : 1-2 Standardized tests and measures addressing body structure, function, activity limitation and / or participation in recreation  MEDIUM Complexity : Evolving with changing characteristics  Other outcome measures Barthel 0  LOW Based on the above components, the patient evaluation is determined to be of the following complexity level: LOW Pain Rating: 
None observed in face Activity Tolerance:  
Fair After treatment patient left in no apparent distress:  
Supine in bed and Call bell within reach COMMUNICATION/EDUCATION:  
The patients plan of care was discussed with: Occupational therapist and Registered nurse. Fall prevention education was provided and the patient/caregiver indicated understanding. Thank you for this referral. 
Valentina Gómez, PT Time Calculation: 15 mins

## 2021-05-25 NOTE — ED NOTES
TRANSFER - OUT REPORT: 
 
Verbal report given to Leonora(name) on Lety Lubin  being transferred to ICU(unit) for routine progression of care Report consisted of patients Situation, Background, Assessment and  
Recommendations(SBAR). Information from the following report(s) SBAR, Kardex, ED Summary, [facility] and Recent Results was reviewed with the receiving nurse. Lines:  
Peripheral IV 05/24/21 Distal;Right Basilic (Active) Opportunity for questions and clarification was provided. Patient transported with: 
 Monitor Registered Nurse

## 2021-05-25 NOTE — PROGRESS NOTES
Bedside and Verbal shift change report given to Gerardo Hauser RN (oncoming nurse) by Qi Li RN (offgoing nurse). Report included the following information SBAR, Intake/Output, MAR, Recent Results, Cardiac Rhythm NSR, ST and Alarm Parameters . Primary Nurse Paloma Blue RN and Qi Li RN performed a dual skin assessment on this patient Impairment noted- see wound doc flow sheet Rolf score is 11 
 
2000 Assessment, see flowsheets. Oral care and suction completed. Pt repositioned in bed. Pt showed increased RR and HR and discomfort, temp 100.8, Tylenol administered for pain 3/10. Meds administered, see MAR. Family med at bedside to discuss pt condition and plan of care. Orders received. All lines and drains traced and are infusing/draining appropriately. [address] Oral care and suction completed. PT repositioned. Meds administered, see MAR. 
 
0000 Reassessment, see flowsheets. Meds administered, see MAR. Pt care and bath performed. Chux changed. PW changed. Stool sample collected and sent to lab. Pt wounds on head dressed with antibiotic ointment and petroleum gauze. Tube feed bolus 240ml delivered with 75ml water flush. Pt tolerated well. [address] Pt turned and repositioned. Oral care and suction completed. 0400 Reassessment, see flowsheets. Pt repositioned. Oral care and suction completed. Pt care performed and chux changed. CHG bath completed. Blood drawn, labs sent. 0600 Pt repositioned. Oral care and suction completed. Bedside and Verbal shift change report given to Qi Li RN (oncoming nurse) by Gerardo Hauser RN (offgoing nurse). Report included the following information SBAR, Intake/Output, MAR, Recent Results, Cardiac Rhythm NSR, ST and Alarm Parameters .

## 2021-05-25 NOTE — WOUND CARE
Wound Consult:  New Patient Visit. Chart reviewed. Consulted for evaluation/POA wounds. Spoke with patients nurse,  Huyen Beard and we were at bedside together to assess and provide care. Patient is resting on a Progressa bed with air support mattress. Heels off loaded with boots. Patient has spontaneous eyes, no other response noted; requires two person to move side to side in bed as she is very stiff/rigid. Rolf score 11. She has POA tracheostomy and PEG. Assessment: 
Posterior head midline - 2 x 1.1 x 0.1 cm, full thickness injury with eschar that has lifted off during visit with cleansing; moist yellow slough 75%, red/pink 25%; unsure of etiology of wound; without correct history of injury cannot establish as pressure vs. another type of lesion. POA. Posterior head towards right side - ~ 2 x 1.2 cm area of pink edge 10%, 90% dark eschar slightly lifting along edges, no drainage, no odor, hair surrounds area;  unsure of etiology of wound; without correct history of injury cannot establish as pressure vs. another type of lesion. POA. Right lateral ankle - linear, slightly rough, darker discoloration of skin, ~ 1 x 0.2 cm, no surrounding redness, noted POA. Right lateral heel - 2 x 2 area of hypopigmented pale pink intact skin. POA. Left achilles area - linear area darker discoloration of skin, ~ 1 x 0.2 cm, no surrounding redness, noted POA. Right ear lobe - has pale pink skin appearing more resurfaced that new, loose epidermis in area as well - unsure if may be blister forming POA. Has raised slightly firm dark area on left posterior leg and darker area above this, no surrounding redness, POA. Sacrum/buttocks - no injury; no discoloration to sacrum; gluteal cleft/buttocks with faintly pink blanching skin; loose stool; IAD. No rash noted. Hypopigmented area on left hip area noted. Treatment: 
Cleansed head wounds with wound cleanser, dry gauze. Barrier ointment to buttocks/gluteal area.  
Heel boots reapplied. Turned and repositioned. Wound Recommendations: Bactroban ointment was being used at home for scalp lesions, would resume and lay piece of petroleum gauze over to help keep in place; difficult to secure dressing to head with hair. Foam dressing to right lateral ankle area discoloration to protect. Skin Care / PI Prevention Recommendations: 1. Minimize friction/shear: minimize layers of linen/pads under patient. 2. Off load pressure/reposition: turn and reposition approximately every 2 hours; float heels with  off loading heel boots; waffle cushion for sitting; position wedge. 3. Manage Moisture - keep skin folds dry; incontinence skin care with incontinence wipes; zinc barrier ointment;  temple in use to help contain urine. 4. Continue to monitor nutrition, pain, and skin risk scale, and skin assessment. Plan: 
Spoke with Dr. Maricarmen Sheehan regarding findings and proposed orders for treatment. We will continue to reassess routinely and as needed. Re-consult [facility] nurse to reassess for any concerns including but not limited to: continued intubation beyond 72 hours; SEPSIS/SHOCK; MODS; Vasopressor use with discoloration to fingers or toes; any new wound; cardiac arrest or MI during stay; fecal incontinence requiring frequent linen/chux changes. Yuniel Mendez RN,[facility], Wound / Ostomy Department Wound Healing Office [phone]

## 2021-05-25 NOTE — PROGRESS NOTES
VAT Note: 
 Midline order received and attempted midline in left brachial, only viable vessel for placement without success. Will attempt PIC with vein viewer or recommend triple IJ. 
1440 Spoke with Dr. Astrid Jaeger regarding above, stated will d/c midline order and he would reach out to IR for triple placement.

## 2021-05-25 NOTE — H&P
Stacey Dyer Jesus [address]  Office [phone] Fax [phone] Admission H&P Name: Cate Abdalla MRN: [id number]  Sex: Female YOB: 1970  Age: 49 y.o. PCP: Trisha Mera NP Source of Information:  medical records, ED provider Chief complaint: fever History of Present Illness Cate Abdalla is a 49 y.o. female with known hypotension, seizures, recent admission for HAP and c/ diff infection who presents to the ER from group home with c/o fever. Patient finished oral abx for c diff. Per staff temp was 102 and they called EMS. I was not able to get a hold of group home to obtain more history. Chart reviewed and discussed with ED provider. In the ER, vital signs were remarkable for T 102.3, , RR 40-50. Labs were remarkable for WBC 22.4 LA 3.0 . CXR showed improving BL lower lung infiltrates. Pt was treated with Zosyn, Levaquin and 30 cc/kg IVF. Past Medical History:  
Diagnosis Date  Acute cystitis without hematuria 4/3/2021  VICK (acute kidney injury) ([address] 3/31/2021  
 C. difficile diarrhea 3/28/2020  Clostridium difficile diarrhea 6/30/2020  Constipation 12/11/2019  Diarrhea in adult patient 3/28/2020  Down syndrome  Elevated Dilantin level 4/3/2021  
 History of vascular access device 06/30/2020  
 4 Fr midline, 10 cm L brachial, poor access  History of vascular access device 04/01/2021 [facility] VAT 5fr double PICC LFT brachial at 39cm, arm cir 31 cm   
 Oral thrush 3/29/2020  Positive fecal occult blood test 3/29/2020  Seizures ([address]  Sleep apnea   
 humidifier and oxygen w/trach  Status epilepticus ([address] 3/30/2021  Stroke [facility]) 2019 \"old stroke seen on CT\"  Thrombocytosis ([address] 3/31/2021  VAP (ventilator-associated pneumonia) ([address] 4/9/2021 Patient Vitals for the past 12 hrs: 
 Temp Pulse Resp BP SpO2  
05/24/21 2115  (!) 121 (!) 52 (!) 142/128 95 % 05/24/21 2100  (!) 123 (!) 51 (!) 118/95 95 % 05/24/21 2047  (!) 121 (!) 49 (!) 99/51 94 % 05/24/21 2030  (!) 125 (!) 51 (!) 182/162 96 % 05/24/21 2017  (!) 122  (!) 142/128   
05/24/21 1945  (!) 126 (!) 36 (!) 190/153 96 % 05/24/21 1915  (!) 125 (!) 48 (!) 135/94   
05/24/21 1900  (!) 129 (!) 51 133/63 96 % 05/24/21 1822     97 % 05/24/21 1803 (!) 102.3 °F (39.1 °C) (!) 116 (!) 50 (!) 134/112 95 % Home Medications Prior to Admission medications Medication Sig Start Date End Date Taking? Authorizing Provider  
tobramycin (Bethkis) 300 mg/4 mL nebu Take 300 mg by inhalation two (2) times a day. 5/3/21  Yes Provider, Historical  
potassium chloride (KAON 10%) 20 mEq/15 mL solution 15 mL by Per G Tube route daily. 5/18/21  Yes Sidney Sawyer,   
mupirocin (BACTROBAN) 2 % ointment Apply  to affected area two (2) times a day. 5/18/21  Yes Sidney Sawyer DO  
acetaminophen (TYLENOL) 100 mg/mL suspension 5 mL by Per G Tube route every six (6) hours as needed for Fever. 5/13/21  Yes Elle Sawyer,   
calamine-menthoL-petrolat-zinc (Remedy Calazime Protect Paste) 3.5-0.2-69-16.5 % pste Apply to affected area as needed with each pullup change 5/7/21  Yes Sidney Sawyer DO  
LORazepam (ATIVAN) 0.5 mg tablet 1 Tab by Per G Tube route every six (6) hours as needed for Anxiety or Agitation (Agitation: For heart rate >120 and/or respiratory rate >35. Do not give if blood pressure <100/60). Max Daily Amount: 2 mg. 5/6/21  Yes Sidney Sawyer DO  
midodrine (PROAMATINE) 5 mg tablet 3 Tabs by Per G Tube route every eight (8) hours for 30 days. Hold for systolic over 594 or diastolic over 70 0/07/13 1/26/83 Yes Sidney Sawyer DO  
oxyCODONE IR (ROXICODONE) 5 mg immediate release tablet 1 Tab by Per G Tube route every six (6) hours as needed for Pain for up to 30 days.  Max Daily Amount: 20 mg. 1 Tab by Per G Tube route every six (6) hours as needed for Pain (Agitation: For heart rate >120 and/or respiratory rate >35. Do not give if blood pressure <100/60) 4/27/21 5/27/21 Yes Sidney Sawyer,   
phenytoin (DILANTIN) 50 mg chewable tablet 3 Tabs by Per G Tube route three (3) times daily for 30 days. 4/27/21 5/27/21 Yes Koko Sawyer,   
naloxone (NARCAN) 4 mg/actuation nasal spray Use 1 spray intranasally, then discard. Repeat with new spray every 2 min as needed for opioid overdose symptoms, alternating nostrils. 4/14/21  Yes Misty Zhu MD  
glycopyrrolate (ROBINUL) 1 mg tablet 1 Tab by Per G Tube route three (3) times daily as needed (increased secretions from tracheal tube) for up to 30 doses. 4/14/21  Yes Misty Zhu MD  
cetirizine (ZYRTEC) 10 mg tablet 10 mg by Per G Tube route daily. Yes Provider, Historical  
omeprazole (PRILOSEC) 40 mg capsule 40 mg two (2) times a day. Per tube   Yes Provider, Historical  
guaiFENesin (Siltussin SA) 100 mg/5 mL liquid 200 mg by Per G Tube route four (4) times daily. Yes Provider, Historical  
torsemide (DEMADEX) 20 mg tablet 40 mg by Per G Tube route daily. Yes Provider, Historical  
albuterol-ipratropium (DUO-NEB) 2.5 mg-0.5 mg/3 ml nebu 3 mL by Nebulization route every four (4) hours as needed for Shortness of Breath. Yes Provider, Historical  
melatonin 3 mg tablet 1 Tab by Per G Tube route nightly as needed for Insomnia. Must give by 10pm 10/8/20  Yes Jacklyn Mckay, DO  
diazePAM (VALIUM) 5-7.5-10 mg kit Insert 10 mg into rectum as needed (for seizures that last longer than 5 minutes). Yes Provider, Historical  
OTHER,NON-FORMULARY, by Per G Tube route four (4) times daily. OSMOLITE 1.2 Marino Liquid   Yes Provider, Historical  
levETIRAcetam (KEPPRA) 100 mg/mL solution 750 mg by Per G Tube route every twelve (12) hours. Yes Provider, Historical  
albuterol-ipratropium (DUO-NEB) 2.5 mg-0.5 mg/3 ml nebu 3 mL by Nebulization route every 4 hours daily while awake resp.    Yes Provider, Historical  
vancomycin (VANCOCIN) 250 mg capsule 1 cap open and administer thru G tube qid x 10 days 5/6/21 5/24/21  Isa Michelle, DO Allergies Allergies Allergen Reactions  Depakote [Divalproex] Swelling Past Medical History:  
Diagnosis Date  Acute cystitis without hematuria 4/3/2021  VICK (acute kidney injury) ([facility] 3/31/2021  
 C. difficile diarrhea 3/28/2020  Clostridium difficile diarrhea 6/30/2020  Constipation 12/11/2019  Diarrhea in adult patient 3/28/2020  Down syndrome  Elevated Dilantin level 4/3/2021  
 History of vascular access device 06/30/2020  
 4 Fr midline, 10 cm L brachial, poor access  History of vascular access device 04/01/2021 [facility] VAT 5fr double PICC LFT brachial at 39cm, arm cir 31 cm   
 Oral thrush 3/29/2020  Positive fecal occult blood test 3/29/2020  Seizures ([facility]  Sleep apnea   
 humidifier and oxygen w/trach  Status epilepticus ([facility] 3/30/2021  Stroke [facility]) 2019 \"old stroke seen on CT\"  Thrombocytosis ([facility] 3/31/2021  VAP (ventilator-associated pneumonia) ([facility] 4/9/2021 Past Surgical History:  
Procedure Laterality Date  COLONOSCOPY N/A 7/8/2020 COLONOSCOPY with fecal transplant (consents in red file) performed by Jd Ball MD at [address] HX GI    
 gtube 2/2020  HX OTHER SURGICAL    
 tracheostomy 2/2020 Family History Family history unknown: Yes  
 
 
Social History Social History Socioeconomic History  Marital status: SINGLE Spouse name: Not on file  Number of children: Not on file  Years of education: Not on file  Highest education level: Not on file Occupational History  Not on file Tobacco Use  Smoking status: Never Smoker  Smokeless tobacco: Never Used Substance and Sexual Activity  Alcohol use: Never  Drug use: Never  Sexual activity: Never Other Topics Concern [address] Service Not Asked  Blood Transfusions Not Asked  Caffeine Concern Not Asked  Occupational Exposure Not Asked [place] Hazards Not Asked  Sleep Concern Not Asked  Stress Concern Not Asked  Weight Concern Not Asked  Special Diet Not Asked  Back Care Not Asked  Exercise Not Asked  Bike Helmet Not Asked  Seat Belt Not Asked  Self-Exams Not Asked Social History Narrative ** Merged History Encounter ** Social Determinants of Health Financial Resource Strain:  Difficulty of Paying Living Expenses:   
Food Insecurity:  Worried About [address] in the Last Year:   
[address] in the Last Year:   
Transportation Needs:   
 Lack of Transportation (Medical):  Lack of Transportation (Non-Medical): Physical Activity:   
 Days of Exercise per Week:  Minutes of Exercise per Session:   
Stress:  Feeling of Stress :   
Social Connections:  Frequency of Communication with Friends and Family:  Frequency of Social Gatherings with Friends and Family:  Attends Sikh Services:  Active Member of Clubs or Organizations:  Attends Club or Organization Meetings:  Marital Status: Intimate Partner Violence:  Fear of Current or Ex-Partner:  Emotionally Abused:   
 Physically Abused:   
 Sexually Abused:   
 
 
Alcohol history: Not at all Smoking history: Non-smoker Illicit drug history: Not at all Living arrangement: patient lives in an adult home. Review of Systems: not able to perform, patient is non-verbal  
Physical Exam 
Objective O2 Device: None (Room air), Tracheostomy General:   Alert, toxic appearing Head:   Atraumatic, wound on scalp on left side Eyes:   Conjunctivae clear ENT:  Oral mucosa normal, drooling Neck:  Supple, trachea midline, no adenopathy No JVD Back:    No CVA tenderness Lungs: Tachypnea, no accessory muscles use Heart:   Tachycardic , no murmur Abdomen:    Distended, patient is not grimacing when palpated, BS present in all 4 q.  Peg-tube in place, site is clean, no skin changes around Extremities:  No edema or DVT signs Skin:  Warm and dry No rashes or lesions Neurologic:  Non-verbal at [facility] Urinary catheter:  none Laboratory Data Recent Results (from the past 8 hour(s)) EKG, 12 LEAD, INITIAL Collection Time: 05/24/21  6:18 PM  
Result Value Ref Range Ventricular Rate 131 BPM  
 Atrial Rate 131 BPM  
 P-R Interval 114 ms QRS Duration 62 ms Q-T Interval 300 ms QTC Calculation (Bezet) 443 ms Calculated P Axis 60 degrees Calculated R Axis 83 degrees Calculated T Axis 8 degrees Diagnosis Sinus tachycardia with premature atrial complexes with aberrant conduction Otherwise normal ECG When compared with ECG of 18-APR-2021 09:55, 
aberrant conduction is now present CBC WITH AUTOMATED DIFF Collection Time: 05/24/21  6:34 PM  
Result Value Ref Range WBC 22.4 (H) 3.6 - 11.0 K/uL  
 RBC 3.54 (L) 3.80 - 5.20 M/uL  
 HGB 11.4 (L) 11.5 - 16.0 g/dL HCT 35.5 35.0 - 47.0 % .3 (H) 80.0 - 99.0 FL  
 MCH 32.2 26.0 - 34.0 PG  
 MCHC 32.1 30.0 - 36.5 g/dL  
 RDW 15.0 (H) 11.5 - 14.5 % PLATELET 672 161 - 818 K/uL MPV 11.4 8.9 - 12.9 FL  
 NRBC 0.0 0  WBC ABSOLUTE NRBC 0.00 0.00 - 0.01 K/uL NEUTROPHILS 85 (H) 32 - 75 % BAND NEUTROPHILS 3 0 - 6 % LYMPHOCYTES 8 (L) 12 - 49 % MONOCYTES 4 (L) 5 - 13 % EOSINOPHILS 0 0 - 7 % BASOPHILS 0 0 - 1 % IMMATURE GRANULOCYTES 0 %  
 ABS. NEUTROPHILS 19.7 (H) 1.8 - 8.0 K/UL  
 ABS. LYMPHOCYTES 1.8 0.8 - 3.5 K/UL  
 ABS. MONOCYTES 0.9 0.0 - 1.0 K/UL  
 ABS. EOSINOPHILS 0.0 0.0 - 0.4 K/UL  
 ABS. BASOPHILS 0.0 0.0 - 0.1 K/UL  
 ABS. IMM. GRANS. 0.0 K/UL  
 DF MANUAL    
 RBC COMMENTS NORMOCYTIC, NORMOCHROMIC    
TROPONIN I Collection Time: 05/24/21  6:34 PM  
Result Value Ref Range Troponin-I, Qt. <0.05 <0.05 ng/mL LACTIC ACID Collection Time: 05/24/21  6:35 PM  
Result Value Ref Range  Lactic acid 3.0 (HH) 0.4 - 2.0 MMOL/L  
LACTIC ACID  
 Collection Time: 05/24/21  9:20 PM  
Result Value Ref Range Lactic acid 1.6 0.4 - 2.0 MMOL/L Imaging CXR Results  (Last 48 hours) 05/24/21 1913  XR CHEST PORT Final result Impression:  Persistent but improved bilateral lung infiltrates. .  . Narrative:  INDICATION:  Eval for Infiltrate EXAM: Chest single view. COMPARISON: 4/16/2021. FINDINGS: A single frontal view of the chest at 1906 hours shows persistent but  
improved diffuse parenchymal infiltrate. Tracheostomy device is stable over the  
airway. .  The heart, mediastinum and pulmonary vasculature are stable . The  
bony thorax is unremarkable for age. .  
   
  
  
 
CT Results  (Last 48 hours) None EKG: Rhythm: sinus tachycardia; and regular . Rate (approx.): 131; Axis: normal; P wave: normal; QRS interval: normal ; ST/T wave: non-specific changes; in  Lead: II ; Other findings: borderline ekg. Assessment and Plan Laurie Hernandez is a 49 y.o. female  with known hypotension, seizures, recent admission for HAP and c/ diff infection who is admitted for severe sepsis. Severe sepsis with LA: 4/4 SIRS with LA: c diff vs HAP. Patient with poor IV access and limited labs available at this time. Working on getting better access. Recently admitted for HAP, treated for c diff, also has scalp wound. - admit to ICU, d/w Dr Ada Garcia and plan as follows: 
- Continue Levaquin, Zosyn IV 
- Start oral Vanco 
- Obtain BCx, WCx, UA, UCx, procal, d-dimer, PNA labs and tool studies - Straight cath, bladder checks, strict I&Os, daily wts - Trend LA 
- ABG 
- MIVF 
- CT abd/pelvis and CTA chest 
- Consider GI consult once more data available Hypertensive urgency: BP up to 257/168 with HR >120 
- Labetalol 20 IV and then PRN Seizures:  
- Continue home meds - Keppra level Trach dependent: since 2020 
- Home inhalers  
- glycopyrrolate for increased secretions GERD:  
- Hold protonix, while treated with PO vanco  
 
CKD3:  
- Monitor labs, avoid nephrotoxic agents if possible, IVF Hypotension: - Hold midodrine SHRUTHI: 
- Hold torsemide ? Chronic pain:  
- Hold Oxy, until medications verified with group home Agitation: 
- Continue home lorazepam 
 
 
FEN/GI - Tube feeds, nutrition consulted. NS at 100 mL/hr. Activity - Bedrest 
DVT prophylaxis - Sub Q Heparin GI prophylaxis - Not indicated at this time Fall prophylaxis - Not indicated at this time. Disposition - Admit to ICU. Plan to d/c to Assisted Living. Consulting PT/OT/CM Code Status - Full, per previous notes Patient Tomi Lemus discussed with Dr Koko Tirado (attending physician) 9:40 PM, 05/24/21 Yaniv Joseph MD 
Family Medicine Resident For Billing Chief Complaint Patient presents with  Diarrhea  Fever  Rapid Heart Rate Hospital Problems  Date Reviewed: 5/24/2021 Codes Class Noted POA Lactic acidosis ICD-10-CM: E87.2 ICD-9-CM: 276.2  5/24/2021 Unknown Hypertensive urgency ICD-10-CM: I16.0 ICD-9-CM: 401.9  5/24/2021 Unknown  
   
 CKD (chronic kidney disease) ICD-10-CM: N18.9 ICD-9-CM: 585.9  5/24/2021 Unknown * (Principal) Severe sepsis ([facility] ICD-10-CM: A41.9, R65.20 ICD-9-CM: 038.9, 995.92  4/16/2021 Unknown Chronic static encephalopathy ICD-10-CM: G93.49 
ICD-9-CM: 348.39  4/16/2021 Yes Inability to walk ICD-10-CM: R26.2 ICD-9-CM: 719.7  6/9/2020 Yes Seizure ([facility] ICD-10-CM: R56.9 ICD-9-CM: 780.39  12/11/2019 Yes Gastroesophageal reflux disease without esophagitis ICD-10-CM: K21.9 ICD-9-CM: 530.81  12/11/2019 Yes Down's syndrome ICD-10-CM: Q90.9 ICD-9-CM: 758.0  12/11/2019 Yes Iron deficiency ICD-10-CM: E61.1 ICD-9-CM: 280.9  12/11/2019 Yes Severe intellectual disability ICD-10-CM: F72 
ICD-9-CM: 318.1  2/2/2012 Yes Overview Signed 2/2/2012  1:40 PM by Ayesha Paz MD  
  [facility] [place]

## 2021-05-25 NOTE — PROGRESS NOTES
Reason for Admission:   Tachycardia,hypotension,fever up to 102 RUR Score:     26% PCP: First and Last name:   Darrell Benavidez NP Name of Practice:  
 Are you a current patient: Yes/No: yes Approximate date of last visit:  
 Can you participate in a virtual visit if needed: no Do you (patient/family) have any concerns for transition/discharge? Not @ this time Plan for utilizing home health:   Pt will need home health reumption orders Current Advanced Directive/Advance Care Plan:  Full Code Healthcare Decision Maker:  
 
          Primary Decision Maker (Active): Rambo Corbett - [phone] Transition of Care Plan:       
Primary caretaker for pt is Elias Lomeli @ [phone]. Pt was recently hospitalized from 4/15 to 4/22 with severe sepsis,elevated blood pressures,and acute hypoxemic respiratory failure. Pt was also hospitalized from 3/30/21 to 4/14/21 with status epilepticus ad hypotension. Past medical history-Down's syndrome,colitis,c.difficile,GERD,seizures,,trach/peg Pt lives in an Transitions group [place] and the plan is for pt to return there when discharged. Pt requires assistance in all areas. At home,Pt only moves her head,her mouth and arms. Pt uses [facility]. Any new prescriptions will need to be faxed to the pharmacy @ [phone] . Once the scripts are faxed to the [facility] [place] pharmacy delivers the medications to the adult home by the next day. Pt is open to [facility] so will need resumption orders when discharged. Preliminary clinicals faxed to [facility]. On a previous admission,.humidified air to the trach was ordered Pt is non-verbal. 
 
When discharged,please call nursing report  to [phone]. When discharged,discharge summary and AVS will need to be faxed to [phone]. The number for nurse Teo Katz @ [facility] is [phone].  
Pt has received both of her Moderna vaccinations. At home,her caretaker reports pt only moves her arms,head and mouth. She requires assistance in all levels of care. Staff performs passive ROM with pt @ home. Staff will place sensory items on a table on front of pt for pt to reach out and touch to stimulate her. On pt's last admission,a Level 2 screening was completed, 
 
Pt.'s  trach supplies are through [facility] ([address] On last admission,pt did not qualify for home oxygen. DME @ [facility]:,suction machine,humidified air,trach supplies I am continuing to follow pt for discharge needs.  
 
Kristopher Ortega CM

## 2021-05-25 NOTE — PROGRESS NOTES
0700- Bedside and Verbal shift change report given to Cleve Paige RN (oncoming nurse) by Ted May RN (offgoing nurse). Report included the following information SBAR, Kardex, Intake/Output and Cardiac Rhythm sinus tach. Primary Nurse Minnie Ibarra RN and Ellaree Bumpers, RN performed a dual skin assessment on this patient Impairment noted- see wound doc flow sheet Drips rate verified, Precedex at 0.2 mcg/kg/hr. 0800- Assessment completed, see doc flowsheet. Patient turned and repositioned in bed, mouth care completed, and copious, yellow secretions suctioned from trach. 1000- Patient turned and repositioned, mouth care completed, and trach suctioned. 1200- Reassessment completed, see doc flowsheet. Patient turned and repositioned in bed, mouth care completed, and trach suctioned. Patient had smear of stool on bed pad, unable to obtain sample d/t such small amount. 1400- Patient turned and repositioned, mouth care and trach care completed. 1600- Reassessment completed, see doc flowsheet. Patient turned and repositioned, mouth care and trach care completed. 1603- Precedex stopped. 1800- Patient turned and repositioned in bed, mouth care and trach care completed. Trach inner cannula changed. 1852- PRN Ativan given for anxiety and robinol given for increased secretions. 1900- Bedside and Verbal shift change report given to Drake Chatterjee RN (oncoming nurse) by Cleve Paige RN (offgoing nurse). Report included the following information SBAR, Kardex, Intake/Output and Cardiac Rhythm SR/ST.

## 2021-05-25 NOTE — PROGRESS NOTES
VAT Note: 
  Midline order acknowledged and plan on placing line later today. If urgent access is needed suggest triple IJ. Working PIV currently in  place per Tony Avelar.

## 2021-05-25 NOTE — PROGRESS NOTES
OCCUPATIONAL THERAPY EVALUATION/DISCHARGE Patient: Calin Hunt (50 y.o. female) Date: 5/25/2021 Primary Diagnosis: Severe sepsis ([facility] [address] [A41.9, R65.20] Hypertensive urgency [I16.0] Lactic acidosis [E87.2] Precautions: Fall; skin; contractures; pt is nonverbal    
 
ASSESSMENT Based on the objective data described below, the patient presents with dependent mobility, UE and LE contractures with increased tone, L gaze preference, minimal command following, and baseline cognition deficits following admission for severe sepsis. Pt familiar to this therapist from admission in March 2020. Pt still requiring trach and PEG and is now dependent for mobility and total care for ADLs at [address] [place] [address]. Pt does not offer visual attention to stimulus, noted to have strong L gaze preference and head/neck rotation to L. Pt with extensor tone and contractures in bilateral UEs and LEs, requiring increased time for PROM and repositioning for joint/skin protection. At this time, pt is at baseline and demonstrates minimal ability for benefit from skilled therapy intervention. Will complete orders at this time. Current Level of Function (ADLs/self-care): total care/dependent Functional Outcome Measure: The patient scored 0/100 on the Barthel outcome measure which is indicative of total functional impairment. Other factors to consider for discharge: see above PLAN : 
Recommend with staff: frequent turning/repositioning and boots/adaptive aids for skin protection; PROM to prevent further contractures; Assist for all ADLs Recommendation for discharge: (in order for the patient to meet his/her long term goals) No skilled occupational therapy/ follow up rehabilitation needs identified at this time. This discharge recommendation: 
Has been made in collaboration with the attending provider and/or case management IF patient discharges home will need the following DME: none SUBJECTIVE:  
Patient non verbal and offers no visual interaction with therapist. 
 
OBJECTIVE DATA SUMMARY:  
HISTORY:  
Past Medical History:  
Diagnosis Date  Acute cystitis without hematuria 4/3/2021  VICK (acute kidney injury) ([facility] [address] 3/31/2021  
 C. difficile diarrhea 3/28/2020  Clostridium difficile diarrhea 6/30/2020  Constipation 12/11/2019  Diarrhea in adult patient 3/28/2020  Down syndrome  Elevated Dilantin level 4/3/2021  
 History of vascular access device 06/30/2020  
 4 Fr midline, 10 cm L brachial, poor access  History of vascular access device 04/01/2021 [facility] VAT 5fr double PICC LFT brachial at 39cm, arm cir 31 cm   
 Oral thrush 3/29/2020  Positive fecal occult blood test 3/29/2020  Seizures ([facility] [address]  Sleep apnea   
 humidifier and oxygen w/trach  Status epilepticus ([facility] [address] 3/30/2021  Stroke [facility]) 2019 \"old stroke seen on CT\"  Thrombocytosis ([facility] [address] 3/31/2021  VAP (ventilator-associated pneumonia) ([facility] [address] 4/9/2021 Past Surgical History:  
Procedure Laterality Date  COLONOSCOPY N/A 7/8/2020 COLONOSCOPY with fecal transplant (consents in red file) performed by Emely Berkowitz [facility] HX GI    
 gtube 2/2020  HX OTHER SURGICAL    
 tracheostomy 2/2020 Prior Level of Function/Environment/Context: pt is a resident at group home; total A for ADLs Expanded or extensive additional review of patient history:  
Home Situation Home Environment: Group home EXAMINATION OF PERFORMANCE DEFICITS: 
Cognitive/Behavioral Status: 
Neurologic State: Eyes open to voice Orientation Level: Unable to verbalize (baseline nonverbal) Cognition: Unable to assess (comment) Perception: Cues to attend right visual field; Tactile;Verbal;Visual (L gaze preference with head/neck rotated to L) Safety/Judgement: Decreased awareness of environment Vision/Perceptual:   
Tracking: Unable to test secondary due to decreased visual attention Range of Motion: 
AROM: Grossly decreased, non-functional (contractures present in all extremities and neck) PROM: Grossly decreased, non-functional 
 
Strength: 
Strength: Grossly decreased, non-functional 
 
Coordination: 
Coordination: Grossly decreased, non-functional 
Fine Motor Skills-Upper: Left Impaired;Right Impaired Gross Motor Skills-Upper: Left Impaired;Right Impaired Tone & Sensation: 
Tone: Abnormal 
Sensation:  (unable to assess) Functional Mobility and Transfers for ADLs: 
Bed Mobility: 
Rolling: Total assistance Transfers: 
 Not assessed ADL Assessment: 
Feeding: Total assistance (PEG) Oral Facial Hygiene/Grooming: Total assistance Bathing: Total assistance Upper Body Dressing: Total assistance Lower Body Dressing: Total assistance Toileting: Total assistance ADL Intervention and task modifications: 
Cognitive Retraining Safety/Judgement: Decreased awareness of environment Functional Measure: 
Barthel Index: 
 
Bathin Bladder: 0 Bowels: 0 Groomin Dressin Feedin Mobility: 0 Stairs: 0 Toilet Use: 0 Transfer (Bed to Chair and Back): 0 Total: 0/100 The Barthel ADL Index: Guidelines 1. The index should be used as a record of what a patient does, not as a record of what a patient could do. 2. The main aim is to establish degree of independence from any help, physical or verbal, however minor and for whatever reason. 3. The need for supervision renders the patient not independent. 4. A patient's performance should be established using the best available evidence. Asking the patient, friends/relatives and nurses are the usual sources, but direct observation and common sense are also important. However direct testing is not needed. 5. Usually the patient's performance over the preceding 24-48 hours is important, but occasionally longer periods will be relevant. 6. Middle categories imply that the patient supplies over 50 per cent of the effort.  
7. Use of aids to be independent is allowed. Brenda Edouard., Barthel, D.W. ([id number]). Functional evaluation: the Barthel Index. [address] (14)2. Leonette Castleman der [place], J.J.M.F, Dannial Hoff., Janett Deaner., [place], [address] (1999). Measuring the change indisability after inpatient rehabilitation; comparison of the responsiveness of the Barthel Index and Functional Braxton Measure. Journal of Neurology, Neurosurgery, and Psychiatry, 66(4), 650-414. RUSSELL Medina, VONDA Mosquera, & Shantel Comer M.A. (2004.) Assessment of post-stroke quality of life in cost-effectiveness studies: The usefulness of the Barthel Index and the EuroQoL-5D. [facility], 13, 802-19 Occupational Therapy Evaluation Charge Determination History Examination Decision-Making HIGH Complexity : Extensive review of history including physical, cognitive and psychosocial history  HIGH Complexity : 5 or more performance deficits relating to physical, cognitive , or psychosocial skils that result in activity limitations and / or participation restrictions HIGH Complexity : Patient presents with comorbidities that affect occupational performance. Signifigant modification of tasks or assistance (eg, physical or verbal) with assessment (s) is necessary to enable patient to complete evaluation Based on the above components, the patient evaluation is determined to be of the following complexity level: HIGH Pain Rating: 
Pt does not report pain; no facial grimace noted Activity Tolerance:  
Poor After treatment patient left in no apparent distress:   
Supine in bed, Heels elevated for pressure relief, Call bell within reach, Bed / chair alarm activated, Side rails x 3 and pt offloaded with pillows for skin prevention COMMUNICATION/EDUCATION:  
The patients plan of care was discussed with: Physical therapist, Registered nurse and Case management. Thank you for this referral. 
Harry John OT Time Calculation: 22 mins

## 2021-05-25 NOTE — PROGRESS NOTES
Asked to assist with dilantin management for pt well known to our team.  Will get dilantin free level at 6 pm today after her 4pm dose.   Will make recommendation's in am.

## 2021-05-25 NOTE — PROGRESS NOTES
Spiritual Care Assessment/Progress Note [employer] 
 
 
NAME: Tomi Lemus      MRN: [id number] AGE: 49 y.o. SEX: female Advent Affiliation: No preference Language: [place] 5/25/2021     Total Time (in minutes): 7 Spiritual Assessment begun in [facility] INTENSIVE CARE through conversation with: 
  
    []Patient        [] Family    [] Friend(s) Reason for Consult: Initial/Spiritual assessment, critical care Spiritual beliefs: (Please include comment if needed) 
   [] Identifies with a tj tradition:     
   [] Supported by a tj community:        
   [] Claims no spiritual orientation:       
   [] Seeking spiritual identity:            
   [] Adheres to an individual form of spirituality:       
   [x] Not able to assess:                   
 
    
Identified resources for coping:  
   [] Prayer                           
   [] Music                  [] Guided Imagery 
   [] Family/friends                 [] Pet visits [] Devotional reading                         [x] Unknown 
   [] Other:                      
 
 
Interventions offered during this visit: (See comments for more details) Patient Interventions: Initial visit (Attempted) Plan of Care: 
 
 [] Support spiritual and/or cultural needs  
 [] Support AMD and/or advance care planning process    
 [] Support grieving process 
 [] Coordinate Rites and/or Rituals  
 [] Coordination with community clergy [] No spiritual needs identified at this time 
 [] Detailed Plan of Care below (See Comments)  [] Make referral to Music Therapy 
[] Make referral to Pet Therapy    
[] Make referral to Addiction services 
[] Make referral to [facility] 
[] Make referral to Spiritual Care Partner 
[] No future visits requested       
[x] Follow up upon further referrals Comments: Attempted Initial spiritual assessment in ICU. Miss Genesis Crabtree is known to me from previous visits.   Unfortunately, her spirituality is unknown. She did not respond to my presence. Provided words of comfort. Visited by: Guru Lewis., MS., [address] ([id number])

## 2021-05-25 NOTE — PROGRESS NOTES
Problem: Risk for Spread of Infection Goal: Prevent transmission of infectious organism to others Description: Prevent the transmission of infectious organisms to other patients, staff members, and visitors. Outcome: Progressing Towards Goal 
  
Problem: Patient Education:  Go to Education Activity Goal: Patient/Family Education Outcome: Progressing Towards Goal 
  
Problem: Pressure Injury - Risk of 
Goal: *Prevention of pressure injury Description: Document Rolf Scale and appropriate interventions in the flowsheet. Outcome: Progressing Towards Goal 
Variance Patient Condition Impact: High 
Note: Pressure Injury Interventions: 
Sensory Interventions: Assess changes in LOC, Assess need for specialty bed, Avoid rigorous massage over bony prominences, Discuss PT/OT consult with provider, Keep linens dry and wrinkle-free, Float heels, Maintain/enhance activity level, Minimize linen layers, Monitor skin under medical devices, Pad between skin to skin, Pressure redistribution bed/mattress (bed type), Turn and reposition approx. every two hours (pillows and wedges if needed), Use 30-degree side-lying position Moisture Interventions: Absorbent underpads, Apply protective barrier, creams and emollients, Assess need for specialty bed, Check for incontinence Q2 hours and as needed, Contain wound drainage, Internal/External urinary devices, Maintain skin hydration (lotion/cream), Minimize layers, Limit adult briefs Activity Interventions: Assess need for specialty bed, Pressure redistribution bed/mattress(bed type) Mobility Interventions: Assess need for specialty bed, Float heels, HOB 30 degrees or less, Pressure redistribution bed/mattress (bed type), Turn and reposition approx. every two hours(pillow and wedges) Nutrition Interventions: Discuss nutritional consult with provider Friction and Shear Interventions: HOB 30 degrees or less, Lift sheet, Lift team/patient mobility team, Minimize layers, Transferring/repositioning devices Problem: Patient Education: Go to Patient Education Activity Goal: Patient/Family Education Outcome: Progressing Towards Goal 
  
Problem: Falls - Risk of 
Goal: *Absence of Falls Description: Document Mindi Cuevas Fall Risk and appropriate interventions in the flowsheet. Outcome: Progressing Towards Goal 
Variance Patient Condition Impact: Moderate Note: Fall Risk Interventions: 
  
 
Mentation Interventions: Adequate sleep, hydration, pain control, Bed/chair exit alarm, Evaluate medications/consider consulting pharmacy, Increase mobility, More frequent rounding, Reorient patient, Room close to nurse's station, Update white board Medication Interventions: Bed/chair exit alarm, Evaluate medications/consider consulting pharmacy Elimination Interventions: Call light in reach, Bed/chair exit alarm, Toilet paper/wipes in reach, Toileting schedule/hourly rounds Problem: Patient Education: Go to Patient Education Activity Goal: Patient/Family Education Outcome: Progressing Towards Goal 
  
Problem: Nutrition Deficit Goal: *Optimize nutritional status Outcome: Progressing Towards Goal 
  
Problem: Patient Education: Go to Patient Education Activity Goal: Patient/Family Education Outcome: Progressing Towards Goal 
  
Problem: Diarrhea (Adult and Pediatrics) Goal: *Absence of diarrhea Outcome: Progressing Towards Goal 
Goal: *PALLIATIVE CARE:  Absence of diarrhea Outcome: Progressing Towards Goal 
  
Problem: Patient Education: Go to Patient Education Activity Goal: Patient/Family Education Outcome: Progressing Towards Goal 
  
Problem: Patient Education: Go to Patient Education Activity Goal: Patient/Family Education Outcome: Progressing Towards Goal 
  
Problem: Sepsis: Day of Diagnosis Goal: Off Pathway (Use only if patient is Off Pathway) Outcome: Progressing Towards Goal 
Goal: *Fluid resuscitation Outcome: Progressing Towards Goal Goal: *Paired blood cultures prior to first dose of antibiotic Outcome: Progressing Towards Goal 
Goal: *First dose of  appropriate antibiotic within 3 hours of arrival to ED, within 1 hour of arrival to ICU Outcome: Progressing Towards Goal 
Goal: *Lactic acid with first set of blood cultures Outcome: Progressing Towards Goal 
Goal: *Pneumococcal immunization (if eligible) Outcome: Progressing Towards Goal 
Goal: *Influenza immunization (if eligible) Outcome: Progressing Towards Goal 
Goal: Activity/Safety Outcome: Progressing Towards Goal 
Goal: Consults, if ordered Outcome: Progressing Towards Goal 
Goal: Diagnostic Test/Procedures Outcome: Progressing Towards Goal 
Goal: Nutrition/Diet Outcome: Progressing Towards Goal 
Goal: Discharge Planning Outcome: Progressing Towards Goal 
Goal: Medications Outcome: Progressing Towards Goal 
Goal: Respiratory Outcome: Progressing Towards Goal 
Goal: Treatments/Interventions/Procedures Outcome: Progressing Towards Goal 
Goal: Psychosocial 
Outcome: Progressing Towards Goal 
  
Problem: Sepsis: Discharge Outcomes Goal: *Vital signs within defined limits Outcome: Progressing Towards Goal 
Goal: *Tolerating diet Outcome: Progressing Towards Goal 
Goal: *Verbalizes understanding and describes prescribed diet Outcome: Progressing Towards Goal 
Goal: *Demonstrates progressive activity Outcome: Progressing Towards Goal 
Goal: *Describes follow-up/return visits to physicians Outcome: Progressing Towards Goal 
Goal: *Verbalizes name, dosage, time, side effects, and number of days to continue medications Outcome: Progressing Towards Goal 
Goal: *Influenza immunization (Oct-Mar only) Outcome: Progressing Towards Goal 
Goal: *Pneumococcal immunization Outcome: Progressing Towards Goal 
Goal: *Lungs clear or at baseline Outcome: Progressing Towards Goal 
Goal: *Oxygen saturation returns to baseline or 90% or better on room air Outcome: Progressing Towards Goal Goal: *Glycemic control Outcome: Progressing Towards Goal 
Goal: *Absence of deep venous thrombosis signs and symptoms(Stroke Metric) Outcome: Progressing Towards Goal 
Goal: *Describes available resources and support systems Outcome: Progressing Towards Goal 
Goal: *Optimal pain control at patient's stated goal 
Outcome: Progressing Towards Goal

## 2021-05-25 NOTE — PROGRESS NOTES
[address] [facility], [address] with [address] [facility] Attending Progress Note in Brief S: Patient non-verbal in bed. Diaphoretic. O: 
Visit Vitals BP (!) 142/128 Pulse (!) 121 Temp (!) 102.3 °F (39.1 °C) Resp (!) 52 Ht 4' 9\" (1.448 m) Wt 141 lb 12.8 oz (64.3 kg) SpO2 95% BMI 30.69 kg/m² Physical Examination:  
GEN: Ill appearing Lungs: Rudine Listen Skin: Ulcer noted on scalp A/P: 
Will need access and to collect labs. If unable to get labs, consider ABG to assess for acid-base issues. Would straight cath if not able to collect urine and could be contributing to high blood pressure. Get pneumonia labs. Continue Levaquin for PNA and Zosyn to cover intra-abdominal path. Continue oral vanc for C. Diff coverage for concerns of unresolved infection she was recently treated for. Get CTA Chest/Abdomen/Pelvis. Consult would for scalp lesion, does not look infected visually so will hold on abx for better gram positive coverage. Dr. Roosevelt Lino was present with the patient and completed a physical examination and I was not physically present during this encounter. I saw and evaluated the patient by an virtual audio-video platform. Patient identification was verified prior to start of the visit. Pursuant to the emergency declaration under the [facility] waiver authority and the [employer] Act, this Virtual Visit was conducted, to reduce the patient's risk of exposure to COVID-19 and provide necessary medical care. The resident and I are concurrently monitoring the patient care through appropriate telecommunication technology. I discussed the findings, assessment and plan with the resident and agree with the resident's findings and plan as documented in the resident's note. Rod Sim MD, FAAFP, CAQSM, RMSK

## 2021-05-25 NOTE — PROGRESS NOTES
30 [facility], [address] with [address] [facility] Resident Progress Note in Brief S: Patient seen and examined at bedside. Patient is nonverbal at baseline. Unable to obtain history. O: 
Visit Vitals /83 Pulse 96 Temp 99 °F (37.2 °C) Resp (!) 35 Ht 4' 9\" (1.448 m) Wt 124 lb 9 oz (56.5 kg) SpO2 95% BMI 26.95 kg/m² Physical Examination:  
General:   Initially sleeping but awakens on exam, ill-appearing, diaphoretic, nonverbal at bedside Neck:  Head held sidebent to the left Lungs:   Currently receiving breathing treatment via trach, lung sounds clear anteriorly, mildly tachypneic Heart:   Regular rate and rhythm, no murmur Abdomen:    Soft, mildly distended, no apparent tenderness on exam, G tube in LUQ Extremities:  Trace edema in BL feet and dorsum of BL hands, unna boots in place Skin:  Warm and dry Neurologic:  Somnolent but opens eyes on exam, nonverbal at baseline A/P:  
 
Carisa Brasher is a 49 y.o. female with a PMHx of Down Syndrome (non-verbal at baseline), s/p Trach and PEG tube placement, seizure disorder, encephalomalacia, ANNE, recent admission for HAP and C. diff colitis who is admitted for severe sepsis. Severe sepsis: Unclear source, possible respiratory vs GI. Initially with hypertensive urgency, now with low normal BP.  
- Continue broad spectrum ABX - Levaquin, Zosyn, PO Vanc - MIVF NS at 100 mL/hour 
- Has not required pressure support. Home midodrine 15 mg TID resumed. - Tube feeds resumed. NPO. Please see the primary team's daily progress note for the patient's full plan. Susie Maldonado DO Family Medicine Resident

## 2021-05-25 NOTE — PROGRESS NOTES
[id number] PROGRAM 
PROGRESS NOTE  
 
5/25/2021 PCP: Karen Cuellar NP Assessment/Plan:  
 
Parish Alicea is a 49 y.o. female with a PMHx of Down Syndrome (non-verbal at baseline), s/p Trach and PEG tube, seizures, encephalomalacia, ANNE, recent admission for HAP and C. diff colitis who is admitted for severe sepsis. Overnight events: No acute events overnight.  
  
Severe sepsis with LA: 4/4 SIRS POA. LA 3.0 > 1.6. Procal 2.82. Possibly 2/2 C diff vs HAP. Recently admitted for HAP, treated for C. diff, also has L scalp wound. ABG suggestive of acute on chronic respiratory alkalosis vs mixed respiratory alkalosis w/ metabolic acidosis. AG 10. CT chest/abd/pelvis with minimal scattered groundglass lung attenuation, decreased compared to April 2021 and small amount of debris in RLL bronchioles; no acute abnormality in abd/pelvis. - Continue Levaquin IV, Zosyn IV, oral Vanc - F/U BCx, WCx, UA, UCx, PNA labs, and stool studies - Wound care consult - Straight cath, bladder checks, strict I&Os, daily weight - S/p 30 cc/kg fluid challenge. MIVF NS at 100 mL/ hour - Tylenol PRN 
- Consider GI consult once more data available Elevated D-dimer: 9.2 POA. CT chest without PE. Possibly reactive 2/2 inflammation/infection. Has trace edema in BLLE which is at baseline.  
- Check duplex BLLE 
  
Hypertensive urgency in the setting of chronic hypotension: BP up to 257/168 with HR >120 POA. BP improved to 99/71 at this time. - Hold home midodrine 15 mg TID 
  
Seizures:  
- Continue home Keppra 750 mg q12h and phenytoin 150 mg TID per G tube - Keppra and phyenytoin levels pending  
  
Trach dependent: since 2020 
- Supplemental O2, wean as tolerated - Home Duonebs q4h PRN 
- Glycopyrrolate for increased secretions  
  
GERD:  
- Hold protonix, while treated with PO vanco  
  
CKD3:  
- Monitor labs, avoid nephrotoxic agents if possible, IVF 
  
Chronic lower extremity edema: 
- Hold hold torsemide 40 mg daily  
  ? Chronic pain:  
- Hold home Oxy 5 mg q6h, until medications verified with group home  
  
Agitation: 
- Continue home lorazepam 0.5 mg q6h PRN  
- Precedex gtt  
  
  
FEN/GI - Tube feeds, nutrition consulted. NS at 100 mL/hr. Activity - Bedrest 
DVT prophylaxis - Sub Q Heparin GI prophylaxis - Not indicated at this time Fall prophylaxis - Not indicated at this time. Disposition - Plan to d/c to Assisted Living. Consulting PT/OT/CM Code Status - Full, per previous notes Laurie Hernandez discussed with Dr. Noman Stephenson. Subjective: Pt was seen and examined at bedside. Patient nonverbal at baseline. Unable to obtain history. Objective:  
Physical examination Patient Vitals for the past 24 hrs: 
 Temp Pulse Resp BP SpO2  
05/25/21 0500  85     
05/25/21 0400 99.9 °F (37.7 °C) 86 (!) 32 99/71 98 % 05/25/21 0300  91 (!) 32 96/62 97 % 05/25/21 0200  (!) 103 (!) 33 103/67 95 % 05/25/21 0142     95 % 05/25/21 0138  (!) 136     
05/25/21 0137 (!) 101.3 °F (38.5 °C) (!) 124 (!) 47 112/62 96 % 05/25/21 0037  (!) 116 (!) 46 103/61 98 % 05/25/21 0000  (!) 121 (!) 43 93/67 97 % 05/24/21 2357  (!) 120 (!) 45 114/71 100 % 05/24/21 2355  (!) 115  114/71   
05/24/21 2300  (!) 121 (!) 52 (!) 104/57 100 % 05/24/21 2245  (!) 120 (!) 39 107/61 97 % 05/24/21 2230  (!) 119 (!) 40 (!) 107/42 97 % 05/24/21 2215  (!) 122 (!) 48 (!) 160/106 97 % 05/24/21 2201    (!) 193/140   
05/24/21 2115  (!) 121 (!) 52 (!) 142/128 95 % 05/24/21 2100  (!) 123 (!) 51 (!) 118/95 95 % 05/24/21 2047  (!) 121 (!) 49 (!) 99/51 94 % 05/24/21 2030  (!) 125 (!) 51 (!) 182/162 96 % 05/24/21 2017  (!) 122  (!) 142/128   
05/24/21 1945  (!) 126 (!) 36 (!) 190/153 96 % 05/24/21 1915  (!) 125 (!) 48 (!) 135/94   
05/24/21 1900  (!) 129 (!) 51 133/63 96 % 05/24/21 1822     97 % 05/24/21 1803 (!) 102.3 °F (39.1 °C) (!) 116 (!) 50 (!) 134/112 95 % Temp (24hrs), Av.2 °F (38.4 °C), Min:99.9 °F (37.7 °C), Max:102.3 °F (39.1 °C) O2 Flow Rate (L/min): 9 l/min O2 Device: T-tube Date 21 07 - 21 9340 21 - 21 2101 Shift 5563-2173 7489-5856 24 Hour Total 4056-4856 3106-9674 24 Hour Total  
INTAKE  
I.V.(mL/kg/hr)  364.3 364.3 Precedex Volume  14.3 14.3 Volume (sodium chloride 0.9 % bolus infusion 1,000 mL)  0 0 Volume (sodium chloride 0.9 % bolus infusion 800 mL)  0 0 Volume (piperacillin-tazobactam (ZOSYN) 3.375 g in 0.9% sodium chloride (MBP/ADV) 100 mL MBP)  100 100 Volume (levoFLOXacin (LEVAQUIN) 750 mg in D5W IVPB)  150 150 Volume (piperacillin-tazobactam (ZOSYN) 3.375 g in 0.9% sodium chloride (MBP/ADV) 100 mL MBP)  100 100 Shift Total(mL/kg)  364. 3(6.4) 364. 3(6.4) OUTPUT Urine(mL/kg/hr)  0 0 Urine Occurrence(s)  1 x 1 x Urine Output (mL) (External Urinary Catheter 21)  0 0 Shift Total(mL/kg)  0(0) 0(0) NET  364.3 364.3 Weight (kg) 64.3 56.5 56.5 56.5 56.5 56.5 General:   Initially sleeping but awakens on exam, ill-appearing, nonverbal at bedside Neck:  Head held sidebent to the left Lungs:   Currently receiving breathing treatment via trach, scattered coarse breath sounds anteriorly, tachypneic Heart:   Mildly tachycardic, regular rhythm, no murmur Abdomen:    Soft, moderately distended, no apparent tenderness on exam, G tube in LUQ Extremities:  Trace edema in BL feet/ankles, unna boots in place Skin:  Warm and dry, wound on left scalp Neurologic:  Alert, nonverbal at baseline Data Review: CBC: 
Recent Labs  
  21 
1834 WBC 22.4* HGB 11.4* HCT 35.5  Metabolic Panel: 
Recent Labs  
  21 
2345 21 
2234  142  
K 4.2 3.8 * 112* CO2 20* 20* BUN 56* 55* CREA 1.56* 1.42* * 156* CA 7.8* 7.5* ALB 2. 0* 2.0*  
TBILI 0.2 0.2 * 203* Micro: 
Lab Results Component Value Date/Time Culture result: SCANT PSEUDOMONAS AERUGINOSA (A) 04/16/2021 06:45 AM  
 Culture result: NO NORMAL RESPIRATORY MARINA ISOLATED 04/16/2021 06:45 AM  
 Culture result: NO GROWTH 5 DAYS 04/16/2021 12:03 AM  
 
Imaging: XR CHEST PORT Result Date: 5/24/2021 INDICATION:  Eval for Infiltrate EXAM: Chest single view. COMPARISON: 4/16/2021. FINDINGS: A single frontal view of the chest at 1906 hours shows persistent but improved diffuse parenchymal infiltrate. Tracheostomy device is stable over the airway. .  The heart, mediastinum and pulmonary vasculature are stable . The bony thorax is unremarkable for age. .  
 
Persistent but improved bilateral lung infiltrates. .  . Medications reviewed Current Facility-Administered Medications Medication Dose Route Frequency  dexmedeTOMidine in 0.9 % NaCl (PRECEDEX) 400 mcg/100 mL (4 mcg/mL) infusion soln  0.1-1.5 mcg/kg/hr IntraVENous TITRATE  diazePAM (VALIUM) 5-7.5-10 mg rectal kit 10 mg (Patient Supplied)  10 mg Rectal PRN  
 0.9% sodium chloride infusion  100 mL/hr IntraVENous CONTINUOUS  
 sodium chloride (NS) flush 5-10 mL  5-10 mL IntraVENous PRN  piperacillin-tazobactam (ZOSYN) 3.375 g in 0.9% sodium chloride (MBP/ADV) 100 mL MBP  3.375 g IntraVENous Q8H  
 levoFLOXacin (LEVAQUIN) 750 mg in D5W IVPB  750 mg IntraVENous Q24H  
 labetaloL (NORMODYNE;TRANDATE) 20 mg/4 mL (5 mg/mL) injection 20 mg  20 mg IntraVENous NOW  vancomycin (FIRVANQ) 50 mg/mL oral solution 500 mg  500 mg Oral Q6H  
 sodium chloride (NS) flush 5-40 mL  5-40 mL IntraVENous Q8H  
 sodium chloride (NS) flush 5-40 mL  5-40 mL IntraVENous PRN  
 acetaminophen (TYLENOL) tablet 650 mg  650 mg Oral Q6H PRN Or  
 acetaminophen (TYLENOL) suppository 650 mg  650 mg Rectal Q6H PRN  polyethylene glycol (MIRALAX) packet 17 g  17 g Oral DAILY PRN  
 heparin (porcine) injection 5,000 Units 5,000 Units SubCUTAneous Q8H  
 albuterol-ipratropium (DUO-NEB) 2.5 MG-0.5 MG/3 ML  3 mL Nebulization Q4H PRN  
 levETIRAcetam (KEPPRA) oral solution 750 mg  750 mg Per G Tube Q12H  
 LORazepam (ATIVAN) tablet 0.5 mg  0.5 mg Per G Tube Q6H PRN  phenytoin (DILANTIN) chewable tablet 150 mg  150 mg Per G Tube TID  [Held by provider] torsemide (DEMADEX) tablet 40 mg  40 mg Per G Tube DAILY  glycopyrrolate (ROBINUL) tablet 1 mg  1 mg Per G Tube TID PRN Signed: 
 Raymundo Alas DO Family Medicine Resident Attending note: Attending note to follow. ..

## 2021-05-25 NOTE — PROGRESS NOTES
Admission Medication Reconciliation: 
 
Comments/Recommendations: 
-Medication history obtained via MAR provided from group home 
-Patient is non-verbal, unable to participate in med rec interview Medications added: Tobramycin nebulizer - currently in off 14 day period, would be due to resume on  Medications removed: None Medications changed: None Information obtained from: [facility] from group home Significant PMH/Disease States:  
Past Medical History:  
Diagnosis Date Acute cystitis without hematuria 4/3/2021 VICK (acute kidney injury) ([facility] [address] 3/31/2021 C. difficile diarrhea 3/28/2020 Clostridium difficile diarrhea 2020 Constipation 2019 Diarrhea in adult patient 3/28/2020 Down syndrome Elevated Dilantin level 4/3/2021 History of vascular access device 2020  
 4 Fr midline, 10 cm L brachial, poor access History of vascular access device 2021 [facility] VAT 5fr double PICC LFT brachial at 39cm, arm cir 31 cm Oral thrush 3/29/2020 Positive fecal occult blood test 3/29/2020 Seizures ([address] Sleep apnea   
 humidifier and oxygen w/trach Status epilepticus ([address] 3/30/2021 Stroke [facility]) 2019 \"old stroke seen on CT\" Thrombocytosis ([facility] [address] 3/31/2021 VAP (ventilator-associated pneumonia) ([facility] [address] 2021 Chief Complaint for this Admission: Chief Complaint Patient presents with Diarrhea Fever Rapid Heart Rate Allergies:  Depakote [divalproex] Prior to Admission Medications:  
Prior to Admission Medications Prescriptions Last Dose Informant Patient Reported? Taking? LORazepam (ATIVAN) 0.5 mg tablet   No Yes Si Tab by Per G Tube route every six (6) hours as needed for Anxiety or Agitation (Agitation: For heart rate >120 and/or respiratory rate >35. Do not give if blood pressure <100/60). Max Daily Amount: 2 mg. OTHER,NON-FORMULARY,  Care Giver Yes Yes Sig: by Per G Tube route four (4) times daily. OSMOLITE 1.2 Marino Liquid  
acetaminophen (TYLENOL) 100 mg/mL suspension   No Yes Si mL by Per G Tube route every six (6) hours as needed for Fever. albuterol-ipratropium (DUO-NEB) 2.5 mg-0.5 mg/3 ml nebu 2021 at AM Care Giver Yes Yes Sig: 3 mL by Nebulization route every 4 hours daily while awake resp. albuterol-ipratropium (DUO-NEB) 2.5 mg-0.5 mg/3 ml nebu  Care Giver Yes Yes Sig: 3 mL by Nebulization route every four (4) hours as needed for Shortness of Breath.  
calamine-menthoL-petrolat-zinc (Remedy Calazime Protect Paste) 3.5-0.2-69-16.5 % pste   No Yes Sig: Apply to affected area as needed with each pullup change  
cetirizine (ZYRTEC) 10 mg tablet 2021 at [address] [place] [address] Yes Yes Sig: 10 mg by Per G Tube route daily. diazePAM (VALIUM) 5-7.5-10 mg kit  Care Giver Yes Yes Sig: Insert 10 mg into rectum as needed (for seizures that last longer than 5 minutes). glycopyrrolate (ROBINUL) 1 mg tablet  Care Giver No Yes Si Tab by Per G Tube route three (3) times daily as needed (increased secretions from tracheal tube) for up to 30 doses. guaiFENesin (Siltussin SA) 100 mg/5 mL liquid 2021 at AM Care Giver Yes Yes Si mg by Per G Tube route four (4) times daily. levETIRAcetam (KEPPRA) 100 mg/mL solution 2021 at AM Care Giver Yes Yes Si mg by Per G Tube route every twelve (12) hours. melatonin 3 mg tablet  Care Giver No Yes Si Tab by Per G Tube route nightly as needed for Insomnia. Must give by 10pm  
midodrine (PROAMATINE) 5 mg tablet 2021 at AM  No Yes Sig: 3 Tabs by Per G Tube route every eight (8) hours for 30 days. Hold for systolic over 935 or diastolic over 70  
mupirocin (BACTROBAN) 2 % ointment 2021 at AM  No Yes Sig: Apply  to affected area two (2) times a day.  
naloxone (NARCAN) 4 mg/actuation nasal spray  Care Giver No Yes Sig: Use 1 spray intranasally, then discard.  Repeat with new spray every 2 min as needed for opioid overdose symptoms, alternating nostrils. omeprazole (PRILOSEC) 40 mg capsule 2021 at [address] [place] [address] Yes Yes Si mg two (2) times a day. Per tube  
oxyCODONE IR (ROXICODONE) 5 mg immediate release tablet   No Yes Si Tab by Per G Tube route every six (6) hours as needed for Pain for up to 30 days. Max Daily Amount: 20 mg. 1 Tab by Per G Tube route every six (6) hours as needed for Pain (Agitation: For heart rate >120 and/or respiratory rate >35. Do not give if blood pressure <100/60) phenytoin (DILANTIN) 50 mg chewable tablet 2021 at AM  No Yes Sig: 3 Tabs by Per G Tube route three (3) times daily for 30 days. potassium chloride (KAON 10%) 20 mEq/15 mL solution 2021 at AM  No Yes Sig: 15 mL by Per G Tube route daily. tobramycin (Bethkis) 300 mg/4 mL nebu   Yes Yes Sig: Take 300 mg by inhalation two (2) times a day. torsemide (DEMADEX) 20 mg tablet 2021 at [address] [place] [address] Yes Yes Si mg by Per G Tube route daily. Facility-Administered Medications: None Thank you, 
Andrea Wilkins, PHARMD

## 2021-05-25 NOTE — PROGRESS NOTES
TRANSFER - IN REPORT: 
 
Verbal report received from [address] [place] [address], RN(name) on Bhaskar Norton  being received from ED(unit) for routine progression of care Report consisted of patients Situation, Background, Assessment and  
Recommendations(SBAR). Information from the following report(s) SBAR, Kardex, ED Summary, Intake/Output, MAR, Accordion, Recent Results, Med Rec Status, Cardiac Rhythm ST and Alarm Parameters  was reviewed with the receiving nurse. Opportunity for questions and clarification was provided. Assessment completed upon patients arrival to unit and care assumed. MD Alia Tucker assessed pt with the help of this RN, Orders for precedex received. 0200: Medication administration. Pt repositioned in bed. Mouth care performed. 0400: Pt repositioned in bed, reassessment  Completed, no changes see flow sheet. 0600: Pt repositioned in bed, medication administration. 0700: Bedside and Verbal shift change report given to Guanakito Rivas RN (oncoming nurse) by Dahlia Neff RN (offgoing nurse). Report included the following information SBAR, Kardex, ED Summary, Intake/Output, MAR, Accordion, Recent Results, Med Rec Status, Cardiac Rhythm ST and Alarm Parameters .

## 2021-05-25 NOTE — CONSULTS
Pulmonary/Critical Care  Note Name:    Trina Luna                                    :   1970         #[id number] Date:   2021 Hospital:    [address] [place] [address] 22: 2469 Resuscitation. Full Code SUBJECTIVE. SUMMARY. 16-year-old woman, Down syndrome, resident of a group home, chronic tracheostomy and gastrostomy loan with a history of seizures encephalomalacia, presented the hospital because of fever and diarrhea. Recent diagnosis of Clostridium difficile colitis. She was brought to the ICU because of hypotension and possible sepsis. The patient is unable to provide answers to interview questions. She is bedridden with contractures. Past Medical History:  
Diagnosis Date  Acute cystitis without hematuria 4/3/2021  VICK (acute kidney injury) ([address] 3/31/2021  
 C. difficile diarrhea 3/28/2020  Clostridium difficile diarrhea 2020  Constipation 2019  Diarrhea in adult patient 3/28/2020  Down syndrome  Elevated Dilantin level 4/3/2021  
 History of vascular access device 2020  
 4 Fr midline, 10 cm L brachial, poor access  History of vascular access device 2021 [facility] VAT 5fr double PICC LFT brachial at 39cm, arm cir 31 cm   
 Oral thrush 3/29/2020  Positive fecal occult blood test 3/29/2020  Seizures ([address]  Sleep apnea   
 humidifier and oxygen w/trach  Status epilepticus ([address] 3/30/2021  Stroke [facility]) 2019 \"old stroke seen on CT\"  Thrombocytosis ([address] 3/31/2021  VAP (ventilator-associated pneumonia) ([address] 2021 Past Surgical History:  
Procedure Laterality Date  COLONOSCOPY N/A 2020 COLONOSCOPY with fecal transplant (consents in red file) performed by Carolyne Hu MD at [address] HX GI    
 gtube 2020  HX OTHER SURGICAL    
 tracheostomy 2020 Family History Family history unknown: Yes  
  
Social History Tobacco Use  Smoking status: Never Smoker  Smokeless tobacco: Never Used Substance Use Topics  Alcohol use: Never Current Facility-Administered Medications Medication Dose Route Frequency Provider Last Rate Last Admin  dexmedeTOMidine in 0.9 % NaCl (PRECEDEX) 400 mcg/100 mL (4 mcg/mL) infusion soln  0.1-1.5 mcg/kg/hr IntraVENous TITRATE Ari Ryan MD 3.2 mL/hr at 05/25/21 0614 0.2 mcg/kg/hr at 05/25/21 1493  diazePAM (VALIUM) 5-7.5-10 mg rectal kit 10 mg (Patient Supplied)  10 mg Rectal PRN Shanta Farmer MD      
 0.9% sodium chloride infusion  100 mL/hr IntraVENous CONTINUOUS Tien Oquendo  mL/hr at 05/25/21 0705 100 mL/hr at 05/25/21 4465  sodium chloride (NS) flush 5-10 mL  5-10 mL IntraVENous PRN Roseanne Blue MD      
 piperacillin-tazobactam (ZOSYN) 3.375 g in 0.9% sodium chloride (MBP/ADV) 100 mL MBP  3.375 g IntraVENous Q8H Roseanne Blue MD 25 mL/hr at 05/25/21 0228 3.375 g at 05/25/21 0228  levoFLOXacin (LEVAQUIN) 750 mg in D5W IVPB  750 mg IntraVENous Q24H Roseanne Blue  mL/hr at 05/24/21 2030 750 mg at 05/24/21 2030  labetaloL (NORMODYNE;TRANDATE) 20 mg/4 mL (5 mg/mL) injection 20 mg  20 mg IntraVENous NOW Shanta Farmer MD      
 vancomycin [facility]) 50 mg/mL oral solution 500 mg  500 mg Oral Q6H Shanta Farmer MD   500 mg at 05/25/21 3850  
 sodium chloride (NS) flush 5-40 mL  5-40 mL IntraVENous Q8H Shanta Farmer MD   10 mL at 05/25/21 6881  sodium chloride (NS) flush 5-40 mL  5-40 mL IntraVENous PRN Shanta Farmer MD      
 acetaminophen (TYLENOL) tablet 650 mg  650 mg Oral Q6H PRN Shanta Farmer MD      
 Or  
 acetaminophen (TYLENOL) suppository 650 mg  650 mg Rectal Q6H PRN Shanta Farmer MD      
 polyethylene glycol (MIRALAX) packet 17 g  17 g Oral DAILY PRN Shanta Farmer MD      
[address] [place] [address] heparin (porcine) injection 5,000 Units  5,000 Units SubCUTAneous Q8H Danny Bueno MD   5,000 Units at 05/25/21 0609  
 albuterol-ipratropium (DUO-NEB) 2.5 MG-0.5 MG/3 ML  3 mL Nebulization Q4H PRN Danny Bueno MD      
 levETIRAcetam (KEPPRA) oral solution 750 mg  750 mg Per G Tube Q12H Danny Bueno MD   750 mg at 05/24/21 2349  LORazepam (ATIVAN) tablet 0.5 mg  0.5 mg Per G Tube Q6H PRN Danny Bueno MD      
 phenytoin (DILANTIN) chewable tablet 150 mg  150 mg Per G Tube TID Danny Bueno MD   150 mg at 05/24/21 3238  [Held by provider] torsemide (DEMADEX) tablet 40 mg  40 mg Per G Tube DAILY Danny Bueno MD      
 glycopyrrolate (ROBINUL) tablet 1 mg  1 mg Per G Tube TID PRN Danny Bueno MD   1 mg at 05/25/21 2048 Review of Systems Objective Vital signs for last 24 hours: 
Visit Vitals BP 99/63 Pulse (!) 114 Temp 99.9 °F (37.7 °C) Resp (!) 37 Ht 4' 9\" (1.448 m) Wt 56.5 kg (124 lb 9 oz) SpO2 100% BMI 26.95 kg/m² PHYSICAL EXAMINATION: 
Telemedicine real-time synchronous audio/video (inpatient hospital) patient interaction with the assistance of the bedside nursing staff acting as presenter at the originating site, and my review of electronic medical records by access to the hospital electronic medical record system, and access to the digital radiology record system. Remote location of the physician is [place]. Vital signs. ENT. Clean tracheostomy stoma. TPC attached to the tracheostomy. Traci Tushar CHEST. No wheezes. No rales. No use of accessory muscles.   + Yall secretions from the tracheostomy tube. HEART. Regular rhythm. ABDOMEN. Soft. No tenderness elicited. Bowel sounds normal. Not distended EXTREMITIES. No  edema. NEUROLOGIC. Not interactive. SKELETAL. Contractures. Intake/Output this shift: 
Current Shift: No intake/output data recorded.  
Last 3 Shifts: 05/23 1901 - 05/25 0700 In: 374.1 [I.V.:374.1] Out: 0 Data Review:  
Recent Results (from the past 24 hour(s)) EKG, 12 LEAD, INITIAL Collection Time: 05/24/21  6:18 PM  
Result Value Ref Range Ventricular Rate 131 BPM  
 Atrial Rate 131 BPM  
 P-R Interval 114 ms QRS Duration 62 ms Q-T Interval 300 ms QTC Calculation (Bezet) 443 ms Calculated P Axis 60 degrees Calculated R Axis 83 degrees Calculated T Axis 8 degrees Diagnosis Sinus tachycardia with premature atrial complexes with aberrant conduction Otherwise normal ECG When compared with ECG of 18-APR-2021 09:55, 
aberrant conduction is now present CBC WITH AUTOMATED DIFF Collection Time: 05/24/21  6:34 PM  
Result Value Ref Range WBC 22.4 (H) 3.6 - 11.0 K/uL  
 RBC 3.54 (L) 3.80 - 5.20 M/uL  
 HGB 11.4 (L) 11.5 - 16.0 g/dL HCT 35.5 35.0 - 47.0 % .3 (H) 80.0 - 99.0 FL  
 MCH 32.2 26.0 - 34.0 PG  
 MCHC 32.1 30.0 - 36.5 g/dL  
 RDW 15.0 (H) 11.5 - 14.5 % PLATELET 899 949 - 292 K/uL MPV 11.4 8.9 - 12.9 FL  
 NRBC 0.0 0  WBC ABSOLUTE NRBC 0.00 0.00 - 0.01 K/uL NEUTROPHILS 85 (H) 32 - 75 % BAND NEUTROPHILS 3 0 - 6 % LYMPHOCYTES 8 (L) 12 - 49 % MONOCYTES 4 (L) 5 - 13 % EOSINOPHILS 0 0 - 7 % BASOPHILS 0 0 - 1 % IMMATURE GRANULOCYTES 0 %  
 ABS. NEUTROPHILS 19.7 (H) 1.8 - 8.0 K/UL  
 ABS. LYMPHOCYTES 1.8 0.8 - 3.5 K/UL  
 ABS. MONOCYTES 0.9 0.0 - 1.0 K/UL  
 ABS. EOSINOPHILS 0.0 0.0 - 0.4 K/UL  
 ABS. BASOPHILS 0.0 0.0 - 0.1 K/UL  
 ABS. IMM. GRANS. 0.0 K/UL  
 DF MANUAL    
 RBC COMMENTS NORMOCYTIC, NORMOCHROMIC    
TROPONIN I Collection Time: 05/24/21  6:34 PM  
Result Value Ref Range Troponin-I, Qt. <0.05 <0.05 ng/mL LACTIC ACID Collection Time: 05/24/21  6:35 PM  
Result Value Ref Range Lactic acid 3.0 (HH) 0.4 - 2.0 MMOL/L  
LACTIC ACID Collection Time: 05/24/21  9:20 PM  
Result Value Ref Range  Lactic acid 1.6 0.4 - 2.0 MMOL/L METABOLIC PANEL, COMPREHENSIVE Collection Time: 05/24/21 10:34 PM  
Result Value Ref Range Sodium 142 136 - 145 mmol/L Potassium 3.8 3.5 - 5.1 mmol/L Chloride 112 (H) 97 - 108 mmol/L  
 CO2 20 (L) 21 - 32 mmol/L Anion gap 10 5 - 15 mmol/L Glucose 156 (H) 65 - 100 mg/dL BUN 55 (H) 6 - 20 MG/DL Creatinine 1.42 (H) 0.55 - 1.02 MG/DL  
 BUN/Creatinine ratio 39 (H) 12 - 20 GFR est AA 47 (L) >60 ml/min/1.73m2 GFR est non-AA 39 (L) >60 ml/min/1.73m2 Calcium 7.5 (L) 8.5 - 10.1 MG/DL Bilirubin, total 0.2 0.2 - 1.0 MG/DL  
 ALT (SGPT) 203 (H) 12 - 78 U/L  
 AST (SGOT) 284 (H) 15 - 37 U/L Alk. phosphatase 118 (H) 45 - 117 U/L Protein, total 7.0 6.4 - 8.2 g/dL Albumin 2.0 (L) 3.5 - 5.0 g/dL Globulin 5.0 (H) 2.0 - 4.0 g/dL A-G Ratio 0.4 (L) 1.1 - 2.2 BLOOD GAS, ARTERIAL Collection Time: 05/24/21 10:50 PM  
Result Value Ref Range pH 7.46 (H) 7.35 - 7.45    
 PCO2 24 (L) 35 - 45 mmHg PO2 75 (L) 80 - 100 mmHg O2 SAT 96 92 - 97 % BICARBONATE 17 (L) 22 - 26 mmol/L  
 BASE DEFICIT 4.9 mmol/L  
 O2 METHOD ROOM AIR Sample source ARTERIAL    
 SITE RIGHT RADIAL DOMENICA'S TEST NOT APPLICABLE METABOLIC PANEL, COMPREHENSIVE Collection Time: 05/24/21 11:45 PM  
Result Value Ref Range Sodium 140 136 - 145 mmol/L Potassium 4.2 3.5 - 5.1 mmol/L Chloride 110 (H) 97 - 108 mmol/L  
 CO2 20 (L) 21 - 32 mmol/L Anion gap 10 5 - 15 mmol/L Glucose 133 (H) 65 - 100 mg/dL BUN 56 (H) 6 - 20 MG/DL Creatinine 1.56 (H) 0.55 - 1.02 MG/DL  
 BUN/Creatinine ratio 36 (H) 12 - 20 GFR est AA 43 (L) >60 ml/min/1.73m2 GFR est non-AA 35 (L) >60 ml/min/1.73m2 Calcium 7.8 (L) 8.5 - 10.1 MG/DL Bilirubin, total 0.2 0.2 - 1.0 MG/DL  
 ALT (SGPT) 212 (H) 12 - 78 U/L  
 AST (SGOT) 292 (H) 15 - 37 U/L Alk. phosphatase 120 (H) 45 - 117 U/L Protein, total 7.4 6.4 - 8.2 g/dL Albumin 2.0 (L) 3.5 - 5.0 g/dL Globulin 5.4 (H) 2.0 - 4.0 g/dL  A-G Ratio 0.4 (L) 1.1 - 2.2 PROCALCITONIN Collection Time: 05/24/21 11:45 PM  
Result Value Ref Range Procalcitonin 2.82 ng/mL D DIMER Collection Time: 05/24/21 11:45 PM  
Result Value Ref Range D-dimer 9.20 (H) 0.00 - 0.65 mg/L FEU  
SAMPLES BEING HELD Collection Time: 05/24/21 11:45 PM  
Result Value Ref Range SAMPLES BEING HELD 1SST COMMENT Add-on orders for these samples will be processed based on acceptable specimen integrity and analyte stability, which may vary by analyte. IMPRESSION.  Sepsis due to diarrhea with dehydration.  Tracheobronchitis. Chronic tracheostomy tube.  Down syndrome. Cognitively impaired. PLAN.  Intravenous fluids in accordance with vascular assessment for hypotension and perfusion. Ongoing assessment of volume status.  Pressors- Levophed. Vasopressin. Central venous access  Monitor electrolytes and replace as needed.  Maintain tracheostomy tube.  Bronchodilator therapy. Airway clearance measures.  Mechanical ventilation . Optimize oxygenation and ventilation/perfusion.  Antibiotic coverage for sepsis, though consideration is given to respiratory sources gastrointestinal as well as the possible problem of persistent Clostridium difficile. Tosha Salm  Venous prophylaxis.  Gastrointestinal prophylaxis.  Insulin regimen for glucose control. Fredy Choi. Brendon Champion MD,  FCCP, FAASM Pulmonary Medicine · Sleep Medicine · Critical Care Originating site:      hospital listed above Presenter:    ICU nurse for the shift,   
I performed all aspects of the physical examination via Telemedicine associated with two way audio and video communication and with the on-site assistance of  the presenter . I am located in [place], [place] and the patient is located in the facility noted. The patient is critically ill in the ICU. I personally  reviewed the pertinent medical records, laboratory/ pathology data and radiographic images. The decision making regarding this patient is as documented above, which was generated  following  discussion  with the multidisciplinary ICU team and creation of a treatment plan for  the patient. We discussed the patient's interval history and future coordination of care and  plans. The patient's medications  were reviewed and changes made as stipulated above. Due to  critical illness impairing one or more vital organs of this patient resulting in life threatening clinical situation  I have provided direct, frequent personal  assessment and manipulation in management plan. 60 minutes total critical care time; greater than 50% of the time was spent in direct patient care and coordination of care.

## 2021-05-26 NOTE — PROGRESS NOTES
0700- Bedside and Verbal shift change report given to Syl Brannon RN (oncoming nurse) by Logan Mena RN (offgoing nurse). Report included the following information Intake/Output, Recent Results and Cardiac Rhythm NSR. Primary Nurse Nii Mojica RN and Fili Page RN performed a dual skin assessment on this patient Impairment noted- see wound doc flow sheet 
 
0800- Assessment completed, see doc flowsheet. Patient turned and repositioned in bed, mouth care completed, and trach suctioned. Large, watery bowel movement cleaned up. 2706- Patient extremely agitated, RASS +2. RN attempted to reposition in bed, provided mouth care and trach suctioning. HR 120s to 130s and RR 50s-60s and patient appears uncomfortable. SpO2 remains at 95% or greater. 0375- Precedex restarted at 0.4 mcg/kg/hr d/t agitation. Dr. Kyle Campbell notified. Patient again repositioned in bed and stimuli decreased, still very agitated. 6578- Patient still very agitated, RASS +2. Precedex increased to 0.5 mcg/kg/hr. 1000- Patient repositioned supine in bed. Mouth care and trach care completed. Small amount of thick, yellow secretions suctioned from trach. 1004- Precedex increased to 0.6 mcg/kg/hr, patient still very agitated. RASS +2.  
1015- Family Medicine notified that patient restarted on Precedex. Also notified of Dr. Leonel Miranda recommendation of place port for access, they will discuss with team.  
 
1110- IDRs completed with team. Orders received to stop continuous fluids as patient is tolerating tube feeds. 1115- Precedex decreased to 0.5 mcg/kg/hr. RASS -1.  
1120- Spoke with Dr. Evelyne Hernandez via telehealth regarding line placement and possible palliative consult. Dr. Evelyne Hernandez to speak with Dr. Kyle Campbell regarding recs. 1200- Reassessment completed, see doc flowsheet. Trach suctioned scant amount of thick, yellow sputum and mouth care completed. Patient turned and repositioned, still very agitated.   
1215- Precedex increased to 0.6 mcg/kg/hr for agitation. 1241- Precedex increased to 0.7 mcg/kg/hr for agitation. 1400- Patient turned and repositioned in bed. Mouth care and trach care completed. RASS -1. 
1408- Precedex decreased to 0.6 mcg/kg/hr. 1428- Precedex decreased to 0.5mcg/kg/hr. 1528- Precedex decreased to 0.4 mcg/kg/hr. 1600- Reassessment completed, see doc flowsheet. Mouth care and trach care completed, patient turned and repositioned in bed. Tube feed bolus given, patient tolerated well. 1615- Precedex decreased to 0.3 mcg/kg/hr. 1800- Patient turned and repositioned in bed, mouth care and trach care completed. Thick, white secretions suctioned from trach. Inner cannula changed. Large bowel movement cleaned up and purewick replaced. Precedex decreased to 0.2 mcg/kg/hr. 1900- Bedside and Verbal shift change report given to Jewel Craig RN (oncoming nurse) by Guanakito Rivas RN (offgoing nurse).  Report included the following information SBAR, Kardex, Intake/Output and Cardiac Rhythm NSr.

## 2021-05-26 NOTE — PROGRESS NOTES
Critical Care Progress Note Date:2021       Room:[address] [place] [address] Patient Bridgette Arceo     YOB: 1970     Age:51 y.o. Subjective Subjective  
: Was placed back on Precedex given agitation. WBC is trending down. Febrile. Hemoglobin stable at 9. 9. Creatinine stable Review of Systems Unable to obtain given clinical condition Objective Vitals Last 24 Hours: TEMPERATURE:  Temp  Av °F (37.8 °C)  Min: 99 °F (37.2 °C)  Max: 100.9 °F (38.3 °C) RESPIRATIONS RANGE: Resp  Av  Min: 24  Max: 61 
PULSE OXIMETRY RANGE: SpO2  Av.1 %  Min: 66 %  Max: 100 % PULSE RANGE: Pulse  Av.6  Min: 78  Max: 127 BLOOD PRESSURE RANGE: Systolic (72IGC), NNF:55 , Min:83 , MEK:460  
; Diastolic (33GZH), GVH:67, Min:46, Max:90 I/O (24Hr): Intake/Output Summary (Last 24 hours) at 2021 1006 Last data filed at 2021 0800 Gross per 24 hour Intake 3279.4 ml Output 1400 ml Net 1879.4 ml I examined the patient via telemedicine, with its associated limitations. I reviewed the electronic medical record, the x-rays, labs, progress notes, previous history and physicals and consultation notes that were available in the electronic medical record. I beamed in and examined the patient with assistance of house staff On exam: 
 
Gen: Not  interactive HEENT:  Trach in place Chest: symmetrical chest rise CV:S1,S2 Abd: soft, non-distended Ext: no edema Neuro:Contractures Objective Labs/Imaging/Diagnostics Labs: CBC: 
Recent Labs  
  21 
0704 21 
1834 WBC 12.7* 15.0* 22.4*  
RBC 3.11* 3.37* 3.54* HGB 9.9* 10.7* 11.4* HCT 31.8* 33.9* 35.5 .3* 100.6* 100.3*  
RDW 14.4 14.8* 15.0*  
 270 307 CHEMISTRIES: 
Recent Labs  
  2125/21 
0704 21 
2345  139 140  
K 3.2* 4.2 4.2 * 110* 110* CO2 18* 19* 20* BUN 36* 50* 56*  
CA 8.5 8.6 7.8*  
PT/INR:No results for input(s): INR, INREXT in the last 72 hours. No lab exists for component: PROTIME APTT:No results for input(s): APTT in the last 72 hours. LIVER PROFILE: 
Recent Labs  
  05/26/21 
0419 05/25/21 
0704 05/24/21 
2345 * 222* 292* * 182* 212* Lab Results Component Value Date/Time ALT (SGPT) 126 (H) 05/26/2021 04:19 AM  
 AST (SGOT) 136 (H) 05/26/2021 04:19 AM  
 Alk. phosphatase 129 (H) 05/26/2021 04:19 AM  
 Bilirubin, direct 0.08 11/05/2020 11:23 AM  
 Bilirubin, total 0.3 05/26/2021 04:19 AM  
 
 
Imaging Last 24 Hours: 
DUPLEX LOWER EXT VENOUS BILAT Result Date: 5/25/2021 Bilateral lower extremity venous duplex negative for deep venous thrombosis or thrombophlebitis. NOTE: Unable to image bilateral popliteal veins due to patient positioning. Assessment//Plan Principal Problem: 
  Severe sepsis ([address] (4/16/2021) Active Problems: 
  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 Continue Keppra and phenytoin. Increase Ativan PRN and wean Precedex as able Continue midodrine. Continue to monitor blood pressure. Oxygen through trach collar. Monitor urine output and renal indices. Tube feed. Continue to monitor H&H.   
DC Levaquin. Continue Zosyn continue p.o. Vanco.  Follow culture. Monitor WBC and temperature curve. SQ heparin I performed all aspects of the physical examination via Telemedicine associated with two way audio and video communication and with the on-site assistance of  the bedside nurse. I am located in  [place] and the patient is located in [place] at [address] [employer]. The patient is critically ill in the ICU. I  personally  reviewed the pertinent medical records, laboratory/ pathology data and radiographic images. The decision making regarding this patient is as documented above, which was generated  following  discussion  with the multidisciplinary ICU team and creation of a treatment plan for  the patient. We discussed the patient's interval history and future coordination of care and  plans. The patient's medications  were reviewed and changes made as stipulated above. Due to  critical illness impairing one or more vital organs of this patient resulting in life threatening clinical situation  I have provided direct, frequent personal  assessment and manipulation in management plan and spent 35 minutes  of  Critical  care time excluding the time spent on procedures and teaching. Greater than 50% of this time  in patients care was  employed  in counseling and coordination of care and engaged in face to face discussion of case management issues, addressing questions, and outlining a plan of  therapy. Pt at risk of life threatening deterioration from agitation,sepsis Pt seen and evaluated via tele encounter. Audio and Video were used for this interaction.  
 
 
 
Electronically signed by Corina Urban MD on 5/26/2021 at 10:06 AM

## 2021-05-26 NOTE — PROGRESS NOTES
Case Management follow-up: 
 
RUR 27%-moderate readmission risk Problems: 
Severe sepsis with lactic acidosis Hypertensive urgency 
seizures 
agitation Tachycardia Interventions: 
precedex for acute agitation Wound care Recommendations: 
Attending,please consider Palliative Medicine consultation Please consider Resumption of home health with [facility] for skilled nursing,Pt does not meet criteria for home PT/OT Prior to admission disposition: 
Pt lives in  [facility] and the plan is for pt to return there when discharged. Primary caretaker is Jacqui Willis @ [phone]. Healthcare Decision Maker:  
  
          Primary Decision Maker (Active): Ja Sanchez - Mother - [phone] Pt requires assistance in all areas. At home,Pt only moves her head,her mouth and arms. Pt uses [facility]. Any new prescriptions will need to be faxed to the pharmacy @ [phone] . Once the scripts are faxed to the [facility] pharmacy delivers the medications to the adult home by the next day. 
  
Pt is open to [facility] so will need resumption orders when discharged. Preliminary clinicals faxed to [facility] When discharged,please call nursing report  to [phone]. When discharged,discharge summary and AVS will need to be faxed to [phone]. The number for nurse managerLeandra Katz @ the group [facility] is [phone]. Pt has received both of her Moderna vaccinations Pt.'s  trach supplies are through [facility] ([phone],extension [phone]. On last admission,pt did not qualify for home oxygen. 
  
DME @ [facility]:,suction machine,humidified air,trach supplies I am continuing to follow pt for discharge needs. Shar Arango CM Both rapid Covid and Covid by PCR are negative.

## 2021-05-26 NOTE — PROGRESS NOTES
Bedside and Verbal shift change report given to Naomi Montalvo RN (oncoming nurse) by Kelley Murphy RN (offgoing nurse). Report included the following information SBAR, Intake/Output, MAR, Recent Results, Cardiac Rhythm NSR, ST and Alarm Parameters . Primary Nurse Andres Miranda RN and Kelley Murphy RN performed a dual skin assessment on this patient Impairment noted- see wound doc flow sheet Rolf score is 10 
 
2000 Assessment, see flowsheets. Oral care and suction completed. Pt repositioned. Pt lines and drains traced and infusing/draining appropriately. Pt is baseline non-verbal and not interactive. Pt Tube Feed given per orders. Meds administered, see MAR. Precedex drip titrated down and turned off, see MAR. 
 
2100 Pt had a large, yellow BM. Pt care and cleanup performed. [address] Pt repositioned. Oral care and suction completed. Precedex drip restarted d/t pt agitation, see flowsheets. Pt caregiver at bedside and updated on pt condition and plan of care. 0000 Reassessment, see flowsheets. Pt repositioned. Oral care and suction completed. Tube Feed administered. Meds administered, see MAR.  
 
0200 Pt RR and HR were elevated even after suctioning/repositioned/cleaning the pt. Contacted [facility] and received orders for 1mg IV ativan. Precedex drip titrated up d/t pt agitation, see flowsheets. Pt repositioned. Oral care and suction completed. 0400 Reassessment, see flowsheets. Pt turned and repositioned. Oral care and suction completed. Meds administered, see MAR. Blood drawn, labs sent. 0600 BPs low, called Family medicine and discussed low Bps, concern for access, and pt condition. MD stated they will discuss with team and to continue with Midodrine for now. Pt responds to voice. Pt repositioned. Oral care and suction completed. Bedside and Verbal shift change report given to Herman Guillaume RN (oncoming nurse) by Naomi Montalvo RN (offgoing nurse).  Report included the following information SBAR, Intake/Output, MAR, Recent Results, Cardiac Rhythm NSR, ST and Alarm Parameters .

## 2021-05-26 NOTE — PROGRESS NOTES
30 [facility] [id number] 301 [facility] Resident Progress Note in Brief S: Patient seen and examined at bedside. Patient is nonverbal at baseline. Unable to obtain history. O: 
Visit Vitals /63 (BP 1 Location: Left upper arm, BP Patient Position: At rest) Pulse 62 Temp 99.2 °F (37.3 °C) Resp (!) 32 Ht 4' 9\" (1.448 m) Wt 124 lb 9 oz (56.5 kg) SpO2 96% BMI 26.95 kg/m² Physical Examination:  
General:   Alert, appears agitated, nonverbal   
Neck:  Head held sidebent to the left Lungs: Tachypneic, anterior lungs clear to auscultation Heart:   Tachycardic, regular rhythm, no murmur Abdomen:    Soft, moderately distended, hyperactive bowel sounds, no apparent tenderness on exam, G tube in LUQ Extremities:  Trace edema in BL feet and dorsum of BL hands, unna boots in place Skin:  Warm and dry Neurologic:  Nonverbal, appears agitated, nonverbal at baseline A/P:  
 
Jon Reynoso is a 49 y.o. female with a PMHx of Down Syndrome (non-verbal at baseline), s/p Trach and PEG tube placement, seizure disorder, encephalomalacia, ANNE, recent admission for HAP and C. diff colitis who is admitted for severe sepsis. Severe sepsis: - S/p Levaquin. Remains on Zosyn and PO Vanc - MIVF NS at 100 mL/hour 
- Has not required pressure support. Home midodrine 15 mg TID. - Precedex gtt for agitation. Scheduled Ativan 0.5 mg qhr per G tube. - Tube feeds resumed. NPO. Please see the primary team's daily progress note for the patient's full plan. Thea Anne DO Family Medicine Resident

## 2021-05-26 NOTE — PROGRESS NOTES
Problem: Risk for Spread of Infection Goal: Prevent transmission of infectious organism to others Description: Prevent the transmission of infectious organisms to other patients, staff members, and visitors. Outcome: Progressing Towards Goal 
  
Problem: Patient Education:  Go to Education Activity Goal: Patient/Family Education Outcome: Progressing Towards Goal 
  
Problem: Pressure Injury - Risk of 
Goal: *Prevention of pressure injury Description: Document Rolf Scale and appropriate interventions in the flowsheet. Outcome: Progressing Towards Goal 
Note: Pressure Injury Interventions: 
Sensory Interventions: Assess changes in LOC, Assess need for specialty bed, Avoid rigorous massage over bony prominences, Check visual cues for pain, Discuss PT/OT consult with provider, Float heels, Keep linens dry and wrinkle-free, Maintain/enhance activity level, Minimize linen layers, Monitor skin under medical devices, Pressure redistribution bed/mattress (bed type), Turn and reposition approx. every two hours (pillows and wedges if needed), Suspension boots Moisture Interventions: Absorbent underpads, Apply protective barrier, creams and emollients, Assess need for specialty bed, Internal/External urinary devices, Maintain skin hydration (lotion/cream), Minimize layers, Moisture barrier, Check for incontinence Q2 hours and as needed Activity Interventions: Assess need for specialty bed, Increase time out of bed, Pressure redistribution bed/mattress(bed type), PT/OT evaluation Mobility Interventions: Assess need for specialty bed, Float heels, HOB 30 degrees or less, Pressure redistribution bed/mattress (bed type), PT/OT evaluation, Turn and reposition approx. every two hours(pillow and wedges), Suspension boots Nutrition Interventions: Document food/fluid/supplement intake, Discuss nutritional consult with provider Friction and Shear Interventions: Apply protective barrier, creams and emollients, HOB 30 degrees or less, Lift sheet, Lift team/patient mobility team, Minimize layers, Transferring/repositioning devices, Foam dressings/transparent film/skin sealants Problem: Patient Education: Go to Patient Education Activity Goal: Patient/Family Education Outcome: Progressing Towards Goal 
  
Problem: Falls - Risk of 
Goal: *Absence of Falls Description: Document Nayla Dougherty Fall Risk and appropriate interventions in the flowsheet. Outcome: Progressing Towards Goal 
Note: Fall Risk Interventions: 
  
 
Mentation Interventions: Adequate sleep, hydration, pain control, Bed/chair exit alarm, Door open when patient unattended, Evaluate medications/consider consulting pharmacy, Increase mobility, More frequent rounding, Room close to nurse's station Medication Interventions: Bed/chair exit alarm, Evaluate medications/consider consulting pharmacy, Patient to call before getting OOB, Teach patient to arise slowly Elimination Interventions: Bed/chair exit alarm, Call light in reach, Patient to call for help with toileting needs, Toileting schedule/hourly rounds Problem: Patient Education: Go to Patient Education Activity Goal: Patient/Family Education Outcome: Progressing Towards Goal 
  
Problem: Nutrition Deficit Goal: *Optimize nutritional status Outcome: Progressing Towards Goal 
  
Problem: Patient Education: Go to Patient Education Activity Goal: Patient/Family Education Outcome: Progressing Towards Goal 
  
Problem: Diarrhea (Adult and Pediatrics) Goal: *Absence of diarrhea Outcome: Progressing Towards Goal 
Goal: *PALLIATIVE CARE:  Absence of diarrhea Outcome: Progressing Towards Goal 
  
Problem: Patient Education: Go to Patient Education Activity Goal: Patient/Family Education Outcome: Progressing Towards Goal

## 2021-05-26 NOTE — PROGRESS NOTES
[address] [facility] PROGRAM 
PROGRESS NOTE  
 
5/26/2021 PCP: Lily Madison NP Assessment/Plan:  
 
Hermann Wolfe is a 49 y.o. female with a PMHx of Down Syndrome (non-verbal at baseline), s/p Trach and PEG tube, seizures, encephalomalacia, ANNE, recent admission for HAP and C. diff colitis who is admitted for severe sepsis. Overnight events: No acute events overnight.  
  
Severe sepsis with LA: 4/4 SIRS POA. LA 3.0 > 1.6. Procal 2.82. Possibly 2/2 C diff vs HAP. Recently admitted for HAP, treated for C. diff, also has L scalp wound. CT chest/abd/pelvis with minimal scattered groundglass lung attenuation, decreased compared to April 2021 and small amount of debris in RLL bronchioles; no acute abnormality in abd/pelvis. - Currently on Levaquin IV, Zosyn IV, oral Vanc.  
- Will talk to Intensivist about discontinuing Levaquin, does not need double coverage for Pseudomonas given that she is out of the window for HAP. Will also discuss discontinuing Zosyn, with the only reason to keep it on board being the concern for Asp PNA w/ debris in RLL bronchioles seen on CT. 
- Will continue to treat for C diff w/ Vanc for 7d vs 21d. Per group home, treatment was stopped on 5/17, but she continued to have diarrhea and loose malodorous BMs, concerning for C diff that was not fully treated - BCx NGTD 
- UA, UCx pending - WCx pending - PNA labs pending - Stool studies pending - Wound care consulted - Pure Wick in place - MIVF NS at 100 mL/ hour - Tylenol PRN Elevated D-dimer: 9.2 POA. CT chest without PE. Possibly reactive 2/2 inflammation/infection. Has trace edema in BLLE which is at baseline.  
- BLLE Dopplers Negative Chronic Hypotension: has required pressors in the past, presented with Hypertensive Urgency on this admission, but is now starting to have MAPs in the high 50s to low 60s.  
- Restarted home midodrine 15mg TID yesterday, will continue to monitor and treat as needed 
  
Hypertensive urgency: improved. BP up to 257/168 with HR >120 POA. BP improved to 99/71. Seizures:  
- Continue home Keppra 750 mg q12h and phenytoin 150 mg TID per G tube - Keppra level pending - Phenytoin level 7.6 (low), reached out to Neuro for dose adjustment. Will adjust after Free Phenytoin level comes back 
  
Trach dependent: since 2020 
- Set to room air currently - Home Duonebs q4h PRN 
- Glycopyrrolate for increased secretions  
  
GERD:  
- Hold protonix, while treated with PO vanco  
  
CKD3:  
- Monitor labs, avoid nephrotoxic agents if possible, IVF 
  
Chronic lower extremity edema: 
- Hold hold torsemide 40 mg daily  
  ? Chronic pain:  
- Hold home Oxy 5 mg q6h, until medications verified with group home, discontinued at group home 
  
Agitation: 
- Continue home lorazepam 0.5 mg q6h PRN  
- Precedex turned off 
  
FEN/GI - Tube feeds, nutrition consulted. NS at 100 mL/hr. Activity - Bedrest 
DVT prophylaxis - Sub Q Heparin GI prophylaxis - Not indicated at this time Fall prophylaxis - Not indicated at this time. Disposition - Plan to d/c to Assisted Living. Consulting PT/OT/CM Code Status - Full, per previous notes Jon Baas discussed with Dr. Lul Houston. Subjective: Pt was seen and examined at bedside. Patient nonverbal at baseline. Unable to obtain history. Objective:  
Physical examination Patient Vitals for the past 24 hrs: 
 Temp Pulse Resp BP SpO2  
05/26/21 0512     99 % 05/26/21 0423  (!) 122 (!) 46 127/81 98 % 05/26/21 0400 99.8 °F (37.7 °C) 86 26 (!) 87/49 100 % 05/26/21 0354  86 29 (!) 89/50 100 % 05/26/21 0314  86 27 (!) 83/46 100 % 05/26/21 0300  91 24  98 % 05/26/21 0200  (!) 112 (!) 33 (!) 92/58 97 % 05/26/21 0100  (!) 116 (!) 45  (!) 77 % 05/26/21 0051     100 % 05/26/21 0000 99.9 °F (37.7 °C) 78 28 (!) 90/52 100 % 05/25/21 2317  91     
05/25/21 2313  86 29 (!) 90/50 99 % 05/25/21 2300  87 (!) 31 (!) 89/48 100 % 21 2251  92 (!) 39 (!) 90/55 100 % 21 2236  89  (!) 86/47   
21 2200  89 28 (!) 86/47 96 % 21 2100  (!) 111 (!) 34 (!) 92/58 95 % 21 (!) 100.8 °F (38.2 °C) (!) 115 (!) 58 (!) 129/90 100 % 21  (!) 116 (!) 42  100 % 21 195  (!) 118     
21 1900  (!) 111 (!) 35  100 % 21 1800  (!) 111 (!) 54 (!) 92/58   
21 1700  96 (!) 35 102/83 95 % 21 1600 99 °F (37.2 °C) (!) 105 (!) 45 111/64 97 % 21 1459  (!) 106     
21 1400  (!) 112 (!) 39 133/89 98 % 21 1200 99.2 °F (37.3 °C) 86 25 101/65 98 % 21 1000  93 (!) 34 133/82 99 % 21 0900  97 (!) 35 (!) 89/72 99 % 21 0800 99.6 °F (37.6 °C) 90 (!) 35 126/75 98 % 21 0701  (!) 105     
21 0700  (!) 105 (!) 49  93 % 21 0600  (!) 114 (!) 37 99/63 100 % Temp (24hrs), Av.7 °F (37.6 °C), Min:99 °F (37.2 °C), Max:100.8 °F (38.2 °C) O2 Flow Rate (L/min): 9 l/min O2 Device: T-tube Date 21 07 - 21 6614 21 07 - 21 4087 Shift 5726-7917 4045-5777 24 Hour Total 2218-3004 1437-1462 24 Hour Total  
INTAKE  
I.V.(mL/kg/hr) 917.3(1.4) 1200 2117.3 Precedex Volume 25.6  25.6 Volume (0.9% sodium chloride infusion) 891.7 1200 2091.7 NG/GT 30 1060 1090 Water Flush Volume (mL) (PEG/Gastrostomy Tube 21)  210 210 Medication Volume (PEG/Gastrostomy Tube 21) 30 80 110 Intake (ml) (PEG/Gastrostomy Tube 21)  770 770 Shift Total(mL/kg) 947. 3(16.8) 8556(55) 3207. 3(56.8) OUTPUT Urine(mL/kg/hr) 1250(1.8) 400 1650 Urine Output (mL) (External Urinary Catheter 21) 5699 774 4676 Emesis/NG output  0 0 Output (ml) (PEG/Gastrostomy Tube 21)  0 0 Stool Stool Occurrence(s)  2 x 2 x Shift Total(mL/kg) 8526(42.8) 400(7.1) [id number]) NET -302.7 1860 1557.3 Weight (kg) 56.5 56.5 56.5 56.5 56.5 56.5 General:   Initially sleeping but awakens on exam, ill-appearing, nonverbal at bedside Neck:  Head held sidebent to the left Lungs:   Currently receiving breathing treatment via trach, scattered coarse breath sounds anteriorly, tachypneic Heart:   Mildly tachycardic, regular rhythm, no murmur Abdomen:    Soft, moderately distended, no apparent tenderness on exam, G tube in LUQ Extremities:  Trace edema in BL feet/ankles, unna boots in place Skin:  Warm and dry, wound on left scalp Neurologic:  Alert, nonverbal at baseline Data Review: CBC: 
Recent Labs  
  05/26/21 0419 05/25/21 
0704 05/24/21 
1834 WBC 12.7* 15.0* 22.4* HGB 9.9* 10.7* 11.4* HCT 31.8* 33.9* 35.5  270 307 Metabolic Panel: 
Recent Labs  
  05/26/21 0419 05/25/21 
0704 05/24/21 
2345  139 140  
K 3.2* 4.2 4.2 * 110* 110* CO2 18* 19* 20* BUN 36* 50* 56* CREA 1.37* 1.54* 1.56* * 107* 133* CA 8.5 8.6 7.8* ALB 2.1* 2.0* 2.0*  
TBILI 0.3 0.3 0.2 * 182* 212* Micro: 
Lab Results Component Value Date/Time Culture result: NO GROWTH 2 DAYS 05/24/2021 07:00 PM  
 Culture result: NO GROWTH 2 DAYS 05/24/2021 06:35 PM  
 Culture result: SCANT PSEUDOMONAS AERUGINOSA (A) 04/16/2021 06:45 AM  
 Culture result: NO NORMAL RESPIRATORY MARINA ISOLATED 04/16/2021 06:45 AM  
 
Imaging: XR CHEST PORT Result Date: 5/24/2021 INDICATION:  Eval for Infiltrate EXAM: Chest single view. COMPARISON: 4/16/2021. FINDINGS: A single frontal view of the chest at 1906 hours shows persistent but improved diffuse parenchymal infiltrate. Tracheostomy device is stable over the airway. .  The heart, mediastinum and pulmonary vasculature are stable . The bony thorax is unremarkable for age. .  
 
Persistent but improved bilateral lung infiltrates. .  . Medications reviewed Current Facility-Administered Medications Medication Dose Route Frequency  dexmedeTOMidine in 0.9 % NaCl (PRECEDEX) 400 mcg/100 mL (4 mcg/mL) infusion soln  0.1-1.5 mcg/kg/hr IntraVENous TITRATE  diazePAM (VALIUM) 5-7.5-10 mg rectal kit 10 mg (Patient Supplied)  10 mg Rectal PRN  
 0.9% sodium chloride infusion  100 mL/hr IntraVENous CONTINUOUS  
 mupirocin (BACTROBAN) 2 % ointment   Topical BID  midodrine (PROAMATINE) tablet 15 mg  15 mg Per G Tube Q8H  
 sodium chloride (NS) flush 5-10 mL  5-10 mL IntraVENous PRN  piperacillin-tazobactam (ZOSYN) 3.375 g in 0.9% sodium chloride (MBP/ADV) 100 mL MBP  3.375 g IntraVENous Q8H  
 levoFLOXacin (LEVAQUIN) 750 mg in D5W IVPB  750 mg IntraVENous Q24H  
 vancomycin (FIRVANQ) 50 mg/mL oral solution 500 mg  500 mg Oral Q6H  
 sodium chloride (NS) flush 5-40 mL  5-40 mL IntraVENous Q8H  
 sodium chloride (NS) flush 5-40 mL  5-40 mL IntraVENous PRN  
 acetaminophen (TYLENOL) tablet 650 mg  650 mg Oral Q6H PRN Or  
 acetaminophen (TYLENOL) suppository 650 mg  650 mg Rectal Q6H PRN  polyethylene glycol (MIRALAX) packet 17 g  17 g Oral DAILY PRN  
 heparin (porcine) injection 5,000 Units  5,000 Units SubCUTAneous Q8H  
 albuterol-ipratropium (DUO-NEB) 2.5 MG-0.5 MG/3 ML  3 mL Nebulization Q4H PRN  
 levETIRAcetam (KEPPRA) oral solution 750 mg  750 mg Per G Tube Q12H  
 LORazepam (ATIVAN) tablet 0.5 mg  0.5 mg Per G Tube Q6H PRN  phenytoin (DILANTIN) chewable tablet 150 mg  150 mg Per G Tube TID  [Held by provider] torsemide (DEMADEX) tablet 40 mg  40 mg Per G Tube DAILY  glycopyrrolate (ROBINUL) tablet 1 mg  1 mg Per G Tube TID PRN Signed: 
 Jaja Armenta DO Family Medicine Resident Attending note: Attending note to follow. ..

## 2021-05-26 NOTE — PROGRESS NOTES
[address] [facility] PROGRAM 
PROGRESS NOTE  
 
5/26/2021 PCP: Altagracia Ambrocio NP Assessment/Plan:  
 
Wilfredo Basurto is a 49 y.o. female with a PMHx of Down Syndrome (non-verbal at baseline), s/p Trach and PEG tube, seizures, encephalomalacia, ANNE, recent admission for HAP and C. diff colitis who is admitted for severe sepsis. Overnight events: MAPs on the 50s. Difficult to get IV access. S/p Midodrine 15 mg x1 
  
Severe sepsis with LA: 4/4 SIRS POA. LA 3.0 > 1.6. Procal 2.82. Possibly 2/2 C diff vs HAP. Recently admitted for HAP, treated for C. diff, also has L scalp wound. CT chest/abd/pelvis with minimal scattered groundglass lung attenuation, decreased compared to April 2021 and small amount of debris in RLL bronchioles; no acute abnormality in abd/pelvis. S/p Levaquin. 
- Continue Zosyn IV. Continue oral Vanc for total of 21 day course. - BCx NG 2 days - UCx to be collected - Stool studies pending - Wound care consulted - Tylenol PRN Elevated D-dimer: 9.2 POA. CT chest without PE. Possibly reactive 2/2 inflammation/infection. BLLE Dopplers Negative. Worsening Chronic Hypotension: has required pressors in the past, presented with Hypertensive Urgency on this admission, but is now starting to have MAPs in the high 50s to low 60s. - Continue home Midodrine 15mg TID  
- Will consider PORT placement for pressor Seizures:  
- Continue home Keppra 750 mg q12h and phenytoin 150 mg TID per G tube - Keppra level pending - Phenytoin level 7.6 (low), reached out to Neuro for dose adjustment. Will adjust after Free Phenytoin level comes back 
  
Trach dependent: since 2020 
- Home Duonebs q4h PRN 
- Glycopyrrolate for increased secretions  
  
GERD:  
- Hold protonix, while treated with PO vanco  
  
CKD3:  
- Monitor labs, avoid nephrotoxic agents if possible, IVF 
  
Chronic lower extremity edema: 
- Hold hold torsemide 40 mg daily  
  ? Chronic pain:  
- Hold home Oxy 5 mg q6h, until medications verified with group home, discontinued at group home 
  
Agitation: 
- Continue home lorazepam 0.5 mg q6h PRN  
- Precedex ggt 
  
FEN/GI - Tube feeds, nutrition consulted. NS at 100 mL/hr. Activity - Bedrest 
DVT prophylaxis - Sub Q Heparin GI prophylaxis - Not indicated at this time Fall prophylaxis - Not indicated at this time. Disposition - Plan to d/c to Assisted Living. Consulting PT/OT/CM Code Status - Full, per previous notes Ehsan Carcamo discussed with Dr. Lv Iyer. Subjective: Pt was seen and examined at bedside. Patient nonverbal at baseline. Unable to obtain history. Objective:  
Physical examination Patient Vitals for the past 24 hrs: 
 Temp Pulse Resp BP SpO2  
05/26/21 1000  93 (!) 44 97/61 97 % 05/26/21 0957 (!) 100.9 °F (38.3 °C)      
05/26/21 0930  (!) 123 (!) 61  97 % 05/26/21 0905  85 (!) 36 101/63   
05/26/21 0800 100.3 °F (37.9 °C) 96 (!) 36 (!) 88/50 100 % 05/26/21 0700  97     
05/26/21 0600  (!) 127 (!) 57 118/85 (!) 88 % 05/26/21 0512     99 % 05/26/21 0500  (!) 123 (!) 46 112/88 (!) 66 %  
05/26/21 0423  (!) 122 (!) 46 127/81 98 % 05/26/21 0400 99.8 °F (37.7 °C) 86 26 (!) 87/49 100 % 05/26/21 0354  86 29 (!) 89/50 100 % 05/26/21 0314  86 27 (!) 83/46 100 % 05/26/21 0300  91 24  98 % 05/26/21 0200  (!) 112 (!) 33 (!) 92/58 97 % 05/26/21 0100  (!) 116 (!) 45  (!) 77 % 05/26/21 0051     100 % 05/26/21 0000 99.9 °F (37.7 °C) 78 28 (!) 90/52 100 % 05/25/21 2317  91     
05/25/21 2313  86 29 (!) 90/50 99 % 05/25/21 2300  87 (!) 31 (!) 89/48 100 % 05/25/21 2251  92 (!) 39 (!) 90/55 100 % 05/25/21 2236  89  (!) 86/47   
05/25/21 2200  89 28 (!) 86/47 96 % 05/25/21 2100  (!) 111 (!) 34 (!) 92/58 95 % 05/25/21 2002 (!) 100.8 °F (38.2 °C) (!) 115 (!) 58 (!) 129/90 100 % 05/25/21 2000  (!) 116 (!) 42  100 % 05/25/21 1958  (!) [address] 21 1900  (!) 111 (!) 35  100 % 21 1800  (!) 111 (!) 54 (!) 92/58   
21 1700  96 (!) 35 102/83 95 % 21 1600 99 °F (37.2 °C) (!) 105 (!) 45 111/64 97 % 21 1459  (!) 106     
21 1400  (!) 112 (!) 39 133/89 98 % Temp (24hrs), Av.1 °F (37.8 °C), Min:99 °F (37.2 °C), Max:100.9 °F (38.3 °C) O2 Flow Rate (L/min): 9 l/min O2 Device: T-tube, Humidifier Date 21 - 21 39121 07 - 21 9696 Shift 6848-5491 5788-9844 24 Hour Total 3029-8180 1011-8113 24 Hour Total  
INTAKE  
I.V.(mL/kg/hr) 917.3(1.4) 1400(2.1) 2317.3(1.7) 417.5  417.5 Precedex Volume 25.6  25.6 17.5  17.5 Volume (0.9% sodium chloride infusion) 891.7 1400 2291.7 400  400 NG/GT 30 1060 1090 525  525 Water Flush Volume (mL) (PEG/Gastrostomy Tube 21)  210 210 150  150 Medication Volume (PEG/Gastrostomy Tube 21) 30 80 110 60  60 Intake (ml) (PEG/Gastrostomy Tube 21)  770 770 315  315 Shift Total(mL/kg) 947. 3(16.8) 6141(16.5) 3407. 3(60.3) 942. 5(16.7)  942. 5(16.7) OUTPUT Urine(mL/kg/hr) 1250(1.8) 550(0.8) 1800(1.3) 100  100 Urine Occurrence(s)    1 x  1 x Urine Output (mL) (External Urinary Catheter 21) 9016 241 1506 100  100 Emesis/NG output  0 0 0  0 Output (ml) (PEG/Gastrostomy Tube 21)  0 0 0  0 Stool Stool Occurrence(s)  2 x 2 x 1 x  1 x Shift Total(mL/kg) 1250(22.1) 550(9.7) 1800(31.9) 100(1.8)  100(1.8) NET -302.7 1910 1607.3 842.5  842.5 Weight (kg) 56.5 56.5 56.5 56.5 56.5 56.5 General:   Ill-appearing, nonverbal at baseline Neck:  No deformities Lungs:   Scattered coarse breath sounds anteriorly Heart:   Regular rhythm, no murmur Abdomen:    Soft, moderately distended, no apparent tenderness on exam, G tube in LUQ Extremities:  1+ edema in BL hands. Trace edema in BL feet/ankles, unna boots in place Skin:  Warm and dry, wound on left scalp Neurologic:  Alert, nonverbal at baseline Data Review: CBC: 
Recent Labs  
  05/26/21 
0419 05/25/21 
0704 05/24/21 
1834 WBC 12.7* 15.0* 22.4* HGB 9.9* 10.7* 11.4* HCT 31.8* 33.9* 35.5  270 307 Metabolic Panel: 
Recent Labs  
  05/26/21 
0419 05/25/21 
0704 05/24/21 
2345  139 140  
K 3.2* 4.2 4.2 * 110* 110* CO2 18* 19* 20* BUN 36* 50* 56* CREA 1.37* 1.54* 1.56* * 107* 133* CA 8.5 8.6 7.8* ALB 2.1* 2.0* 2.0*  
TBILI 0.3 0.3 0.2 * 182* 212* Micro: 
Lab Results Component Value Date/Time Culture result: NO GROWTH 2 DAYS 05/24/2021 07:00 PM  
 Culture result: NO GROWTH 2 DAYS 05/24/2021 06:35 PM  
 Culture result: SCANT PSEUDOMONAS AERUGINOSA (A) 04/16/2021 06:45 AM  
 Culture result: NO NORMAL RESPIRATORY MARINA ISOLATED 04/16/2021 06:45 AM  
 
Imaging: XR CHEST PORT Result Date: 5/24/2021 INDICATION:  Eval for Infiltrate EXAM: Chest single view. COMPARISON: 4/16/2021. FINDINGS: A single frontal view of the chest at 1906 hours shows persistent but improved diffuse parenchymal infiltrate. Tracheostomy device is stable over the airway. .  The heart, mediastinum and pulmonary vasculature are stable . The bony thorax is unremarkable for age. .  
 
Persistent but improved bilateral lung infiltrates. .  . Medications reviewed Current Facility-Administered Medications Medication Dose Route Frequency  potassium bicarb-citric acid (EFFER-K) tablet 20 mEq  20 mEq Per G Tube DAILY  LORazepam (ATIVAN) tablet 0.5 mg  0.5 mg Per G Tube Q2H  
 lansoprazole (PREVACID) 3 mg/mL oral suspension 30 mg  30 mg Per G Tube BID  piperacillin-tazobactam (ZOSYN) 3.375 g in 0.9% sodium chloride (MBP/ADV) 100 mL MBP  3.375 g IntraVENous Q8H  
 dexmedeTOMidine in 0.9 % NaCl (PRECEDEX) 400 mcg/100 mL (4 mcg/mL) infusion soln  0.1-1.5 mcg/kg/hr IntraVENous TITRATE  diazePAM (VALIUM) 5-7.5-10 mg rectal kit 10 mg (Patient Supplied)  10 mg Rectal PRN  
 0.9% sodium chloride infusion  100 mL/hr IntraVENous CONTINUOUS  
 mupirocin (BACTROBAN) 2 % ointment   Topical BID  midodrine (PROAMATINE) tablet 15 mg  15 mg Per G Tube Q8H  
 sodium chloride (NS) flush 5-10 mL  5-10 mL IntraVENous PRN  
 vancomycin (FIRVANQ) 50 mg/mL oral solution 500 mg  500 mg Oral Q6H  
 sodium chloride (NS) flush 5-40 mL  5-40 mL IntraVENous Q8H  
 sodium chloride (NS) flush 5-40 mL  5-40 mL IntraVENous PRN  
 acetaminophen (TYLENOL) tablet 650 mg  650 mg Oral Q6H PRN Or  
 acetaminophen (TYLENOL) suppository 650 mg  650 mg Rectal Q6H PRN  polyethylene glycol (MIRALAX) packet 17 g  17 g Oral DAILY PRN  
 heparin (porcine) injection 5,000 Units  5,000 Units SubCUTAneous Q8H  
 albuterol-ipratropium (DUO-NEB) 2.5 MG-0.5 MG/3 ML  3 mL Nebulization Q4H PRN  
 levETIRAcetam (KEPPRA) oral solution 750 mg  750 mg Per G Tube Q12H  phenytoin (DILANTIN) chewable tablet 150 mg  150 mg Per G Tube TID  [Held by provider] torsemide (DEMADEX) tablet 40 mg  40 mg Per G Tube DAILY  glycopyrrolate (ROBINUL) tablet 1 mg  1 mg Per G Tube TID PRN Signed: 
 Tessie Borden MD 
Family Medicine Resident Attending note: Attending note to follow. ..

## 2021-05-27 NOTE — PROGRESS NOTES
[id number] PROGRAM 
PROGRESS NOTE  
 
5/28/2021 PCP: Ramon Yan NP Assessment/Plan:  
 
Alee Lauren is a 49 y.o. female with a PMHx of Down Syndrome (non-verbal at baseline), s/p Trach and PEG tube, seizures, encephalomalacia, ANNE, recent admission for HAP and C. diff colitis who is admitted for severe sepsis. Overnight events: None  
  
Severe sepsis with LA: 4/4 SIRS POA. LA 3.0 > 1.6. Procal 2.82. Possibly 2/2 C diff vs HAP. Recently admitted for HAP, treated for C. diff, also has L scalp wound. CT chest/abd/pelvis with minimal scattered groundglass lung attenuation, decreased compared to April 2021 and small amount of debris in RLL bronchioles; no acute abnormality in abd/pelvis. CXR on 5/27: Increase interstitial and airspace infiltrate in the left lung c/w worsening PNA. S/p Levaquin. Stool studies neg. BCx NG 3 days - Continue Zosyn IV (5/26) - Continue oral Vancomycin 500 mg q6h for total of 21 day course. (5/24) - Wound care following - Palliative following  
- Hospice consulted - WBC stool in process Elevated D-dimer: 9.2 POA. CT chest without PE. Possibly reactive 2/2 inflammation/infection. BLLE Dopplers Negative. Worsening Chronic Hypotension: has required pressors in the past, presented with Hypertensive Urgency on this admission, but is now starting to have MAPs in the high 50s to low 60s. - Continue home Midodrine 15mg TID Seizures:  
- Continue home Keppra 750 mg q12h and phenytoin 150 mg TID per G tube - Keppra level pending - Phenytoin level 7.6 (low), Free Phenytoin level low.  
  
Trach dependent: since 2020 
- Home Duonebs q4h PRN  
- Glycopyrrolate for increased secretions  
  
GERD:  
- Hold protonix, while treated with PO vanco  
  
CKD3:  
- Monitor labs, avoid nephrotoxic agents if possible, IVF 
  
Chronic lower extremity edema: 
- Hold hold torsemide 40 mg daily  
  ? Chronic pain:  
- Hold home Oxy 5 mg q6h, until medications verified with group home, discontinued at group home 
  
Agitation: 
- Continue home lorazepam 0.5 mg q6h PRN  
- Precedex ggt off 
  
FEN/GI - Tube feeds, nutrition consulted. NS at 100 mL/hr. Activity - Bedrest 
DVT prophylaxis - Sub Q Heparin GI prophylaxis - Not indicated at this time Fall prophylaxis - Not indicated at this time. Disposition - Plan to d/c to Assisted Living. Consulting PT/OT/CM Code Status - Partial code no CPR and no shock. Lior Mai discussed with Dr. Anat Cervantes MD.  
 
Subjective: Pt was seen and examined at bedside. Patient nonverbal at baseline. Unable to obtain history. Objective:  
Physical examination Patient Vitals for the past 24 hrs: 
 Temp Pulse Resp BP SpO2  
21 0716     100 % 21 0600  99 (!) 38  100 % 21 0500  63 28 91/61 98 % 21 0400  (!) 59 26 (!) 80/58 98 % 21 0300  62 26 (!) 94/57 98 % 21 0200  64 26 (!) 86/68 99 % 21 0100  64 26 92/63 99 % 21 0000 98.2 °F (36.8 °C) 71 26 (!) 87/54 99 % 21 2300  73 28 (!) 80/51   
21 2200  80 18 (!) 78/48 100 % 21 2152  77     
21 2100  (!) 106 (!) 47 (!) 143/108 (!) 89 % 21 2000 98.5 °F (36.9 °C) 84 (!) 36 (!) 95/51 97 % 21 1930  80 (!) 31 (!) 80/47 95 % 21 1900  77 (!) 32 (!) 73/49 98 % 21 1800  82 (!) 34 (!) 80/43 100 % 21 1700  (!) 112 (!) 48 115/87 100 % 21 1600 98.1 °F (36.7 °C) 69 (!) 35 122/89 96 % 21 1501  66     
21 1500  66 (!) 33 100/67 100 % 21 1400  70 (!) 33 (!) 90/55 100 % 21 1300  78 (!) 33 (!) 77/40 100 % 21 1200 97.9 °F (36.6 °C) (!) 117 (!) 52 (!) 89/42 99 % 21 1100  78 (!) 38 (!) 76/40 98 % 21 1000  (!) 113 (!) 55 118/78   
21 0900    96/63  Temp (24hrs), Av.2 °F (36.8 °C), Min:97.9 °F (36.6 °C), Max:98.5 °F (36.9 °C) O2 Flow Rate (L/min): 9 l/min O2 Device: None (Room air) Date 05/27/21 0700 - 05/28/21 0811 05/28/21 0700 - 05/29/21 4880 Shift 0700-1859 1900-0659 24 Hour Total 5778-2553 0805-0351 24 Hour Total  
INTAKE  
I.V.(mL/kg/hr) 406.7(0.5) 950(1.3) 1356.7(0.9) Volume (sodium chloride 0.9 % bolus infusion 500 mL) 0  0 Volume (lactated Ringers infusion) 6.7 800 806.7 Volume (piperacillin-tazobactam (ZOSYN) 3.375 g in 0.9% sodium chloride (MBP/ADV) 100 mL MBP) 300 100 400 Volume (albumin human 25% (BUMINATE) solution 12.5 g) 100 50 150 NG/ 490 840 Water Flush Volume (mL) (PEG/Gastrostomy Tube 05/25/21) 150 150 300 Medication Volume (PEG/Gastrostomy Tube 05/25/21) 200 100 300 Intake (ml) (PEG/Gastrostomy Tube 05/25/21)  240 240 Shift Total(mL/kg) 756. 7(12.2) [id number]) 2246.0(08.7) OUTPUT Urine(mL/kg/hr) 500(0.7) 700(0.9) 1200(0.8) Urine Occurrence(s) 2 x  2 x Urine Output (mL) (External Urinary Catheter 05/25/21)  Emesis/NG output  0 0 Output (ml) (PEG/Gastrostomy Tube 05/25/21)  0 0 Stool Stool Occurrence(s) 2 x  2 x Shift Total(mL/kg) 500(8.1) 700(11.3) 1200(19.4) .7 740 996.7 Weight (kg) 62 62 62 62 62 62 General:   Ill-appearing, nonverbal at baseline Neck:  No deformities Lungs:   Scattered coarse breath sounds anteriorly Heart:   Regular rhythm, no murmur Abdomen:    Soft, moderately distended, no apparent tenderness on exam, G tube in LUQ Extremities:  1+ edema in BL hands. Trace edema in BL feet/ankles, unna boots in place Skin:  Warm and dry, wound on left scalp Neurologic:  Alert, nonverbal at baseline. Open eyes to voice. Data Review: CBC: 
Recent Labs  
  05/28/21 
0522 05/27/21 
0405 05/26/21 
0419 WBC 7.2 10.3 12.7* HGB 8.8* 8.4* 9.9*  
HCT 28.5* 26.3* 31.8*  
 259 278 Metabolic Panel: 
Recent Labs  
  05/28/21 
0522 05/27/21 3381 05/26/21 
5956 * 147* 145  
K 3.5 3.5 3.2*  
* 121* 116* CO2 21 18* 18*  
BUN 21* 29* 36* CREA 0.80 0.98 1.37* * 135* 103* CA 8.0* 7.7* 8.5 ALB 2.0* 1.7* 2.1* TBILI 0.1* 0.1* 0.3 ALT 48 78 126* Micro: 
Lab Results Component Value Date/Time Culture result: NO GROWTH 4 DAYS 05/24/2021 07:00 PM  
 Culture result: NO GROWTH 4 DAYS 05/24/2021 06:35 PM  
 Culture result: SCANT PSEUDOMONAS AERUGINOSA (A) 04/16/2021 06:45 AM  
 Culture result: NO NORMAL RESPIRATORY MARINA ISOLATED 04/16/2021 06:45 AM  
 
Imaging: XR CHEST PORT Result Date: 5/24/2021 INDICATION:  Eval for Infiltrate EXAM: Chest single view. COMPARISON: 4/16/2021. FINDINGS: A single frontal view of the chest at 1906 hours shows persistent but improved diffuse parenchymal infiltrate. Tracheostomy device is stable over the airway. .  The heart, mediastinum and pulmonary vasculature are stable . The bony thorax is unremarkable for age. .  
 
Persistent but improved bilateral lung infiltrates. .  . Medications reviewed Current Facility-Administered Medications Medication Dose Route Frequency  morphine injection 1 mg  1 mg IntraVENous Q4H PRN  
 LORazepam (ATIVAN) injection 1 mg  1 mg IntraVENous Q4H PRN  
 oxyCODONE IR (ROXICODONE) tablet 5 mg  5 mg Oral Q8H  
 lactated Ringers infusion  100 mL/hr IntraVENous CONTINUOUS  
 potassium bicarb-citric acid (EFFER-K) tablet 20 mEq  20 mEq Per G Tube DAILY  LORazepam (ATIVAN) tablet 0.5 mg  0.5 mg Per G Tube Q2H  
 lansoprazole (PREVACID) 3 mg/mL oral suspension 30 mg  30 mg Per G Tube BID  piperacillin-tazobactam (ZOSYN) 3.375 g in 0.9% sodium chloride (MBP/ADV) 100 mL MBP  3.375 g IntraVENous Q8H  
 dexmedeTOMidine in 0.9 % NaCl (PRECEDEX) 400 mcg/100 mL (4 mcg/mL) infusion soln  0.1-1.5 mcg/kg/hr IntraVENous TITRATE  diazePAM (VALIUM) 5-7.5-10 mg rectal kit 10 mg (Patient Supplied)  10 mg Rectal PRN  
 mupirocin (BACTROBAN) 2 % ointment Topical BID  midodrine (PROAMATINE) tablet 15 mg  15 mg Per G Tube Q8H  
 sodium chloride (NS) flush 5-10 mL  5-10 mL IntraVENous PRN  
 vancomycin (FIRVANQ) 50 mg/mL oral solution 500 mg  500 mg Oral Q6H  
 sodium chloride (NS) flush 5-40 mL  5-40 mL IntraVENous Q8H  
 sodium chloride (NS) flush 5-40 mL  5-40 mL IntraVENous PRN  
 acetaminophen (TYLENOL) tablet 650 mg  650 mg Oral Q6H PRN Or  
 acetaminophen (TYLENOL) suppository 650 mg  650 mg Rectal Q6H PRN  polyethylene glycol (MIRALAX) packet 17 g  17 g Oral DAILY PRN  
 heparin (porcine) injection 5,000 Units  5,000 Units SubCUTAneous Q8H  
 albuterol-ipratropium (DUO-NEB) 2.5 MG-0.5 MG/3 ML  3 mL Nebulization Q4H PRN  
 levETIRAcetam (KEPPRA) oral solution 750 mg  750 mg Per G Tube Q12H  phenytoin (DILANTIN) chewable tablet 150 mg  150 mg Per G Tube TID  [Held by provider] torsemide (DEMADEX) tablet 40 mg  40 mg Per G Tube DAILY  glycopyrrolate (ROBINUL) tablet 1 mg  1 mg Per G Tube TID PRN Signed: 
 Ruiz Marrufo MD 
Family Medicine Resident Attending note: Attending note to follow. ..

## 2021-05-27 NOTE — CONSULTS
Palliative Medicine Consult Pranay: [phone] ([id number]) Patient Name: Cate Abdalla YOB: 1970 Date of Initial Consult: 5/27/2021 Reason for Consult: Goals of care discussion Requesting Provider: Dr. Augustin Bruner Primary Care Physician: Trisha Mera NP 
 
 SUMMARY:  
Cate Abdalla is a 49 y.o. with a past history of Downs Syndrome, Remote CVA,  seizures, recurring C Diff s/p fecal transplant, HLD, Recurring hypoxic respiratory failure s/p Tracheostomy (2/2020), Pseudomonas PNA,  PEG, Sleep apnea and severe debility. Patient presented to ER from her residential [address] [place] [address] with staff reporting patient to have fever, tachycardia and hypotension, as well as recent dx of recurrent Cdiff. This is the patients 3rd hospitalization since 3/2021. Patient was admitted on 5/24/2021 with a diagnosis Sepsis, hypertensive emergency and hypoxia 2/2 HAP. No fever since 5/26 Current medical issues leading to Palliative Medicine involvement include: GOC discussion Psychosocial Hx: Patient has supportive family, with her mother and 2 sisters that live locally. Patient has lived in the group home x several years Baseline Cognitive and Functional status: Up until approximately 2 years ago, patient had been ambulatory and  Verbal. Unfortunately, she has had a significant decline, now with severe debility, non verbal, unable to follow simple commands and is bedbound, with chronic trach collar and PEG tube feedings. PALLIATIVE DIAGNOSES:  
1. Hypoxic Respiratory Failure 2. SOB 3. PNA 4. agitation 5. Debility 6. Advanced Care planning Discussion 7. DNR Discussion 8. Goals of care Discussion PLAN:  
1. Prior to visit I spoke with patients nurse Chantelle Roca RN 
2. Patient was seen x 2, initially no family at bedside. Patient opened eyes to voice, but no eye contact, non verbal and not following commands.  While I was still in unit, her sister Tracee Rosales arrived and I met with her at bedside. 3. Hypoxia- Unstable. At time of visit she was  down to humidified room air from 9L early this morning, however she did have a period agitation and desated into high 80s for a short time. · She continues to have intermittent intemitted tachycardia and tachypnea without any known stimulus, prn morphine and ativan provide very short periods of relief. Nursing also provides suctioning, though nurse denied having significant secretions today. 4. GOC discussion- I Did not speak to decision maker/mother, I only Spoke with her sister Stephanie Marmolejo at length, regarding patients severe decline over past 2 years and our concerns she will continue to decline resulting in frequent hospitalizations. We discussed restorative tx vs comfort and encouraged family to discuss their goal for the patient in the setting of limited life expectancy. 5. Patient mother does not drive and she also works during the day, Daughter planning to bring mom in this evening and Family medicine team will meet with them 6. DNR Discussion- Dr. Thea Jackson spoke to mother since my visit and mother elected to change code status to Partial Code, no chest compressions and no shock. 7.  Initial consult note routed to primary continuity provider and/or primary health care team members 8. Communicated plan of care with: Palliative IDT, Geoff Nielson Team, spoke with family medicine team, Anthony Whyte RN and Dr. Thea Jackson GOALS OF CARE / TREATMENT PREFERENCES:  
 
GOALS OF CARE: 
  
 
TREATMENT PREFERENCES:  
Code Status: Partial Code Advance Care Planning: 
[x] [facility] Interdisciplinary Team has updated the ACP Navigator with [address] and Patient Capacity Primary Decision Maker (Active): Kecia Mckeon - Mother - [phone] Advance Care Planning 4/22/2021 Patient's Healthcare Decision Maker is: Legal Next of Kin Confirm Advance Directive None Patient Would Like to Complete Advance Directive Unable Other Instructions: Other: As far as possible, the palliative care team has discussed with patient / health care proxy about goals of care / treatment preferences for patient. HISTORY:  
 
History obtained from: medical records/ unit nurse Oh Cast RN/Dr. Monica Nieves CHIEF COMPLAINT: N/A 
 
HPI/SUBJECTIVE: The patient is:  
[] Verbal and participatory [x] Non-participatory due to:  
Patient non verbal and not following commands, unable to provide ROS or HPI. Chart review/course of hospitalization: Patient with frequent periods of tachycardia >115 and tachypnea in 50s, PRN morphine and lorazepam provided short periods of relief. 5/27 CXR +worsening PNA Clinical Pain Assessment (nonverbal scale for severity on nonverbal patients): Activity (Movement): Lying quietly, normal position Duration: for how long has pt been experiencing pain (e.g., 2 days, 1 month, years) Frequency: how often pain is an issue (e.g., several times per day, once every few days, constant) FUNCTIONAL ASSESSMENT:  
 
Palliative Performance Scale (PPS): 20 PSYCHOSOCIAL/SPIRITUAL SCREENING:  
 
Palliative IDT has assessed this patient for cultural preferences / practices and a referral made as appropriate to needs (Cultural Services, Patient Advocacy, Ethics, etc.) Any spiritual / Druze concerns: 
[] Yes /  [x] No 
 
Caregiver Burnout: 
[] Yes /  [] No /  [x] No Caregiver Present Anticipatory grief assessment:  
[x] Normal  / [] Maladaptive ESAS Anxiety: ESAS Depression:    
 
 
 REVIEW OF SYSTEMS:  
 
Positive and pertinent negative findings in ROS are noted above in HPI. The following systems were [] reviewed / [x] unable to be reviewed as noted in HPI Other findings are noted below. Systems: constitutional, ears/nose/mouth/throat, respiratory, gastrointestinal, genitourinary, musculoskeletal, integumentary, neurologic, psychiatric, endocrine. Positive findings noted below. Modified ESAS Completed by: provider Stool Occurrence(s): 1 (Large brown loose stool) PHYSICAL EXAM:  
 
From RN flowsheet: 
Wt Readings from Last 3 Encounters:  
05/27/21 136 lb 11 oz (62 kg) 04/22/21 141 lb 12.8 oz (64.3 kg) 04/14/21 130 lb 8 oz (59.2 kg) Blood pressure (!) 77/40, pulse 78, temperature 97.9 °F (36.6 °C), resp. rate (!) 33, height 4' 9\" (1.448 m), weight 136 lb 11 oz (62 kg), SpO2 100 %. Pain Scale 1: Adult Nonverbal Pain Scale Pain Intensity 1: 0 Last bowel movement, if known:  
 
Constitutional: Appears younger than stated age, petite, well nourished, weak, frail,  Intermittent distress Eyes: pupils equal, anicteric ENMT: no nasal discharge, moist mucous membranes, humidified air to  trach Cardiovascular: intermittent tachycardia,  rhythm, distal pulses intact Respiratory: intermittent tachypnea and intermittently  labored, symmetric, +coarse breath sounds Gastrointestinal: round, distended, semi firm. , +bowel sounds, PEG Musculoskeletal: no deformity, no tenderness to palpation, bilateral foot drop Skin: cool to touch, dry, 3+ edema in arms and hands bilaterally, trace edema ankles and feet, Right foot with bandage over pedal wound. Neurologic: Opens eyes to voice, no tracking, non verbal, did not follow commands Psychiatric:unable to assess Other: 
 
 
 HISTORY:  
 
Principal Problem: 
  Severe sepsis ([facility] [address] (4/16/2021) Active Problems: 
  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  
Diagnosis Date  Acute cystitis without hematuria 4/3/2021  VICK (acute kidney injury) ([facility] [address] 3/31/2021  
 C. difficile diarrhea 3/28/2020  Clostridium difficile diarrhea 6/30/2020  Constipation 12/11/2019  Diarrhea in adult patient 3/28/2020  Down syndrome  Elevated Dilantin level 4/3/2021  
 History of vascular access device 06/30/2020  
 4 Fr midline, 10 cm L brachial, poor access  History of vascular access device 04/01/2021 [facility] VAT 5fr double PICC LFT brachial at 39cm, arm cir 31 cm   
 Oral thrush 3/29/2020  Positive fecal occult blood test 3/29/2020  Seizures ([facility] [address]  Sleep apnea   
 humidifier and oxygen w/trach  Status epilepticus ([facility] [address] 3/30/2021  Stroke [facility]) 2019 \"old stroke seen on CT\"  Thrombocytosis ([facility] [address] 3/31/2021  VAP (ventilator-associated pneumonia) ([facility] [address] 4/9/2021 Past Surgical History:  
Procedure Laterality Date  COLONOSCOPY N/A 7/8/2020 COLONOSCOPY with fecal transplant (consents in red file) performed by Rajendra Montes De Oca MD at [facility] HX GI    
 gtube 2/2020  HX OTHER SURGICAL    
 tracheostomy 2/2020 Family History Family history unknown: Yes History reviewed, no pertinent family history. Social History Tobacco Use  Smoking status: Never Smoker  Smokeless tobacco: Never Used Substance Use Topics  Alcohol use: Never Allergies Allergen Reactions  Depakote [Divalproex] Swelling Current Facility-Administered Medications Medication Dose Route Frequency  albumin human 25% (BUMINATE) solution 12.5 g  12.5 g IntraVENous Q8H  
 morphine injection 1 mg  1 mg IntraVENous Q4H PRN  
 LORazepam (ATIVAN) injection 1 mg  1 mg IntraVENous Q4H PRN  
 oxyCODONE IR (ROXICODONE) tablet 5 mg  5 mg Oral Q8H  potassium bicarb-citric acid (EFFER-K) tablet 20 mEq  20 mEq Per G Tube DAILY  LORazepam (ATIVAN) tablet 0.5 mg  0.5 mg Per G Tube Q2H  
 lansoprazole (PREVACID) 3 mg/mL oral suspension 30 mg  30 mg Per G Tube BID  piperacillin-tazobactam (ZOSYN) 3.375 g in 0.9% sodium chloride (MBP/ADV) 100 mL MBP  3.375 g IntraVENous Q8H  
 dexmedeTOMidine in 0.9 % NaCl (PRECEDEX) 400 mcg/100 mL (4 mcg/mL) infusion soln  0.1-1.5 mcg/kg/hr IntraVENous TITRATE  diazePAM (VALIUM) 5-7.5-10 mg rectal kit 10 mg (Patient Supplied)  10 mg Rectal PRN  
 0.9% sodium chloride infusion  100 mL/hr IntraVENous CONTINUOUS  
 mupirocin (BACTROBAN) 2 % ointment   Topical BID  midodrine (PROAMATINE) tablet 15 mg  15 mg Per G Tube Q8H  
 sodium chloride (NS) flush 5-10 mL  5-10 mL IntraVENous PRN  
 vancomycin (FIRVANQ) 50 mg/mL oral solution 500 mg  500 mg Oral Q6H  
 sodium chloride (NS) flush 5-40 mL  5-40 mL IntraVENous Q8H  
 sodium chloride (NS) flush 5-40 mL  5-40 mL IntraVENous PRN  
 acetaminophen (TYLENOL) tablet 650 mg  650 mg Oral Q6H PRN Or  
 acetaminophen (TYLENOL) suppository 650 mg  650 mg Rectal Q6H PRN  polyethylene glycol (MIRALAX) packet 17 g  17 g Oral DAILY PRN  
 heparin (porcine) injection 5,000 Units  5,000 Units SubCUTAneous Q8H  
 albuterol-ipratropium (DUO-NEB) 2.5 MG-0.5 MG/3 ML  3 mL Nebulization Q4H PRN  
 levETIRAcetam (KEPPRA) oral solution 750 mg  750 mg Per G Tube Q12H  phenytoin (DILANTIN) chewable tablet 150 mg  150 mg Per G Tube TID  [Held by provider] torsemide (DEMADEX) tablet 40 mg  40 mg Per G Tube DAILY  glycopyrrolate (ROBINUL) tablet 1 mg  1 mg Per G Tube TID PRN  
 
 
 
 LAB AND IMAGING FINDINGS:  
 
Lab Results Component Value Date/Time WBC 10.3 05/27/2021 04:05 AM  
 HGB 8.4 (L) 05/27/2021 04:05 AM  
 PLATELET 475 54/61/2814 04:05 AM  
 
Lab Results Component Value Date/Time  Sodium 147 (H) 05/27/2021 04:05 AM  
 Potassium 3.5 05/27/2021 04:05 AM  
 Chloride 121 (H) 05/27/2021 04:05 AM  
 CO2 18 (L) 05/27/2021 04:05 AM  
 BUN 29 (H) 05/27/2021 04:05 AM  
 Creatinine 0.98 05/27/2021 04:05 AM  
 Calcium 7.7 (L) 05/27/2021 04:05 AM  
 Magnesium 2.4 04/15/2021 11:51 PM  
 Phosphorus 4.4 04/14/2021 01:26 AM  
  
Lab Results Component Value Date/Time Alk. phosphatase 109 05/27/2021 04:05 AM  
 Protein, total 6.1 (L) 05/27/2021 04:05 AM  
 Albumin 1.7 (L) 05/27/2021 04:05 AM  
 Globulin 4.4 (H) 05/27/2021 04:05 AM  
 
Lab Results Component Value Date/Time INR 1.0 03/30/2021 09:19 PM  
 Prothrombin time 10.4 03/30/2021 09:19 PM  
  
Lab Results Component Value Date/Time Iron 101 03/31/2021 11:18 PM  
 TIBC 205 (L) 03/31/2021 11:18 PM  
 Iron % saturation 49 03/31/2021 11:18 PM  
 Ferritin 211 03/31/2021 11:18 PM  
  
Lab Results Component Value Date/Time pH 7.46 (H) 05/24/2021 10:50 PM  
 PCO2 24 (L) 05/24/2021 10:50 PM  
 PO2 75 (L) 05/24/2021 10:50 PM  
 
No components found for: [place] No results found for: CPK, CKMB Total time: 70 min Counseling / coordination time, spent as noted above: 55 min 
> 50% counseling / coordination?: yes Prolonged service was provided for  []30 min   []75 min in face to face time in the presence of the patient, spent as noted above. Time Start:  
Time End:  
Note: this can only be billed with 96007 (initial) or 48808 (follow up). If multiple start / stop times, list each separately.

## 2021-05-27 NOTE — PROGRESS NOTES
Bedside and Verbal shift change report given to Rut Boles (oncoming nurse) by Logan Mena (offgoing nurse). Report included the following information SBAR, Intake/Output, MAR, Cardiac Rhythm NSR/ tachy and Alarm Parameters . Primary Nurse Vidal Gabriel, RN and Fili Page RN, RN performed a dual skin assessment on this patient Impairment noted- see wound doc flow sheet Rolf score is 11 
 
0800 Assessment complete. ICU MD evaled pt. Precedex stopped. [address] Pt awake when turning, suctioning, giving meds, and changing bed pad. HR increased into low 110s, RR increased to mid 50s, pt anxious, ICU MD notified, ativan and morphine ordered PRN. Meds given. Pt sleeping for about 5 minutes. When pt wakes up same symptoms and vitals came back. [facility] Assessment complete. Family at bedside and speaking to attending and palliative. [address] Assessment complete. Code status changed to intubation only. No CPR. Hospice, and palliative consulted. [address] Last two BP MAPs in 50s. [id number] [facility] [address] notified. Will reassess. No orders given. Requested to be notified if it continued. Bedside and Verbal shift change report given to Cam (oncoming nurse) by Rut Boles (offgoing nurse). Report included the following information SBAR, Intake/Output, MAR, Recent Results, Cardiac Rhythm NSR and Alarm Parameters .

## 2021-05-27 NOTE — PROGRESS NOTES
Spiritual Care Assessment/Progress Note [address] 
 
 
NAME: Sam Mittal      MRN: [id number] AGE: 49 y.o. SEX: female Hoahaoism Affiliation: No preference Language: [place] 5/27/2021     Total Time (in minutes): 11 Spiritual Assessment begun in [facility] 3 INTENSIVE CARE through conversation with: 
  
    [x]Patient        [x] Family    [] Friend(s) Reason for Consult: Initial/Spiritual assessment, critical care Spiritual beliefs: (Please include comment if needed) [x] Identifies with a tj tradition: Jeniferi    
   [] Supported by a tj community:        
   [] Claims no spiritual orientation:       
   [] Seeking spiritual identity:            
   [] Adheres to an individual form of spirituality:       
   [] Not able to assess:                   
 
    
Identified resources for coping:  
   [] Prayer                           
   [] Music                  [] Guided Imagery [x] Family/friends                 [] Pet visits [] Devotional reading                         [] Unknown 
   [] Other:                                         
 
 
Interventions offered during this visit: (See comments for more details) Patient Interventions: Initial visit (Attempted) Family/Friend(s): Affirmation of emotions/emotional suffering, Affirmation of tj, Initial Assessment, Iconic (affirming the presence of God/Higher Power), Prayer (assurance of), Hoahaoism beliefs/image of God discussed Plan of Care: 
 
 [] Support spiritual and/or cultural needs  
 [] Support AMD and/or advance care planning process    
 [] Support grieving process 
 [] Coordinate Rites and/or Rituals  
 [] Coordination with community clergy  [] No spiritual needs identified at this time 
 [] Detailed Plan of Care below (See Comments)  [] Make referral to Music Therapy 
[] Make referral to Pet Therapy    
[] Make referral to Addiction services 
[] Make referral to [facility] 
[] Make referral to Spiritual Care Partner 
[] No future visits requested       
[x] Follow up upon further referrals Comments:  's visit was in the ICU for critical care initial spiritual assessment. Miss Godwin's sister Sahil Madrigal was present. She welcomed  warmly, stating she just entered. Sahil Madrigal expressed concern about appearance of sister and requested for her nurse.  notified nurse of sister's concern and nurse came in to update patient's sister of her current condition.  advised Sahil Madrigal of spiritual care presence for support, and  provided her space to interact with nurse. Visited by: Mercy Laws.  Paging Service: [phone])

## 2021-05-27 NOTE — PROGRESS NOTES
Case Management follow-up RUR 29 %-high risk readmission Trach/peg/tube feeds In IDR ,it was discussed that pt has been weaned off of the precedex and has been receiving prn morphine and ativan which have both helped. The plan as outlined in IDR is for pt to be downgraded to step-down if pt remains stable. Healthcare Decision Maker:  
  
          Primary Decision Maker (Active): Sammi Rivera - Mother - [phone] When discharged: Pt will return to [facility]. Caretaker is Joy Miller @ [phone]. Caretaker will need to be notified prior to discharge regarding pt returning to the group home. [facility] has accepted pt for skilled nursing services when discharged. When discharged,[facility] will need to be notified regarding discharge by calling [phone]. Update; Hospice order received. [facility] Hospice consult called to [phone]. I also called Tammi Nichols with hospice @ [phone] to inform nurse regarding consultation.  
 
Sohan Chavez CM

## 2021-05-27 NOTE — PROGRESS NOTES
Critical Care Progress Note Date:2021       Room:[address] [place] [address] Patient Reggie Jimenez     YOB: 1970     Age:51 y.o. Subjective Subjective  
 
: No major events overnight. Culture remain negative. C. difficile is negative. Remain for theT-max 100.9 yesterday morning, since then patient has been afebrile., Received a bolus this morning. Patient was on low-dose of Precedex this morning that was turned off. Albumin remain very low 1.7 Chest x-ray with worsening pneumonia on the left side : Was placed back on Precedex given agitation. WBC is trending down. Febrile. Hemoglobin stable at 9. 9. Creatinine stable Review of Systems Unable to obtain given clinical condition Objective Vitals Last 24 Hours: TEMPERATURE:  Temp  Av.3 °F (37.4 °C)  Min: 98.2 °F (36.8 °C)  Max: 100.9 °F (38.3 °C) RESPIRATIONS RANGE: Resp  Av.8  Min: 20  Max: 55 PULSE OXIMETRY RANGE: SpO2  Av.7 %  Min: 76 %  Max: 100 % PULSE RANGE: Pulse  Av.6  Min: 62  Max: 129 BLOOD PRESSURE RANGE: Systolic (90XDC), ABIMBOLA:69 , Min:75 , YFO:456  
; Diastolic (63TYO), DCY:34, Min:36, Max:82 I/O (24Hr): Intake/Output Summary (Last 24 hours) at 2021 6348 Last data filed at 2021 0600 Gross per 24 hour Intake 4669.63 ml Output 1251 ml Net 3418.63 ml I examined the patient via telemedicine, with its associated limitations. I reviewed the electronic medical record, the x-rays, labs, progress notes, previous history and physicals and consultation notes that were available in the electronic medical record. I beamed in and examined the patient with assistance of house staff On exam: 
 
Gen: Not  interactive HEENT:  Trach in place Chest: symmetrical chest rise CV:S1,S2 Abd: soft, non-distended Ext: no edema Neuro:Contractures Objective: 
Vital signs: (most recent): Blood pressure 96/63, pulse 74, temperature 98.8 °F (37.1 °C), resp.  rate (!) 44, height 4' 9\" (1.448 m), weight 62 kg (136 lb 11 oz), SpO2 100 %. Labs/Imaging/Diagnostics Labs: CBC: 
Recent Labs  
  05/27/21 
0405 05/26/21 0419 05/25/21 
0263 WBC 10.3 12.7* 15.0*  
RBC 2.63* 3.11* 3.37* HGB 8.4* 9.9* 10.7* HCT 26.3* 31.8* 33.9*  
.0* 102.3* 100.6*  
RDW 14.4 14.4 14.8*  
 278 270 CHEMISTRIES: 
Recent Labs  
  05/27/21 
0405 05/26/21 0419 05/25/21 
0412 * 145 139  
K 3.5 3.2* 4.2 * 116* 110* CO2 18* 18* 19*  
BUN 29* 36* 50* CA 7.7* 8.5 8.6 PT/INR:No results for input(s): INR, INREXT, INREXT in the last 72 hours. No lab exists for component: PROTIME APTT:No results for input(s): APTT in the last 72 hours. LIVER PROFILE: 
Recent Labs  
  05/27/21 0405 05/26/21 0419 05/25/21 
1464 AST 80* 136* 222* ALT 78 126* 182* Lab Results Component Value Date/Time ALT (SGPT) 78 05/27/2021 04:05 AM  
 AST (SGOT) 80 (H) 05/27/2021 04:05 AM  
 Alk. phosphatase 109 05/27/2021 04:05 AM  
 Bilirubin, direct 0.08 11/05/2020 11:23 AM  
 Bilirubin, total 0.1 (L) 05/27/2021 04:05 AM  
 
 
Imaging Last 24 Hours: 
No results found. Assessment//Plan Principal Problem: 
  Severe sepsis ([facility] (4/16/2021) Active Problems: 
  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 Continue Keppra and phenytoin. DC Precedex. As needed Ativan and morphine as needed for agitation and tachypnea. Continue p.o. scheduled Ativan Continue midodrine.   Continue to monitor blood pressure. Add albumin for 24 hours. Oxygen through trach collar. Follow-up chest x-ray Monitor urine output and renal indices. Tube feed boluses with free water flush. Continue to monitor H&H. Continue Zosyn For pneumonia for 7 days. continue p.o. Vanco for history of C. Difficile. Follow culture. Monitor WBC and temperature curve IVF for maintenance. Intermittent boluses as needed. Check lactic acid in AM . 
SQ heparin I performed all aspects of the physical examination via Telemedicine associated with two way audio and video communication and with the on-site assistance of  the bedside nurse. I am located in  [place] and the patient is located in [place] at [address] [place] [address]. The patient is critically ill in the ICU. I  personally  reviewed the pertinent medical records, laboratory/ pathology data and radiographic images. The decision making regarding this patient is as documented above, which was generated  following  discussion  with the multidisciplinary ICU team and creation of a treatment plan for  the patient. We discussed the patient's interval history and future coordination of care and  plans. The patient's medications  were reviewed and changes made as stipulated above. Due to  critical illness impairing one or more vital organs of this patient resulting in life threatening clinical situation  I have provided direct, frequent personal  assessment and manipulation in management plan and spent 35 minutes  of  critical care time excluding the time spent on procedures and teaching. Greater than 50% of this time  in patients care was  employed  in counseling and coordination of care and engaged in face to face discussion of case management issues, addressing questions, and outlining a plan of  therapy. Pt at risk of life threatening deterioration from agitation,sepsis Pt seen and evaluated via tele encounter.  Audio and Video were used for this interaction.  
 
 
 
Electronically signed by Harris Nguyen MD on 5/27/2021 at 10:06 AM

## 2021-05-27 NOTE — PROGRESS NOTES
05/27/21 
1:56 PM 
 
I met with patient's sister, Alondra Schneider, at patient's bedside. We had patient's mother, Ms. Lilia Stone on speaker phone with us. I discussed patient's current status and prognosis. She has had recurrent aspiration pneumonia, which is shown on today's chest xray. We have also had issues controlling her low blood pressure. I explained that the only way to aggressively treat her low blood pressure with pressors would be to insert a port, and I discussed the benefits and risks of this procedure. They do not want to have a port placed, and would like to try continuing with conservative measures (IV fluids). We also readdressed code status, and they do not want patient to have CPR at this point. They would like to continue with IV antibiotics and fluids at this time, but are thinking of moving toward comfort measures. We also discussed hospice which they are open to considering. I also spoke to patient's other sister, Maribell Irby, on the phone this morning and she would like them to pursue hospice and comfort care. I willl consult Hospice and change code status to partial code now. Lilia Stone (Mother, [place]): [phone] Alondra Schneider (sister): [phone] Maribell Ibry (sister): [phone]

## 2021-05-27 NOTE — PROGRESS NOTES
[facility], [address] with [facility] Resident Progress Note in Brief S: Patient seen and examined at bedside. She is sleeping comfortably. O: 
Visit Vitals BP (!) 87/54 (BP 1 Location: Left upper arm, BP Patient Position: At rest) Pulse 71 Temp 98.2 °F (36.8 °C) Resp 26 Ht 4' 9\" (1.448 m) Wt 136 lb 11 oz (62 kg) SpO2 99% BMI 29.58 kg/m² Physical Examination:  
General:   Sleeping Lungs:   Anterior lungs clear to auscultation, no increased WOB, no wheezing/rales, rhonchi Heart:   Regular rate and rhythm, no murmur Abdomen:    Soft, moderately distended, hyperactive bowel sounds, no apparent tenderness on exam, G tube in LUQ Extremities:  Trace edema in BL feet and dorsum of BL hands, unna boots in place Skin:  Warm and dry Neurologic:  Sleeping comfortably A/P:  
 
Vidal Longo is a 49 y.o. female with a PMHx of Down Syndrome (non-verbal at baseline), s/p Trach and PEG tube placement, seizure disorder, encephalomalacia, ANNE, recent admission for HAP and C. diff colitis who is admitted for severe sepsis. Severe sepsis: - S/p Levaquin. Remains on Zosyn and PO Vanc - MIVF LR at 100 mL/hour 
- Has not required pressure support. Home midodrine 15 mg TID. - Scheduled Ativan 0.5 mg q2hr and Oxycodone IR 5 mg q8h per G tube. - Palliative care and hospice team were consulted. CODE status changed to partial (No CPR) after family meeting. Please see the primary team's daily progress note for the patient's full plan. Jaja Armenta DO Family Medicine Resident

## 2021-05-27 NOTE — PROGRESS NOTES
Problem: Risk for Spread of Infection Goal: Prevent transmission of infectious organism to others Description: Prevent the transmission of infectious organisms to other patients, staff members, and visitors. Outcome: Progressing Towards Goal 
  
Problem: Patient Education:  Go to Education Activity Goal: Patient/Family Education Outcome: Progressing Towards Goal 
  
Problem: Pressure Injury - Risk of 
Goal: *Prevention of pressure injury Description: Document Rolf Scale and appropriate interventions in the flowsheet. Outcome: Progressing Towards Goal 
Note: Pressure Injury Interventions: 
Sensory Interventions: Assess changes in LOC, Assess need for specialty bed, Avoid rigorous massage over bony prominences, Check visual cues for pain, Discuss PT/OT consult with provider, Float heels, Keep linens dry and wrinkle-free, Maintain/enhance activity level, Minimize linen layers, Pad between skin to skin, Monitor skin under medical devices, Pressure redistribution bed/mattress (bed type), Suspension boots, Turn and reposition approx. every two hours (pillows and wedges if needed) Moisture Interventions: Absorbent underpads, Apply protective barrier, creams and emollients, Assess need for specialty bed, Check for incontinence Q2 hours and as needed, Internal/External urinary devices, Minimize layers, Moisture barrier Activity Interventions: Assess need for specialty bed, Increase time out of bed, Pressure redistribution bed/mattress(bed type), PT/OT evaluation Mobility Interventions: Assess need for specialty bed, Float heels, HOB 30 degrees or less, PT/OT evaluation, Pressure redistribution bed/mattress (bed type), Turn and reposition approx. every two hours(pillow and wedges), Suspension boots Nutrition Interventions: Document food/fluid/supplement intake, Discuss nutritional consult with provider Friction and Shear Interventions: Apply protective barrier, creams and emollients, Feet elevated on foot rest, Foam dressings/transparent film/skin sealants, Lift sheet, Lift team/patient mobility team, Minimize layers, Transferring/repositioning devices Problem: Patient Education: Go to Patient Education Activity Goal: Patient/Family Education Outcome: Progressing Towards Goal 
  
Problem: Falls - Risk of 
Goal: *Absence of Falls Description: Document Lear Shaker Fall Risk and appropriate interventions in the flowsheet. Outcome: Progressing Towards Goal 
Note: Fall Risk Interventions: 
  
 
Mentation Interventions: Adequate sleep, hydration, pain control, Bed/chair exit alarm, Door open when patient unattended, Evaluate medications/consider consulting pharmacy, Increase mobility, More frequent rounding, Reorient patient, Room close to nurse's station, Toileting rounds Medication Interventions: Bed/chair exit alarm, Evaluate medications/consider consulting pharmacy, Patient to call before getting OOB Elimination Interventions: Bed/chair exit alarm, Call light in reach, Patient to call for help with toileting needs, Toileting schedule/hourly rounds Problem: Patient Education: Go to Patient Education Activity Goal: Patient/Family Education Outcome: Progressing Towards Goal 
  
Problem: Nutrition Deficit Goal: *Optimize nutritional status Outcome: Progressing Towards Goal 
  
Problem: Patient Education: Go to Patient Education Activity Goal: Patient/Family Education Outcome: Progressing Towards Goal 
  
Problem: Diarrhea (Adult and Pediatrics) Goal: *Absence of diarrhea Outcome: Progressing Towards Goal 
Goal: *PALLIATIVE CARE:  Absence of diarrhea Outcome: Progressing Towards Goal 
  
Problem: Patient Education: Go to Patient Education Activity Goal: Patient/Family Education Outcome: Progressing Towards Goal 
  
Problem: Patient Education: Go to Patient Education Activity Goal: Patient/Family Education Outcome: Progressing Towards Goal 
  
Problem: Sepsis: Day of Diagnosis Goal: Off Pathway (Use only if patient is Off Pathway) Outcome: Progressing Towards Goal 
Goal: *Fluid resuscitation Outcome: Progressing Towards Goal 
Goal: *Paired blood cultures prior to first dose of antibiotic Outcome: Progressing Towards Goal 
Goal: *First dose of  appropriate antibiotic within 3 hours of arrival to ED, within 1 hour of arrival to ICU Outcome: Progressing Towards Goal 
Goal: *Lactic acid with first set of blood cultures Outcome: Progressing Towards Goal 
Goal: *Pneumococcal immunization (if eligible) Outcome: Progressing Towards Goal 
Goal: *Influenza immunization (if eligible) Outcome: Progressing Towards Goal 
Goal: Activity/Safety Outcome: Progressing Towards Goal 
Goal: Consults, if ordered Outcome: Progressing Towards Goal 
Goal: Diagnostic Test/Procedures Outcome: Progressing Towards Goal 
Goal: Nutrition/Diet Outcome: Progressing Towards Goal 
Goal: Discharge Planning Outcome: Progressing Towards Goal 
Goal: Medications Outcome: Progressing Towards Goal 
Goal: Respiratory Outcome: Progressing Towards Goal 
Goal: Treatments/Interventions/Procedures Outcome: Progressing Towards Goal 
Goal: Psychosocial 
Outcome: Progressing Towards Goal 
  
Problem: Sepsis: Day 2 Goal: Off Pathway (Use only if patient is Off Pathway) Outcome: Progressing Towards Goal 
Goal: *Central Venous Pressure maintained at 8-12 mm Hg Outcome: Progressing Towards Goal 
Goal: *Hemodynamically stable Outcome: Progressing Towards Goal 
Goal: *Tolerating diet Outcome: Progressing Towards Goal 
Goal: Activity/Safety Outcome: Progressing Towards Goal 
Goal: Consults, if ordered Outcome: Progressing Towards Goal 
Goal: Diagnostic Test/Procedures Outcome: Progressing Towards Goal 
Goal: Nutrition/Diet Outcome: Progressing Towards Goal 
Goal: Discharge Planning Outcome: Progressing Towards Goal 
Goal: Medications Outcome: Progressing Towards Goal 
Goal: Respiratory Outcome: Progressing Towards Goal 
Goal: Treatments/Interventions/Procedures Outcome: Progressing Towards Goal 
Goal: Psychosocial 
Outcome: Progressing Towards Goal 
  
Problem: Sepsis: Day 3 Goal: Off Pathway (Use only if patient is Off Pathway) Outcome: Progressing Towards Goal 
Goal: *Central Venous Pressure maintained at 8-12 mm Hg Outcome: Progressing Towards Goal 
Goal: *Oxygen saturation within defined limits Outcome: Progressing Towards Goal 
Goal: *Vital sign stability Outcome: Progressing Towards Goal 
Goal: *Tolerating diet Outcome: Progressing Towards Goal 
Goal: *Demonstrates progressive activity Outcome: Progressing Towards Goal 
Goal: Activity/Safety Outcome: Progressing Towards Goal 
Goal: Consults, if ordered Outcome: Progressing Towards Goal 
Goal: Diagnostic Test/Procedures Outcome: Progressing Towards Goal 
Goal: Nutrition/Diet Outcome: Progressing Towards Goal 
Goal: Discharge Planning Outcome: Progressing Towards Goal 
Goal: Medications Outcome: Progressing Towards Goal 
Goal: Respiratory Outcome: Progressing Towards Goal 
Goal: Treatments/Interventions/Procedures Outcome: Progressing Towards Goal 
Goal: Psychosocial 
Outcome: Progressing Towards Goal 
  
Problem: Sepsis: Day 4 Goal: Off Pathway (Use only if patient is Off Pathway) Outcome: Progressing Towards Goal 
Goal: Activity/Safety Outcome: Progressing Towards Goal 
Goal: Consults, if ordered Outcome: Progressing Towards Goal 
Goal: Diagnostic Test/Procedures Outcome: Progressing Towards Goal 
Goal: Nutrition/Diet Outcome: Progressing Towards Goal 
Goal: Discharge Planning Outcome: Progressing Towards Goal 
Goal: Medications Outcome: Progressing Towards Goal 
Goal: Respiratory Outcome: Progressing Towards Goal 
Goal: Treatments/Interventions/Procedures Outcome: Progressing Towards Goal 
Goal: Psychosocial 
Outcome: Progressing Towards Goal 
Goal: *Oxygen saturation within defined limits Outcome: Progressing Towards Goal 
Goal: *Hemodynamically stable Outcome: Progressing Towards Goal 
Goal: *Vital signs within defined limits Outcome: Progressing Towards Goal 
Goal: *Tolerating diet Outcome: Progressing Towards Goal 
Goal: *Demonstrates progressive activity Outcome: Progressing Towards Goal 
Goal: *Fluid volume maintenance Outcome: Progressing Towards Goal 
  
Problem: Sepsis: Day 5 Goal: Off Pathway (Use only if patient is Off Pathway) Outcome: Progressing Towards Goal 
Goal: *Oxygen saturation within defined limits Outcome: Progressing Towards Goal 
Goal: *Vital signs within defined limits Outcome: Progressing Towards Goal 
Goal: *Tolerating diet Outcome: Progressing Towards Goal 
Goal: *Demonstrates progressive activity Outcome: Progressing Towards Goal 
Goal: *Discharge plan identified Outcome: Progressing Towards Goal 
Goal: Activity/Safety Outcome: Progressing Towards Goal 
Goal: Consults, if ordered Outcome: Progressing Towards Goal 
Goal: Diagnostic Test/Procedures Outcome: Progressing Towards Goal 
Goal: Nutrition/Diet Outcome: Progressing Towards Goal 
Goal: Discharge Planning Outcome: Progressing Towards Goal 
Goal: Medications Outcome: Progressing Towards Goal 
Goal: Respiratory Outcome: Progressing Towards Goal 
Goal: Treatments/Interventions/Procedures Outcome: Progressing Towards Goal 
Goal: Psychosocial 
Outcome: Progressing Towards Goal 
  
Problem: Sepsis: Day 6 Goal: Off Pathway (Use only if patient is Off Pathway) Outcome: Progressing Towards Goal 
Goal: *Oxygen saturation within defined limits Outcome: Progressing Towards Goal 
Goal: *Vital signs within defined limits Outcome: Progressing Towards Goal 
Goal: *Tolerating diet Outcome: Progressing Towards Goal 
Goal: *Demonstrates progressive activity Outcome: Progressing Towards Goal 
Goal: Influenza immunization Outcome: Progressing Towards Goal 
Goal: *Pneumococcal immunization Outcome: Progressing Towards Goal 
Goal: Activity/Safety Outcome: Progressing Towards Goal 
Goal: Diagnostic Test/Procedures Outcome: Progressing Towards Goal 
Goal: Nutrition/Diet Outcome: Progressing Towards Goal 
Goal: Discharge Planning Outcome: Progressing Towards Goal 
Goal: Medications Outcome: Progressing Towards Goal 
Goal: Respiratory Outcome: Progressing Towards Goal 
Goal: Treatments/Interventions/Procedures Outcome: Progressing Towards Goal 
Goal: Psychosocial 
Outcome: Progressing Towards Goal 
  
Problem: Sepsis: Discharge Outcomes Goal: *Vital signs within defined limits Outcome: Progressing Towards Goal 
Goal: *Tolerating diet Outcome: Progressing Towards Goal 
Goal: *Verbalizes understanding and describes prescribed diet Outcome: Progressing Towards Goal 
Goal: *Demonstrates progressive activity Outcome: Progressing Towards Goal 
Goal: *Describes follow-up/return visits to physicians Outcome: Progressing Towards Goal 
Goal: *Verbalizes name, dosage, time, side effects, and number of days to continue medications Outcome: Progressing Towards Goal 
Goal: *Influenza immunization (Oct-Mar only) Outcome: Progressing Towards Goal 
Goal: *Pneumococcal immunization Outcome: Progressing Towards Goal 
Goal: *Lungs clear or at baseline Outcome: Progressing Towards Goal 
Goal: *Oxygen saturation returns to baseline or 90% or better on room air Outcome: Progressing Towards Goal 
Goal: *Glycemic control Outcome: Progressing Towards Goal 
Goal: *Absence of deep venous thrombosis signs and symptoms(Stroke Metric) Outcome: Progressing Towards Goal 
Goal: *Describes available resources and support systems Outcome: Progressing Towards Goal 
Goal: *Optimal pain control at patient's stated goal 
Outcome: Progressing Towards Goal

## 2021-05-27 NOTE — ACP (ADVANCE CARE PLANNING)
Primary Decision Maker (Active): Irineo Corbett - [phone] Advance Care Planning 5/27/2021 Patient's Healthcare Decision Maker is: Legal Next of Kin Confirm Advance Directive None Patient Would Like to Complete Advance Directive Unable Pt does not have AMD in place, is unable to complete due to physical/cognitive status. Mother Vonda Riley is legal NOK and surrogate decision maker. Pt currently has Partial Code order in place (no cpr, no shock, yes intubation).   Hospice consult has been placed per family request.

## 2021-05-27 NOTE — HOSPICE
[employer] Good Help to Those in Need 
[phone] Patient Name: Yadi Spencer YOB: 1970 Age: 49 y.o. Carroll Koo RN Note:  Hospice consult received, reviewing chart. Will follow up with Unit Nurse and Care Manager to discuss plan of care, patient status and discharge disposition within the hour. Thank you for the opportunity to be of service to this patient. Perry Soulier RN 
[phone] c 
[phone] o Called mother and reviewed hospice. She asked that I call her daughter Maribell Irby. Called and left a message.

## 2021-05-28 PROBLEM — A41.9 SEPSIS WITH ACUTE ORGAN DYSFUNCTION WITHOUT SEPTIC SHOCK (HCC): Status: ACTIVE | Noted: 2021-01-01

## 2021-05-28 PROBLEM — R65.20 SEPSIS WITH ACUTE ORGAN DYSFUNCTION WITHOUT SEPTIC SHOCK (HCC): Status: ACTIVE | Noted: 2021-01-01

## 2021-05-28 NOTE — PROGRESS NOTES
Case Management follow-up: 
 
RUR 30%-high readmission risk Problems: 
Severe sepsis HAP Hypotension Chronic trach/peg Seizures Pt is now a partial code-Please read FP 's notes. I discussed pt with FP resident,Dr Ashwin Humphreys this am. 
 
[facility] discussed hospice with pt.'s sister,Karen Murphy. Karen plans to discuss hospice with pt.'s [employer]. Healthcare Decision Maker:  
  
          Primary Decision Maker (Active): McKenzie Anna - Mother - [phone] 
 Secondary decision-makers for pt are her sisters; 
Dacia Nance  [phone] Lazarus Rees @ [phone] Future disposition: If mother and sisters decide on hospice ,I will assist pt with coordination of care./transport in relationship to that disposition. If pt does not transition into hospice or comfort measures the eventual plan will be for pt to  return  to [facility] Caretaker is Esthela Palacios @ [phone]. Caretaker will need to be notified prior to discharge regarding pt returning to the [facility]. 
  
If pt is not transitioned into comfort care or [facility] has accepted pt for skilled nursing services when discharged. When discharged,[facility] will need to be notified regarding discharge by calling [phone]. I will continue to follow pt for discharge needs.  
 
Valentin Riley CM

## 2021-05-28 NOTE — PROGRESS NOTES
Comprehensive Nutrition Assessment Type and Reason for Visit: Mikayla Hylton Nutrition Recommendations/Plan: 1. ContinueTF via PEG: 
 
Vital AF 1.2 bolus feeds 240mL 5x daily (10am, 4pm, 8pm, 12 midnight, 4am) + 75mL H2O flush after each bolus. Adjust TF times as needed for medications. TF at goal provides 1440 kcals, 90 g protein, 1348 mL H2O- meeting 100% energy/protein needs. Nutrition Assessment:     
5/28: Follow up. Pt tolerating TFs at goal. Noted to have thick secretions requiring frequent suction per RN. Palliative following and family meeting with hospice today per IDR. Will monitor goals of car.e Labs- Na 149, Hgb 8.8. Meds- [email or web address], Prevacid, ativan, ativan, midodrine, phenytoin, zosyn, Effer-K, vanco. 
 
5/25: 50 y/o female admitted with severe sepsis. PMH includes Down's syndrome, chronic trach, chronic PEG. PT currently COVID-19 r/o. +c.diff. RD familiar with pt from previous admissions. Pt normally resides at group home and her normal TF regimen is Osmolite 1.2 237mL bolus 5x/day + 75mL H2O flush before and after each bolus. Hospital does not carry Osmolite TF. Recommend restarting pt on similar TF regimen from recent admission. May need to readjust TF bolus times based on medications. Will adjust as needed. Pt's weight appears down from recent admission. Current ordered TF regimen should be exceeding p t's energy needs by about ~170 kcals, so will monitor weight trends while in hospital. 
Labs- Cr 1.54, Hgb 10.7, -[phone]. Meds- 0.9%NaCl @100mL/h, Precedex, zosyn, Phenytoin, vanco. 
 
Weight hx: Wt Readings from Last 10 Encounters:  
05/27/21 62 kg (136 lb 11 oz) 04/22/21 64.3 kg (141 lb 12.8 oz) 04/14/21 59.2 kg (130 lb 8 oz) 02/03/21 58.9 kg (129 lb 12.8 oz) 07/08/20 49.9 kg (110 lb)  
06/30/20 49.9 kg (110 lb) 03/31/20 50.1 kg (110 lb 7.2 oz) 03/17/20 44.5 kg (98 lb)  
01/14/20 44.5 kg (98 lb) 01/09/20 44.6 kg (98 lb 6 oz) Malnutrition Assessment: Malnutrition Status: insufficient data Estimated Daily Nutrient Needs: 
Energy (kcal): 1935; Weight Used for Energy Requirements: Current Protein (g): 67 (1.2-1.4g/kg); Weight Used for Protein Requirements: Current Fluid (ml/day): 1269; Method Used for Fluid Requirements: 1 ml/kcal 
 
 
Nutrition Related Findings:  last BM 5/27 (loose) Wounds:   
Wound consult pending Current Nutrition Therapies: DIET NPO With Tube Feedings DIET TUBE FEEDING Vital AF 1.2 bolus feeds 240mL 5x daily (10am, 4pm, 8pm, 12 midnight, 4am) + 75mL H2O flush after each bolus. Adjust TF times as needed for medications. Anthropometric Measures: 
· Height:  4' 9\" (144.8 cm) · Current Body Wt:  62 kg (136 lb 11 oz) · Admission Body Wt:      
· Usual Body Wt:       
· Ideal Body Wt:  85 lbs:  146.5 % · Adjusted Body Weight:   ; Weight Adjustment for: No adjustment · Adjusted BMI:      
· BMI Category: Overweight (BMI 25.0-29. 9) Nutrition Diagnosis: · Altered GI function related to swallowing difficulty as evidenced by nutrition support-enteral nutrition 5/28: Nutrition dx continues. Nutrition Interventions:  
Food and/or Nutrient Delivery: Start tube feeding Nutrition Education and Counseling: No recommendations at this time Coordination of Nutrition Care: Continue to monitor while inpatient, Interdisciplinary rounds Goals: 
TF to meet >90% energy/protein needs next 2-4 days Nutrition Monitoring and Evaluation:  
Behavioral-Environmental Outcomes: None identified Food/Nutrient Intake Outcomes: Enteral nutrition intake/tolerance Physical Signs/Symptoms Outcomes: Weight, Biochemical data, Nutrition focused physical findings, GI status, Fluid status or edema Discharge Planning:   
Enteral nutrition Electronically signed by Kaleb Cesar RDN on 5/28/2021 Contact: [phone]

## 2021-05-28 NOTE — PROGRESS NOTES
Problem: Risk for Spread of Infection Goal: Prevent transmission of infectious organism to others Description: Prevent the transmission of infectious organisms to other patients, staff members, and visitors. Outcome: Progressing Towards Goal 
  
Problem: Patient Education:  Go to Education Activity Goal: Patient/Family Education Outcome: Progressing Towards Goal 
  
Problem: Pressure Injury - Risk of 
Goal: *Prevention of pressure injury Description: Document Rolf Scale and appropriate interventions in the flowsheet. Outcome: Progressing Towards Goal 
Note: Pressure Injury Interventions: 
Sensory Interventions: Assess changes in LOC, Assess need for specialty bed, Avoid rigorous massage over bony prominences, Keep linens dry and wrinkle-free Moisture Interventions: Absorbent underpads, Apply protective barrier, creams and emollients, Assess need for specialty bed, Maintain skin hydration (lotion/cream), Minimize layers Activity Interventions: Assess need for specialty bed, Pressure redistribution bed/mattress(bed type), PT/OT evaluation Mobility Interventions: Assess need for specialty bed, Float heels, HOB 30 degrees or less, Pressure redistribution bed/mattress (bed type), Turn and reposition approx. every two hours(pillow and wedges) Nutrition Interventions: Document food/fluid/supplement intake, Discuss nutritional consult with provider Friction and Shear Interventions: Foam dressings/transparent film/skin sealants, HOB 30 degrees or less, Minimize layers Problem: Patient Education: Go to Patient Education Activity Goal: Patient/Family Education Outcome: Progressing Towards Goal 
  
Problem: Falls - Risk of 
Goal: *Absence of Falls Description: Document [facility] Fall Risk and appropriate interventions in the flowsheet.  
Outcome: Progressing Towards Goal 
Note: Fall Risk Interventions: 
  
 
Mentation Interventions: Adequate sleep, hydration, pain control, Door open when patient unattended, Evaluate medications/consider consulting pharmacy Medication Interventions: Bed/chair exit alarm Elimination Interventions: Bed/chair exit alarm, Call light in reach History of Falls Interventions: Bed/chair exit alarm, Consult care management for discharge planning, Investigate reason for fall Problem: Patient Education: Go to Patient Education Activity Goal: Patient/Family Education Outcome: Progressing Towards Goal 
  
Problem: Nutrition Deficit Goal: *Optimize nutritional status Outcome: Progressing Towards Goal 
  
Problem: Patient Education: Go to Patient Education Activity Goal: Patient/Family Education Outcome: Progressing Towards Goal 
  
Problem: Diarrhea (Adult and Pediatrics) Goal: *Absence of diarrhea Outcome: Progressing Towards Goal 
Goal: *PALLIATIVE CARE:  Absence of diarrhea Outcome: Progressing Towards Goal 
  
Problem: Patient Education: Go to Patient Education Activity Goal: Patient/Family Education Outcome: Progressing Towards Goal 
  
Problem: Patient Education: Go to Patient Education Activity Goal: Patient/Family Education Outcome: Progressing Towards Goal 
  
Problem: Sepsis: Day of Diagnosis Goal: Off Pathway (Use only if patient is Off Pathway) Outcome: Progressing Towards Goal 
Goal: *Fluid resuscitation Outcome: Progressing Towards Goal 
Goal: *Paired blood cultures prior to first dose of antibiotic Outcome: Progressing Towards Goal 
Goal: *First dose of  appropriate antibiotic within 3 hours of arrival to ED, within 1 hour of arrival to ICU Outcome: Progressing Towards Goal 
Goal: *Lactic acid with first set of blood cultures Outcome: Progressing Towards Goal 
Goal: *Pneumococcal immunization (if eligible) Outcome: Progressing Towards Goal 
Goal: *Influenza immunization (if eligible) Outcome: Progressing Towards Goal 
Goal: Activity/Safety Outcome: Progressing Towards Goal 
Goal: Consults, if ordered Outcome: Progressing Towards Goal 
Goal: Diagnostic Test/Procedures Outcome: Progressing Towards Goal 
Goal: Nutrition/Diet Outcome: Progressing Towards Goal 
Goal: Discharge Planning Outcome: Progressing Towards Goal 
Goal: Medications Outcome: Progressing Towards Goal 
Goal: Respiratory Outcome: Progressing Towards Goal 
Goal: Treatments/Interventions/Procedures Outcome: Progressing Towards Goal 
Goal: Psychosocial 
Outcome: Progressing Towards Goal 
  
Problem: Sepsis: Day 2 Goal: Off Pathway (Use only if patient is Off Pathway) Outcome: Progressing Towards Goal 
Goal: *Central Venous Pressure maintained at 8-12 mm Hg Outcome: Progressing Towards Goal 
Goal: *Hemodynamically stable Outcome: Progressing Towards Goal 
Goal: *Tolerating diet Outcome: Progressing Towards Goal 
Goal: Activity/Safety Outcome: Progressing Towards Goal 
Goal: Consults, if ordered Outcome: Progressing Towards Goal 
Goal: Diagnostic Test/Procedures Outcome: Progressing Towards Goal 
Goal: Nutrition/Diet Outcome: Progressing Towards Goal 
Goal: Discharge Planning Outcome: Progressing Towards Goal 
Goal: Medications Outcome: Progressing Towards Goal 
Goal: Respiratory Outcome: Progressing Towards Goal 
Goal: Treatments/Interventions/Procedures Outcome: Progressing Towards Goal 
Goal: Psychosocial 
Outcome: Progressing Towards Goal 
  
Problem: Sepsis: Day 3 Goal: Off Pathway (Use only if patient is Off Pathway) Outcome: Progressing Towards Goal 
Goal: *Central Venous Pressure maintained at 8-12 mm Hg Outcome: Progressing Towards Goal 
Goal: *Oxygen saturation within defined limits Outcome: Progressing Towards Goal 
Goal: *Vital sign stability Outcome: Progressing Towards Goal 
Goal: *Tolerating diet Outcome: Progressing Towards Goal 
Goal: *Demonstrates progressive activity Outcome: Progressing Towards Goal 
Goal: Activity/Safety Outcome: Progressing Towards Goal 
Goal: Consults, if ordered Outcome: Progressing Towards Goal 
Goal: Diagnostic Test/Procedures Outcome: Progressing Towards Goal 
Goal: Nutrition/Diet Outcome: Progressing Towards Goal 
Goal: Discharge Planning Outcome: Progressing Towards Goal 
Goal: Medications Outcome: Progressing Towards Goal 
Goal: Respiratory Outcome: Progressing Towards Goal 
Goal: Treatments/Interventions/Procedures Outcome: Progressing Towards Goal 
Goal: Psychosocial 
Outcome: Progressing Towards Goal 
  
Problem: Sepsis: Day 4 Goal: Off Pathway (Use only if patient is Off Pathway) Outcome: Progressing Towards Goal 
Goal: Activity/Safety Outcome: Progressing Towards Goal 
Goal: Consults, if ordered Outcome: Progressing Towards Goal 
Goal: Diagnostic Test/Procedures Outcome: Progressing Towards Goal 
Goal: Nutrition/Diet Outcome: Progressing Towards Goal 
Goal: Discharge Planning Outcome: Progressing Towards Goal 
Goal: Medications Outcome: Progressing Towards Goal 
Goal: Respiratory Outcome: Progressing Towards Goal 
Goal: Treatments/Interventions/Procedures Outcome: Progressing Towards Goal 
Goal: Psychosocial 
Outcome: Progressing Towards Goal 
Goal: *Oxygen saturation within defined limits Outcome: Progressing Towards Goal 
Goal: *Hemodynamically stable Outcome: Progressing Towards Goal 
Goal: *Vital signs within defined limits Outcome: Progressing Towards Goal 
Goal: *Tolerating diet Outcome: Progressing Towards Goal 
Goal: *Demonstrates progressive activity Outcome: Progressing Towards Goal 
Goal: *Fluid volume maintenance Outcome: Progressing Towards Goal 
  
Problem: Sepsis: Day 5 Goal: Off Pathway (Use only if patient is Off Pathway) Outcome: Progressing Towards Goal 
Goal: *Oxygen saturation within defined limits Outcome: Progressing Towards Goal 
Goal: *Vital signs within defined limits Outcome: Progressing Towards Goal 
Goal: *Tolerating diet Outcome: Progressing Towards Goal 
Goal: *Demonstrates progressive activity Outcome: Progressing Towards Goal 
Goal: *Discharge plan identified Outcome: Progressing Towards Goal 
Goal: Activity/Safety Outcome: Progressing Towards Goal 
Goal: Consults, if ordered Outcome: Progressing Towards Goal 
Goal: Diagnostic Test/Procedures Outcome: Progressing Towards Goal 
Goal: Nutrition/Diet Outcome: Progressing Towards Goal 
Goal: Discharge Planning Outcome: Progressing Towards Goal 
Goal: Medications Outcome: Progressing Towards Goal 
Goal: Respiratory Outcome: Progressing Towards Goal 
Goal: Treatments/Interventions/Procedures Outcome: Progressing Towards Goal 
Goal: Psychosocial 
Outcome: Progressing Towards Goal 
  
Problem: Sepsis: Day 6 Goal: Off Pathway (Use only if patient is Off Pathway) Outcome: Progressing Towards Goal 
Goal: *Oxygen saturation within defined limits Outcome: Progressing Towards Goal 
Goal: *Vital signs within defined limits Outcome: Progressing Towards Goal 
Goal: *Tolerating diet Outcome: Progressing Towards Goal 
Goal: *Demonstrates progressive activity Outcome: Progressing Towards Goal 
Goal: Influenza immunization Outcome: Progressing Towards Goal 
Goal: *Pneumococcal immunization Outcome: Progressing Towards Goal 
Goal: Activity/Safety Outcome: Progressing Towards Goal 
Goal: Diagnostic Test/Procedures Outcome: Progressing Towards Goal 
Goal: Nutrition/Diet Outcome: Progressing Towards Goal 
Goal: Discharge Planning Outcome: Progressing Towards Goal 
Goal: Medications Outcome: Progressing Towards Goal 
Goal: Respiratory Outcome: Progressing Towards Goal 
Goal: Treatments/Interventions/Procedures Outcome: Progressing Towards Goal 
Goal: Psychosocial 
Outcome: Progressing Towards Goal 
  
Problem: Sepsis: Discharge Outcomes Goal: *Vital signs within defined limits Outcome: Progressing Towards Goal 
Goal: *Tolerating diet Outcome: Progressing Towards Goal 
Goal: *Verbalizes understanding and describes prescribed diet Outcome: Progressing Towards Goal 
Goal: *Demonstrates progressive activity Outcome: Progressing Towards Goal 
Goal: *Describes follow-up/return visits to physicians Outcome: Progressing Towards Goal 
Goal: *Verbalizes name, dosage, time, side effects, and number of days to continue medications Outcome: Progressing Towards Goal 
Goal: *Influenza immunization (Oct-Mar only) Outcome: Progressing Towards Goal 
Goal: *Pneumococcal immunization Outcome: Progressing Towards Goal 
Goal: *Lungs clear or at baseline Outcome: Progressing Towards Goal 
Goal: *Oxygen saturation returns to baseline or 90% or better on room air Outcome: Progressing Towards Goal 
Goal: *Glycemic control Outcome: Progressing Towards Goal 
Goal: *Absence of deep venous thrombosis signs and symptoms(Stroke Metric) Outcome: Progressing Towards Goal 
Goal: *Describes available resources and support systems Outcome: Progressing Towards Goal 
Goal: *Optimal pain control at patient's stated goal 
Outcome: Progressing Towards Goal

## 2021-05-28 NOTE — PROGRESS NOTES
I discussed plan with pt.'s nurse The patient.'s sisters are planning to discuss hospice community home with pt.'s mother tonight. There is a possibility that pt may be transferred to the [facility] @ some point this weekend depending upon family's decision. Danny Aguirre Hospice nurse will direct this with treatment team tomorrow after discussing further with family. If needed ,nursing please contact [facility] @ [phone] to set up transportation  PCS has been completed for transport to the hospice house  And placed in pt.'s bedside chart. The address for the hospice house is [address] [place] [address]. The number to call report is [phone]. The hospice nurse will direct you tomorrow with discharge times,etc.plus in coordination with the [facility] team. 
 
I updated Dr Gomez More regarding the tentative plan tomorrow or this weekend pending family's decision.  
 
Ben Graham

## 2021-05-28 NOTE — CONSULTS
Neurology Consult - Inpatient Name:   Zahra Louise 
MRN:    [id number] Date of Admission:  5/24/2021 Date of Consultation:  05/28/21 HISTORY OF PRESENT ILLNESS:  
 
This is a 49 y.o. female with  has a past medical history of Acute cystitis without hematuria (4/3/2021), VICK (acute kidney injury) ([address] (3/31/2021), C. difficile diarrhea (3/28/2020), Clostridium difficile diarrhea (6/30/2020), Constipation (12/11/2019), Diarrhea in adult patient (3/28/2020), Down syndrome, Elevated Dilantin level (4/3/2021), History of vascular access device (06/30/2020), History of vascular access device (04/01/2021), Oral thrush (3/29/2020), Positive fecal occult blood test (3/29/2020), Seizures ([address] Sleep apnea, Status epilepticus ([address] (3/30/2021), Stroke ([address] (2019), Thrombocytosis ([address] (3/31/2021), and VAP (ventilator-associated pneumonia) ([address] (4/9/2021). She also has no past medical history of Adverse effect of anesthesia, Diabetes ([address] Difficult intubation, Malignant hyperthermia due to anesthesia, Nausea & vomiting, or Pseudocholinesterase deficiency. Ana Luisa Quintero Pt is admitted with severe sepsis. Pt is known to neurology service for hx of Epilepsy, hx of Down's syndrome, severe MR, lives in group home. Sees Dr CARO [facility] Neurology as outpatient [facility]). Neurology is asked to adjust Phenytoin due to low blood level (free phenytoin is < 0.4 (rr 1.0 to 2.0). Home AEDs:  
Phenytoin 50 mg chewtab, 3 tabs per G-tube three times a day 
Keppra 100 mg/ mL, 750 mg per G-Tube every 12 hours Correcting free phenytoin level for albumin level, patient's free phenytoin level is zero (\"-0.03\"). Will bolus fosphenytoin 15 mg/ kg/ PE IV x 1 (950 mg PE) now to get blood level up Will increase Phenytoin to 200 mg per G-tube three times a day Will increase Keppra to 1000 mg per G-Tube twice a day Complete Review of Systems: reviewed on admission H&P 
 
==================================== Allergies Allergen Reactions  Depakote [Divalproex] Swelling Current Outpatient Medications Medication Instructions  acetaminophen (TYLENOL) 500 mg, Per G Tube, EVERY 6 HOURS AS NEEDED  
 albuterol-ipratropium (DUO-NEB) 2.5 mg-0.5 mg/3 ml nebu 3 mL, Nebulization, EVERY 4 HOURS WHILE AWAKE RESP  
 albuterol-ipratropium (DUO-NEB) 2.5 mg-0.5 mg/3 ml nebu 3 mL, Nebulization, EVERY 4 HOURS AS NEEDED  calamine-menthoL-petrolat-zinc (Remedy Calazime Protect Paste) 3.5-0.2-69-16.5 % pste Apply to affected area as needed with each pullup change  cetirizine (ZYRTEC) 10 mg, Per G Tube, DAILY  diazePAM (VALIUM) 5-7.5-10 mg kit 10 mg, Rectal, AS NEEDED  
 glycopyrrolate (ROBINUL) 1 mg, Per G Tube, 3 TIMES DAILY AS NEEDED  
 guaiFENesin (SILTUSSIN SA) 200 mg, Per G Tube, 4 TIMES DAILY  levETIRAcetam (KEPPRA) 750 mg, Per G Tube, EVERY 12 HOURS  
 LORazepam (ATIVAN) 0.5 mg, Per G Tube, EVERY 6 HOURS AS NEEDED  
 melatonin 3 mg, Per G Tube, BEDTIME PRN, Must give by 10pm  
 mupirocin (BACTROBAN) 2 % ointment Topical, 2 TIMES DAILY  naloxone (NARCAN) 4 mg/actuation nasal spray Use 1 spray intranasally, then discard. Repeat with new spray every 2 min as needed for opioid overdose symptoms, alternating nostrils.  omeprazole (PRILOSEC) 40 mg, 2 TIMES DAILY, Per tube  OTHER,NON-FORMULARY, Per G Tube, 4 TIMES DAILY, OSMOLITE 1.2 Marino Liquid  phenytoin (DILANTIN) 50 mg chewable tablet GIVE 3 TABLETS (=150MG) VIA G-TUBE 3 TIMES PER DAY FOR SEIZURES.  potassium chloride (KAON 10%) 20 mEq/15 mL solution 20 mEq, Per G Tube, DAILY  tobramycin (BETHKIS) 300 mg, Inhalation, 2 TIMES DAILY  torsemide (DEMADEX) 40 mg, Per G Tube, DAILY Current Facility-Administered Medications Medication Dose Route Frequency Provider Last Rate Last Admin  fosphenytoin (CEREBYX) 950 mg PE in 0.9% sodium chloride 100 mL IVPB  15 mg PE/kg IntraVENous NOW Susan Howard MD      
 levETIRAcetam (KEPPRA) oral solution 1,000 mg  1,000 mg Per G Tube Q12H Damir Omalley MD      
 phenytoin (DILANTIN) chewable tablet 200 mg  200 mg Per G Tube TID Damir Omalley MD      
 morphine injection 1 mg  1 mg IntraVENous Q4H PRN Harjit Guajardo MD   1 mg at 05/27/21 2110  LORazepam (ATIVAN) injection 1 mg  1 mg IntraVENous Q4H PRN Harjit Guajardo MD   1 mg at 05/27/21 1649  oxyCODONE IR (ROXICODONE) tablet 5 mg  5 mg Oral Q8H Devin Ayala MD   5 mg at 05/28/21 0531  
 lactated Ringers infusion  100 mL/hr IntraVENous CONTINUOUS Harjit Guajardo  mL/hr at 05/28/21 0626 100 mL/hr at 05/28/21 9489  potassium bicarb-citric acid (EFFER-K) tablet 20 mEq  20 mEq Per G Tube DAILY Raymond Coon MD   20 mEq at 05/28/21 4748  LORazepam (ATIVAN) tablet 0.5 mg  0.5 mg Per G Tube Q2H Raymond Coon MD   0.5 mg at 05/28/21 0932  
 lansoprazole (PREVACID) 3 mg/mL oral suspension 30 mg  30 mg Per G Tube BID Harjit Guajardo MD   30 mg at 05/28/21 0932  piperacillin-tazobactam (ZOSYN) 3.375 g in 0.9% sodium chloride (MBP/ADV) 100 mL MBP  3.375 g IntraVENous Q8H Raymond Coon MD 25 mL/hr at 05/28/21 0930 3.375 g at 05/28/21 0930  
 dexmedeTOMidine in 0.9 % NaCl (PRECEDEX) 400 mcg/100 mL (4 mcg/mL) infusion soln  0.1-1.5 mcg/kg/hr IntraVENous TITRATE Merle Hodges MD   Stopped at 05/27/21 0830  
 diazePAM (VALIUM) 5-7.5-10 mg rectal kit 10 mg (Patient Supplied)  10 mg Rectal PRN Danny Bueno MD      
 mupirocin (BACTROBAN) 2 % ointment   Topical BID Lissette Zaldivar MD   Given at 05/28/21 3650  midodrine (PROAMATINE) tablet 15 mg  15 mg Per G Tube Q8H Raymond Coon MD   15 mg at 05/28/21 0530  
 sodium chloride (NS) flush 5-10 mL  5-10 mL IntraVENous PRN Evert Walker MD      
 vancomycin ORLIN YOUNG [facility]) 50 mg/mL oral solution 500 mg  500 mg Oral Q6H Danny Bueno MD   500 mg at 05/28/21 1255  sodium chloride (NS) flush 5-40 mL  5-40 mL IntraVENous Q8H Danny Bueno MD   10 mL at 05/28/21 1461  sodium chloride (NS) flush 5-40 mL  5-40 mL IntraVENous PRN Addi Bergman MD      
 acetaminophen (TYLENOL) tablet 650 mg  650 mg Oral Q6H PRN Addi Bergman MD   650 mg at 05/26/21 9258 Or  acetaminophen (TYLENOL) suppository 650 mg  650 mg Rectal Q6H PRN Addi Bergman MD      
 polyethylene glycol (MIRALAX) packet 17 g  17 g Oral DAILY PRN Addi Bergman MD      
 heparin (porcine) injection 5,000 Units  5,000 Units SubCUTAneous Q8H Addi Bergman MD   5,000 Units at 05/28/21 0530  
 albuterol-ipratropium (DUO-NEB) 2.5 MG-0.5 MG/3 ML  3 mL Nebulization Q4H PRN Addi Bergman MD      
 [Held by provider] torsemide [facility]) tablet 40 mg  40 mg Per G Tube DAILY Addi Bergman MD      
 glycopyrrolate (ROBINUL) tablet 1 mg  1 mg Per G Tube TID PRN Addi Bergman MD   1 mg at 05/28/21 1034 PSHx  has a past surgical history that includes hx gi; hx other surgical; and colonoscopy (N/A, 7/8/2020). SocHx  reports that she has never smoked. She has never used smokeless tobacco. She reports that she does not drink alcohol and does not use drugs. FHx Family history is unknown by patient. PHYSICAL EXAM 
 
Patient Vitals for the past 4 hrs: 
 Temp Pulse Resp BP SpO2  
05/28/21 1206  89 30 (!) 94/49 100 % 05/28/21 1200 97.6 °F (36.4 °C)      
05/28/21 1130  87 (!) 35 (!) 88/51 100 % 05/28/21 1100  (!) 113 (!) 39  98 % 05/28/21 1037     100 % 05/28/21 1000  (!) 102 (!) 35 133/85 100 % 05/28/21 0900  64 27 (!) 81/55 98 % Lethargic, resting in bed, non-verbal at baseline. Briefly opens eyes to voice. Doesn't follow any commands (baseline). Face appears symmetric. EOMs briefly visualized and conjugate movements. 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 DTRs deferred. Labs/ Radiology See HPI No results found. Assessment/ Plan ICD-10-CM ICD-9-CM 1.  Sepsis with acute organ dysfunction without septic shock, due to unspecified organism, unspecified type ([facility]  A41.9 038.9   
 R65.20 995.92   
2. Hypertensive urgency  I16.0 401.9 3. Lactic acidosis  E87.2 276.2 4. Severe sepsis (HCC)  A41.9 038.9   
 R65.20 995.92   
5. Down's syndrome  Q90.9 758.0 6. Seizure ([facility]  R56.9 780.39   
7. Pneumonia due to infectious organism, unspecified laterality, unspecified part of lung  J18.9 486   
8. Tachypnea  R06.82 786.06   
9. Chronic static encephalopathy  G93.49 348.39   
10. Inability to walk  R26.2 719.7 11. Iron deficiency  E61.1 280.9 12. Gastroesophageal reflux disease without esophagitis  K21.9 530.81   
13. Anemia, unspecified type  D64.9 285.9 14. Tracheostomy dependence ([facility]  Z93.0 V44.0 15. Wheelchair bound  Z99.3 V46.3 16. Subtherapeutic phenytoin level  Z51.81 V58.83   
17. On tube feeding diet  Z78.9 V49.89   
18. Hypoxia  R09.02 799.02   
19. Shortness of breath  R06.02 786.05   
20. Agitation  R45.1 307.9 21. DNR (do not resuscitate) discussion  Z71.89 V65.49   
22. Goals of care, counseling/discussion  Z71.89 V65.49   
23. Severe intellectual disability  F72 318.1 24. Hx of seizure disorder  Z86.69 V12.49   
 
 
AED changes as noted above Ordered repeat free Phenytoin level for tomorrow AM 
 
Dr Froy Mak takes over Neurology service at [facility] today. Will have him follow up on Dilantin level and make any further adjustments Thank you for asking the Neurology Service to evaluate Amairani Brown. Signed By: Rakesh Simpson MD   
 May 28, 2021

## 2021-05-28 NOTE — PROGRESS NOTES
Bedside and Verbal shift change report given to [facility] (oncoming nurse) by Austin Sahu RN (offgoing nurse). Report included the following information SBAR, Kardex, ED Summary, Procedure Summary, Intake/Output, MAR, Recent Results, Med Rec Status and Cardiac Rhythm NSR. Primary Nurse Patricia Casillas RN and Austin Sahu RN performed a dual skin assessment on this patient Impairment noted- see wound doc flow sheet Rolf score is 13 Bedside and Verbal shift change report given to [address] [place] [address] (oncoming nurse) by Cam RUDOLPH (offgoing nurse).  Report included the following information SBAR, Kardex, ED Summary, Procedure Summary, Intake/Output, MAR, Accordion, Recent Results and Med Rec Status. '

## 2021-05-28 NOTE — PROGRESS NOTES
[facility] 
 
ICU TEAM Progress Note Name: Vikram Butler : 1970 MRN: [id number] Date: 2021 ICU Assessment 1. Severe sepsis 2. HAP 3. Hypotension 4. Chronic respiratory failure s/p trach/PEG 5. Seizures 6. CKD3 ICU Comprehensive Plan of Care:  
-Continue /hr 
-Continue zosyn and PO vancomycin 
-Continue Keppra and Dilantin 
-Scheduled oxycodone and ativan 
-Midodrine 15 TID 
-subQ heparin 
-Pepcid per PEG 
-Precedex with goal RASS 0 to -1 1. Discussed Care Plan with Bedside RN 2. Documentation of Current Medications ICU Issues: 
D- Delirium assessement CAM-ICU: Assessments ordered E- Early Mobility/ PT: Will order when appropriate F- Feeding: TF 
Peptic Ulcer Disease Prophylaxis: PPI 
DVT Prophylaxis: [facility] Glycemic Control (140-180 mg/dL): WNL Catheter Discontinuation (CVC, arterial, urinary, gastric, rectal): Keep all Antibiotics: Zosyn and PO vanco 
Steroids: None MAR Review (pain, anxiety, constipation . Danielle Dues Danielle Dues ): Completed Code Status: DNR but not DNI 
LOS: 4 Subjective:  
Progress Note: 2021 Reason for ICU Admission: severe sepsis HPI:   64-year-old woman, Down syndrome, resident of a group home, chronic tracheostomy and gastrostomy loan with a history of seizures encephalomalacia, presented the hospital because of fever and diarrhea. Recent diagnosis of Clostridium difficile colitis. She was brought to the ICU because of hypotension and possible sepsis. The patient is unable to provide answers to interview questions. She is bedridden with contractures Overnight Events:  
2021 DANO overnight. Changed to partial code yesterday. SBP fluctuating. On midodrine. On TF boluses. Thick secretions. Active Problem List:  
 
Problem List  Date Reviewed: 2021 Codes Class Lactic acidosis ICD-10-CM: E87.2 ICD-9-CM: 276.2 Hypertensive urgency ICD-10-CM: I16.0 ICD-9-CM: 401.9  CKD (chronic kidney disease) ICD-10-CM: N18.9 ICD-9-CM: 585.9 Pneumonia ICD-10-CM: J18.9 ICD-9-CM: 854 * (Principal) Severe sepsis ([facility] ICD-10-CM: A41.9, R65.20 ICD-9-CM: 038.9, 995.92 Anemia ICD-10-CM: D64.9 ICD-9-CM: 042. 9 Chronic respiratory failure with hypoxia (HCC) ICD-10-CM: J96.11 
ICD-9-CM: 518.83, 799.02 Chronic static encephalopathy ICD-10-CM: G93.49 
ICD-9-CM: 348.39 Swelling of hand ICD-10-CM: M79.89 ICD-9-CM: 729.81 Subtherapeutic phenytoin level ICD-10-CM: Z51.81 
ICD-9-CM: V58.83 Tracheostomy dependence ([facility] ICD-10-CM: Z93.0 ICD-9-CM: V44.0 Acute respiratory failure with hypoxia (HCC) ICD-10-CM: J96.01 
ICD-9-CM: 518.81 Acute kidney injury ([facility] ICD-10-CM: N17.9 ICD-9-CM: 584.9 Hyperphosphatemia ICD-10-CM: E83.39 
ICD-9-CM: 275.3 On tube feeding diet ICD-10-CM: Z78.9 ICD-9-CM: V49.89 Wheelchair bound ICD-10-CM: Z99.3 ICD-9-CM: V46.3 Inability to walk ICD-10-CM: R26.2 ICD-9-CM: 719.7 Seizure ([facility] ICD-10-CM: R56.9 ICD-9-CM: 780.39 Gastroesophageal reflux disease without esophagitis ICD-10-CM: K21.9 ICD-9-CM: 530.81 Down's syndrome ICD-10-CM: Q90.9 ICD-9-CM: 758.0 Iron deficiency ICD-10-CM: E61.1 ICD-9-CM: 280.9 Severe intellectual disability ICD-10-CM: F72 
ICD-9-CM: 318.1 Overview Signed 2/2/2012  1:40 PM by Bebe Mayers MD  
  [facility] Past Medical History:  
 
 has a past medical history of Acute cystitis without hematuria (4/3/2021), VICK (acute kidney injury) ([facility] (3/31/2021), C. difficile diarrhea (3/28/2020), Clostridium difficile diarrhea (6/30/2020), Constipation (12/11/2019), Diarrhea in adult patient (3/28/2020), Down syndrome, Elevated Dilantin level (4/3/2021), History of vascular access device (06/30/2020), History of vascular access device (04/01/2021), Oral thrush (3/29/2020), Positive fecal occult blood test (3/29/2020), Seizures ([facility] Sleep apnea, Status epilepticus ([facility] (3/30/2021), Stroke ([facility] (2019), Thrombocytosis ([facility] (3/31/2021), and VAP (ventilator-associated pneumonia) ([facility] (4/9/2021). She also has no past medical history of Adverse effect of anesthesia, Diabetes ([facility] Difficult intubation, Malignant hyperthermia due to anesthesia, Nausea & vomiting, or Pseudocholinesterase deficiency. Past Surgical History:  
 
 has a past surgical history that includes hx gi; hx other surgical; and colonoscopy (N/A, 7/8/2020). Home Medications:  
 
Prior to Admission medications Medication Sig Start Date End Date Taking? Authorizing Provider  
tobramycin (Bethkis) 300 mg/4 mL nebu Take 300 mg by inhalation two (2) times a day. 5/3/21  Yes Provider, Historical  
potassium chloride (KAON 10%) 20 mEq/15 mL solution 15 mL by Per G Tube route daily. 5/18/21  Yes Sidney Sawyer, DO  
mupirocin (BACTROBAN) 2 % ointment Apply  to affected area two (2) times a day. 5/18/21  Yes Sidney Sawyer DO  
acetaminophen (TYLENOL) 100 mg/mL suspension 5 mL by Per G Tube route every six (6) hours as needed for Fever. 5/13/21  Yes Herminia Sawyer, DO  
calamine-menthoL-petrolat-zinc (Remedy Calazime Protect Paste) 3.5-0.2-69-16.5 % pste Apply to affected area as needed with each pullup change 5/7/21  Yes Sidney Sawyer DO  
LORazepam (ATIVAN) 0.5 mg tablet 1 Tab by Per G Tube route every six (6) hours as needed for Anxiety or Agitation (Agitation: For heart rate >120 and/or respiratory rate >35. Do not give if blood pressure <100/60). Max Daily Amount: 2 mg. 5/6/21  Yes Sidney Sawyer DO  
midodrine (PROAMATINE) 5 mg tablet 3 Tabs by Per G Tube route every eight (8) hours for 30 days. Hold for systolic over 575 or diastolic over 70 8/89/49 5/[birth year]72 Yes Sidney aSwyer DO  
oxyCODONE IR (ROXICODONE) 5 mg immediate release tablet 1 Tab by Per G Tube route every six (6) hours as needed for Pain for up to 30 days.  Max Daily Amount: 20 mg. 1 Tab by Per G Tube route every six (6) hours as needed for Pain (Agitation: For heart rate >120 and/or respiratory rate >35. Do not give if blood pressure <100/60) 4/27/21 5/27/21 Yes Ti Sawyer, DO  
naloxone (NARCAN) 4 mg/actuation nasal spray Use 1 spray intranasally, then discard. Repeat with new spray every 2 min as needed for opioid overdose symptoms, alternating nostrils. 4/14/21  Yes William Espinoza MD  
glycopyrrolate (ROBINUL) 1 mg tablet 1 Tab by Per G Tube route three (3) times daily as needed (increased secretions from tracheal tube) for up to 30 doses. 4/14/21  Yes William Espinoza MD  
cetirizine (ZYRTEC) 10 mg tablet 10 mg by Per G Tube route daily. Yes Provider, Historical  
omeprazole (PRILOSEC) 40 mg capsule 40 mg two (2) times a day. Per tube   Yes Provider, Historical  
guaiFENesin (Siltussin SA) 100 mg/5 mL liquid 200 mg by Per G Tube route four (4) times daily. Yes Provider, Historical  
torsemide (DEMADEX) 20 mg tablet 40 mg by Per G Tube route daily. Yes Provider, Historical  
albuterol-ipratropium (DUO-NEB) 2.5 mg-0.5 mg/3 ml nebu 3 mL by Nebulization route every four (4) hours as needed for Shortness of Breath. Yes Provider, Historical  
melatonin 3 mg tablet 1 Tab by Per G Tube route nightly as needed for Insomnia. Must give by 10pm 10/8/20  Yes Beverley Connolly, DO  
diazePAM (VALIUM) 5-7.5-10 mg kit Insert 10 mg into rectum as needed (for seizures that last longer than 5 minutes). Yes Provider, Historical  
OTHER,NON-FORMULARY, by Per G Tube route four (4) times daily. OSMOLITE 1.2 Marino Liquid   Yes Provider, Historical  
levETIRAcetam (KEPPRA) 100 mg/mL solution 750 mg by Per G Tube route every twelve (12) hours. Yes Provider, Historical  
albuterol-ipratropium (DUO-NEB) 2.5 mg-0.5 mg/3 ml nebu 3 mL by Nebulization route every 4 hours daily while awake resp.    Yes Provider, Historical  
phenytoin (DILANTIN) 50 mg chewable tablet GIVE 3 TABLETS (=150MG) VIA G-TUBE 3 TIMES PER DAY FOR SEIZURES. 21   Scott Hayward, DO Allergies/Social/Family History: Allergies Allergen Reactions  Depakote [Divalproex] Swelling Social History Tobacco Use  Smoking status: Never Smoker  Smokeless tobacco: Never Used Substance Use Topics  Alcohol use: Never Family History Family history unknown: Yes Review of Systems:  
 
Unable to be obtained secondary to clinical condition. Objective:  
Vital Signs: 
Visit Vitals BP (!) 136/104 (BP 1 Location: Left upper arm, BP Patient Position: At rest) Pulse 98 Temp (!) 96.7 °F (35.9 °C) Resp (!) 37 Ht 4' 9\" (1.448 m) Wt 62 kg (136 lb 11 oz) SpO2 92% BMI 29.58 kg/m² O2 Flow Rate (L/min): 9 l/min O2 Device: None (Room air) Temp (24hrs), Av.9 °F (36.6 °C), Min:96.7 °F (35.9 °C), Max:98.5 °F (36.9 °C) Intake/Output:  
 
Intake/Output Summary (Last 24 hours) at 2021 7358 Last data filed at 2021 0800 Gross per 24 hour Intake 2961.67 ml Output 1000 ml Net 1961.67 ml Physical Exam: 
Performed via video assessment. Gen: Patient is not alert. Doesn't follow commands HEENT: NC/AT Chest: Chest movement is equal bilaterally Cardiac: Cardiac monitor reveals SR Extremities: Extremities appear well perfused with no obvious edema Neuro: Nonfocal 
 
LABS AND  DATA: Personally reviewed Recent Labs  
  21 
0405 WBC 7.2 10.3 HGB 8.8* 8.4* HCT 28.5* 26.3*  
 259 Recent Labs  
  21 
0405 * 147* K 3.5 3.5 * 121* CO2 21 18* BUN 21* 29* CREA 0.80 0.98  
* 135* CA 8.0* 7.7* Recent Labs  
  21 
0405 AP 95 109  
TP 5.8* 6.1* ALB 2.0* 1.7*  
GLOB 3.8 4.4* No results for input(s): INR, PTP, APTT, INREXT in the last 72 hours. No results for input(s): PHI, PCO2I, PO2I, FIO2I in the last 72 hours.  
No results for input(s): CPK, CKMB, TROIQ, BNPP in the last 72 hours. Hemodynamics:  
PAP:   CO:    
Wedge:   CI:    
CVP:    SVR:    
  PVR:    
 
Ventilator Settings: 
Mode Rate Tidal Volume Pressure FiO2 PEEP  
         21 % Peak airway pressure:     
Minute ventilation:     
 
 
MEDS: Reviewed Chest X-Ray: CXR Results  (Last 48 hours) 05/27/21 1131  XR CHEST PORT Final result Impression:  1. Increased interstitial and airspace infiltrate in the left lung as described  
above consistent with worsening pneumonia. Narrative:  EXAM: XR CHEST PORT INDICATION: fever COMPARISON: 5/24/2021 and CT scan of 5/25/2021 FINDINGS: A portable AP radiograph of the chest was obtained at 1126 hours. The  
patient is on a cardiac monitor. The tracheostomy tube overlies the airway. The  
lungs demonstrate interval increase in interstitial airspace opacities within  
the left lung especially left suprahilar region and retrocardiac region this is  
probably due to worsening pneumonia. Interstitial opacities in the right lung  
are unchanged. No pleural effusions. Heart size is normal.  
   
  
  
 
Multidisciplinary Rounds Completed:  Yes DISPOSITION 
ICU Critical Care Time The patient is critically ill with severe sepsis. If I do not acutely intervene upon these illnesses, the patient's life is in danger. These illnesses have required me to: (1) perform high complexity decision making for assessment and support; (2) assess the patient via video; (3) personally review the medical record and laboratory and diagnostic imaging results; (4) actively titrate high-alert medications; (5) manage the ventilator and actively titrate oxygen; (6) discuss this patient's case management with other healthcare providers; and (7) apply and interpret advanced monitoring techniques. As a result of this, I personally spent 35 minutes providing critical care services exclusively to this patient.   This was in exclusion of the time spent performing procedures or teaching. Arabella Yoder DO, MBA C/ [address] 5/28/2021

## 2021-05-28 NOTE — PROGRESS NOTES
[id number] [facility] PROGRAM 
PROGRESS NOTE  
 
5/28/2021 PCP: Lolis Thomas NP Assessment/Plan:  
 
Adithya Johnson is a 49 y.o. female with a PMHx of Down Syndrome (non-verbal at baseline), s/p Trach and PEG tube, seizures, encephalomalacia, ANNE, recent admission for HAP and C. diff colitis who is admitted for severe sepsis. Overnight events: None  
  
Severe sepsis with LA: 4/4 SIRS POA. LA 3.0 > 1.6. Procal 2.82. Possibly 2/2 C diff vs HAP. CT chest/abd/pelvis with minimal scattered groundglass lung attenuation. CXR on 5/27: Increase interstitial and airspace infiltrate in the left lung c/w worsening PNA. S/p Levaquin. Stool studies neg. BCx NG 5 days - Continue Zosyn IV (5/24) - Continue oral Vancomycin 500 mg q6h for total of 21 day course. (5/24) - Wound care following - Palliative following  
- Hospice consulted. Family to decide today if they will move forward to Hospice care. - WBC stool in process Elevated D-dimer: 9.2 POA. CT chest without PE. Possibly reactive 2/2 inflammation/infection. BLLE Dopplers Negative. Worsening Chronic Hypotension: has required pressors in the past, presented with Hypertensive Urgency on this admission, but is now starting to have MAPs in the high 50s to low 60s. S/p Albumin 12.5 mg x 3 doses - Continue home Midodrine 15mg TID Seizures: Phenytoin level 7.6 (low), Free Phenytoin level low. S/p Fosphenytoin 950 mg IV x1. Keppra level low (8.2) - Neurology consulted, Appre recs 
- Continue home Keppra 1000 mg q12h (Increased from 750 mg BID on 5/28) - Phenytoin 200 mg TID per G tube (increased from 150 TID on 5/28) - Free Phenytoin level today 
  
Trach dependent: since 2020 
- Home Duonebs q4h PRN  
- Glycopyrrolate for increased secretions  
  
GERD:  
- Hold protonix, while treated with PO vanco  
  
CKD3:  
- Monitor labs, avoid nephrotoxic agents if possible, IVF 
  
Chronic lower extremity edema: 
- Hold hold torsemide 40 mg daily  
  ? Chronic pain:  
- Hold home Oxy 5 mg q6h, until medications verified with group home, discontinued at group home 
  
Agitation: 
- Continue home lorazepam 0.5 mg q6h PRN  
- Precedex ggt off 
  
FEN/GI - Tube feeds, nutrition consulted. NS at 100 mL/hr. Activity - Bedrest 
DVT prophylaxis - Sub Q Heparin GI prophylaxis - Not indicated at this time Fall prophylaxis - Not indicated at this time. Disposition - Plan to d/c to Assisted Living. Consulting PT/OT/CM Code Status - Partial code no CPR and no shock. Marcial Six discussed with Dr. Mikey Peterson MD.  
 
Subjective: Pt was seen and examined at bedside. Patient nonverbal at baseline. Unable to obtain history. Objective:  
Physical examination Patient Vitals for the past 24 hrs: 
 Temp Pulse Resp BP SpO2  
05/28/21 1500  84 16 (!) 92/58 98 % 05/28/21 1430  (!) 102 (!) 43 (!) 111/98 95 % 05/28/21 1423     100 % 05/28/21 1406  98 (!) 36 94/75 98 % 05/28/21 1300  76 (!) 32 96/60   
05/28/21 1206  89 30 (!) 94/49 100 % 05/28/21 1200 97.6 °F (36.4 °C)      
05/28/21 1130  87 (!) 35 (!) 88/51 100 % 05/28/21 1100  (!) 113 (!) 39  98 % 05/28/21 1037     100 % 05/28/21 1000  (!) 102 (!) 35 133/85 100 % 05/28/21 0900  64 27 (!) 81/55 98 % 05/28/21 0800 (!) 96.7 °F (35.9 °C) 98 (!) 37 (!) 136/104 92 % 05/28/21 0716     100 % 05/28/21 0701  85     
05/28/21 0700  82 29  95 % 05/28/21 0600  99 (!) 38  100 % 05/28/21 0500  63 28 91/61 98 % 05/28/21 0400  (!) 59 26 (!) 80/58 98 % 05/28/21 0300  62 26 (!) 94/57 98 % 05/28/21 0200  64 26 (!) 86/68 99 % 05/28/21 0100  64 26 92/63 99 % 05/28/21 0000 98.2 °F (36.8 °C) 71 26 (!) 87/54 99 % 05/27/21 2300  73 28 (!) 80/51   
05/27/21 2200  80 18 (!) 78/48 100 % 05/27/21 2152  77     
05/27/21 2100  (!) 106 (!) 47 (!) 143/108 (!) 89 % 05/27/21 2000 98.5 °F (36.9 °C) 84 (!) 36 (!) 95/51 97 % 05/27/21 1930  80 (!) 31 (!) 80/47 95 % 21 1900  77 (!) 32 (!) 73/49 98 % 21 1800  82 (!) 34 (!) 80/43 100 % 21 1700  (!) 112 (!) 48 115/87 100 % 21 1600 98.1 °F (36.7 °C) 69 (!) 35 122/89 96 % Temp (24hrs), Av.8 °F (36.6 °C), Min:96.7 °F (35.9 °C), Max:98.5 °F (36.9 °C) O2 Flow Rate (L/min): 9 l/min O2 Device: None (Room air) Date 21 - 21 5153 21 - 21 9196 Shift 8412-8841 8707-2237 24 Hour Total 1900-0659 24 Hour Total  
INTAKE  
I.V.(mL/kg/hr) 406.7(0.5) 950(1.3) 1356.7(0.9) 1400  1400 Volume (sodium chloride 0.9 % bolus infusion 500 mL) 0  0 Volume (lactated Ringers infusion) 6.7 800 806.7 1200  1200 Volume (piperacillin-tazobactam (ZOSYN) 3.375 g in 0.9% sodium chloride (MBP/ADV) 100 mL MBP) 300 100 400 200  200 Volume (albumin human 25% (BUMINATE) solution 12.5 g) 100 50 150 NG/ 490 840 140  140 Water Flush Volume (mL) (PEG/Gastrostomy Tube 21) 150 150 300 100  100 Medication Volume (PEG/Gastrostomy Tube 21) 200 100 300 40  40 Intake (ml) (PEG/Gastrostomy Tube 21)  240 240 Shift Total(mL/kg) 756. 7(12.2) [id number]) 0291.3(38.4) 1261(78.5)  3274(88.5) OUTPUT Urine(mL/kg/hr) 500(0.7) 700(0.9) 1200(0.8) 300  300 Urine Voided    300  300 Urine Occurrence(s) 2 x  2 x Urine Output (mL) (External Urinary Catheter 21)  Emesis/NG output  0 0 Output (ml) (PEG/Gastrostomy Tube 21)  0 0 Stool Stool Occurrence(s) 2 x  2 x 1 x  1 x Shift Total(mL/kg) 500(8.1) 700(11.3) 1200(19.4) 300(4.8)  300(4.8) .7 740 996.7 1240  1240 Weight (kg) 62 62 62 62 62 62 General:   Ill-appearing, nonverbal at baseline Neck:  No deformities Lungs:   Scattered coarse breath sounds anteriorly Heart:   Regular rhythm, no murmur Abdomen:    Soft, mild distended, no apparent tenderness on exam, G tube in LUQ Extremities:  2+ edema in BL hands. Trace edema in BL feet/ankles, unna boots in place Skin:  Warm and dry, wound on left scalp Neurologic:  Alert, nonverbal at baseline. Data Review: CBC: 
Recent Labs  
  05/28/21 0522 05/27/21 0405 05/26/21 
0419 WBC 7.2 10.3 12.7* HGB 8.8* 8.4* 9.9*  
HCT 28.5* 26.3* 31.8*  
 259 278 Metabolic Panel: 
Recent Labs  
  05/28/21 
0522 05/27/21 
0405 05/26/21 
7204 * 147* 145  
K 3.5 3.5 3.2*  
* 121* 116* CO2 21 18* 18*  
BUN 21* 29* 36* CREA 0.80 0.98 1.37* * 135* 103* CA 8.0* 7.7* 8.5 ALB 2.0* 1.7* 2.1* TBILI 0.1* 0.1* 0.3 ALT 48 78 126* Micro: 
Lab Results Component Value Date/Time Culture result: NO GROWTH 4 DAYS 05/24/2021 07:00 PM  
 Culture result: NO GROWTH 4 DAYS 05/24/2021 06:35 PM  
 Culture result: SCANT PSEUDOMONAS AERUGINOSA (A) 04/16/2021 06:45 AM  
 Culture result: NO NORMAL RESPIRATORY MARINA ISOLATED 04/16/2021 06:45 AM  
 
Imaging: XR CHEST PORT Result Date: 5/24/2021 INDICATION:  Eval for Infiltrate EXAM: Chest single view. COMPARISON: 4/16/2021. FINDINGS: A single frontal view of the chest at 1906 hours shows persistent but improved diffuse parenchymal infiltrate. Tracheostomy device is stable over the airway. .  The heart, mediastinum and pulmonary vasculature are stable . The bony thorax is unremarkable for age. .  
 
Persistent but improved bilateral lung infiltrates. .  . Medications reviewed Current Facility-Administered Medications Medication Dose Route Frequency  levETIRAcetam (KEPPRA) oral solution 1,000 mg  1,000 mg Per G Tube Q12H  phenytoin (DILANTIN) chewable tablet 200 mg  200 mg Per G Tube TID  morphine injection 1 mg  1 mg IntraVENous Q4H PRN  
 LORazepam (ATIVAN) injection 1 mg  1 mg IntraVENous Q4H PRN  
 oxyCODONE IR (ROXICODONE) tablet 5 mg  5 mg Oral Q8H  
 lactated Ringers infusion  100 mL/hr IntraVENous CONTINUOUS  
 potassium bicarb-citric acid (EFFER-K) tablet 20 mEq  20 mEq Per G Tube DAILY  LORazepam (ATIVAN) tablet 0.5 mg  0.5 mg Per G Tube Q2H  
 lansoprazole (PREVACID) 3 mg/mL oral suspension 30 mg  30 mg Per G Tube BID  piperacillin-tazobactam (ZOSYN) 3.375 g in 0.9% sodium chloride (MBP/ADV) 100 mL MBP  3.375 g IntraVENous Q8H  
 dexmedeTOMidine in 0.9 % NaCl (PRECEDEX) 400 mcg/100 mL (4 mcg/mL) infusion soln  0.1-1.5 mcg/kg/hr IntraVENous TITRATE  diazePAM (VALIUM) 5-7.5-10 mg rectal kit 10 mg (Patient Supplied)  10 mg Rectal PRN  
 mupirocin (BACTROBAN) 2 % ointment   Topical BID  midodrine (PROAMATINE) tablet 15 mg  15 mg Per G Tube Q8H  
 sodium chloride (NS) flush 5-10 mL  5-10 mL IntraVENous PRN  
 vancomycin (FIRVANQ) 50 mg/mL oral solution 500 mg  500 mg Oral Q6H  
 sodium chloride (NS) flush 5-40 mL  5-40 mL IntraVENous Q8H  
 sodium chloride (NS) flush 5-40 mL  5-40 mL IntraVENous PRN  
 acetaminophen (TYLENOL) tablet 650 mg  650 mg Oral Q6H PRN Or  
 acetaminophen (TYLENOL) suppository 650 mg  650 mg Rectal Q6H PRN  polyethylene glycol (MIRALAX) packet 17 g  17 g Oral DAILY PRN  
 heparin (porcine) injection 5,000 Units  5,000 Units SubCUTAneous Q8H  
 albuterol-ipratropium (DUO-NEB) 2.5 MG-0.5 MG/3 ML  3 mL Nebulization Q4H PRN  
 [Held by provider] torsemide (DEMADEX) tablet 40 mg  40 mg Per G Tube DAILY  glycopyrrolate (ROBINUL) tablet 1 mg  1 mg Per G Tube TID PRN Signed: 
 Chey Leiva MD 
Family Medicine Resident Attending note: Attending note to follow. ..

## 2021-05-28 NOTE — WOUND CARE
Wound Consult: Follow Up Visit. Chart reviewed. Consulted for head wounds POA. Spoke with patients nurse,  Leticia Briggs and we were at bedside together to turn and provide care. Patient is resting on a Progress bed with air support mattress. Heels off loaded with boots. Patient is non-verbal, trach; requires two person assist to move side to side in bed. Rolf score 9. Assessment: 
Posterior head midline - ~ 1.5 x 1 cm area of brown/tan drying slough where eschar had come off initially, no drainage, no odor, stable in appearance, surrounded by hair; POA injury. Posterior head towards right side - ~ 1.5 x 1 cm area of dry dark brown/black eschar, no drainage, no odor, stable in appearance, surrounded by hair, POA injury. Buttocks/sacrum remain intact, no injury or redness, continues to have loose watery stools. Right lateral ankle with linear dry hyperpigmented darker area, no surrounding redness. Foam in use. No discoloration to heels. Treatment: 
Incontinence skin care. Repositioning. Used Bactroban to head. Wound Recommendations: 
Continue Bactroban to head wounds. Skin Care / PI Prevention Recommendations: 1. Minimize friction/shear: minimize layers of linen/pads under patient. 2. Off load pressure/reposition: turn and reposition approximately every 2 hours; float heels with  off loading heel boots; waffle cushion for sitting; position wedge. 3. Manage Moisture - keep skin folds dry; incontinence skin care with incontinence wipes; zinc barrier ointment;  temple in use to help contain urine. 4. Continue to monitor nutrition, pain, and skin risk scale, and skin assessment. Plan: We will continue to reassess routinely and as needed. Please re-consult should concerns arise despite continued skin/PI prevention measures. Ida Armas RN,[facility], Wound / Ostomy Department Wound Healing Office [phone]

## 2021-05-28 NOTE — HOSPICE
[facility] Good Help to Those in Need 
[phone] Patient Name: Randal Hernandez YOB: 1970 Age: 49 y.o. Carroll Koo RN Note:  Hospice consult noted. Chart reviewed. Called and spoke with patients sister Brook Mikey Johsnoncynthia. Reviewed how our services work and the support that we offer. Also why families choose hospice. She will speak with her mother and call back . Thank you for the opportunity to be of 
 service to this patient. Rand Kumar RN 
[phone] c 
[phone] o Met with daughter Mckenzie Beth and reviewed hospice services. She is going to speak to her mother and sister about it and reach out to staff about their decision.

## 2021-05-28 NOTE — PROGRESS NOTES
30 [facility], [address] with [address] [facility] Resident Progress Note in Brief S: Patient seen and examined at bedside. Alert but non-verbal.  
 
O: 
Visit Vitals /73 Pulse (!) 112 Temp 98.4 °F (36.9 °C) Resp (!) 50 Ht 4' 9\" (1.448 m) Wt 136 lb 11 oz (62 kg) SpO2 96% BMI 29.58 kg/m² Physical Examination:  
General:   Alert, non-verbal, appears uncomfortable Lungs: Tachypneic, scattered coarse breath sounds Heart:   Tachycardic, regular rhythm, no murmur Abdomen: Moderately distended, hyperactive bowel sounds, no apparent tenderness on exam, G tube in LUQ Extremities:  Trace edema in BL feet and dorsum of BL hands, unna boots in place Skin:  Warm and dry Neurologic:  Alert, non-verbal  
 
A/P:  
 
Laurie Hernandez is a 49 y.o. female with a PMHx of Down Syndrome (non-verbal at baseline), s/p Trach and PEG tube placement, seizure disorder, encephalomalacia, ANNE, recent admission for HAP and C. diff colitis who is admitted for severe sepsis. Severe sepsis: - S/p Levaquin. Remains on Zosyn and PO Vanc - MIVF LR at 100 mL/hour 
- Has not required pressure support. Home midodrine 15 mg TID. - Scheduled Ativan 0.5 mg q2hr and Oxycodone IR 5 mg q8h per G tube. - Palliative care and hospice team were consulted. CODE status changed to partial (No CPR) after family meeting. Possibly transitioning to hospice. Please see the primary team's daily progress note for the patient's full plan. Shaneka George, DO Family Medicine Resident

## 2021-05-28 NOTE — PROGRESS NOTES
Bedside and Verbal shift change report given to [address] [place] [address] (oncoming nurse) by [facility] (offgoing nurse). Report included the following information SBAR, Kardex, ED Summary, Procedure Summary, Intake/Output, MAR, Recent Results, Med Rec Status, Cardiac Rhythm Sinus Tach and Alarm Parameters . Primary Nurse Zoë Nichols RN and Tarik Paulson RN performed a dual skin assessment on this patient No impairment noted Rolf score is 11

## 2021-05-28 NOTE — PROGRESS NOTES
0700- Bedside shift change report given to Mary Garcia RN (oncoming nurse) by Cam RN (offgoing nurse). Report included the following information SBAR, Kardex, Intake/Output, MAR, Accordion, Recent Results, Med Rec Status, Cardiac Rhythm NSR, Alarm Parameters  and Quality Measures. Primary Nurse Boom Beck, RN and Cam RUDOLPH, RN performed a dual skin assessment on this patient No impairment noted Rolf score is 8 Assessment:  
Neuro: Patient responds to voice/touch. Non verbal, not following commands. Will open eyes but not track with eyes. Cardio: NSR. BP fluctuates. MD aware. LR infusing @100. Resp: medical air through Yana Pablo #6. Patient tachypnic. Thick secretions. Suction via trach in-line cannula. Coarse lung sounds. GI/Gu: PEG C/D/I. Bolus feeds x5 day. ABd firm, active bowel sounds. Karen Shade in place for incontinence. 0800 -  Mouth care and suction completed. Turned patient to right side. 1000 -  Mouth care and suction completed. Turned patient to left side. Wound nurse in room. Patient tachypnic with stimulus. Thick secretions suctioned orally and in-line cannula 
1200-No changes with assessment. Patient very restless/tachypnic with any stimuli. 1513-Sister at bedside. Notified hospice RN  
1600- Family will discuss plan of care for patient and reconvene tomorrow with hospice RN 
1900-Bedside shift change report given to Cam RUDOLPH (oncoming nurse) by Mary Garcia RN (offgoing nurse). Report included the following information SBAR, Kardex, Intake/Output, MAR, Accordion, Recent Results, Med Rec Status, Cardiac Rhythm NSR, Alarm Parameters  and Quality Measures.

## 2021-05-29 PROBLEM — R06.82 TACHYPNEA: Status: ACTIVE | Noted: 2021-01-01

## 2021-05-29 NOTE — PROGRESS NOTES
0700- Bedside shift change report given to Munir Saldaña RN (oncoming nurse) by Cam RUDOLPH (offgoing nurse). Report included the following information SBAR, Kardex, Intake/Output, MAR, Accordion, Recent Results, Med Rec Status, Cardiac Rhythm NSR, Alarm Parameters  and Quality Measures. Primary Nurse Gail Toribio RN and Cam RN performed a dual skin assessment on this patient Impairment noted- see wound doc flow sheet Rolf score is 10 Assessment:  
Neuro: Patient responds to touch. Does not track with eyes. Non verbal at baseline. Not following any commands. Cardio: NSR. BP soft. Gen edema. Resp: Decreased Fi02 28% on 6L via trach. Coarse lung sounds. GI/: Incontinent. Changed purwick. 1 large yellow watery BM. Skin: No breakdown. Post head lesion x2 noted. Ointment ordered. Mepilex on right ankle abrasion. 0800 -  Mouth care and suction completed. Turned patient to right side 
0900- Rounded with Dr. Papa Hancock. Will downgrade patient. Stopped LR and increased water flushes to 150ml with TF bolus d/t Na level. 1000-Repositioned patient to right side. Patient resting comfortable in bed. 
1200-Gave patient TF with 150ml water flush. Incontinent episode. Total change. Repositioned patient to the left side. Added luigi hugger d/t low temp 1400- Nephro by to see patient. Awaiting results for phenytoin levels. Hospice in room. Awaiting family decision. 1600- No changes with reassessment. Deep suctioned patient. Oral care preformed. Repositioned to left side. All extremities elevated. Luigi hugger in place. Patient does not seem to be in any pain. 1700- Vital AF 240ml bolus given with 75ml flush 
1900- Bedside shift change report given to Gisela(oncoming nurse) by Munir Saldaña RN (offgoing nurse). Report included the following information SBAR, Kardex, MAR, Accordion, Recent Results, Med Rec Status, Cardiac Rhythm NSR and Quality Measures.

## 2021-05-29 NOTE — PROGRESS NOTES
1900: Bedside shift change report given to Renetta Roberts RN (oncoming nurse) by Nandini Villarreal RN (offgoing nurse). Report included the following information SBAR, Kardex, Procedure Summary, Intake/Output, MAR, Recent Results and Cardiac Rhythm NSR. Primary Nurse Diann San RN and Nandini Villarreal RN performed a dual skin assessment on this patient Impairment noted- see wound doc flow sheet Rolf score is 10 
 
2000: Shift  Assessment completed - see flowsheets 2200: TRANSFER - OUT REPORT: 
 
Verbal report given to KLAUDIA Sparrow(name) on Cate Abdalla  being transferred to [facility](unit) for routine progression of care Report consisted of patients Situation, Background, Assessment and  
Recommendations(SBAR). Information from the following report(s) SBAR, Kardex, Procedure Summary, Intake/Output, MAR, Recent Results and Cardiac Rhythm NSR was reviewed with the receiving nurse. Lines:  
Peripheral IV 05/24/21 Distal;Right Basilic (Active) Site Assessment Clean, dry, & intact 05/29/21 2230 Phlebitis Assessment 0 05/29/21 2230 Infiltration Assessment 0 05/29/21 2230 Dressing Status Clean, dry, & intact 05/29/21 2230 Dressing Type Transparent;Tape 05/29/21 2230 Hub Color/Line Status Blue; Infusing 05/29/21 2230 Action Taken Open ports on tubing capped 05/29/21 2230 Alcohol Cap Used Yes 05/29/21 2230 Opportunity for questions and clarification was provided. Patient transported with: 
 Monitor O2 @ 6L liters Patient-specific medications from Pharmacy Registered Nurse

## 2021-05-29 NOTE — HOSPICE
[facility] RN note: Follow up T/C with daughter Maurene Murdock ([phone]) who states that pt's mother Cameita \"wants the port placed. \"  Reportedly that decision was to either place a port for medication or admit to , decision is to place the port. Family do not want TF to stop despite extensive education around continued aspiration risk. Maureen Murdock was a work and did not have a lot of time to speak. Offered to contact pt's mother but Maureen Murdock stated, \"she's just have you call me. \"  [id number] [facility] to continue to follow for ongoing discussion as clinical status evolves. 3:40--communicated discussion with sister Maureen Murdock with the family practice team who will follow up with family. Thank you for the opportunity to care for this pt and family. Please contact hospice at [phone] with any questions or concerns.

## 2021-05-29 NOTE — PROGRESS NOTES
visited Mrs. Beatriz Ward for a follow up visit in the ICU. She was lying in bed and appeared to be resting comfortably. No family was present at this time. Consulted with Mrs. Lujange Fermín nurse. Family is deciding next steps in Mrs. Godwin's care and should come to a decision later today. 's are available for further support upon referral 
Leda Botello.  Paging Service: [phone])

## 2021-05-29 NOTE — PROGRESS NOTES
[facility] Progress Note Patient ID: 
Grupo Deluna 
[id number] 
74 y.o. 
1970 Subjective:  
History: 
Grupo Deluna is a 49 y.o. female who  has a past medical history of Acute cystitis without hematuria (4/3/2021), VICK (acute kidney injury) ([address] (3/31/2021), C. difficile diarrhea (3/28/2020), Clostridium difficile diarrhea (6/30/2020), Constipation (12/11/2019), Diarrhea in adult patient (3/28/2020), Down syndrome, Elevated Dilantin level (4/3/2021), History of vascular access device (06/30/2020), History of vascular access device (04/01/2021), Oral thrush (3/29/2020), Positive fecal occult blood test (3/29/2020), Seizures ([address] Sleep apnea, Status epilepticus ([address] (3/30/2021), Stroke ([address] (2019), Thrombocytosis ([address] (3/31/2021), and VAP (ventilator-associated pneumonia) ([address] (4/9/2021). She also has no past medical history of Adverse effect of anesthesia, Diabetes ([address] Difficult intubation, Malignant hyperthermia due to anesthesia, Nausea & vomiting, or Pseudocholinesterase deficiency. who is admitted for severe sepsis. Dr Natahsa Serrano neurology, was called for history of Epilepsy seeing Dr Mayo Bird on Dilantin. (+) Down syndrome with severe intellectual disability lives in group home. Patient saw Dr Salvador Carias and was switched to Phenytoin 50 mg chew tap; 3 tabs via G tube TID. Phyntoins free < 0.4 so Dr Natasha Serrano increased patient's Phenytoin to 200 mg TID Home meds: 
Phenytoin 50 mg chew tap; 3 tabs via G tube TID Keppra 100 mg/ml, 750 mg Q 12 hrs. ROS: 
Per HPI- 
Otherwise the remainder of the review of system was negative Social Hx: 
Social History Socioeconomic History  Marital status: SINGLE Spouse name: Not on file  Number of children: Not on file  Years of education: Not on file  Highest education level: Not on file Tobacco Use  Smoking status: Never Smoker  Smokeless tobacco: Never Used Substance and Sexual Activity  Alcohol use: Never  Drug use: Never  Sexual activity: Never Other Topics Concern Social History Narrative ** Merged History Encounter ** Social Determinants of Health Financial Resource Strain:  Difficulty of Paying Living Expenses:   
Food Insecurity:  Worried About [address] in the Last Year:   
[address] in the Last Year:   
Transportation Needs:   
 Lack of Transportation (Medical):  Lack of Transportation (Non-Medical): Physical Activity:   
 Days of Exercise per Week:  Minutes of Exercise per Session:   
Stress:  Feeling of Stress :   
Social Connections:  Frequency of Communication with Friends and Family:  Frequency of Social Gatherings with Friends and Family:  Attends Orthodox Services:  Active Member of Clubs or Organizations:  Attends Club or Organization Meetings:  Marital Status:   
 
 
Meds: 
No current facility-administered medications on file prior to encounter. Current Outpatient Medications on File Prior to Encounter Medication Sig Dispense Refill  tobramycin (Bethkis) 300 mg/4 mL nebu Take 300 mg by inhalation two (2) times a day.  potassium chloride (KAON 10%) 20 mEq/15 mL solution 15 mL by Per G Tube route daily. 480 mL 11  
 mupirocin (BACTROBAN) 2 % ointment Apply  to affected area two (2) times a day. 30 g 1  
 acetaminophen (TYLENOL) 100 mg/mL suspension 5 mL by Per G Tube route every six (6) hours as needed for Fever. 1 Bottle 11  
 calamine-menthoL-petrolat-zinc (Remedy Calazime Protect Paste) 3.5-0.2-69-16.5 % pste Apply to affected area as needed with each pullup change 113 g 15  LORazepam (ATIVAN) 0.5 mg tablet 1 Tab by Per G Tube route every six (6) hours as needed for Anxiety or Agitation (Agitation: For heart rate >120 and/or respiratory rate >35. Do not give if blood pressure <100/60).  Max Daily Amount: 2 mg. 30 Tab 0  
 naloxone (NARCAN) 4 mg/actuation nasal spray Use 1 spray intranasally, then discard. Repeat with new spray every 2 min as needed for opioid overdose symptoms, alternating nostrils. 2 Each 0  
 glycopyrrolate (ROBINUL) 1 mg tablet 1 Tab by Per G Tube route three (3) times daily as needed (increased secretions from tracheal tube) for up to 30 doses. 30 Tab 0  
 cetirizine (ZYRTEC) 10 mg tablet 10 mg by Per G Tube route daily.  omeprazole (PRILOSEC) 40 mg capsule 40 mg two (2) times a day. Per tube  guaiFENesin (Siltussin SA) 100 mg/5 mL liquid 200 mg by Per G Tube route four (4) times daily.  torsemide (DEMADEX) 20 mg tablet 40 mg by Per G Tube route daily.  albuterol-ipratropium (DUO-NEB) 2.5 mg-0.5 mg/3 ml nebu 3 mL by Nebulization route every four (4) hours as needed for Shortness of Breath.  melatonin 3 mg tablet 1 Tab by Per G Tube route nightly as needed for Insomnia. Must give by 10pm 30 Tab 11  
 diazePAM (VALIUM) 5-7.5-10 mg kit Insert 10 mg into rectum as needed (for seizures that last longer than 5 minutes).  OTHER,NON-FORMULARY, by Per G Tube route four (4) times daily. OSMOLITE 1.2 Marino Liquid  levETIRAcetam (KEPPRA) 100 mg/mL solution 750 mg by Per G Tube route every twelve (12) hours.  albuterol-ipratropium (DUO-NEB) 2.5 mg-0.5 mg/3 ml nebu 3 mL by Nebulization route every 4 hours daily while awake resp.  phenytoin (DILANTIN) 50 mg chewable tablet GIVE 3 TABLETS (=150MG) VIA G-TUBE 3 TIMES PER DAY FOR SEIZURES. 270 Tablet 0 Imaging: CT Results (recent): 
Results from [facility] Encounter encounter on 05/24/21 CTA CHEST W OR W WO CONT Narrative EXAM:  CT angiography chest; CT abdomen pelvis with contrast 
 
INDICATION:  Tachycardia. Fever. Diarrhea. COMPARISON: Chest radiograph 5/24/2021. CT chest 4/12/2021. CT abdomen pelvis 6/30/2020. TECHNIQUE: Helical thin section chest CT following uneventful intravenous 
administration of nonionic contrast according to departmental PE protocol.  
Coronal and sagittal reformats were performed. 3D/MIP post processing was 
performed. Helical CT of the abdomen and pelvis is performed with intravenous 
contrast. Coronal and sagittal reformatted images were obtained. CT dose 
reduction was achieved through use of a standardized protocol tailored for this 
examination and automatic exposure control for dose modulation. FINDINGS: 
CT chest angiography: This is a good quality study for the evaluation of pulmonary embolism to the 
first subsegmental arterial level. There is no pulmonary embolism to this level. A tracheostomy tube terminates above the maryjo. The visualized thyroid gland is 
unremarkable. The aorta and main pulmonary artery are normal in caliber. Cardiac 
size is within normal limits. No pericardial effusion. No lymphadenopathy by 
imaging size criteria. There is minimal scattered groundglass attenuation, decreased compared to 
4/12/2021. There is no lung mass or consolidation. No pleural effusion or 
pneumothorax. There is a small amount of debris in the right lower lobe 
bronchioles. CT abdomen and pelvis: The liver, spleen, pancreas, and adrenal glands are normal. The gall bladder is 
present  without intra- or extra-hepatic biliary dilatation. The kidneys are symmetric without hydronephrosis. A percutaneous gastrostomy tube terminates in the stomach. There are no dilated 
bowel loops. The appendix is normal. 
 
There are no enlarged lymph nodes. There is no free fluid or free air. There is a small diverticulum at the superior aspect of the urinary bladder. There is no pelvic mass. The bony structures are age-appropriate. Impression 1. No acute pulmonary embolism. 2. Minimal scattered groundglass lung attenuation, decreased compared to 
4/12/2021, may represent pulmonary edema, nonspecific infection/inflammation, or 
aspiration. A small amount of debris is noted in the right lower lobe 
bronchioles.  
 
3. No acute abnormality in the abdomen or pelvis. MRI Results (recent): No results found for this or any previous visit. IR Results (recent): No results found for this or any previous visit. VAS/US Results (recent): No results found for this or any previous visit. Reviewed records in [facility] and [facility] tab today Lab Review No results displayed because visit has over 200 results. Orders Only on 04/24/2021 Component Date Value Ref Range Status  Phenytoin 04/24/2021 3.9* 10.0 - 20.0 ug/mL Final  
 Comment:                                 Detection Limit =  0.8 
                          <0.8 Indicates None Detected No results displayed because visit has over 200 results. No results displayed because visit has over 200 results. Objective:  
 
 
Exam: 
Visit Vitals BP (!) 142/93 (BP 1 Location: Left lower arm, BP Patient Position: At rest) Pulse 88 Temp (!) 96 °F (35.6 °C) Resp (!) 31 Ht 4' 9\" (1.448 m) Wt 67 kg (147 lb 11.3 oz) SpO2 91% BMI 31.96 kg/m² Gen: Lethargic, opens eyes to pain No eye contact Not following commands Moves extremities Assessment: 1. Sepsis with acute organ dysfunction without septic shock, due to unspecified organism, unspecified type ([address] 2. Hypertensive urgency 3. Lactic acidosis 4. Severe sepsis ([address] 5. Down's syndrome 6. Seizure ([address] 7. Pneumonia due to infectious organism, unspecified laterality, unspecified part of lung 8. Tachypnea 9. Chronic static encephalopathy 10. Inability to walk 11. Iron deficiency 12. Gastroesophageal reflux disease without esophagitis 13. Anemia, unspecified type 14. Tracheostomy dependence ([address] 15. Wheelchair bound 16. Subtherapeutic phenytoin level 17. On tube feeding diet 18. Hypoxia 19. Shortness of breath 20. Agitation 21. DNR (do not resuscitate) discussion 22. Goals of care, counseling/discussion 23.  Severe intellectual disability 24. Hx of seizure disorder Plan: 1. Phyntoins free < 0.4 so Dr James Ha increased patient's Phenytoin to 200 mg TID; although clinically patient has no recent seizure events. 2. Phenytoin free level still pending. Instructed nurse to message me if it is elevated. (dosing for Phenytoin is not linear). If elevated, will put back on original dose since patient was not having seizures on that dose. 3. Continue Keppra 750 mg Q 12 hrs 4. Will order another Phenytoin free level tacos AM 
 
Please call for questions 35 mins of time spent, 50% of which was spent on counseling and coordination of care. Gerardo Nails MD 
Diplomate, American Board of Psychiatry and Neurology Diplomate, Neuromuscular Medicine Diplomate, [address] [place] [address] Board of Electrodiagnostic Medicine

## 2021-05-29 NOTE — PROGRESS NOTES
Problem: Risk for Spread of Infection Goal: Prevent transmission of infectious organism to others Description: Prevent the transmission of infectious organisms to other patients, staff members, and visitors. Outcome: Progressing Towards Goal 
  
Problem: Patient Education:  Go to Education Activity Goal: Patient/Family Education Outcome: Progressing Towards Goal 
  
Problem: Pressure Injury - Risk of 
Goal: *Prevention of pressure injury Description: Document Rolf Scale and appropriate interventions in the flowsheet. Outcome: Progressing Towards Goal 
Note: Pressure Injury Interventions: 
Sensory Interventions: Assess changes in LOC, Assess need for specialty bed, Avoid rigorous massage over bony prominences, Check visual cues for pain, Keep linens dry and wrinkle-free, Minimize linen layers, Pad between skin to skin Moisture Interventions: Absorbent underpads, Apply protective barrier, creams and emollients, Assess need for specialty bed, Internal/External urinary devices, Minimize layers, Moisture barrier Activity Interventions: Pressure redistribution bed/mattress(bed type), Assess need for specialty bed Mobility Interventions: Assess need for specialty bed, HOB 30 degrees or less, Pressure redistribution bed/mattress (bed type), PT/OT evaluation, Turn and reposition approx. every two hours(pillow and wedges), Float heels Nutrition Interventions: Document food/fluid/supplement intake Friction and Shear Interventions: Apply protective barrier, creams and emollients, Foam dressings/transparent film/skin sealants, HOB 30 degrees or less, Lift sheet, Lift team/patient mobility team, Minimize layers Problem: Patient Education: Go to Patient Education Activity Goal: Patient/Family Education Outcome: Progressing Towards Goal 
  
Problem: Falls - Risk of 
Goal: *Absence of Falls Description: Document Darrel El Fall Risk and appropriate interventions in the flowsheet.  
Outcome: Progressing Towards Goal 
Note: Fall Risk Interventions: 
  
 
Mentation Interventions: Adequate sleep, hydration, pain control, Bed/chair exit alarm, Door open when patient unattended Medication Interventions: Bed/chair exit alarm Elimination Interventions: Bed/chair exit alarm, Call light in reach History of Falls Interventions: Bed/chair exit alarm, Consult care management for discharge planning, Room close to nurse's station Problem: Patient Education: Go to Patient Education Activity Goal: Patient/Family Education Outcome: Progressing Towards Goal 
  
Problem: Nutrition Deficit Goal: *Optimize nutritional status Outcome: Progressing Towards Goal 
  
Problem: Patient Education: Go to Patient Education Activity Goal: Patient/Family Education Outcome: Progressing Towards Goal 
  
Problem: Diarrhea (Adult and Pediatrics) Goal: *Absence of diarrhea Outcome: Progressing Towards Goal 
Goal: *PALLIATIVE CARE:  Absence of diarrhea Outcome: Progressing Towards Goal 
  
Problem: Patient Education: Go to Patient Education Activity Goal: Patient/Family Education Outcome: Progressing Towards Goal 
  
Problem: Patient Education: Go to Patient Education Activity Goal: Patient/Family Education Outcome: Progressing Towards Goal 
  
Problem: Sepsis: Day of Diagnosis Goal: Off Pathway (Use only if patient is Off Pathway) Outcome: Progressing Towards Goal 
Goal: *Fluid resuscitation Outcome: Progressing Towards Goal 
Goal: *Paired blood cultures prior to first dose of antibiotic Outcome: Progressing Towards Goal 
Goal: *First dose of  appropriate antibiotic within 3 hours of arrival to ED, within 1 hour of arrival to ICU Outcome: Progressing Towards Goal 
Goal: *Lactic acid with first set of blood cultures Outcome: Progressing Towards Goal 
Goal: *Pneumococcal immunization (if eligible) Outcome: Progressing Towards Goal 
Goal: *Influenza immunization (if eligible) Outcome: Progressing Towards Goal 
Goal: Activity/Safety Outcome: Progressing Towards Goal 
Goal: Consults, if ordered Outcome: Progressing Towards Goal 
Goal: Diagnostic Test/Procedures Outcome: Progressing Towards Goal 
Goal: Nutrition/Diet Outcome: Progressing Towards Goal 
Goal: Discharge Planning Outcome: Progressing Towards Goal 
Goal: Medications Outcome: Progressing Towards Goal 
Goal: Respiratory Outcome: Progressing Towards Goal 
Goal: Treatments/Interventions/Procedures Outcome: Progressing Towards Goal 
Goal: Psychosocial 
Outcome: Progressing Towards Goal 
  
Problem: Sepsis: Day 2 Goal: Off Pathway (Use only if patient is Off Pathway) Outcome: Progressing Towards Goal 
Goal: *Central Venous Pressure maintained at 8-12 mm Hg Outcome: Progressing Towards Goal 
Goal: *Hemodynamically stable Outcome: Progressing Towards Goal 
Goal: *Tolerating diet Outcome: Progressing Towards Goal 
Goal: Activity/Safety Outcome: Progressing Towards Goal 
Goal: Consults, if ordered Outcome: Progressing Towards Goal 
Goal: Diagnostic Test/Procedures Outcome: Progressing Towards Goal 
Goal: Nutrition/Diet Outcome: Progressing Towards Goal 
Goal: Discharge Planning Outcome: Progressing Towards Goal 
Goal: Medications Outcome: Progressing Towards Goal 
Goal: Respiratory Outcome: Progressing Towards Goal 
Goal: Treatments/Interventions/Procedures Outcome: Progressing Towards Goal 
Goal: Psychosocial 
Outcome: Progressing Towards Goal 
  
Problem: Sepsis: Day 3 Goal: Off Pathway (Use only if patient is Off Pathway) Outcome: Progressing Towards Goal 
Goal: *Central Venous Pressure maintained at 8-12 mm Hg Outcome: Progressing Towards Goal 
Goal: *Oxygen saturation within defined limits Outcome: Progressing Towards Goal 
Goal: *Vital sign stability Outcome: Progressing Towards Goal 
Goal: *Tolerating diet Outcome: Progressing Towards Goal 
Goal: *Demonstrates progressive activity Outcome: Progressing Towards Goal 
Goal: Activity/Safety Outcome: Progressing Towards Goal 
Goal: Consults, if ordered Outcome: Progressing Towards Goal 
Goal: Diagnostic Test/Procedures Outcome: Progressing Towards Goal 
Goal: Nutrition/Diet Outcome: Progressing Towards Goal 
Goal: Discharge Planning Outcome: Progressing Towards Goal 
Goal: Medications Outcome: Progressing Towards Goal 
Goal: Respiratory Outcome: Progressing Towards Goal 
Goal: Treatments/Interventions/Procedures Outcome: Progressing Towards Goal 
Goal: Psychosocial 
Outcome: Progressing Towards Goal 
  
Problem: Sepsis: Day 4 Goal: Off Pathway (Use only if patient is Off Pathway) Outcome: Progressing Towards Goal 
Goal: Activity/Safety Outcome: Progressing Towards Goal 
Goal: Consults, if ordered Outcome: Progressing Towards Goal 
Goal: Diagnostic Test/Procedures Outcome: Progressing Towards Goal 
Goal: Nutrition/Diet Outcome: Progressing Towards Goal 
Goal: Discharge Planning Outcome: Progressing Towards Goal 
Goal: Medications Outcome: Progressing Towards Goal 
Goal: Respiratory Outcome: Progressing Towards Goal 
Goal: Treatments/Interventions/Procedures Outcome: Progressing Towards Goal 
Goal: Psychosocial 
Outcome: Progressing Towards Goal 
Goal: *Oxygen saturation within defined limits Outcome: Progressing Towards Goal 
Goal: *Hemodynamically stable Outcome: Progressing Towards Goal 
Goal: *Vital signs within defined limits Outcome: Progressing Towards Goal 
Goal: *Tolerating diet Outcome: Progressing Towards Goal 
Goal: *Demonstrates progressive activity Outcome: Progressing Towards Goal 
Goal: *Fluid volume maintenance Outcome: Progressing Towards Goal 
  
Problem: Sepsis: Day 5 Goal: Off Pathway (Use only if patient is Off Pathway) Outcome: Progressing Towards Goal 
Goal: *Oxygen saturation within defined limits Outcome: Progressing Towards Goal 
Goal: *Vital signs within defined limits Outcome: Progressing Towards Goal 
Goal: *Tolerating diet Outcome: Progressing Towards Goal 
Goal: *Demonstrates progressive activity Outcome: Progressing Towards Goal 
Goal: *Discharge plan identified Outcome: Progressing Towards Goal 
Goal: Activity/Safety Outcome: Progressing Towards Goal 
Goal: Consults, if ordered Outcome: Progressing Towards Goal 
Goal: Diagnostic Test/Procedures Outcome: Progressing Towards Goal 
Goal: Nutrition/Diet Outcome: Progressing Towards Goal 
Goal: Discharge Planning Outcome: Progressing Towards Goal 
Goal: Medications Outcome: Progressing Towards Goal 
Goal: Respiratory Outcome: Progressing Towards Goal 
Goal: Treatments/Interventions/Procedures Outcome: Progressing Towards Goal 
Goal: Psychosocial 
Outcome: Progressing Towards Goal 
  
Problem: Sepsis: Day 6 Goal: Off Pathway (Use only if patient is Off Pathway) Outcome: Progressing Towards Goal 
Goal: *Oxygen saturation within defined limits Outcome: Progressing Towards Goal 
Goal: *Vital signs within defined limits Outcome: Progressing Towards Goal 
Goal: *Tolerating diet Outcome: Progressing Towards Goal 
Goal: *Demonstrates progressive activity Outcome: Progressing Towards Goal 
Goal: Influenza immunization Outcome: Progressing Towards Goal 
Goal: *Pneumococcal immunization Outcome: Progressing Towards Goal 
Goal: Activity/Safety Outcome: Progressing Towards Goal 
Goal: Diagnostic Test/Procedures Outcome: Progressing Towards Goal 
Goal: Nutrition/Diet Outcome: Progressing Towards Goal 
Goal: Discharge Planning Outcome: Progressing Towards Goal 
Goal: Medications Outcome: Progressing Towards Goal 
Goal: Respiratory Outcome: Progressing Towards Goal 
Goal: Treatments/Interventions/Procedures Outcome: Progressing Towards Goal 
Goal: Psychosocial 
Outcome: Progressing Towards Goal 
  
Problem: Sepsis: Discharge Outcomes Goal: *Vital signs within defined limits Outcome: Progressing Towards Goal 
Goal: *Tolerating diet Outcome: Progressing Towards Goal 
Goal: *Verbalizes understanding and describes prescribed diet Outcome: Progressing Towards Goal 
Goal: *Demonstrates progressive activity Outcome: Progressing Towards Goal 
Goal: *Describes follow-up/return visits to physicians Outcome: Progressing Towards Goal 
Goal: *Verbalizes name, dosage, time, side effects, and number of days to continue medications Outcome: Progressing Towards Goal 
Goal: *Influenza immunization (Oct-Mar only) Outcome: Progressing Towards Goal 
Goal: *Pneumococcal immunization Outcome: Progressing Towards Goal 
Goal: *Lungs clear or at baseline Outcome: Progressing Towards Goal 
Goal: *Oxygen saturation returns to baseline or 90% or better on room air Outcome: Progressing Towards Goal 
Goal: *Glycemic control Outcome: Progressing Towards Goal 
Goal: *Absence of deep venous thrombosis signs and symptoms(Stroke Metric) Outcome: Progressing Towards Goal 
Goal: *Describes available resources and support systems Outcome: Progressing Towards Goal 
Goal: *Optimal pain control at patient's stated goal 
Outcome: Progressing Towards Goal

## 2021-05-29 NOTE — PROGRESS NOTES
Critical Care Progress Note Date:2021       Room:[address] [place] [address] Patient Alivia Mayfield     YOB: 1970     Age:51 y.o. Subjective Subjective  
 
: No major events. On 6L O2. Na up to 151. wBCS normal,Afebrile. Hb stable Cr stable : No major events overnight. Culture remain negative. C. difficile is negative. Remain for theT-max 100.9 yesterday morning, since then patient has been afebrile., Received a bolus this morning. Patient was on low-dose of Precedex this morning that was turned off. Albumin remain very low 1.7 Chest x-ray with worsening pneumonia on the left side : Was placed back on Precedex given agitation. WBC is trending down. Febrile. Hemoglobin stable at 9. 9. Creatinine stable Review of Systems Unable to obtain given clinical condition Objective Vitals Last 24 Hours: TEMPERATURE:  Temp  Av.8 °F (36.6 °C)  Min: 96.1 °F (35.6 °C)  Max: 98.4 °F (36.9 °C) RESPIRATIONS RANGE: Resp  Av.1  Min: 16  Max: 50 PULSE OXIMETRY RANGE: SpO2  Av.1 %  Min: 90 %  Max: 100 % PULSE RANGE: Pulse  Av.9  Min: 62  Max: 119 BLOOD PRESSURE RANGE: Systolic (53FVS), WMC:14 , Min:82 , RZY:941  
; Diastolic (46SYD), EKZ:29, Min:46, Max:98 I/O (24Hr): Intake/Output Summary (Last 24 hours) at 2021 1140 Last data filed at 2021 0800 Gross per 24 hour Intake 3154 ml Output 650 ml Net 2504 ml I examined the patient via telemedicine, with its associated limitations. I reviewed the electronic medical record, the x-rays, labs, progress notes, previous history and physicals and consultation notes that were available in the electronic medical record. I beamed in and examined the patient with assistance of house staff On exam: 
 
Gen: Not  interactive HEENT:  Trach in place Chest: symmetrical chest rise CV:S1,S2 Abd: soft, non-distended Ext: no edema Neuro:Contractures Objective: 
Vital signs: (most recent): Blood pressure (!) 88/58, pulse 63, temperature (!) 96.1 °F (35.6 °C), resp. rate 29, height 4' 9\" (1.448 m), weight 67 kg (147 lb 11.3 oz), SpO2 100 %. Labs/Imaging/Diagnostics Labs: CBC: 
Recent Labs  
  05/29/21 0245 05/28/21 0522 05/27/21 
0405 WBC 9.0 7.2 10.3 RBC 2.70* 2.79* 2.63* HGB 8.6* 8.8* 8.4* HCT 27.4* 28.5* 26.3*  
.5* 102.2* 100.0*  
RDW 14.8* 14.6* 14.4  277 259 CHEMISTRIES: 
Recent Labs  
  05/29/21 0245 05/28/21 0522 05/27/21 
0405 * 149* 147* K 3.8 3.5 3.5 * 123* 121* CO2 21 21 18* BUN 21* 21* 29* CA 7.9* 8.0* 7.7*  
PT/INR:No results for input(s): INR, INREXT, INREXT in the last 72 hours. No lab exists for component: PROTIME APTT:No results for input(s): APTT in the last 72 hours. LIVER PROFILE: 
Recent Labs  
  05/29/21 0245 05/28/21 0522 05/27/21 
0405 AST 45* 49* 80* ALT 47 48 78 Lab Results Component Value Date/Time ALT (SGPT) 47 05/29/2021 02:45 AM  
 AST (SGOT) 45 (H) 05/29/2021 02:45 AM  
 Alk. phosphatase 121 (H) 05/29/2021 02:45 AM  
 Bilirubin, direct 0.08 11/05/2020 11:23 AM  
 Bilirubin, total 0.1 (L) 05/29/2021 02:45 AM  
 
 
Imaging Last 24 Hours: 
No results found. Assessment//Plan Principal Problem: 
  Severe sepsis ([facility] [address] (4/16/2021) Active Problems: 
  Severe intellectual disability (2/2/2012) Overview: Mongolism Seizure ([facility] [address] (12/11/2019) Gastroesophageal reflux disease without esophagitis (12/11/2019) Down's syndrome (12/11/2019) Iron deficiency (12/11/2019) Inability to walk (6/9/2020) Chronic static encephalopathy (4/16/2021) Lactic acidosis (5/24/2021) Hypertensive urgency (5/24/2021) CKD (chronic kidney disease) (5/24/2021) Sepsis with acute organ dysfunction without septic shock ([facility] [address] (5/28/2021) Assessment & Plan 52-year-old female with history of Down syndrome s/p trach and PEG admitted with sepsis.  
 
Continue Keppra and phenytoin. Continue p.o. scheduled Ativan and oxycodone Continue midodrine. Oxygen through trach collar Monitor urine output and renal indices. Tube feed boluses with free water flush. Increase Free H2O Flushes Continue to monitor H&H. Continue Zosyn For pneumonia for 7 days. continue p.o. Vanco for history of C. Difficile. Monitor WBC and temperature curve DC IVF for maintenance SQ heparin/Prevacid Partial code. Family may decide on hospice Poor prognosis OK to transfer out of ICU I performed all aspects of the physical examination via Telemedicine associated with two way audio and video communication and with the on-site assistance of  the bedside nurse. I am located in  [place] and the patient is located in [place] at [facility]. The patient is critically ill in the ICU. I  personally  reviewed the pertinent medical records, laboratory/ pathology data and radiographic images. The decision making regarding this patient is as documented above, which was generated  following  discussion  with the multidisciplinary ICU team and creation of a treatment plan for  the patient. We discussed the patient's interval history and future coordination of care and  plans. The patient's medications  were reviewed and changes made as stipulated above. Due to  critical illness impairing one or more vital organs of this patient resulting in life threatening clinical situation  I have provided direct, frequent personal  assessment and manipulation in management plan and spent 35 minutes  of subsequent  care time excluding the time spent on procedures and teaching. Greater than 50% of this time  in patients care was  employed  in counseling and coordination of care and engaged in face to face discussion of case management issues, addressing questions, and outlining a plan of  therapy. Pt at risk of life threatening deterioration from agitation,sepsis Pt seen and evaluated via tele encounter. Audio and Video were used for this interaction.  
 
 
 
Electronically signed by Paula Payne MD on 5/29/2021 at 10:06 AM

## 2021-05-29 NOTE — PROGRESS NOTES
[id number] PROGRAM 
PROGRESS NOTE  
 
5/30/2021 PCP: Eleazar Calhoun NP Assessment/Plan:  
 
Amairani Brown is a 49 y.o. female with a PMHx of Down Syndrome (non-verbal at baseline), s/p Trach and PEG tube, seizures, encephalomalacia, ANNE, recent admission for HAP and C. diff colitis who is admitted for severe sepsis. Overnight events: Family discussion with palliative - some confusion in regards to \"hospice measures\". Family under the impression that tube feeds would be stopped if she moved to hospice. Family originally chose [facility] [facility]" however after clarifying that hospice would include continuation of feeds they are considering this transition. Severe sepsis likely 2/2 aspiration PNA and c diff: 4/4 SIRS POA. LA 3.0 > 1.6. Procal 2.82. CT 5/25 c/a/p with minimal scattered groundglass lung attenuation. CXR on 5/27: Increase interstitial and airspace infiltrate in the left lung c/w worsening PNA. S/p Levaquin. Stool studies neg.  
- BCx NG 5 days - Levaquin (added 5/29) 
- Continue Zosyn IV (5/24) - Continue oral Vancomycin 500 mg q6h for total of 21 day course (5/24) - Wound care following 
- Hospice and palliative following - will collectively touch base with family today 
- fecal management system Elevated D-dimer: POA 9.2. CT chest without PE. Possibly reactive 2/2 inflammation/infection. BLLE Dopplers Neg. Worsening Chronic Hypotension: has required pressors in the past, presented with Hypertensive Urgency on this admission, but is now on lower side. S/p Albumin 12.5 mg x 3 doses - Continue home Midodrine 15mg TID Seizures: Phenytoin level 7.6 (low), Free Phenytoin level low. S/p Fosphenytoin 950 mg IV x1. Keppra level low (8.2) - Neurology consulted, Appre recs 
- Continue Keppra 1000 mg q12h (Increased from 750 mg BID on 5/28) - Phenytoin 200 mg TID per G tube (increased from 150 TID on 5/28) - Free Phenytoin level pending  
  
Trach dependent: since  
- Home Duonebs q4h PRN  
- Glycopyrrolate for increased secretions  
  
GERD:  
- Hold protonix, while treated with PO vanco  
  
CKD3:  
- Monitor CMP 
  
Chronic lower extremity edema: 
- Hold hold torsemide 40 mg daily  
  ? Chronic pain:  
- Hold home Oxy 5 mg q6h, until medications verified with group home, discontinued at group home 
  
Agitation: 
- Continue home lorazepam 0.5 mg q2h scheduled and 1mg q4h prn - Precedex ggt off 
  
FEN/GI - Tube feeds, nutrition consulted Activity - Bedrest 
DVT prophylaxis - Sub Q Heparin GI prophylaxis - Not indicated at this time Fall prophylaxis - Not indicated at this time. Disposition - Plan to d/c TBD> Code Status - Partial code no CPR and no shock. Alee Lauren discussed with Dr. Edy Mcintosh MD.  
 
Subjective: Pt was seen and examined at bedside. Patient nonverbal at baseline. Unable to obtain history. Objective:  
Physical examination Patient Vitals for the past 24 hrs: 
 Temp Pulse Resp BP SpO2  
21 0700  (!) 125     
21 0453 99.4 °F (37.4 °C) 90 22 99/62 93 % 21 0044 99.4 °F (37.4 °C) 88 24 96/61 97 % 21 0040     97 % 21 2230 98.4 °F (36.9 °C) (!) 102 30 114/88 100 % 21 2000 96.9 °F (36.1 °C) 79 30 (!) 86/54 94 % 21 1930  80 (!) 32 (!) 88/55 94 % 21 1920     95 % 21 1900  84 (!) 31 (!) 95/51 94 % 21 1800  87 (!) 31 112/65 95 % 21 1700  71 30 98/67 94 % 21 1600 (!) 96.3 °F (35.7 °C) 73 26 117/67 93 % 21 1502  76     
21 1500  75 29 100/62 98 % 21 1400  82 29 (!) 93/57 95 % 21 1300  86 (!) 33 (!) 141/93 92 % 21 1200 (!) 96 °F (35.6 °C) 88 (!) 31 (!) 142/93 91 % 21 1100  63 29 (!) 88/58 100 % 21 1000  63 28 102/70 98 % 21 0900  62 27 (!) 88/57 96 % Temp (24hrs), Av.7 °F (36.5 °C), Min:96 °F (35.6 °C), Max:99.4 °F (37.4 °C) O2 Flow Rate (L/min): 6 l/min O2 Device: Tracheostomy, T-tube Date 05/29/21 0700 - 05/30/21 9588 05/30/21 0700 - 05/31/21 8481 Shift 0700-1859 1900-0659 24 Hour Total 1283-3058 8432-2695 24 Hour Total  
INTAKE  
I.V.(mL/kg/hr) 350(0.4) 100(0.1) 450(0.3) Volume (lactated Ringers infusion) 200  200 Volume (piperacillin-tazobactam (ZOSYN) 3.375 g in 0.9% sodium chloride (MBP/ADV) 100 mL MBP)  100 100 Volume (levoFLOXacin (LEVAQUIN) 750 mg in D5W IVPB) 150  150 NG/-200-0579 Water Flush Volume (mL) (PEG/Gastrostomy Tube 05/25/21) 320 300 620 Medication Volume (PEG/Gastrostomy Tube 05/25/21) 145 250 395 Intake (ml) (PEG/Gastrostomy Tube 05/25/21)  920 920 Shift Total(mL/kg) [id number]) [id number]) 1428(87.3) OUTPUT Urine(mL/kg/hr) 800(1) 0(0) 800(0.5) Urine Occurrence(s)  1 x 1 x Urine Output (mL) (External Urinary Catheter 05/25/21) 800 0 800 Stool Stool Occurrence(s)  3 x 3 x Shift Total(mL/kg) 800(11.9) 0(0) 800(12.7) NET 15 9637 5786 Weight (kg) 67 63 63 63 63 63 General:   Ill-appearing, nonverbal at baseline Neck:  No deformities Lungs:   Scattered coarse breath sounds anteriorly. Transmitted trach ventilation sounds. Heart:   Regular rhythm, no murmur Abdomen:    Soft, mild distended, no apparent tenderness on exam, G tube in LUQ Extremities:  2+ edema in BL hands. Trace edema in BLLE, unna boots in place Skin:  Warm and dry, wound on left scalp Neurologic:  Alert, nonverbal at baseline. Data Review: CBC: 
Recent Labs  
  05/29/21 
0245 05/28/21 
0522 WBC 9.0 7.2 HGB 8.6* 8.8* HCT 27.4* 28.5*  277 Metabolic Panel: 
Recent Labs 05/30/21 
0309 05/29/21 
0245 05/28/21 
0522 * 151* 149*  
K 4.3 3.8 3.5 * 123* 123* CO2 22 21 21 BUN 22* 21* 21* CREA 0.90 0.86 0.80 * 154* 101* CA 8.3* 7.9* 8.0* ALB 1.8* 1.8* 2.0*  
TBILI <0.1* 0.1* 0.1* ALT 50 47 48 Micro: 
Lab Results Component Value Date/Time Culture result: MRSA NOT PRESENT 05/28/2021 09:36 AM  
 Culture result:  05/28/2021 09:36 AM  
  Screening of patient nares for MRSA is for surveillance purposes and, if positive, to facilitate isolation considerations in high risk settings. It is not intended for automatic decolonization interventions per se as regimens are not sufficiently effective to warrant routine use. Culture result: NO GROWTH 6 DAYS 05/24/2021 07:00 PM  
 
Imaging: XR CHEST PORT Result Date: 5/24/2021 INDICATION:  Eval for Infiltrate EXAM: Chest single view. COMPARISON: 4/16/2021. FINDINGS: A single frontal view of the chest at 1906 hours shows persistent but improved diffuse parenchymal infiltrate. Tracheostomy device is stable over the airway. .  The heart, mediastinum and pulmonary vasculature are stable . The bony thorax is unremarkable for age. .  
 
Persistent but improved bilateral lung infiltrates. .  . Medications reviewed Current Facility-Administered Medications Medication Dose Route Frequency  levoFLOXacin (LEVAQUIN) 750 mg in D5W IVPB  750 mg IntraVENous Q24H  
 levETIRAcetam (KEPPRA) oral solution 1,000 mg  1,000 mg Per G Tube Q12H  phenytoin (DILANTIN) chewable tablet 200 mg  200 mg Per G Tube TID  morphine injection 1 mg  1 mg IntraVENous Q4H PRN  
 LORazepam (ATIVAN) injection 1 mg  1 mg IntraVENous Q4H PRN  
 oxyCODONE IR (ROXICODONE) tablet 5 mg  5 mg Oral Q8H  potassium bicarb-citric acid (EFFER-K) tablet 20 mEq  20 mEq Per G Tube DAILY  LORazepam (ATIVAN) tablet 0.5 mg  0.5 mg Per G Tube Q2H  
 lansoprazole (PREVACID) 3 mg/mL oral suspension 30 mg  30 mg Per G Tube BID  piperacillin-tazobactam (ZOSYN) 3.375 g in 0.9% sodium chloride (MBP/ADV) 100 mL MBP  3.375 g IntraVENous Q8H  
 dexmedeTOMidine in 0.9 % NaCl (PRECEDEX) 400 mcg/100 mL (4 mcg/mL) infusion soln  0.1-1.5 mcg/kg/hr IntraVENous TITRATE  mupirocin (BACTROBAN) 2 % ointment   Topical BID  midodrine (PROAMATINE) tablet 15 mg  15 mg Per G Tube Q8H  
 sodium chloride (NS) flush 5-10 mL  5-10 mL IntraVENous PRN  
 vancomycin (FIRVANQ) 50 mg/mL oral solution 500 mg  500 mg Oral Q6H  
 sodium chloride (NS) flush 5-40 mL  5-40 mL IntraVENous Q8H  
 sodium chloride (NS) flush 5-40 mL  5-40 mL IntraVENous PRN  
 acetaminophen (TYLENOL) tablet 650 mg  650 mg Oral Q6H PRN Or  
 acetaminophen (TYLENOL) suppository 650 mg  650 mg Rectal Q6H PRN  polyethylene glycol (MIRALAX) packet 17 g  17 g Oral DAILY PRN  
 heparin (porcine) injection 5,000 Units  5,000 Units SubCUTAneous Q8H  
 albuterol-ipratropium (DUO-NEB) 2.5 MG-0.5 MG/3 ML  3 mL Nebulization Q4H PRN  
 [Held by provider] torsemide (DEMADEX) tablet 40 mg  40 mg Per G Tube DAILY  glycopyrrolate (ROBINUL) tablet 1 mg  1 mg Per G Tube TID PRN

## 2021-05-30 NOTE — PROGRESS NOTES
[facility] Progress Note Patient ID: 
Vidal Longo 
[id number] 
56 y.o. 
1970 Subjective:  
History: 
Vidal Longo is a 49 y.o. female who  has a past medical history of Acute cystitis without hematuria (4/3/2021), VICK (acute kidney injury) ([address] (3/31/2021), C. difficile diarrhea (3/28/2020), Clostridium difficile diarrhea (6/30/2020), Constipation (12/11/2019), Diarrhea in adult patient (3/28/2020), Down syndrome, Elevated Dilantin level (4/3/2021), History of vascular access device (06/30/2020), History of vascular access device (04/01/2021), Oral thrush (3/29/2020), Positive fecal occult blood test (3/29/2020), Seizures ([address] Sleep apnea, Status epilepticus ([address] (3/30/2021), Stroke ([address] (2019), Thrombocytosis ([address] (3/31/2021), and VAP (ventilator-associated pneumonia) ([address] (4/9/2021). She also has no past medical history of Adverse effect of anesthesia, Diabetes ([address] Difficult intubation, Malignant hyperthermia due to anesthesia, Nausea & vomiting, or Pseudocholinesterase deficiency. who is admitted for severe sepsis. 
  
Dr Elisabet Velazco neurology, was called for history of Epilepsy seeing Dr Eliana Arevalo on Dilantin. (+) Down syndrome with severe intellectual disability lives in group home. 
  
Patient saw Dr Deyanira Carvajal and was switched to Phenytoin 50 mg chew tap; 3 tabs via G tube TID. 
  
Phyntoins free < 0.4 so Dr Elisabet Velazco increased patient's Phenytoin to 200 mg TID. Seem more alert today. No seizure activity. 
  
Home meds: 
Phenytoin 50 mg chew tap; 3 tabs via G tube TID Keppra 100 mg/ml, 750 mg Q 12 hrs. ROS: 
Per HPI- 
Otherwise the remainder of the review of system was negative Social Hx: 
Social History Socioeconomic History  Marital status: SINGLE Spouse name: Not on file  Number of children: Not on file  Years of education: Not on file  Highest education level: Not on file Tobacco Use  Smoking status: Never Smoker  Smokeless tobacco: Never Used Substance and Sexual Activity  Alcohol use: Never  Drug use: Never  Sexual activity: Never Other Topics Concern Social History Narrative ** Merged History Encounter ** Social Determinants of Health Financial Resource Strain:  Difficulty of Paying Living Expenses:   
Food Insecurity:  Worried About [address] in the Last Year:   
[address] in the Last Year:   
Transportation Needs:   
 Lack of Transportation (Medical):  Lack of Transportation (Non-Medical): Physical Activity:   
 Days of Exercise per Week:  Minutes of Exercise per Session:   
Stress:  Feeling of Stress :   
Social Connections:  Frequency of Communication with Friends and Family:  Frequency of Social Gatherings with Friends and Family:  Attends Holiness Services:  Active Member of Clubs or Organizations:  Attends Club or Organization Meetings:  Marital Status:   
 
 
Meds: 
No current facility-administered medications on file prior to encounter. Current Outpatient Medications on File Prior to Encounter Medication Sig Dispense Refill  tobramycin (Bethkis) 300 mg/4 mL nebu Take 300 mg by inhalation two (2) times a day.  potassium chloride (KAON 10%) 20 mEq/15 mL solution 15 mL by Per G Tube route daily. 480 mL 11  
 mupirocin (BACTROBAN) 2 % ointment Apply  to affected area two (2) times a day. 30 g 1  
 acetaminophen (TYLENOL) 100 mg/mL suspension 5 mL by Per G Tube route every six (6) hours as needed for Fever. 1 Bottle 11  
 calamine-menthoL-petrolat-zinc (Remedy Calazime Protect Paste) 3.5-0.2-69-16.5 % pste Apply to affected area as needed with each pullup change 113 g 15  LORazepam (ATIVAN) 0.5 mg tablet 1 Tab by Per G Tube route every six (6) hours as needed for Anxiety or Agitation (Agitation: For heart rate >120 and/or respiratory rate >35. Do not give if blood pressure <100/60). Max Daily Amount: 2 mg. 30 Tab 0  naloxone (NARCAN) 4 mg/actuation nasal spray Use 1 spray intranasally, then discard. Repeat with new spray every 2 min as needed for opioid overdose symptoms, alternating nostrils. 2 Each 0  
 glycopyrrolate (ROBINUL) 1 mg tablet 1 Tab by Per G Tube route three (3) times daily as needed (increased secretions from tracheal tube) for up to 30 doses. 30 Tab 0  
 cetirizine (ZYRTEC) 10 mg tablet 10 mg by Per G Tube route daily.  omeprazole (PRILOSEC) 40 mg capsule 40 mg two (2) times a day. Per tube  guaiFENesin (Siltussin SA) 100 mg/5 mL liquid 200 mg by Per G Tube route four (4) times daily.  torsemide (DEMADEX) 20 mg tablet 40 mg by Per G Tube route daily.  albuterol-ipratropium (DUO-NEB) 2.5 mg-0.5 mg/3 ml nebu 3 mL by Nebulization route every four (4) hours as needed for Shortness of Breath.  melatonin 3 mg tablet 1 Tab by Per G Tube route nightly as needed for Insomnia. Must give by 10pm 30 Tab 11  
 diazePAM (VALIUM) 5-7.5-10 mg kit Insert 10 mg into rectum as needed (for seizures that last longer than 5 minutes).  OTHER,NON-FORMULARY, by Per G Tube route four (4) times daily. OSMOLITE 1.2 Marino Liquid  levETIRAcetam (KEPPRA) 100 mg/mL solution 750 mg by Per G Tube route every twelve (12) hours.  albuterol-ipratropium (DUO-NEB) 2.5 mg-0.5 mg/3 ml nebu 3 mL by Nebulization route every 4 hours daily while awake resp.  phenytoin (DILANTIN) 50 mg chewable tablet GIVE 3 TABLETS (=150MG) VIA G-TUBE 3 TIMES PER DAY FOR SEIZURES. 270 Tablet 0 Imaging: CT Results (recent): 
Results from [facility] Encounter encounter on 05/24/21 CTA CHEST W OR W WO CONT Narrative EXAM:  CT angiography chest; CT abdomen pelvis with contrast 
 
INDICATION:  Tachycardia. Fever. Diarrhea. COMPARISON: Chest radiograph 5/24/2021. CT chest 4/12/2021. CT abdomen pelvis 6/30/2020.  
 
TECHNIQUE: Helical thin section chest CT following uneventful intravenous 
administration of nonionic contrast according to departmental PE protocol. Coronal and sagittal reformats were performed. 3D/MIP post processing was 
performed. Helical CT of the abdomen and pelvis is performed with intravenous 
contrast. Coronal and sagittal reformatted images were obtained. CT dose 
reduction was achieved through use of a standardized protocol tailored for this 
examination and automatic exposure control for dose modulation. FINDINGS: 
CT chest angiography: This is a good quality study for the evaluation of pulmonary embolism to the 
first subsegmental arterial level. There is no pulmonary embolism to this level. A tracheostomy tube terminates above the maryjo. The visualized thyroid gland is 
unremarkable. The aorta and main pulmonary artery are normal in caliber. Cardiac 
size is within normal limits. No pericardial effusion. No lymphadenopathy by 
imaging size criteria. There is minimal scattered groundglass attenuation, decreased compared to 
4/12/2021. There is no lung mass or consolidation. No pleural effusion or 
pneumothorax. There is a small amount of debris in the right lower lobe 
bronchioles. CT abdomen and pelvis: The liver, spleen, pancreas, and adrenal glands are normal. The gall bladder is 
present  without intra- or extra-hepatic biliary dilatation. The kidneys are symmetric without hydronephrosis. A percutaneous gastrostomy tube terminates in the stomach. There are no dilated 
bowel loops. The appendix is normal. 
 
There are no enlarged lymph nodes. There is no free fluid or free air. There is a small diverticulum at the superior aspect of the urinary bladder. There is no pelvic mass. The bony structures are age-appropriate. Impression 1. No acute pulmonary embolism. 2. Minimal scattered groundglass lung attenuation, decreased compared to 
4/12/2021, may represent pulmonary edema, nonspecific infection/inflammation, or 
aspiration.  A small amount of debris is noted in the right lower lobe 
bronchioles. 3. No acute abnormality in the abdomen or pelvis. MRI Results (recent): No results found for this or any previous visit. IR Results (recent): No results found for this or any previous visit. VAS/US Results (recent): No results found for this or any previous visit. Reviewed records in [facility] tab today Lab Review No results displayed because visit has over 200 results. Orders Only on 04/24/2021 Component Date Value Ref Range Status  Phenytoin 04/24/2021 3.9* 10.0 - 20.0 ug/mL Final  
 Comment:                                 Detection Limit =  0.8 
                          <0.8 Indicates None Detected No results displayed because visit has over 200 results. No results displayed because visit has over 200 results. Objective:  
 
 
Exam: 
Visit Vitals BP 99/62 (BP 1 Location: Right arm, BP Patient Position: Supine) Pulse (!) 125 Temp 99.4 °F (37.4 °C) Resp 22 Ht 4' 9\" (1.448 m) Wt 63 kg (139 lb) SpO2 93% BMI 30.08 kg/m² Gen: Lethargic, opens eyes to pain No eye contact Not following commands Moves extremities Assessment: 1. Sepsis with acute organ dysfunction without septic shock, due to unspecified organism, unspecified type ([address] 2. Hypertensive urgency 3. Lactic acidosis 4. Severe sepsis ([address] 5. Down's syndrome 6. Seizure ([address] 7. Pneumonia due to infectious organism, unspecified laterality, unspecified part of lung 8. Tachypnea 9. Chronic static encephalopathy 10. Inability to walk 11. Iron deficiency 12. Gastroesophageal reflux disease without esophagitis 13. Anemia, unspecified type 14. Tracheostomy dependence ([address] 15. Wheelchair bound 16. Subtherapeutic phenytoin level 17. On tube feeding diet 18. Hypoxia 19. Shortness of breath 20. Agitation 21. DNR (do not resuscitate) discussion 22.  Goals of care, counseling/discussion 23. Severe intellectual disability 24. Hx of seizure disorder Plan: 1. Phyntoins free 1.8 yesterday on Phenytoin to 200 mg TID; Follow up Phenytoin free level today and will check another level tomorrow AM to make sure it is not trending up. 2. Continue Keppra 750 mg Q 12 hrs 3. Seizure precautions 4. Family considering palliative care Please call for questions 35 mins of time spent, 50% of which was spent on counseling and coordination of care. Herber Alcala MD 
Diplomate, American Board of Psychiatry and Neurology Diplomate, Neuromuscular Medicine Diplomate, [address] [place] [address] Board of Electrodiagnostic Medicine

## 2021-05-30 NOTE — PROGRESS NOTES
[id number] [facility] PROGRAM 
PROGRESS NOTE  
 
5/31/2021 PCP: Jovan Harkins NP Assessment/Plan:  
 
Polina Silva is a 49 y.o. female with a PMHx of Down Syndrome (non-verbal at baseline), s/p Trach and PEG tube, seizures, encephalomalacia, ANNE, recent admission for HAP and C. diff colitis who is admitted for severe sepsis. Overnight events: None. Severe sepsis likely 2/2 aspiration PNA and c diff: 4/4 SIRS POA. LA 3.0 > 1.6. Procal 2.82. CT 5/25 c/a/p with minimal scattered groundglass lung attenuation. CXR on 5/27: Increase interstitial and airspace infiltrate in the left lung c/w worsening PNA. S/p Levaquin. Stool studies neg. BCx neg x 6 days. - Continue Levaquin (5/29), Zosyn IV (5/24, today last dose), oral Vancomycin 500 mg q6h for total of 21 day course (5/24) - Wound care following 
- Hospice and palliative following - plan to move to [facility] today. Elevated D-dimer: POA 9.2. CT chest without PE. Possibly reactive 2/2 inflammation/infection. BLLE Dopplers Neg. Worsening Chronic Hypotension: has required pressors in the past, presented with Hypertensive Urgency on this admission, but is now on lower side. S/p Albumin 12.5 mg x 3 doses - Continue home Midodrine 15mg TID Seizures: Phenytoin level 7.6 (low), Free Phenytoin level low. S/p Fosphenytoin 950 mg IV x1. Keppra level low (8.2) - Neurology consulted, Jory Massed - Keppra 1000 mg q12h (Increased from 750 mg BID on 5/28) - Phenytoin 200 mg TID per G tube (increased from 150 TID on 5/28) - Monitoring free phenytoin level daily  
  
Trach dependent: since 2020 
- Home Duonebs q4h PRN  
- Glycopyrrolate for increased secretions  
  
GERD:  
- Hold protonix, while treated with PO vanco  
  
CKD3:  
- Monitor CMP 
  
Chronic lower extremity edema: 
- Hold hold torsemide 40 mg daily  
  ? Chronic pain:  
- Hold home Oxy 5 mg q6h, until medications verified with group home, discontinued at group home 
  
Agitation: 
- Continue home lorazepam 0.5 mg q2h scheduled and 1mg q4h prn - Precedex ggt off 
  
FEN/GI - Tube feeds, nutrition consulted Activity - Bedrest 
DVT prophylaxis - Sub Q Heparin GI prophylaxis - Not indicated at this time Fall prophylaxis - Not indicated at this time. Disposition - Hospice Code Status - Partial code no CPR and no shock. Bhaskar Errol discussed with Dr. Kerri Loja MD.  
 
Subjective: Pt was seen and examined at bedside. Patient nonverbal at baseline. Objective:  
Physical examination Visit Vitals BP 93/71 Pulse (!) 108 Temp 98.5 °F (36.9 °C) Resp 24 Ht 4' 9\" (1.448 m) Wt 143 lb 1.6 oz (64.9 kg) SpO2 100% BMI 30.97 kg/m² Date 05/30/21 0700 - 05/31/21 1061 05/31/21 0700 - 06/01/21 6073 Shift 0411-2939 2392-3886 24 Hour Total 7516-2309 1676-5712 24 Hour Total  
INTAKE  
P.O.  0 0     
  P. O.  0 0     
I. V.(mL/kg/hr) 350(0.5)  350(0.2) Volume (piperacillin-tazobactam (ZOSYN) 3.375 g in 0.9% sodium chloride (MBP/ADV) 100 mL MBP) 200  200 Volume (levoFLOXacin (LEVAQUIN) 750 mg in D5W IVPB) 150  150 NG/GT 9392 357 8378 Water Flush Volume (mL) (PEG/Gastrostomy Tube 05/25/21) 150 240 390 Medication Volume (PEG/Gastrostomy Tube 05/25/21) 250 70 320 Intake (ml) (PEG/Gastrostomy Tube 05/25/21)  Shift Total(mL/kg) [id number]) [id number]) [id number])     
OUTPUT Urine(mL/kg/hr) 950(1.3) 1200(1.5) 2150(1.4) Urine Voided 950  950 Urine Occurrence(s) 1 x 3 x 4 x Urine Output (mL) (External Urinary Catheter 05/25/21)  1200 1200 Stool Stool Occurrence(s)  3 x 3 x Shift Total(mL/kg) 950(15.1) 4543(71.2) [id number])  -721 631 Weight (kg) 63 64.9 64.9 64.9 64.9 64.9 General:   Ill-appearing, nonverbal at baseline Neck:  No deformities Lungs:   Scattered coarse breath sounds anteriorly.  Transmitted trach ventilation sounds. Heart:   Tachycardic, no murmur. Abdomen:    Soft, mild distended, no apparent tenderness on exam, G tube in LUQ Extremities:  2+ edema in BL hands. Trace edema in BLLE, unna boots in place Skin:  Warm and dry, wound on left scalp Neurologic:  Alert, nonverbal at baseline. Data Review: CBC: 
Recent Labs  
  05/29/21 
0245 WBC 9.0 HGB 8.6* HCT 27.4*  
 Metabolic Panel: 
Recent Labs 05/30/21 
0309 05/29/21 
0245 * 151*  
K 4.3 3.8 * 123* CO2 22 21 BUN 22* 21* CREA 0.90 0.86 * 154* CA 8.3* 7.9* ALB 1.8* 1.8* TBILI <0.1* 0.1* ALT 50 47 Micro: 
Lab Results Component Value Date/Time Culture result: MRSA NOT PRESENT 05/28/2021 09:36 AM  
 Culture result:  05/28/2021 09:36 AM  
  Screening of patient nares for MRSA is for surveillance purposes and, if positive, to facilitate isolation considerations in high risk settings. It is not intended for automatic decolonization interventions per se as regimens are not sufficiently effective to warrant routine use. Culture result: NO GROWTH 6 DAYS 05/24/2021 07:00 PM  
 
Imaging: XR CHEST PORT Result Date: 5/24/2021 INDICATION:  Eval for Infiltrate EXAM: Chest single view. COMPARISON: 4/16/2021. FINDINGS: A single frontal view of the chest at 1906 hours shows persistent but improved diffuse parenchymal infiltrate. Tracheostomy device is stable over the airway. .  The heart, mediastinum and pulmonary vasculature are stable . The bony thorax is unremarkable for age. .  
 
Persistent but improved bilateral lung infiltrates. .  . Medications reviewed Current Facility-Administered Medications Medication Dose Route Frequency  albuterol-ipratropium (DUO-NEB) 2.5 MG-0.5 MG/3 ML  3 mL Nebulization Q6H RT  
 levoFLOXacin (LEVAQUIN) 750 mg in D5W IVPB  750 mg IntraVENous Q24H  
 levETIRAcetam (KEPPRA) oral solution 1,000 mg  1,000 mg Per G Tube Q12H  phenytoin (DILANTIN) chewable tablet 200 mg  200 mg Per G Tube TID  morphine injection 1 mg  1 mg IntraVENous Q4H PRN  
 LORazepam (ATIVAN) injection 1 mg  1 mg IntraVENous Q4H PRN  
 oxyCODONE IR (ROXICODONE) tablet 5 mg  5 mg Oral Q8H  potassium bicarb-citric acid (EFFER-K) tablet 20 mEq  20 mEq Per G Tube DAILY  LORazepam (ATIVAN) tablet 0.5 mg  0.5 mg Per G Tube Q2H  
 lansoprazole (PREVACID) 3 mg/mL oral suspension 30 mg  30 mg Per G Tube BID  piperacillin-tazobactam (ZOSYN) 3.375 g in 0.9% sodium chloride (MBP/ADV) 100 mL MBP  3.375 g IntraVENous Q8H  
 dexmedeTOMidine in 0.9 % NaCl (PRECEDEX) 400 mcg/100 mL (4 mcg/mL) infusion soln  0.1-1.5 mcg/kg/hr IntraVENous TITRATE  mupirocin (BACTROBAN) 2 % ointment   Topical BID  midodrine (PROAMATINE) tablet 15 mg  15 mg Per G Tube Q8H  
 sodium chloride (NS) flush 5-10 mL  5-10 mL IntraVENous PRN  
 vancomycin (FIRVANQ) 50 mg/mL oral solution 500 mg  500 mg Oral Q6H  
 sodium chloride (NS) flush 5-40 mL  5-40 mL IntraVENous Q8H  
 sodium chloride (NS) flush 5-40 mL  5-40 mL IntraVENous PRN  
 acetaminophen (TYLENOL) tablet 650 mg  650 mg Oral Q6H PRN Or  
 acetaminophen (TYLENOL) suppository 650 mg  650 mg Rectal Q6H PRN  polyethylene glycol (MIRALAX) packet 17 g  17 g Oral DAILY PRN  
 heparin (porcine) injection 5,000 Units  5,000 Units SubCUTAneous Q8H  
 [Held by provider] torsemide (DEMADEX) tablet 40 mg  40 mg Per G Tube DAILY  glycopyrrolate (ROBINUL) tablet 1 mg  1 mg Per G Tube TID PRN

## 2021-05-30 NOTE — PROGRESS NOTES
Bedside and Verbal shift change report given to  rn  (oncoming nurse) by am rn  (offgoing nurse). Report included the following information SBAR, Kardex and Recent Results. .. This patient was assisted with Intentional Toileting every 2 hours during this shift. Documentation of ambulation and output reflected on Flowsheet. .. 1700- Hospice RN @ bedside. . 
 
[place].Theo- 
MD Palliative @ bedside to . Tosha Rebolledo Has some question now talking with FPMD.. Bedside and Verbal shift change report given to AM RN  (oncoming nurse) by Eric Fitzpatrick RN  (offgoing nurse). Report included the following information SBAR, Kardex and Recent Results.

## 2021-05-30 NOTE — HOSPICE
[facility] 
[phone] Nursing Note Patient Name: Vikram Butler YOB: 1970 Age: 49 y.o. [facility] RN Note:   
 
Received call from Dr Rice/on-call hospice MD.  Dr Rhiannon Norris has approved hospice admission with continuation of TF which was requested by family. Called Ana Aldridge/mother/LNOK to see if she was able to come to [facility] to sign consents for hospice. Per Lucia Castillo, she does not drive and those that assist her with transportation are working. Offered to do consents via [address] [place] [address] with email. Lucia Castillo does not have an email address but requested that I call her daughter, Cherri Clay, to assist with this. Called Leonora Godwin/daughter and left VM requesting return call. If there are any questions, please call this hospice liaison at [phone] or the main [address] number [phone]. Thank you for the opportunity to be of service to this patient and her family.

## 2021-05-30 NOTE — PROGRESS NOTES
2109 Attempted to call report with no answer from the unit. 2135 TRANSFER - IN REPORT: 
 
Verbal report received from [facility], RN (name) on Adithya Johnson  being received from ICU (unit) for routine progression of care Report consisted of patients Situation, Background, Assessment and  
Recommendations(SBAR). Information from the following report(s) SBAR, Kardex, Intake/Output, MAR, Recent Results, Med Rec Status and Cardiac Rhythm NSR was reviewed with the receiving nurse. Opportunity for questions and clarification was provided. 2230 Assessment completed upon patients arrival to unit and care assumed. Patient with loose stools overnight. Patient tachypneic with care and repositioning. Morning labs drawn but there was not enough blood to run the CBC. Attempt to re-draw CBC was unsuccessful. 
 
0700 Spoke to MD requesting fecal management system d/t frequent loose stools as well as possibly increasing ativan dose but decreasing frequency. MD will discuss with team. 
 
0715 Bedside and Verbal shift change report given to Monica Yarbrough RN (oncoming nurse) by Brendan Russell RN (offgoing nurse). Report included the following information SBAR, Kardex, Intake/Output, MAR, Recent Results, Med Rec Status and Cardiac Rhythm NSR.

## 2021-05-31 NOTE — PROGRESS NOTES
Transition of Care Note formerly [facility] is a 49 y.o. female with a PMHx of Down Syndrome (non-verbal at baseline), s/p Trach and PEG tube, Seizures, Encephalomalacia, ANNE, recent admission for HAP and C. diff colitis who is admitted for Severe Sepsis. Patient has been treated for Aspiration Pneumonia with IV antibiotics, completed a 7 days course of Zosyn and is only currently on Levaquin (started on 5/29-6/2) and PO Vancomycin for C. Diff infection to be given for 21 days (started on 5/24). Pt has poor prognosis due to her baseline medical conditions, therefore it is likely that patient will continue to decline and require multiple future admissions, (pt has been admitted ~ 3 times since January to date). Hospital course complicated by ICU stay for a couple of days. Pt required Precedex ggt (5/25-5/27) for agitation. Pt also with worsening chronic hypotension requiring Midodrine supplement and small NS bolus given that she is hard stick and has difficult IV access (just a port would be an option for IV access). Neurology was consulted for anti-seizure medication adjustments. Palliative and Hospice were consulted and discussed pt's condition with family members. Pt's mother wants pt to go back to [address] [place] [address] under Hospice care. Hospice Team and Family Medicine team to continue following and communicating with family.

## 2021-05-31 NOTE — PROGRESS NOTES
[id number] [facility] PROGRAM 
PROGRESS NOTE  
 
6/1/2021 PCP: Noe Vaughn NP Assessment/Plan:  
 
Marcial Van is a 49 y.o. female with a PMHx of Down Syndrome (non-verbal at baseline), s/p Trach and PEG tube, seizures, encephalomalacia, ANNE, recent admission for HAP and C. diff colitis admitted for severe sepsis. Overnight events: None. Severe sepsis likely 2/2 aspiration PNA and c diff: 4/4 SIRS POA. LA 3.0 > 1.6. Procal 2.82. CT 5/25 c/a/p with minimal scattered groundglass lung attenuation. CXR on 5/27: Increase interstitial and airspace infiltrate in the left lung c/w worsening PNA. S/p Levaquin. Stool studies neg. BCx neg x 6 days. S/p Zosyn x 7 days. - Continue Levaquin (5/29-6/2), Oral Vancomycin 500 mg q6h for total of 21 day course (5/24) - Wound care following 
- Hospice and palliative following - Pt to go to [address] [place] [address] under Hospice care Elevated D-dimer: POA 9.2. CT chest without PE. Possibly reactive 2/2 inflammation/infection. BLLE Dopplers Neg. Worsening Chronic Hypotension: has required pressors in the past, presented with Hypertensive Urgency on this admission, but is now on lower side. S/p Albumin 12.5 mg x 3 doses - Continue home Midodrine 15mg TID Seizures: Phenytoin level 7.6 (low), Free Phenytoin level low. S/p Fosphenytoin 950 mg IV x1. Keppra level low (8.2) - Neurology consulted, Maru Cardoza - Keppra 1000 mg q12h (Increased from 750 mg BID on 5/28) - Phenytoin 200 mg TID per G tube (increased from 150 TID on 5/28) - Monitoring free phenytoin level 
  
Trach dependent: since 2020 
- Home Duonebs q4h PRN  
- Glycopyrrolate for increased secretions  
  
GERD:  
- Hold protonix, while treated with PO vanco  
  
CKD3:  
- Monitor CMP 
  
Chronic lower extremity edema: 
- Hold hold torsemide 40 mg daily  
  ? Chronic pain:  
- Hold home Oxy 5 mg q6h, until medications verified with group home, discontinued at group home 
  
Agitation: 
- Continue home lorazepam 0.5 mg q2h scheduled and 1mg q4h prn - Precedex ggt off 
  
FEN/GI - Tube feeds, nutrition consulted Activity - Bedrest 
DVT prophylaxis - Sub Q Heparin GI prophylaxis - Not indicated at this time Fall prophylaxis - Not indicated at this time. Disposition - Hospice Code Status - DNR Trae Kuhn discussed with Dr. Klaudia Kline MD.  
 
Subjective: Pt was seen and examined at bedside. Patient nonverbal at baseline. Non agitated. Objective:  
Physical examination Visit Vitals BP (!) 98/56 Pulse (!) 104 Temp 98.6 °F (37 °C) Resp 20 Ht 4' 9\" (1.448 m) Wt 143 lb 3.2 oz (65 kg) SpO2 95% BMI 30.99 kg/m² Date 05/31/21 0700 - 06/01/21 7572 06/01/21 0700 - 06/02/21 6703 Shift 4635-4630 8068-7151 24 Hour Total 2086-5898 1428-3431 24 Hour Total  
INTAKE  
I.V.(mL/kg/hr)  250 250 Volume (piperacillin-tazobactam (ZOSYN) 3.375 g in 0.9% sodium chloride (MBP/ADV) 100 mL MBP)  100 100 Volume (levoFLOXacin (LEVAQUIN) 750 mg in D5W IVPB)  150 150 [id number] Water Flush Volume (mL) (PEG/Gastrostomy Tube 05/25/21) 200 225 425 Medication Volume (PEG/Gastrostomy Tube 05/25/21) 360 50 410 Intake (ml) (PEG/Gastrostomy Tube 05/25/21)  720 720 Shift Total(mL/kg) 560(8.6) [id number]) [id number]) OUTPUT Urine(mL/kg/hr) 300(0.4) 800 1100 Urine Voided 300  300 Urine Occurrence(s) 2 x  2 x Urine Output (mL) (External Urinary Catheter 05/25/21)  800 800 Emesis/NG output  0 0 Output (ml) (PEG/Gastrostomy Tube 05/25/21)  0 0 Stool Stool Occurrence(s) 3 x  3 x Shift Total(mL/kg) 300(4.6) 800(12.3) 1100(16.9)  445 705 Weight (kg) 64.9 65 65 65 65 65 General:   Ill-appearing, nonverbal at baseline Neck: Tracheostomy in place Lungs:   Scattered coarse breath sounds anteriorly. Transmitted trach ventilation sounds. Heart:   Tachycardic, no murmur. Abdomen:    Soft, mild distended, no apparent tenderness on exam, G tube in LUQ Extremities:  2+ edema in hands b/l. Trace edema in BLE, unna boots in place Skin:  Warm and dry, wound on left scalp Neurologic:  Alert, nonverbal at baseline. Data Review: CBC: 
No results for input(s): WBC, HGB, HCT, PLT, HGBEXT, HCTEXT, PLTEXT, HGBEXT, HCTEXT, PLTEXT in the last 72 hours. Metabolic Panel: 
Recent Labs 05/30/21 
0309 * K 4.3 * CO2 22 BUN 22* CREA 0.90 * CA 8.3* ALB 1.8* TBILI <0.1* ALT 50 Micro: 
Lab Results Component Value Date/Time Culture result: MRSA NOT PRESENT 05/28/2021 09:36 AM  
 Culture result:  05/28/2021 09:36 AM  
  Screening of patient nares for MRSA is for surveillance purposes and, if positive, to facilitate isolation considerations in high risk settings. It is not intended for automatic decolonization interventions per se as regimens are not sufficiently effective to warrant routine use. Culture result: NO GROWTH 6 DAYS 05/24/2021 07:00 PM  
 
Imaging: XR CHEST PORT Result Date: 5/24/2021 INDICATION:  Eval for Infiltrate EXAM: Chest single view. COMPARISON: 4/16/2021. FINDINGS: A single frontal view of the chest at 1906 hours shows persistent but improved diffuse parenchymal infiltrate. Tracheostomy device is stable over the airway. .  The heart, mediastinum and pulmonary vasculature are stable . The bony thorax is unremarkable for age. .  
 
Persistent but improved bilateral lung infiltrates. .  . Medications reviewed Current Facility-Administered Medications Medication Dose Route Frequency  oxyCODONE IR (ROXICODONE) tablet 5 mg  5 mg Per G Tube Q8H  
 albuterol-ipratropium (DUO-NEB) 2.5 MG-0.5 MG/3 ML  3 mL Nebulization Q6H RT  
 levoFLOXacin (LEVAQUIN) 750 mg in D5W IVPB  750 mg IntraVENous Q24H  
 levETIRAcetam (KEPPRA) oral solution 1,000 mg  1,000 mg Per G Tube Q12H  phenytoin (DILANTIN) chewable tablet 200 mg  200 mg Per G Tube TID  morphine injection 1 mg  1 mg IntraVENous Q4H PRN  
 LORazepam (ATIVAN) injection 1 mg  1 mg IntraVENous Q4H PRN  potassium bicarb-citric acid (EFFER-K) tablet 20 mEq  20 mEq Per G Tube DAILY  LORazepam (ATIVAN) tablet 0.5 mg  0.5 mg Per G Tube Q2H  
 lansoprazole (PREVACID) 3 mg/mL oral suspension 30 mg  30 mg Per G Tube BID  mupirocin (BACTROBAN) 2 % ointment   Topical BID  midodrine (PROAMATINE) tablet 15 mg  15 mg Per G Tube Q8H  
 sodium chloride (NS) flush 5-10 mL  5-10 mL IntraVENous PRN  
 vancomycin (FIRVANQ) 50 mg/mL oral solution 500 mg  500 mg Oral Q6H  
 sodium chloride (NS) flush 5-40 mL  5-40 mL IntraVENous Q8H  
 sodium chloride (NS) flush 5-40 mL  5-40 mL IntraVENous PRN  
 acetaminophen (TYLENOL) tablet 650 mg  650 mg Oral Q6H PRN Or  
 acetaminophen (TYLENOL) suppository 650 mg  650 mg Rectal Q6H PRN  polyethylene glycol (MIRALAX) packet 17 g  17 g Oral DAILY PRN  
 heparin (porcine) injection 5,000 Units  5,000 Units SubCUTAneous Q8H  
 [Held by provider] torsemide (DEMADEX) tablet 40 mg  40 mg Per G Tube DAILY  glycopyrrolate (ROBINUL) tablet 1 mg  1 mg Per G Tube TID PRN

## 2021-05-31 NOTE — DISCHARGE SUMMARY
[address] [facility] [address] [address]   Office [phone]  Fax [phone]       Discharge / Transfer / Off-Service Note     Name: Amairani Brown MRN: [id number]  Sex: Female   YOB: 1970  Age: 49 y.o. PCP: Eleazar Calhoun NP     Date of admission: 5/24/2021  Date of discharge/transfer: 6/1/2021    Attending physician at admission: Dr. Benny Smith  Attending physician at discharge/transfer: Dr. Raji Thomas  Resident physician at discharge/transfer: Arely Angulo MD     Consultants during hospitalization  IP CONSULT TO [facility]     Admission diagnoses   Severe sepsis    Recommended follow-up after discharge  - Per Hospice Team     Things to follow up on with PCP:   - Management per Hospice Team      Medication Changes:  - Management per Hospice Team    History of Present Illness  As per admitting provider:   Amairani Brown is a 49 y.o. female with known hypotension, seizures, recent admission for HAP and c/ diff infection who presents to the ER from group home with c/o fever. Patient finished oral abx for c diff. Per staff temp was 102 and they called EMS. I was not able to get a hold of group home to obtain more history. Chart reviewed and discussed with ED provider. Hospital course  Amairani Brown is a 49 y.o. female with a PMHx of Down Syndrome (non-verbal at baseline), s/p Trach and PEG tube, seizures, encephalomalacia, ANNE, recent admission for HAP and C. diff colitis who was admitted for severe sepsis. Pt was treated with IV and PO antibiotics. Hospital course was complicated by ICU stay for a couple of days. Worsening chronic hypotension has required increased midodrine supplementation. Neurology following during admission for anti-seizure medication adjustment. Patient's poor prognosis due to chronic medical conditions has resulted in multiple admissions (admitted 3 times since January).  Palliative and Hospice were consulted and family agreed to transition patient to hospice. Severe sepsis likely 2/2 aspiration PNA and C. diff: 4/4 SIRS POA. LA 3.0 > 1.6. Procal 2.82. CT 5/25 c/a/p with minimal scattered groundglass lung attenuation. CXR on 5/27: Increase interstitial and airspace infiltrate in the left lung c/w worsening PNA. Stool studies neg. BCx neg x 6 days. S/p Zosyn x 7 days. Received Levaquin (5/29), and oral Vancomycin 500 mg q6h for total of 21 day course (5/24)  - Management per Hospice Team      Elevated D-dimer: POA 9.2. CT chest without PE. Possibly reactive 2/2 inflammation/infection. BLE Dopplers Neg.     Worsening Chronic Hypotension:S/p Albumin 12.5 mg x 3 doses. Home Midodrine 15mg TID. - Management per Hospice Team      Seizures: Phenytoin level 7.6 (low), Free Phenytoin level low. S/p Fosphenytoin 950 mg IV x1. Keppra level low (8.2). Keppra 1000 mg q12h (Increased from 750 mg BID on 5/28). Phenytoin 200 mg TID per G tube (increased from 150 TID on 5/28)  - Management per Hospice Team      Trach dependent: since 2020. Home Duonebs q4h PRN. Glycopyrrolate for increased secretions. - Management per Hospice Team      GERD: Protonix held while treated with PO vanco   - Management per Hospice Team      CKD3: Stable     Chronic lower extremity edema: Home torsemide 40 mg daily   - Management per Hospice Team      Chronic pain: Home Oxy 5 mg q6h, until medications verified with group home, discontinued at group home. - Management per Hospice Team      Agitation: Home lorazepam 0.5 mg q2h scheduled and 1mg q4h prn  - Management per Hospice Team        Physical exam at discharge:    Vitals Reviewed.    Patient Vitals for the past 12 hrs:   Temp Pulse Resp BP SpO2   06/01/21 1911     95 %   06/01/21 1613  (!) 120 24  96 %   06/01/21 1507 98.4 °F (36.9 °C) (!) 103 26 127/67 100 %   06/01/21 1500  99      06/01/21 1315     96 %   06/01/21 1120 99.6 °F (37.6 °C) (!) 108 28 105/69 97 % General:   Ill-appearing, nonverbal at baseline   Neck:  Tracheostomy in place    Lungs:   Scattered coarse breath sounds anteriorly. Transmitted trach ventilation sounds. Heart:   Tachycardic, no murmur. Abdomen:    Soft, distended, no apparent tenderness on exam, G tube in LUQ   Extremities:  2+ edema in hands b/l. Trace edema in BLE, unna boots in place    Skin:  Warm and dry, wound on left scalp    Neurologic:  Alert, nonverbal at baseline. Condition at discharge: Stable. Labs  No results for input(s): WBC, HGB, HCT, PLT, HGBEXT, HCTEXT, PLTEXT, HGBEXT, HCTEXT, PLTEXT in the last 72 hours. Recent Labs     05/30/21  0309   *   K 4.3   *   CO2 22   BUN 22*   CREA 0.90   *   CA 8.3*     Recent Labs     05/30/21  0309   ALT 50   *   TBILI <0.1*   TP 6.3*   ALB 1.8*   GLOB 4.5*     No results for input(s): PH, PCO2, PO2, TNIPOC, TROIQ, INR, PTP, APTT, FE, TIBC, PSAT, FERR, GLUCPOC, INREXT, INREXT in the last 72 hours. No lab exists for component: GLPOC    Micro:  Lab Results   Component Value Date/Time    Culture result: MRSA NOT PRESENT 05/28/2021 09:36 AM    Culture result:  05/28/2021 09:36 AM     Screening of patient nares for MRSA is for surveillance purposes and, if positive, to facilitate isolation considerations in high risk settings. It is not intended for automatic decolonization interventions per se as regimens are not sufficiently effective to warrant routine use. Culture result: NO GROWTH 6 DAYS 05/24/2021 07:00 PM       Imaging:  CTA CHEST W OR W WO CONT    Result Date: 5/25/2021  EXAM:  CT angiography chest; CT abdomen pelvis with contrast INDICATION:  Tachycardia. Fever. Diarrhea. COMPARISON: Chest radiograph 5/24/2021. CT chest 4/12/2021. CT abdomen pelvis 6/30/2020. TECHNIQUE: Helical thin section chest CT following uneventful intravenous administration of nonionic contrast according to departmental PE protocol.  Coronal and sagittal reformats were performed. 3D/MIP post processing was performed. Helical CT of the abdomen and pelvis is performed with intravenous contrast. Coronal and sagittal reformatted images were obtained. CT dose reduction was achieved through use of a standardized protocol tailored for this examination and automatic exposure control for dose modulation. FINDINGS: CT chest angiography: This is a good quality study for the evaluation of pulmonary embolism to the first subsegmental arterial level. There is no pulmonary embolism to this level. A tracheostomy tube terminates above the mayrjo. The visualized thyroid gland is unremarkable. The aorta and main pulmonary artery are normal in caliber. Cardiac size is within normal limits. No pericardial effusion. No lymphadenopathy by imaging size criteria. There is minimal scattered groundglass attenuation, decreased compared to 4/12/2021. There is no lung mass or consolidation. No pleural effusion or pneumothorax. There is a small amount of debris in the right lower lobe bronchioles. CT abdomen and pelvis: The liver, spleen, pancreas, and adrenal glands are normal. The gall bladder is present  without intra- or extra-hepatic biliary dilatation. The kidneys are symmetric without hydronephrosis. A percutaneous gastrostomy tube terminates in the stomach. There are no dilated bowel loops. The appendix is normal.  There are no enlarged lymph nodes. There is no free fluid or free air. There is a small diverticulum at the superior aspect of the urinary bladder. There is no pelvic mass. The bony structures are age-appropriate. 1. No acute pulmonary embolism. 2. Minimal scattered groundglass lung attenuation, decreased compared to 4/12/2021, may represent pulmonary edema, nonspecific infection/inflammation, or aspiration. A small amount of debris is noted in the right lower lobe bronchioles. 3. No acute abnormality in the abdomen or pelvis.      CT ABD PELV W CONT    Result Date: 5/25/2021  EXAM: CT angiography chest; CT abdomen pelvis with contrast INDICATION:  Tachycardia. Fever. Diarrhea. COMPARISON: Chest radiograph 5/24/2021. CT chest 4/12/2021. CT abdomen pelvis 6/30/2020. TECHNIQUE: Helical thin section chest CT following uneventful intravenous administration of nonionic contrast according to departmental PE protocol. Coronal and sagittal reformats were performed. 3D/MIP post processing was performed. Helical CT of the abdomen and pelvis is performed with intravenous contrast. Coronal and sagittal reformatted images were obtained. CT dose reduction was achieved through use of a standardized protocol tailored for this examination and automatic exposure control for dose modulation. FINDINGS: CT chest angiography: This is a good quality study for the evaluation of pulmonary embolism to the first subsegmental arterial level. There is no pulmonary embolism to this level. A tracheostomy tube terminates above the maryjo. The visualized thyroid gland is unremarkable. The aorta and main pulmonary artery are normal in caliber. Cardiac size is within normal limits. No pericardial effusion. No lymphadenopathy by imaging size criteria. There is minimal scattered groundglass attenuation, decreased compared to 4/12/2021. There is no lung mass or consolidation. No pleural effusion or pneumothorax. There is a small amount of debris in the right lower lobe bronchioles. CT abdomen and pelvis: The liver, spleen, pancreas, and adrenal glands are normal. The gall bladder is present  without intra- or extra-hepatic biliary dilatation. The kidneys are symmetric without hydronephrosis. A percutaneous gastrostomy tube terminates in the stomach. There are no dilated bowel loops. The appendix is normal.  There are no enlarged lymph nodes. There is no free fluid or free air. There is a small diverticulum at the superior aspect of the urinary bladder. There is no pelvic mass. The bony structures are age-appropriate.      1. No acute pulmonary embolism. 2. Minimal scattered groundglass lung attenuation, decreased compared to 4/12/2021, may represent pulmonary edema, nonspecific infection/inflammation, or aspiration. A small amount of debris is noted in the right lower lobe bronchioles. 3. No acute abnormality in the abdomen or pelvis. XR CHEST PORT    Result Date: 5/27/2021  EXAM: XR CHEST PORT INDICATION: fever COMPARISON: 5/24/2021 and CT scan of 5/25/2021 FINDINGS: A portable AP radiograph of the chest was obtained at 1126 hours. The patient is on a cardiac monitor. The tracheostomy tube overlies the airway. The lungs demonstrate interval increase in interstitial airspace opacities within the left lung especially left suprahilar region and retrocardiac region this is probably due to worsening pneumonia. Interstitial opacities in the right lung are unchanged. No pleural effusions. Heart size is normal.     1. Increased interstitial and airspace infiltrate in the left lung as described above consistent with worsening pneumonia. XR CHEST PORT    Result Date: 5/24/2021  INDICATION:  Eval for Infiltrate EXAM: Chest single view. COMPARISON: 4/16/2021. FINDINGS: A single frontal view of the chest at 1906 hours shows persistent but improved diffuse parenchymal infiltrate. Tracheostomy device is stable over the airway. .  The heart, mediastinum and pulmonary vasculature are stable . The bony thorax is unremarkable for age. .     Persistent but improved bilateral lung infiltrates. .  . DUPLEX LOWER EXT VENOUS BILAT    Result Date: 5/25/2021  Bilateral lower extremity venous duplex negative for deep venous thrombosis or thrombophlebitis. NOTE: Unable to image bilateral popliteal veins due to patient positioning.        Procedures / Diagnostic Studies    Chronic diagnoses   Problem List as of 6/1/2021 Date Reviewed: 5/24/2021        Codes Class Noted - Resolved    Tachypnea ICD-10-CM: R06.82  ICD-9-CM: 786.06  5/29/2021 - Present Sepsis with acute organ dysfunction without septic shock (HCC) ICD-10-CM: A41.9, R65.20  ICD-9-CM: 038.9, 995.92  5/28/2021 - Present        Lactic acidosis ICD-10-CM: E87.2  ICD-9-CM: 276.2  5/24/2021 - Present        Hypertensive urgency ICD-10-CM: I16.0  ICD-9-CM: 401.9  5/24/2021 - Present        CKD (chronic kidney disease) ICD-10-CM: N18.9  ICD-9-CM: 585.9  5/24/2021 - Present        Pneumonia ICD-10-CM: J18.9  ICD-9-CM: 903  4/16/2021 - Present        * (Principal) Severe sepsis ([facility] ICD-10-CM: A41.9, R65.20  ICD-9-CM: 038.9, 995.92  4/16/2021 - Present        Anemia ICD-10-CM: D64.9  ICD-9-CM: 285.9  4/16/2021 - Present        Chronic respiratory failure with hypoxia (HCC) ICD-10-CM: J96.11  ICD-9-CM: 518.83, 799.02  4/16/2021 - Present        Chronic static encephalopathy ICD-10-CM: G93.49  ICD-9-CM: 348.39  4/16/2021 - Present        Swelling of hand ICD-10-CM: M79.89  ICD-9-CM: 729.81  4/16/2021 - Present        Subtherapeutic phenytoin level ICD-10-CM: Z51.81  ICD-9-CM: V58.83  4/16/2021 - Present        Tracheostomy dependence ([facility] ICD-10-CM: Z93.0  ICD-9-CM: V44.0  4/16/2021 - Present        Acute respiratory failure with hypoxia (HCC) ICD-10-CM: J96.01  ICD-9-CM: 518.81  4/4/2021 - Present        Acute kidney injury ([facility] ICD-10-CM: N17.9  ICD-9-CM: 584.9  3/31/2021 - Present        Hyperphosphatemia ICD-10-CM: E83.39  ICD-9-CM: 275.3  3/31/2021 - Present        On tube feeding diet ICD-10-CM: Z78.9  ICD-9-CM: V49.89  Unknown - Present        Wheelchair bound ICD-10-CM: Z99.3  ICD-9-CM: V46.3  6/9/2020 - Present        Inability to walk ICD-10-CM: R26.2  ICD-9-CM: 719.7  6/9/2020 - Present        Seizure ([address] ICD-10-CM: R56.9  ICD-9-CM: 780.39  12/11/2019 - Present        Gastroesophageal reflux disease without esophagitis ICD-10-CM: K21.9  ICD-9-CM: 530.81  12/11/2019 - Present        Down's syndrome ICD-10-CM: Q90.9  ICD-9-CM: 758.0  12/11/2019 - Present        Iron deficiency ICD-10-CM: E61.1  ICD-9-CM: 280.9  12/11/2019 - Present        Severe intellectual disability ICD-10-CM: F72  ICD-9-CM: 318.1  2/2/2012 - Present    Overview Signed 2/2/2012  1:40 PM by Alicia Ferreira MD     [place]               RESOLVED: Sepsis [facility]) ICD-10-CM: A41.9  ICD-9-CM: 038.9, 995.91  4/9/2021 - 4/16/2021        RESOLVED: VAP (ventilator-associated pneumonia) ([facility] ICD-10-CM: G84.538  ICD-9-CM: 997.31  4/9/2021 - 4/16/2021        RESOLVED: UTI (urinary tract infection) ICD-10-CM: N39.0  ICD-9-CM: 599.0  4/9/2021 - 4/9/2021        RESOLVED: Altered mental status ICD-10-CM: R41.82  ICD-9-CM: 780.97  4/6/2021 - 4/16/2021        RESOLVED: Hypoxia ICD-10-CM: R09.02  ICD-9-CM: 799.02  4/6/2021 - 4/16/2021        RESOLVED: Acute cystitis without hematuria ICD-10-CM: N30.00  ICD-9-CM: 595.0  4/3/2021 - 4/16/2021        RESOLVED: Elevated Dilantin level ICD-10-CM: R78.89  ICD-9-CM: 790.6  4/3/2021 - 4/16/2021        RESOLVED: Edema (Chronic) ICD-10-CM: R60.9  ICD-9-CM: 782.3  3/31/2021 - 4/16/2021        RESOLVED: Hypotension ICD-10-CM: I95.9  ICD-9-CM: 458.9  3/31/2021 - 4/16/2021        RESOLVED: Hyponatremia ICD-10-CM: E87.1  ICD-9-CM: 276.1  3/31/2021 - 4/16/2021        RESOLVED: Thrombocytosis ([facility] ICD-10-CM: D47.3  ICD-9-CM: 238.71  3/31/2021 - 4/16/2021        RESOLVED: Status epilepticus ([address] ICD-10-CM: G40.901  ICD-9-CM: 345.3  3/30/2021 - 4/16/2021        RESOLVED: Clostridium difficile diarrhea ICD-10-CM: A04.72  ICD-9-CM: 008.45  6/30/2020 - 4/16/2021        RESOLVED: Positive fecal occult blood test ICD-10-CM: R19.5  ICD-9-CM: 792.1  3/29/2020 - 4/16/2021        RESOLVED: Oral thrush ICD-10-CM: B37.0  ICD-9-CM: 112.0  3/29/2020 - 4/16/2021        RESOLVED: Diarrhea in adult patient ICD-10-CM: R19.7  ICD-9-CM: 787.91  3/28/2020 - 4/16/2021        RESOLVED: C. difficile diarrhea ICD-10-CM: A04.72  ICD-9-CM: 008.45  3/28/2020 - 4/16/2021        RESOLVED: Constipation ICD-10-CM: K59.00  ICD-9-CM: 564.00 12/11/2019 - 4/16/2021              Discharge Medication List as of 6/1/2021  4:53 PM      START taking these medications    Details   vancomycin (FIRVANQ) 50 mg/mL oral solution Take 10 mL by mouth every six (6) hours for 13 days. , Normal, Disp-520 mL, R-0         CONTINUE these medications which have CHANGED    Details   midodrine (PROAMATINE) 5 mg tablet 3 Tablets by Per G Tube route every eight (8) hours for 30 days. Hold for systolic over 461 or diastolic over 70, Normal, Disp-270 Tablet, R-0      oxyCODONE IR (ROXICODONE) 5 mg immediate release tablet 1 Tablet by Per G Tube route every six (6) hours as needed for Pain for up to 30 days. Max Daily Amount: 20 mg. 1 Tab by Per G Tube route every six (6) hours as needed for Pain (Agitation: For heart rate >120 and/or respiratory rate >35. Do not give if b lood pressure <100/60), Normal, Disp-120 Tablet, R-0      levETIRAcetam (KEPPRA) 100 mg/ml soln oral solution 10 mL by Per G Tube route every twelve (12) hours for 30 days. , Print, Disp-600 mL, R-0      phenytoin (DILANTIN) 50 mg chewable tablet 4 Tablets by Per G Tube route three (3) times daily for 30 days. , Normal, Disp-360 Tablet, R-0         CONTINUE these medications which have NOT CHANGED    Details   tobramycin (Bethkis) 300 mg/4 mL nebu Take 300 mg by inhalation two (2) times a day., Historical Med      potassium chloride (KAON 10%) 20 mEq/15 mL solution 15 mL by Per G Tube route daily. , Normal, Disp-480 mL, R-11      mupirocin (BACTROBAN) 2 % ointment Apply  to affected area two (2) times a day., Normal, Disp-30 g, R-1      acetaminophen (TYLENOL) 100 mg/mL suspension 5 mL by Per G Tube route every six (6) hours as needed for Fever., Normal, Disp-1 Bottle, R-11      calamine-menthoL-petrolat-zinc (Remedy Calazime Protect Paste) 3.5-0.2-69-16.5 % pste Apply to affected area as needed with each pullup change, Normal, Disp-113 g, R-15      LORazepam (ATIVAN) 0.5 mg tablet 1 Tab by Per G Tube route every six (6) hours as needed for Anxiety or Agitation (Agitation: For heart rate >120 and/or respiratory rate >35. Do not give if blood pressure <100/60). Max Daily Amount: 2 mg., Normal, Disp-30 Tab, R-0      naloxone (NARCAN) 4 mg/actuation nasal spray Use 1 spray intranasally, then discard. Repeat with new spray every 2 min as needed for opioid overdose symptoms, alternating nostrils. , Normal, Disp-2 Each, R-0      glycopyrrolate (ROBINUL) 1 mg tablet 1 Tab by Per G Tube route three (3) times daily as needed (increased secretions from tracheal tube) for up to 30 doses. , Normal, Disp-30 Tab, R-0      cetirizine (ZYRTEC) 10 mg tablet 10 mg by Per G Tube route daily. , Historical Med      guaiFENesin (Siltussin SA) 100 mg/5 mL liquid 200 mg by Per G Tube route four (4) times daily. , Historical Med      !! albuterol-ipratropium (DUO-NEB) 2.5 mg-0.5 mg/3 ml nebu 3 mL by Nebulization route every four (4) hours as needed for Shortness of Breath., Historical Med      melatonin 3 mg tablet 1 Tab by Per G Tube route nightly as needed for Insomnia. Must give by 10pm, Normal, Disp-30 Tab, R-11      diazePAM (VALIUM) 5-7.5-10 mg kit Insert 10 mg into rectum as needed (for seizures that last longer than 5 minutes). , Historical Med      OTHER,NON-FORMULARY, by Per G Tube route four (4) times daily. OSMOLITE 1.2 Marino Liquid, Historical Med      !! albuterol-ipratropium (DUO-NEB) 2.5 mg-0.5 mg/3 ml nebu 3 mL by Nebulization route every 4 hours daily while awake resp., Historical Med       !! - Potential duplicate medications found. Please discuss with provider. STOP taking these medications       omeprazole (PRILOSEC) 40 mg capsule Comments:   Reason for Stopping:         torsemide (DEMADEX) 20 mg tablet Comments:   Reason for Stopping:                Diet:  Tube Feeds.  To be managed by Hospice Team     Activity:  As tolerated     Disposition: Hospice     Discharge instructions to patient/family  NA    Follow up plans/appointments  Follow-up Information     Follow up With Specialties Details Why Contact Info    Dixie Prado NP Family Medicine   [address] [address]  [phone]      [facility]    [address], [place], [place]  Phone: [phone]            Javier Cardenas MD  Family Medicine Resident       For Billing    Chief Complaint   Patient presents with    Diarrhea    Fever    Rapid Heart Rate       Hospital Problems  Date Reviewed: 5/24/2021        Codes Class Noted POA    Tachypnea ICD-10-CM: R06.82  ICD-9-CM: 786.06  5/29/2021 Unknown        Sepsis with acute organ dysfunction without septic shock [facility]) ICD-10-CM: A41.9, R65.20  ICD-9-CM: 038.9, 995.92  5/28/2021 Unknown        Lactic acidosis ICD-10-CM: E87.2  ICD-9-CM: 276.2  5/24/2021 Unknown        Hypertensive urgency ICD-10-CM: I16.0  ICD-9-CM: 401.9  5/24/2021 Unknown        CKD (chronic kidney disease) ICD-10-CM: N18.9  ICD-9-CM: 585.9  5/24/2021 Unknown        * (Principal) Severe sepsis ([facility] ICD-10-CM: A41.9, R65.20  ICD-9-CM: 038.9, 995.92  4/16/2021 Unknown        Chronic static encephalopathy ICD-10-CM: G93.49  ICD-9-CM: 348.39  4/16/2021 Yes        Inability to walk ICD-10-CM: R26.2  ICD-9-CM: 719.7  6/9/2020 Yes        Seizure ([facility] ICD-10-CM: R56.9  ICD-9-CM: 780.39  12/11/2019 Yes        Gastroesophageal reflux disease without esophagitis ICD-10-CM: K21.9  ICD-9-CM: 530.81  12/11/2019 Yes        Down's syndrome ICD-10-CM: Q90.9  ICD-9-CM: 758.0  12/11/2019 Yes        Iron deficiency ICD-10-CM: E61.1  ICD-9-CM: 280.9  12/11/2019 Yes        Severe intellectual disability ICD-10-CM: F72  ICD-9-CM: 318.1  2/2/2012 Yes    Overview Signed 2/2/2012  1:40 PM by Dionicio Edward MD     [facility]

## 2021-05-31 NOTE — HOSPICE
[facility] Good Help to Those in Need 
[phone] Patient Name: Mario Peguero YOB: 1970 Age: 49 y.o. [facility] RN Note:  Patient is stable on meds thru PGT and on 5L o2 via trach collar. Appears Comfortable and has no IV medication requirement. Vital signs are stable and WBC is WNL. Patient was seen by RN and Dr Alfredo Barlow today. She is still partial code and family desires to continue TF for forseeable future. Patient is NOT appropriate for IP LOC or [facility] transfer. This liaison spoke w/ mother who is amenable to DC back to group home w/ hospice. Per intake we can accept. Admission slot saved for Wednesday 06/02/21 at 11am.   
 
Liaison will need to contact group home tomorrow am to determine equip needs and determine best method of having consents signed by MPOA/mother Thank you for the opportunity to be of service to this patient.

## 2021-05-31 NOTE — PROGRESS NOTES
Bedside and Verbal shift change report given to Saba Marques RN (oncoming nurse) by Christian Schafer RN (offgoing nurse). Report included the following information SBAR, Kardex, Intake/Output, MAR, Recent Results, Med Rec Status and Cardiac Rhythm sinus tach. [id number] Notified Dr. Prisca Key that nurse was unable to obtain labs after two attempts due to generalized edema. MD acknowledged and will notify day team. 
 
0720 Bedside and Verbal shift change report given to KLAUDIA Alves (oncoming nurse) by Saba Marques RN (offgoing nurse). Report included the following information SBAR, Kardex, Intake/Output, MAR, Recent Results, Med Rec Status and Cardiac Rhythm NSR.

## 2021-05-31 NOTE — PROGRESS NOTES
SPEECH THERAPY SCREENING: 
SERVICES ARE NOT INDICATED AT THIS TIME [facility] for speech therapy based on results obtained during the nursing admission assessment. The patients chart was reviewed and the patient is not appropriate for a skilled therapy evaluation at this time. Please consult speech therapy if any therapy needs arise. Thank you. Repetitive admissions for aspiration PNA. Patient to discharge back to group home with hospice. No SLP needed at this time.  
Edgard Magallon, SLP

## 2021-05-31 NOTE — ROUTINE PROCESS
0920: Patient's chart accessed for MEWS of 3. Patient's , /61, MAP 78. Patient w/down syndrome, S/P trach and PEG, seizures, recent admission for HAP and C. Diff, admitted for severe sepsis. Pt. Frequently elevated HR   
F/u with primary RN, NATALEE RUDOLPH. Assessed pt at bedside; no s/s of distress or agitation currently.           
Notes indicate pt. Moving towards hospice care pending family decision. Team aware of HR - elevated with agitation, lorazepam 0.5 mg q2h scheduled and 1mg q4h prn for agitation ANKIT Callejas RN 
1300- MEWs score now [address] score 1 Nishi RUDOLPH

## 2021-05-31 NOTE — ROUTINE PROCESS
RRT RN Note: 
 
2155:  Patient's chart accessed for MEWS of 3. Patient's RR 28, BP 93/70, MAP stable at 80. Patient w/down syndrome, S/P trach and PEG, seizures, recent admission for HAP and C. Diff, admitted for severe sepsis. MDs aware of patient's chronic hypotension. Patient getting scheduled midodrine. F/u with primary RN, Devan Peace. RN states patient is moving towards hospice.

## 2021-05-31 NOTE — PROGRESS NOTES
Palliative Care Spiritual Care follow up on Progressive Care unit. Previous notes indicate Miss Ashley Tian, has a Latter-day belief in God. She is known to me from previous visits. She did not open her eyes during my visit. Provided spiritual words of comfort and prayer. Provided assurance of prayer. Advised nurse to contact [facility] for any further referrals. Visited by: Yris Sams., MS., [address] ([id number])

## 2021-05-31 NOTE — PROGRESS NOTES
Hospice consult received on pt. Chart reviewed in detail, patient seen and examined, discussed case in detail with family medicine doctors and hospice liaison. In brief, 49 y.o. woman with a PMHx of Down Syndrome (non-verbal at baseline), s/p Trach and PEG tube, seizures, encephalomalacia, recent admission for HAP and C. diff colitis who is admitted now for severe sepsis likely secondary to aspiration and persistent C.diff. Pt being treated for above with IV antibiotics. Pt also with worsening chronic hypotension requiring midodrine supplement. Considering pt's medical history, complexities and current medical condition the expected prognosis is poor. [facility] sister Brenda Courser and family has agreed for hospice care. Pt appropriate and would benefit from GIP LOC at [facility]. Family would like to continue tube feedings at this time even though pt is aspirating. Family will need continuous ongoing education and support to come to terms and agree for wean and stop of tube feeds. Pt is trach dependent. Pt is partial code; no CPR/shocks Hospice awaiting consents signing by mother who is the MPOA. Hospice will attempt to have this completed on 5/31/21 followed by transfer to [facility] for inpatient hospice care.

## 2021-05-31 NOTE — PROGRESS NOTES
0730 Bedside and Verbal shift change report given to Joselyn Mckenna RN (oncoming nurse) by Wilfredo Muller RN (offgoing nurse). Report included the following information SBAR and Kardex. This patient was assisted with Intentional Toileting every 2 hours during this shift. Documentation of ambulation and output reflected on Flowsheet. 1915 Bedside and Verbal shift change report given to Nghia Wells RN (oncoming nurse) by Joselyn Pineda RN (offgoing nurse). Report included the following information SBAR and Kardex.

## 2021-05-31 NOTE — PROGRESS NOTES
Hospice attending Chart reviewed and discussed with hospice liaisonConchita. Also reviewed the chart from our team yesterday. Patient clearly has appropriate hospice diagnosis but at least by her evaluation today, does not meet inpatient criteria as she is not requiring IV medication or any medication that cannot be done in her current home setting. Her sepsis has appeared to be stabilized with IV antibiotics as her white count is back to normal.  She remains on scheduled medication through her PEG. Patient certainly remains at high risk for rapid decline. Talked with patient's mother via phone. She is amenable for patient to return to the group home with the support of hospice. Also reviewed CODE STATUS and she agrees on on DNR status and this has been changed in the chart. Our team will continue to follow closely and if patient appears to be showing evidence of rapid decline/GIP needs, certainly we can reassess about inpatient admission. In the meantime, we have reserve a slot for her to be admitted to the group home on Wednesday morning. I also discussed with patient's mother about tube feedings. My suspicion is patient is still aspirating despite the tube feeds and likely will need to either stop or dramatically decrease the amount that patient is receiving. We will readdress this once patient returns to her group home and is back in her normal setting. She is amenable to consider decreasing/stopping the tube feedings if it is causing more burden than benefit. Appreciate the consult and our team will continue to follow closely.

## 2021-06-01 NOTE — PROGRESS NOTES
Problem: Pressure Injury - Risk of 
Goal: *Prevention of pressure injury Description: Document Rolf Scale and appropriate interventions in the flowsheet. Outcome: Progressing Towards Goal 
Note: Pressure Injury Interventions: 
Sensory Interventions: Assess changes in LOC, Avoid rigorous massage over bony prominences, Check visual cues for pain, Discuss PT/OT consult with provider, Keep linens dry and wrinkle-free, Monitor skin under medical devices, Sit a 90-degree angle/use footstool if needed Moisture Interventions: Absorbent underpads, Apply protective barrier, creams and emollients, Check for incontinence Q2 hours and as needed, Internal/External fecal devices, Internal/External urinary devices Activity Interventions: Increase time out of bed, Pressure redistribution bed/mattress(bed type), PT/OT evaluation Mobility Interventions: Assess need for specialty bed, Chair cushion, Float heels, HOB 30 degrees or less, Pressure redistribution bed/mattress (bed type), PT/OT evaluation, Suspension boots, Turn and reposition approx. every two hours(pillow and wedges) Nutrition Interventions: Discuss nutritional consult with provider Friction and Shear Interventions: Apply protective barrier, creams and emollients, Feet elevated on foot rest, Foam dressings/transparent film/skin sealants, HOB 30 degrees or less, Lift team/patient mobility team, Sit at 90-degree angle, Transferring/repositioning devices Problem: Patient Education: Go to Patient Education Activity Goal: Patient/Family Education Outcome: Progressing Towards Goal 
  
Problem: Nutrition Deficit Goal: *Optimize nutritional status Outcome: Progressing Towards Goal 
  
Problem: Patient Education: Go to Patient Education Activity Goal: Patient/Family Education Outcome: Progressing Towards Goal

## 2021-06-01 NOTE — PROGRESS NOTES
Agitated and tachypneic after turning, Tracheal suctioning, mouth care and trach care done. Mod amount of yellow tracheal secretions. Has scheduled anxiolytic that was given at 0200. Emotional support and music therapy given.

## 2021-06-01 NOTE — PROGRESS NOTES
Comprehensive Nutrition Assessment Type and Reason for Visit: Osmel Dora Nutrition Recommendations/Plan: 1. Continue TF via PEG: 
 
Vital AF 1.2 bolus feeds 240mL 5x daily (10am, 4pm, 8pm, 12 midnight, 4am) + 75mL H2O flush after each bolus. Adjust TF times as needed for medications. TF at goal provides 1440 kcals, 90 g protein, 1348 mL H2O- meeting 100% energy/protein needs. Nutrition Assessment:     
6/1: Follow up. Per chart, plan is for pt to d/c back to group home with hospice. Family wishes to continue TFs for now. Recommend continuing current regimen. Labs- Na 152, Cl 123, Hgb 8.6, -154-101. Meds- midodrine, phenytoin, Effer-K, vanco. 
  
5/28: Follow up. Pt tolerating TFs at goal. Noted to have thick secretions requiring frequent suction per RN. Palliative following and family meeting with hospice today per IDR. Will monitor goals of car.e Labs- Na 149, Hgb 8.8. Meds- [email or web address], Prevacid, ativan, ativan, midodrine, phenytoin, zosyn, Effer-K, vanco. 
 
5/25: 48 y/o female admitted with severe sepsis. PMH includes Down's syndrome, chronic trach, chronic PEG. PT currently COVID-19 r/o. +c.diff. RD familiar with pt from previous admissions. Pt normally resides at group home and her normal TF regimen is Osmolite 1.2 237mL bolus 5x/day + 75mL H2O flush before and after each bolus. Hospital does not carry Osmolite TF. Recommend restarting pt on similar TF regimen from recent admission. May need to readjust TF bolus times based on medications. Will adjust as needed. Pt's weight appears down from recent admission. Current ordered TF regimen should be exceeding p t's energy needs by about ~170 kcals, so will monitor weight trends while in hospital. 
Labs- Cr 1.54, Hgb 10.7, -104-368. Meds- 0.9%NaCl @100mL/h, Precedex, zosyn, Phenytoin, vanco. 
 
Weight hx: Wt Readings from Last 10 Encounters:  
06/01/21 65 kg (143 lb 3.2 oz) 04/22/21 64.3 kg (141 lb 12.8 oz) 04/14/21 59.2 kg (130 lb 8 oz)  
02/03/21 58.9 kg (129 lb 12.8 oz) 07/08/20 49.9 kg (110 lb)  
06/30/20 49.9 kg (110 lb) 03/31/20 50.1 kg (110 lb 7.2 oz) 03/17/20 44.5 kg (98 lb)  
01/14/20 44.5 kg (98 lb) 01/09/20 44.6 kg (98 lb 6 oz) Malnutrition Assessment: 
Malnutrition Status: insufficient data Estimated Daily Nutrient Needs: 
Energy (kcal): 1752; Weight Used for Energy Requirements: Current Protein (g): 67 (1.2-1.4g/kg); Weight Used for Protein Requirements: Current Fluid (ml/day): 1269; Method Used for Fluid Requirements: 1 ml/kcal 
 
 
Nutrition Related Findings:  last BM 5/31; 1+ edema to all extremities Wounds:   
Multiple, None (head eschar wounds per wound care) Current Nutrition Therapies: DIET NPO With Tube Feedings DIET TUBE FEEDING Vital AF 1.2 bolus feeds 240mL 5x daily (10am, 4pm, 8pm, 12 midnight, 4am) + 75mL H2O flush after each bolus. Adjust TF times as needed for medications. Anthropometric Measures: 
· Height:  4' 9\" (144.8 cm) · Current Body Wt:  65 kg (143 lb 4.8 oz) · Admission Body Wt:      
· Usual Body Wt:       
· Ideal Body Wt:  85 lbs:  146.5 % · Adjusted Body Weight:   ; Weight Adjustment for: No adjustment · Adjusted BMI:      
· BMI Category:  Obese class 1 (BMI 30.0-34. 9) Nutrition Diagnosis: · Altered GI function related to swallowing difficulty as evidenced by nutrition support-enteral nutrition 5/28: Nutrition dx continues. 6/1: Nutrition dx continues. Nutrition Interventions:  
Food and/or Nutrient Delivery: Continue current diet Nutrition Education and Counseling: No recommendations at this time Coordination of Nutrition Care: Continue to monitor while inpatient, Interdisciplinary rounds Goals: 
TF to meeting >90% energy/protein needs next 5-7 days Nutrition Monitoring and Evaluation:  
Behavioral-Environmental Outcomes: None identified Food/Nutrient Intake Outcomes: Enteral nutrition intake/tolerance Physical Signs/Symptoms Outcomes: Weight, Biochemical data, Nutrition focused physical findings, GI status, Fluid status or edema Discharge Planning:   
Enteral nutrition Electronically signed by Javier Denny RDN on 6/1/2021 Contact: [phone]

## 2021-06-01 NOTE — PROGRESS NOTES
Hospice Attending: 
 
Pt seen in FU again today along with hospice liaison Sheeba and bed side nurse Manpreet Lui. Chart reviewed for updates as well. In comparison from weekend 5/30 when I had seen her, she appears to have declined and new symptoms/signs seen; increased coarse secretions airways and chest; coarse rales, RN states that she tried suctioning that did not help much. Pt also appears to have more labored breathing; rapid and shallow. Also ore restless. Abdomen seems lot more distended. She did have a BM this am.  
Current vs; low grade fever; 99.6, 108/mt (tachycardic), RR 28 (tachypneic) Bp 105/69: stable, pt remains on 5l through trach. Pt has been started on scheduled lorazepam now and I asked RN to give her a scheduled dose. Pt also on oxycodone every 8 hours that she is receiving. Pt clearly is changing in medical condition and stability and expected to decline quickly. I feel like she should be admitted for inpatient hospice care likely at [facility] where she can be closely monitored and symptoms can be managed well between use of tube and IV. We also talked with sister Ricardo Vegas who is ageeable for hospice care. She will assist with consents signing by her mother. Family also in agreement for weaning off tube feeds gradually as it is causing more discomfort than benefit at this time. Plan is for patient to be transferred to [facility] for GIP level of hospice care for now once consents have been signed. If she stabilizes over there then she may return to group home for continued hospice care

## 2021-06-01 NOTE — PROGRESS NOTES
Transport in room and DC in progress. RT present to assist with O2. All belongings bagged and sent with pt. DC paperwork with [facility] staff.

## 2021-06-01 NOTE — HOSPICE
LCSW sent consents via DocuSign to patient's mother's email provided by RN Liaison: [email or web address] Awaiting signature and will update team. 
 
115pm: LCSW heard from RN liaison that patient's sister would not be with the mother to sign until later that day as mom does not have an email and will be using patient's sister's email to complete DocuSign. LCSW called Ary Ramos to request that she meet with patient's mother after they are both out of work after 5p today. [facility] agreed and [facility] LCSW will await consents this evening. LCSW also requested that they discuss planning for final arrangements and choose a FH as this is a requirement of [facility] admission. ANNE Silver, hospitalsW Hospice Social Worker 
[phone]

## 2021-06-01 NOTE — HOSPICE
[facility] Help to Those in Need 
[phone] Patient Name: Lior Mai YOB: 1970 Age: 49 y.o. [address] RN Note:   
Patient appears to be having more labored and rapid respirations, distended abdomen and coarse rhonci. Would meet criteria for inpatient hospice per Dr Lakesha Wells and is stable to transport to [facility]. Consents being sent to mother via [employer]. Once received we will need transport to [facility]. Thank you for the opportunity to be of service to this patient. Charo Oakley RN Clinical Nurse Liaison [address] [phone] [id number]

## 2021-06-01 NOTE — HOSPICE
[facility] Good Help to Those in Need 
[phone] Patient Name: Mario Peguero YOB: 1970 Age: 49 y.o. ALFREDITO ROBBINS RN Note:  
Sister Esau Milton will take docusign to her mother to complete after 5pm today Please set transport to [facility] for 6:30pm 
Thank you for the opportunity to be of service to this patient. Joaquin Yanez RN Clinical Nurse Liaison [facility] ([phone] [id number]

## 2021-06-01 NOTE — PROGRESS NOTES
Received call from nursing requesting assistance with suctioning patient. Upon arrival in room found patient alone in no acute respiratory distress but in need of suction. Saturation 96% on t-tube @ fi02 of .30. Tracheal and oral suction done, plus suction with lavage. Moderate amount of product obtained. See flow sheets for further details.

## 2021-06-01 NOTE — WOUND CARE
Wound Consult:  Re-Consult Visit. Chart reviewed. Consulted for right heel. Spoke with charge nurse, Marta Michaud to hand off on visit. Patient is resting on a Versacare bed with accumax mattress. Heels off loaded with boots. Patient is with eyes closed; mobility team following, currently turned towards side. Rolf score 10. Assessment: 
Right heel - lateral aspect - resurfaced pink skin, blanching, noted POA from previous injury. Left medial heel - area of pink, blanching intact skin noted. Right lateral ankle - linear area of rough dry hyperpigmented skin peeling with intact skin beneath/foam in use. Skin Care / PI Prevention Recommendations: 1. Minimize friction/shear: minimize layers of linen/pads under patient. 2. Off load pressure/reposition: continue to turn and reposition approximately every 2 hours; float heels with pillows or use off loading heel boots; mobility team. 
3. Manage Moisture - keep skin folds dry; incontinence skin care with incontinence wipes; zinc barrier ointment; appropriate sized briefs if needed; Pure wick in use to help contain urine. 4. Continue to monitor nutrition, pain, and skin risk scale, and skin assessment. Plan: We will continue to reassess routinely and as needed. Please re-consult should concerns arise despite continued skin/PI prevention measures. Martina Woodson RN,[facility], Wound / Ostomy Department Wound Healing Office [phone]

## 2021-06-01 NOTE — PROGRESS NOTES
CM Note: A referral was sent in [facility] to [facility] requesting a 6:30 pm  to go to [facility]. Pt will be admitted there with the possibility of transferring to her group home. Packet on chart. Nursing to call report to [facility] at [phone] . FREDDY Chavira

## 2021-06-01 NOTE — PROGRESS NOTES
0700: Bedside shift change report given to [address] [place] [address] (oncoming nurse) by Krysta Brownlee (offgoing nurse). Report included the following information SBAR, Kardex, Procedure Summary, Intake/Output, MAR, Accordion and Cardiac Rhythm Sinus tach. This patient was assisted with Intentional Toileting every 2 hours during this shift. Documentation of ambulation and output reflected on Flowsheet. 1645: Report given to [facility] 1825: Patient ready for transport via [address] [facility], ETA 1830.

## 2021-06-01 NOTE — HOSPICE
LCSW followed up with patient's sister, Jorge Houston and mother, Cosme Duque. They are meeting at [phone] to complete consents which is the same time of transport. LCSW called [facility] to request delay to 7pm to insure consents are completed. They shared that ETA is 715pm. 
 
LCSW will await completed consents and send DDNR to floor RN and consents to [facility] staff. 650pm: Consents returned via DocuSign along with DDNR. DDNR sent to floor RN via e-mail to be included on transport. Consents and DDNR sent to [facility] team for review as well. ANNE Klein, [facility] Hospice Social Worker 
[phone]

## 2021-06-01 NOTE — DISCHARGE INSTRUCTIONS
[facility] - [facility] / [facility] DISCHARGE INSTRUCTIONS    Maria Luias Sanders / [id number] : 1970    Admission date: 2021 Discharge date: 2021       Primary care provider: Dixie Prado NP    Discharging provider:  Javier Cardenas MD  - Family Medicine Resident  Jorge Thomas MD - Attending, Family Medicine   . . . . . . . . . . . . . . . . . . . . . . . . . . . . . . . . . . . . . . . . . . . . . . . . . . . . . . . . . . . . . . . . . . . . . . . Tamra Chester FINAL DIAGNOSES & HOSPITAL COURSE:  Hospital course  Maria Luisa Sanders is a 49 y.o. female with a PMHx of Down Syndrome (non-verbal at baseline), s/p Trach and PEG tube, seizures, encephalomalacia, ANNE, recent admission for HAP and C. diff colitis who was admitted for severe sepsis. Pt was treated with IV and PO antibiotics. Hospital course was complicated by ICU stay for a couple of days. Worsening chronic hypotension has required increased midodrine supplementation. Neurology following during admission for anti-seizure medication adjustment. Patient's poor prognosis due to chronic medical conditions has resulted in multiple admissions (admitted 3 times since January). Palliative and Hospice were consulted and family agreed to transition patient to hospice. Severe sepsis likely 2/2 aspiration PNA and C. diff: 4/4 SIRS POA. LA 3.0 > 1.6. Procal 2.82. CT  c/a/p with minimal scattered groundglass lung attenuation. CXR on : Increase interstitial and airspace infiltrate in the left lung c/w worsening PNA. Stool studies neg. BCx neg x 6 days. S/p Zosyn x 7 days. Received Levaquin (), and oral Vancomycin 500 mg q6h for total of 21 day course ()  - Management per Hospice Team      Elevated D-dimer: POA 9.2. CT chest without PE. Possibly reactive 2/2 inflammation/infection. BLE Dopplers Neg.     Worsening Chronic Hypotension:S/p Albumin 12.5 mg x 3 doses. Home Midodrine 15mg TID.   - Management per Hospice Team    Seizures: Phenytoin level 7.6 (low), Free Phenytoin level low. S/p Fosphenytoin 950 mg IV x1. Keppra level low (8.2). Keppra 1000 mg q12h (Increased from 750 mg BID on 5/28). Phenytoin 200 mg TID per G tube (increased from 150 TID on 5/28)  - Management per Hospice Team      Trach dependent: since 2020. Home Duonebs q4h PRN. Glycopyrrolate for increased secretions. - Management per Hospice Team      GERD: Protonix held while treated with PO vanco   - Management per Hospice Team      CKD3: Stable     Chronic lower extremity edema: Home torsemide 40 mg daily   - Management per Hospice Team      Chronic pain: Home Oxy 5 mg q6h, until medications verified with group home, discontinued at group home. - Management per Hospice Team      Agitation: Home lorazepam 0.5 mg q2h scheduled and 1mg q4h prn  - Management per Hospice Team         FOLLOW-UP CARE RECOMMENDATIONS:  Follow-up Information    None       It is very important that you keep follow-up appointment(s). Bring discharge papers, medication list (and/or medication bottles) to follow-up appointments for review by outpatient provider(s). FOLLOW-UP TESTS RECOMMENDED:   - Management per Hospice Team     PENDING TEST RESULTS:  At the time of discharge the following test results are still pending: None . Please review these results as they become available. Specific symptoms to watch for: chest pain, shortness of breath, fever, chills, nausea, vomiting, diarrhea, change in mentation, falling, weakness, bleeding. DIET: On Tube Feedings.   Management per Hospice Team     ACTIVITY: Management per Hospice Team     WOUND CARE: Management per Hospice Team     GOALS OF CARE:    Eventual return to home/independent/assisted living     Long term SNF    x  Hospice     No rehospitalization     Patient condition at discharge:   Functional status  x  Poor      Deconditioned      Independent   Cognition    Lucid     Forgetful (some sensescence)     x  Dementia Non-Verbal at baseline     Catheters/lines (plus indication)    Serna     PICC    x  PEG         Code status    Full code    X  DNR     . . . . . . . . . . . . . . . . . . . . . . . . . . . . . . . . . . . . . . . . . . . . . . . . . . . . . . . . . . . . . . . . . . . . . . . Shante Maxwell      CHRONIC MEDICAL CONDITIONS:  Problem List as of 6/1/2021 Date Reviewed: 5/24/2021        Codes Class Noted - Resolved    Tachypnea ICD-10-CM: R06.82  ICD-9-CM: 786.06  5/29/2021 - Present        Sepsis with acute organ dysfunction without septic shock ([facility] ICD-10-CM: A41.9, R65.20  ICD-9-CM: 038.9, 995.92  5/28/2021 - Present        Lactic acidosis ICD-10-CM: E87.2  ICD-9-CM: 276.2  5/24/2021 - Present        Hypertensive urgency ICD-10-CM: I16.0  ICD-9-CM: 401.9  5/24/2021 - Present        CKD (chronic kidney disease) ICD-10-CM: N18.9  ICD-9-CM: 585.9  5/24/2021 - Present        Pneumonia ICD-10-CM: J18.9  ICD-9-CM: 248  4/16/2021 - Present        * (Principal) Severe sepsis ([facility] ICD-10-CM: A41.9, R65.20  ICD-9-CM: 038.9, 995.92  4/16/2021 - Present        Anemia ICD-10-CM: D64.9  ICD-9-CM: 285.9  4/16/2021 - Present        Chronic respiratory failure with hypoxia (HCC) ICD-10-CM: J96.11  ICD-9-CM: 518.83, 799.02  4/16/2021 - Present        Chronic static encephalopathy ICD-10-CM: G93.49  ICD-9-CM: 348.39  4/16/2021 - Present        Swelling of hand ICD-10-CM: M79.89  ICD-9-CM: 729.81  4/16/2021 - Present        Subtherapeutic phenytoin level ICD-10-CM: Z51.81  ICD-9-CM: V58.83  4/16/2021 - Present        Tracheostomy dependence ([facility] ICD-10-CM: Z93.0  ICD-9-CM: V44.0  4/16/2021 - Present        Acute respiratory failure with hypoxia (HCC) ICD-10-CM: J96.01  ICD-9-CM: 518.81  4/4/2021 - Present        Acute kidney injury ([facility] ICD-10-CM: N17.9  ICD-9-CM: 584.9  3/31/2021 - Present        Hyperphosphatemia ICD-10-CM: E83.39  ICD-9-CM: 275.3  3/31/2021 - Present        On tube feeding diet ICD-10-CM: Z78.9  ICD-9-CM: V49.89  Unknown - Present        Wheelchair bound ICD-10-CM: Z99.3  ICD-9-CM: V46.3  6/9/2020 - Present        Inability to walk ICD-10-CM: R26.2  ICD-9-CM: 719.7  6/9/2020 - Present        Seizure ([facility] ICD-10-CM: R56.9  ICD-9-CM: 780.39  12/11/2019 - Present        Gastroesophageal reflux disease without esophagitis ICD-10-CM: K21.9  ICD-9-CM: 530.81  12/11/2019 - Present        Down's syndrome ICD-10-CM: Q90.9  ICD-9-CM: 758.0  12/11/2019 - Present        Iron deficiency ICD-10-CM: E61.1  ICD-9-CM: 280.9  12/11/2019 - Present        Severe intellectual disability ICD-10-CM: F72  ICD-9-CM: 318.1  2/2/2012 - Present    Overview Signed 2/2/2012  1:40 PM by Bernadene Landau, MD     [place]               RESOLVED: Sepsis ([facility] ICD-10-CM: A41.9  ICD-9-CM: 038.9, 995.91  4/9/2021 - 4/16/2021        RESOLVED: VAP (ventilator-associated pneumonia) ([facility] ICD-10-CM: X21.293  ICD-9-CM: 997.31  4/9/2021 - 4/16/2021        RESOLVED: UTI (urinary tract infection) ICD-10-CM: N39.0  ICD-9-CM: 599.0  4/9/2021 - 4/9/2021        RESOLVED: Altered mental status ICD-10-CM: R41.82  ICD-9-CM: 780.97  4/6/2021 - 4/16/2021        RESOLVED: Hypoxia ICD-10-CM: R09.02  ICD-9-CM: 799.02  4/6/2021 - 4/16/2021        RESOLVED: Acute cystitis without hematuria ICD-10-CM: N30.00  ICD-9-CM: 595.0  4/3/2021 - 4/16/2021        RESOLVED: Elevated Dilantin level ICD-10-CM: R78.89  ICD-9-CM: 790.6  4/3/2021 - 4/16/2021        RESOLVED: Edema (Chronic) ICD-10-CM: R60.9  ICD-9-CM: 782.3  3/31/2021 - 4/16/2021        RESOLVED: Hypotension ICD-10-CM: I95.9  ICD-9-CM: 458.9  3/31/2021 - 4/16/2021        RESOLVED: Hyponatremia ICD-10-CM: E87.1  ICD-9-CM: 276.1  3/31/2021 - 4/16/2021        RESOLVED: Thrombocytosis ([facility] ICD-10-CM: D47.3  ICD-9-CM: 238.71  3/31/2021 - 4/16/2021        RESOLVED: Status epilepticus ([facility] ICD-10-CM: G40.901  ICD-9-CM: 345.3  3/30/2021 - 4/16/2021        RESOLVED: Clostridium difficile diarrhea ICD-10-CM: V52.17  ICD-9-CM: 008.45  6/30/2020 - 4/16/2021        RESOLVED: Positive fecal occult blood test ICD-10-CM: R19.5  ICD-9-CM: 792.1  3/29/2020 - 4/16/2021        RESOLVED: Oral thrush ICD-10-CM: B37.0  ICD-9-CM: 112.0  3/29/2020 - 4/16/2021        RESOLVED: Diarrhea in adult patient ICD-10-CM: R19.7  ICD-9-CM: 787.91  3/28/2020 - 4/16/2021        RESOLVED: C. difficile diarrhea ICD-10-CM: A04.72  ICD-9-CM: 008.45  3/28/2020 - 4/16/2021        RESOLVED: Constipation ICD-10-CM: K59.00  ICD-9-CM: 564.00  12/11/2019 - 4/16/2021              Information obtained by :   I understand that if any problems occur once I am at home I am to contact my physician. I understand and acknowledge receipt of the instructions indicated above.                                                                                                                                              Physician's or R.N.'s Signature                                                                  Date/Time                                                                                                                                              Patient or Representative Signature                                                          Date/Time

## 2021-06-01 NOTE — PALLIATIVE CARE DISCHARGE
The Palliative Medicine team was consulted as part of your / your loved one's care in the hospital. Our team is a supportive service; we strive to relieve suffering and improve quality of life. You identified the following goal(s) as your main focus for healthcare: Comfort We reviewed advance care planning information, which includes the following: 
Advance Care Planning 5/27/2021 Patient's Healthcare Decision Maker is: Legal Next of Kin Confirm Advance Directive None Patient Would Like to Complete Advance Directive Unable We reviewed / discussed your code status as: DNR 
   Full Code means perform CPR in the event of cardiac arrest 
   [facility] means do NOT perform CPR in the event of cardiac arrest 
   Partial Code means you have specific preferences, please discuss with your health care team 
   No Order means this issue was not addressed / resolved during your stay Because of the importance of this information, we are providing you with a printed copy to share with other healthcare providers after this hospitalization is complete. If any of the above information is incomplete or incorrect, please contact the Palliative Medicine team at [phone].

## 2021-06-02 NOTE — HOSPICE
[facility]  [phone]    Inpatient Nursing Admission   Patient Name: Dinorah Goins  YOB: 1970  Age: 49 y.o. Date of Hospice Admission: 6/1/2021  Hospice Attending Elected by Patient: Alicia Martinez MD  Primary Care Physician: Didi Disla NP  Admitting RN: Luan Avitia RN  : Yoselin Rivas     Level of Care (GIP/Routine/Respite): GIP  Facility of Care: [facility]  Patient Room: 08/01     HOSPICE SUMMARY   ER Visits/ Hospitalizations in past year:   Recurrent admissions  Hospice Diagnosis: Acute respiratory failure  Onset Date of Hospice Diagnosis: 3/28/21  Summary of Disease Progression Leading to Hospice Diagnosis: Advanced  Down Syndrome progression associated with debility, pt is non verbal, has trach and peg tube. Hx of seizures, dementia, and aspiration pneumonia.       Diagnoses RELATED to the terminal prognosis: Severe Sepsis, Aspiration pneumonia and C-Diff colitis'  Other Diagnoses:   Patient Active Problem List   Diagnosis Code    Seizure ([facility] R56.9    Gastroesophageal reflux disease without esophagitis K21.9    Down's syndrome Q90.9    Iron deficiency E61.1    Severe intellectual disability F78    Wheelchair bound Z99.3    Inability to walk R26.2    On tube feeding diet Z78.9    Acute kidney injury ([address] N17.9    Hyperphosphatemia E83.39    Acute respiratory failure with hypoxia ([address] J96.01    Pneumonia J18.9    Severe sepsis (HCC) A41.9, R65.20    Anemia D64.9    Chronic respiratory failure with hypoxia (HCC) J96.11    Chronic static encephalopathy G93.49    Swelling of hand M79.89    Subtherapeutic phenytoin level Z51.81    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 who was admitted to [facility]. The patient's principle diagnosis of Advanced Down's syndrome has resulted in further progression with debility, s/p trach and peg tube,  Symptoms of agitation, shortness of breath,, increased secretions restlessness and chronic hypotension. Functionally, the patient's Palliative Performance Scale has declined over a period of weeks and is estimated at 20-30%. Objective information that support this patients limited prognosis includes: Labored breathing, restlessness, chronic pain and anxiety. The patient/family chose comfort measures with the support of Hospice. Patient meets for GIP LOC as evidenced by Close monitoring, requiring frequent medication administration that would be difficult to manage in a Group Home setting. Prognosis estimated based on 06/02/21 clinical assessment is:   [] Few to Many Hours  [] Hours to Days   [] Few to Many Days   [x] Days to Weeks   [] Few to Many Weeks   [] Weeks to Months   [] Few to Many Months    ASSESSMENT    Patient self-reports:  []  Yes    [x] No    SYMPTOMS: Restlessness,, agitation, increased secretions,? SIGNS/PHYSICAL FINDINGS: Increased secretions, contracted extremities, dementia, non verbal, restless, shortness of breath and fatigue. FAST for all dementia:      Learning Assessment:  Patient  Is patient willing/able to learn? What is the highest level of education completed? Learning preference (written material, demonstration, visual)? Learning barriers (ESOL, [place], poor vision)? Caregiver  Is caregiver willing to learn care for patient? What is the highest level of education completed? Learning preference (written material, demonstration, visual)? Learning barriers (ESOL, [place], poor vision)?     CLINICAL INFORMATION     Wt Readings from Last 3 Encounters:   06/01/21 65 kg (143 lb 3.2 oz)   04/22/21 64.3 kg (141 lb 12.8 oz)   04/14/21 59.2 kg (130 lb 8 oz)     Ht Readings from Last 3 Encounters:   05/25/21 4' 9\" (1.448 m)   04/18/21 4' 9\" (1.448 m)   04/13/21 4' 9\" (1.448 m)     There is no height or weight on file to calculate BMI. Visit Vitals  BP (!) 190/87   Pulse (!) 108   Temp 99 °F (37.2 °C)   Resp 22   SpO2 98%       LAB VALUES  No results found for this visit on 06/01/21 (from the past 12 hour(s)). No results found for this visit on 06/01/21 (from the past 6 hour(s)). Lab Results   Component Value Date/Time    Protein, total 6.3 (L) 05/30/2021 03:09 AM    Albumin 1.8 (L) 05/30/2021 03:09 AM       Currently this patient has:  [x] Supplemental O2 [] Peripheral IV  [] PICC    [] PORT   [] Serna Catheter [] NG Tube   [x] PEG Tube [] Ostomy    [] AICD: Has ICD been deactivated? [] Yes [] No:______    PLAN     1. Admit to GIP level of care for Seizure disorder, increased secretions, chronic pain, anxiety and restlessness. 2. Ativan 1mg every 4 hours, and every 15 mins, Robimul 0.2mg every 4 hours, as needed, Duonebs every 6 hours as needed. 3.Continue anti seizure medications, cont 02 via trach @ 5 liters. 4. PRN medications in place for breakthrough symptom management  5. Infection control and prevention as needed. 6. Provide support/education to caregiver/family   7. Monitor closely for changes in symptoms  8. Provide support and frequent rounds for patient comfort and safety   9. Maintain skin integrity as tolerated for hospice patient, turning and repositioning for comfort  10. Provide  and  for patient and family support  6. Continue to discuss discharge plan for any changes    Hospice Team Frequency Orders:  Skilled Nurse - Daily x 14 days with 5 PRN visits for symptom control. MSW - 1 x weekly with 5 visits PRN family support and need for volunteer services.  - 1 x weekly with 5 visits PRN spiritual support.  CNA - daily x 14 days    ADVANCE CARE PLANNING (Complete in ACP Flow Sheet)   Code Status: DNR  Durable DNR: [x]  Yes  []  No  Code Status Discussed/Confirmed: DNR  Preference for Other Life Sustaining Treatment Discussed/Confirmed: Yes  Hospitalization Preference:    Advance Care Planning 2021   Patient's Healthcare Decision Maker is: Legal Next of Kin   Confirm Advance Directive None   Patient Would Like to Complete Advance Directive Unable        Service: [] Yes  [x]  No      [] Unknown  Appropriate for Pinning Ceremony:  [] Yes     [] No  Quaker: NO PREFERENCE   Home: Undetermine    DISCHARGE PLANNING     1. Discharge Plan: Return back to [address] [place] [address]  2. Patient/Family teaching: No family present  3. Response to patient/family teaching: Family not present    SOCIAL/EMOTIONAL/SPIRITUAL NEEDS     Spiritual Issues Identified: None    Psych/ Social/ Emotional Issues Identified: None    Caregiver Support:  [] Provided information on End of Life Care   [] Material Provided: Darra Scale From [employer] or [facility]     CARE COORDINATION   Terri Valencia contacted, discharge to hospice order received  Dr. Marcin Loya  contacted, agrees to serve as attending provider for hospice and provided verbal certification of terminal illness with life expectancy of 6 months or less. Orders for hospice admission, medications and plan of treatment received. Medication reconciliation completed. MEDS: See medication list below  DME: Per [facility]  Supplies: Per [facility]  IDT communication to include MD, SN, SW, CH and support team    ALLERGIES AND MEDICATIONS     Allergies:    Allergies   Allergen Reactions    Depakote [Divalproex] Swelling     Current Facility-Administered Medications   Medication Dose Route Frequency    bisacodyL (DULCOLAX) suppository 10 mg  10 mg Rectal DAILY PRN    LORazepam (INTENSOL) 2 mg/mL oral concentrate 1 mg  1 mg Per G Tube Q4H    LORazepam (INTENSOL) 2 mg/mL oral concentrate 1 mg  1 mg Per G Tube Q15MIN PRN    morphine (ROXANOL) concentrated oral syringe 10 mg  10 mg Oral Q30MIN PRN    oxyCODONE (ROXICODONE INTENSOL) 20 mg/mL concentrated solution 5 mg  5 mg Per G Tube Q8H    phenytoin (DILANTIN) 100 mg/4 mL oral suspension 200 mg (Patient Supplied)  200 mg Oral TID    levETIRAcetam (KEPPRA) oral solution 1,000 mg  1,000 mg Oral BID    glycopyrrolate (ROBINUL) injection 0.2 mg  0.2 mg IntraVENous Q4H PRN    albuterol-ipratropium (DUO-NEB) 2.5 MG-0.5 MG/3 ML  3 mL Nebulization Q6H PRN

## 2021-06-02 NOTE — PROGRESS NOTES
[address] [facility] Help to Those in Need  [phone]    Social Work Admission Note  Patient Name: Vidal Longo  YOB: 1970  Age: 49 y.o. Date of Visit: 21  Facility of Care: [facility]  Patient Room:      Hospice Attending: Norma Silva MD  Hospice Diagnosis: Acute respiratory failure    Level of Care:    [x]  GIP    []  Respite   []  Routine    Consents/NCD Documentation:     Consents Reviewed: Yes or N/A    Person Reviewed/Signed with: ANNE Avila, NABEEL    Right to NCD Reviewed: Yes     NCD Requested: No    Admission Nurse/Intake Notified NCD was requested: N/A    Planned Start of Care Date: 21    ANNE Garcia, NABEEL    NARRATIVE   MAGGIW assisted with consents yesterday evening and walked family through process. LCSW discussed NCD which was declined. LCSW met with patient today at bedside, she is unresponsive to sound and touch, but appears comfortable. Patient's two sisters had been in earlier that day to visit. LCSW placed call to sister Erma Morton to follow up regarding final arrangements and offer additional support. No answer, left VM and will continue to follow.     ADVANCE CARE PLANNING    Code Status: DNR  Durable DNR: X Yes  _ No  Advance Care Planning 2021   Patient's Healthcare Decision Maker is: Legal Next of Kin   Confirm Advance Directive None   Patient Would Like to Complete Advance Directive Unable       Relationship Status:  [x]  Single     []        []      []  Domestic Partner     []  /  []  Common Law  []    []  Unknown    If in a relationship, name of partner/spouse:  Duration of relationship:    Anglican: NO PREFERENCE     Home: TBD, discussed w/ sister  Resources Provided: Support at bedside    Social Work Initial Assessment     Gender:  female    Race/Ethnicity: (francesca all that apply)  []  American [place] [facility] or [place] Native  []    [x]  Black or  American  []   or   []   or [place]  []  White  []  Unknown      Service:    []  Yes   []  No       []  Unknown  Appropriate for Pinning Ceremony:   []  Yes      [x]  No  Is patient using VA benefits?    []  Yes      []  No     Primary Language: Unknown  []   Needed  []   utilized during visit    Ability to express thoughts/needs/feelings  []  Expressed thoughts/feelings/needs without difficulty  []  Requires extra time and cuing  []  Speech limited single words  []  Uses only gestures (eye, blinking eye or head movement/pointing)  []  Unable to express thoughts/feelings/needs (speech unintelligible or inappropriate)  [x]  Unresponsive  Notes:      Mental Status:  []  Alert-oriented to:     []  Person     []  Place     []  Time  []  Comatose-responds to:    []   Verbal stimuli    []  Tactile stimuli    []  Painful stimuli  []  Forgetful  []  Disoriented/Confused  [x]  Lethargic  []  Agitated  []  Other (specify):    Notes:      Patients description of Illness/Current Health Status:    [x]  Patient unable to discuss  []  Patient unwilling to discuss  []  (Specify)        Knowledge/Understanding of Disease Process  Patient:    []  Demonstrates knowledge/understanding of disease process   []  Demonstrates knowledge/understanding of treatment plan   []  Demonstrates knowledge/understanding of prognosis   []  Demonstrates acceptance of prognosis   []  Demonstrates knowledge/understanding of resuscitation status   []  Other (specify)  Caregiver:   [x]  Demonstrates knowledge/understanding of disease process   [x]  Demonstrates knowledge/understanding of treatment plan   [x]  Demonstrates knowledge/understanding of prognosis   []  Demonstrates acceptance of prognosis   []  Demonstrates knowledge/understanding of resuscitation status   []  Other (specify)  Notes:      Patients living arrangement/care setting:  Use the PRIOR COLUMN when the PATIENTS current health status necessitated a change in his/her primary residence. Prior Current Response              []             []    Patients own home/residence              []             []    Home of family member/friend              []             []    Boarding home              []             []    Assisted living facility/assisted center              []             []    Hospital/Acute care facility              []             []    Skilled nursing facility              [x]             []    Long term care facility/Nursing home              []             [x]    Hospice in Patient      Primary Caregiver:  []  No Primary Caregiver  Name of Primary Caregiver: Stewart Roque  Relationship or Primary Caregiver:    []  Spouse/Significant other       []  Natural Child        []  Step child       []  Sibling   [x]  Parent   []  Friend/Neighbor   []  Community/Gnosticism Volunteer   []  Paid help   []  Other (specify):___________  Notes:       Family members/Significant others:  Name: Mike Solorzano  Relationship: Sister  Phone Number:  Actively involved in care? [x]  Yes  []  No    Name:  Relationship:  Phone Number:  Actively involved in care? []  Yes  []  No    Name:  Relationship:  Phone Number:  Actively involved in care?   []  Yes  []  No    Social support systems: (select ONE best description)  [x]  Excellent social support system which includes three or more family members or friends  []  Good social support system which includes two or less members or friends  []  [address] support which includes one family member or friend  []  Poor social support; no family members or friends; basically ALONE  Notes:      Emotional Status: (francesca all that apply)    Patient Caregiver Response                 []                [x]    Mood/Affect stable and appropriate                   []                []    Angry                 []                []    Anxious                 []                []    Apprehensive []                []    Avoidant                 []                []    Clinging                 []                []    Depressed                 []                []    Distraught                 []                []    Elated                 []                []    Euphoric                 []                []    Fearful                 []                []    Flat Affect                 []                []    Helpless                 []                []    Hostile                 []                []    Impulsive                 []                []    Irritable                 []                []    Labile                 []                []    Manic                 []                []    Restlessness                 []                []    Sad                 []                []    Suspicious                 []                []    Tearful                 []                []    Withdrawn     Notes:     Coping Skills (strengths/weakness):    Patient: Coping Skills (strength/weakness):    Family/caregiver (strength/weakness):     [place] of care (francesca all that apply):     [x]  No burden evident   []  Family must administer medications   []  Illness causing financial strain   []  Family/Support feels overwhelmed   []  Family/Support sleep disturbed with patients care   []  Patients care causes extra physical stress  of death  []  Illness causes changes in family lifestyle  []  Illness impacting family/support employment  []  Family experiencing increased time demands  []  Patients behavior endangers family  []  Denial of patients illness  []  Concern over outcome of illness/fear  []  Patients behavior embarrassing to family   Notes:      Risk Factors: (francesca all that apply):    [x]  No burden evident   []  Alcohol abuse  []  Financial resources inadequate to meet basic needs (food/house/etc)  []  Financial resources inadequate to meet health care needs (supplies/equipment/medications)  [] Food/nutrition resources inadequate  []  Home environment unsafe/inadequate for home care  []  Homicidal risk  []  Lives alone or without concerned relatives  []  Multiple medications/complex schedule  []  Physical limitations increase likelihood of falls  []  Plan of care/treatments complicated  []  Substance use/abuse  []  Suicidal risk  []  Visual impairment threatens safety/ability to perform self-care  []  Other (specify):     Abuse/Neglect (actual/potential risks):  [x]  No signs of abuse/neglect  []  History of abuse/neglect                 []  Physical          []  Sexual  []  History of domestic violence  []  Lacks adequate physical care  []  Lacks emotional nurturing/support  []  Lacks appropriate stimulation/cognitive experiences  []  Left alone inappropriately  []  Lacks necessary supervision  []  Inadequate or delayed medical care  []  Unsafe environment (i.e guns/drug use/history of violence in the home/etc.)  []  Bruising or other physical signs of injury present  []  Other (specify):  Notes:   []  Refer to child/adult protective services      Current Sources of Stress (in Addition to Current Illness):   [x]  None reported  []  Bills/Debt    []  Career/Job change    []   (short term)    []   (long term)    []  Death of a child (recent)    []  Death of a parent (recent)   []  Death of a spouse (recent)   []  Employment status changed   []  Family discord    []  Financial loss/Inadequate inther (specify):come  []  Job loss  []  Legal issues unresolved  []  Lifestyle change  []  Marital discord  []  Marriage within the last year  []  Paperwork (insurance/legal/etc) overwhelming  []  Separation/Divorce  []  Other (specify):  Notes:      Current Community Resources Being Utilized     [facility] for [facility] care        Interventions/Plan of Care     Assess social and emotional factors related to coping with end of life issues  Community resource planning/referral   Relocation to different care setting if/when symptoms stabilize    Discharge Planning     Should patient stabilize, will return to group home.     MSW Assessment Completed by: Azam Zepeda  06/02/21    Time In: 330      Time Out: 400

## 2021-06-02 NOTE — HOSPICE
NAME OF PATIENT:  Trae Kuhn    LEVEL OF CARE:  GIP    REASON FOR GIP:   Unmanageable respiratory distress, Terminal agitation, despite changes to medications, Medication adjustment that must be monitored 24/7 and Stabilizing treatment that cannot take place at home    *PATIENT REMAINS ELIGIBLE FOR [facility] LEVEL OF CARE AS EVIDENCED BY: (MUST BE ADDRESSED OF PATIENT GIP)      REASON FOR RESPITE:  NA    O2 SAFETY:  Concentrator positioning (6\" from furniture/drapes), Tanks stored in baltazar , No petroleum based products on face while oxygen in use and Oxygen sign on the door    FALL INTERVENTIONS PROVIDED:   Implemented/recommended use of non-skid footwear, Implemented/recommended use of fall risk identification flag to all team members, Implemented/recommended assistive devices and encouraged their use, Implemented/recommended resources for alarm system (personal alarm, bed alarm, call bell, etc.) , Implemented/recommended environmental changes (remove hazards, lower bed, improve lighting, etc.) and Implemented/recommended increased supervision/assistance    INTERDISPLINARY COMMUNICATION/COLLABORATION:  Physician, MSW, Penn Laird and RN, CNA    NEW MEDICATION INITIATION DOCUMENTATION:  NA    Reason medication is being initiated:  NA    MD / Provider name consulted re: change in status / initiation of new medication: NA    New Symptom(s):  NA  New Order(s):  NA    Name of the person notified of the changes:  NA    Name of person being taught:  NA    Instructions given:  NA    Side Effects taught:  NA    Response to teaching:  NA      COMFORTABLE DYING MEASURE:  Is Patient/family satisfied with symptom level?  yes    DISCHARGE PLAN:  Return back to [address] [place] [address] when symptoms can be managed followed by Hospice. 20:00 Patient arrived to [facility] via [facility] transport, patient alert , non verbal, no signs of pain or discomfort, [email or web address] via trach collar, peg tube intact, paper work with patient.   21:00 Patient resting in bed, [email or web address] liters via trach collar, trach care provided, Inc of urine albert care provided, skin warm with 2+ edema in upper and lower extremities small area on rt foot, dressing intact. 21:30 Patient's caregiver from [address] [place] [address] in to visit to provided information for the care of patient, will return back in the morning. Left message to notify mother that patient had arrived to [facility]. 22:00 Patient medicated with scheduled Lorazepam, Roxicodone and Keppra  Via peg tube. 23:30 Patient turned and repositioned, mouth care provided, no signs of pain or respiratory distress noted. 00:30 Patient resting quietly, no signs of pain or discomfort noted, medications effective. 01:30 Patient medicated with scheduled Lorazepam, remains non verbal, responds to voice, will open eyes, no signs of pain. 02:30 Complete bed bath and linen change provided by Geraldine Avitia CNA, turned and repositioned for comfort.  03:30 Patient with labored breathing  tachypneic using accessory muscles increased audible secretions suction for moderate amt of white secretions via trach. Medicated with PRN Roxanol and scheduled Lorazepam. Will cont to monitor. 04:30 Patient resting, dyspnea with rest and exertion, decreased audible secretions noted. no signs of pain. 05:30 Patient lying in bed eyes closed no signs of pain or respiratory distress noted, medications effective. 06:00 Patient medicated with scheduled medications, resting no signs of pain or respiratory distress noted. 07:00 Shift report given to oncoming Nurse.

## 2021-06-02 NOTE — PROGRESS NOTES
Problem: Falls - Risk of  Goal: *Absence of Falls  Description: Document Shahbaz Lares Fall Risk and appropriate interventions in the flowsheet. Outcome: Progressing Towards Goal  Note: Fall Risk Interventions:       Mentation Interventions: Adequate sleep, hydration, pain control, Bed/chair exit alarm, Door open when patient unattended, More frequent rounding    Medication Interventions: Bed/chair exit alarm    Elimination Interventions: Bed/chair exit alarm    History of Falls Interventions: Bed/chair exit alarm, Door open when patient unattended, Room close to nurse's station         Problem: Airway Clearance - Ineffective  Goal: *Absence of airway secretions  Outcome: Progressing Towards Goal  Note: Patient with gross upper airway secretions via trach. Scopalamine patch, atropine drops and robinol scheduled today. Continue to assess, monitor and treat per POC. Problem: Breathing Pattern - Ineffective  Goal: *PALLIATIVE CARE:  Alleviation of Dyspnea  Outcome: Progressing Towards Goal  Note: Patient with dyspnea at rest.  Dilaudid and lorazepam scheduled for subQ administration with improvement in her respiratory effort. Continue to assess, monitor and treat per POC.

## 2021-06-02 NOTE — H&P
[facility] Help to Those in Need  [phone]    Patient Name: Lety Lubin  YOB: 1970    Date of Provider Hospice Visit: 06/01/21    Level of Care:   [x] General Inpatient (GIP)    [] Routine   [] Respite    Current Location of Care:  [] [facility] [x] [facility] [] [facility] [] [facility] [] [facility] THE [facility]    IF [facility], patient referred from:  [] [facility] [x] [facility] [] [facility] [] [facility] [] Home [] Other:     Date of Original Hospice Admission: 06/01/21  Hospice Medical Director at time of admission: [address] [place] [address] Diagnosis: Advanced Down's syndrome  Diagnoses RELATED to the terminal prognosis: Severe Sepsis,  Aspiration pneumonia, C. Diff colitis,   Other Diagnoses: seizures, CKD stage 3, chronic lower extremity edema      HOSPICE SUMMARY   Do not cut and paste chart information other than imaging findings    Lety Lubin is a 49y.o. year old who was admitted to [facility]. The patient's principle diagnosis of Advanced Down's syndrome has resulted in further progression with associated debility, s/p Trach and PEG tube, seizures disorder, dementia, non verbal at baseline, & chronic aspiration. Pt now presented with sepsis likely secondary to aspiration pneumonia and C. Diff colitis. Pt treated with Antibiotics for both infections however continues to decline and showing signs of distress; increased restlessness, agitation, shortness of breath, and retention of airway secretions. Pt also with worsening chronic hypotension that required increased midodrine supplementation. Pt continues on chronic anti seizure medications. Pt has had recurrent admissions for similar medical concerns this year. Refer to LCD     Functionally, the patient's Karnofsky and/or Palliative Performance Scale has declined over a period of weeks and is estimated at 20-30%. The patient is dependent on the following ADLs:all      The patient/family chose comfort measures with the support of Hospice. HOSPICE DIAGNOSES   Active Symptoms:  1. Labored breathing  2. Increased oropharyngeal secretions  3. Restlessness/agitation  4. Non verbal chronic pain  5. Chronic lower extremity edema  6. Chronic aspiration/inability to tolerate tube feeds  7 Debility/Down's syndrome  8 Hospice care pt     PLAN   1. Pt to be transferred to [facility] today for GIP LOC. Consents being signed by mother. 2. Pt has declined quite a bit over past few days while she has been here at the hospital; at this time has symptoms that are concerning for further quick decline and possible difficulty with managing at the group home setting. Pt requires close monitoring and management of symptoms with frequent medication administration. 3. Pt currently on bolus feeding per PEG 5 times a day that is causing increased aspiration and worsening secretions and breathing distress. Family has been very hesitant to stop the tube feeds but now willing to consider reducing it after explanation regarding the harms associated with it. Recommend wean off gradually to maybe bolus feeds 3 times a day and then wean further and stop   4. Ativan 1mg scheduled every 4 hours and every 15mts as needed per PEG  5. Oxycodone 5mg scheduled every 8 hours   6. Continue anti seizure medications phenytoin & Keppra in current doses  7. Other comfort medications as needed  8. Duonebs every 6 hours as needed. 9. Robinul 0.2mg IV every 4 hours as needed for secretions. Also may gentle anterior suction  10. Continue O2 via trach 5l     11.  and SW to support family needs  15. Disposition: If pt's condition stabilizes then she can possibly return to the group home under hospice care  13. Hospice Plan of care was reviewed in detail and agree with current plan of care    Prognosis estimated based on 06/01/21 clinical assessment is:   [] Hours to Days    [x] Days to Weeks    [] Other:    Communicated plan of care with: Hospice Case Manager;  Hospice IDT; Care Team     GOALS OF CARE     Patient/Medical POA stated Goal of Care: comfort    [x] I have reviewed and/or updated ACP information in the Advance Care Planning Navigator. This information is available in the [address] link in the patient's chart header. Primary Decision [address] (Health Care Agent):   Primary Decision Maker (Active): Cailin Laws - Mother - [phone]    Secondary Decision Maker: Alexei Masker - Sister - [phone]    Secondary Decision Maker: Lynnette Hall - Sister - [phone]    Resuscitation Status: DNR  If DNR is there a Durable DNR on file? : [x] Yes [] No (If no, complete Durable DNR)    HISTORY     History obtained from: chart review, family, hospice liaisons    CHIEF COMPLAINT: N/A  The patient is:   [] Verbal  [x] Nonverbal  [] Unresponsive    HPI/SUBJECTIVE:  Pt seen at hospital today in FU. Seems to have increased coarse secretions airways and chest; coarse rales, RN states that she tried suctioning that did not help much. Pt also appears to have more labored breathing; rapid and shallow. Also more restless. Abdomen seems lot more distended. She did have a BM this am.   Pt received a dose of lorazepam while I was in.         REVIEW OF SYSTEMS     The following systems were: [] reviewed  [x] unable to be reviewed    Positive ROS include:  Constitutional: fatigue, weakness, in pain, short of breath  Ears/nose/mouth/throat: increased airway secretions  Respiratory:shortness of breath, wheezing  Gastrointestinal:poor appetite, nausea, vomiting, abdominal pain, constipation, diarrhea  Musculoskeletal:pain, deformities, swelling legs  Neurologic:confusion, hallucinations, weakness  Psychiatric:anxiety, feeling depressed, poor sleep  Endocrine:     Adult Non-Verbal Pain Assessment Score: 6    Face  [] 0   No particular expression or smile  [x] 1   Occasional grimace, tearing, frowning, wrinkled forehead  [] 2   Frequent grimace, tearing, frowning, wrinkled forehead    Activity (movement)  [] 0   Lying quietly, normal position  [x] 1   Seeking attention through movement or slow, cautious movement  [] 2   Restless, excessive activity and/or withdrawal reflexes    Guarding  [] 0   Lying quietly, no positioning of hands over areas of body  [x] 1   Splinting areas of the body, tense  [] 2   Rigid, stiff    Physiology (vital signs)  [] 0   Stable vital signs  [x] 1   Change in any of the following: SBP > 20mm Hg; HR > 20/minute  [] 2   Change in any of the following: SBP > 30mm Hg; HR > 25/minute    Respiratory  [] 0   Baseline RR/SpO2, compliant with ventilator  [] 1   RR > 10 above baseline, or 5% drop SpO2, mild asynchrony with ventilator  [x] 2   RR > 20 above baseline, or 10% drop SpO2, asynchrony with ventilator     FUNCTIONAL ASSESSMENT     Palliative Performance Scale (PPS): 20-30%       PSYCHOSOCIAL/SPIRITUAL ASSESSMENT     Active Problems:    * No active hospital problems.  *    Past Medical History:   Diagnosis Date    Acute cystitis without hematuria 4/3/2021    VICK (acute kidney injury) ([address] 3/31/2021    C. difficile diarrhea 3/28/2020    Clostridium difficile diarrhea 6/30/2020    Constipation 12/11/2019    Diarrhea in adult patient 3/28/2020    Down syndrome     Elevated Dilantin level 4/3/2021    History of vascular access device 06/30/2020    4 Fr midline, 10 cm L brachial, poor access    History of vascular access device 04/01/2021    [facility] VAT 5fr double PICC LFT brachial at 39cm, arm cir 31 cm     Oral thrush 3/29/2020    Positive fecal occult blood test 3/29/2020    Seizures ([address]     Sleep apnea     humidifier and oxygen w/trach    Status epilepticus ([address] 3/30/2021    Stroke ([address] 2019    \"old stroke seen on CT\"    Thrombocytosis ([address] 3/31/2021    VAP (ventilator-associated pneumonia) ([address] 4/9/2021      Past Surgical History:   Procedure Laterality Date    COLONOSCOPY N/A 7/8/2020    COLONOSCOPY with fecal transplant (consents in red file) performed by Aidee Welch MD at [address] HX GI      gtube 2/2020    HX OTHER SURGICAL      tracheostomy 2/2020      Social History     Tobacco Use    Smoking status: Never Smoker    Smokeless tobacco: Never Used   Substance Use Topics    Alcohol use: Never     Family History   Family history unknown:  Yes      Allergies   Allergen Reactions    Depakote [Divalproex] Swelling      Current Facility-Administered Medications   Medication Dose Route Frequency    bisacodyL (DULCOLAX) suppository 10 mg  10 mg Rectal DAILY PRN    LORazepam (INTENSOL) 2 mg/mL oral concentrate 1 mg  1 mg Per G Tube Q4H    LORazepam (INTENSOL) 2 mg/mL oral concentrate 1 mg  1 mg Per G Tube Q15MIN PRN    morphine (ROXANOL) concentrated oral syringe 10 mg  10 mg Oral Q30MIN PRN    oxyCODONE (ROXICODONE INTENSOL) 20 mg/mL concentrated solution 5 mg  5 mg Per G Tube Q8H    phenytoin (DILANTIN) 100 mg/4 mL oral suspension 200 mg (Patient Supplied)  200 mg Oral TID    albuterol-ipratropium (DUO-NEB) 2.5 MG-0.5 MG/3 ML  3 mL Nebulization Q6H PRN    levETIRAcetam (KEPPRA) oral solution 1,000 mg  1,000 mg Oral BID        PHYSICAL EXAM     Wt Readings from Last 3 Encounters:   06/01/21 65 kg (143 lb 3.2 oz)   04/22/21 64.3 kg (141 lb 12.8 oz)   04/14/21 59.2 kg (130 lb 8 oz)       Visit Vitals  BP (!) 190/87   Pulse (!) 108   Temp 99 °F (37.2 °C)   Resp 22   SpO2 98%       Supplemental O2  [x] Yes: 5l Trach  [] NO  Last bowel movement: 06/01/21    Currently this patient has:  [x] Peripheral IV [] PICC  [] PORT [] ICD    [x] Serna Catheter [] NG Tube   [] PEG Tube    [] Rectal Tube [] Drain  [] Other:     Constitutional: lethargic, ill appearing, frail, non verbal, appears restless and in distress  Eyes: pallor, blank stare  ENMT: increased coarse secretions  Cardiovascular: tachycardic, peripheral edema  Respiratory: labored breathing++, coarse rales all over chest  Gastrointestinal: distended abdomen, bowel sounds+  Musculoskeletal: contracted extremities  Skin:warm  Neurologic: down's syndrome, dementia, non verbal, disabled  Psychiatric: restless, agitated  Other:       Pertinent Lab and or Imaging Tests:  Lab Results   Component Value Date/Time    Sodium 152 (H) 05/30/2021 03:09 AM    Potassium 4.3 05/30/2021 03:09 AM    Chloride 123 (H) 05/30/2021 03:09 AM    CO2 22 05/30/2021 03:09 AM    Anion gap 7 05/30/2021 03:09 AM    Glucose 115 (H) 05/30/2021 03:09 AM    BUN 22 (H) 05/30/2021 03:09 AM    Creatinine 0.90 05/30/2021 03:09 AM    BUN/Creatinine ratio 24 (H) 05/30/2021 03:09 AM    GFR est AA >60 05/30/2021 03:09 AM    GFR est non-AA >60 05/30/2021 03:09 AM    Calcium 8.3 (L) 05/30/2021 03:09 AM     Lab Results   Component Value Date/Time    Protein, total 6.3 (L) 05/30/2021 03:09 AM    Albumin 1.8 (L) 05/30/2021 03:09 AM           Total time:   Counseling / coordination time:   > 50% counseling / coordination?:

## 2021-06-02 NOTE — TELEPHONE ENCOUNTER
Ovidio needs OV to support the use of the pt's [employer] tube feeding formula.  [phone]  Fax [phone].

## 2021-06-02 NOTE — PROGRESS NOTES
[facility] Help to Those in Need  [phone]    Patient Name: Cate Abdalla  YOB: 1970    Date of Provider Hospice Visit: 06/02/21    Level of Care:   [x] General Inpatient (GIP)    [] Routine   [] Respite    Current Location of Care:  [] [facility] [x] [facility] [] [facility] [] [facility] [] Hospice House THE [facility]    IF [facility], patient referred from:  [] [facility] [x] [facility] [] [facility] [] [facility] [] Home [] Other:     Date of Original Hospice Admission: 06/01/21  Hospice Medical Director at time of admission: [address] [place] [address] Diagnosis: Advanced Down's syndrome  Diagnoses RELATED to the terminal prognosis: Severe Sepsis,  Aspiration pneumonia, C. Diff colitis,   Other Diagnoses: seizures, CKD stage 3, chronic lower extremity edema      HOSPICE SUMMARY   Do not cut and paste chart information other than imaging findings    Cate Abdalla is a 49y.o. year old who was admitted to [facility]. The patient's principle diagnosis of Advanced Down's syndrome has resulted in further progression with associated debility, s/p Trach and PEG tube, seizures disorder, dementia, non verbal at baseline, & chronic aspiration. Pt now presented with sepsis likely secondary to aspiration pneumonia and C. Diff colitis. Pt treated with Antibiotics for both infections however continues to decline and showing signs of distress; increased restlessness, agitation, shortness of breath, and retention of airway secretions. Pt also with worsening chronic hypotension that required increased midodrine supplementation. Pt continues on chronic anti seizure medications. Pt has had recurrent admissions for similar medical concerns this year. Refer to LCD     Functionally, the patient's Karnofsky and/or Palliative Performance Scale has declined over a period of weeks and is estimated at 20-30%. The patient is dependent on the following ADLs:all      The patient/family chose comfort measures with the support of Hospice. HOSPICE DIAGNOSES   Active Symptoms:  1. Labored breathing  2. Increased oropharyngeal secretions  3. Restlessness/agitation  4. Non verbal chronic pain  5. Chronic lower extremity edema  6. Chronic aspiration/inability to tolerate tube feeds  7 Debility/Down's syndrome  8 Hospice care pt     PLAN   1. Patient will continue GIP level care as she is need frequent nursing assessment, IV medication management, not tolerate sublingual/oral medication nor is she tolerating PEG feedings or medications via the PEG. I have significant concerns that everything placing the PEG is causing significant respiratory distress as well as aspiration. This includes medications as well as her tube feedings. Patient with significant secretions, increased work of breathing and shows nonverbal signs of pain. .   2. I think all oral/PEG medications as well as tube feedings should be stopped at this time. I think all of this is causing more burden than benefit. Discussed this with patient's sister-Leonora via phone. I initially called patient's mother but she did not answer. Family plans on visiting this afternoon. 3. Subcutaneous access has been obtained  4. Ativan 2 mg subcutaneous every 4 hours scheduled every 15 minutes as needed. We will use this for both restlessness as well as seizure prevention. Plan on stopping her Keppra and Dilantin given the fact that I think this is high-volume in her PEG. 5. Tube feedings have been held/stopped for now  6. Dilaudid 0.5 mg subcu every 4 hours scheduled every 15 minutes as needed  7. Monitor for seizures  8. For secretions, scopolamine patch, Robinul 0.2 mg subcu every 4, atropine drops. Gentle suction as needed as well as repositioning as needed. 9. Other comfort medications as needed  10. Duonebs every 6 hours as needed. 11. Continue O2 via trach 5l     12.  and SW to support family needs  15.  Disposition: If pt's condition stabilizes then she can possibly return to the group home under hospice care  14. Hospice Plan of care was reviewed in detail and agree with current plan of care    Prognosis estimated based on 06/02/21 clinical assessment is:   [] Hours to Days    [x] Days to Weeks    [] Other:    Communicated plan of care with: Hospice Case Manager; Hospice IDT; Care Team     GOALS OF CARE     Patient/Medical POA stated Goal of Care: comfort    [x] I have reviewed and/or updated ACP information in the Advance Care Planning Navigator. This information is available in the [address] link in the patient's chart header. Primary Decision [address] (Health Care Agent):   Primary Decision Maker (Active): Tu Dunn - Mother - [phone]    Secondary Decision Maker: Becki Lim - Sister - [phone]    Secondary Decision Maker: Priscilla Hooper - Sister - [phone]    Resuscitation Status: DNR  If DNR is there a Durable DNR on file? : [x] Yes [] No (If no, complete Durable DNR)    HISTORY     History obtained from: chart review, family, hospice liaisons    CHIEF COMPLAINT: N/A  The patient is:   [] Verbal  [x] Nonverbal  [] Unresponsive    HPI/SUBJECTIVE:  Pt seen at hospital today in FU. Seems to have increased coarse secretions airways and chest; coarse rales, RN states that she tried suctioning that did not help much. Pt also appears to have more labored breathing; rapid and shallow. Also more restless. Abdomen seems lot more distended. She did have a BM this am.   Pt received a dose of lorazepam while I was in.     6/2-patient eyes open.   Shows evidence of furrowing of the brow, work of breathing, audible secretions       REVIEW OF SYSTEMS     The following systems were: [] reviewed  [x] unable to be reviewed    Positive ROS include:  Constitutional: fatigue, weakness, in pain, short of breath  Ears/nose/mouth/throat: increased airway secretions  Respiratory:shortness of breath, wheezing  Gastrointestinal:poor appetite, nausea, vomiting, abdominal pain, constipation, diarrhea  Musculoskeletal:pain, deformities, swelling legs  Neurologic:confusion, hallucinations, weakness  Psychiatric:anxiety, feeling depressed, poor sleep  Endocrine:     Adult Non-Verbal Pain Assessment Score: 6    Face  [] 0   No particular expression or smile  [x] 1   Occasional grimace, tearing, frowning, wrinkled forehead  [] 2   Frequent grimace, tearing, frowning, wrinkled forehead    Activity (movement)  [] 0   Lying quietly, normal position  [x] 1   Seeking attention through movement or slow, cautious movement  [] 2   Restless, excessive activity and/or withdrawal reflexes    Guarding  [] 0   Lying quietly, no positioning of hands over areas of body  [x] 1   Splinting areas of the body, tense  [] 2   Rigid, stiff    Physiology (vital signs)  [] 0   Stable vital signs  [x] 1   Change in any of the following: SBP > 20mm Hg; HR > 20/minute  [] 2   Change in any of the following: SBP > 30mm Hg; HR > 25/minute    Respiratory  [] 0   Baseline RR/SpO2, compliant with ventilator  [] 1   RR > 10 above baseline, or 5% drop SpO2, mild asynchrony with ventilator  [x] 2   RR > 20 above baseline, or 10% drop SpO2, asynchrony with ventilator     FUNCTIONAL ASSESSMENT     Palliative Performance Scale (PPS): 20       PSYCHOSOCIAL/SPIRITUAL ASSESSMENT     Active Problems:    * No active hospital problems.  *    Past Medical History:   Diagnosis Date    Acute cystitis without hematuria 4/3/2021    VICK (acute kidney injury) ([facility] [address] 3/31/2021    C. difficile diarrhea 3/28/2020    Clostridium difficile diarrhea 6/30/2020    Constipation 12/11/2019    Diarrhea in adult patient 3/28/2020    Down syndrome     Elevated Dilantin level 4/3/2021    History of vascular access device 06/30/2020    4 Fr midline, 10 cm L brachial, poor access    History of vascular access device 04/01/2021    [facility] VAT 5fr double PICC LFT brachial at 39cm, arm cir 31 cm     Oral thrush 3/29/2020    Positive fecal occult blood test 3/29/2020    Seizures (HCC)     Sleep apnea     humidifier and oxygen w/trach    Status epilepticus [address] 3/30/2021    Stroke ([facility] 2019    \"old stroke seen on CT\"    Thrombocytosis ([facility] 3/31/2021    VAP (ventilator-associated pneumonia) ([facility] 4/9/2021      Past Surgical History:   Procedure Laterality Date    COLONOSCOPY N/A 7/8/2020    COLONOSCOPY with fecal transplant (consents in red file) performed by Elma Powell MD at [facility] HX GI      gtube 2/2020    HX OTHER SURGICAL      tracheostomy 2/2020      Social History     Tobacco Use    Smoking status: Never Smoker    Smokeless tobacco: Never Used   Substance Use Topics    Alcohol use: Never     Family History   Family history unknown:  Yes      Allergies   Allergen Reactions    Depakote [Divalproex] Swelling      Current Facility-Administered Medications   Medication Dose Route Frequency    LORazepam (ATIVAN) injection 2 mg  2 mg SubCUTAneous Q4H    HYDROmorphone (DILAUDID) injection 0.5 mg  0.5 mg SubCUTAneous Q4H    glycopyrrolate (ROBINUL) injection 0.2 mg  0.2 mg SubCUTAneous Q4H    LORazepam (ATIVAN) injection 2 mg  2 mg SubCUTAneous Q15MIN PRN    HYDROmorphone (DILAUDID) injection 0.5 mg  0.5 mg SubCUTAneous Q15MIN PRN    scopolamine (TRANSDERM-SCOP) 1 mg over 3 days 1 Patch  1 Patch TransDERmal Q72H    atropine 1 % ophthalmic solution 1 Drop  1 Drop SubLINGual TID    bisacodyL (DULCOLAX) suppository 10 mg  10 mg Rectal DAILY PRN    glycopyrrolate (ROBINUL) injection 0.2 mg  0.2 mg IntraVENous Q4H PRN    albuterol-ipratropium (DUO-NEB) 2.5 MG-0.5 MG/3 ML  3 mL Nebulization Q6H PRN        PHYSICAL EXAM     Wt Readings from Last 3 Encounters:   06/01/21 65 kg (143 lb 3.2 oz)   04/22/21 64.3 kg (141 lb 12.8 oz)   04/14/21 59.2 kg (130 lb 8 oz)       Visit Vitals  /62   Pulse (!) 111   Temp 100 °F (37.8 °C)   Resp 20   SpO2 97%       Supplemental O2  [x] Yes: 5l Trach  [] NO  Last bowel movement: 06/01/21    Currently this patient has:  [x] Peripheral IV [] PICC  [] PORT [] ICD    [x] Serna Catheter [] NG Tube   [] PEG Tube    [] Rectal Tube [] Drain  [] Other:     Constitutional: Ill-appearing, increased work of breathing, secretions noted  Eyes: pallor, blank stare  ENMT: increased coarse secretions  Cardiovascular: tachycardic, peripheral edema  Respiratory: labored breathing++, coarse rales all over chest, secretions are audible  Gastrointestinal: distended abdomen, hypoactive bowel sounds, PEG in place  Musculoskeletal: contracted extremities  Skin:warm  Neurologic: down's syndrome, dementia, non verbal, disabled  Psychiatric: restless, agitated  Other:       Pertinent Lab and or Imaging Tests:  Lab Results   Component Value Date/Time    Sodium 152 (H) 05/30/2021 03:09 AM    Potassium 4.3 05/30/2021 03:09 AM    Chloride 123 (H) 05/30/2021 03:09 AM    CO2 22 05/30/2021 03:09 AM    Anion gap 7 05/30/2021 03:09 AM    Glucose 115 (H) 05/30/2021 03:09 AM    BUN 22 (H) 05/30/2021 03:09 AM    Creatinine 0.90 05/30/2021 03:09 AM    BUN/Creatinine ratio 24 (H) 05/30/2021 03:09 AM    GFR est AA >60 05/30/2021 03:09 AM    GFR est non-AA >60 05/30/2021 03:09 AM    Calcium 8.3 (L) 05/30/2021 03:09 AM     Lab Results   Component Value Date/Time    Protein, total 6.3 (L) 05/30/2021 03:09 AM    Albumin 1.8 (L) 05/30/2021 03:09 AM           Total time:   Counseling / coordination time:   > 50% counseling / coordination?:

## 2021-06-02 NOTE — PROGRESS NOTES
[facility] certification & plan of care was put on [facility] desk to process.

## 2021-06-02 NOTE — PROGRESS NOTES
0700-Report received from [facility]  0745-pt resting in bed with eyes closed, no grimacing or furrowed brow; respirations regular and shallow, unlabored  0828-pt with rapid respirations and abdominal breathing; grossly audible upper airway secretions heard; pt in-line suctioned x3 in order to clear secretions; changed of urinary and small fecal incontinence; pt positioned high on right side; assessment completed  0850-scheduled keppra, lorazepam and PRN roxanol administered via PEG tube with 20cc water  0900-subQ IV placed in left lateral thigh  0910-PRN robinol administered; Dr Jonatan Duncan rounding--changes to POC: dc all PEG meds including antiepilleptics--will order scheduled subQ medications for pain, anxiety and secretions  0923-pt speaking with patient's sister via telephone to provide update  0930-pt asleep in bed; audible secretions heard; no grimacing or furrowed brow; respirations shallow, even and unlabored at 16/min  1020-pt asleep in bed; eyes closed, mouth open; RR shallow and even, unlabored and without audible secretions  1038-scopalamine patch applied to left ear; atropine drop administered; pt asleep with even, unlabored respirations  1130-pts sister Wieslet at the bedside; pt laying quietly with eyes open looking at her sister; respirations shallow, even and unlabored; no grimacing or furrowed brow  1215-scheduled lorazepam, dilaudid and robinol administered; pt turned to the left; breathing was fast and labored with abdominal breathing and grossly audible secretions but once turn was completed pt calmed down and drifted off to sleep; new subQ IV started in right upper arm; depends dry; pts sister at bedside; pt noted to have trembling of lower lip--pts sister reports that this is some of patient's seizure activity along with a \"blank stare\"  1320-patient appears to be asleep in bed-eyes closed; no grimacing or furrowed brow; respirations shallow, unlabored and without grossly audible upper airway secretions  1430- patient laying quietly on her left side; no family at the bedside; respirations unlabored and shallow  1500-patient turned to the right; trach care completed; oral care completed; pt tolerated the turn without distress; respirations remain shallow and unlabored; no grimacing or restlessness noted  1530-patient's sister Brook Ferraro present at the bedside  1610-pt resting quietly with eyes closed; respirations shallow, even and unlabored   1632-eyes open to tactile stimuli--scheduled dilaudid, lorazepam, robinol and atropine administered  1750-pt turned to the left; eyes opened during movement; no grimacing or furrowed brow, no audible upper airway secretions; respirations shallow 12/min and unlabored  1820-patient resting with eyes closed; respirations shallow and unlabored  1900-Report given to Scarlett STAPLES RN     NAME OF PATIENT:  Tomi Lemus    LEVEL OF CARE:  GIP    REASON FOR GIP:   Unmanageable respiratory distress, Medication adjustment that must be monitored 24/7 and Stabilizing treatment that cannot take place at home    *PATIENT REMAINS ELIGIBLE FOR [facility] LEVEL OF CARE AS EVIDENCED BY: (MUST BE ADDRESSED OF PATIENT GIP)      REASON FOR RESPITE:  n/a    O2 SAFETY:  Concentrator positioning (6\" from furniture/drapes) and No petroleum based products on face while oxygen in use    FALL INTERVENTIONS PROVIDED:   Implemented/recommended resources for alarm system (personal alarm, bed alarm, call bell, etc.) , Implemented/recommended environmental changes (remove hazards, lower bed, improve lighting, etc.) and Implemented/recommended increased supervision/assistance    INTERDISPLINARY COMMUNICATION/COLLABORATION:  Physician, MSW and [place]    NEW MEDICATION INITIATION DOCUMENTATION:  Consulted AT MD to report change in pt status, Obtained Order from Provider for initiation of symptom relief medication /other medication needed and Documentation completed in Clinical Note in [facility]    Reason medication is being initiated:  meds administered via PEG are contributing to patient's aspiration    MD / Provider name consulted re: change in status / initiation of new medication:  Dr. Nirav Larkin Symptom(s):  Secretions, dyspnea    New Order(s):  See [facility] for changes to POC    Name of the person notified of the changes:  Patient's mother and sister    Name of person being taught:  Patient's mother and sister    Instructions given:  RNs to administer medications    Side Effects taught:  yes    Response to teaching:  Verbalized understanding      COMFORTABLE DYING MEASURE:  Is Patient/family satisfied with symptom level?  yes    DISCHARGE PLAN:  Patient will likely pass at [facility]; should pt stablize then she will be discharged back to her group home

## 2021-06-03 NOTE — HOSPICE
Initial spiritual care visit with the patient. The patient was alone in her room. She opened her eyes when the  started talking to her but was enable to keep them open or respond in any other way. Per the hospital  the patient has a Karla Floro belief. The  talked to the patient about her family and what was on TV. The  offered a prayer for comfort and peace. The  called the patient's sister Elias Schulz to offer support. She said she was doing ok and was not in need of spiritual support at this time . She did say the patient likes [employer] music and would love for the  to play it while she is visiting.

## 2021-06-03 NOTE — PROGRESS NOTES
[facility] Help to Those in Need  [phone]    Patient Name: Phyllis Yusuf  YOB: 1970    Date of Provider Hospice Visit: 06/03/21    Level of Care:   [x] General Inpatient (GIP)    [] Routine   [] Respite    Current Location of Care:  [] [facility] [x] [facility] [] [facility] [] [facility] [] [facility]    IF [facility], patient referred from:  [] [facility] [x] [facility] [] [facility] [] [facility] [] Home [] Other:     Date of Original Hospice Admission: 06/01/21  Hospice Medical Director at time of admission: [address] [place] [address] Diagnosis: Advanced Down's syndrome  Diagnoses RELATED to the terminal prognosis: Severe Sepsis,  Aspiration pneumonia, C. Diff colitis,   Other Diagnoses: seizures, CKD stage 3, chronic lower extremity edema      HOSPICE SUMMARY   Do not cut and paste chart information other than imaging findings    Phyllis Yusuf is a 49y.o. year old who was admitted to [facility]. The patient's principle diagnosis of Advanced Down's syndrome has resulted in further progression with associated debility, s/p Trach and PEG tube, seizures disorder, dementia, non verbal at baseline, & chronic aspiration. Pt now presented with sepsis likely secondary to aspiration pneumonia and C. Diff colitis. Pt treated with Antibiotics for both infections however continues to decline and showing signs of distress; increased restlessness, agitation, shortness of breath, and retention of airway secretions. Pt also with worsening chronic hypotension that required increased midodrine supplementation. Pt continues on chronic anti seizure medications. Pt has had recurrent admissions for similar medical concerns this year. Refer to LCD     Functionally, the patient's Karnofsky and/or Palliative Performance Scale has declined over a period of weeks and is estimated at 20-30%. The patient is dependent on the following ADLs:all      The patient/family chose comfort measures with the support of Hospice. HOSPICE DIAGNOSES   Active Symptoms:  1. Labored breathing  2. Increased oropharyngeal secretions  3. Restlessness/agitation  4. Non verbal chronic pain  5. Chronic lower extremity edema  6. Chronic aspiration/inability to tolerate tube feeds  7 Debility/Down's syndrome  8 Hospice care pt     PLAN   1. Patient will continue GIP level care as she is need frequent nursing assessment, IV medication management, did not tolerate sublingual/oral medication nor is she tolerating PEG feedings or medications via the PEG. Multiple adjustments in medication yesterday and patient appears much more comfortable today. Still had some episodes of agitation and significant secretions last night. Had to do some gentle suctioning but her respiratory rate looks much better. 2. Subcutaneous access has been obtained  3. Ativan 2 mg subcutaneous every 4 hours scheduled every 15 minutes as needed. We will use this for both restlessness as well as seizure prevention. Evaluate for any seizure activity. I have also added a seizure dose of Ativan if patient were to experience a seizure. According to family, these are absent type seizures. 4. Tube feedings have been held/stopped for now  5. Dilaudid 0.5 mg subcu every 4 hours scheduled every 15 minutes as needed. 2 as needed doses overnight  6. Monitor for seizures  7. For secretions, scopolamine patch, Robinul 0.2 mg subcu every 4, atropine drops. Gentle suction as needed as well as repositioning as needed. 8. Other comfort medications as needed  9. Duonebs every 6 hours as needed. 10. Continue O2 via trach 5l-this likely can be weaned. 11. I was able to talk with patient's sister. Both sisters visited yesterday and we educated on end-of-life care. Obviously, they are upset but also understand and appreciated the patient looks much more comfortable. I left a message for patient's mother yesterday but patient's sisters were able to talk with her.   We will continue to discuss with him daily    12.  and SW to support family needs  15. Disposition: If pt's condition stabilizes then she can possibly return to the group home under hospice care  14. Hospice Plan of care was reviewed in detail and agree with current plan of care    Prognosis estimated based on 06/03/21 clinical assessment is:   [x] Hours to Days    [] Days to Weeks    [] Other:    Communicated plan of care with: Hospice Case Manager; Hospice IDT; Care Team     GOALS OF CARE     Patient/Medical POA stated Goal of Care: comfort    [x] I have reviewed and/or updated ACP information in the Advance Care Planning Navigator. This information is available in the [address] link in the patient's chart header. Primary Decision [facility] (Health Care Agent):   Primary Decision Maker (Active): Nicole Velez - Mother - [phone]    Secondary Decision Maker: Gonzalez Jaquez - Sister - [phone]    Secondary Decision Maker: Nyla Smith - Sister - [phone]    Resuscitation Status: DNR  If DNR is there a Durable DNR on file? : [x] Yes [] No (If no, complete Durable DNR)    HISTORY     History obtained from: chart review, family, hospice liaisons    CHIEF COMPLAINT: N/A  The patient is:   [] Verbal  [x] Nonverbal  [] Unresponsive    HPI/SUBJECTIVE:  Pt seen at hospital today in FU. Seems to have increased coarse secretions airways and chest; coarse rales, RN states that she tried suctioning that did not help much. Pt also appears to have more labored breathing; rapid and shallow. Also more restless. Abdomen seems lot more distended. She did have a BM this am.   Pt received a dose of lorazepam while I was in.     6/2-patient eyes open. Shows evidence of furrowing of the brow, work of breathing, audible secretions    6/3-patient finally resting. No evidence of nonverbal signs of pain today. Work of breathing is much improved. Secretions seem improved as well.        REVIEW OF SYSTEMS     The following systems were: [] reviewed  [x] unable to be reviewed    Positive ROS include:  Constitutional: fatigue, weakness, in pain, short of breath  Ears/nose/mouth/throat: increased airway secretions  Respiratory:shortness of breath, wheezing  Gastrointestinal:poor appetite, nausea, vomiting, abdominal pain, constipation, diarrhea  Musculoskeletal:pain, deformities, swelling legs  Neurologic:confusion, hallucinations, weakness  Psychiatric:anxiety, feeling depressed, poor sleep  Endocrine:     Adult Non-Verbal Pain Assessment Score: 2    Face  [] 0   No particular expression or smile  [x] 1   Occasional grimace, tearing, frowning, wrinkled forehead  [] 2   Frequent grimace, tearing, frowning, wrinkled forehead    Activity (movement)  [x] 0   Lying quietly, normal position  [] 1   Seeking attention through movement or slow, cautious movement  [] 2   Restless, excessive activity and/or withdrawal reflexes    Guarding  [x] 0   Lying quietly, no positioning of hands over areas of body  [] 1   Splinting areas of the body, tense  [] 2   Rigid, stiff    Physiology (vital signs)  [x] 0   Stable vital signs  [] 1   Change in any of the following: SBP > 20mm Hg; HR > 20/minute  [] 2   Change in any of the following: SBP > 30mm Hg; HR > 25/minute    Respiratory  [] 0   Baseline RR/SpO2, compliant with ventilator  [x] 1   RR > 10 above baseline, or 5% drop SpO2, mild asynchrony with ventilator  [] 2   RR > 20 above baseline, or 10% drop SpO2, asynchrony with ventilator     FUNCTIONAL ASSESSMENT     Palliative Performance Scale (PPS): 10       PSYCHOSOCIAL/SPIRITUAL ASSESSMENT     Active Problems:    * No active hospital problems.  *    Past Medical History:   Diagnosis Date    Acute cystitis without hematuria 4/3/2021    VICK [facility]acute kidney [facility]) [facility] [address] 3/31/2021    C. difficile diarrhea 3/28/2020    Clostridium difficile diarrhea 6/30/2020    Constipation 12/11/2019    Diarrhea in adult patient 3/28/2020    Down syndrome     Elevated Dilantin level 4/3/2021    History of vascular access device 06/30/2020    4 Fr midline, 10 cm L brachial, poor access    History of vascular access device 04/01/2021    [facility] VAT 5fr double PICC LFT brachial at 39cm, arm cir 31 cm     Oral thrush 3/29/2020    Positive fecal occult blood test 3/29/2020    Seizures ([address]     Sleep apnea     humidifier and oxygen w/trach    Status epilepticus ([address] 3/30/2021    Stroke ([facility] [address] 2019    \"old stroke seen on CT\"    Thrombocytosis ([facility] [address] 3/31/2021    VAP (ventilator-associated pneumonia) ([facility] [address] 4/9/2021      Past Surgical History:   Procedure Laterality Date    COLONOSCOPY N/A 7/8/2020    COLONOSCOPY with fecal transplant (consents in red file) performed by Destiny Penaloza MD at [address] [facility] HX GI      gtube 2/2020    HX OTHER SURGICAL      tracheostomy 2/2020      Social History     Tobacco Use    Smoking status: Never Smoker    Smokeless tobacco: Never Used   Substance Use Topics    Alcohol use: Never     Family History   Family history unknown:  Yes      Allergies   Allergen Reactions    Depakote [Divalproex] Swelling      Current Facility-Administered Medications   Medication Dose Route Frequency    LORazepam (ATIVAN) injection 4 mg  4 mg IntraVENous Q5MIN PRN    LORazepam (ATIVAN) injection 2 mg  2 mg SubCUTAneous Q4H    HYDROmorphone (DILAUDID) injection 0.5 mg  0.5 mg SubCUTAneous Q4H    glycopyrrolate (ROBINUL) injection 0.2 mg  0.2 mg SubCUTAneous Q4H    LORazepam (ATIVAN) injection 2 mg  2 mg SubCUTAneous Q15MIN PRN    HYDROmorphone (DILAUDID) injection 0.5 mg  0.5 mg SubCUTAneous Q15MIN PRN    scopolamine (TRANSDERM-SCOP) 1 mg over 3 days 1 Patch  1 Patch TransDERmal Q72H    atropine 1 % ophthalmic solution 1 Drop  1 Drop SubLINGual TID    bisacodyL (DULCOLAX) suppository 10 mg  10 mg Rectal DAILY PRN    glycopyrrolate (ROBINUL) injection 0.2 mg  0.2 mg IntraVENous Q4H PRN    albuterol-ipratropium (DUO-NEB) 2.5 MG-0.5 MG/3 ML  3 mL Nebulization Q6H PRN        PHYSICAL EXAM     Wt Readings from Last 3 Encounters:   06/01/21 65 kg (143 lb 3.2 oz)   04/22/21 64.3 kg (141 lb 12.8 oz)   04/14/21 59.2 kg (130 lb 8 oz)       Visit Vitals  BP (!) 90/50   Pulse (!) 109   Temp 98.9 °F (37.2 °C)   Resp 20   SpO2 96%       Supplemental O2  [x] Yes: 5l Trach  [] NO  Last bowel movement: 06/01/21    Currently this patient has:  [x] Peripheral IV [] PICC  [] PORT [] ICD    [x] Serna Catheter [] NG Tube   [] PEG Tube    [] Rectal Tube [] Drain  [] Other:     Constitutional: Ill-appearing, minimally responsive, no evidence of work of breathing  Eyes: pallor, blank stare  ENMT: i's slightly dry oral mucosa  Cardiovascular: Regular rate and rhythm with peripheral edema  Respiratory: Respiratory rate now down into the mid teens, no significant work of breathing, no accessory muscle use  Gastrointestinal: distended abdomen, hypoactive bowel sounds, PEG in place  Musculoskeletal: contracted extremities  Skin:warm  Neurologic: down's syndrome, dementia, non verbal, disabled  Psychiatric: Much calmer  Other:       Pertinent Lab and or Imaging Tests:  Lab Results   Component Value Date/Time    Sodium 152 (H) 05/30/2021 03:09 AM    Potassium 4.3 05/30/2021 03:09 AM    Chloride 123 (H) 05/30/2021 03:09 AM    CO2 22 05/30/2021 03:09 AM    Anion gap 7 05/30/2021 03:09 AM    Glucose 115 (H) 05/30/2021 03:09 AM    BUN 22 (H) 05/30/2021 03:09 AM    Creatinine 0.90 05/30/2021 03:09 AM    BUN/Creatinine ratio 24 (H) 05/30/2021 03:09 AM    GFR est AA >60 05/30/2021 03:09 AM    GFR est non-AA >60 05/30/2021 03:09 AM    Calcium 8.3 (L) 05/30/2021 03:09 AM     Lab Results   Component Value Date/Time    Protein, total 6.3 (L) 05/30/2021 03:09 AM    Albumin 1.8 (L) 05/30/2021 03:09 AM           Total time:   Counseling / coordination time:   > 50% counseling / coordination?:

## 2021-06-03 NOTE — PROGRESS NOTES
Problem: Pressure Injury - Risk of  Goal: *Prevention of pressure injury  Description: Document Rolf Scale and appropriate interventions in the flowsheet. Outcome: Progressing Towards Goal  Note: Pressure Injury Interventions:  Sensory Interventions: Check visual cues for pain, Minimize linen layers    Moisture Interventions: Absorbent underpads, Minimize layers, Moisture barrier    Activity Interventions: Pressure redistribution bed/mattress(bed type)    Mobility Interventions: Float heels, HOB 30 degrees or less    Nutrition Interventions: Document food/fluid/supplement intake    Friction and Shear Interventions: Apply protective barrier, creams and emollients, HOB 30 degrees or less, Minimize layers                Problem: Falls - Risk of  Goal: *Absence of Falls  Description: Document Brianne Fall Risk and appropriate interventions in the flowsheet.   Outcome: Progressing Towards Goal  Note: Fall Risk Interventions:       Mentation Interventions: Adequate sleep, hydration, pain control, Bed/chair exit alarm, Door open when patient unattended    Medication Interventions: Bed/chair exit alarm    Elimination Interventions: Bed/chair exit alarm, Call light in reach    History of Falls Interventions: Bed/chair exit alarm, Door open when patient unattended

## 2021-06-03 NOTE — PROGRESS NOTES
0700-Report received from [address] completed; pt laying in bed with eyes closed, neck hyperextended; respirations shallow, regular, non-labored at 28/min  0810-scheduled lorazepam, robinol, dilaudid, and atropine administered; pt turned to the left  0845-pt laying quietly in bed; respirations shallow, unlabored, no gross audible secretions heard;RR 18/min  0930-RR 20/min, shallow, unlabored  1010-respirations very shallow 18/min; pt laying with eyes closed; no grimacing or furrowed brow  1120-RR 20/min, very shallow, unlabored  1120- Karla at the bedside  1130-scheduled lorazepam, robinol and dilaudid administered; pts eyes open to voice and stimuli; trach care completed; oral care completed; pts face washed; pt turned onto right side  1200-RR 18/min, shallow, appears unlabored; no grimacing or furrowed brow, pt remains laying quietly in bed  1321-patient laying on her right side, eyes closed; facial muscles relaxed; respirations shallow 18/min; no grossly audible secretions   1410-RR 24/min with grossly audible high pitched noise exuding from her trach on exhalation--PRN lorazepam and dilaudid administered and patient deep suctioned with the retrieval of a significant, hardened piece of dried secretion from her trach; the high pitched whistling stopped following suction  1423-left lateral thigh subQ IV site hard to the touch in the surrounding tissue so this was removed and a new subQ site started on the left anterior thigh;   1515-pt turned onto left side; diaper removed and pericare/temple care completed; brief was not replaced but a driflo pad was placed under patient; the wound on left head-the scab/eschar fell off with repositioning--this was cleansed with saline and dry gauze and covered with an Optifoam dressing   1530-RR decreased to 20/min, unlabored; pt laying quietly on her side  1620-RR 16/min and shallow; eyes open to voice; patient's nephew at bedside to visit  1635-scheduled lorazepam, dilaudid, and robinol administered; family at the bedside; respirations 16 and shallow, unlabored  1700-scheduled atropine drop administered  1815-pt turned to the right; RR labored and fast at 28/min  1830-PRN lorazepam and dilaudid administered  1900-Report given to Ezra STAPLES RN        NAME OF PATIENT:  Cha Gray    LEVEL OF CARE:  [facility]    REASON FOR GIP:   Pain, despite numerous changes in medications, Unmanageable respiratory distress, Medication adjustment that must be monitored 24/7 and Stabilizing treatment that cannot take place at home    *PATIENT REMAINS ELIGIBLE FOR [facility] LEVEL OF CARE AS EVIDENCED BY: (MUST BE ADDRESSED OF PATIENT GIP)      REASON FOR RESPITE:  n/a    O2 SAFETY:  Concentrator positioning (6\" from furniture/drapes) and No petroleum based products on face while oxygen in use    FALL INTERVENTIONS PROVIDED:   Implemented/recommended use of non-skid footwear, Implemented/recommended resources for alarm system (personal alarm, bed alarm, call bell, etc.) , Implemented/recommended environmental changes (remove hazards, lower bed, improve lighting, etc.) and Implemented/recommended increased supervision/assistance    INTERDISPLINARY COMMUNICATION/COLLABORATION:  Physician, MSW, [place] and RN, CNA    NEW MEDICATION INITIATION DOCUMENTATION:  Consulted AT MD to report change in pt status, Obtained Order from Provider for initiation of symptom relief medication /other medication needed and Documentation completed in Clinical Note in [facility]    Reason medication is being initiated:  dyspnea    MD / Provider name consulted re: change in status / initiation of new medication:  Dr. Denise Lobo Symptom(s):  dyspnea    New Order(s):  See [facility] for changes    Name of the person notified of the changes:  No family present    Name of person being taught:  No family present    Instructions given:  RN to administer medications    Side Effects taught:  No family present    Response to teaching:  No family present      COMFORTABLE DYING MEASURE:  Is Patient/family satisfied with symptom level?  yes    DISCHARGE PLAN:  EOL at [facility]

## 2021-06-03 NOTE — HOSPICE
1900: report received from [facility], [address] [place] [address]: assessment completed see flow sheets for full assessment. Pt is calm and quiet during assessment, eyes open to touch. 2004: Scheduled subcutaneous medications given. Pt restless during med administration, will monitor closely to assess need for PRN medications  2100: pt turned onto R side  2230: pt having labored breathing and is tachypneic, RR 34. PRN Ativan and Dilaudid given  2320: resting with eyes closed, respirations are less labored, RR 19. PRN medications effective  0020: pt bathed, scant amount of lynne urine in brief. Repositioned onto L side. Large amount of white secretions suctioned from mouth. 0118: pt resting quietly with eyes closed  0200: pt is in respiratory distress, RR 30 and labored. PRN Dilaudid and Ativan given. 0230: trach care completed, pt resting with RR of 20. Large amount of secretions suctioned from mouth  0314: pt now resting quietly, no respiratory distress noted. Pt does have intermittent weak dry cough   0340: bladder is distended, temple catheter inserted and 750 mL of yellow urine drained. Pt turned onto R side  0440: pt resting, respirations are very shallow, RR 14.  Scheduled subcu meds given  0530: pt resting quietly, neck is hyperextended, respirations continue to be very shallow  0620: eyes closed, relaxed facial expression respirations remain shallow  0700: report given to [facility], RN          NAME OF PATIENT:  Nonda Lines    LEVEL OF CARE: GIP    REASON FOR GIP:   Pain, despite numerous changes in medications, Unmanageable respiratory distress, Terminal agitation, despite changes to medications, Medication adjustment that must be monitored 24/7 and Stabilizing treatment that cannot take place at home    PATIENT REMAINS ELIGIBLE FOR GIP LEVEL OF CARE AS EVIDENCED BY: Pt requires scheduled and PRN IV medications to mange symptoms and frequent nursing assessments      REASON FOR RESPITE:  n/a    O2 SAFETY:  n/a    FALL INTERVENTIONS PROVIDED:   Implemented/recommended resources for alarm system (personal alarm, bed alarm, call bell, etc.) , Implemented/recommended environmental changes (remove hazards, lower bed, improve lighting, etc.) and Implemented/recommended increased supervision/assistance    INTERDISPLINARY COMMUNICATION/COLLABORATION:  Physician, MSW, [place] and RN, CNA    NEW MEDICATION INITIATION DOCUMENTATION:  n/a    Reason medication is being initiated:  n/a    MD / Provider name consulted re: change in status / initiation of new medication:  n/a    New Symptom(s): n/a    New Order(s): n/a    Name of the person notified of the changes: n/a    Name of person being taught: n/a    Instructions given: n/a    Side Effects taught: n/a    Response to teaching: n/a      COMFORTABLE DYING MEASURE:  Is Patient/family satisfied with symptom level?  yes    DISCHARGE PLAN:  Pt will likely  at the community hospice house, if she stabilizes placement would be found for her

## 2021-06-04 NOTE — PROGRESS NOTES
[facility]  [phone]    Patient Name: Greta Desai  YOB: 1970    Date of Provider Hospice Visit: 06/04/21    Level of Care:   [x] General Inpatient (GIP)    [] Routine   [] Respite    Current Location of Care:  [] [facility] [x] [facility] [] [id number] [address] [facility] [] [facility] [] Hospice House [facility]    IF [facility], patient referred from:  [] [facility] [x] [facility] [] [address] [address] [facility] [] [facility] [] Home [] Other:     Date of Original Hospice Admission: 06/01/21  Hospice Medical Director at time of admission: [address] [place] [address] Diagnosis: Advanced Down's syndrome  Diagnoses RELATED to the terminal prognosis: Severe Sepsis,  Aspiration pneumonia, C. Diff colitis,   Other Diagnoses: seizures, CKD stage 3, chronic lower extremity edema      HOSPICE SUMMARY   Do not cut and paste chart information other than imaging findings    Greta Desai is a 49y.o. year old who was admitted to [facility]. The patient's principle diagnosis of Advanced Down's syndrome has resulted in further progression with associated debility, s/p Trach and PEG tube, seizures disorder, dementia, non verbal at baseline, & chronic aspiration. Pt now presented with sepsis likely secondary to aspiration pneumonia and C. Diff colitis. Pt treated with Antibiotics for both infections however continues to decline and showing signs of distress; increased restlessness, agitation, shortness of breath, and retention of airway secretions. Pt also with worsening chronic hypotension that required increased midodrine supplementation. Pt continues on chronic anti seizure medications. Pt has had recurrent admissions for similar medical concerns this year. Refer to LCD     Functionally, the patient's Karnofsky and/or Palliative Performance Scale has declined over a period of weeks and is estimated at 20-30%. The patient is dependent on the following ADLs:all      The patient/family chose comfort measures with the support of Hospice. HOSPICE DIAGNOSES   Active Symptoms:  1. Labored breathing  2. Increased oropharyngeal secretions  3. Restlessness/agitation  4. Non verbal chronic pain  5. Chronic lower extremity edema  6. Chronic aspiration/inability to tolerate tube feeds  7 Debility/Down's syndrome  8 Hospice care pt     PLAN   1. Patient will continue GIP level care as she is need frequent nursing assessment, IV medication management, did not tolerate sublingual/oral medication nor is she tolerating PEG feedings or medications via the PEG. Patient still having spells of increase work of breathing, possible seizures. I am concerned patient may be having breakthrough seizure with some of this increased agitation/work of breathing  2. We will increase Ativan 4 mg subcutaneous every 4 hours scheduled every 15 minutes as needed. My hope is this will help both the restlessness and if there is any breakthrough seizures which have been difficult to fully assess  3. Tube feedings have been held/stopped for now  4. Increase Dilaudid 2 mg subcu every 4 hours scheduled every 15 minutes as needed. Patient had been increased to 1 mg yesterday afternoon but clearly when I entered the room this morning she was having significant respiratory distress. 5. Monitor for seizures  6. For secretions, will stop Scopolamine and atropine drops and just continue Robinul. I do worry that maybe we dried her out too much. Continue gentle suction. 7. Other comfort medications as needed  8. Duonebs every 6 hours as needed. 9. Continue O2 via trach 5l-this likely can be weaned. 10. Plan reviewed with bedside nurse    11.  and SW to support family needs  15. Disposition: If pt's condition stabilizes then she can possibly return to the group home under hospice care  13.  Hospice Plan of care was reviewed in detail and agree with current plan of care    Prognosis estimated based on 06/04/21 clinical assessment is:   [x] Hours to Days    [] Days to Weeks    [] Other:    Communicated plan of care with: Hospice Case Manager; Hospice IDT; Care Team     GOALS OF CARE     Patient/Medical POA stated Goal of Care: comfort    [x] I have reviewed and/or updated ACP information in the Advance Care Planning Navigator. This information is available in the [address] link in the patient's chart header. Primary Decision [facility] (Health Care Agent):   Primary Decision Maker (Active): Bob Loya - Mother - [phone]    Secondary Decision Maker: Kristin Sheramn - Sister - [phone]    Secondary Decision Maker: Alondra Garcia - Sister - [phone]    Resuscitation Status: DNR  If DNR is there a Durable DNR on file? : [x] Yes [] No (If no, complete Durable DNR)    HISTORY     History obtained from: chart review, family, hospice liaisons    CHIEF COMPLAINT: N/A  The patient is:   [] Verbal  [x] Nonverbal  [] Unresponsive    HPI/SUBJECTIVE:  Pt seen at hospital today in FU. Seems to have increased coarse secretions airways and chest; coarse rales, RN states that she tried suctioning that did not help much. Pt also appears to have more labored breathing; rapid and shallow. Also more restless. Abdomen seems lot more distended. She did have a BM this am.   Pt received a dose of lorazepam while I was in.     6/2-patient eyes open. Shows evidence of furrowing of the brow, work of breathing, audible secretions    6/3-patient finally resting. No evidence of nonverbal signs of pain today. Work of breathing is much improved. Secretions seem improved as well. 6/4-patient with significant respiratory distress. Evidence of restlessness.   We gave multiple as needed doses this morning and now the suggested scheduled dosing       REVIEW OF SYSTEMS     The following systems were: [] reviewed  [x] unable to be reviewed    Positive ROS include:  Constitutional: fatigue, weakness, in pain, short of breath  Ears/nose/mouth/throat: increased airway secretions  Respiratory:shortness of breath, wheezing  Gastrointestinal:poor appetite, nausea, vomiting, abdominal pain, constipation, diarrhea  Musculoskeletal:pain, deformities, swelling legs  Neurologic:confusion, hallucinations, weakness  Psychiatric:anxiety, feeling depressed, poor sleep  Endocrine:     Adult Non-Verbal Pain Assessment Score: \4    Face  [] 0   No particular expression or smile  [x] 1   Occasional grimace, tearing, frowning, wrinkled forehead  [x] 2   Frequent grimace, tearing, frowning, wrinkled forehead    Activity (movement)  [x] 0   Lying quietly, normal position  [] 1   Seeking attention through movement or slow, cautious movement  [] 2   Restless, excessive activity and/or withdrawal reflexes    Guarding  [x] 0   Lying quietly, no positioning of hands over areas of body  [] 1   Splinting areas of the body, tense  [] 2   Rigid, stiff    Physiology (vital signs)  [x] 0   Stable vital signs  [] 1   Change in any of the following: SBP > 20mm Hg; HR > 20/minute  [] 2   Change in any of the following: SBP > 30mm Hg; HR > 25/minute    Respiratory  [] 0   Baseline RR/SpO2, compliant with ventilator  [x] 1   RR > 10 above baseline, or 5% drop SpO2, mild asynchrony with ventilator  [x] 2   RR > 20 above baseline, or 10% drop SpO2, asynchrony with ventilator     FUNCTIONAL ASSESSMENT     Palliative Performance Scale (PPS): 10       PSYCHOSOCIAL/SPIRITUAL ASSESSMENT     Active Problems:    * No active hospital problems.  *    Past Medical History:   Diagnosis Date    Acute cystitis without hematuria 4/3/2021    VICK [facility]acute kidney [facility]) ([facility] [address] 3/31/2021    C. difficile diarrhea 3/28/2020    Clostridium difficile diarrhea 6/30/2020    Constipation 12/11/2019    Diarrhea in adult patient 3/28/2020    Down syndrome     Elevated Dilantin level 4/3/2021    History of vascular access device 06/30/2020    4 Fr midline, 10 cm L brachial, poor access    History of vascular access device 04/01/2021 [facility] VAT 5fr double PICC LFT brachial at 39cm, arm cir 31 cm     Oral thrush 3/29/2020    Positive fecal occult blood test 3/29/2020    Seizures ([facility] [address]     Sleep apnea     humidifier and oxygen w/trach    Status epilepticus ([facility] [address] 3/30/2021    Stroke ([facility] [address] 2019    \"old stroke seen on CT\"    Thrombocytosis ([facility] [address] 3/31/2021    VAP (ventilator-associated pneumonia) ([facility] [address] 4/9/2021      Past Surgical History:   Procedure Laterality Date    COLONOSCOPY N/A 7/8/2020    COLONOSCOPY with fecal transplant (consents in red file) performed by Bubba Gonzalez MD at [address] [facility] HX GI      gtube 2/2020    HX OTHER SURGICAL      tracheostomy 2/2020      Social History     Tobacco Use    Smoking status: Never Smoker    Smokeless tobacco: Never Used   Substance Use Topics    Alcohol use: Never     Family History   Family history unknown:  Yes      Allergies   Allergen Reactions    Depakote [Divalproex] Swelling      Current Facility-Administered Medications   Medication Dose Route Frequency    HYDROmorphone (DILAUDID) injection 2 mg  2 mg SubCUTAneous Q4H    LORazepam (ATIVAN) injection 4 mg  4 mg SubCUTAneous Q4H    HYDROmorphone (DILAUDID) injection 2 mg  2 mg SubCUTAneous Q15MIN PRN    LORazepam (ATIVAN) injection 4 mg  4 mg SubCUTAneous Q15MIN PRN    LORazepam (ATIVAN) injection 4 mg  4 mg IntraVENous Q5MIN PRN    glycopyrrolate (ROBINUL) injection 0.2 mg  0.2 mg SubCUTAneous Q4H    bisacodyL (DULCOLAX) suppository 10 mg  10 mg Rectal DAILY PRN    glycopyrrolate (ROBINUL) injection 0.2 mg  0.2 mg IntraVENous Q4H PRN    albuterol-ipratropium (DUO-NEB) 2.5 MG-0.5 MG/3 ML  3 mL Nebulization Q6H PRN        PHYSICAL EXAM     Wt Readings from Last 3 Encounters:   06/01/21 65 kg (143 lb 3.2 oz)   04/22/21 64.3 kg (141 lb 12.8 oz)   04/14/21 59.2 kg (130 lb 8 oz)       Visit Vitals  BP 90/68 (BP 1 Location: Left lower arm, BP Patient Position: Lying left side)   Pulse (!) 49   Temp 97.8 °F (36.6 °C)   Resp 24   SpO2 95%       Supplemental O2  [x] Yes: 5l Trach  [] NO  Last bowel movement: 06/01/21    Currently this patient has:  [x] Peripheral IV [] PICC  [] PORT [] ICD    [x] Serna Catheter [] NG Tube   [] PEG Tube    [] Rectal Tube [] Drain  [] Other:     Constitutional: Ill-appearing, restless, respiratory distress,  Eyes: pallor, blank stare  ENMT: i's slightly dry oral mucosa  Cardiovascular: Regular rate and rhythm with peripheral edema  Respiratory: Respiratory rate in the mid 20s, accessory muscle use, increased work of breathing  Gastrointestinal: distended abdomen, hypoactive bowel sounds, PEG in place  Musculoskeletal: contracted extremities  Skin:warm  Neurologic: down's syndrome, dementia, non verbal, disabled  Psychiatric: Restless other:       Pertinent Lab and or Imaging Tests:  Lab Results   Component Value Date/Time    Sodium 152 (H) 05/30/2021 03:09 AM    Potassium 4.3 05/30/2021 03:09 AM    Chloride 123 (H) 05/30/2021 03:09 AM    CO2 22 05/30/2021 03:09 AM    Anion gap 7 05/30/2021 03:09 AM    Glucose 115 (H) 05/30/2021 03:09 AM    BUN 22 (H) 05/30/2021 03:09 AM    Creatinine 0.90 05/30/2021 03:09 AM    BUN/Creatinine ratio 24 (H) 05/30/2021 03:09 AM    GFR est AA >60 05/30/2021 03:09 AM    GFR est non-AA >60 05/30/2021 03:09 AM    Calcium 8.3 (L) 05/30/2021 03:09 AM     Lab Results   Component Value Date/Time    Protein, total 6.3 (L) 05/30/2021 03:09 AM    Albumin 1.8 (L) 05/30/2021 03:09 AM           Total time:   Counseling / coordination time:   > 50% counseling / coordination?:

## 2021-06-04 NOTE — PROGRESS NOTES
Problem: Pressure Injury - Risk of  Goal: *Prevention of pressure injury  Description: Document Rolf Scale and appropriate interventions in the flowsheet. Outcome: Progressing Towards Goal  Note: Pressure Injury Interventions:  Sensory Interventions: Assess changes in LOC, Float heels, Keep linens dry and wrinkle-free, Minimize linen layers    Moisture Interventions: Absorbent underpads, Internal/External urinary devices, Minimize layers, Moisture barrier    Activity Interventions: Pressure redistribution bed/mattress(bed type)    Mobility Interventions: Float heels, HOB 30 degrees or less    Nutrition Interventions:  (NPO)    Friction and Shear Interventions: Apply protective barrier, creams and emollients, HOB 30 degrees or less, Foam dressings/transparent film/skin sealants, Minimize layers                Problem: Falls - Risk of  Goal: *Absence of Falls  Description: Document Brianne Fall Risk and appropriate interventions in the flowsheet.   Outcome: Progressing Towards Goal  Note: Fall Risk Interventions:       Mentation Interventions: Adequate sleep, hydration, pain control, Bed/chair exit alarm, More frequent rounding    Medication Interventions: Bed/chair exit alarm    Elimination Interventions: Call light in reach, Bed/chair exit alarm    History of Falls Interventions: Bed/chair exit alarm, Door open when patient unattended         Problem: Infection - Risk of, Urinary Catheter-Associated Urinary Tract Infection  Goal: *Absence of infection signs and symptoms  Outcome: Progressing Towards Goal

## 2021-06-04 NOTE — PROGRESS NOTES
LCSW met with patient at bedside. No family present at time of visit. Offered calming presence and held patient's hand. She remains unresponsive to sound and touch but appears peaceful and comfortable.     ANNE Fuentes, [facility]   [phone]

## 2021-06-04 NOTE — HOSPICE
NAME OF PATIENT:  Hermann Wolfe    LEVEL OF CARE:  [facility]    REASON FOR GIP:   Pain, despite numerous changes in medications, Unmanageable respiratory distress, Medication adjustment that must be monitored 24/7 and Stabilizing treatment that cannot take place at home    *PATIENT REMAINS ELIGIBLE FOR [facility] LEVEL OF CARE AS EVIDENCED BY: (MUST BE ADDRESSED OF PATIENT GIP)      REASON FOR RESPITE: n/a      O2 SAFETY:  Concentrator positioning (6\" from furniture/drapes), Tanks stored in baltazar , No petroleum based products on face while oxygen in use and Oxygen sign on the door    FALL INTERVENTIONS PROVIDED:   Implemented/recommended resources for alarm system (personal alarm, bed alarm, call bell, etc.)  and Implemented/recommended environmental changes (remove hazards, lower bed, improve lighting, etc.)    INTERDISPLINARY COMMUNICATION/COLLABORATION:  Physician and RN, CNA    NEW MEDICATION INITIATION DOCUMENTATION:  Consulted AT MD to report change in pt status, Obtained Order from Provider for initiation of symptom relief medication /other medication needed and Documentation completed in Clinical Note in [facility]    Reason medication is being initiated:  Dyspnea, pain    MD / Provider name consulted re: change in status / initiation of new medication:  Dr. Edilson Hunter Symptom(s):  Increased dyspnea    New Order(s):  Dilaudid 2mg Q4H, Ativan 4 mg Q4H    Name of the person notified of the changes:  Bob Born, mother    Name of person being taught:  same    Instructions given:  yes    Side Effects taught:  yes    Response to teaching:  understanding      COMFORTABLE DYING MEASURE:  Is Patient/family satisfied with symptom level?  yes    DISCHARGE PLAN:  Patient may pass at [facility], should patient stabilize placement will be arranged. 0700  Report received from [facility]  0730  Patient resting quietly lying on left side. Respirations very shallow and coarse.   2+ generalized edema noted, hand and feet cool to touch.  0800  Resting quietly with eyes open. 0830  Medicated with scheduled medications. 4902  Patient's RR 42, coarse respirations with whistling sound from trach. Medicated with prn dilaudid. 7379  Patient continues with labored rapid respirations, Dr. Dara Dubois at bedside. Medicated with prn dilaudid and ativan. 5388  Repositioned patient onto right side. Attempted to suction trach for comfort. No secretions noted. Respirations 34-36. Seems slightly less labored. 0900  Patient appears to be resting more comfortably. Respiration rate 24-26. Eyes closed, oral care provided. Medications adjusted per Dr. Dara Dubois for management of discomfort and dyspnea. 0930  Patient resting quietly, eyes closed. 1000  Patients mother (Ro Lizz) and care giver(Lina) at bedside. Update provided. 1050  Patient resting quietly with eyes closed. Oral care provided. 1145  Respiration rate 26, appears to be comfortable. Home  at bedside. 1210  Medicated with scheduled robinul, ativan and dilaudid. Oral care provided. 1250  Resting quietly with eyes closed. 1340  Patient's respirations 26, labored at times. Medicated with prn dilaudid. 1420  Patient appears to be resting quietly. Repositioned patient to her left side. 1455  Patient having occasional periods of brief apnea. Lips appear more dusky. 1515  TOD, no spontaneous respirations or heart beat auscultated after 2 minutes. 1520  No answer at Ro Zamudio (mom) number. Spoke with Lina, care giver at group home. Update provided. [address] with patient's mother, Ro Zamudio, awaiting a return call regarding  home choice. 1630  attempted to call patient's mother for  home info, no answer. I did speak with Lina. She stated she was on the phone with Ro Zamudio and she would return my call shortly. 1640  Patient's sister Jayden Paci at bedside very tearful grieving appropriately.      65  Sister continues to be very tearful, asking oxygen to be turned back on. Dr. Marcin Loya at bedside. [phone]  Family to come into view patient. [address] 24 for assistance, patient's mother at bedside. Sister continues to be distraught. [address] at bedside  184  Family remains at bedside, sister not wanting to leave. [address] no longer at bedside. Mother informed jennifer that another sister is on her way.   Sister at bedside with jennifer.   Second sister very tearful, nurse and jennifer remain at bedside.   Report given to nurse, family had spoke with Jake Beltran and .  home earlier. Will call them when family has left and body prepared.

## 2021-06-04 NOTE — HOSPICE
1900: report received from [place], [address] [place] [address]: assessment completed, pt resting quietly with audible secretions, does not seem to be in any respiratory distress, respirations are shallow and unlabored. Pt is lethargic, eyes will open to touch. See flowsheets for full assessment  2025: Scheduled subcu medications given, relaxed facial expression, no grimacing noted  2120: pt resting with eyes closed, Respirations are shallow, RR 19  2215: pt turned supine. Neck is hyperextended, respirations remain shallow  2305: mouth care completed, pt has large white patches on her tongue   0002: respirations are shallow and unlabored RR 22  0100: high pitched whistling heard from outside pt room, deep suctioned patient and removed a hardened chunk of dried secretions. Pt tolerated well and returned to resting quietly after being suctioned  0200: pt turned onto R side, no respiratory distress noted  0250: respirations shallow and unlabored, RR 22. Relaxed facial expression, no grimacing noted  0345: pt continues to rest quietly, mouth care completed  0430: respirations shallow and unlabored, pt opens eyes to voice and touch  0522: pt bathed and turned onto L side. R ear is reddened but blanchable, zinc cream applied.  Pt tolerated bath well and once completed, quickly fell asleep  0627: pt resting with shallow and unlabored respirations, RR 22  0700: report given to oncoming nurse     NAME OF PATIENT:  Sam Mittal    LEVEL OF CARE: GIP    REASON FOR GIP:   Unmanageable respiratory distress, Medication adjustment that must be monitored 24/7 and Stabilizing treatment that cannot take place at home    *PATIENT REMAINS ELIGIBLE FOR GIP LEVEL OF CARE AS EVIDENCED BY: Pt requires PRN and scheduled IV medications to manage symptoms and frequent skilled nursing assessments       REASON FOR RESPITE:  n/a    O2 SAFETY:  Concentrator positioning (6\" from furniture/drapes) and No petroleum based products on face while oxygen in use    FALL INTERVENTIONS PROVIDED:   Implemented/recommended resources for alarm system (personal alarm, bed alarm, call bell, etc.) , Implemented/recommended environmental changes (remove hazards, lower bed, improve lighting, etc.) and Implemented/recommended increased supervision/assistance    INTERDISPLINARY COMMUNICATION/COLLABORATION:  Physician, MSW, [place] and RN, CNA    NEW MEDICATION INITIATION DOCUMENTATION:  n/a    Reason medication is being initiated: n/a    MD / Provider name consulted re: change in status / initiation of new medication: n/a    New Symptom(s): n/a    New Order(s):  n/a    Name of the person notified of the changes: n/a    Name of person being taught: n/a    Instructions given: n/a    Side Effects taught:n/a    Response to teaching: n/a      COMFORTABLE DYING MEASURE:  Is Patient/family satisfied with symptom level?  yes    DISCHARGE PLAN: Pt will likely  at [facility], if she stabilizes, placement will be found for her

## 2021-06-04 NOTE — PROGRESS NOTES
Problem: Pressure Injury - Risk of  Goal: *Prevention of pressure injury  Description: Document Rolf Scale and appropriate interventions in the flowsheet. Outcome: Progressing Towards Goal  Note: Pressure Injury Interventions:  Sensory Interventions: Float heels, Minimize linen layers    Moisture Interventions: Internal/External urinary devices    Activity Interventions: Pressure redistribution bed/mattress(bed type)    Mobility Interventions: Float heels, HOB 30 degrees or less    Nutrition Interventions:  (NPO)    Friction and Shear Interventions: Apply protective barrier, creams and emollients, HOB 30 degrees or less, Foam dressings/transparent film/skin sealants, Minimize layers                Problem: Patient Education: Go to Patient Education Activity  Goal: Patient/Family Education  Outcome: Progressing Towards Goal     Problem: Falls - Risk of  Goal: *Absence of Falls  Description: Document Brianne Fall Risk and appropriate interventions in the flowsheet.   Outcome: Progressing Towards Goal  Note: Fall Risk Interventions:       Mentation Interventions: Adequate sleep, hydration, pain control, Bed/chair exit alarm, More frequent rounding    Medication Interventions: Bed/chair exit alarm    Elimination Interventions: Call light in reach, Bed/chair exit alarm    History of Falls Interventions: Bed/chair exit alarm, Door open when patient unattended         Problem: Patient Education: Go to Patient Education Activity  Goal: Patient/Family Education  Outcome: Progressing Towards Goal     Problem: Breathing Pattern - Ineffective  Goal: *PALLIATIVE CARE:  Alleviation of Dyspnea  Outcome: Progressing Towards Goal

## 2021-06-05 NOTE — HOSPICE
Death visit: This visit was in response to a call from the nursing staff for support to family. They shared that the patient's sister, Rickey Burrows, was at bedside and having a very difficult time excepting that the [facility], was dead and that Latasha's  mother had just arrived at the hospice house. Upon my arrival at the hospice house, Rickey Burrows was sitting silently at bedside. . I.  offered my condolence and attempted conversation to assess how I might best support her. After she did not respond, I asked if I could sit with her. Once she started to talk,  provided space for her to reflect on the special person Saintclair Lynch was and offered support to  Rickey Burrows  in her grief process. Latasha's mother, another sister and her  from the [facility] home were all at the Hospice house but not in the room. After a while, they came to the room to say they were leaving. Latasha's mother noted that she needed to go home. They shared that another sister was on the way to visit and that she too was grieving deeply. I walked them out and again offered my sympathy as they left. Latasha's other sister arrived shortly after they left. She of course was grieving and did not want to accept that her sister was dead. However,after visiting with Saint[facility] Lynch and saying her good-by's, she said she was ready to go. When I left she was still in the parking lot, I checked in with her, we talked and I offered prayer with her. Her mother and one of her sister's returned and they all left togather.

## 2021-06-05 NOTE — PROGRESS NOTES
[address] - Report received, sister and hospice Chaplain with patient. 2000 - Family has left     2200 - Post mortum care completed and [facility] called. [id number] [employer] picked up patient.

## 2021-06-09 ENCOUNTER — TELEPHONE (OUTPATIENT)
Dept: FAMILY MEDICINE CLINIC | Age: 51
End: 2021-06-09

## 2021-06-09 NOTE — TELEPHONE ENCOUNTER
----- Message from Kristi Conn sent at 6/9/2021  9:30 AM EDT -----  Regarding: AMINA Dacosta/Telephone  Contact: [phone]  General Message/Vendor Calls    Caller's first and last name:  Sandeep Alcaraz with [facility]      Reason for call: Requesting follow up regarding paperwork for office notes that was faxed on 5/2/21 and 5/5/21.        Callback required yes/no and why: yes/follow up       Best contact number(s): [phone]      Details to clarify the request: N/A      Kristi Conn

## 2021-06-09 NOTE — TELEPHONE ENCOUNTER
Called and LVM for Rosie to call the office back. Office notes were faxed to [address] on 5/5/21 with confirmation received.

## 2021-06-10 NOTE — TELEPHONE ENCOUNTER
Called and spoke with Rosie. She states that she did not receive the office notes that were faxed on 5/5/21. She asked if I could refax and also send office notes from the last 6 months. She gave me her direct fax number. Last 6 months of office notes faxed to [address]. Fax number [phone] confirmation number [phone].

## 2021-07-22 NOTE — PROGRESS NOTES
Responded to RRT called for Ms Nohelia Medel in room 324. Multiple staff members were attending to patient. No family was present. Please notify  if support desired. : . Luis A Jackson. Isai Deleon; [facility], to contact [phone] [address] call: [phone] Performed Resulted

## 2021-09-09 ENCOUNTER — DOCUMENTATION ONLY (OUTPATIENT)
Dept: FAMILY MEDICINE CLINIC | Age: 51
End: 2021-09-09

## 2021-09-10 ENCOUNTER — DOCUMENTATION ONLY (OUTPATIENT)
Dept: FAMILY MEDICINE CLINIC | Age: 51
End: 2021-09-10

## 2022-07-30 NOTE — ROUTINE PROCESS
Order received for swallowing evaluation. She currently has a trach and PEG. PNA dx. She has post prandial aspiration risks from PEG and open trach system indicating secretion management issues. These can be reduced by keeping HOB no lower than 30 degrees. Consult RD for TF. Nonverbal at baseline. Currently no SLP indicated. This document is complete and the patient is ready for discharge.

## 2022-11-30 NOTE — HOSPICE
[address] Bereavement/Condolence Call: This MSW called pts mother, Lilia Stone to offer condolences and support. MSW left  and offered [facility] information for ongoing support.     Gaye     [phone] I spoke with Teddy Jacinto via the telephone regarding her [facility] insurance coverage, potential oral medication authorizations, enrollment in the [employer] ([facility]) and the [address] [place] [address], and assistance organization resource sheet. The patient will review the cancer programs. Next, I spoke with Teddy Ophelia about billing questions and treatment services. We discussed copayments, deductibles, and out of pocket maximums. Next, I spoke with Teddy Ophelia regarding copay and foundation opportunities. Patient gave verbal permission to be enrolled. Next, I spoke with Teddy Ophelia regarding potential oral medication authorizations. I told her that if she ever had any problems getting her oral medications filled to give the dedicated [facility]  a call. Most of the time, it is simply an authorization that needs to be done with the insurance company. Lastly, I explained to Teddy Ophelia a form with various resource organizations that could assist with specific needs (example:  transportation, lodging, preparing meals, home cleaning)  I will leave a folder with the information discussed at the  for Teddy Jacinto to  at her next appointment. Teddy Jacinto expressed understanding of the information above and all questions were answered to her satisfaction.

## 2023-08-03 NOTE — PROGRESS NOTES
Transition of care note: At the request of the FP team,I contacted pt.'s group home to see how many liters of oxygen they can manage @ the group home. She is trying to contact the nurse,Colleen the nurse to find out. Hopefully,pt will be able to continue tolerating the weaning process . I updated the group home that pt is on trach collar 40 % Fio2. Pt has orders to transfer to telemetry. I updated pt.'s Penny Joe. Mechelle Roblero Case management [phone] Both arterial and venous

## 2025-04-05 NOTE — PROGRESS NOTES
Protocols faxed to [facility]. Fax number [phone] confirmation number [phone].
05-Apr-2025 14:30

## (undated) DEVICE — SOLIDIFIER MEDC 1200ML -- CONVERT TO 356117

## (undated) DEVICE — TRAY CENTRAL LINE BIOPATCH --

## (undated) DEVICE — SET ADMIN 16ML TBNG L100IN 2 Y INJ SITE IV PIGGY BK DISP

## (undated) DEVICE — ELECTRODE,RADIOTRANSLUCENT,FOAM,3PK: Brand: MEDLINE

## (undated) DEVICE — CANN NASAL O2 CAPNOGRAPHY AD -- FILTERLINE

## (undated) DEVICE — SENSOR RMFG PLSE OXMTR INF --

## (undated) DEVICE — Z CONVERTED USE 2274299 CUFF BLD PRESSURE LNG MED AD 25-35 CM ARM FLEXIPORT DISP

## (undated) DEVICE — SYR 50ML SLIP TIP NSAF LF STRL --

## (undated) DEVICE — CATH IV AUTOGRD BC BLU 22GA 25 -- INSYTE

## (undated) DEVICE — KIT COLON W/ 1.1OZ LUB AND 2 END

## (undated) DEVICE — BAG BELONG PT PERS CLEAR HANDL

## (undated) DEVICE — BASIN EMSIS 16OZ GRAPHITE PLAS KID SHP MOLD GRAD FOR ORAL

## (undated) DEVICE — Device

## (undated) DEVICE — Z DISCONTINUED GLOVE SURG SZ 7.5 L12IN FNGR THK13MIL WHT ISOLEX

## (undated) DEVICE — 1200 GUARD II KIT W/5MM TUBE W/O VAC TUBE: Brand: GUARDIAN